# Patient Record
Sex: FEMALE | Race: OTHER | HISPANIC OR LATINO | Employment: OTHER | URBAN - METROPOLITAN AREA
[De-identification: names, ages, dates, MRNs, and addresses within clinical notes are randomized per-mention and may not be internally consistent; named-entity substitution may affect disease eponyms.]

---

## 2017-02-28 ENCOUNTER — GENERIC CONVERSION - ENCOUNTER (OUTPATIENT)
Dept: CARDIOLOGY CLINIC | Facility: CLINIC | Age: 52
End: 2017-02-28

## 2017-02-28 ENCOUNTER — GENERIC CONVERSION - ENCOUNTER (OUTPATIENT)
Dept: OTHER | Facility: OTHER | Age: 52
End: 2017-02-28

## 2017-03-13 ENCOUNTER — OUTPATIENT (OUTPATIENT)
Dept: OUTPATIENT SERVICES | Facility: HOSPITAL | Age: 52
LOS: 1 days | Discharge: HOME | End: 2017-03-13

## 2017-05-15 ENCOUNTER — GENERIC CONVERSION - ENCOUNTER (OUTPATIENT)
Dept: OTHER | Facility: OTHER | Age: 52
End: 2017-05-15

## 2017-06-27 DIAGNOSIS — Z79.01 LONG TERM (CURRENT) USE OF ANTICOAGULANTS: ICD-10-CM

## 2017-06-27 DIAGNOSIS — I51.3 INTRACARDIAC THROMBOSIS, NOT ELSEWHERE CLASSIFIED: ICD-10-CM

## 2017-10-10 ENCOUNTER — GENERIC CONVERSION - ENCOUNTER (OUTPATIENT)
Dept: OTHER | Facility: OTHER | Age: 52
End: 2017-10-10

## 2017-11-07 ENCOUNTER — GENERIC CONVERSION - ENCOUNTER (OUTPATIENT)
Dept: OTHER | Facility: OTHER | Age: 52
End: 2017-11-07

## 2017-11-09 ENCOUNTER — CONVERSION ENCOUNTER (OUTPATIENT)
Dept: MAMMOGRAPHY | Facility: CLINIC | Age: 52
End: 2017-11-09

## 2017-11-16 ENCOUNTER — ALLSCRIPTS OFFICE VISIT (OUTPATIENT)
Dept: OTHER | Facility: OTHER | Age: 52
End: 2017-11-16

## 2017-11-20 NOTE — CONSULTS
Assessment    1  Acute anterior wall MI (410 10) (I21 09)   2  Dyslipidemia (272 4) (E78 5)   3  3-vessel CAD (414 00) (I25 10)    Plan  3-vessel CAD    · CARDIAC ICD IMPLANT; Status:Need Information - Financial Authorization; Requestedfor:19Nov2017;    Perform:[facility]; YJT:[id number]; Ordered; For:3-vessel CAD; Ordered By:Maxim Fritz;   · EKG/ECG- POC; Status:Complete;   Done: [id number]   Perform: In Office; Due:16Nov2018; Last Updated Corina Felix; 11/16/2017 1:24:11 PM;Ordered;CAD; Steven Red By:Maxim Fritz;    Discussion/Summary    1  Ischemic cardiomyopathy ejection fraction 28% large anterior wall myocardial infarction certainly she is at risk for sudden cardiac death  I explained to her the nature of ICD therapy explained the not make her feel better but has the potential to make her live longer I did explain inherent risks of placing a defibrillator such as heart perforation need for reoperation infection  Given that she has an apical thrombus we will perform her ICD placement on warfarin therapy  This may increase her risk of bleeding slightly but certainly do not want to risk a cerebrovascular accident  History of HIV on treatment since 1985  History of hypertension well controlled  you for asking me to see this pleasant patient in consultation will arrange for single-chamber AICD placement we will check a warfarin level a few days before will hopefully operate with an INR between two and three  The patient was counseled regarding diagnostic results,-- instructions for management,-- risk factor reductions,-- prognosis,-- impressions,-- risks and benefits of treatment options  Chief Complaint  Pt is here for an EP consult for a possible ICD implant  Pt had an MI on 2/26/17 while living in [place] and she went to [facility] in [facility]  We have requested these records  Pt moved to [place] in March and started seeing Dr Navneet Julien   Pt does not have any current cardiac complaints or symptoms  History of Present Illness  Cardiology HPI Free Text Note Form [facility]: Consult for ICD placement prior anterior wall myocardial infarction without revascularization this occurred while the patient was in the [place]  She was recently seen by Dr Lucy Hyde in cardiology consultation  Echocardiogram showed severe LV systolic dysfunction with apical akinesis and paradoxical motion large nonmobile thrombus noted in the apex RV normal size perfusion scan showed ejection fraction 28% essentially no reversibility  is on appropriate medical therapy including entresto (has an ARB) , carvedilol and statin  is essentially New York heart Association class one to class two symptoms  point review of systems positive for lack of energy fatigue trouble sleeping weight changes constipation shortness of breath cough sputum and wheezing snoring present  All other ROS negative  history negative for hypertension coronary disease diabetes or sudden cardiac death  history smokes one pack a day has done so for 30 years single no alcohol use       Review of Systems    ROS reviewed  Active Problems  1  3-vessel CAD (414 00) (I25 10)   2  Acute anterior wall MI (410 10) (I21 09)   3  Back pain (724 5) (M54 9)   4  Dyslipidemia (272 4) (E78 5)   5  HIV (human immunodeficiency virus infection) (V08) (B20)   6  Hypertension (401 9) (I10)    Past Medical History   · History of cocaine abuse (305 63) (X17 595)   · History of S/P cardiac cath (V45 89) (Z98 890)    The active problems and past medical history were reviewed and updated today  Surgical History   · History of Hysterectomy    The surgical history was reviewed and updated today  Family History  Father    · Family history of    · Family history of gastrointestinal bleeding (V18 59) (Z83 79)  Brother    · Family history of    · Family history of Overdose  Family History Reviewed: The family history was reviewed and updated today  Social History     · Current every day smoker (305 1) (F17 200)  The social history was reviewed and updated today  The social history was reviewed and is unchanged  Current Meds   1  Aspirin 81 MG Oral Tablet Delayed Release; Take 1 tablet daily; Therapy: (Recorded:10Nov2017) to Recorded   2  Atorvastatin Calcium 40 MG Oral Tablet; Take 1 tablet daily; Therapy: (Recorded:10Nov2017) to Recorded   3  Carvedilol 6 25 MG Oral Tablet; Take 1 tablet twice a day with food; Therapy: (Recorded:10Nov2017) to Recorded   4  Coumadin 2 MG Oral Tablet; TAKE 1 TABLET DAILY AS DIRECTED; Therapy: (Recorded:10Nov2017) to Recorded   5  Descovy 200-25 MG Oral Tablet; TAKE 1 TABLET BY MOUTH DAILY; Therapy: (Recorded:10Nov2017) to Recorded   6  Entresto 24-26 MG Oral Tablet; TAKE 1 TABLET Bedtime; Therapy: (Recorded:10Nov2017) to Recorded   7  Tivicay 50 MG Oral Tablet; TAKE 1 TABLET BY MOUTH ONCE DAILY; Therapy: (Recorded:10Nov2017) to Recorded    The medication list was reviewed and updated today  Allergies  1  No Known Drug Allergies    Vitals  Signs     Heart Rate: 61  Systolic: 479, RUE, Sitting  Diastolic: 72, RUE, Sitting  Height: 5 ft 2 in  Weight: 178 lb 9 oz  BMI Calculated: 32 66  BSA Calculated: 1 82    Physical Exam   Constitutional - General appearance: No acute distress, well appearing and well nourished  Eyes - Conjunctiva and Sclera examination: Conjunctiva pink, sclera anicteric  Neck - Normal, no JVD   Pulmonary - Respiratory effort: No signs of respiratory distress  -- Auscultation of lungs: Clear to auscultation  Cardiovascular - Auscultation of heart: Normal rate and rhythm, normal S1 and S2, no murmurs  -- Pedal pulses: Normal, 2+ bilaterally  -- Examination of extremities for edema and/or varicosities: Normal    Abdomen - Soft  Musculoskeletal - Gait and station: Normal gait  Skin - Skin: Normal without rashes  Skin is warm and well perfused  Neurologic - Speech normal  No focal deficits  Psychiatric - Orientation to person, place, and time: Normal       Results/Data  Sinus rhythm with previous anterior wall myocardial infarction Q-waves V1 through V6 also possible old inferior infarction Q-waves to three AVF this may represent a wrap-around LAD      End of Encounter Meds    1  Carvedilol 6 25 MG Oral Tablet; Take 1 tablet twice a day with food; Therapy: (Recorded:10Nov2017) to Recorded    2  Atorvastatin Calcium 40 MG Oral Tablet; Take 1 tablet daily; Therapy: (Recorded:10Nov2017) to Recorded   3  Entresto 24-26 MG Oral Tablet; TAKE 1 TABLET Bedtime; Therapy: (Recorded:10Nov2017) to Recorded    4  Descovy 200-25 MG Oral Tablet; TAKE 1 TABLET BY MOUTH DAILY; Therapy: (Recorded:10Nov2017) to Recorded   5  Tivicay 50 MG Oral Tablet; TAKE 1 TABLET BY MOUTH ONCE DAILY; Therapy: (Recorded:10Nov2017) to Recorded    6  Aspirin 81 MG Oral Tablet Delayed Release; Take 1 tablet daily; Therapy: (Recorded:10Nov2017) to Recorded   7  Coumadin 2 MG Oral Tablet (Warfarin Sodium); TAKE 1 TABLET DAILY AS DIRECTED;  Therapy: (Recorded:10Nov2017) to Recorded    Signatures   Electronically signed by : Ludin Salter DO; Nov 19 2017  8:44PM EST                       (Author)

## 2017-12-13 ENCOUNTER — ANESTHESIA (OUTPATIENT)
Dept: INPATIENT UNIT | Facility: HOSPITAL | Age: 52
End: 2017-12-13
Payer: MEDICARE

## 2017-12-13 ENCOUNTER — ANESTHESIA EVENT (OUTPATIENT)
Dept: INPATIENT UNIT | Facility: HOSPITAL | Age: 52
End: 2017-12-13
Payer: MEDICARE

## 2017-12-13 ENCOUNTER — HOSPITAL ENCOUNTER (OUTPATIENT)
Dept: NON INVASIVE DIAGNOSTICS | Facility: HOSPITAL | Age: 52
Discharge: HOME | End: 2017-12-15
Attending: INTERNAL MEDICINE | Admitting: INTERNAL MEDICINE
Payer: MEDICARE

## 2017-12-13 ENCOUNTER — GENERIC CONVERSION - ENCOUNTER (OUTPATIENT)
Dept: OTHER | Facility: OTHER | Age: 52
End: 2017-12-13

## 2017-12-13 ENCOUNTER — APPOINTMENT (OUTPATIENT)
Dept: RADIOLOGY | Facility: HOSPITAL | Age: 52
End: 2017-12-13
Payer: MEDICARE

## 2017-12-13 DIAGNOSIS — I25.10 3-VESSEL CAD: ICD-10-CM

## 2017-12-13 DIAGNOSIS — Z95.810 ICD (IMPLANTABLE CARDIOVERTER-DEFIBRILLATOR) IN PLACE: Primary | ICD-10-CM

## 2017-12-13 LAB
ANION GAP SERPL CALCULATED.3IONS-SCNC: 5 MMOL/L (ref 4–13)
ATRIAL RATE: 60 BPM
BASOPHILS # BLD AUTO: 0.03 THOUSANDS/ΜL (ref 0–0.1)
BASOPHILS NFR BLD AUTO: 1 % (ref 0–1)
BUN SERPL-MCNC: 13 MG/DL (ref 5–25)
CALCIUM SERPL-MCNC: 9.3 MG/DL (ref 8.3–10.1)
CHLORIDE SERPL-SCNC: 109 MMOL/L (ref 100–108)
CO2 SERPL-SCNC: 28 MMOL/L (ref 21–32)
CREAT SERPL-MCNC: 1.47 MG/DL (ref 0.6–1.3)
EOSINOPHIL # BLD AUTO: 0.08 THOUSAND/ΜL (ref 0–0.61)
EOSINOPHIL NFR BLD AUTO: 2 % (ref 0–6)
ERYTHROCYTE [DISTWIDTH] IN BLOOD BY AUTOMATED COUNT: 15.5 % (ref 11.6–15.1)
GFR SERPL CREATININE-BSD FRML MDRD: 41 ML/MIN/1.73SQ M
GLUCOSE P FAST SERPL-MCNC: 101 MG/DL (ref 65–99)
GLUCOSE SERPL-MCNC: 101 MG/DL (ref 65–140)
HCG SERPL QL: NEGATIVE
HCT VFR BLD AUTO: 40.2 % (ref 34.8–46.1)
HGB BLD-MCNC: 13.5 G/DL (ref 11.5–15.4)
INR PPP: 1.27 (ref 0.86–1.16)
LYMPHOCYTES # BLD AUTO: 2.01 THOUSANDS/ΜL (ref 0.6–4.47)
LYMPHOCYTES NFR BLD AUTO: 42 % (ref 14–44)
MAGNESIUM SERPL-MCNC: 2.1 MG/DL (ref 1.6–2.6)
MCH RBC QN AUTO: 29.9 PG (ref 26.8–34.3)
MCHC RBC AUTO-ENTMCNC: 33.6 G/DL (ref 31.4–37.4)
MCV RBC AUTO: 89 FL (ref 82–98)
MONOCYTES # BLD AUTO: 0.34 THOUSAND/ΜL (ref 0.17–1.22)
MONOCYTES NFR BLD AUTO: 7 % (ref 4–12)
NEUTROPHILS # BLD AUTO: 2.37 THOUSANDS/ΜL (ref 1.85–7.62)
NEUTS SEG NFR BLD AUTO: 48 % (ref 43–75)
NRBC BLD AUTO-RTO: 0 /100 WBCS
P AXIS: 53 DEGREES
PLATELET # BLD AUTO: 173 THOUSANDS/UL (ref 149–390)
PMV BLD AUTO: 10.9 FL (ref 8.9–12.7)
POTASSIUM SERPL-SCNC: 4.4 MMOL/L (ref 3.5–5.3)
PR INTERVAL: 134 MS
PROTHROMBIN TIME: 16 SECONDS (ref 12.1–14.4)
QRS AXIS: -52 DEGREES
QRSD INTERVAL: 90 MS
QT INTERVAL: 478 MS
QTC INTERVAL: 478 MS
RBC # BLD AUTO: 4.51 MILLION/UL (ref 3.81–5.12)
SODIUM SERPL-SCNC: 142 MMOL/L (ref 136–145)
T WAVE AXIS: 75 DEGREES
VENTRICULAR RATE: 60 BPM
WBC # BLD AUTO: 4.84 THOUSAND/UL (ref 4.31–10.16)

## 2017-12-13 PROCEDURE — 83735 ASSAY OF MAGNESIUM: CPT | Performed by: PHYSICIAN ASSISTANT

## 2017-12-13 PROCEDURE — C1722 AICD, SINGLE CHAMBER: HCPCS

## 2017-12-13 PROCEDURE — 80048 BASIC METABOLIC PNL TOTAL CA: CPT | Performed by: PHYSICIAN ASSISTANT

## 2017-12-13 PROCEDURE — 85025 COMPLETE CBC W/AUTO DIFF WBC: CPT | Performed by: PHYSICIAN ASSISTANT

## 2017-12-13 PROCEDURE — 85610 PROTHROMBIN TIME: CPT | Performed by: PHYSICIAN ASSISTANT

## 2017-12-13 PROCEDURE — C1777 LEAD, AICD, ENDO SINGLE COIL: HCPCS

## 2017-12-13 PROCEDURE — C1892 INTRO/SHEATH,FIXED,PEEL-AWAY: HCPCS | Performed by: INTERNAL MEDICINE

## 2017-12-13 PROCEDURE — 84703 CHORIONIC GONADOTROPIN ASSAY: CPT | Performed by: PHYSICIAN ASSISTANT

## 2017-12-13 PROCEDURE — 33249 INSJ/RPLCMT DEFIB W/LEAD(S): CPT | Performed by: INTERNAL MEDICINE

## 2017-12-13 PROCEDURE — 71010 HB CHEST X-RAY 1 VIEW FRONTAL (PORTABLE): CPT

## 2017-12-13 PROCEDURE — 93005 ELECTROCARDIOGRAM TRACING: CPT | Performed by: PHYSICIAN ASSISTANT

## 2017-12-13 RX ORDER — CARVEDILOL 6.25 MG/1
6.25 TABLET ORAL 2 TIMES DAILY WITH MEALS
COMMUNITY
End: 2018-12-12 | Stop reason: SDUPTHER

## 2017-12-13 RX ORDER — LIDOCAINE HYDROCHLORIDE 10 MG/ML
INJECTION, SOLUTION INFILTRATION; PERINEURAL AS NEEDED
Status: DISCONTINUED | OUTPATIENT
Start: 2017-12-13 | End: 2017-12-13 | Stop reason: SURG

## 2017-12-13 RX ORDER — ASPIRIN 81 MG/1
81 TABLET ORAL DAILY
Status: DISCONTINUED | OUTPATIENT
Start: 2017-12-13 | End: 2017-12-15 | Stop reason: HOSPADM

## 2017-12-13 RX ORDER — CARVEDILOL 6.25 MG/1
6.25 TABLET ORAL 2 TIMES DAILY WITH MEALS
Status: DISCONTINUED | OUTPATIENT
Start: 2017-12-13 | End: 2017-12-15 | Stop reason: HOSPADM

## 2017-12-13 RX ORDER — ATORVASTATIN CALCIUM 40 MG/1
40 TABLET, FILM COATED ORAL DAILY
Status: DISCONTINUED | OUTPATIENT
Start: 2017-12-13 | End: 2017-12-15 | Stop reason: HOSPADM

## 2017-12-13 RX ORDER — SODIUM CHLORIDE 9 MG/ML
INJECTION, SOLUTION INTRAVENOUS CONTINUOUS PRN
Status: DISCONTINUED | OUTPATIENT
Start: 2017-12-13 | End: 2017-12-13 | Stop reason: SURG

## 2017-12-13 RX ORDER — FENTANYL CITRATE 50 UG/ML
25 INJECTION, SOLUTION INTRAMUSCULAR; INTRAVENOUS EVERY 2 HOUR PRN
Status: DISCONTINUED | OUTPATIENT
Start: 2017-12-13 | End: 2017-12-15 | Stop reason: HOSPADM

## 2017-12-13 RX ORDER — ASPIRIN 81 MG/1
81 TABLET ORAL DAILY
COMMUNITY
End: 2019-08-12 | Stop reason: SDUPTHER

## 2017-12-13 RX ORDER — PROPOFOL 10 MG/ML
INJECTION, EMULSION INTRAVENOUS CONTINUOUS PRN
Status: DISCONTINUED | OUTPATIENT
Start: 2017-12-13 | End: 2017-12-13 | Stop reason: SURG

## 2017-12-13 RX ORDER — OXYCODONE HYDROCHLORIDE AND ACETAMINOPHEN 5; 325 MG/1; MG/1
1 TABLET ORAL EVERY 4 HOURS PRN
Status: DISCONTINUED | OUTPATIENT
Start: 2017-12-13 | End: 2017-12-15 | Stop reason: HOSPADM

## 2017-12-13 RX ORDER — ACETAMINOPHEN 325 MG/1
650 TABLET ORAL EVERY 4 HOURS PRN
Status: DISCONTINUED | OUTPATIENT
Start: 2017-12-13 | End: 2017-12-15 | Stop reason: HOSPADM

## 2017-12-13 RX ORDER — WARFARIN SODIUM 2 MG/1
5 TABLET ORAL
COMMUNITY
End: 2018-09-12 | Stop reason: SDUPTHER

## 2017-12-13 RX ORDER — WARFARIN SODIUM 5 MG/1
5 TABLET ORAL
Status: DISCONTINUED | OUTPATIENT
Start: 2017-12-13 | End: 2017-12-15 | Stop reason: HOSPADM

## 2017-12-13 RX ORDER — ATORVASTATIN CALCIUM 40 MG/1
40 TABLET, FILM COATED ORAL DAILY
COMMUNITY
End: 2018-09-11 | Stop reason: SDUPTHER

## 2017-12-13 RX ORDER — LIDOCAINE HYDROCHLORIDE 10 MG/ML
INJECTION, SOLUTION INFILTRATION; PERINEURAL CODE/TRAUMA/SEDATION MEDICATION
Status: COMPLETED | OUTPATIENT
Start: 2017-12-13 | End: 2017-12-13

## 2017-12-13 RX ORDER — GENTAMICIN SULFATE 40 MG/ML
INJECTION, SOLUTION INTRAMUSCULAR; INTRAVENOUS CODE/TRAUMA/SEDATION MEDICATION
Status: COMPLETED | OUTPATIENT
Start: 2017-12-13 | End: 2017-12-13

## 2017-12-13 RX ADMIN — SODIUM CHLORIDE: 0.9 INJECTION, SOLUTION INTRAVENOUS at 10:05

## 2017-12-13 RX ADMIN — OXYCODONE HYDROCHLORIDE AND ACETAMINOPHEN 1 TABLET: 5; 325 TABLET ORAL at 17:11

## 2017-12-13 RX ADMIN — WARFARIN SODIUM 5 MG: 5 TABLET ORAL at 17:11

## 2017-12-13 RX ADMIN — IOHEXOL 10 ML: 350 INJECTION, SOLUTION INTRAVENOUS at 10:56

## 2017-12-13 RX ADMIN — LIDOCAINE HYDROCHLORIDE 50 MG: 10 INJECTION, SOLUTION INFILTRATION; PERINEURAL at 10:29

## 2017-12-13 RX ADMIN — GENTAMICIN SULFATE 80 MG: 40 INJECTION, SOLUTION INTRAMUSCULAR; INTRAVENOUS at 11:08

## 2017-12-13 RX ADMIN — SACUBITRIL AND VALSARTAN 1 TABLET: 24; 26 TABLET, FILM COATED ORAL at 21:13

## 2017-12-13 RX ADMIN — CARVEDILOL 6.25 MG: 6.25 TABLET, FILM COATED ORAL at 17:11

## 2017-12-13 RX ADMIN — PROPOFOL 80 MCG/KG/MIN: 10 INJECTION, EMULSION INTRAVENOUS at 10:29

## 2017-12-13 RX ADMIN — CEFAZOLIN SODIUM 1000 MG: 1 SOLUTION INTRAVENOUS at 10:42

## 2017-12-13 RX ADMIN — OXYCODONE HYDROCHLORIDE AND ACETAMINOPHEN 1 TABLET: 5; 325 TABLET ORAL at 13:20

## 2017-12-13 RX ADMIN — LIDOCAINE HYDROCHLORIDE 30 ML: 10 INJECTION, SOLUTION INFILTRATION; PERINEURAL at 10:43

## 2017-12-13 RX ADMIN — CEFAZOLIN SODIUM 1000 MG: 1 SOLUTION INTRAVENOUS at 10:25

## 2017-12-13 RX ADMIN — LIDOCAINE HYDROCHLORIDE 10 ML: 10 INJECTION, SOLUTION INFILTRATION; PERINEURAL at 10:44

## 2017-12-13 RX ADMIN — OXYCODONE HYDROCHLORIDE AND ACETAMINOPHEN 1 TABLET: 5; 325 TABLET ORAL at 21:20

## 2017-12-13 NOTE — CASE MANAGEMENT
OP/NCB    Cardiac ICD Implant    /64   Pulse 61   Temp 97 8 °F (36 6 °C) (Oral)   Resp 18   Ht 5' 2" (1 575 m)   Wt 80 7 kg (178 lb)   SpO2 96%   BMI 32 56 kg/m²     Scheduled Meds:   Continuous Infusions:  ceFAZolin      PRN Meds:    ceFAZolin    gentamicin    iohexol    lidocaine

## 2017-12-13 NOTE — PROGRESS NOTES
Patient returned from cath lab at this time, patient c/o incisional pain, which EP cardiology is aware of  Right chest wall aquacel dressing is clean dry and intact  Telemetry applied  Primary RN aware of patient's return  Bed in low position, ice bag applied to site, call bell and belongings within reach

## 2017-12-13 NOTE — ANESTHESIA PREPROCEDURE EVALUATION
Review of Systems/Medical History  Patient summary reviewed  Chart reviewed  No history of anesthetic complications     Cardiovascular  EKG reviewed, Hypertension , Past MI Acute Anterior, CAD, CAD status: 3VD, CHF ,   Comment: Normal sinus rhythm  Low voltage QRS  Left anterior fascicular block  Inferior infarct , age undetermined  Anterolateral infarct , age undetermined  Abnormal ECG  No previous ECGs available  Confirmed by Trip Darling ([id number]) on 12/13/2017 7:42:43 AM    Specimen Collected: 12/13/17 07:41  Last Resulted: 12/13/17 07:42      Ischemic cardiomyopathy ejection fraction 28% ,  Pulmonary  Smoker cigarette smoker , ,        GI/Hepatic            Endo/Other     GYN       Hematology    Immunocompromised state HIV/AIDS,    Musculoskeletal       Neurology   Psychology     Comment: History of cocaine abuse         Physical Exam    Airway    Mallampati score: II  TM Distance: >3 FB  Neck ROM: full     Dental       Cardiovascular  Cardiovascular exam normal    Pulmonary  Pulmonary exam normal Breath sounds clear to auscultation,     Other Findings        Anesthesia Plan  ASA Score- 3       Anesthesia Type- IV sedation with anesthesia with ASA Monitors  Additional Monitors: arterial line  Airway Plan:     Comment: Possible arterial line  Induction- intravenous  Informed Consent- Anesthetic plan and risks discussed with patient  I personally reviewed this patient with the CRNA  Discussed and agreed on the Anesthesia Plan with the CRNA       Lab Results   Component Value Date    WBC 4 84 12/13/2017    HGB 13 5 12/13/2017    HCT 40 2 12/13/2017    MCV 89 12/13/2017     12/13/2017     Lab Results   Component Value Date    GLUCOSE 101 12/13/2017    CALCIUM 9 3 12/13/2017     12/13/2017    K 4 4 12/13/2017    CO2 28 12/13/2017     (H) 12/13/2017    BUN 13 12/13/2017    CREATININE 1 47 (H) 12/13/2017     Lab Results   Component Value Date    INR 1 27 (H) 12/13/2017 PROTIME 16 0 (H) 12/13/2017     No results found for: PTT        I, Dr Blayne Bergman, the attending physician, have personally seen and evaluated the patient prior to anesthetic care  I have reviewed the pre-anesthetic record, and other medical records if appropriate to the anesthetic care  If a CRNA is involved in the case, I have reviewed the CRNA assessment, if present, and agree  The patient is in a suitable condition to proceed with my formulated anesthetic plan

## 2017-12-13 NOTE — ANESTHESIA POSTPROCEDURE EVALUATION
Post-Op Assessment Note      CV Status:  Stable    Mental Status:  Alert    Hydration Status:  Stable    PONV Controlled:  None    Airway Patency:  Patent    Post Op Vitals Reviewed: Yes          Staff: CRNA           BP   110/61   Temp      Pulse  70   Resp   24   SpO2   100

## 2017-12-14 ENCOUNTER — APPOINTMENT (OUTPATIENT)
Dept: NON INVASIVE DIAGNOSTICS | Facility: HOSPITAL | Age: 52
End: 2017-12-14
Payer: MEDICARE

## 2017-12-14 ENCOUNTER — APPOINTMENT (OUTPATIENT)
Dept: RADIOLOGY | Facility: HOSPITAL | Age: 52
End: 2017-12-14
Attending: INTERNAL MEDICINE
Payer: MEDICARE

## 2017-12-14 LAB
ANION GAP SERPL CALCULATED.3IONS-SCNC: 5 MMOL/L (ref 4–13)
BUN SERPL-MCNC: 16 MG/DL (ref 5–25)
CALCIUM SERPL-MCNC: 8.6 MG/DL (ref 8.3–10.1)
CHLORIDE SERPL-SCNC: 106 MMOL/L (ref 100–108)
CO2 SERPL-SCNC: 30 MMOL/L (ref 21–32)
CREAT SERPL-MCNC: 1.68 MG/DL (ref 0.6–1.3)
ERYTHROCYTE [DISTWIDTH] IN BLOOD BY AUTOMATED COUNT: 15.9 % (ref 11.6–15.1)
GFR SERPL CREATININE-BSD FRML MDRD: 35 ML/MIN/1.73SQ M
GLUCOSE SERPL-MCNC: 111 MG/DL (ref 65–140)
HCT VFR BLD AUTO: 37 % (ref 34.8–46.1)
HGB BLD-MCNC: 12.3 G/DL (ref 11.5–15.4)
INR PPP: 1.21 (ref 0.86–1.16)
MCH RBC QN AUTO: 30 PG (ref 26.8–34.3)
MCHC RBC AUTO-ENTMCNC: 33.2 G/DL (ref 31.4–37.4)
MCV RBC AUTO: 90 FL (ref 82–98)
PLATELET # BLD AUTO: 150 THOUSANDS/UL (ref 149–390)
PMV BLD AUTO: 10.4 FL (ref 8.9–12.7)
POTASSIUM SERPL-SCNC: 4.4 MMOL/L (ref 3.5–5.3)
PROTHROMBIN TIME: 15.4 SECONDS (ref 12.1–14.4)
RBC # BLD AUTO: 4.1 MILLION/UL (ref 3.81–5.12)
SODIUM SERPL-SCNC: 141 MMOL/L (ref 136–145)
WBC # BLD AUTO: 5.2 THOUSAND/UL (ref 4.31–10.16)

## 2017-12-14 PROCEDURE — 85610 PROTHROMBIN TIME: CPT | Performed by: PHYSICIAN ASSISTANT

## 2017-12-14 PROCEDURE — 80048 BASIC METABOLIC PNL TOTAL CA: CPT | Performed by: PHYSICIAN ASSISTANT

## 2017-12-14 PROCEDURE — 93308 TTE F-UP OR LMTD: CPT

## 2017-12-14 PROCEDURE — 85027 COMPLETE CBC AUTOMATED: CPT | Performed by: PHYSICIAN ASSISTANT

## 2017-12-14 PROCEDURE — 71020 HB CHEST X-RAY 2VW FRONTAL&LATL: CPT

## 2017-12-14 RX ADMIN — ACETAMINOPHEN 650 MG: 325 TABLET, FILM COATED ORAL at 20:58

## 2017-12-14 RX ADMIN — DOLUTEGRAVIR SODIUM 50 MG: 50 TABLET, FILM COATED ORAL at 13:14

## 2017-12-14 RX ADMIN — OXYCODONE HYDROCHLORIDE AND ACETAMINOPHEN 1 TABLET: 5; 325 TABLET ORAL at 15:51

## 2017-12-14 RX ADMIN — WARFARIN SODIUM 5 MG: 5 TABLET ORAL at 17:09

## 2017-12-14 RX ADMIN — ATORVASTATIN CALCIUM 40 MG: 40 TABLET, FILM COATED ORAL at 09:21

## 2017-12-14 RX ADMIN — ACETAMINOPHEN 650 MG: 325 TABLET, FILM COATED ORAL at 13:21

## 2017-12-14 RX ADMIN — OXYCODONE HYDROCHLORIDE AND ACETAMINOPHEN 1 TABLET: 5; 325 TABLET ORAL at 09:21

## 2017-12-14 RX ADMIN — EMTRICITABINE AND TENOFOVIR ALAFENAMIDE 1 TABLET: 200; 25 TABLET ORAL at 13:14

## 2017-12-14 RX ADMIN — ACETAMINOPHEN 650 MG: 325 TABLET, FILM COATED ORAL at 04:00

## 2017-12-14 RX ADMIN — ASPIRIN 81 MG: 81 TABLET ORAL at 09:21

## 2017-12-14 NOTE — PROGRESS NOTES
Patient ambulating without difficulty, BPs still low manually, pt asymptomatic  Will continue to monitor

## 2017-12-14 NOTE — PROGRESS NOTES
Progress Note - Electrophysiology-Cardiology (EP)   Yolanda Escalera 47 y o  female MRN: [id number]  Unit/Bed#: 2 210-01 Encounter: [id number]      Assessment:  1  Ischemic CMP, EF 28%  2  HTN  3  HIV, on treatment    Plan:  -pt with hypotension this am   Meds were held  -BP improved, echo done to r/o pericardial effusion shows small anterior effusion, not likely cause of hypotension  Will repeat in am and keep again overnight  Order Entresto qd in pm as at home   -site, xray and device check nml  -per dr Cecelia Scanlon  Usual coumadin dosing  Subjective/Objective     Subjective: Pt denies CP, SOB or dizziness  Site pain controlled    Objective:     Vitals: /58   Pulse (!) 51   Temp 98 °F (36 7 °C) (Oral)   Resp 16   Ht 5' 2" (1 575 m)   Wt 80 7 kg (178 lb)   SpO2 99%   BMI 32 56 kg/m²   Vitals:    12/13/17 0745   Weight: 80 7 kg (178 lb)     Orthostatic Blood Pressures    Flowsheet Row Most Recent Value   Blood Pressure  107/58 filed at 12/14/2017 1459   Patient Position - Orthostatic VS  Lying filed at 12/14/2017 1459          Physical Exam:   VS: Blood pressure 107/58, pulse (!) 51, temperature 98 °F (36 7 °C), temperature source Oral, resp  rate 16, height 5' 2" (1 575 m), weight 80 7 kg (178 lb), SpO2 99 %  Gen  Pt in NAD  Skin: warm and dry without rashes, clubbing or edema  CV: RRR, +S1, S2  Resp   CTA b/l, no w/r/r  Abdomen: soft, NT, ND, +BS  Extr : no LE edema b/l  Psych: normal affect and mood  Neuro: No focal deficits, AAOx3  Device site-aquacel in place, no hematoma or ecchymosis        Scheduled Meds:  aspirin 81 mg Oral Daily   atorvastatin 40 mg Oral Daily   carvedilol 6 25 mg Oral BID With Meals   dolutegravir 50 mg Oral Daily   emtricitabine-tenofovir AF 1 tablet Oral Daily   sacubitril-valsartan 1 tablet Oral HS   warfarin 5 mg Oral Daily (warfarin)     Continuous Infusions:   PRN Meds:   acetaminophen    fentanyl citrate (PF)    oxyCODONE-acetaminophen      Intake/Output Summary (Last 24 hours) at 12/14/17 1608  Last data filed at 12/14/17 1321   Gross per 24 hour   Intake              420 ml   Output              200 ml   Net              220 ml       Invasive Devices     Peripheral Intravenous Line            Peripheral IV 12/13/17 Left Antecubital 1 day    Peripheral IV 12/13/17 Right Hand 1 day                            Lab Results:   CBC with diff:   Results from last 7 days  Lab Units 12/14/17  0440   WBC Thousand/uL 5 20   RBC Million/uL 4 10   HEMOGLOBIN g/dL 12 3   HEMATOCRIT % 37 0   MCV fL 90   MCH pg 30 0   MCHC g/dL 33 2   RDW % 15 9*   MPV fL 10 4   PLATELETS Thousands/uL 150     CMP:   Results from last 7 days  Lab Units 12/14/17  0440   SODIUM mmol/L 141   POTASSIUM mmol/L 4 4   CHLORIDE mmol/L 106   CO2 mmol/L 30   ANION GAP mmol/L 5   BUN mg/dL 16   CREATININE mg/dL 1 68*   GLUCOSE RANDOM mg/dL 111   CALCIUM mg/dL 8 6   EGFR ml/min/1 73sq m 35     TSH:     Magnesium:   Results from last 7 days  Lab Units 12/13/17  0824   MAGNESIUM mg/dL 2 1       Counseling / Coordination of Care  Total time spent today 30 minutes minutes  Greater than 50% of total time was spent with the patient and / or family counseling and / or coordination of care

## 2017-12-14 NOTE — PLAN OF CARE
Problem: PAIN - ADULT  Goal: Verbalizes/displays adequate comfort level or baseline comfort level  Interventions:  - Encourage patient to monitor pain and request assistance  - Assess pain using appropriate pain scale  - Administer analgesics based on type and severity of pain and evaluate response  - Implement non-pharmacological measures as appropriate and evaluate response  - Consider cultural and social influences on pain and pain management  - Notify physician/advanced practitioner if interventions unsuccessful or patient reports new pain  Outcome: Progressing      Problem: INFECTION - ADULT  Goal: Absence or prevention of progression during hospitalization  INTERVENTIONS:  - Assess and monitor for signs and symptoms of infection  - Monitor lab/diagnostic results  - Monitor all insertion sites, i e  indwelling lines, tubes, and drains  - Monitor endotracheal (as able) and nasal secretions for changes in amount and color  - [place] appropriate cooling/warming therapies per order  - Administer medications as ordered  - Instruct and encourage patient and family to use good hand hygiene technique  - Identify and instruct in appropriate isolation precautions for identified infection/condition  Outcome: Progressing    Goal: Absence of fever/infection during neutropenic period  INTERVENTIONS:  - Monitor WBC  - Implement neutropenic guidelines  Outcome: Progressing      Problem: SAFETY ADULT  Goal: Patient will remain free of falls  INTERVENTIONS:  - Assess patient frequently for physical needs  -  Identify cognitive and physical deficits and behaviors that affect risk of falls    -  [place] fall precautions as indicated by assessment   - Educate patient/family on patient safety including physical limitations  - Instruct patient to call for assistance with activity based on assessment  - Modify environment to reduce risk of injury  - Consider OT/PT consult to assist with strengthening/mobility  Outcome: Progressing    Goal: Maintain or return to baseline ADL function  INTERVENTIONS:  -  Assess patient's ability to carry out ADLs; assess patient's baseline for ADL function and identify physical deficits which impact ability to perform ADLs (bathing, care of mouth/teeth, toileting, grooming, dressing, etc )  - Assess/evaluate cause of self-care deficits   - Assess range of motion  - Assess patient's mobility; develop plan if impaired  - Assess patient's need for assistive devices and provide as appropriate  - Encourage maximum independence but intervene and supervise when necessary  ¯ Involve family in performance of ADLs  ¯ Assess for home care needs following discharge   ¯ Request OT consult to assist with ADL evaluation and planning for discharge  ¯ Provide patient education as appropriate  Outcome: Progressing    Goal: Maintain or return mobility status to optimal level  INTERVENTIONS:  - Assess patient's baseline mobility status (ambulation, transfers, stairs, etc )    - Identify cognitive and physical deficits and behaviors that affect mobility  - Identify mobility aids required to assist with transfers and/or ambulation (gait belt, sit-to-stand, lift, walker, cane, etc )  - [place] fall precautions as indicated by assessment  - Record patient progress and toleration of activity level on Mobility SBAR; progress patient to next Phase/Stage  - Instruct patient to call for assistance with activity based on assessment  - Request Rehabilitation consult to assist with strengthening/weightbearing, etc   Outcome: Progressing      Problem: DISCHARGE PLANNING  Goal: Discharge to home or other facility with appropriate resources  INTERVENTIONS:  - Identify barriers to discharge w/patient and caregiver  - Arrange for needed discharge resources and transportation as appropriate  - Identify discharge learning needs (meds, wound care, etc )  - Arrange for interpretive services to assist at discharge as needed  - Refer to Case Management Department for coordinating discharge planning if the patient needs post-hospital services based on physician/advanced practitioner order or complex needs related to functional status, cognitive ability, or social support system  Outcome: Progressing      Problem: Knowledge Deficit  Goal: Patient/family/caregiver demonstrates understanding of disease process, treatment plan, medications, and discharge instructions  Complete learning assessment and assess knowledge base    Interventions:  - Provide teaching at level of understanding  - Provide teaching via preferred learning methods  Outcome: Progressing

## 2017-12-14 NOTE — PROGRESS NOTES
Paged dr Christensen Fear, on call fellow, regarding pt's hypotension and HR in high 40s-low 50s  Pt had a couple of BP meds in the evening and have been getting a percocet for pain  Pt is asking for another percocet and my concern of current vitals  Per doctor, see if pt would take tylenol for now d/t low HR/BP

## 2017-12-14 NOTE — CASE MANAGEMENT
Continued Stay Review    Date: 12/14/2017    Vital Signs: BP (!) 84/54   Pulse (!) 51   Temp 97 9 °F (36 6 °C) (Oral)   Resp 16   Ht 5' 2" (1 575 m)   Wt 80 7 kg (178 lb)   SpO2 97%   BMI 32 56 kg/m²     Medications:   Scheduled Meds:   aspirin 81 mg Oral Daily   atorvastatin 40 mg Oral Daily   carvedilol 6 25 mg Oral BID With Meals   dolutegravir 50 mg Oral Daily   emtricitabine-tenofovir AF 1 tablet Oral Daily   sacubitril-valsartan 1 tablet Oral BID   warfarin 5 mg Oral Daily (warfarin)     Continuous Infusions:    PRN Meds:   acetaminophen    fentanyl citrate (PF)    PO oxyCODONE-acetaminophen x3    Abnormal Labs/Diagnostic Results:     Age/Sex: 47 y o  female     Assessment/Plan:   1  Ischemic CMP, EF 28%  2  HTN  3  HIV, on treatment     Plan:  -pt with hypotension this am   Meds were held  -BP improved, echo done to r/o pericardial effusion shows small anterior effusion, not likely cause of hypotension  Will repeat in am and keep again overnight  Order Entresto qd in pm as at home   -site, xray and device check nml  -per dr Nuvia Jerez  Usual coumadin dosing  Discharge Plan: TBD    HR in high 40s-low 50s  Pt had a couple of BP meds in the evening and have been getting a percocet for pain  Pt is asking for another percocet and my concern of current vitals

## 2017-12-14 NOTE — PROGRESS NOTES
Rounding done with Raji Carr with cardiology  Aware of patient's low blood pressures  Patient has ambulated around the unit without difficulty, has no c/o dizziness or lightheadedness  Ambulation charted appropriately  Will continue to monitor

## 2017-12-15 ENCOUNTER — APPOINTMENT (OUTPATIENT)
Dept: NON INVASIVE DIAGNOSTICS | Facility: HOSPITAL | Age: 52
End: 2017-12-15
Payer: MEDICARE

## 2017-12-15 VITALS
RESPIRATION RATE: 18 BRPM | WEIGHT: 178 LBS | OXYGEN SATURATION: 98 % | BODY MASS INDEX: 32.76 KG/M2 | SYSTOLIC BLOOD PRESSURE: 107 MMHG | HEIGHT: 62 IN | TEMPERATURE: 97.7 F | HEART RATE: 54 BPM | DIASTOLIC BLOOD PRESSURE: 56 MMHG

## 2017-12-15 LAB
ANION GAP SERPL CALCULATED.3IONS-SCNC: 4 MMOL/L (ref 4–13)
BASOPHILS # BLD AUTO: 0.02 THOUSANDS/ΜL (ref 0–0.1)
BASOPHILS NFR BLD AUTO: 0 % (ref 0–1)
BUN SERPL-MCNC: 18 MG/DL (ref 5–25)
CALCIUM SERPL-MCNC: 8.6 MG/DL (ref 8.3–10.1)
CHLORIDE SERPL-SCNC: 108 MMOL/L (ref 100–108)
CO2 SERPL-SCNC: 29 MMOL/L (ref 21–32)
CREAT SERPL-MCNC: 1.5 MG/DL (ref 0.6–1.3)
EOSINOPHIL # BLD AUTO: 0.11 THOUSAND/ΜL (ref 0–0.61)
EOSINOPHIL NFR BLD AUTO: 3 % (ref 0–6)
ERYTHROCYTE [DISTWIDTH] IN BLOOD BY AUTOMATED COUNT: 15.9 % (ref 11.6–15.1)
GFR SERPL CREATININE-BSD FRML MDRD: 40 ML/MIN/1.73SQ M
GLUCOSE SERPL-MCNC: 80 MG/DL (ref 65–140)
HCT VFR BLD AUTO: 38.5 % (ref 34.8–46.1)
HGB BLD-MCNC: 12.7 G/DL (ref 11.5–15.4)
INR PPP: 1.22 (ref 0.86–1.16)
LYMPHOCYTES # BLD AUTO: 1.98 THOUSANDS/ΜL (ref 0.6–4.47)
LYMPHOCYTES NFR BLD AUTO: 44 % (ref 14–44)
MCH RBC QN AUTO: 29.9 PG (ref 26.8–34.3)
MCHC RBC AUTO-ENTMCNC: 33 G/DL (ref 31.4–37.4)
MCV RBC AUTO: 91 FL (ref 82–98)
MONOCYTES # BLD AUTO: 0.39 THOUSAND/ΜL (ref 0.17–1.22)
MONOCYTES NFR BLD AUTO: 9 % (ref 4–12)
NEUTROPHILS # BLD AUTO: 1.96 THOUSANDS/ΜL (ref 1.85–7.62)
NEUTS SEG NFR BLD AUTO: 44 % (ref 43–75)
NRBC BLD AUTO-RTO: 0 /100 WBCS
PLATELET # BLD AUTO: 144 THOUSANDS/UL (ref 149–390)
PMV BLD AUTO: 11.2 FL (ref 8.9–12.7)
POTASSIUM SERPL-SCNC: 4.4 MMOL/L (ref 3.5–5.3)
PROTHROMBIN TIME: 15.5 SECONDS (ref 12.1–14.4)
RBC # BLD AUTO: 4.25 MILLION/UL (ref 3.81–5.12)
SODIUM SERPL-SCNC: 141 MMOL/L (ref 136–145)
WBC # BLD AUTO: 4.46 THOUSAND/UL (ref 4.31–10.16)

## 2017-12-15 PROCEDURE — 85610 PROTHROMBIN TIME: CPT | Performed by: PHYSICIAN ASSISTANT

## 2017-12-15 PROCEDURE — 80048 BASIC METABOLIC PNL TOTAL CA: CPT | Performed by: PHYSICIAN ASSISTANT

## 2017-12-15 PROCEDURE — 93308 TTE F-UP OR LMTD: CPT

## 2017-12-15 PROCEDURE — 85025 COMPLETE CBC W/AUTO DIFF WBC: CPT | Performed by: PHYSICIAN ASSISTANT

## 2017-12-15 RX ORDER — OXYCODONE HYDROCHLORIDE AND ACETAMINOPHEN 5; 325 MG/1; MG/1
1 TABLET ORAL EVERY 4 HOURS PRN
Qty: 10 TABLET | Refills: 0 | Status: SHIPPED | OUTPATIENT
Start: 2017-12-15 | End: 2017-12-25

## 2017-12-15 RX ADMIN — OXYCODONE HYDROCHLORIDE AND ACETAMINOPHEN 1 TABLET: 5; 325 TABLET ORAL at 13:55

## 2017-12-15 RX ADMIN — ASPIRIN 81 MG: 81 TABLET ORAL at 09:05

## 2017-12-15 RX ADMIN — ACETAMINOPHEN 650 MG: 325 TABLET, FILM COATED ORAL at 03:30

## 2017-12-15 RX ADMIN — EMTRICITABINE AND TENOFOVIR ALAFENAMIDE 1 TABLET: 200; 25 TABLET ORAL at 09:13

## 2017-12-15 RX ADMIN — ATORVASTATIN CALCIUM 40 MG: 40 TABLET, FILM COATED ORAL at 09:05

## 2017-12-15 RX ADMIN — DOLUTEGRAVIR SODIUM 50 MG: 50 TABLET, FILM COATED ORAL at 09:12

## 2017-12-15 RX ADMIN — ACETAMINOPHEN 650 MG: 325 TABLET, FILM COATED ORAL at 09:10

## 2017-12-15 NOTE — DISCHARGE INSTRUCTIONS
Please continue your Coumadin and have an INR on Monday  We did not change any of year medications so the should be continued as prior to admission  Please refer to post pacemaker/ICD implantation discharge instructions and restrictions and your ICD booklet/temporary card  Keep incision dry for one week  Do not use lotions/powders/creams on incision  Remove outer bandage 7 days after implantation  This bandage is waterproof so you can shower with it in place  No overhead reaching/pushing/pulling/lifting greater than 5-10lbs with left arm for one month  Please call the office if you notice redness, swelling, bleeding, or drainage from incision or if you develop fevers  Implantable Cardioverter Defibrillator   WHAT YOU NEED TO KNOW:   An implantable cardioverter defibrillator (ICD) is a small device that monitors your heart rate and rhythm  It is placed inside your chest or abdomen  It may be used if you have an arrhythmia  An arrhythmia is an irregular heart rate or a heart rate that is too fast or too slow  Some arrhythmias may cause your heart to suddenly stop beating  An ICD can give a shock to your heart to make it start beating again  It can also make your heart beat faster or slower  DISCHARGE INSTRUCTIONS:   Call 911 for any of the following:   · You have any of the following signs of a heart attack:      ¨ Squeezing, pressure, or pain in your chest that lasts longer than 5 minutes or returns    ¨ Discomfort or pain in your back, neck, jaw, stomach, or arm     ¨ Trouble breathing    ¨ Nausea or vomiting    ¨ Lightheadedness or a sudden cold sweat, especially with chest pain or trouble breathing    · You become weak, dizzy, or faint  · You feel your heart skip beats or beat very fast or slow, but you do not feel a shock from your ICD  · You feel lightheaded, short of breath, and have chest pain  · You cough up blood  · You have trouble breathing    Seek care immediately if: · Your arm or leg feels warm, tender, and painful  It may look swollen and red  · Your stitches or staples come apart  · Blood soaks through your bandage  · You feel more than 3 shocks in a row from your ICD  Contact your healthcare provider if:   · You have a fever  · You feel 1 or more shocks from your ICD and feel fine afterwards  · Your feet or ankles swell  · The skin around your stitches or staples is red, swollen, or draining pus or fluid  · You have chills, a cough, and feel weak or achy  · You are sad or anxious and find it hard to do your usual activities  · You have questions or concerns about your condition or care  Medicines: You may need any of the following:  · Heart medicine  may be given to strengthen or control your heartbeat  · Blood thinners    help prevent blood clots  Examples of blood thinners include heparin and warfarin  Clots can cause strokes, heart attacks, and death  The following are general safety guidelines to follow while you are taking a blood thinner:    ¨ Watch for bleeding and bruising while you take blood thinners  Watch for bleeding from your gums or nose  Watch for blood in your urine and bowel movements  Use a soft washcloth on your skin, and a soft toothbrush to brush your teeth  This can keep your skin and gums from bleeding  If you shave, use an electric shaver  Do not play contact sports  ¨ Tell your dentist and other healthcare providers that you take anticoagulants  Wear a bracelet or necklace that says you take this medicine  ¨ Do not start or stop any medicines unless your healthcare provider tells you to  Many medicines cannot be used with blood thinners  ¨ Tell your healthcare provider right away if you forget to take the medicine, or if you take too much  ¨ Warfarin  is a blood thinner that you may need to take  The following are things you should be aware of if you take warfarin      § Foods and medicines can affect the amount of warfarin in your blood  Do not make major changes to your diet while you take warfarin  Warfarin works best when you eat about the same amount of vitamin K every day  Vitamin K is found in green leafy vegetables and certain other foods  Ask for more information about what to eat when you are taking warfarin  § You will need to see your healthcare provider for follow-up visits when you are on warfarin  You will need regular blood tests  These tests are used to decide how much medicine you need  · Prescription pain medicine  may be given  Ask your how to take this medicine safely  · Take your medicine as directed  Contact your healthcare provider if you think your medicine is not helping or if you have side effects  Tell him or her if you are allergic to any medicine  Keep a list of the medicines, vitamins, and herbs you take  Include the amounts, and when and why you take them  Bring the list or the pill bottles to follow-up visits  Carry your medicine list with you in case of an emergency  Care for your wound as directed:  Wear loose-fitting clothing over the ICD site  Do not get your wound wet until your healthcare provider says it is okay  Carefully wash the wound with soap and water  Dry the area and put on new, clean bandages as directed  Change your bandages when they get wet or dirty  Do not put powders or lotions over your incision  Check your wound everyday for signs of infections such as swelling, redness, or pus  Self-care:   · Apply ice  on your wound for 15 to 20 minutes every hour or as directed  Use an ice pack, or put crushed ice in a plastic bag  Cover it with a towel  Ice helps prevent tissue damage and decreases swelling and pain  · Do not lift anything heavier than 3 pounds  Lifting may put too much stress on your incision  Ask your healthcare provider when you can lift heavy objects  · Limit the use of your arm nearest to your ICD    Place your arm closest to the ICD in a sling  Wear as directed  This will help decrease swelling and pain  Prop your arm on pillows or blankets when you take off your sling to keep it elevated comfortably  Do not lift your arm closest to your ICD, over your heard for 5 days  Perform gentle range of motion exercises (ROM) exercises as directed to prevent arm and shoulder stiffness  Safety instructions when you have an ICD:  Talk to your healthcare provider about driving and playing sports after you have an ICD placed  The following are instructions to keep you safe with an ICD:  · Carry an ID card for your ICD at all times  This card has important information about your ICD  Healthcare providers need to know if you have an ICD so they can keep you safe during medical procedures and tests  · Wear medical alert jewelry  that says you have an ICD  Ask your healthcare provider where to get these items  · Stay away from magnets or machines with electric fields  Some MRI machines may be safe to use with your ICD  Ask your healthcare provider before you have an MRI  Avoid leaning into a car engine or doing welding  These things can interfere with how your ICD works  · Keep your cell phone and MP3 player away from your ICD  Do not place your cell phone or MP3 player in a breast pocket over your ICD site  Use your cell phone with the ear on the opposite side from your ICD  Wear arm bands with cell phones or MP3 players on the opposite arm from where your ICD is  · Tell airport security you have an ICD  You may need to be searched by hand when you go through a security gate  The security gate or handheld wand could harm your ICD  · Keep an ICD diary  Record when you get a shock and what you were doing before you got the shock  Keep track of how you felt before and after the shock, as well as how many shocks you received  Write down the day and time of each shock   Bring the diary with you when you see your healthcare provider or cardiologist   Skinny Whitfield how to take your pulse:  Check your pulse any time you feel light headed, dizzy, or short of breath  Use the second hand of a clock or a timer  Flip your hand with palms face up  Place your pointer finger and second finger onto the side of your wrist that is under your thumb  Apply gentle pressure  When you feel your pulse, count the number of beats in 15 seconds  Multiply this number by 4 to find the beats per minute  A normal pulse is 60 to 100 beats per minutes  Follow up with your healthcare provider as directed: Your healthcare provider will check your ICD frequently  He will place a special magnet over your ICD to check it  Information will be sent to a computer about your heart rhythm, and how well your ICD is working  Your ICD battery may need to be replaced every 5 to 7 years  Write down your questions so you remember to ask them during your visits  © 2017 [address] [employer] Information is for End User's use only and may not be sold, redistributed or otherwise used for commercial purposes  All illustrations and images included in CareNotes® are the copyrighted property of [employer] , [employer]  or Micah Mar  The above information is an  only  It is not intended as medical advice for individual conditions or treatments  Talk to your doctor, nurse or pharmacist before following any medical regimen to see if it is safe and effective for you

## 2017-12-15 NOTE — CASE MANAGEMENT
Continued Stay Review    Date: 12/15/2017    Vital Signs: /58   Pulse (!) 51   Temp 98 3 °F (36 8 °C)   Resp 17   Ht 5' 2" (1 575 m)   Wt 80 7 kg (178 lb)   SpO2 96%   BMI 32 56 kg/m²     Medications:   Scheduled Meds:   aspirin 81 mg Oral Daily   atorvastatin 40 mg Oral Daily   carvedilol 6 25 mg Oral BID With Meals   dolutegravir 50 mg Oral Daily   emtricitabine-tenofovir AF 1 tablet Oral Daily   sacubitril-valsartan 1 tablet Oral HS   warfarin 5 mg Oral Daily (warfarin)     Continuous Infusions:    PRN Meds:   acetaminophen    fentanyl citrate (PF)    oxyCODONE-acetaminophen    Abnormal Labs/Diagnostic Results:   ECHO: pending    Age/Sex: 47 y o  female     Assessment/Plan: 0932    Discharge Plan: VIET

## 2017-12-15 NOTE — NURSING NOTE
Pt  Discharge information provided to pt  Who verbalized understanding of the information provided  Written prescription provided to pt

## 2017-12-15 NOTE — DISCHARGE SUMMARY
Discharge Summary - Siria Sevilla 47 y o  female MRN: [id number]    Unit/Bed#: CW2 210-01 Encounter: [id number]    Admission Date: 12/13/2017     Discharge Diagnosis:   1  Ischemic CMP, EF 28%  2  HTN  3  HIV, on treatment  4  Status post single-chamber [employer] ICD    Procedures Performed:   Orders Placed This Encounter   Procedures    Cardiac icd implant       Hospital Course:   Patient is a 71-year-old female with history of ischemic cardiomyopathy with EF 28% and large anterior wall myocardial infarction  She was seen in the office by Dr Saira Villasenor November 16, 2017  She was recommended to undergo insertion of single-chamber ICD implant  She follows with Dr Feliz Frankel  Patient had the procedure completed on December 13, 4949 without complication  She was monitored overnight restarted on her home medications  Chest x-ray showed leads in proper position with no pneumothorax  Device check showed normal device function and device site was stable with no significant  hematoma or ecchymosis  Patient's blood pressure was seen to be low the next morning although patient was feeling fine  Echocardiogram was completed to rule out pericardial effusion which showed a small effusion  It was recommended by Dr Saira Villasenor that patient continue on her Coumadin but that we repeat an additional echocardiogram after keeping her another 24 hours  She was seen and examined this morning and blood pressure was back to baseline at 587 systolic  She was feeling well with no complaints of dizziness, chest pain or shortness of breath  Repeat echocardiogram showed stable trace to small pericardial effusion  It was recommended that she could be discharged home  Post device instructions were reviewed with patient and post hospital device check was placed in discharge paperwork  Patient was discharged home with a prescription for 10 Percocet    Her prescription history was reviewed in the [place] drug monitoring program web site and she does not have any recent narcotic prescriptions  Exam:   VS: Blood pressure 107/56, pulse (!) 54, temperature 97 7 °F (36 5 °C), temperature source Oral, resp  rate 18, height 5' 2" (1 575 m), weight 80 7 kg (178 lb), SpO2 98 %  Gen  Pt in NAD  Skin: warm and dry without rashes, clubbing or edema  CV: RRR, +S1, S2,  Resp  CTA b/l, no w/r/r  Abdomen: soft, NT, ND, +BS  Extr : no LE edema b/l  Psych: normal affect and mood  Neuro: No focal deficits, AAOx3        Complications: none    Condition at Discharge: good     Discharge instructions/Information to patient and family:   See after visit summary for information provided to patient and family  Provisions for Follow-Up Care:  See after visit summary for information related to follow-up care and any pertinent home health orders  Disposition: Home    Planned Readmission: No    Discharge Statement   >40 minutes was spent on discharging the patient including arranging appts, discharge instructions review and paperwork, medication reconciliation, etc       Discharge Medications:  See after visit summary for reconciled discharge medications provided to patient and family

## 2018-01-02 ENCOUNTER — GENERIC CONVERSION - ENCOUNTER (OUTPATIENT)
Dept: OTHER | Facility: OTHER | Age: 53
End: 2018-01-02

## 2018-01-02 ENCOUNTER — ALLSCRIPTS OFFICE VISIT (OUTPATIENT)
Dept: OTHER | Facility: OTHER | Age: 53
End: 2018-01-02

## 2018-01-05 ENCOUNTER — GENERIC CONVERSION - ENCOUNTER (OUTPATIENT)
Dept: OTHER | Facility: OTHER | Age: 53
End: 2018-01-05

## 2018-01-12 VITALS
SYSTOLIC BLOOD PRESSURE: 108 MMHG | WEIGHT: 178.56 LBS | DIASTOLIC BLOOD PRESSURE: 72 MMHG | HEIGHT: 62 IN | BODY MASS INDEX: 32.86 KG/M2 | HEART RATE: 61 BPM

## 2018-01-24 ENCOUNTER — CONVERSION ENCOUNTER (OUTPATIENT)
Dept: MAMMOGRAPHY | Facility: CLINIC | Age: 53
End: 2018-01-24

## 2018-03-07 NOTE — PROGRESS NOTES
"  Discussion/Summary  Normal device function      Results/Data  Cardiac Device In Clinic 02Jan2018 07:45PM Neita Halsted     Test Name Result Flag Reference   MISCELLANEOUS COMMENT (Report)     DEVICE INTERROGATED IN THE [facility] OFFICE  WOUND CHECK: INCISION CLEAN AND DRY WITH EDGES APPROXIMATED; WOUND CARE AND RESTRICTIONS REVIEWED WITH PATIENT  BATTERY VOLTAGE ADEQUATE (DAVIDSON - 11 4 YRS)   - 0%  ALL LEAD PARAMETERS TEST WITHIN NORMAL LIMITS & STABLE SINCE IMPLANT  ALL OTHER TESTING WITHIN NORMAL LIMITS  NO SIGNIFICANT HIGH RATE EPISODES  NO PROGRAMMING CHANGES MADE TO DEVICE PARAMETERS  APPROPRIATELY FUNCTIONING ICD   eb   Cardiac Electrophysiology Report      [id number]  pdf   DEVICE TYPE CRT-D       Cardiac Electrophysiology Report [id number] 07:45PM Neita Halsted     Test Name Result Flag Reference   Cardiac Electrophysiology Report      [id number]  pdf     Signatures   Electronically signed by : Luann Nunez, ; Jan 2 2018  3:11PM EST                       (Author)    Electronically signed by : Dorie Wong DO; Jan 2 2018  3:46PM EST                       (Author)    "

## 2018-04-11 LAB
CALLED AND READ BACK BY. (HISTORICAL): NORMAL
PT - I.N. RATIO (HISTORICAL): 5 RATIO(INR)
PT, PATIENT (HISTORICAL): 54.9 SEC (ref 9.2–11.1)

## 2018-04-13 LAB
% CD3 (MATURE T CELLS) (HISTORICAL): 72
% CD4 (HELPER CELLS) (HISTORICAL): 19
ABSOLUTE CD3+ CELLS (HISTORICAL): 1740
ABSOLUTE CD8+ CELLS (HISTORICAL): 1291
CD4:CD8 RATIO (HISTORICAL): 0.36
CD8 % SUPPRESSOR T CELL (HISTORICAL): 53
Lab: 451
Lab: ABNORMAL

## 2018-04-15 LAB
Lab: <1.3
Lab: <20
Lab: NORMAL

## 2018-04-16 LAB
PT - I.N. RATIO (HISTORICAL): 4.2 RATIO(INR)
PT, PATIENT (HISTORICAL): 43.5 SEC (ref 9.2–11.1)

## 2018-04-26 LAB
PT - I.N. RATIO (HISTORICAL): 2.2 RATIO(INR)
PT, PATIENT (HISTORICAL): 22.3 SEC (ref 9.2–11.1)

## 2018-05-15 LAB
PT - I.N. RATIO (HISTORICAL): 2.6 RATIO(INR)
PT, PATIENT (HISTORICAL): 26.7 SEC (ref 9.2–11.1)

## 2018-05-30 LAB
PT - I.N. RATIO (HISTORICAL): 1.9 RATIO(INR)
PT, PATIENT (HISTORICAL): 19.4 SEC (ref 9.2–11.1)

## 2018-06-07 LAB
PT - I.N. RATIO (HISTORICAL): 2.7 RATIO (INR) (ref 0.89–1.1)
PT, PATIENT (HISTORICAL): 26.9 SEC (ref 9.5–11.6)

## 2018-07-05 ENCOUNTER — APPOINTMENT (OUTPATIENT)
Dept: LAB | Facility: HOSPITAL | Age: 53
End: 2018-07-05
Payer: MEDICARE

## 2018-07-05 ENCOUNTER — TRANSCRIBE ORDERS (OUTPATIENT)
Dept: ADMINISTRATIVE | Facility: HOSPITAL | Age: 53
End: 2018-07-05

## 2018-07-05 DIAGNOSIS — Z79.01 LONG TERM (CURRENT) USE OF ANTICOAGULANTS: Primary | ICD-10-CM

## 2018-07-05 DIAGNOSIS — Z79.01 LONG TERM (CURRENT) USE OF ANTICOAGULANTS: ICD-10-CM

## 2018-07-05 LAB
INR PPP: 3.2 (ref 0.89–1.1)
PROTHROMBIN TIME: 31.8 SECONDS (ref 9.5–11.6)

## 2018-07-05 PROCEDURE — 85610 PROTHROMBIN TIME: CPT

## 2018-07-05 PROCEDURE — 36415 COLL VENOUS BLD VENIPUNCTURE: CPT

## 2018-07-09 ENCOUNTER — TELEPHONE (OUTPATIENT)
Dept: FAMILY MEDICINE CLINIC | Facility: CLINIC | Age: 53
End: 2018-07-09

## 2018-07-09 DIAGNOSIS — I25.5 ISCHEMIC CARDIOMYOPATHY: Primary | ICD-10-CM

## 2018-07-09 NOTE — PROGRESS NOTES
Spoke with patient  Currently taking coumadin 5 mg PO nightly  She also has 6 mg tablets, which she is not currently taking  INR is 3 2  Instructed her to take 5 mg nightly and recheck INR in one week  Patient reports she successfully underwent breast lumpectomy  She is now interested in going through with screening colonoscopy  She stated Dr Karina Shelton said she was cleared to do so  Spoke with Dr Hari Martinez, whois in clinic tomorrow  She will arrange

## 2018-07-09 NOTE — TELEPHONE ENCOUNTER
See my note of 7/9/18 regarding handling of INR  Will refer this lab to Dr Sai Schultz, who is in the office tomorrow

## 2018-07-10 NOTE — TELEPHONE ENCOUNTER
Advised patient to call our office with date of her colonoscopy appointment  Advised patient she will need to stop coumadin 5 days prior to colonoscopy and she would need INR checked the day of colonoscopy    Patient agreed to call office so she can receive written instructions

## 2018-07-11 ENCOUNTER — ANTICOAG VISIT (OUTPATIENT)
Dept: FAMILY MEDICINE CLINIC | Facility: CLINIC | Age: 53
End: 2018-07-11

## 2018-07-11 DIAGNOSIS — I25.5 ISCHEMIC CARDIOMYOPATHY: ICD-10-CM

## 2018-07-16 ENCOUNTER — ANTICOAG VISIT (OUTPATIENT)
Dept: FAMILY MEDICINE CLINIC | Facility: CLINIC | Age: 53
End: 2018-07-16

## 2018-07-16 ENCOUNTER — TELEPHONE (OUTPATIENT)
Dept: FAMILY MEDICINE CLINIC | Facility: CLINIC | Age: 53
End: 2018-07-16

## 2018-07-16 ENCOUNTER — APPOINTMENT (OUTPATIENT)
Dept: LAB | Facility: HOSPITAL | Age: 53
End: 2018-07-16
Payer: MEDICARE

## 2018-07-16 DIAGNOSIS — I25.5 ISCHEMIC CARDIOMYOPATHY: ICD-10-CM

## 2018-07-16 DIAGNOSIS — Z79.01 ENCOUNTER FOR CURRENT LONG-TERM USE OF ANTICOAGULANTS: Primary | ICD-10-CM

## 2018-07-16 DIAGNOSIS — Z79.01 ENCOUNTER FOR CURRENT LONG-TERM USE OF ANTICOAGULANTS: ICD-10-CM

## 2018-07-16 LAB
INR PPP: 2.61 (ref 0.89–1.1)
PROTHROMBIN TIME: 26.2 SECONDS (ref 9.5–11.6)

## 2018-07-16 PROCEDURE — 36415 COLL VENOUS BLD VENIPUNCTURE: CPT

## 2018-07-16 PROCEDURE — 85610 PROTHROMBIN TIME: CPT

## 2018-07-16 NOTE — PROGRESS NOTES
INR goal 2 - 3    7/16/2018 at goal 2 61  Spoke with patient  Currently taking 5 mg q day  Continue 5 mg daily and recheck INR in one week 7/23/2018  Patient verbalized understanding

## 2018-07-23 ENCOUNTER — LAB (OUTPATIENT)
Dept: LAB | Facility: HOSPITAL | Age: 53
End: 2018-07-23
Payer: MEDICARE

## 2018-07-23 ENCOUNTER — OFFICE VISIT (OUTPATIENT)
Dept: CARDIOLOGY CLINIC | Facility: CLINIC | Age: 53
End: 2018-07-23
Payer: MEDICARE

## 2018-07-23 ENCOUNTER — ANTICOAG VISIT (OUTPATIENT)
Dept: FAMILY MEDICINE CLINIC | Facility: CLINIC | Age: 53
End: 2018-07-23

## 2018-07-23 VITALS
RESPIRATION RATE: 18 BRPM | SYSTOLIC BLOOD PRESSURE: 118 MMHG | DIASTOLIC BLOOD PRESSURE: 60 MMHG | WEIGHT: 171 LBS | HEART RATE: 60 BPM | BODY MASS INDEX: 31.47 KG/M2 | HEIGHT: 62 IN | OXYGEN SATURATION: 96 %

## 2018-07-23 DIAGNOSIS — I23.6 LV (LEFT VENTRICULAR) MURAL THROMBUS FOLLOWING MI (HCC): ICD-10-CM

## 2018-07-23 DIAGNOSIS — I25.5 ISCHEMIC CARDIOMYOPATHY: ICD-10-CM

## 2018-07-23 DIAGNOSIS — I25.2 PAST HEART ATTACK: ICD-10-CM

## 2018-07-23 DIAGNOSIS — I25.10 CORONARY ARTERY DISEASE INVOLVING NATIVE HEART WITHOUT ANGINA PECTORIS, UNSPECIFIED VESSEL OR LESION TYPE: Primary | ICD-10-CM

## 2018-07-23 DIAGNOSIS — I25.2 OLD MI (MYOCARDIAL INFARCTION): ICD-10-CM

## 2018-07-23 DIAGNOSIS — I10 HYPERTENSION, UNSPECIFIED TYPE: ICD-10-CM

## 2018-07-23 DIAGNOSIS — E78.1 PURE HYPERGLYCERIDEMIA: ICD-10-CM

## 2018-07-23 DIAGNOSIS — Z79.01 LONG TERM CURRENT USE OF ANTICOAGULANT THERAPY: ICD-10-CM

## 2018-07-23 PROBLEM — E78.5 HYPERLIPEMIA: Status: ACTIVE | Noted: 2017-03-31

## 2018-07-23 PROBLEM — I51.89 IMPAIRED LEFT VENTRICULAR FUNCTION: Status: ACTIVE | Noted: 2017-11-07

## 2018-07-23 PROBLEM — Z21 HIV POSITIVE (HCC): Status: ACTIVE | Noted: 2017-04-10

## 2018-07-23 PROBLEM — F17.200 TOBACCO DEPENDENCE SYNDROME: Status: ACTIVE | Noted: 2017-03-31

## 2018-07-23 LAB
INR PPP: 2.91 (ref 0.89–1.1)
PROTHROMBIN TIME: 29 SECONDS (ref 9.5–11.6)

## 2018-07-23 PROCEDURE — 36415 COLL VENOUS BLD VENIPUNCTURE: CPT

## 2018-07-23 PROCEDURE — 99214 OFFICE O/P EST MOD 30 MIN: CPT | Performed by: INTERNAL MEDICINE

## 2018-07-23 PROCEDURE — 93000 ELECTROCARDIOGRAM COMPLETE: CPT | Performed by: INTERNAL MEDICINE

## 2018-07-23 PROCEDURE — 85610 PROTHROMBIN TIME: CPT

## 2018-07-23 RX ORDER — ALBUTEROL SULFATE 90 UG/1
AEROSOL, METERED RESPIRATORY (INHALATION)
COMMUNITY
Start: 2017-09-19 | End: 2019-11-14 | Stop reason: SDUPTHER

## 2018-07-23 NOTE — PROGRESS NOTES
Cardiology Follow Up    Jong Albright  1965  [id number]  [address] [facility]  [address], [facility] [address] [phone]    1  Coronary artery disease involving native heart without angina pectoris, unspecified vessel or lesion type  POCT ECG   2  Hypertension, unspecified type  POCT ECG   3  Old MI (myocardial infarction)  POCT ECG    sacubitril-valsartan (ENTRESTO) 24-26 MG TABS   4  old extensive anterior, apical, lateral and distal inferior apical MI  sacubitril-valsartan (ENTRESTO) 24-26 MG TABS   5  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 (HCC)  sacubitril-valsartan (ENTRESTO) 24-26 MG TABS   6  Long term current use of anticoagulant therapy     7  Pure hyperglyceridemia         Patient was referred to us from [facility]  She experienced a myocardial infarction on February 28, 2017 in [place]  She has subsequently moved to [facility] area  She had a cardiac catheterization taken at [facility]  Patient's cardiac catheterization CD revealed the left main coronary artery to be nonobstructed  The left anterior descending artery was 100% in its midportion after a large 1st diagonal branch which was nonobstructed  The circumflex artery obtuse marginal was 90%  The right coronary artery in its midportion with 50-60%  The large distal LAD filled from right to left collaterals  [facility] had recommended CABG but the patient had refused at that time  No ventriculogram was performed but an echocardiogram which I performed had demonstrated extensive LV damage  A nuclear stress test demonstrated minimal with any it ischemia  I did not feel that the patient would improve with CABG  Patient is free of angina pectoris or shortness of breath  She is doing quite well for her degree of LV dysfunction      03/27/2017 lipids: Cholesterol 162, triglycerides 170, LDL 82, HDL 46     04/11/2017 echocardiogram severe LV dysfunction, EF 30%, mild LV enlargement, apical aneurysm with akinesis to paradoxical motion  Large organized apical thrombus  Grade 1 diastolic dysfunction, normal pulmonary artery pressures    05/15/2017 stress test:  Severe LV dysfunction, EF 20%  Mild LV enlargement  Anterior apical aneurysm with paradoxical motion  Extensive myocardial infarctions involving the distal anterior apical and inferior apical walls  Negative for Persantine induced ischemia  Interval History:  Patient denies chest discomfort,  shortness of breath, symptoms of near-syncope/syncope  She isn't tolerating Entresto once daily at bedtime    Patient Active Problem List   Diagnosis    Ischemic cardiomyopathy    Triple vessel coronary artery disease    HIV positive ([facility] [address] )    Hypertension    Hyperlipemia    Impaired left ventricular function    Tobacco dependence syndrome    Long term current use of anticoagulant therapy    old extensive anterior, apical, lateral and distal inferior apical MI    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 [facility])     History reviewed  No pertinent past medical history  Social History     Social History    Marital status: Single     Spouse name: N/A    Number of children: N/A    Years of education: N/A     Occupational History    Not on file       Social History Main Topics    Smoking status: Current Some Day Smoker     Packs/day: 1 00     Types: Cigarettes    Smokeless tobacco: Current User    Alcohol use No    Drug use: No    Sexual activity: Not on file     Other Topics Concern    Not on file     Social History Narrative    No narrative on file      Family History   Problem Relation Age of Onset    Other Father         GI bleed    Suicidality Brother     Drug abuse Brother         overdose     Past Surgical History:   Procedure Laterality Date    CORONARY ANGIOPLASTY      TOTAL ABDOMINAL HYSTERECTOMY         Current Outpatient Prescriptions:     albuterol (VENTOLIN HFA) 90 mcg/act inhaler, Use only as rescue inhaler, Disp: , Rfl:     aspirin (ECOTRIN LOW STRENGTH) 81 mg EC tablet, Take 81 mg by mouth daily, Disp: , Rfl:     atorvastatin (LIPITOR) 40 mg tablet, Take 40 mg by mouth daily, Disp: , Rfl:     carvedilol (COREG) 6 25 mg tablet, Take 6 25 mg by mouth 2 (two) times a day with meals, Disp: , Rfl:     dolutegravir (TIVICAY) 50 MG TABS, Take 50 mg by mouth daily, Disp: , Rfl:     emtricitabine-tenofovir AF (DESCOVY) 200-25 MG tablet, Take by mouth daily, Disp: , Rfl:     nicotine polacrilex (NICORETTE) 4 mg gum, every 2 (two) hours, Disp: , Rfl:     sacubitril-valsartan (ENTRESTO) 24-26 MG TABS, Take 1 tablet by mouth 2 (two) times a day, Disp: 60 tablet, Rfl: 5    warfarin (COUMADIN) 2 mg tablet, Take by mouth daily, Disp: , Rfl:   Allergies   Allergen Reactions    No Active Allergies        Results for orders placed or performed in visit on 07/23/18   POCT ECG    Narrative    Normal sinus rhythm at a rate of 60 beats per minute  Extensive anterior inferior and inferior lateral wall myocardial infarctions           No results found for: CHOL  No results found for: HDL  No results found for: LDLCALC  No results found for: TRIG  No components found for: CHOLHDL  Lab Results   Component Value Date    GLUCOSE 80 12/15/2017    CALCIUM 8 6 12/15/2017     12/15/2017    K 4 4 12/15/2017    CO2 29 12/15/2017     12/15/2017    BUN 18 12/15/2017    CREATININE 1 50 (H) 12/15/2017     Lab Results   Component Value Date     12/15/2017    K 4 4 12/15/2017     12/15/2017    CO2 29 12/15/2017    ANIONGAP 4 12/15/2017    BUN 18 12/15/2017    CREATININE 1 50 (H) 12/15/2017    GLUCOSE 80 12/15/2017    GLUF 101 (H) 12/13/2017    CALCIUM 8 6 12/15/2017    EGFR 40 12/15/2017     Lab Results   Component Value Date    WBC 4 46 12/15/2017    HGB 12 7 12/15/2017    HCT 38 5 12/15/2017    MCV 91 12/15/2017     (L) 12/15/2017     No results found for: XKP3YDNRLXRE, TSH, SLAMBTSH  No results found for: SEDRATE    Labs:  Appointment on 07/16/2018   Component Date Value    Protime 07/16/2018 26 2*    INR 07/16/2018 2 61*   Appointment on 07/05/2018   Component Date Value    Protime 07/05/2018 31 8*    INR 07/05/2018 3 20*   Orders Only on 06/07/2018   Component Date Value    PT, PATIENT (HISTORICAL) 06/07/2018 26 9*    PT - I N  RATIO (HISTORI* 06/07/2018 2 7*   Orders Only on 05/30/2018   Component Date Value    PT, PATIENT (HISTORICAL) 05/30/2018 19 4*    PT - I N  RATIO (HISTORI* 05/30/2018 1 9    Orders Only on 05/15/2018   Component Date Value    PT, PATIENT (HISTORICAL) 05/15/2018 26 7*    PT - I N  RATIO (HISTORI* 05/15/2018 2 6    Orders Only on 04/26/2018   Component Date Value    PT, PATIENT (HISTORICAL) 04/26/2018 22 3*    PT - I N  RATIO (HISTORI* 04/26/2018 2 2    Orders Only on 04/16/2018   Component Date Value    PT, PATIENT (HISTORICAL) 04/16/2018 43 5*    PT - I N  RATIO (HISTORI* 04/16/2018 4 2    Orders Only on 04/11/2018   Component Date Value    PT, PATIENT (HISTORICAL) 04/11/2018 54 9*    PT - I N  RATIO (HISTORI* 04/11/2018 5 0     Called and Read back by  04/11/2018 CALLED TO     HIV 1 QUANT PCR, COPY (H* 04/11/2018 <20     HIV 1 QUANT PCR, LOG (HI* 04/11/2018 <1 3     HIV 1 QUANT PCR, INTERP * 04/11/2018 See Note     % CD3 (MATURE T CELLS) (* 04/11/2018 72     ABSOLUTE CD3+ CELLS (HIS* 04/11/2018 1740     % CD4 (HELPER CELLS) 04/11/2018 19*    ABSOLUTE CD4+ CELLS (HIS* 04/11/2018 451     CD8 % SUPPRESSOR T CELL 04/11/2018 53*    ABSOLUTE CD8+ CELLS (HIS* 04/11/2018 1291*    CD4:CD8 RATIO 04/11/2018 0 36*    LYMPHOCYTE SUBSET PANEL * 04/11/2018 See Note        Review of Systems:  Review of Systems   Constitutional: Negative  HENT: Negative  Respiratory: Negative for chest tightness and shortness of breath      Cardiovascular: Negative for chest pain and leg swelling  Gastrointestinal: Negative  Endocrine: Negative  Genitourinary: Negative  Musculoskeletal: Negative  Skin: Negative  Allergic/Immunologic: Negative  Neurological: Negative  Hematological: Negative  Psychiatric/Behavioral: Negative  Physical Exam:  /60 (BP Location: Left arm, Patient Position: Sitting, Cuff Size: Large)   Pulse 60   Resp 18   Ht 5' 2" (1 575 m)   Wt 77 6 kg (171 lb)   SpO2 96%   BMI 31 28 kg/m²   Physical Exam   Constitutional: She is oriented to person, place, and time  She appears well-developed and well-nourished  HENT:   Head: Normocephalic and atraumatic  Eyes: Conjunctivae and EOM are normal  Pupils are equal, round, and reactive to light  Neck: Normal range of motion  Neck supple  No JVD present  No tracheal deviation present  No thyromegaly present  Cardiovascular: Normal rate, regular rhythm, normal heart sounds and intact distal pulses  Exam reveals no gallop and no friction rub  No murmur heard  Pulmonary/Chest: Effort normal  No respiratory distress  She has no rales  Abdominal: Soft  Bowel sounds are normal    Musculoskeletal: Normal range of motion  She exhibits no edema  Neurological: She is alert and oriented to person, place, and time  Skin: Skin is warm and dry  Psychiatric: She has a normal mood and affect  Her behavior is normal        Discussion/Summary:  Will increase Entresto 24/26 to b i d   Patient to return in 6 weeks  Will consider increasing Entresto to 49/51 if she is tolerating the increased dose performed today  Patient also for a device check in 6 weeks

## 2018-07-23 NOTE — PROGRESS NOTES
INR goal 2-3  Indication for coumadin - Ischemic cardiomyopathy    INR 2 91  At goal     Currently taking 5mg Qd  Denies any epistaxis, gum bleed, change in color of stools, hematuria, easy bruising  Recheck on 7/30/3018  If she continues to remain at goal, recheck in 2 weeks  Discussed with patient

## 2018-07-30 ENCOUNTER — LAB (OUTPATIENT)
Dept: LAB | Facility: HOSPITAL | Age: 53
End: 2018-07-30
Payer: MEDICARE

## 2018-07-30 DIAGNOSIS — I25.5 ISCHEMIC CARDIOMYOPATHY: ICD-10-CM

## 2018-07-30 LAB
INR PPP: 2.6 (ref 2–3)
INR PPP: 2.64 (ref 0.89–1.1)
PROTHROMBIN TIME: 26.5 SECONDS (ref 9.5–11.6)

## 2018-07-30 PROCEDURE — 85610 PROTHROMBIN TIME: CPT

## 2018-07-30 PROCEDURE — 36415 COLL VENOUS BLD VENIPUNCTURE: CPT

## 2018-08-15 ENCOUNTER — APPOINTMENT (OUTPATIENT)
Dept: LAB | Facility: HOSPITAL | Age: 53
End: 2018-08-15
Attending: FAMILY MEDICINE
Payer: MEDICARE

## 2018-08-15 DIAGNOSIS — I23.6 LV (LEFT VENTRICULAR) MURAL THROMBUS FOLLOWING MI (HCC): ICD-10-CM

## 2018-08-15 DIAGNOSIS — I23.6 LV (LEFT VENTRICULAR) MURAL THROMBUS FOLLOWING MI (HCC): Primary | ICD-10-CM

## 2018-08-15 LAB
INR PPP: 2.5 (ref 0.89–1.1)
PROTHROMBIN TIME: 25.2 SECONDS (ref 9.5–11.6)

## 2018-08-15 PROCEDURE — 85610 PROTHROMBIN TIME: CPT

## 2018-08-15 PROCEDURE — 36415 COLL VENOUS BLD VENIPUNCTURE: CPT

## 2018-08-15 NOTE — PROGRESS NOTES
Caity told me patient is across the street for INR which has not been ordered  INR order placed  Caity will relay to patient

## 2018-08-28 ENCOUNTER — TRANSCRIBE ORDERS (OUTPATIENT)
Dept: ADMINISTRATIVE | Facility: HOSPITAL | Age: 53
End: 2018-08-28

## 2018-08-28 ENCOUNTER — TELEPHONE (OUTPATIENT)
Dept: FAMILY MEDICINE CLINIC | Facility: CLINIC | Age: 53
End: 2018-08-28

## 2018-08-28 DIAGNOSIS — I23.6 LV (LEFT VENTRICULAR) MURAL THROMBUS FOLLOWING MI (HCC): Primary | ICD-10-CM

## 2018-08-28 NOTE — TELEPHONE ENCOUNTER
Consider changing to a standing order if you know how frequently you want her to go, otherwise, please associate and sign for INR being done today   Thanks

## 2018-08-31 ENCOUNTER — TELEPHONE (OUTPATIENT)
Dept: FAMILY MEDICINE CLINIC | Facility: CLINIC | Age: 53
End: 2018-08-31

## 2018-08-31 NOTE — TELEPHONE ENCOUNTER
Please call patient and follow-up on INR and colonoscopy which appears like it was cancelled  Please clarify   Thanks, Tuyet Jeong

## 2018-09-05 ENCOUNTER — TELEPHONE (OUTPATIENT)
Dept: FAMILY MEDICINE CLINIC | Facility: CLINIC | Age: 53
End: 2018-09-05

## 2018-09-05 NOTE — TELEPHONE ENCOUNTER
Patient left a voicemail on front office telephone number  Pt states she wants to speak with Dr Zuri Cowart to please return her call

## 2018-09-11 ENCOUNTER — IN-CLINIC DEVICE VISIT (OUTPATIENT)
Dept: CARDIOLOGY CLINIC | Facility: CLINIC | Age: 53
End: 2018-09-11
Payer: MEDICARE

## 2018-09-11 ENCOUNTER — APPOINTMENT (OUTPATIENT)
Dept: LAB | Facility: HOSPITAL | Age: 53
End: 2018-09-11
Payer: MEDICARE

## 2018-09-11 ENCOUNTER — TELEPHONE (OUTPATIENT)
Dept: FAMILY MEDICINE CLINIC | Facility: CLINIC | Age: 53
End: 2018-09-11

## 2018-09-11 ENCOUNTER — OFFICE VISIT (OUTPATIENT)
Dept: CARDIOLOGY CLINIC | Facility: CLINIC | Age: 53
End: 2018-09-11
Payer: MEDICARE

## 2018-09-11 VITALS
HEART RATE: 62 BPM | SYSTOLIC BLOOD PRESSURE: 100 MMHG | DIASTOLIC BLOOD PRESSURE: 80 MMHG | HEIGHT: 62 IN | OXYGEN SATURATION: 99 % | WEIGHT: 147 LBS | BODY MASS INDEX: 27.05 KG/M2

## 2018-09-11 DIAGNOSIS — I10 HYPERTENSION, UNSPECIFIED TYPE: ICD-10-CM

## 2018-09-11 DIAGNOSIS — I23.6 LV (LEFT VENTRICULAR) MURAL THROMBUS FOLLOWING MI (HCC): ICD-10-CM

## 2018-09-11 DIAGNOSIS — I25.5 ISCHEMIC CARDIOMYOPATHY: Primary | ICD-10-CM

## 2018-09-11 DIAGNOSIS — I25.119 CORONARY ARTERY DISEASE INVOLVING NATIVE HEART WITH ANGINA PECTORIS, UNSPECIFIED VESSEL OR LESION TYPE (HCC): ICD-10-CM

## 2018-09-11 DIAGNOSIS — E78.00 PURE HYPERCHOLESTEROLEMIA: ICD-10-CM

## 2018-09-11 DIAGNOSIS — I25.10 CORONARY ARTERY DISEASE INVOLVING NATIVE HEART WITHOUT ANGINA PECTORIS, UNSPECIFIED VESSEL OR LESION TYPE: Primary | ICD-10-CM

## 2018-09-11 DIAGNOSIS — Z95.810 PRESENCE OF AUTOMATIC CARDIOVERTER/DEFIBRILLATOR (AICD): ICD-10-CM

## 2018-09-11 LAB
INR PPP: 2.54 (ref 0.89–1.1)
PROTHROMBIN TIME: 25.5 SECONDS (ref 9.5–11.6)

## 2018-09-11 PROCEDURE — 85610 PROTHROMBIN TIME: CPT

## 2018-09-11 PROCEDURE — 99214 OFFICE O/P EST MOD 30 MIN: CPT | Performed by: INTERNAL MEDICINE

## 2018-09-11 PROCEDURE — 93282 PRGRMG EVAL IMPLANTABLE DFB: CPT | Performed by: INTERNAL MEDICINE

## 2018-09-11 PROCEDURE — 36415 COLL VENOUS BLD VENIPUNCTURE: CPT

## 2018-09-11 PROCEDURE — 93000 ELECTROCARDIOGRAM COMPLETE: CPT | Performed by: INTERNAL MEDICINE

## 2018-09-11 RX ORDER — ATORVASTATIN CALCIUM 40 MG/1
40 TABLET, FILM COATED ORAL DAILY
Qty: 30 TABLET | Refills: 5 | Status: SHIPPED | OUTPATIENT
Start: 2018-09-11 | End: 2018-12-12 | Stop reason: SDUPTHER

## 2018-09-11 RX ORDER — ATORVASTATIN CALCIUM 40 MG/1
40 TABLET, FILM COATED ORAL DAILY
Qty: 30 TABLET | Refills: 5 | Status: SHIPPED | OUTPATIENT
Start: 2018-09-11 | End: 2018-09-11 | Stop reason: SDUPTHER

## 2018-09-11 NOTE — TELEPHONE ENCOUNTER
Pt left message I didn't quite understand  First she said the cardiologist needed blood work to see her then at the end of the message she said she needed a referral  [address] and confirmed she needs insurance referral  Graham Cox has already contacted [employer] to set up the referral but it takes 24 hours  Notified office she could still be seen today

## 2018-09-11 NOTE — PROGRESS NOTES
Results for orders placed or performed in visit on 09/11/18   Cardiac EP device report    Narrative    MDT SINGLE ICD  INTERROGATED IN THE OFFICE BY COMPANY REP  BATTERY VOLTAGE ADEQUATE (11 2 YRS)  : <0 1%  ALL LEAD PARAMETERS WITHIN NORMAL LIMITS  NO SIGNIFICANT HIGH RATE EPISODES  OPTI-VOL WITHIN NORMAL LIMITS  NO PROGRAMMING CHANGES MADE TO DEVICE PARAMETERS  PT SEEN BY DR Penny Mello TODAY  APPROPRIATELY FUNCTIONING ICD    [address] [place] [address]

## 2018-09-12 ENCOUNTER — OFFICE VISIT (OUTPATIENT)
Dept: FAMILY MEDICINE CLINIC | Facility: CLINIC | Age: 53
End: 2018-09-12
Payer: MEDICARE

## 2018-09-12 ENCOUNTER — ANTICOAG VISIT (OUTPATIENT)
Dept: FAMILY MEDICINE CLINIC | Facility: CLINIC | Age: 53
End: 2018-09-12

## 2018-09-12 VITALS
TEMPERATURE: 96.7 F | RESPIRATION RATE: 16 BRPM | DIASTOLIC BLOOD PRESSURE: 82 MMHG | HEART RATE: 72 BPM | WEIGHT: 178 LBS | SYSTOLIC BLOOD PRESSURE: 120 MMHG | OXYGEN SATURATION: 97 % | BODY MASS INDEX: 32.56 KG/M2

## 2018-09-12 DIAGNOSIS — I25.5 ISCHEMIC CARDIOMYOPATHY: ICD-10-CM

## 2018-09-12 DIAGNOSIS — I23.6 LV (LEFT VENTRICULAR) MURAL THROMBUS FOLLOWING MI (HCC): ICD-10-CM

## 2018-09-12 DIAGNOSIS — Z71.85 IMMUNIZATION COUNSELING: ICD-10-CM

## 2018-09-12 DIAGNOSIS — Z21 HIV POSITIVE (HCC): ICD-10-CM

## 2018-09-12 DIAGNOSIS — F17.200 TOBACCO DEPENDENCE SYNDROME: ICD-10-CM

## 2018-09-12 DIAGNOSIS — Z12.11 ENCOUNTER FOR SCREENING COLONOSCOPY: Primary | ICD-10-CM

## 2018-09-12 DIAGNOSIS — E78.2 MIXED HYPERLIPIDEMIA: ICD-10-CM

## 2018-09-12 DIAGNOSIS — Z79.01 LONG TERM CURRENT USE OF ANTICOAGULANT THERAPY: ICD-10-CM

## 2018-09-12 DIAGNOSIS — I10 ESSENTIAL HYPERTENSION: ICD-10-CM

## 2018-09-12 DIAGNOSIS — Z12.11 SCREENING FOR MALIGNANT NEOPLASM OF COLON: ICD-10-CM

## 2018-09-12 PROCEDURE — 90732 PPSV23 VACC 2 YRS+ SUBQ/IM: CPT | Performed by: FAMILY MEDICINE

## 2018-09-12 PROCEDURE — G0009 ADMIN PNEUMOCOCCAL VACCINE: HCPCS | Performed by: FAMILY MEDICINE

## 2018-09-12 PROCEDURE — 99214 OFFICE O/P EST MOD 30 MIN: CPT | Performed by: FAMILY MEDICINE

## 2018-09-12 RX ORDER — WARFARIN SODIUM 2 MG/1
5 TABLET ORAL
Qty: 90 TABLET | Refills: 3 | Status: SHIPPED | OUTPATIENT
Start: 2018-09-12 | End: 2018-12-12 | Stop reason: ALTCHOICE

## 2018-09-12 NOTE — PROGRESS NOTES
Assessment/Plan:    Screening for malignant neoplasm of colon  Discussed with patient that the USPSTF recommends screening for colorectal cancer starting at age 48 years and continuing until age 76 years  Patient has agreed to undergo testing at this time  All questions were answered  Tobacco dependence syndrome  - Discussed harmful affects of smoking  Patient has decreased to 1/2ppd from 1ppd  She has been using the gum however not interested in starting another medication to assist with smoking cessation  Will  again on next visit  HIV positive ([facility] )  Referral for ID given  Encouraged to continue to take medications daily  Hypertension  - BP well controlled on coreg and entresto  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 (HCC)  - INR 2 5 today  Continue coumadin  Advised to adhere to coumadin friendly diet  Immunization counseling  Discussed with patient that Shingrix is FDA approved for the prevention of shingles (herpes zoster) in adults 48 and older  Explained that this vaccine is a 2 dose series and 2nd dose will given in 6 months  Discussed with patient that 1 dose of PPSV23  is recommended due to history of tobacco abuse as well as mild intermittent asthma  She will need PPSV13 the following year  Diagnoses and all orders for this visit:    Encounter for screening colonoscopy  -     Ambulatory referral to Gastroenterology; Future    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 (HCC)  -     warfarin (COUMADIN) 2 mg tablet; Take 2 5 tablets (5 mg total) by mouth daily    HIV positive ([facility] )  -     Ambulatory referral to Infectious Disease;  Future  -     PNEUMOCOCCAL POLYSACCHARIDE VACCINE 23-VALENT =>3YO SQ IM    Tobacco dependence syndrome    Long term current use of anticoagulant therapy    Mixed hyperlipidemia    Screening for malignant neoplasm of colon    Essential hypertension    Immunization counseling          Subjective:      Patient ID: Eldaveronica Greene Victoria Kong is a 47 y o  female  Feliberto Hirsch is a 53F with PMH of HIV, CAD s/p MI with LV thrombus (s/p ICD placement) and HLD presents for follow-up  Reports she is doing well  No issues/complaints today  HIV: viral load checked in May, 2018 was undetectable  CD4 counts were in 400s  Used to follow with Dr Jevon Hilton however he does not work with [facility] Quigley and would like to be referred to new ID doctor  Reports compliance with meds  CAD s/p LV thrombus: Seen by Cardio, ICD interrogated, no new events  Reports no symptoms including CP, SOB  Continues to take Coumadin 5mg  Tobacco abuse: continues to smoke however has decreased to 1/2 ppd    HLD: stable on statin    Health maintenance:  Colonoscopy: needs a new referral as previous colonoscopy was cancelled  Referral had   Mammogram: s/p R breast biopsy by Dr Sina Rene in  which was normal  Follow-up mammogram in 2018  Pap smear: scheduled with me in 2 weeks  Immunization: agreeable to PCV23 today  Advised to get flu shot and Shingrix  The following portions of the patient's history were reviewed and updated as appropriate: allergies, current medications, past family history, past medical history, past social history, past surgical history and problem list     Review of Systems   Constitutional: Negative for chills and fever  Respiratory: Negative for cough and shortness of breath  Cardiovascular: Negative for chest pain and palpitations  Endocrine: Negative for polyphagia and polyuria  Musculoskeletal: Negative for back pain  Neurological: Negative for light-headedness, numbness and headaches  Objective:      /82 (BP Location: Right arm, Patient Position: Sitting, Cuff Size: Standard)   Pulse 72   Temp (!) 96 7 °F (35 9 °C) (Temporal)   Resp 16   Wt 80 7 kg (178 lb)   SpO2 97%   BMI 32 56 kg/m²          Physical Exam   Constitutional: She appears well-developed and well-nourished     HENT:   Head: Normocephalic and atraumatic  Eyes: EOM are normal    Neck: Normal range of motion  Neck supple  Cardiovascular: Normal rate and normal heart sounds  Pulmonary/Chest: Effort normal and breath sounds normal  No respiratory distress  Abdominal: Soft  Bowel sounds are normal  She exhibits no distension  Neurological: She is alert  Skin: Skin is warm and dry  Psychiatric: She has a normal mood and affect

## 2018-09-12 NOTE — ASSESSMENT & PLAN NOTE
Discussed with patient that Shingrix is FDA approved for the prevention of shingles (herpes zoster) in adults 48 and older  Explained that this vaccine is a 2 dose series and 2nd dose will given in 6 months  Discussed with patient that 1 dose of PPSV23  is recommended due to history of tobacco abuse as well as mild intermittent asthma  She will need PPSV13 the following year

## 2018-09-12 NOTE — PATIENT INSTRUCTIONS
Shingles Vaccine   AMBULATORY CARE:   The shingles vaccine  is an injection to protect you from the herpes-zoster virus  This is the same virus that causes chickenpox  Shingles is a painful rash that develops in people who had chickenpox  You can get the shingles vaccine even if you do not know if you had chickenpox  If you have had shingles, the vaccine can help prevent another outbreak  Who should get the shingles vaccine:   · Anyone 60 years or older    · Anyone who has had shingles, to prevent another outbreak  Who should not get the shingles vaccine or should wait to get it: If you have a temperature of 101 3°F or higher, wait to get the vaccine until symptoms get better  You should not get the vaccine if:  · You had a severe allergic reaction to gelatin, the antibiotic neomycin, or any part of the vaccine    · You have a weakened immune system from HIV or AIDS, are taking a drug such as steroids or cancer treatment, or have certain cancers    · You are pregnant or plan to become pregnant within 4 weeks of receiving the vaccine  Risks of the shingles vaccine: You may develop a rash that looks like chickenpox near the injection site  The site may also be red, sore, swollen, or itch  You may get shingles even after you receive the vaccine  You may have an allergic reaction  This can be life-threatening  Call 911 if:   · You have signs of a severe allergic reaction, such as trouble breathing, swelling in your throat, or hives  Seek care immediately if:   · You have a high fever or behavior changes that concern you  Contact your healthcare provider if:   · You have questions or concerns about the shingles vaccine  Apply a warm compress  to the area to relieve swelling and pain  If you develop a chickenpox-like rash near the injection site, cover the rash until it goes away  Follow up with your healthcare provider as directed:  Write down your questions so you remember to ask them during your visits  © 2017 [facility] Information is for End User's use only and may not be sold, redistributed or otherwise used for commercial purposes  All illustrations and images included in CareNotes® are the copyrighted property of A D A M [employer] Inc  or [employer]ah Mar  The above information is an  only  It is not intended as medical advice for individual conditions or treatments  Talk to your doctor, nurse or pharmacist before following any medical regimen to see if it is safe and effective for you

## 2018-09-12 NOTE — ASSESSMENT & PLAN NOTE
- Discussed harmful affects of smoking  Patient has decreased to 1/2ppd from 1ppd  She has been using the gum however not interested in starting another medication to assist with smoking cessation  Will  again on next visit

## 2018-09-12 NOTE — ASSESSMENT & PLAN NOTE
Discussed with patient that the USPSTF recommends screening for colorectal cancer starting at age 48 years and continuing until age 76 years  Patient has agreed to undergo testing at this time  All questions were answered

## 2018-09-13 NOTE — PROGRESS NOTES
Cardiology Follow Up    Tejal Suarez  1965  [id number]  [address] [facility]  [address], [facility] [address] [phone]    1  Coronary artery disease involving native heart without angina pectoris, unspecified vessel or lesion type  POCT ECG   2  Hypertension, unspecified type  POCT ECG   3  Pure hypercholesterolemia  atorvastatin (LIPITOR) 40 mg tablet    DISCONTINUED: atorvastatin (LIPITOR) 40 mg tablet       Patient was referred to us from [facility]  She experienced a myocardial infarction on February 28, 2017 in [place]  She has subsequently moved to [facility] area  She had a cardiac catheterization taken at [facility]  Patient's cardiac catheterization CD revealed the left main coronary artery to be nonobstructed  The left anterior descending artery was 100% in its midportion after a large 1st diagonal branch which was nonobstructed  The circumflex artery obtuse marginal was 90%  The right coronary artery in its midportion with 50-60%  The large distal LAD filled from right to left collaterals  [facility] had recommended CABG but the patient had refused at that time  No ventriculogram was performed but an echocardiogram which I performed had demonstrated extensive LV damage  A nuclear stress test demonstrated minimal with any it ischemia  I did not feel that the patient would improve with CABG  Patient is free of angina pectoris or shortness of breath  She is doing quite well for her degree of LV dysfunction  03/27/2017 lipids:  Cholesterol 162, triglycerides 170, LDL 82, HDL 46     04/11/2017 echocardiogram severe LV dysfunction, EF 30%, mild LV enlargement, apical aneurysm with akinesis to paradoxical motion  Large organized apical thrombus    Grade 1 diastolic dysfunction, normal pulmonary artery pressures    05/15/2017 stress test:  Severe LV dysfunction, EF 20%  Mild LV enlargement  Anterior apical aneurysm with paradoxical motion  Extensive myocardial infarctions involving the distal anterior apical and inferior apical walls  Negative for Persantine induced ischemia  Interval History:   Patient has no cardiac complaints  She denies chest discomfort or shortness of breath  She continues to be active  Interrogation of her ICD demonstrates no events  Her blood pressure was checked with the automatic machine and it was 110/68 with values of 110/50, 113/92 and 106/62  Patient Active Problem List   Diagnosis    Ischemic cardiomyopathy    Triple vessel coronary artery disease    HIV positive ([place] [address] )    Hypertension    Hyperlipemia    Impaired left ventricular function    Tobacco dependence syndrome    Long term current use of anticoagulant therapy    old extensive anterior, apical, lateral and distal inferior apical MI    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 ([place] [address] )    Screening for malignant neoplasm of colon    Immunization counseling     Past Medical History:   Diagnosis Date    Coronary artery disease     defibrillator    HIV positive ([place] [address] )      Social History     Social History    Marital status: Single     Spouse name: N/A    Number of children: N/A    Years of education: N/A     Occupational History    Not on file       Social History Main Topics    Smoking status: Current Some Day Smoker     Packs/day: 1 00     Types: Cigarettes    Smokeless tobacco: Current User      Comment: 15 cigaretes a day    Alcohol use No    Drug use: No    Sexual activity: Not on file     Other Topics Concern    Not on file     Social History Narrative    No narrative on file      Family History   Problem Relation Age of Onset    Other Father         GI bleed    Suicidality Brother     Drug abuse Brother         overdose     Past Surgical History:   Procedure Laterality Date    ANGIOPLASTY      CARDIAC DEFIBRILLATOR PLACEMENT      CORONARY ANGIOPLASTY      pci placement    TOTAL ABDOMINAL HYSTERECTOMY         Current Outpatient Prescriptions:     albuterol (VENTOLIN HFA) 90 mcg/act inhaler, Use only as rescue inhaler, Disp: , Rfl:     aspirin (ECOTRIN LOW STRENGTH) 81 mg EC tablet, Take 81 mg by mouth daily, Disp: , Rfl:     atorvastatin (LIPITOR) 40 mg tablet, Take 1 tablet (40 mg total) by mouth daily, Disp: 30 tablet, Rfl: 5    carvedilol (COREG) 6 25 mg tablet, Take 6 25 mg by mouth 2 (two) times a day with meals, Disp: , Rfl:     dolutegravir (TIVICAY) 50 MG TABS, Take 50 mg by mouth daily, Disp: , Rfl:     emtricitabine-tenofovir AF (DESCOVY) 200-25 MG tablet, Take by mouth daily, Disp: , Rfl:     nicotine polacrilex (NICORETTE) 4 mg gum, every 2 (two) hours, Disp: , Rfl:     sacubitril-valsartan (ENTRESTO) 24-26 MG TABS, Take 1 tablet by mouth 2 (two) times a day, Disp: 60 tablet, Rfl: 5    warfarin (COUMADIN) 2 mg tablet, Take 2 5 tablets (5 mg total) by mouth daily, Disp: 90 tablet, Rfl: 3  Allergies   Allergen Reactions    No Active Allergies        Results for orders placed or performed in visit on 09/11/18   POCT ECG    Narrative    Normal sinus rhythm at a rate of 62 beats per minute  Old anterior septal and apical myocardial infarction  Low QRS voltage limb leads  Nonspecific ST T wave changes             Lab Results   Component Value Date    CALCIUM 8 6 12/15/2017     12/15/2017    K 4 4 12/15/2017    CO2 29 12/15/2017     12/15/2017    BUN 18 12/15/2017    CREATININE 1 50 (H) 12/15/2017     Lab Results   Component Value Date     12/15/2017    K 4 4 12/15/2017     12/15/2017    CO2 29 12/15/2017    BUN 18 12/15/2017    CREATININE 1 50 (H) 12/15/2017    GLUF 101 (H) 12/13/2017    CALCIUM 8 6 12/15/2017    EGFR 40 12/15/2017     Lab Results   Component Value Date    WBC 4 46 12/15/2017    HGB 12 7 12/15/2017    HCT 38 5 12/15/2017    MCV 91 12/15/2017     (L) 12/15/2017         Review of Systems:  Review of Systems   Constitutional: Negative  HENT: Negative  Respiratory: Negative for chest tightness and shortness of breath  Cardiovascular: Negative for chest pain and leg swelling  Gastrointestinal: Negative  Endocrine: Negative  Genitourinary: Negative  Musculoskeletal: Negative  Skin: Negative  Allergic/Immunologic: Negative  Neurological: Negative  Hematological: Negative  Psychiatric/Behavioral: Negative  Physical Exam:  /80 (BP Location: Left arm, Patient Position: Sitting, Cuff Size: Large)   Pulse 62   Ht 5' 2" (1 575 m)   Wt 66 7 kg (147 lb)   SpO2 99%   BMI 26 89 kg/m²   Physical Exam   Constitutional: She is oriented to person, place, and time  She appears well-developed and well-nourished  HENT:   Head: Normocephalic and atraumatic  Eyes: Conjunctivae and EOM are normal  Pupils are equal, round, and reactive to light  Neck: Normal range of motion  Neck supple  No JVD present  No tracheal deviation present  No thyromegaly present  Cardiovascular: Normal rate, regular rhythm, normal heart sounds and intact distal pulses  Exam reveals no gallop and no friction rub  No murmur heard  Pulmonary/Chest: Effort normal  No respiratory distress  She has no rales  Abdominal: Soft  Bowel sounds are normal    Musculoskeletal: Normal range of motion  She exhibits no edema  Neurological: She is alert and oriented to person, place, and time  Skin: Skin is warm and dry  Psychiatric: She has a normal mood and affect  Her behavior is normal        Discussion/Summary:  1  Coronary artery disease, status post extensive myocardial infarction  2  Status post single chamber ICD  3  Hyperlipidemia  Plan:  1  Remote pacer check in 3 months  2  Return office visit in 3 months on a non pacer day  3   Return office visit and office pacer check in 6

## 2018-10-08 DIAGNOSIS — I51.3 MURAL THROMBUS OF LEFT VENTRICLE: Primary | ICD-10-CM

## 2018-10-08 RX ORDER — WARFARIN SODIUM 5 MG/1
TABLET ORAL
Qty: 30 TABLET | Refills: 0 | Status: SHIPPED | OUTPATIENT
Start: 2018-10-08 | End: 2018-11-07 | Stop reason: SDUPTHER

## 2018-10-12 ENCOUNTER — TELEPHONE (OUTPATIENT)
Dept: FAMILY MEDICINE CLINIC | Facility: CLINIC | Age: 53
End: 2018-10-12

## 2018-10-12 ENCOUNTER — APPOINTMENT (OUTPATIENT)
Dept: LAB | Facility: HOSPITAL | Age: 53
End: 2018-10-12
Payer: MEDICARE

## 2018-10-12 ENCOUNTER — ANTICOAG VISIT (OUTPATIENT)
Dept: FAMILY MEDICINE CLINIC | Facility: CLINIC | Age: 53
End: 2018-10-12

## 2018-10-12 ENCOUNTER — ANNUAL EXAM (OUTPATIENT)
Dept: FAMILY MEDICINE CLINIC | Facility: CLINIC | Age: 53
End: 2018-10-12
Payer: MEDICARE

## 2018-10-12 VITALS
TEMPERATURE: 97 F | SYSTOLIC BLOOD PRESSURE: 122 MMHG | RESPIRATION RATE: 16 BRPM | BODY MASS INDEX: 31.47 KG/M2 | HEART RATE: 79 BPM | WEIGHT: 171 LBS | DIASTOLIC BLOOD PRESSURE: 86 MMHG | HEIGHT: 62 IN | OXYGEN SATURATION: 97 %

## 2018-10-12 DIAGNOSIS — Z01.419 WELL WOMAN EXAM WITH ROUTINE GYNECOLOGICAL EXAM: Primary | ICD-10-CM

## 2018-10-12 DIAGNOSIS — I25.5 ISCHEMIC CARDIOMYOPATHY: ICD-10-CM

## 2018-10-12 DIAGNOSIS — Z12.4 PAP SMEAR FOR CERVICAL CANCER SCREENING: ICD-10-CM

## 2018-10-12 DIAGNOSIS — F17.200 TOBACCO DEPENDENCE SYNDROME: ICD-10-CM

## 2018-10-12 LAB
INR PPP: 2.19 (ref 0.89–1.1)
PROTHROMBIN TIME: 22.2 SECONDS (ref 9.5–11.6)

## 2018-10-12 PROCEDURE — 90471 IMMUNIZATION ADMIN: CPT | Performed by: FAMILY MEDICINE

## 2018-10-12 PROCEDURE — G0101 CA SCREEN;PELVIC/BREAST EXAM: HCPCS | Performed by: FAMILY MEDICINE

## 2018-10-12 PROCEDURE — 88175 CYTOPATH C/V AUTO FLUID REDO: CPT | Performed by: PATHOLOGY

## 2018-10-12 PROCEDURE — 90682 RIV4 VACC RECOMBINANT DNA IM: CPT | Performed by: FAMILY MEDICINE

## 2018-10-12 PROCEDURE — 88141 CYTOPATH C/V INTERPRET: CPT | Performed by: PATHOLOGY

## 2018-10-12 PROCEDURE — 36415 COLL VENOUS BLD VENIPUNCTURE: CPT | Performed by: FAMILY MEDICINE

## 2018-10-12 PROCEDURE — 85610 PROTHROMBIN TIME: CPT | Performed by: FAMILY MEDICINE

## 2018-10-12 PROCEDURE — 87624 HPV HI-RISK TYP POOLED RSLT: CPT | Performed by: FAMILY MEDICINE

## 2018-10-12 NOTE — ASSESSMENT & PLAN NOTE
Pap smear obtained with HPV  Patient tolerated the procedure well  Plan to call patient with results

## 2018-10-12 NOTE — PROGRESS NOTES
Assessment/Plan:    Well woman exam with routine gynecological exam  Pap smear obtained with HPV  Patient tolerated the procedure well  Plan to call patient with results    Pap smear for cervical cancer screening  Discussed with patient that for women aged 27 to 72 years, the USPSTF recommends cervical cancer screening every 5 years with hrHPV testing in combination with cytology (cotesting)  Patient has agreed to undergo testing at this time  All questions were answered  Tobacco dependence syndrome  Discussed against harmful affects of smoking with patient including various cancers  Offered nicotine replacement however she is not interested at this time  Will re-address on next visit         Diagnoses and all orders for this visit:    Well woman exam with routine gynecological exam  -     influenza vaccine, 8905-5959, quadrivalent, recombinant, PF, 0 5 mL, for patients 18-49 yr with comorbidities (FLUBLOK)  -     Liquid-based pap, diagnostic    Pap smear for cervical cancer screening  -     influenza vaccine, [id number], quadrivalent, recombinant, PF, 0 5 mL, for patients 18-49 yr with comorbidities (FLUBLOK)  -     Liquid-based pap, diagnostic    Tobacco dependence syndrome          Subjective:      Patient ID: Leigha Loaiza is a 47 y o  female  Leigha Loaiza is a 47 y o  female here for a routine exam   Current complaints: denies vaginal discharge, bleeding or vaginal dryness  Personal health questionnaire reviewed: yes  Gynecologic History  LMP: 20 years ago  S/p partial hysterectomy (per patient had uterine cancer)  Contraception: none as she is not currently sexually active  When she was sexually active she was with women  Last Pap: 3 years  Results were: normal  Last mammogram: due in Nov 2018   Results were: normal    Obstetric History  1 child via vaginal delivery      The following portions of the patient's history were reviewed and updated as appropriate: allergies, current medications, past family history, past medical history, past social history, past surgical history and problem list             The following portions of the patient's history were reviewed and updated as appropriate: allergies, current medications, past family history, past medical history, past social history, past surgical history and problem list     Review of Systems   Constitutional: Negative for fatigue and unexpected weight change  Genitourinary: Negative for decreased urine volume, menstrual problem, pelvic pain, urgency, vaginal bleeding, vaginal discharge and vaginal pain  Musculoskeletal: Negative for back pain  Skin: Negative for pallor  Objective:      /86 (BP Location: Right arm, Patient Position: Sitting, Cuff Size: Standard)   Pulse 79   Temp (!) 97 °F (36 1 °C) (Temporal)   Resp 16   Ht 5' 2" (1 575 m)   Wt 77 6 kg (171 lb)   SpO2 97%   BMI 31 28 kg/m²          Physical Exam   Constitutional: She appears well-developed and well-nourished  No distress  HENT:   Head: Normocephalic and atraumatic  Eyes: EOM are normal    Pulmonary/Chest: Effort normal        Genitourinary: Vagina normal and uterus normal  No breast swelling, tenderness, discharge or bleeding  No labial fusion  There is no rash, tenderness or lesion on the right labia  There is no rash, tenderness or lesion on the left labia  Cervix exhibits no motion tenderness, no discharge and no friability  Right adnexum displays no mass and no tenderness  Left adnexum displays no mass and no tenderness  No erythema, tenderness or bleeding in the vagina  No foreign body in the vagina  No vaginal discharge found  Musculoskeletal: Normal range of motion

## 2018-10-12 NOTE — PATIENT INSTRUCTIONS

## 2018-10-12 NOTE — TELEPHONE ENCOUNTER
Called patient to discuss INR results  INR in range, plan to continue current regimen  Patient taking med as advised

## 2018-10-12 NOTE — ASSESSMENT & PLAN NOTE
Discussed with patient that for women aged 27 to 72 years, the USPSTF recommends cervical cancer screening every 5 years with hrHPV testing in combination with cytology (cotesting)  Patient has agreed to undergo testing at this time  All questions were answered

## 2018-10-12 NOTE — ASSESSMENT & PLAN NOTE
Discussed against harmful affects of smoking with patient including various cancers  Offered nicotine replacement however she is not interested at this time   Will re-address on next visit

## 2018-10-16 LAB
HPV HR 12 DNA CVX QL NAA+PROBE: NEGATIVE
HPV16 DNA CVX QL NAA+PROBE: POSITIVE
HPV18 DNA CVX QL NAA+PROBE: NEGATIVE

## 2018-10-18 LAB
LAB AP GYN PRIMARY INTERPRETATION: NORMAL
Lab: NORMAL
PATH INTERP SPEC-IMP: NORMAL

## 2018-10-19 ENCOUNTER — TELEPHONE (OUTPATIENT)
Dept: FAMILY MEDICINE CLINIC | Facility: CLINIC | Age: 53
End: 2018-10-19

## 2018-10-23 ENCOUNTER — APPOINTMENT (OUTPATIENT)
Dept: LAB | Facility: HOSPITAL | Age: 53
End: 2018-10-23
Payer: MEDICARE

## 2018-10-23 ENCOUNTER — TRANSCRIBE ORDERS (OUTPATIENT)
Dept: ADMINISTRATIVE | Facility: HOSPITAL | Age: 53
End: 2018-10-23

## 2018-10-23 DIAGNOSIS — B20 HUMAN IMMUNODEFICIENCY VIRUS (HIV) DISEASE (HCC): Primary | ICD-10-CM

## 2018-10-23 DIAGNOSIS — B20 HUMAN IMMUNODEFICIENCY VIRUS (HIV) DISEASE (HCC): ICD-10-CM

## 2018-10-23 PROCEDURE — 86355 B CELLS TOTAL COUNT: CPT

## 2018-10-23 PROCEDURE — 86360 T CELL ABSOLUTE COUNT/RATIO: CPT

## 2018-10-23 PROCEDURE — 87536 HIV-1 QUANT&REVRSE TRNSCRPJ: CPT

## 2018-10-23 PROCEDURE — 36415 COLL VENOUS BLD VENIPUNCTURE: CPT

## 2018-10-23 PROCEDURE — 86359 T CELLS TOTAL COUNT: CPT

## 2018-10-24 LAB
BASOPHILS # BLD AUTO: 0 X10E3/UL (ref 0–0.2)
BASOPHILS # BLD AUTO: 0 X10E3/UL (ref 0–0.2)
BASOPHILS NFR BLD AUTO: 0 %
BASOPHILS NFR BLD AUTO: 0 %
CD19 CELLS # BLD: 302 /UL (ref 12–645)
CD19 CELLS NFR BLD: 15.9 % (ref 3.3–25.4)
CD3 CELLS # BLD: 1406 /UL (ref 622–2402)
CD3 CELLS NFR BLD: 74 % (ref 57.5–86.2)
CD3+CD4+ CELLS # BLD: 426 /UL (ref 359–1519)
CD3+CD4+ CELLS # BLD: 445 /UL (ref 359–1519)
CD3+CD4+ CELLS NFR BLD: 22.4 % (ref 30.8–58.5)
CD3+CD4+ CELLS NFR BLD: 23.4 % (ref 30.8–58.5)
CD3+CD4+ CELLS/CD3+CD8+ CLL BLD: 0.43 % (ref 0.92–3.72)
CD3+CD8+ CELLS # BLD: 986 /UL (ref 109–897)
CD3+CD8+ CELLS NFR BLD: 51.9 % (ref 12–35.5)
EOSINOPHIL # BLD AUTO: 0 X10E3/UL (ref 0–0.4)
EOSINOPHIL # BLD AUTO: 0.1 X10E3/UL (ref 0–0.4)
EOSINOPHIL NFR BLD AUTO: 1 %
EOSINOPHIL NFR BLD AUTO: 1 %
ERYTHROCYTE [DISTWIDTH] IN BLOOD BY AUTOMATED COUNT: 15.2 % (ref 12.3–15.4)
ERYTHROCYTE [DISTWIDTH] IN BLOOD BY AUTOMATED COUNT: 15.6 % (ref 12.3–15.4)
HCT VFR BLD AUTO: 40.4 % (ref 34–46.6)
HCT VFR BLD AUTO: 42.9 % (ref 34–46.6)
HGB BLD-MCNC: 14.1 G/DL (ref 11.1–15.9)
HGB BLD-MCNC: 14.3 G/DL (ref 11.1–15.9)
IMM GRANULOCYTES # BLD: 0 X10E3/UL (ref 0–0.1)
IMM GRANULOCYTES # BLD: 0 X10E3/UL (ref 0–0.1)
IMM GRANULOCYTES NFR BLD: 0 %
IMM GRANULOCYTES NFR BLD: 0 %
LYMPHOCYTES # BLD AUTO: 1.9 X10E3/UL (ref 0.7–3.1)
LYMPHOCYTES # BLD AUTO: 1.9 X10E3/UL (ref 0.7–3.1)
LYMPHOCYTES NFR BLD AUTO: 39 %
LYMPHOCYTES NFR BLD AUTO: 40 %
MCH RBC QN AUTO: 31.4 PG (ref 26.6–33)
MCH RBC QN AUTO: 32.4 PG (ref 26.6–33)
MCHC RBC AUTO-ENTMCNC: 32.9 G/DL (ref 31.5–35.7)
MCHC RBC AUTO-ENTMCNC: 35.4 G/DL (ref 31.5–35.7)
MCV RBC AUTO: 92 FL (ref 79–97)
MCV RBC AUTO: 96 FL (ref 79–97)
MONOCYTES # BLD AUTO: 0.3 X10E3/UL (ref 0.1–0.9)
MONOCYTES # BLD AUTO: 0.3 X10E3/UL (ref 0.1–0.9)
MONOCYTES NFR BLD AUTO: 6 %
MONOCYTES NFR BLD AUTO: 7 %
NEUTROPHILS # BLD AUTO: 2.5 X10E3/UL (ref 1.4–7)
NEUTROPHILS # BLD AUTO: 2.7 X10E3/UL (ref 1.4–7)
NEUTROPHILS NFR BLD AUTO: 52 %
NEUTROPHILS NFR BLD AUTO: 54 %
PLATELET # BLD AUTO: 197 X10E3/UL (ref 150–379)
PLATELET # BLD AUTO: 204 X10E3/UL (ref 150–379)
RBC # BLD AUTO: 4.41 X10E6/UL (ref 3.77–5.28)
RBC # BLD AUTO: 4.49 X10E6/UL (ref 3.77–5.28)
WBC # BLD AUTO: 4.7 X10E3/UL (ref 3.4–10.8)
WBC # BLD AUTO: 4.9 X10E3/UL (ref 3.4–10.8)

## 2018-10-25 LAB
HIV1 RNA # SERPL NAA+PROBE: <20 COPIES/ML
HIV1 RNA SERPL NAA+PROBE-LOG#: NORMAL LOG10COPY/ML

## 2018-10-29 ENCOUNTER — TELEPHONE (OUTPATIENT)
Dept: FAMILY MEDICINE CLINIC | Facility: CLINIC | Age: 53
End: 2018-10-29

## 2018-10-29 NOTE — TELEPHONE ENCOUNTER
Called patient to remind her to have her INR checked on Wed Oct 31 which is 2 days before her colposcopy procedure

## 2018-10-31 ENCOUNTER — APPOINTMENT (OUTPATIENT)
Dept: LAB | Facility: HOSPITAL | Age: 53
End: 2018-10-31
Payer: MEDICARE

## 2018-10-31 ENCOUNTER — PATIENT OUTREACH (OUTPATIENT)
Dept: SURGERY | Facility: CLINIC | Age: 53
End: 2018-10-31

## 2018-10-31 DIAGNOSIS — Z72.89 OTHER PROBLEMS RELATED TO LIFESTYLE: ICD-10-CM

## 2018-10-31 DIAGNOSIS — I23.6 LV (LEFT VENTRICULAR) MURAL THROMBUS FOLLOWING MI (HCC): ICD-10-CM

## 2018-10-31 DIAGNOSIS — Z13.1 SCREENING FOR DIABETES MELLITUS: ICD-10-CM

## 2018-10-31 DIAGNOSIS — Z11.3 ENCOUNTER FOR SCREENING FOR INFECTIONS WITH A PREDOMINANTLY SEXUAL MODE OF TRANSMISSION: ICD-10-CM

## 2018-10-31 DIAGNOSIS — B20 HIV (HUMAN IMMUNODEFICIENCY VIRUS INFECTION) (HCC): Primary | ICD-10-CM

## 2018-10-31 LAB
INR PPP: 2.57 (ref 0.89–1.1)
PROTHROMBIN TIME: 25.8 SECONDS (ref 9.5–11.6)

## 2018-10-31 PROCEDURE — 85610 PROTHROMBIN TIME: CPT

## 2018-10-31 PROCEDURE — 36415 COLL VENOUS BLD VENIPUNCTURE: CPT

## 2018-10-31 NOTE — LETTER
HIV Prevention Counseling Risk Reduction Plan    Client Name: Ambika Villarreal   Client #:    Client Signature:     LYNN #:    CM Signature:     CHIS Score: -   Date: 10/31/18 RAT Score:-     Current Risk Behaviors  Unprotected sex with: same sex partners  Anal/Oral/Vaginal?: Vaginal, Oral     Number of sex partners in the past three months: 0        Has sex for drugs or money: No  Has sex while using drugs and alcohol: No  Drug or Alcohol Abuse: No        Has not disclosed HIV/STD D to partner(s): No  Victim of sexual abuse/assault: No       Previous Successes  Previous Successes: Abstinence          Safer Goal Behavior(s)  Safer Goal Behavior(s): Abstinence (sex/alcohol/drugs)       Barrier(s) to Safer Goal Behavior          Benefit(s) to Safer Goal Behavior  Benefit(s) to safer goal behavior: Prevent the spread of HIV, STD to others       Action Steps  Action Steps: Continue adhering to medical care/medication adherence       Referral          Comments (relating to goals, benefits, barriers and action steps identified above)  Comments: (relating to goals, benefits, barriers and action steps identified above): Ct reports to currently be not sexually active  Ct maintains she understands the importane of using protection when sexualy active

## 2018-10-31 NOTE — LETTER
[facility]  HIV and Non-HIV Medication Assessment    Date: 10/31/18  Client Name: Mary Garcia  Client Address: [place] [address]  Client Phone: [phone] (home)   : Robyn Soni    Is the client currently receiving medical care for HIV?: Yes  When was the last time the client had a CD4 (T-cell) count? (Choose one): Within the last 3 months  What was the CD4 (or T-cell) count at the client's last measurement?: 445 (Truncated from multiple numeric result values to a single numeric value  Original value: 445; 426)  When was the last time the client had a viral load count? (Choose one): Within the last 3 months  What was the viral load count at the client's last measurement? (Choose one): Undetectable  Has client ever been prescribed HIV antiretroviral (ARV) drug therapy by a doctor?: Yes       Client is currently being prescribed the following medications:  Medications: HIV ARV Meds  ARV: How many pills is the client supposed to take a day?: 1  ARV: How many pills did the client miss taking yesterday?: 0  ARV: How many pills did the client miss taking last week?: 0  ARV: Were there any pills missed within the last month?: 0                                                    Other Medication Questions:  Are there other medications you are supposed to be taking and are not taking?: No           Client's understanding of mediation?: Thorough  Has the client ever stopped taking ARV without the doctor's permission?: No                   Barriers to Drug Adherence:                      Summary:  Summary: Drug Adherence Assessment (Choose one): Client has an adequate understading and support to maintain medication adherence  No interventions needed  Staff Summary/Recommendation(s): Ct is currently maintianing medical adherence and attending her appointments  Ct is in need of a medical appointment with Dr Maya Joseph as she was referred by her previous doctor

## 2018-10-31 NOTE — LETTER
Roque Sheridan 28 at [address]  Dental Assessment Referral    Date: 10/31/18    Client Name: Donna Jorge  Client Address: [address] [place] [address]  Client Phone: [phone] (home)   Primary Dentist: To be Charles Mathew    Do you have dental insurance coverage? If yes, note it comments: Yes    1  When was your last dental visit?: 2 1/2 years ago  2  Do you have trouble eating?: No     3  Do your gums bleed at any time?: No  4  Is your mouth sore?: No  5  Do you have any toothaches?: No  6  Do you have any loose teeth?: No  7   Have you noticed any white coating on your tongue or in your mouth?: No    Referral made to: Richy [address] : ***  Date: 10/31/18
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
## 2018-11-01 ENCOUNTER — ANTICOAG VISIT (OUTPATIENT)
Dept: FAMILY MEDICINE CLINIC | Facility: CLINIC | Age: 53
End: 2018-11-01

## 2018-11-01 ENCOUNTER — TELEPHONE (OUTPATIENT)
Dept: FAMILY MEDICINE CLINIC | Facility: CLINIC | Age: 53
End: 2018-11-01

## 2018-11-01 DIAGNOSIS — I25.5 ISCHEMIC CARDIOMYOPATHY: ICD-10-CM

## 2018-11-01 DIAGNOSIS — I51.3 MURAL THROMBUS OF LEFT VENTRICLE: Primary | ICD-10-CM

## 2018-11-01 NOTE — TELEPHONE ENCOUNTER
Called patient to discuss INR results  INR is in therapeutic range  She will be coming in for her colopo tomorrow

## 2018-11-02 ENCOUNTER — APPOINTMENT (OUTPATIENT)
Dept: LAB | Facility: HOSPITAL | Age: 53
End: 2018-11-02
Attending: INTERNAL MEDICINE
Payer: MEDICARE

## 2018-11-02 ENCOUNTER — ANNUAL EXAM (OUTPATIENT)
Dept: FAMILY MEDICINE CLINIC | Facility: CLINIC | Age: 53
End: 2018-11-02
Payer: MEDICARE

## 2018-11-02 VITALS
SYSTOLIC BLOOD PRESSURE: 110 MMHG | DIASTOLIC BLOOD PRESSURE: 72 MMHG | OXYGEN SATURATION: 97 % | TEMPERATURE: 97.1 F | HEART RATE: 78 BPM | RESPIRATION RATE: 16 BRPM | WEIGHT: 172 LBS | BODY MASS INDEX: 31.46 KG/M2

## 2018-11-02 DIAGNOSIS — Z72.89 OTHER PROBLEMS RELATED TO LIFESTYLE: ICD-10-CM

## 2018-11-02 DIAGNOSIS — Z11.3 ENCOUNTER FOR SCREENING FOR INFECTIONS WITH A PREDOMINANTLY SEXUAL MODE OF TRANSMISSION: ICD-10-CM

## 2018-11-02 DIAGNOSIS — R87.612 LOW GRADE SQUAMOUS INTRAEPITHELIAL LESION (LGSIL) ON CERVICAL PAP SMEAR: Primary | ICD-10-CM

## 2018-11-02 DIAGNOSIS — Z13.1 SCREENING FOR DIABETES MELLITUS: ICD-10-CM

## 2018-11-02 DIAGNOSIS — B20 HIV (HUMAN IMMUNODEFICIENCY VIRUS INFECTION) (HCC): ICD-10-CM

## 2018-11-02 LAB
ALBUMIN SERPL BCP-MCNC: 4.2 G/DL (ref 3–5.2)
ALP SERPL-CCNC: 71 U/L (ref 43–122)
ALT SERPL W P-5'-P-CCNC: 30 U/L (ref 9–52)
ANION GAP SERPL CALCULATED.3IONS-SCNC: 9 MMOL/L (ref 5–14)
AST SERPL W P-5'-P-CCNC: 26 U/L (ref 14–36)
BASOPHILS # BLD AUTO: 0 THOUSANDS/ΜL (ref 0–0.1)
BASOPHILS NFR BLD AUTO: 1 % (ref 0–1)
BILIRUB SERPL-MCNC: 0.6 MG/DL
BILIRUB UR QL STRIP: NEGATIVE
BUN SERPL-MCNC: 13 MG/DL (ref 5–25)
CALCIUM SERPL-MCNC: 9.3 MG/DL (ref 8.4–10.2)
CHLAMYDIA DNA CVX QL NAA+PROBE: NORMAL
CHLORIDE SERPL-SCNC: 106 MMOL/L (ref 97–108)
CHOLEST SERPL-MCNC: 176 MG/DL
CLARITY UR: CLEAR
CO2 SERPL-SCNC: 26 MMOL/L (ref 22–30)
COLOR UR: YELLOW
CREAT SERPL-MCNC: 1.37 MG/DL (ref 0.6–1.2)
EOSINOPHIL # BLD AUTO: 0.1 THOUSAND/ΜL (ref 0–0.4)
EOSINOPHIL NFR BLD AUTO: 2 % (ref 0–6)
ERYTHROCYTE [DISTWIDTH] IN BLOOD BY AUTOMATED COUNT: 14.5 %
EST. AVERAGE GLUCOSE BLD GHB EST-MCNC: 123 MG/DL
GFR SERPL CREATININE-BSD FRML MDRD: 44 ML/MIN/1.73SQ M
GLUCOSE P FAST SERPL-MCNC: 139 MG/DL (ref 70–99)
GLUCOSE UR STRIP-MCNC: NEGATIVE MG/DL
HBA1C MFR BLD: 5.9 % (ref 4.2–6.3)
HCT VFR BLD AUTO: 42.5 % (ref 36–46)
HDLC SERPL-MCNC: 40 MG/DL (ref 40–59)
HGB BLD-MCNC: 14.3 G/DL (ref 12–16)
HGB UR QL STRIP.AUTO: NEGATIVE
KETONES UR STRIP-MCNC: NEGATIVE MG/DL
LDLC SERPL CALC-MCNC: 102 MG/DL
LEUKOCYTE ESTERASE UR QL STRIP: NEGATIVE
LYMPHOCYTES # BLD AUTO: 2 THOUSANDS/ΜL (ref 0.5–4)
LYMPHOCYTES NFR BLD AUTO: 41 % (ref 20–50)
MCH RBC QN AUTO: 32.3 PG (ref 26–34)
MCHC RBC AUTO-ENTMCNC: 33.7 G/DL (ref 31–36)
MCV RBC AUTO: 96 FL (ref 80–100)
MONOCYTES # BLD AUTO: 0.3 THOUSAND/ΜL (ref 0.2–0.9)
MONOCYTES NFR BLD AUTO: 6 % (ref 1–10)
N GONORRHOEA DNA GENITAL QL NAA+PROBE: NORMAL
NEUTROPHILS # BLD AUTO: 2.5 THOUSANDS/ΜL (ref 1.8–7.8)
NEUTS SEG NFR BLD AUTO: 51 % (ref 45–65)
NITRITE UR QL STRIP: NEGATIVE
PH UR STRIP.AUTO: 6 [PH] (ref 4.5–8)
PLATELET # BLD AUTO: 185 THOUSANDS/UL (ref 150–450)
PMV BLD AUTO: 9.1 FL (ref 8.9–12.7)
POTASSIUM SERPL-SCNC: 3.8 MMOL/L (ref 3.6–5)
PROT SERPL-MCNC: 7.6 G/DL (ref 5.9–8.4)
PROT UR STRIP-MCNC: NEGATIVE MG/DL
RBC # BLD AUTO: 4.44 MILLION/UL (ref 4–5.2)
SODIUM SERPL-SCNC: 141 MMOL/L (ref 137–147)
SP GR UR STRIP.AUTO: 1.02 (ref 1–1.04)
TRIGL SERPL-MCNC: 171 MG/DL
UROBILINOGEN UA: NEGATIVE MG/DL
WBC # BLD AUTO: 4.9 THOUSAND/UL (ref 4.5–11)

## 2018-11-02 PROCEDURE — 86645 CMV ANTIBODY IGM: CPT

## 2018-11-02 PROCEDURE — 86480 TB TEST CELL IMMUN MEASURE: CPT

## 2018-11-02 PROCEDURE — 86803 HEPATITIS C AB TEST: CPT

## 2018-11-02 PROCEDURE — 87491 CHLMYD TRACH DNA AMP PROBE: CPT

## 2018-11-02 PROCEDURE — 87536 HIV-1 QUANT&REVRSE TRNSCRPJ: CPT

## 2018-11-02 PROCEDURE — 87340 HEPATITIS B SURFACE AG IA: CPT

## 2018-11-02 PROCEDURE — 88305 TISSUE EXAM BY PATHOLOGIST: CPT | Performed by: PATHOLOGY

## 2018-11-02 PROCEDURE — 86644 CMV ANTIBODY: CPT

## 2018-11-02 PROCEDURE — 86592 SYPHILIS TEST NON-TREP QUAL: CPT

## 2018-11-02 PROCEDURE — 87591 N.GONORRHOEAE DNA AMP PROB: CPT

## 2018-11-02 PROCEDURE — 86777 TOXOPLASMA ANTIBODY: CPT

## 2018-11-02 PROCEDURE — 86778 TOXOPLASMA ANTIBODY IGM: CPT

## 2018-11-02 PROCEDURE — 99214 OFFICE O/P EST MOD 30 MIN: CPT | Performed by: FAMILY MEDICINE

## 2018-11-02 PROCEDURE — 81381 HLA I TYPING 1 ALLELE HR: CPT

## 2018-11-02 PROCEDURE — 86706 HEP B SURFACE ANTIBODY: CPT

## 2018-11-02 PROCEDURE — 86361 T CELL ABSOLUTE COUNT: CPT

## 2018-11-02 PROCEDURE — 36415 COLL VENOUS BLD VENIPUNCTURE: CPT

## 2018-11-02 PROCEDURE — 57456 ENDOCERV CURETTAGE W/SCOPE: CPT | Performed by: FAMILY MEDICINE

## 2018-11-02 PROCEDURE — 80061 LIPID PANEL: CPT

## 2018-11-02 PROCEDURE — 86708 HEPATITIS A ANTIBODY: CPT

## 2018-11-02 PROCEDURE — 85025 COMPLETE CBC W/AUTO DIFF WBC: CPT

## 2018-11-02 PROCEDURE — 80053 COMPREHEN METABOLIC PANEL: CPT

## 2018-11-02 PROCEDURE — 83036 HEMOGLOBIN GLYCOSYLATED A1C: CPT

## 2018-11-02 NOTE — ASSESSMENT & PLAN NOTE
Colpo done, endocervical curettage sample sent for tissue exam    Patient tolerated the procedure well

## 2018-11-02 NOTE — PROGRESS NOTES
Assessment/Plan:    Low grade squamous intraepithelial lesion (LGSIL) on cervical Pap smear  Colpo done, endocervical curettage sample sent for tissue exam    Patient tolerated the procedure well  Diagnoses and all orders for this visit:    Low grade squamous intraepithelial lesion (LGSIL) on cervical Pap smear  -     Tissue Exam; Future  -     Tissue Exam  -     Colposcopy          Subjective:      Patient ID: Jono munson a 47 y o  female  53F presents for colonoscopy procedure  Her most recent pap smear revealed Low grade squamous intraepithelial lesion  She is on coumadin for mural thrombus of LV and her INR checked on 11/1 (yesterday) was 2 5 (therapeutic range)  She is also HIV + currently on triple therapy with high CD4 count and low viral load  She feels well  Reports she underwent colpo once many years ago which resulted WNL  The following portions of the patient's history were reviewed and updated as appropriate: allergies, current medications, past family history, past medical history, past social history, past surgical history and problem list     Review of Systems   Constitutional: Negative for fatigue and unexpected weight change  Genitourinary: Negative for decreased urine volume, menstrual problem, pelvic pain, urgency, vaginal bleeding, vaginal discharge and vaginal pain  Musculoskeletal: Negative for back pain  Skin: Negative for pallor  Objective:      /72 (BP Location: Right arm, Patient Position: Sitting, Cuff Size: Standard)   Pulse 78   Temp (!) 97 1 °F (36 2 °C) (Temporal)   Resp 16   Wt 78 kg (172 lb)   SpO2 97%   BMI 31 46 kg/m²            Physical Exam   Constitutional: She appears well-developed and well-nourished  No distress  HENT:   Head: Normocephalic and atraumatic  Eyes: EOM are normal    Pulmonary/Chest: Effort normal    Genitourinary: Vagina normal and uterus normal  No breast swelling, tenderness, discharge or bleeding   No labial fusion  There is no rash, tenderness or lesion on the right labia  There is no rash, tenderness or lesion on the left labia  Uterus is not tender  Cervix exhibits no motion tenderness, no discharge and no friability  Right adnexum displays no mass and no tenderness  Left adnexum displays no mass and no tenderness  No erythema, tenderness or bleeding in the vagina  No foreign body in the vagina  No vaginal discharge found  Musculoskeletal: Normal range of motion  Colposcopy  Date/Time: 11/2/2018 11:13 AM  Performed by: Calixto Holt  Authorized by: Calixto Holt     Consent:     Consent obtained:  Verbal    Consent given by:  Patient    Procedural risks discussed:  Bleeding, failure rate and repeat procedure    Patient questions answered: yes      Patient agrees, verbalizes understanding, and wants to proceed: yes    Pre-procedure:     Pre-procedure timeout performed: yes      Prepped with: acetic acid    Indication:     Indication:  LSIL  Procedure:     Procedure: Colposcopy w/ endocervical curettage      Under satisfactory analgesia the patient was prepped and draped in the dorsal lithotomy position: yes      Dalton speculum was placed in the vagina: yes      Under colposcopic examination the transition zone was seen in entirety: yes      Intracervical block was performed: no      Endocervix was curetted using a Kevorkian curette: yes      Tampon inserted: no      Monsel's solution was applied: no      Biopsy(s): no      Specimen to pathology: yes    Post-procedure:     Findings: Bleeding      Impression: Low grade cervical dysplasia      Patient tolerance of procedure:   Tolerated well, no immediate complications

## 2018-11-03 LAB
BASOPHILS # BLD AUTO: 0 X10E3/UL (ref 0–0.2)
BASOPHILS NFR BLD AUTO: 1 %
CD3+CD4+ CELLS # BLD: 458 /UL (ref 359–1519)
CD3+CD4+ CELLS NFR BLD: 21.8 % (ref 30.8–58.5)
CLINICAL COMMENT: NORMAL
CMV IGG SERPL IA-ACNC: >10 U/ML (ref 0–0.59)
CMV IGM SERPL IA-ACNC: <30 AU/ML (ref 0–29.9)
EOSINOPHIL # BLD AUTO: 0.1 X10E3/UL (ref 0–0.4)
EOSINOPHIL NFR BLD AUTO: 2 %
ERYTHROCYTE [DISTWIDTH] IN BLOOD BY AUTOMATED COUNT: 15.5 % (ref 12.3–15.4)
HAV AB SER QL IA: NORMAL
HBV SURFACE AB SER-ACNC: 590.84 MIU/ML
HBV SURFACE AG SER QL: NORMAL
HCT VFR BLD AUTO: 42.8 % (ref 34–46.6)
HCV AB SER QL: NORMAL
HGB BLD-MCNC: 14.8 G/DL (ref 11.1–15.9)
IMM GRANULOCYTES # BLD: 0 X10E3/UL (ref 0–0.1)
IMM GRANULOCYTES NFR BLD: 0 %
LYMPHOCYTES # BLD AUTO: 2.1 X10E3/UL (ref 0.7–3.1)
LYMPHOCYTES NFR BLD AUTO: 42 %
MCH RBC QN AUTO: 32.2 PG (ref 26.6–33)
MCHC RBC AUTO-ENTMCNC: 34.6 G/DL (ref 31.5–35.7)
MCV RBC AUTO: 93 FL (ref 79–97)
MONOCYTES # BLD AUTO: 0.3 X10E3/UL (ref 0.1–0.9)
MONOCYTES NFR BLD AUTO: 6 %
NEUTROPHILS # BLD AUTO: 2.5 X10E3/UL (ref 1.4–7)
NEUTROPHILS NFR BLD AUTO: 49 %
PLATELET # BLD AUTO: 195 X10E3/UL (ref 150–379)
RBC # BLD AUTO: 4.6 X10E6/UL (ref 3.77–5.28)
T GONDII IGG SERPL IA-ACNC: <3 IU/ML (ref 0–7.1)
T GONDII IGM SER IA-ACNC: <3 AU/ML (ref 0–7.9)
WBC # BLD AUTO: 5.1 X10E3/UL (ref 3.4–10.8)

## 2018-11-05 ENCOUNTER — TELEPHONE (OUTPATIENT)
Dept: FAMILY MEDICINE CLINIC | Facility: CLINIC | Age: 53
End: 2018-11-05

## 2018-11-05 LAB
GAMMA INTERFERON BACKGROUND BLD IA-ACNC: 0.02 IU/ML
M TB IFN-G BLD-IMP: NEGATIVE
M TB IFN-G CD4+ BCKGRND COR BLD-ACNC: 0 IU/ML
M TB IFN-G CD4+ BCKGRND COR BLD-ACNC: 0 IU/ML
MITOGEN IGNF BCKGRD COR BLD-ACNC: >10 IU/ML
RPR SER QL: NORMAL

## 2018-11-06 LAB
HIV1 RNA # SERPL NAA+PROBE: 20 COPIES/ML
HIV1 RNA SERPL NAA+PROBE-LOG#: 1.3 LOG10COPY/ML

## 2018-11-06 NOTE — TELEPHONE ENCOUNTER
Called lab at [phone] and they did not offer any questions or concerns that I could answer for them

## 2018-11-07 DIAGNOSIS — I51.3 MURAL THROMBUS OF LEFT VENTRICLE: ICD-10-CM

## 2018-11-07 LAB — HLA-B*57:01 SPEC QL: NEGATIVE

## 2018-11-07 RX ORDER — WARFARIN SODIUM 5 MG/1
TABLET ORAL
Qty: 30 TABLET | Refills: 2 | Status: SHIPPED | OUTPATIENT
Start: 2018-11-07 | End: 2019-02-12 | Stop reason: SDUPTHER

## 2018-11-09 ENCOUNTER — TELEPHONE (OUTPATIENT)
Dept: FAMILY MEDICINE CLINIC | Facility: CLINIC | Age: 53
End: 2018-11-09

## 2018-11-09 NOTE — TELEPHONE ENCOUNTER
Called patient with colpo results  Plan to repeat pap in 12 months  Advised patient to set a reminder for herself and I will do the same   All questions were answered

## 2018-11-14 ENCOUNTER — OFFICE VISIT (OUTPATIENT)
Dept: SURGERY | Facility: CLINIC | Age: 53
End: 2018-11-14
Payer: MEDICARE

## 2018-11-14 VITALS
WEIGHT: 173.8 LBS | BODY MASS INDEX: 31.98 KG/M2 | SYSTOLIC BLOOD PRESSURE: 98 MMHG | TEMPERATURE: 98 F | HEART RATE: 61 BPM | DIASTOLIC BLOOD PRESSURE: 65 MMHG | HEIGHT: 62 IN

## 2018-11-14 DIAGNOSIS — B20 HIV (HUMAN IMMUNODEFICIENCY VIRUS INFECTION) (HCC): Primary | ICD-10-CM

## 2018-11-14 DIAGNOSIS — N18.30 CKD (CHRONIC KIDNEY DISEASE) STAGE 3, GFR 30-59 ML/MIN (HCC): ICD-10-CM

## 2018-11-14 DIAGNOSIS — F17.200 TOBACCO DEPENDENCE SYNDROME: ICD-10-CM

## 2018-11-14 PROCEDURE — 99204 OFFICE O/P NEW MOD 45 MIN: CPT | Performed by: INTERNAL MEDICINE

## 2018-11-14 NOTE — PROGRESS NOTES
Consultation - Infectious Disease   Shara Reagan 47 y o  female MRN: [id number]  Unit/Bed#:  Encounter: [id number]      IMPRESSION & RECOMMENDATIONS:     1  HIV - doing well on TIVICAY and DESCOVY with viral load of 20 and CD4 count in 400s  Her Cr  Is 1 37  Stressed adherence  Repeat labs in 2 months and follow-up in 3 months  2  CKD stage 3 - suspect secondary to cardiac issues, HTN, and smoking  Cr  1 37 with GFR 44  Denies decrease in urine volume or frequency  Recommend follow-up with nephrology  3  Tobacco dependence - currently smoked 1ppd  In the process of trying to quit  Follows-up with PCP  Has been unsuccessful with nicotine patch  Advised cessation  HISTORY OF PRESENT ILLNESS:  Reason for Consult: HIV  HPI: Shara Reagan is a 47y o  year old female here for new patient evaluation for HIV  Patient is a lesbian was diagnosed with HIV in 1988 after heterosexual contact  She is currently taking TIVICAY and DESCOVY without missing any doses  She presents to the clinic to establish care because her previous provider relocated  She is currently not sexually active and reports that last sexual encounter was approximately 10 years ago  However she did suffer an MI and LV mural wall thrombus in February 2017 requiring defibrillatory placement and long-term anticoagulation with warfarin  Has adequate follow-up with cardiology  She currently smokes 1 pack per day and is in the process of trying to quit  She states that her peers smoke and is difficult for her to quit  She denies any recreational or illicit drug use  Has used heroin and crack in the past by snorting or smoking; she has been clean for the past 7 years  Overall she is feeling well and denies any side effects from any of her medications  REVIEW OF SYSTEMS:  A complete 12 point system-based review of systems is otherwise negative      PAST MEDICAL HISTORY:  Past Medical History:   Diagnosis Date    Coronary artery disease defibrillator    HIV positive ([facility] [address] )      Past Surgical History:   Procedure Laterality Date    ANGIOPLASTY      CARDIAC DEFIBRILLATOR PLACEMENT      CORONARY ANGIOPLASTY      pci placement    TOTAL ABDOMINAL HYSTERECTOMY         FAMILY HISTORY:  Non-contributory    SOCIAL HISTORY:  Social History   History   Alcohol Use No     History   Drug Use No     History   Smoking Status    Current Some Day Smoker    Packs/day: 1 00    Types: Cigarettes   Smokeless Tobacco    Current User     Comment: 15 cigaretes a day       ALLERGIES:  Allergies   Allergen Reactions    No Active Allergies     No Known Allergies        MEDICATIONS:  All current active medications have been reviewed  PHYSICAL EXAM:  Vitals:    11/14/18 1741   BP: 98/65   BP Location: Left arm   Patient Position: Sitting   Cuff Size: Standard   Pulse: 61   Temp: 98 °F (36 7 °C)   Weight: 78 8 kg (173 lb 12 8 oz)   Height: 5' 2" (1 575 m)         General Appearance:  Appearing well, nontoxic, and in no distress   Head:  Normocephalic, without obvious abnormality, atraumatic   Eyes:  Conjunctiva pink and sclera anicteric, both eyes   Nose: Nares normal, mucosa normal, no drainage   Throat: Oropharynx moist without lesions   Neck: Supple, symmetrical, no adenopathy, no tenderness/mass/nodules   Back:   Symmetric, no curvature, ROM normal, no CVA tenderness   Lungs:   Clear to auscultation bilaterally, respirations unlabored   Chest Wall:  No tenderness or deformity   Heart:  RRR; no murmur, rub or gallop   Abdomen:   Soft, non-tender, non-distended, positive bowel sounds,    Extremities: No cyanosis, clubbing or edema   Skin: No rashes or lesions  No draining wounds noted  Lymph nodes: Cervical, supraclavicular nodes normal   Neurologic: Alert and oriented times 3, extremity strength 5/5 and symmetric       LABS, IMAGING, & OTHER STUDIES:  Lab Results:  I have personally reviewed pertinent labs    Lab Results   Component Value Date    K 3 8 11/02/2018     11/02/2018    CO2 26 11/02/2018    BUN 13 11/02/2018    CREATININE 1 37 (H) 11/02/2018    GLUF 139 (H) 11/02/2018    CALCIUM 9 3 11/02/2018    AST 26 11/02/2018    ALT 30 11/02/2018    ALKPHOS 71 11/02/2018    EGFR 44 (L) 11/02/2018     Lab Results   Component Value Date    WBC 4 90 11/02/2018    WBC 5 1 11/02/2018    HGB 14 3 11/02/2018    HGB 14 8 11/02/2018    HCT 42 5 11/02/2018    HCT 42 8 11/02/2018    MCV 96 11/02/2018    MCV 93 11/02/2018     11/02/2018     11/02/2018     No results found for: CE2OMUJ  HIV-1 RNA Viral Load Log   Date/Time Value Ref Range Status   11/02/2018 09:32 AM 1 301 [id number]/mL Final       Imaging Studies:   I have personally reviewed pertinent imaging study reports and images in PACS  Other Studies:   I have personally reviewed pertinent reports

## 2018-11-16 NOTE — PROGRESS NOTES
Initial Nutritional Assessment     Socio-Economic Status:receives "SNAP"   Living situation:   Lives in house with her mother, and her nephews                              & Kitchen Facilities: fully functioning appliances   Psychosocial Factors:                                Functional Status:  Patient & her mother do the food shopping &meal prep                 Physical Activity Level:patient states she walks regularly and has an "exercise chair"/ resistance training   GI Intolerance:   None reported                                          Appetite : Good   Oral Health  History/Status:  Patient states she has dentures, they fit well                                                    Last Dental visit:  "a while ago" (but she states she doesn't need an appt  Now)                        ?:   [address] Allergies/Intolerances    None reported                                                                                Diet History: Breakfast:coffee, toast & eggs             Lunch: sandwich, water              Dinner:  Rice & beans, fish or chicken, cooked green veggies, water                                                                          Snacks:  Weight Status: todays weight:   78 8kg              IBW:   50kg           %IBW:  158%         UBW:    78 kg        Weight Stable for past 6 months?        yes                             current BMI:31 8  Estimated Nutritional Needs: Kcal/kkcal/57 kg Adjusted body weight                                                          =    1425 calories                                             Gm Protein/kg             1 0 - 1 2gm/kg                =     57 to 68                               Fluid:           25    ml/kg =   1425 ml                                             Patient meeting nutritional needs:    yes            , Supplements: no  Nutrition Diagnosis: Problem Related to:    Overweight/Obesity As Evidenced by:estimated intake inconsistent w/ measured nutritional needs   Signs &  Symptoms:BMI > 30  Nutrition Education Intervention:   Person Educated: patient                         , Education Provided: healthy eating guidelines reviewed  Readiness to Learn:   receptive                                Expected compliance: fair to good  Recommendations:  Continue to monitor patient's weight status, lipids and other pertinent labs                                                                                                                                                                    Neo Sanabria, DOROTHYN,LDN,CDE

## 2018-11-19 ENCOUNTER — TELEPHONE (OUTPATIENT)
Dept: OBGYN CLINIC | Facility: OTHER | Age: 53
End: 2018-11-19

## 2018-11-19 DIAGNOSIS — N18.30 STAGE 3 CHRONIC KIDNEY DISEASE (HCC): Primary | ICD-10-CM

## 2018-11-19 NOTE — TELEPHONE ENCOUNTER
Received a call from patient regarding her blood work showing chronic kidney disease   She would like to be referred to nephro which I have placed a referral for her

## 2018-11-23 ENCOUNTER — TELEPHONE (OUTPATIENT)
Dept: NEPHROLOGY | Facility: CLINIC | Age: 53
End: 2018-11-23

## 2018-11-23 NOTE — TELEPHONE ENCOUNTER
Called and spoke with Airam in regards to scheduling her new patient appointment that she was referred to by Dr Stanislav Miguel  Patient is scheduled for Friday, December 7 at 130pm  Patient is aware of time, date and location  I confirmed patient of her mailing address and I put NPP out in the mail

## 2018-12-11 ENCOUNTER — REMOTE DEVICE CLINIC VISIT (OUTPATIENT)
Dept: CARDIOLOGY CLINIC | Facility: CLINIC | Age: 53
End: 2018-12-11
Payer: COMMERCIAL

## 2018-12-11 DIAGNOSIS — I25.5 ISCHEMIC CARDIOMYOPATHY: Primary | ICD-10-CM

## 2018-12-11 DIAGNOSIS — Z95.810 PRESENCE OF CARDIOVERTER DEFIBRILLATOR: ICD-10-CM

## 2018-12-11 PROCEDURE — 93295 DEV INTERROG REMOTE 1/2/MLT: CPT | Performed by: INTERNAL MEDICINE

## 2018-12-11 PROCEDURE — 93296 REM INTERROG EVL PM/IDS: CPT | Performed by: INTERNAL MEDICINE

## 2018-12-11 NOTE — PROGRESS NOTES
Results for orders placed or performed in visit on 12/11/18   Cardiac EP device report    Narrative    MDT SINGLE ICD  MDT SINGLE CHAMBER ICD CARELINK TRANSMISSION: BATTERY VOLTAGE ADEQUATE (11 1 YRS)  PRESENTING - SBRADY @ 54 BPM   - <0 1%  ALL AVAILABLE LEAD PARAMETERS APPEAR WITHIN NORMAL LIMITS & STABLE  NO SIGNIFICANT HIGH RATE EPISODES  OPTI-VOL WITHIN NORMAL LIMITS   APPROPRIATELY FUNCTIONING ICD       eb

## 2018-12-12 ENCOUNTER — PATIENT OUTREACH (OUTPATIENT)
Dept: SURGERY | Facility: CLINIC | Age: 53
End: 2018-12-12

## 2018-12-12 ENCOUNTER — OFFICE VISIT (OUTPATIENT)
Dept: CARDIOLOGY CLINIC | Facility: CLINIC | Age: 53
End: 2018-12-12
Payer: COMMERCIAL

## 2018-12-12 ENCOUNTER — TELEPHONE (OUTPATIENT)
Dept: FAMILY MEDICINE CLINIC | Facility: CLINIC | Age: 53
End: 2018-12-12

## 2018-12-12 ENCOUNTER — APPOINTMENT (OUTPATIENT)
Dept: LAB | Facility: HOSPITAL | Age: 53
End: 2018-12-12
Payer: COMMERCIAL

## 2018-12-12 VITALS
BODY MASS INDEX: 32.42 KG/M2 | HEART RATE: 67 BPM | WEIGHT: 176.2 LBS | OXYGEN SATURATION: 98 % | HEIGHT: 62 IN | DIASTOLIC BLOOD PRESSURE: 60 MMHG | SYSTOLIC BLOOD PRESSURE: 124 MMHG

## 2018-12-12 DIAGNOSIS — I25.2 OLD MI (MYOCARDIAL INFARCTION): ICD-10-CM

## 2018-12-12 DIAGNOSIS — I25.2 PAST HEART ATTACK: ICD-10-CM

## 2018-12-12 DIAGNOSIS — E78.2 MIXED HYPERLIPIDEMIA: ICD-10-CM

## 2018-12-12 DIAGNOSIS — I10 ESSENTIAL HYPERTENSION: ICD-10-CM

## 2018-12-12 DIAGNOSIS — I25.5 ISCHEMIC CARDIOMYOPATHY: ICD-10-CM

## 2018-12-12 DIAGNOSIS — I23.6 LV (LEFT VENTRICULAR) MURAL THROMBUS FOLLOWING MI (HCC): ICD-10-CM

## 2018-12-12 DIAGNOSIS — I25.10 TRIPLE VESSEL CORONARY ARTERY DISEASE: Primary | ICD-10-CM

## 2018-12-12 DIAGNOSIS — E78.00 PURE HYPERCHOLESTEROLEMIA: ICD-10-CM

## 2018-12-12 DIAGNOSIS — Z95.810 IMPLANTABLE CARDIOVERTER-DEFIBRILLATOR (ICD) IN SITU: ICD-10-CM

## 2018-12-12 LAB
INR PPP: 2.22 (ref 0.89–1.1)
PROTHROMBIN TIME: 22.5 SECONDS (ref 9.5–11.6)

## 2018-12-12 PROCEDURE — 36415 COLL VENOUS BLD VENIPUNCTURE: CPT | Performed by: FAMILY MEDICINE

## 2018-12-12 PROCEDURE — 93000 ELECTROCARDIOGRAM COMPLETE: CPT | Performed by: INTERNAL MEDICINE

## 2018-12-12 PROCEDURE — 99214 OFFICE O/P EST MOD 30 MIN: CPT | Performed by: INTERNAL MEDICINE

## 2018-12-12 PROCEDURE — 85610 PROTHROMBIN TIME: CPT | Performed by: FAMILY MEDICINE

## 2018-12-12 RX ORDER — ATORVASTATIN CALCIUM 40 MG/1
40 TABLET, FILM COATED ORAL DAILY
Qty: 30 TABLET | Refills: 5 | Status: SHIPPED | OUTPATIENT
Start: 2018-12-12 | End: 2019-03-01 | Stop reason: DRUGHIGH

## 2018-12-12 RX ORDER — CARVEDILOL 6.25 MG/1
6.25 TABLET ORAL 2 TIMES DAILY WITH MEALS
Qty: 60 TABLET | Refills: 5 | Status: SHIPPED | OUTPATIENT
Start: 2018-12-12 | End: 2019-07-08 | Stop reason: SDUPTHER

## 2018-12-12 NOTE — PROGRESS NOTES
Cardiology Follow Up    Deion Valderrama  1965  [id number]  [address] [facility]  [address] [place] [id number] [phone]    1  Triple vessel coronary artery disease  POCT ECG    Echo complete with contrast if indicated   2  Ischemic cardiomyopathy  Echo complete with contrast if indicated   3  old extensive anterior, apical, lateral and distal inferior apical MI  Echo complete with contrast if indicated   4  Implantable cardioverter-defibrillator (ICD) in situ  Echo complete with contrast if indicated   5  Essential hypertension     6  Mixed hyperlipidemia         Patient was referred to us from [facility]  She experienced a myocardial infarction on February 28, 2017 in [place]  She has subsequently moved to [facility] area  She had a cardiac catheterization taken at [facility]  Patient's cardiac catheterization CD revealed the left main coronary artery to be nonobstructed  The left anterior descending artery was 100% in its midportion after a large 1st diagonal branch which was nonobstructed  The circumflex artery obtuse marginal was 90%  The right coronary artery in its midportion with 50-60%  The large distal LAD filled from right to left collaterals  [facility] had recommended CABG but the patient had refused at that time  No ventriculogram was performed but an echocardiogram which I performed had demonstrated extensive LV damage  A nuclear stress test demonstrated minimal with any it ischemia  I did not feel that the patient would improve with CABG  Patient is free of angina pectoris or shortness of breath  She is doing quite well for her degree of LV dysfunction  04/11/2017 echocardiogram severe LV dysfunction, EF 30%, mild LV enlargement, apical aneurysm with akinesis to paradoxical motion  Large organized apical thrombus  Grade 1 diastolic dysfunction, normal pulmonary artery pressures    05/15/2017 stress test: Severe LV dysfunction, EF 20%  Mild LV enlargement  Anterior apical aneurysm with paradoxical motion  Extensive myocardial infarctions involving the distal anterior apical and inferior apical walls  Negative for Persantine induced ischemia  Interval History:   Patient has done well over the last 3 months  She denies shortness of breath or chest pain  Her blood pressure is improved compared to her last visit  Patient Active Problem List   Diagnosis    Ischemic cardiomyopathy    Triple vessel coronary artery disease    HIV positive ([place] [address] )    Hypertension    Hyperlipemia    Impaired left ventricular function    Tobacco dependence syndrome    Long term current use of anticoagulant therapy    old extensive anterior, apical, lateral and distal inferior apical MI    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    Screening for malignant neoplasm of colon    Immunization counseling    Well woman exam with routine gynecological exam    Pap smear for cervical cancer screening    Low grade squamous intraepithelial lesion (LGSIL) on cervical Pap smear    CKD (chronic kidney disease) stage 3, GFR 30-59 ml/min ([facility])    Implantable cardioverter-defibrillator (ICD) in situ     Past Medical History:   Diagnosis Date    Coronary artery disease     defibrillator    HIV positive ([place] [address] )      Social History     Social History    Marital status: Single     Spouse name: N/A    Number of children: N/A    Years of education: N/A     Occupational History    Not on file       Social History Main Topics    Smoking status: Current Some Day Smoker     Packs/day: 1 00     Types: Cigarettes    Smokeless tobacco: Current User      Comment: 15 cigaretes a day    Alcohol use No    Drug use: No    Sexual activity: Not on file     Other Topics Concern    Not on file     Social History Narrative    No narrative on file Family History   Problem Relation Age of Onset    Other Father         GI bleed    Suicidality Brother     Drug abuse Brother         overdose     Past Surgical History:   Procedure Laterality Date    ANGIOPLASTY      CARDIAC DEFIBRILLATOR PLACEMENT      CORONARY ANGIOPLASTY      pci placement    TOTAL ABDOMINAL HYSTERECTOMY         Current Outpatient Prescriptions:     albuterol (VENTOLIN HFA) 90 mcg/act inhaler, Use only as rescue inhaler, Disp: , Rfl:     aspirin (ECOTRIN LOW STRENGTH) 81 mg EC tablet, Take 81 mg by mouth daily, Disp: , Rfl:     atorvastatin (LIPITOR) 40 mg tablet, Take 1 tablet (40 mg total) by mouth daily, Disp: 30 tablet, Rfl: 5    carvedilol (COREG) 6 25 mg tablet, Take 6 25 mg by mouth 2 (two) times a day with meals, Disp: , Rfl:     dolutegravir (TIVICAY) 50 MG TABS, Take 1 tablet (50 mg total) by mouth daily, Disp: 30 tablet, Rfl: 3    emtricitabine-tenofovir AF (DESCOVY) 200-25 MG tablet, Take 1 tablet by mouth daily, Disp: 30 tablet, Rfl: 3    nicotine polacrilex (NICORETTE) 4 mg gum, every 2 (two) hours, Disp: , Rfl:     sacubitril-valsartan (ENTRESTO) 24-26 MG TABS, Take 1 tablet by mouth 2 (two) times a day, Disp: 60 tablet, Rfl: 5    warfarin (COUMADIN) 5 mg tablet, TAKE 1 TABLET BY MOUTH ONCE DAILY  , Disp: 30 tablet, Rfl: 2  Allergies   Allergen Reactions    No Active Allergies     No Known Allergies        Results for orders placed or performed in visit on 12/12/18   POCT ECG    Narrative    Normal sinus rhythm at a rate of 67 beats per minute  Extensive anterior lateral inferior wall myocardial infarction  Secondary ST T-wave abnormalities           No results found for: CHOL  Lab Results   Component Value Date    HDL 40 11/02/2018     Lab Results   Component Value Date    LDLCALC 102 11/02/2018     Lab Results   Component Value Date    TRIG 171 (H) 11/02/2018     No results found for: [place], [place]  Lab Results   Component Value Date    CALCIUM 9 3 11/02/2018 K 3 8 11/02/2018    CO2 26 11/02/2018     11/02/2018    BUN 13 11/02/2018    CREATININE 1 37 (H) 11/02/2018     Lab Results   Component Value Date    K 3 8 11/02/2018     11/02/2018    CO2 26 11/02/2018    BUN 13 11/02/2018    CREATININE 1 37 (H) 11/02/2018    GLUF 139 (H) 11/02/2018    CALCIUM 9 3 11/02/2018    AST 26 11/02/2018    ALT 30 11/02/2018    ALKPHOS 71 11/02/2018    EGFR 44 (L) 11/02/2018     Lab Results   Component Value Date    WBC 4 90 11/02/2018    WBC 5 1 11/02/2018    HGB 14 3 11/02/2018    HGB 14 8 11/02/2018    HCT 42 5 11/02/2018    HCT 42 8 11/02/2018    MCV 96 11/02/2018    MCV 93 11/02/2018     11/02/2018     11/02/2018       Review of Systems:  Review of Systems   Respiratory: Negative for cough, choking, chest tightness, shortness of breath and wheezing  Cardiovascular: Negative for chest pain, palpitations and leg swelling  Musculoskeletal: Negative for gait problem  Skin: Negative for rash  Neurological: Negative for dizziness, tremors, syncope, weakness, light-headedness, numbness and headaches  Psychiatric/Behavioral: Negative for agitation and behavioral problems  The patient is not hyperactive  Physical Exam:  /60 (BP Location: Left arm, Patient Position: Sitting, Cuff Size: Adult)   Pulse 67   Ht 5' 2" (1 575 m)   Wt 79 9 kg (176 lb 3 2 oz)   SpO2 98%   BMI 32 23 kg/m²    Physical Exam   Constitutional: She is oriented to person, place, and time  She appears well-developed and well-nourished  No distress  HENT:   Head: Normocephalic and atraumatic  Neck: No JVD present  No thyromegaly present  Cardiovascular: Normal rate, regular rhythm, normal heart sounds and intact distal pulses  Exam reveals no gallop and no friction rub  No murmur heard  Pulmonary/Chest: No respiratory distress  She has no wheezes  She has no rales  Musculoskeletal: She exhibits no edema     Neurological: She is alert and oriented to person, place, and time  Skin: Skin is warm and dry  Psychiatric: She has a normal mood and affect  Her behavior is normal        Discussion/Summary:  1   Echocardiogram 1-2 weeks before return visit  2  Fasting direct LDL prior to return  3    In office device check on return

## 2018-12-20 ENCOUNTER — CONSULT (OUTPATIENT)
Dept: NEPHROLOGY | Facility: CLINIC | Age: 53
End: 2018-12-20
Payer: COMMERCIAL

## 2018-12-20 VITALS
HEIGHT: 62 IN | WEIGHT: 176 LBS | DIASTOLIC BLOOD PRESSURE: 40 MMHG | SYSTOLIC BLOOD PRESSURE: 80 MMHG | BODY MASS INDEX: 32.39 KG/M2

## 2018-12-20 DIAGNOSIS — N18.30 CKD (CHRONIC KIDNEY DISEASE) STAGE 3, GFR 30-59 ML/MIN (HCC): Primary | ICD-10-CM

## 2018-12-20 DIAGNOSIS — I25.5 ISCHEMIC CARDIOMYOPATHY: ICD-10-CM

## 2018-12-20 DIAGNOSIS — N18.30 STAGE 3 CHRONIC KIDNEY DISEASE (HCC): ICD-10-CM

## 2018-12-20 PROCEDURE — 99204 OFFICE O/P NEW MOD 45 MIN: CPT | Performed by: INTERNAL MEDICINE

## 2018-12-20 NOTE — PROGRESS NOTES
NEPHROLOGY OUTPATIENT CONSULTATION   José Miguel Jacobsen 47 y o  female MRN: [id number]    Reason for consultation:  Chronic kidney disease    ASSESSMENT and PLAN:  1  Chronic kidney disease, suspecting stage III, baseline creatinine seems to fluctuate between 1 41 6 over last year  Her underlying disease most likely secondary to cardiorenal, urinalysis is bland  Unfortunately we do not have any laboratory studies be on 2017 to compare with  2  HIV since 1985 currently on treatment  3  Cardiomyopathy, previous ejection fraction 35%  4  Labile blood pressures, patient asymptomatic, continue to follow    · Again suspecting CKD stage 3, baseline creatinine 1 4-1 6 likely from possible cardiorenal syndrome urinalysis is bland  · No changes in current regimen  · Will continue to follow closely, patient has schedule blood work in 3 months will follow-up in 6 months  · Would also check renal ultrasound for size and echogenicity    HISTORY OF PRESENT ILLNESS:  We had the pleasure of seeing Airam for nephrology consultation secondary to a elevated creatinine between 1 4-1 6  As we know she is a 15-year-old female with a known history of HIV since 6  She has been on treatment since then  She also has a known history of coronary disease as well as ischemic cardiomyopathy  Ejection fraction of approximately 30 to 35%  Denies any history of kidney disease  No reported family medical history of kidney disease  Feels well  Denies any excessive fatigue or tiredness  Denies any dizziness lightheadedness or falls  Denies any chest pains significant shortness of breath cough or sputum production  No abdominal pain or bloating  Denies any lower extremity swelling  Her appetite has been normal   Denies any problems with urination creating hematuria or dysuria  No reports of flank pain  Review of Systems   All other systems reviewed and are negative        PAST MEDICAL HISTORY:  Past Medical History:   Diagnosis Date  Coronary artery disease     defibrillator    HIV positive ([facility] [address] )        PAST SURGICAL HISTORY:  Past Surgical History:   Procedure Laterality Date    ANGIOPLASTY      CARDIAC DEFIBRILLATOR PLACEMENT      CORONARY ANGIOPLASTY      pci placement    TOTAL ABDOMINAL HYSTERECTOMY         ALLERGIES:  Allergies   Allergen Reactions    No Active Allergies     No Known Allergies        SOCIAL HISTORY:  History   Alcohol Use    Yes     Comment: ocassionally     History   Drug Use No     History   Smoking Status    Current Some Day Smoker    Packs/day: 1 00    Years: 30 00    Types: Cigarettes   Smokeless Tobacco    Current User     Comment: 15 cigaretes a day       FAMILY HISTORY:  Family History   Problem Relation Age of Onset    Other Father         GI bleed    Suicidality Brother     Drug abuse Brother         overdose       MEDICATIONS:    Current Outpatient Prescriptions:     albuterol (VENTOLIN HFA) 90 mcg/act inhaler, Use only as rescue inhaler, Disp: , Rfl:     aspirin (ECOTRIN LOW STRENGTH) 81 mg EC tablet, Take 81 mg by mouth daily, Disp: , Rfl:     atorvastatin (LIPITOR) 40 mg tablet, Take 1 tablet (40 mg total) by mouth daily, Disp: 30 tablet, Rfl: 5    carvedilol (COREG) 6 25 mg tablet, Take 1 tablet (6 25 mg total) by mouth 2 (two) times a day with meals, Disp: 60 tablet, Rfl: 5    dolutegravir (TIVICAY) 50 MG TABS, Take 1 tablet (50 mg total) by mouth daily, Disp: 30 tablet, Rfl: 3    emtricitabine-tenofovir AF (DESCOVY) 200-25 MG tablet, Take 1 tablet by mouth daily, Disp: 30 tablet, Rfl: 3    nicotine polacrilex (NICORETTE) 4 mg gum, every 2 (two) hours, Disp: , Rfl:     sacubitril-valsartan (ENTRESTO) 24-26 MG TABS, Take 1 tablet by mouth 2 (two) times a day, Disp: 60 tablet, Rfl: 5    warfarin (COUMADIN) 5 mg tablet, TAKE 1 TABLET BY MOUTH ONCE DAILY  , Disp: 30 tablet, Rfl: 2        PHYSICAL EXAM:  Vitals:    12/20/18 0912 12/20/18 0959   BP: 130/92 (!) 80/40   BP Location: Left arm Left arm   Patient Position: Sitting Sitting   Cuff Size: Large    Weight: 79 8 kg (176 lb)    Height: 5' 2" (1 575 m)        Physical Exam   Constitutional: She is oriented to person, place, and time  No distress  HENT:   Head: Normocephalic  Eyes: No scleral icterus  Cardiovascular: Normal rate and regular rhythm  Pulmonary/Chest: Effort normal  She has rales (Coarse rales right base)  Abdominal: Soft  She exhibits no distension  Musculoskeletal: She exhibits no edema  Neurological: She is alert and oriented to person, place, and time  Skin: Skin is warm and dry  Psychiatric: She has a normal mood and affect  Laboratory results:   Results for orders placed or performed in visit on 11/02/18   Tissue Exam   Result Value Ref Range    Case Report       Surgical Pathology Report                         Case: [id number]                                   Authorizing Provider:  Janette Montez MD            Collected:           11/02/2018 1058              Ordering Location:     [address] [place] [address]    Received:            11/02/2018 [address]                                                                            Heart                                                                        Pathologist:           iBllie Salazar MD                                                              Specimen:    Cervix, Endocervical, Endocervical                                                         Final Diagnosis       A  Cervix, Endocervical, Endocervical:  -Scant superficial squamous epithelium with mild cytological atypia , suggestive of HPV infection   -No evidence of endocervical epithelium seen      Note       Appropriate clinical follow up is recommended  Additional Information       All controls performed with the immunohistochemical stains reported above reacted appropriately    These tests were developed and their performance characteristics determined by [facility] or [facility]  They may not be cleared or approved by the [place]  Food and Drug Administration  The FDA has determined that such clearance or approval is not necessary  These tests are used for clinical purposes  They should not be regarded as investigational or for research  This laboratory has been approved by CLIA 88, designated as a high-complexity laboratory and is qualified to perform these tests  Gross Description        A  The specimen is received in formalin, submitted in 2 containers, labeled with the patient's name and hospital number, and is designated "cervix/endocervix  The specimen consists of multiple colorless to minute white tan-pink, mucinous and soft tissue fragments measuring in aggregate 0 2 x 0 1 by a by 1 cm  Due to the size and consistency of the specimen, the specimen may not survive histological processing  Entirely submitted  One cassette  Note: The estimated total formalin fixation time based upon information provided by the submitting clinician and the standard processing schedule is over 72 hours    Jackson Zaidi

## 2018-12-20 NOTE — LETTER
December 20, 2018     Pedro Real MD  [address] [place] [address]  [address] [facility]    Patient: Becki Torres   YOB: 1965   Date of Visit: 12/20/2018     Dear Dr Ct Osorio      Thank you for referring Becki Torres to me for evaluation  Below are the relevant portions of my assessment and plan of care  If you have questions, please do not hesitate to call me  I look forward to following [place] along with you  Sincerely,        Barry Crowell, DO        CC: No Recipients    Progress Notes:    ASSESSMENT and PLAN:  1  Chronic kidney disease, suspecting stage III, baseline creatinine seems to fluctuate between 1 41 6 over last year  Her underlying disease most likely secondary to cardiorenal, urinalysis is bland  Unfortunately we do not have any laboratory studies be on 2017 to compare with  2  HIV since 1985 currently on treatment  3  Cardiomyopathy, previous ejection fraction 35%  4   Labile blood pressures, patient asymptomatic, continue to follow    · Again suspecting CKD stage 3, baseline creatinine 1 4-1 6 likely from possible cardiorenal syndrome urinalysis is bland  · No changes in current regimen  · Will continue to follow closely, patient has schedule blood work in 3 months will follow-up in 6 months  · Would also check renal ultrasound for size and echogenicity

## 2018-12-20 NOTE — PATIENT INSTRUCTIONS
ASSESSMENT and PLAN:  1  Chronic kidney disease, suspecting stage III, baseline creatinine seems to fluctuate between 1 41 6 over last year  Her underlying disease most likely secondary to cardiorenal, urinalysis is bland  Unfortunately we do not have any laboratory studies be on 2017 to compare with  2  HIV since 1985 currently on treatment  3  Cardiomyopathy, previous ejection fraction 35%  4   Labile blood pressures, patient asymptomatic, continue to follow    · Again suspecting CKD stage 3, baseline creatinine 1 4-1 6 likely from possible cardiorenal syndrome urinalysis is bland  · No changes in current regimen  · Will continue to follow closely, patient has schedule blood work in 3 months will follow-up in 6 months  · Would also check renal ultrasound for size and echogenicity

## 2018-12-22 ENCOUNTER — HOSPITAL ENCOUNTER (OUTPATIENT)
Dept: ULTRASOUND IMAGING | Facility: HOSPITAL | Age: 53
Discharge: HOME | End: 2018-12-22
Attending: INTERNAL MEDICINE
Payer: COMMERCIAL

## 2018-12-22 DIAGNOSIS — N18.30 CKD (CHRONIC KIDNEY DISEASE) STAGE 3, GFR 30-59 ML/MIN (HCC): ICD-10-CM

## 2018-12-22 DIAGNOSIS — I25.5 ISCHEMIC CARDIOMYOPATHY: ICD-10-CM

## 2018-12-22 DIAGNOSIS — N18.30 STAGE 3 CHRONIC KIDNEY DISEASE (HCC): ICD-10-CM

## 2018-12-22 PROCEDURE — 76770 US EXAM ABDO BACK WALL COMP: CPT

## 2019-01-03 ENCOUNTER — OFFICE VISIT (OUTPATIENT)
Dept: FAMILY MEDICINE CLINIC | Facility: CLINIC | Age: 54
End: 2019-01-03

## 2019-01-03 VITALS
OXYGEN SATURATION: 95 % | SYSTOLIC BLOOD PRESSURE: 104 MMHG | BODY MASS INDEX: 32.02 KG/M2 | HEIGHT: 62 IN | DIASTOLIC BLOOD PRESSURE: 70 MMHG | TEMPERATURE: 97.4 F | HEART RATE: 59 BPM | RESPIRATION RATE: 16 BRPM | WEIGHT: 174 LBS

## 2019-01-03 DIAGNOSIS — I23.6 LV (LEFT VENTRICULAR) MURAL THROMBUS FOLLOWING MI (HCC): ICD-10-CM

## 2019-01-03 DIAGNOSIS — E78.2 MIXED HYPERLIPIDEMIA: ICD-10-CM

## 2019-01-03 DIAGNOSIS — Z21 HIV POSITIVE (HCC): ICD-10-CM

## 2019-01-03 DIAGNOSIS — I10 ESSENTIAL HYPERTENSION: Primary | ICD-10-CM

## 2019-01-03 PROCEDURE — 99213 OFFICE O/P EST LOW 20 MIN: CPT | Performed by: FAMILY MEDICINE

## 2019-01-03 NOTE — ASSESSMENT & PLAN NOTE
- Discussed with patient that goal BP <150/90 in the absence of DM or CKD  - Controlled  - Restrict daily salt intake up to 2 4 g  - Discussed DASH diet, hand out given  - Advised to abstain for alcohol consumption  - Advised to walk 30 minutes a day 5 times a week and practice stress relieving measures such as meditation, yoga, bebo chi

## 2019-01-03 NOTE — ASSESSMENT & PLAN NOTE
She can follow-up with ID  CD4 count per patient is high with low viral load  She is complaint with her meds

## 2019-01-10 ENCOUNTER — PATIENT OUTREACH (OUTPATIENT)
Dept: SURGERY | Facility: CLINIC | Age: 54
End: 2019-01-10

## 2019-01-15 ENCOUNTER — TRANSCRIBE ORDERS (OUTPATIENT)
Dept: ADMINISTRATIVE | Facility: HOSPITAL | Age: 54
End: 2019-01-15

## 2019-01-15 ENCOUNTER — TELEPHONE (OUTPATIENT)
Dept: FAMILY MEDICINE CLINIC | Facility: CLINIC | Age: 54
End: 2019-01-15

## 2019-01-15 ENCOUNTER — APPOINTMENT (OUTPATIENT)
Dept: LAB | Facility: HOSPITAL | Age: 54
End: 2019-01-15
Payer: COMMERCIAL

## 2019-01-15 ENCOUNTER — ANTICOAG VISIT (OUTPATIENT)
Dept: FAMILY MEDICINE CLINIC | Facility: CLINIC | Age: 54
End: 2019-01-15

## 2019-01-15 DIAGNOSIS — I25.5 ISCHEMIC CARDIOMYOPATHY: ICD-10-CM

## 2019-01-15 NOTE — TELEPHONE ENCOUNTER
Called patient to report that INR is now in therapeutic range  She is scheduled for L breast biopsy with Dr Rad Romano  Surgery is scheduled for 01/16/2019  She will resume both ASA and Warfarin on 01/17  Plan to re-check INR 1 week after resuming the medications

## 2019-01-18 PROCEDURE — 88305 TISSUE EXAM BY PATHOLOGIST: CPT | Performed by: PATHOLOGY

## 2019-01-19 ENCOUNTER — LAB REQUISITION (OUTPATIENT)
Dept: LAB | Facility: HOSPITAL | Age: 54
End: 2019-01-19
Payer: COMMERCIAL

## 2019-01-19 DIAGNOSIS — N63.20 MASS OF LEFT BREAST: ICD-10-CM

## 2019-01-24 ENCOUNTER — APPOINTMENT (OUTPATIENT)
Dept: LAB | Facility: HOSPITAL | Age: 54
End: 2019-01-24
Payer: COMMERCIAL

## 2019-01-24 DIAGNOSIS — B20 HIV (HUMAN IMMUNODEFICIENCY VIRUS INFECTION) (HCC): ICD-10-CM

## 2019-01-24 DIAGNOSIS — I25.5 ISCHEMIC CARDIOMYOPATHY: ICD-10-CM

## 2019-01-24 DIAGNOSIS — N18.30 CKD (CHRONIC KIDNEY DISEASE) STAGE 3, GFR 30-59 ML/MIN (HCC): ICD-10-CM

## 2019-01-24 DIAGNOSIS — N18.30 STAGE 3 CHRONIC KIDNEY DISEASE (HCC): ICD-10-CM

## 2019-01-24 LAB
ALBUMIN SERPL BCP-MCNC: 3.9 G/DL (ref 3–5.2)
ALP SERPL-CCNC: 65 U/L (ref 43–122)
ALT SERPL W P-5'-P-CCNC: 15 U/L (ref 9–52)
ANION GAP SERPL CALCULATED.3IONS-SCNC: 5 MMOL/L (ref 5–14)
AST SERPL W P-5'-P-CCNC: 26 U/L (ref 14–36)
BACTERIA UR QL AUTO: ABNORMAL /HPF
BASOPHILS # BLD AUTO: 0 THOUSANDS/ΜL (ref 0–0.1)
BASOPHILS NFR BLD AUTO: 1 % (ref 0–1)
BILIRUB SERPL-MCNC: 0.6 MG/DL
BILIRUB UR QL STRIP: NEGATIVE
BUN SERPL-MCNC: 14 MG/DL (ref 5–25)
CALCIUM SERPL-MCNC: 9.3 MG/DL (ref 8.4–10.2)
CHLORIDE SERPL-SCNC: 111 MMOL/L (ref 97–108)
CLARITY UR: CLEAR
CO2 SERPL-SCNC: 24 MMOL/L (ref 22–30)
COLOR UR: ABNORMAL
CREAT SERPL-MCNC: 1.31 MG/DL (ref 0.6–1.2)
CREAT UR-MCNC: 223 MG/DL
EOSINOPHIL # BLD AUTO: 0.1 THOUSAND/ΜL (ref 0–0.4)
EOSINOPHIL NFR BLD AUTO: 1 % (ref 0–6)
ERYTHROCYTE [DISTWIDTH] IN BLOOD BY AUTOMATED COUNT: 13.7 %
GFR SERPL CREATININE-BSD FRML MDRD: 47 ML/MIN/1.73SQ M
GLUCOSE SERPL-MCNC: 90 MG/DL (ref 70–99)
GLUCOSE UR STRIP-MCNC: NEGATIVE MG/DL
HCT VFR BLD AUTO: 43.2 % (ref 36–46)
HGB BLD-MCNC: 14.1 G/DL (ref 12–16)
HGB UR QL STRIP.AUTO: NEGATIVE
KETONES UR STRIP-MCNC: NEGATIVE MG/DL
LEUKOCYTE ESTERASE UR QL STRIP: 25
LYMPHOCYTES # BLD AUTO: 2.1 THOUSANDS/ΜL (ref 0.5–4)
LYMPHOCYTES NFR BLD AUTO: 40 % (ref 25–45)
MCH RBC QN AUTO: 31.4 PG (ref 26–34)
MCHC RBC AUTO-ENTMCNC: 32.6 G/DL (ref 31–36)
MCV RBC AUTO: 96 FL (ref 80–100)
MICROALBUMIN UR-MCNC: 10.5 MG/L (ref 0–20)
MICROALBUMIN/CREAT 24H UR: 5 MG/G CREATININE (ref 0–30)
MONOCYTES # BLD AUTO: 0.3 THOUSAND/ΜL (ref 0.2–0.9)
MONOCYTES NFR BLD AUTO: 5 % (ref 1–10)
MUCOUS THREADS UR QL AUTO: ABNORMAL
NEUTROPHILS # BLD AUTO: 2.8 THOUSANDS/ΜL (ref 1.8–7.8)
NEUTS SEG NFR BLD AUTO: 53 % (ref 45–65)
NITRITE UR QL STRIP: NEGATIVE
NON-SQ EPI CELLS URNS QL MICRO: ABNORMAL /HPF
PH UR STRIP.AUTO: 5 [PH] (ref 4.5–8)
PHOSPHATE SERPL-MCNC: 3 MG/DL (ref 2.5–4.8)
PLATELET # BLD AUTO: 200 THOUSANDS/UL (ref 150–450)
PMV BLD AUTO: 9.3 FL (ref 8.9–12.7)
POTASSIUM SERPL-SCNC: 4.3 MMOL/L (ref 3.6–5)
PROT SERPL-MCNC: 7.4 G/DL (ref 5.9–8.4)
PROT UR STRIP-MCNC: NEGATIVE MG/DL
PTH-INTACT SERPL-MCNC: 110.9 PG/ML (ref 16.7–78.9)
RBC # BLD AUTO: 4.48 MILLION/UL (ref 4–5.2)
RBC #/AREA URNS AUTO: ABNORMAL /HPF
SODIUM SERPL-SCNC: 140 MMOL/L (ref 137–147)
SP GR UR STRIP.AUTO: 1.01 (ref 1–1.04)
URATE SERPL-MCNC: 6.2 MG/DL (ref 2.7–7.5)
UROBILINOGEN UA: 1 MG/DL
WBC # BLD AUTO: 5.3 THOUSAND/UL (ref 4.5–11)
WBC #/AREA URNS AUTO: ABNORMAL /HPF

## 2019-01-24 PROCEDURE — 84550 ASSAY OF BLOOD/URIC ACID: CPT

## 2019-01-24 PROCEDURE — 83970 ASSAY OF PARATHORMONE: CPT

## 2019-01-24 PROCEDURE — 82570 ASSAY OF URINE CREATININE: CPT

## 2019-01-24 PROCEDURE — 82043 UR ALBUMIN QUANTITATIVE: CPT

## 2019-01-24 PROCEDURE — 80053 COMPREHEN METABOLIC PANEL: CPT

## 2019-01-24 PROCEDURE — 84100 ASSAY OF PHOSPHORUS: CPT

## 2019-01-24 PROCEDURE — 87536 HIV-1 QUANT&REVRSE TRNSCRPJ: CPT

## 2019-01-24 PROCEDURE — 86361 T CELL ABSOLUTE COUNT: CPT

## 2019-01-24 PROCEDURE — 85025 COMPLETE CBC W/AUTO DIFF WBC: CPT

## 2019-01-24 PROCEDURE — 81001 URINALYSIS AUTO W/SCOPE: CPT

## 2019-01-25 ENCOUNTER — ANTICOAG VISIT (OUTPATIENT)
Dept: FAMILY MEDICINE CLINIC | Facility: CLINIC | Age: 54
End: 2019-01-25

## 2019-01-25 ENCOUNTER — TELEPHONE (OUTPATIENT)
Dept: FAMILY MEDICINE CLINIC | Facility: CLINIC | Age: 54
End: 2019-01-25

## 2019-01-25 DIAGNOSIS — I23.6 LV (LEFT VENTRICULAR) MURAL THROMBUS FOLLOWING MI (HCC): Primary | ICD-10-CM

## 2019-01-25 DIAGNOSIS — I25.5 ISCHEMIC CARDIOMYOPATHY: ICD-10-CM

## 2019-01-25 LAB
BASOPHILS # BLD AUTO: 0 X10E3/UL (ref 0–0.2)
BASOPHILS NFR BLD AUTO: 1 %
CD3+CD4+ CELLS # BLD: 497 /UL (ref 359–1519)
CD3+CD4+ CELLS NFR BLD: 22.6 % (ref 30.8–58.5)
EOSINOPHIL # BLD AUTO: 0 X10E3/UL (ref 0–0.4)
EOSINOPHIL NFR BLD AUTO: 1 %
ERYTHROCYTE [DISTWIDTH] IN BLOOD BY AUTOMATED COUNT: 14.5 % (ref 12.3–15.4)
HCT VFR BLD AUTO: 41.2 % (ref 34–46.6)
HGB BLD-MCNC: 14.2 G/DL (ref 11.1–15.9)
IMM GRANULOCYTES # BLD: 0 X10E3/UL (ref 0–0.1)
IMM GRANULOCYTES NFR BLD: 0 %
LYMPHOCYTES # BLD AUTO: 2.2 X10E3/UL (ref 0.7–3.1)
LYMPHOCYTES NFR BLD AUTO: 42 %
MCH RBC QN AUTO: 32 PG (ref 26.6–33)
MCHC RBC AUTO-ENTMCNC: 34.5 G/DL (ref 31.5–35.7)
MCV RBC AUTO: 93 FL (ref 79–97)
MONOCYTES # BLD AUTO: 0.3 X10E3/UL (ref 0.1–0.9)
MONOCYTES NFR BLD AUTO: 6 %
NEUTROPHILS # BLD AUTO: 2.7 X10E3/UL (ref 1.4–7)
NEUTROPHILS NFR BLD AUTO: 50 %
PLATELET # BLD AUTO: 211 X10E3/UL (ref 150–379)
RBC # BLD AUTO: 4.44 X10E6/UL (ref 3.77–5.28)
WBC # BLD AUTO: 5.2 X10E3/UL (ref 3.4–10.8)

## 2019-01-25 RX ORDER — WARFARIN SODIUM 1 MG/1
TABLET ORAL
Qty: 20 TABLET | Refills: 0 | Status: SHIPPED | OUTPATIENT
Start: 2019-01-25 | End: 2019-03-01 | Stop reason: SDUPTHER

## 2019-01-25 NOTE — TELEPHONE ENCOUNTER
Called patient to discuss subtherapeutic INR  Plan to start her on 1mg pills along with 5 mg pills of coumadin she already takes  She is to take 1mg today (1/25/2019 - 1/27/2019) along with coumadin 5mg daily  Plan to re-check INR in 1 week  Patient verbalized understanding

## 2019-01-28 LAB
HIV1 RNA # SERPL NAA+PROBE: <20 COPIES/ML
HIV1 RNA SERPL NAA+PROBE-LOG#: NORMAL LOG10COPY/ML

## 2019-02-06 ENCOUNTER — TELEPHONE (OUTPATIENT)
Dept: FAMILY MEDICINE CLINIC | Facility: CLINIC | Age: 54
End: 2019-02-06

## 2019-02-06 NOTE — TELEPHONE ENCOUNTER
Received a call from patient stating that she did not go for INR check as she is in [place] for super bowel  Advised patient to continue with coumadin as planned and get blood work done ASAP  Patient also requested for help with smoking cessation, wants to be prescribed nictorol  Advised to schedule an appointment

## 2019-02-12 DIAGNOSIS — I51.3 MURAL THROMBUS OF LEFT VENTRICLE: ICD-10-CM

## 2019-02-12 RX ORDER — WARFARIN SODIUM 5 MG/1
TABLET ORAL
Qty: 30 TABLET | Refills: 0 | Status: SHIPPED | OUTPATIENT
Start: 2019-02-12 | End: 2019-03-13 | Stop reason: SDUPTHER

## 2019-02-14 ENCOUNTER — APPOINTMENT (OUTPATIENT)
Dept: LAB | Facility: HOSPITAL | Age: 54
End: 2019-02-14
Payer: COMMERCIAL

## 2019-02-14 DIAGNOSIS — I51.3 MURAL THROMBUS OF LEFT VENTRICLE: ICD-10-CM

## 2019-02-14 DIAGNOSIS — E78.2 MIXED HYPERLIPIDEMIA: ICD-10-CM

## 2019-02-14 LAB — LDLC SERPL DIRECT ASSAY-MCNC: 76.46 MG/DL (ref 0–100)

## 2019-02-14 PROCEDURE — 83721 ASSAY OF BLOOD LIPOPROTEIN: CPT

## 2019-02-15 ENCOUNTER — ANTICOAG VISIT (OUTPATIENT)
Dept: FAMILY MEDICINE CLINIC | Facility: CLINIC | Age: 54
End: 2019-02-15

## 2019-02-15 ENCOUNTER — TELEPHONE (OUTPATIENT)
Dept: FAMILY MEDICINE CLINIC | Facility: CLINIC | Age: 54
End: 2019-02-15

## 2019-02-15 DIAGNOSIS — Z72.0 TOBACCO ABUSE: Primary | ICD-10-CM

## 2019-02-15 DIAGNOSIS — I25.5 ISCHEMIC CARDIOMYOPATHY: ICD-10-CM

## 2019-02-15 NOTE — TELEPHONE ENCOUNTER
Called patient, INR 1 7 which is subtherapeutic   She is advised to now start taking 6mg QD (instead of 5mg QD) for next week and go for blood work on 2/21

## 2019-02-21 ENCOUNTER — APPOINTMENT (OUTPATIENT)
Dept: LAB | Facility: HOSPITAL | Age: 54
End: 2019-02-21
Payer: COMMERCIAL

## 2019-02-21 DIAGNOSIS — I51.3 MURAL THROMBUS OF LEFT VENTRICLE: ICD-10-CM

## 2019-02-21 LAB
INR PPP: 2.37 (ref 0.89–1.1)
PROTHROMBIN TIME: 23.9 SECONDS (ref 9.5–11.6)

## 2019-02-21 PROCEDURE — 36415 COLL VENOUS BLD VENIPUNCTURE: CPT

## 2019-02-21 PROCEDURE — 85610 PROTHROMBIN TIME: CPT

## 2019-02-22 ENCOUNTER — ANTICOAG VISIT (OUTPATIENT)
Dept: FAMILY MEDICINE CLINIC | Facility: CLINIC | Age: 54
End: 2019-02-22

## 2019-02-22 ENCOUNTER — TELEPHONE (OUTPATIENT)
Dept: FAMILY MEDICINE CLINIC | Facility: CLINIC | Age: 54
End: 2019-02-22

## 2019-02-22 DIAGNOSIS — I25.5 ISCHEMIC CARDIOMYOPATHY: ICD-10-CM

## 2019-02-22 NOTE — TELEPHONE ENCOUNTER
Called patient and discussed INR  INR is therapeutic  Continue with Coumadin 6mg QD  Re-check INR on Feb 28th

## 2019-03-01 ENCOUNTER — TELEPHONE (OUTPATIENT)
Dept: FAMILY MEDICINE CLINIC | Facility: CLINIC | Age: 54
End: 2019-03-01

## 2019-03-01 ENCOUNTER — ANTICOAG VISIT (OUTPATIENT)
Dept: FAMILY MEDICINE CLINIC | Facility: CLINIC | Age: 54
End: 2019-03-01

## 2019-03-01 ENCOUNTER — APPOINTMENT (OUTPATIENT)
Dept: LAB | Facility: HOSPITAL | Age: 54
End: 2019-03-01
Payer: COMMERCIAL

## 2019-03-01 ENCOUNTER — OFFICE VISIT (OUTPATIENT)
Dept: SURGERY | Facility: CLINIC | Age: 54
End: 2019-03-01
Payer: COMMERCIAL

## 2019-03-01 VITALS
HEART RATE: 68 BPM | WEIGHT: 173.3 LBS | TEMPERATURE: 97.7 F | HEIGHT: 62 IN | SYSTOLIC BLOOD PRESSURE: 107 MMHG | OXYGEN SATURATION: 97 % | DIASTOLIC BLOOD PRESSURE: 68 MMHG | BODY MASS INDEX: 31.89 KG/M2

## 2019-03-01 DIAGNOSIS — E78.5 HYPERLIPIDEMIA, UNSPECIFIED HYPERLIPIDEMIA TYPE: Primary | ICD-10-CM

## 2019-03-01 DIAGNOSIS — Z23 NEED FOR MENACTRA VACCINATION: ICD-10-CM

## 2019-03-01 DIAGNOSIS — B20 HIV (HUMAN IMMUNODEFICIENCY VIRUS INFECTION) (HCC): Primary | ICD-10-CM

## 2019-03-01 DIAGNOSIS — I51.3 MURAL THROMBUS OF LEFT VENTRICLE: ICD-10-CM

## 2019-03-01 DIAGNOSIS — N18.30 CKD (CHRONIC KIDNEY DISEASE) STAGE 3, GFR 30-59 ML/MIN (HCC): ICD-10-CM

## 2019-03-01 DIAGNOSIS — I25.5 ISCHEMIC CARDIOMYOPATHY: ICD-10-CM

## 2019-03-01 DIAGNOSIS — I23.6 LV (LEFT VENTRICULAR) MURAL THROMBUS FOLLOWING MI (HCC): ICD-10-CM

## 2019-03-01 DIAGNOSIS — F17.200 TOBACCO DEPENDENCE SYNDROME: ICD-10-CM

## 2019-03-01 LAB
INR PPP: 2.17 (ref 0.89–1.1)
PROTHROMBIN TIME: 22 SECONDS (ref 9.5–11.6)

## 2019-03-01 PROCEDURE — 90734 MENACWYD/MENACWYCRM VACC IM: CPT

## 2019-03-01 PROCEDURE — 36415 COLL VENOUS BLD VENIPUNCTURE: CPT

## 2019-03-01 PROCEDURE — 85610 PROTHROMBIN TIME: CPT

## 2019-03-01 PROCEDURE — 99214 OFFICE O/P EST MOD 30 MIN: CPT | Performed by: INTERNAL MEDICINE

## 2019-03-01 PROCEDURE — 90471 IMMUNIZATION ADMIN: CPT

## 2019-03-01 RX ORDER — WARFARIN SODIUM 1 MG/1
TABLET ORAL
Qty: 90 TABLET | Refills: 1 | Status: SHIPPED | OUTPATIENT
Start: 2019-03-01 | End: 2019-03-13 | Stop reason: SDUPTHER

## 2019-03-01 RX ORDER — ATORVASTATIN CALCIUM 80 MG/1
80 TABLET, FILM COATED ORAL DAILY
Qty: 30 TABLET | Refills: 5 | Status: SHIPPED | OUTPATIENT
Start: 2019-03-01 | End: 2019-07-16 | Stop reason: SDUPTHER

## 2019-03-01 NOTE — TELEPHONE ENCOUNTER
Called patient regarding INR 2 1  Went up on her Coumadin from 5mg to 6mg  She reports compliance with meds  She denies taking anything new that might be interacting with her Coumadin   Plan to re-check INR on 3/14

## 2019-03-01 NOTE — TELEPHONE ENCOUNTER
Pt called to increase atorvastatin from 40 to 80 mg daily per Dr Chandler Console  Pt understands to take two pills daily for now and new Rx to be sent in for her

## 2019-03-01 NOTE — PROGRESS NOTES
Progress Note - Infectious Disease   Siria Sevilla 49 y o  female MRN: [id number]  Unit/Bed#:  Encounter: [id number]      Impression/Plan:  1  HIV - doing well on Tivicay and Descovy with an undetectable viral load and a CD4 count in the high 400s  The patient wishes to change to a 1 pill a day and therefore will attempt to get approval with Biktarvy  If we changed to [facility], recheck labs in 4 weeks and follow up in 6 weeks  Stressed adherence      2  CKD stage 3 - the renal function remains relatively stable  The patient will continue to follow with Nephrology  Continue to monitor the renal function closely      3  Tobacco dependence - patient continues to smoke but is interested in stopping  Will engage the patient with are tobacco cessation program   Stressed the importance of complete tobacco cessation  Patient was provided medication, adherence and prevention education    Subjective:  Routine follow-up for HIV  Patient claims 100% adherence with his Tivicay and Descovy  Patient denies any notable side effects  Overall the feeling well  The patient denies any fever chills or sweats, denies any nausea vomiting or diarrhea, denies any cough or shortness of breath  ROS: A complete 12 point ROS is negative other than that noted in the HPI    Followup portions patient history reviewed and updated as: Allergies, current medications, past medical history, past social history, past surgical history, and the problem list    Objective:  Vitals:  Vitals:    03/01/19 0754   BP: 107/68   BP Location: Left arm   Patient Position: Sitting   Cuff Size: Standard   Pulse: 68   Temp: 97 7 °F (36 5 °C)   SpO2: 97%   Weight: 78 6 kg (173 lb 4 8 oz)   Height: 5' 2" (1 575 m)       Physical Exam:   General Appearance:  Alert, interactive, appearing well,  nontoxic, no acute distress  Neck:   Supple without lymphadenopathy, no thyromegaly or masses   Throat: Oropharynx moist without lesions      Lungs:   Clear to auscultation bilaterally; no wheezes, rhonchi or rales; respirations unlabored   Heart:  RRR; no murmur, rub or gallop   Abdomen:   Soft, non-tender, non-distended, positive bowel sounds  Extremities: No clubbing, cyanosis or edema   Skin: No new rashes or lesions  No draining wounds noted  Labs, Imaging, & Other studies:   All pertinent labs and imaging studies were personally reviewed    Lab Results   Component Value Date    K 4 3 01/24/2019     (H) 01/24/2019    CO2 24 01/24/2019    BUN 14 01/24/2019    CREATININE 1 31 (H) 01/24/2019    GLUF 139 (H) 11/02/2018    CALCIUM 9 3 01/24/2019    AST 26 01/24/2019    ALT 15 01/24/2019    ALKPHOS 65 01/24/2019    EGFR 47 (L) 01/24/2019     Lab Results   Component Value Date    WBC 5 30 01/24/2019    WBC 5 2 01/24/2019    HGB 14 1 01/24/2019    HGB 14 2 01/24/2019    HCT 43 2 01/24/2019    HCT 41 2 01/24/2019    MCV 96 01/24/2019    MCV 93 01/24/2019     01/24/2019     01/24/2019     Lab Results   Component Value Date    HEPCAB Non-reactive 11/02/2018     Lab Results   Component Value Date    HAV Non-reactive 11/02/2018    HEPCAB Non-reactive 11/02/2018     Lab Results   Component Value Date    RPR Non-Reactive 11/02/2018     No results found for: [id number]  CD4 ABS   Date/Time Value Ref Range Status   01/24/2019 01:32  359 - 1519 /uL Final     HIV-1 RNA by PCR, Qn   Date/Time Value Ref Range Status   01/24/2019 01:32 PM <20 copies/mL Final     Comment:     HIV-1 RNA detected  The reportable range for this assay is 20 to 10,000,000  copies HIV-1 RNA/mL       HIV-1 RNA Viral Load Log   Date/Time Value Ref Range Status   01/24/2019 01:32 PM COMMENT coh72zefv/mL Final     Comment:     Unable to calculate result since non-numeric result obtained for  component test            Current Outpatient Medications:     aspirin (ECOTRIN LOW STRENGTH) 81 mg EC tablet, Take 81 mg by mouth daily, Disp: , Rfl:     atorvastatin (LIPITOR) 40 mg tablet, Take 1 tablet (40 mg total) by mouth daily, Disp: 30 tablet, Rfl: 5    carvedilol (COREG) 6 25 mg tablet, Take 1 tablet (6 25 mg total) by mouth 2 (two) times a day with meals, Disp: 60 tablet, Rfl: 5    dolutegravir (TIVICAY) 50 MG TABS, Take 1 tablet (50 mg total) by mouth daily, Disp: 30 tablet, Rfl: 3    emtricitabine-tenofovir AF (DESCOVY) 200-25 MG tablet, Take 1 tablet by mouth daily, Disp: 30 tablet, Rfl: 3    nicotine polacrilex (NICORETTE) 4 mg gum, every 2 (two) hours, Disp: , Rfl:     warfarin (COUMADIN) 5 mg tablet, TAKE 1 TABLET BY MOUTH ONCE DAILY  , Disp: 30 tablet, Rfl: 0    albuterol (VENTOLIN HFA) 90 mcg/act inhaler, Use only as rescue inhaler, Disp: , Rfl:     nicotine (NICOTROL) 10 MG inhaler, Inhale 1 puff as needed for smoking cessation (Patient not taking: Reported on 3/1/2019), Disp: 42 each, Rfl: 0    sacubitril-valsartan (ENTRESTO) 24-26 MG TABS, Take 1 tablet by mouth 2 (two) times a day, Disp: 60 tablet, Rfl: 5    warfarin (COUMADIN) 1 mg tablet, Take 1 tab daily along with Coumadin 5mg for the next three days (Patient not taking: Reported on 3/1/2019), Disp: 20 tablet, Rfl: 0

## 2019-03-01 NOTE — PROGRESS NOTES
The  introduced self and Spiritual Care services  Ms Nieto Adjutant thanked  for information and she was well and did not need services at this time  Further support as needed

## 2019-03-08 ENCOUNTER — ANTICOAG VISIT (OUTPATIENT)
Dept: FAMILY MEDICINE CLINIC | Facility: CLINIC | Age: 54
End: 2019-03-08

## 2019-03-08 ENCOUNTER — TELEPHONE (OUTPATIENT)
Dept: FAMILY MEDICINE CLINIC | Facility: CLINIC | Age: 54
End: 2019-03-08

## 2019-03-08 ENCOUNTER — APPOINTMENT (OUTPATIENT)
Dept: LAB | Facility: HOSPITAL | Age: 54
End: 2019-03-08
Payer: COMMERCIAL

## 2019-03-08 DIAGNOSIS — I25.5 ISCHEMIC CARDIOMYOPATHY: ICD-10-CM

## 2019-03-08 DIAGNOSIS — I51.3 MURAL THROMBUS OF LEFT VENTRICLE: ICD-10-CM

## 2019-03-08 DIAGNOSIS — I25.5 ISCHEMIC CARDIOMYOPATHY: Primary | ICD-10-CM

## 2019-03-08 LAB
INR PPP: 3.22 (ref 0.89–1.1)
PROTHROMBIN TIME: 31.9 SECONDS (ref 9.5–11.6)

## 2019-03-08 PROCEDURE — 36415 COLL VENOUS BLD VENIPUNCTURE: CPT

## 2019-03-08 PROCEDURE — 85610 PROTHROMBIN TIME: CPT

## 2019-03-11 ENCOUNTER — REMOTE DEVICE CLINIC VISIT (OUTPATIENT)
Dept: CARDIOLOGY CLINIC | Facility: CLINIC | Age: 54
End: 2019-03-11
Payer: COMMERCIAL

## 2019-03-11 DIAGNOSIS — I25.10 CORONARY ARTERY DISEASE INVOLVING NATIVE HEART WITHOUT ANGINA PECTORIS, UNSPECIFIED VESSEL OR LESION TYPE: ICD-10-CM

## 2019-03-11 DIAGNOSIS — I25.5 ISCHEMIC CARDIOMYOPATHY: Primary | ICD-10-CM

## 2019-03-11 DIAGNOSIS — Z95.810 ICD (IMPLANTABLE CARDIOVERTER-DEFIBRILLATOR) IN PLACE: ICD-10-CM

## 2019-03-11 PROCEDURE — 93296 REM INTERROG EVL PM/IDS: CPT | Performed by: INTERNAL MEDICINE

## 2019-03-11 PROCEDURE — 93295 DEV INTERROG REMOTE 1/2/MLT: CPT | Performed by: INTERNAL MEDICINE

## 2019-03-11 NOTE — PROGRESS NOTES
Results for orders placed or performed in visit on 03/11/19   Cardiac EP device report    Narrative    MDT SINGLE ICD  CARELINK TRANSMISSION: BATTERY VOLTAGE ADEQUATE (11 YRS)   - 0%  ALL AVAILABLE LEAD PARAMETERS APPEAR WITHIN NORMAL LIMITS & STABLE  PRESENTING EGRM SHOWS SBRADY @ ~ 58 BPM  NO SIGNIFICANT HIGH RATE EPISODES  OPTI-VOL WITHIN NORMAL LIMITS  APPROPRIATELY FUNCTIONING ICD     EB

## 2019-03-12 ENCOUNTER — OFFICE VISIT (OUTPATIENT)
Dept: CARDIOLOGY CLINIC | Facility: CLINIC | Age: 54
End: 2019-03-12
Payer: COMMERCIAL

## 2019-03-12 ENCOUNTER — HOSPITAL ENCOUNTER (OUTPATIENT)
Dept: NON INVASIVE DIAGNOSTICS | Facility: CLINIC | Age: 54
Discharge: HOME | End: 2019-03-12
Payer: COMMERCIAL

## 2019-03-12 VITALS
WEIGHT: 176 LBS | HEIGHT: 62 IN | OXYGEN SATURATION: 99 % | BODY MASS INDEX: 32.39 KG/M2 | DIASTOLIC BLOOD PRESSURE: 70 MMHG | HEART RATE: 61 BPM | SYSTOLIC BLOOD PRESSURE: 112 MMHG

## 2019-03-12 DIAGNOSIS — I25.2 PAST HEART ATTACK: ICD-10-CM

## 2019-03-12 DIAGNOSIS — I10 ESSENTIAL HYPERTENSION: ICD-10-CM

## 2019-03-12 DIAGNOSIS — I25.10 TRIPLE VESSEL CORONARY ARTERY DISEASE: ICD-10-CM

## 2019-03-12 DIAGNOSIS — E78.2 MIXED HYPERLIPIDEMIA: ICD-10-CM

## 2019-03-12 DIAGNOSIS — I25.10 TRIPLE VESSEL CORONARY ARTERY DISEASE: Primary | ICD-10-CM

## 2019-03-12 DIAGNOSIS — I51.89 IMPAIRED LEFT VENTRICULAR FUNCTION: ICD-10-CM

## 2019-03-12 DIAGNOSIS — I25.5 ISCHEMIC CARDIOMYOPATHY: ICD-10-CM

## 2019-03-12 DIAGNOSIS — Z95.810 IMPLANTABLE CARDIOVERTER-DEFIBRILLATOR (ICD) IN SITU: ICD-10-CM

## 2019-03-12 DIAGNOSIS — I23.6 LV (LEFT VENTRICULAR) MURAL THROMBUS FOLLOWING MI (HCC): ICD-10-CM

## 2019-03-12 PROCEDURE — 99215 OFFICE O/P EST HI 40 MIN: CPT | Performed by: INTERNAL MEDICINE

## 2019-03-12 PROCEDURE — 93306 TTE W/DOPPLER COMPLETE: CPT

## 2019-03-13 ENCOUNTER — TELEPHONE (OUTPATIENT)
Dept: CARDIOLOGY CLINIC | Facility: CLINIC | Age: 54
End: 2019-03-13

## 2019-03-13 ENCOUNTER — ANTICOAG VISIT (OUTPATIENT)
Dept: FAMILY MEDICINE CLINIC | Facility: CLINIC | Age: 54
End: 2019-03-13

## 2019-03-13 ENCOUNTER — TELEPHONE (OUTPATIENT)
Dept: FAMILY MEDICINE CLINIC | Facility: CLINIC | Age: 54
End: 2019-03-13

## 2019-03-13 ENCOUNTER — TRANSCRIBE ORDERS (OUTPATIENT)
Dept: ADMINISTRATIVE | Facility: HOSPITAL | Age: 54
End: 2019-03-13

## 2019-03-13 ENCOUNTER — APPOINTMENT (OUTPATIENT)
Dept: LAB | Facility: HOSPITAL | Age: 54
End: 2019-03-13
Payer: COMMERCIAL

## 2019-03-13 DIAGNOSIS — I51.3 MURAL THROMBUS OF LEFT VENTRICLE: ICD-10-CM

## 2019-03-13 DIAGNOSIS — I25.5 ISCHEMIC CARDIOMYOPATHY: ICD-10-CM

## 2019-03-13 DIAGNOSIS — I23.6 LV (LEFT VENTRICULAR) MURAL THROMBUS FOLLOWING MI (HCC): ICD-10-CM

## 2019-03-13 LAB
INR PPP: 1.83 (ref 0.89–1.1)
PROTHROMBIN TIME: 18.7 SECONDS (ref 9.5–11.6)

## 2019-03-13 PROCEDURE — 36415 COLL VENOUS BLD VENIPUNCTURE: CPT

## 2019-03-13 PROCEDURE — 85610 PROTHROMBIN TIME: CPT

## 2019-03-13 PROCEDURE — 93306 TTE W/DOPPLER COMPLETE: CPT | Performed by: INTERNAL MEDICINE

## 2019-03-13 RX ORDER — WARFARIN SODIUM 1 MG/1
TABLET ORAL
Qty: 90 TABLET | Refills: 1 | Status: SHIPPED | OUTPATIENT
Start: 2019-03-13 | End: 2020-01-23

## 2019-03-13 RX ORDER — WARFARIN SODIUM 5 MG/1
5 TABLET ORAL DAILY
Qty: 90 TABLET | Refills: 5 | Status: SHIPPED | OUTPATIENT
Start: 2019-03-13 | End: 2020-04-01

## 2019-03-13 NOTE — TELEPHONE ENCOUNTER
Please see note for correspondence  Lynne Alfred from Dr Ann Doyle office called me earlier, states Dr Claribel Melendez would like to see her INR 2 5-3 5 due to an abnormal echo finding  If appropriate, you can request Cardiology management

## 2019-03-13 NOTE — TELEPHONE ENCOUNTER
Called Dr Tasneem Joy who recommended that patient's INR should be between 2 5-3 5 given her new echo findings  He recommended to start patient on Coumadin 10mg today and then Coumadin 6mg daily until 3/18 and recheck INR  Called patient and had a lengthy discussion  Advised to take Coumadin 10mg today and then Coumadin 6mg daily until 3/18 and recheck INR   Patient verbalized understanding

## 2019-03-13 NOTE — TELEPHONE ENCOUNTER
Called pt 3/12 to go to the lab on 3/13 early to get a pt and left msg  Called pt 3/13 at 8:00 to go to the lab  Pt answered and I told her to go to the lab for a P T  Pt said she was going to the dentist first and I told her to go before the dentist so we can get a result early  She said she would

## 2019-03-13 NOTE — TELEPHONE ENCOUNTER
Called patient, INR 1 8, subtherapeutic  She reports not taking the meds as prescribed  She was supposed to take 5mg for 3 days and 6mg for the next 4 days however she did not have 6mg tabs  Plan is to continue with 5mg x 1 week  Then recheck INR in 1 week  Denies any meds/dietary changes at this point

## 2019-03-13 NOTE — TELEPHONE ENCOUNTER
----- Message from Adela Wyatt MD sent at 3/13/2019  1:27 PM EDT -----      ----- Message -----  From: Lab, Background User  Sent: 3/13/2019   1:00 PM  To: Adela Wyatt MD

## 2019-03-13 NOTE — TELEPHONE ENCOUNTER
Called patient and had a lengthy discussion with her regarding INR of 3 22  She did receive Dr Antonio Herrera message who was managing her INR during my vacation on March 4-March 8th  Patient reports that she went for INR check today, reports compliance with meds  She was taking 5mg Warfarin QD

## 2019-03-13 NOTE — TELEPHONE ENCOUNTER
[address] [facility]  (Dr Ct Osorio) office per Dr Chapo Barrientos them that per Dr Landen Evans he would like to have patients INR to run  Between 2 5-3 5 due to abnormal echo finding   the patient had low Inr yesteday of 1 83 and he would recommend it being increased due to visualized cloudiness in the atrium  The family practice controls her coumadin  Spoke to [employer] in office who will give information to Dr Ct Osorio

## 2019-03-14 DIAGNOSIS — I51.3 MURAL THROMBUS OF LEFT VENTRICLE: Primary | ICD-10-CM

## 2019-03-19 ENCOUNTER — TELEPHONE (OUTPATIENT)
Dept: FAMILY MEDICINE CLINIC | Facility: CLINIC | Age: 54
End: 2019-03-19

## 2019-03-19 ENCOUNTER — ANTICOAG VISIT (OUTPATIENT)
Dept: FAMILY MEDICINE CLINIC | Facility: CLINIC | Age: 54
End: 2019-03-19

## 2019-03-19 DIAGNOSIS — I25.5 ISCHEMIC CARDIOMYOPATHY: ICD-10-CM

## 2019-03-19 NOTE — TELEPHONE ENCOUNTER
Patient called my personal number  She requested that I call [facility] in [place] to obtain her INR results  Prior to calling [facility] I discussed the case with front office staff Carey Cates as patient reports the results were already faxed to our office  Carey Cates states that our fax system is very slow and it may take days for us to receive faxes  I discussed this situation with Malu Fontenot who states that we do not have an alternative fax machine to receive results  I called [facility], spoke to  who reported the INR: 3 4  I then called the patient and advised her to take Coumadin 6mg QD and have her INR checked on Monday 3/25  Patient verbalized understanding

## 2019-03-19 NOTE — TELEPHONE ENCOUNTER
Called patient in regards to her INR  She was advised to have it drawn on 3/18  She is currently in [place] and reports she went to a lab yesterday in [place] to have her INR drawn  She is currently in the process of calling that lab and have them fax the results to our clinic  I advised patient that she can call my personal number/office number if she is unable to get those results

## 2019-03-21 ENCOUNTER — PATIENT OUTREACH (OUTPATIENT)
Dept: SURGERY | Facility: CLINIC | Age: 54
End: 2019-03-21

## 2019-03-26 ENCOUNTER — APPOINTMENT (OUTPATIENT)
Dept: LAB | Facility: HOSPITAL | Age: 54
End: 2019-03-26
Payer: COMMERCIAL

## 2019-03-26 DIAGNOSIS — I51.3 MURAL THROMBUS OF LEFT VENTRICLE: ICD-10-CM

## 2019-03-27 ENCOUNTER — ANTICOAG VISIT (OUTPATIENT)
Dept: FAMILY MEDICINE CLINIC | Facility: CLINIC | Age: 54
End: 2019-03-27

## 2019-03-27 ENCOUNTER — TELEPHONE (OUTPATIENT)
Dept: FAMILY MEDICINE CLINIC | Facility: CLINIC | Age: 54
End: 2019-03-27

## 2019-03-27 DIAGNOSIS — I25.5 ISCHEMIC CARDIOMYOPATHY: ICD-10-CM

## 2019-03-27 NOTE — TELEPHONE ENCOUNTER
Late entry:    Called patient on 3/36/2019 at 3:19PM to report INR results  INR is 2/1 subtherapeutic  Plan to take Coumadin 10mg on 3/26 and then continue with 6mg QD  She reports compliance with meds

## 2019-04-01 ENCOUNTER — APPOINTMENT (OUTPATIENT)
Dept: LAB | Facility: HOSPITAL | Age: 54
End: 2019-04-01
Payer: COMMERCIAL

## 2019-04-01 DIAGNOSIS — I51.3 MURAL THROMBUS OF LEFT VENTRICLE: ICD-10-CM

## 2019-04-01 DIAGNOSIS — B20 HIV (HUMAN IMMUNODEFICIENCY VIRUS INFECTION) (HCC): ICD-10-CM

## 2019-04-01 LAB
ALBUMIN SERPL BCP-MCNC: 4.1 G/DL (ref 3–5.2)
ALP SERPL-CCNC: 60 U/L (ref 43–122)
ALT SERPL W P-5'-P-CCNC: 21 U/L (ref 9–52)
ANION GAP SERPL CALCULATED.3IONS-SCNC: 7 MMOL/L (ref 5–14)
AST SERPL W P-5'-P-CCNC: 26 U/L (ref 14–36)
BASOPHILS # BLD AUTO: 0 THOUSANDS/ΜL (ref 0–0.1)
BASOPHILS NFR BLD AUTO: 0 % (ref 0–1)
BILIRUB SERPL-MCNC: 0.5 MG/DL
BUN SERPL-MCNC: 18 MG/DL (ref 5–25)
CALCIUM SERPL-MCNC: 9.3 MG/DL (ref 8.4–10.2)
CHLORIDE SERPL-SCNC: 106 MMOL/L (ref 97–108)
CO2 SERPL-SCNC: 27 MMOL/L (ref 22–30)
CREAT SERPL-MCNC: 1.44 MG/DL (ref 0.6–1.2)
EOSINOPHIL # BLD AUTO: 0 THOUSAND/ΜL (ref 0–0.4)
EOSINOPHIL NFR BLD AUTO: 1 % (ref 0–6)
ERYTHROCYTE [DISTWIDTH] IN BLOOD BY AUTOMATED COUNT: 14 %
GFR SERPL CREATININE-BSD FRML MDRD: 41 ML/MIN/1.73SQ M
GLUCOSE P FAST SERPL-MCNC: 109 MG/DL (ref 70–99)
HCT VFR BLD AUTO: 41.8 % (ref 36–46)
HGB BLD-MCNC: 13.7 G/DL (ref 12–16)
INR PPP: 4.71 (ref 0.89–1.1)
LYMPHOCYTES # BLD AUTO: 2.1 THOUSANDS/ΜL (ref 0.5–4)
LYMPHOCYTES NFR BLD AUTO: 33 % (ref 25–45)
MCH RBC QN AUTO: 31.6 PG (ref 26–34)
MCHC RBC AUTO-ENTMCNC: 32.8 G/DL (ref 31–36)
MCV RBC AUTO: 96 FL (ref 80–100)
MONOCYTES # BLD AUTO: 0.3 THOUSAND/ΜL (ref 0.2–0.9)
MONOCYTES NFR BLD AUTO: 5 % (ref 1–10)
NEUTROPHILS # BLD AUTO: 3.9 THOUSANDS/ΜL (ref 1.8–7.8)
NEUTS SEG NFR BLD AUTO: 61 % (ref 45–65)
PLATELET # BLD AUTO: 173 THOUSANDS/UL (ref 150–450)
PMV BLD AUTO: 9.1 FL (ref 8.9–12.7)
POTASSIUM SERPL-SCNC: 4.7 MMOL/L (ref 3.6–5)
PROT SERPL-MCNC: 7.1 G/DL (ref 5.9–8.4)
PROTHROMBIN TIME: 45.7 SECONDS (ref 9.5–11.6)
RBC # BLD AUTO: 4.33 MILLION/UL (ref 4–5.2)
SODIUM SERPL-SCNC: 140 MMOL/L (ref 137–147)
WBC # BLD AUTO: 6.3 THOUSAND/UL (ref 4.5–11)

## 2019-04-01 PROCEDURE — 80053 COMPREHEN METABOLIC PANEL: CPT

## 2019-04-01 PROCEDURE — 85025 COMPLETE CBC W/AUTO DIFF WBC: CPT

## 2019-04-01 PROCEDURE — 86361 T CELL ABSOLUTE COUNT: CPT

## 2019-04-01 PROCEDURE — 85610 PROTHROMBIN TIME: CPT

## 2019-04-01 PROCEDURE — 87536 HIV-1 QUANT&REVRSE TRNSCRPJ: CPT

## 2019-04-01 PROCEDURE — 36415 COLL VENOUS BLD VENIPUNCTURE: CPT

## 2019-04-02 ENCOUNTER — ANTICOAG VISIT (OUTPATIENT)
Dept: FAMILY MEDICINE CLINIC | Facility: CLINIC | Age: 54
End: 2019-04-02

## 2019-04-02 ENCOUNTER — TELEPHONE (OUTPATIENT)
Dept: FAMILY MEDICINE CLINIC | Facility: CLINIC | Age: 54
End: 2019-04-02

## 2019-04-02 DIAGNOSIS — I25.5 ISCHEMIC CARDIOMYOPATHY: ICD-10-CM

## 2019-04-02 LAB
BASOPHILS # BLD AUTO: 0 X10E3/UL (ref 0–0.2)
BASOPHILS NFR BLD AUTO: 1 %
CD3+CD4+ CELLS # BLD: 414 /UL (ref 359–1519)
CD3+CD4+ CELLS NFR BLD: 19.7 % (ref 30.8–58.5)
EOSINOPHIL # BLD AUTO: 0.1 X10E3/UL (ref 0–0.4)
EOSINOPHIL NFR BLD AUTO: 1 %
ERYTHROCYTE [DISTWIDTH] IN BLOOD BY AUTOMATED COUNT: 14.9 % (ref 12.3–15.4)
HCT VFR BLD AUTO: 35.3 % (ref 34–46.6)
HGB BLD-MCNC: 12 G/DL (ref 11.1–15.9)
IMM GRANULOCYTES # BLD: 0 X10E3/UL (ref 0–0.1)
IMM GRANULOCYTES NFR BLD: 0 %
LYMPHOCYTES # BLD AUTO: 2.1 X10E3/UL (ref 0.7–3.1)
LYMPHOCYTES NFR BLD AUTO: 33 %
MCH RBC QN AUTO: 31.4 PG (ref 26.6–33)
MCHC RBC AUTO-ENTMCNC: 34 G/DL (ref 31.5–35.7)
MCV RBC AUTO: 92 FL (ref 79–97)
MONOCYTES # BLD AUTO: 0.3 X10E3/UL (ref 0.1–0.9)
MONOCYTES NFR BLD AUTO: 4 %
NEUTROPHILS # BLD AUTO: 3.9 X10E3/UL (ref 1.4–7)
NEUTROPHILS NFR BLD AUTO: 61 %
PLATELET # BLD AUTO: 201 X10E3/UL (ref 150–379)
RBC # BLD AUTO: 3.82 X10E6/UL (ref 3.77–5.28)
WBC # BLD AUTO: 6.3 X10E3/UL (ref 3.4–10.8)

## 2019-04-03 ENCOUNTER — PATIENT OUTREACH (OUTPATIENT)
Dept: SURGERY | Facility: CLINIC | Age: 54
End: 2019-04-03

## 2019-04-03 LAB
HIV1 RNA # SERPL NAA+PROBE: <20 COPIES/ML
HIV1 RNA SERPL NAA+PROBE-LOG#: NORMAL LOG10COPY/ML

## 2019-04-16 ENCOUNTER — APPOINTMENT (OUTPATIENT)
Dept: LAB | Facility: HOSPITAL | Age: 54
End: 2019-04-16
Payer: COMMERCIAL

## 2019-04-16 ENCOUNTER — ANTICOAG VISIT (OUTPATIENT)
Dept: FAMILY MEDICINE CLINIC | Facility: CLINIC | Age: 54
End: 2019-04-16

## 2019-04-16 ENCOUNTER — TELEPHONE (OUTPATIENT)
Dept: FAMILY MEDICINE CLINIC | Facility: CLINIC | Age: 54
End: 2019-04-16

## 2019-04-16 ENCOUNTER — HOSPITAL ENCOUNTER (EMERGENCY)
Facility: HOSPITAL | Age: 54
Discharge: HOME | End: 2019-04-16
Attending: EMERGENCY MEDICINE | Admitting: EMERGENCY MEDICINE
Payer: COMMERCIAL

## 2019-04-16 VITALS
SYSTOLIC BLOOD PRESSURE: 100 MMHG | DIASTOLIC BLOOD PRESSURE: 60 MMHG | OXYGEN SATURATION: 97 % | WEIGHT: 167.31 LBS | TEMPERATURE: 96.7 F | RESPIRATION RATE: 14 BRPM | HEART RATE: 58 BPM | BODY MASS INDEX: 30.6 KG/M2

## 2019-04-16 DIAGNOSIS — D68.59 HYPERCOAGULABLE STATE, PRIMARY (HCC): Primary | ICD-10-CM

## 2019-04-16 DIAGNOSIS — I23.6 LV (LEFT VENTRICULAR) MURAL THROMBUS FOLLOWING MI (HCC): ICD-10-CM

## 2019-04-16 DIAGNOSIS — Z72.0 TOBACCO ABUSE: Primary | ICD-10-CM

## 2019-04-16 DIAGNOSIS — R79.1 SUPRATHERAPEUTIC INR: Primary | ICD-10-CM

## 2019-04-16 DIAGNOSIS — I25.5 ISCHEMIC CARDIOMYOPATHY: ICD-10-CM

## 2019-04-16 LAB
INR PPP: 10.54 (ref 0.89–1.1)
PROTHROMBIN TIME: 97.8 SECONDS (ref 9.5–11.6)

## 2019-04-16 PROCEDURE — 99283 EMERGENCY DEPT VISIT LOW MDM: CPT | Performed by: EMERGENCY MEDICINE

## 2019-04-16 PROCEDURE — 99283 EMERGENCY DEPT VISIT LOW MDM: CPT

## 2019-04-16 PROCEDURE — 85610 PROTHROMBIN TIME: CPT

## 2019-04-16 PROCEDURE — 36415 COLL VENOUS BLD VENIPUNCTURE: CPT

## 2019-04-16 RX ORDER — PHYTONADIONE 5 MG/1
5 TABLET ORAL ONCE
Status: COMPLETED | OUTPATIENT
Start: 2019-04-16 | End: 2019-04-16

## 2019-04-16 RX ORDER — POLYETHYLENE GLYCOL 3350 17 G
4 POWDER IN PACKET (EA) ORAL AS NEEDED
Qty: 100 EACH | Refills: 2 | Status: SHIPPED | OUTPATIENT
Start: 2019-04-16 | End: 2019-05-10

## 2019-04-16 RX ADMIN — PHYTONADIONE 5 MG: 5 TABLET ORAL at 17:10

## 2019-04-18 ENCOUNTER — APPOINTMENT (OUTPATIENT)
Dept: LAB | Facility: HOSPITAL | Age: 54
End: 2019-04-18
Payer: COMMERCIAL

## 2019-04-18 ENCOUNTER — ANTICOAG VISIT (OUTPATIENT)
Dept: FAMILY MEDICINE CLINIC | Facility: CLINIC | Age: 54
End: 2019-04-18

## 2019-04-18 DIAGNOSIS — I25.5 ISCHEMIC CARDIOMYOPATHY: ICD-10-CM

## 2019-04-18 DIAGNOSIS — Z79.01 LONG TERM CURRENT USE OF ANTICOAGULANT THERAPY: Primary | ICD-10-CM

## 2019-04-18 DIAGNOSIS — Z79.01 LONG TERM CURRENT USE OF ANTICOAGULANT THERAPY: ICD-10-CM

## 2019-04-18 LAB
INR PPP: 1.59 (ref 0.89–1.1)
PROTHROMBIN TIME: 16.4 SECONDS (ref 9.5–11.6)

## 2019-04-18 PROCEDURE — 36415 COLL VENOUS BLD VENIPUNCTURE: CPT

## 2019-04-18 PROCEDURE — 85610 PROTHROMBIN TIME: CPT

## 2019-04-22 ENCOUNTER — TELEPHONE (OUTPATIENT)
Dept: OTHER | Facility: HOSPITAL | Age: 54
End: 2019-04-22

## 2019-04-22 DIAGNOSIS — F17.200 TOBACCO DEPENDENCE SYNDROME: Primary | ICD-10-CM

## 2019-04-25 ENCOUNTER — TELEPHONE (OUTPATIENT)
Dept: FAMILY MEDICINE CLINIC | Facility: CLINIC | Age: 54
End: 2019-04-25

## 2019-04-25 ENCOUNTER — APPOINTMENT (OUTPATIENT)
Dept: LAB | Facility: HOSPITAL | Age: 54
End: 2019-04-25
Payer: COMMERCIAL

## 2019-04-25 ENCOUNTER — ANTICOAG VISIT (OUTPATIENT)
Dept: OTHER | Facility: HOSPITAL | Age: 54
End: 2019-04-25

## 2019-04-25 ENCOUNTER — TELEPHONE (OUTPATIENT)
Dept: OTHER | Facility: HOSPITAL | Age: 54
End: 2019-04-25

## 2019-04-25 DIAGNOSIS — K59.01 SLOW TRANSIT CONSTIPATION: Primary | ICD-10-CM

## 2019-04-25 DIAGNOSIS — I25.5 ISCHEMIC CARDIOMYOPATHY: ICD-10-CM

## 2019-04-25 DIAGNOSIS — I51.3 MURAL THROMBUS OF LEFT VENTRICLE: ICD-10-CM

## 2019-04-25 LAB
INR PPP: 6.29 (ref 0.89–1.1)
PROTHROMBIN TIME: 60.1 SECONDS (ref 9.5–11.6)

## 2019-04-25 PROCEDURE — 36415 COLL VENOUS BLD VENIPUNCTURE: CPT

## 2019-04-25 PROCEDURE — 85610 PROTHROMBIN TIME: CPT

## 2019-04-25 RX ORDER — POLYETHYLENE GLYCOL 3350 17 G/17G
17 POWDER, FOR SOLUTION ORAL DAILY
Qty: 14 EACH | Refills: 0 | Status: SHIPPED | OUTPATIENT
Start: 2019-04-25 | End: 2019-05-10

## 2019-04-29 ENCOUNTER — TELEPHONE (OUTPATIENT)
Dept: NEPHROLOGY | Facility: CLINIC | Age: 54
End: 2019-04-29

## 2019-04-29 ENCOUNTER — APPOINTMENT (OUTPATIENT)
Dept: LAB | Facility: HOSPITAL | Age: 54
End: 2019-04-29
Payer: COMMERCIAL

## 2019-04-29 ENCOUNTER — ANTICOAG VISIT (OUTPATIENT)
Dept: OTHER | Facility: HOSPITAL | Age: 54
End: 2019-04-29

## 2019-04-29 ENCOUNTER — TRANSCRIBE ORDERS (OUTPATIENT)
Dept: ADMINISTRATIVE | Facility: HOSPITAL | Age: 54
End: 2019-04-29

## 2019-04-29 ENCOUNTER — TELEPHONE (OUTPATIENT)
Dept: OTHER | Facility: HOSPITAL | Age: 54
End: 2019-04-29

## 2019-04-29 DIAGNOSIS — I25.5 ISCHEMIC CARDIOMYOPATHY: ICD-10-CM

## 2019-04-29 DIAGNOSIS — I51.3 MURAL THROMBUS OF LEFT VENTRICLE: ICD-10-CM

## 2019-04-29 LAB
INR PPP: 2.33 (ref 0.89–1.1)
PROTHROMBIN TIME: 23.5 SECONDS (ref 9.5–11.6)

## 2019-04-29 PROCEDURE — 36415 COLL VENOUS BLD VENIPUNCTURE: CPT

## 2019-04-29 PROCEDURE — 85610 PROTHROMBIN TIME: CPT

## 2019-05-03 ENCOUNTER — OFFICE VISIT (OUTPATIENT)
Dept: SURGERY | Facility: CLINIC | Age: 54
End: 2019-05-03
Payer: COMMERCIAL

## 2019-05-03 VITALS
SYSTOLIC BLOOD PRESSURE: 94 MMHG | HEIGHT: 62 IN | WEIGHT: 167.2 LBS | DIASTOLIC BLOOD PRESSURE: 64 MMHG | BODY MASS INDEX: 30.77 KG/M2 | HEART RATE: 69 BPM | TEMPERATURE: 98.1 F | OXYGEN SATURATION: 99 %

## 2019-05-03 DIAGNOSIS — B20 HIV INFECTION, UNSPECIFIED SYMPTOM STATUS (HCC): Primary | ICD-10-CM

## 2019-05-03 DIAGNOSIS — Z23 NEED FOR MENACTRA VACCINATION: ICD-10-CM

## 2019-05-03 DIAGNOSIS — Z11.3 ENCOUNTER FOR SCREENING FOR INFECTIONS WITH A PREDOMINANTLY SEXUAL MODE OF TRANSMISSION: ICD-10-CM

## 2019-05-03 DIAGNOSIS — F17.200 TOBACCO DEPENDENCE SYNDROME: ICD-10-CM

## 2019-05-03 DIAGNOSIS — N18.30 CKD (CHRONIC KIDNEY DISEASE) STAGE 3, GFR 30-59 ML/MIN (HCC): ICD-10-CM

## 2019-05-03 DIAGNOSIS — Z78.9 HISTORY OF RECENT CHANGE IN LIFESTYLE: ICD-10-CM

## 2019-05-03 DIAGNOSIS — Z11.3 ROUTINE SCREENING FOR STI (SEXUALLY TRANSMITTED INFECTION): ICD-10-CM

## 2019-05-03 DIAGNOSIS — Z21 HIV POSITIVE (HCC): Primary | ICD-10-CM

## 2019-05-03 PROCEDURE — 90734 MENACWYD/MENACWYCRM VACC IM: CPT | Performed by: INTERNAL MEDICINE

## 2019-05-03 PROCEDURE — 99214 OFFICE O/P EST MOD 30 MIN: CPT | Performed by: INTERNAL MEDICINE

## 2019-05-03 PROCEDURE — 90471 IMMUNIZATION ADMIN: CPT | Performed by: INTERNAL MEDICINE

## 2019-05-06 ENCOUNTER — TELEPHONE (OUTPATIENT)
Dept: OBGYN CLINIC | Facility: CLINIC | Age: 54
End: 2019-05-06

## 2019-05-06 ENCOUNTER — ANTICOAG VISIT (OUTPATIENT)
Dept: OBGYN CLINIC | Facility: CLINIC | Age: 54
End: 2019-05-06

## 2019-05-06 ENCOUNTER — APPOINTMENT (OUTPATIENT)
Dept: LAB | Facility: HOSPITAL | Age: 54
End: 2019-05-06
Payer: COMMERCIAL

## 2019-05-06 DIAGNOSIS — I25.5 ISCHEMIC CARDIOMYOPATHY: ICD-10-CM

## 2019-05-06 DIAGNOSIS — I51.3 MURAL THROMBUS OF LEFT VENTRICLE: ICD-10-CM

## 2019-05-06 LAB
INR PPP: 3.1 (ref 0.89–1.1)
PROTHROMBIN TIME: 30.8 SECONDS (ref 9.5–11.6)

## 2019-05-06 PROCEDURE — 36415 COLL VENOUS BLD VENIPUNCTURE: CPT

## 2019-05-06 PROCEDURE — 85610 PROTHROMBIN TIME: CPT

## 2019-05-10 ENCOUNTER — OFFICE VISIT (OUTPATIENT)
Dept: FAMILY MEDICINE CLINIC | Facility: CLINIC | Age: 54
End: 2019-05-10

## 2019-05-10 VITALS
WEIGHT: 164 LBS | DIASTOLIC BLOOD PRESSURE: 60 MMHG | RESPIRATION RATE: 16 BRPM | OXYGEN SATURATION: 98 % | SYSTOLIC BLOOD PRESSURE: 80 MMHG | HEART RATE: 68 BPM | TEMPERATURE: 97.1 F | BODY MASS INDEX: 30 KG/M2

## 2019-05-10 DIAGNOSIS — I23.6 LV (LEFT VENTRICULAR) MURAL THROMBUS FOLLOWING MI (HCC): ICD-10-CM

## 2019-05-10 DIAGNOSIS — I25.2 OLD MI (MYOCARDIAL INFARCTION): ICD-10-CM

## 2019-05-10 DIAGNOSIS — I10 ESSENTIAL HYPERTENSION: ICD-10-CM

## 2019-05-10 DIAGNOSIS — F17.200 TOBACCO DEPENDENCE SYNDROME: ICD-10-CM

## 2019-05-10 DIAGNOSIS — E78.2 MIXED HYPERLIPIDEMIA: Primary | ICD-10-CM

## 2019-05-10 DIAGNOSIS — I25.2 PAST HEART ATTACK: ICD-10-CM

## 2019-05-10 PROCEDURE — 99213 OFFICE O/P EST LOW 20 MIN: CPT | Performed by: FAMILY MEDICINE

## 2019-05-10 RX ORDER — ADHESIVE BANDAGE 3/4"
BANDAGE TOPICAL DAILY
Qty: 1 EACH | Refills: 0 | Status: SHIPPED | OUTPATIENT
Start: 2019-05-10 | End: 2020-09-16

## 2019-05-15 ENCOUNTER — TELEPHONE (OUTPATIENT)
Dept: FAMILY MEDICINE CLINIC | Facility: CLINIC | Age: 54
End: 2019-05-15

## 2019-05-15 ENCOUNTER — ANTICOAG VISIT (OUTPATIENT)
Dept: FAMILY MEDICINE CLINIC | Facility: CLINIC | Age: 54
End: 2019-05-15

## 2019-05-15 ENCOUNTER — APPOINTMENT (OUTPATIENT)
Dept: LAB | Facility: HOSPITAL | Age: 54
End: 2019-05-15
Payer: COMMERCIAL

## 2019-05-15 DIAGNOSIS — I25.5 ISCHEMIC CARDIOMYOPATHY: ICD-10-CM

## 2019-05-15 DIAGNOSIS — E78.2 MIXED HYPERLIPIDEMIA: ICD-10-CM

## 2019-05-15 DIAGNOSIS — I23.6 LV (LEFT VENTRICULAR) MURAL THROMBUS FOLLOWING MI (HCC): ICD-10-CM

## 2019-05-15 LAB
EST. AVERAGE GLUCOSE BLD GHB EST-MCNC: 123 MG/DL
HBA1C MFR BLD: 5.9 % (ref 4.2–6.3)
INR PPP: 3.35 (ref 0.89–1.1)
PROTHROMBIN TIME: 33.2 SECONDS (ref 9.5–11.6)

## 2019-05-15 PROCEDURE — 85610 PROTHROMBIN TIME: CPT

## 2019-05-15 PROCEDURE — 36415 COLL VENOUS BLD VENIPUNCTURE: CPT

## 2019-05-15 PROCEDURE — 83036 HEMOGLOBIN GLYCOSYLATED A1C: CPT

## 2019-05-16 ENCOUNTER — TELEPHONE (OUTPATIENT)
Dept: OBGYN CLINIC | Facility: CLINIC | Age: 54
End: 2019-05-16

## 2019-05-22 ENCOUNTER — ANTICOAG VISIT (OUTPATIENT)
Dept: FAMILY MEDICINE CLINIC | Facility: CLINIC | Age: 54
End: 2019-05-22

## 2019-05-22 ENCOUNTER — APPOINTMENT (OUTPATIENT)
Dept: LAB | Facility: HOSPITAL | Age: 54
End: 2019-05-22
Payer: COMMERCIAL

## 2019-05-22 ENCOUNTER — TELEPHONE (OUTPATIENT)
Dept: FAMILY MEDICINE CLINIC | Facility: CLINIC | Age: 54
End: 2019-05-22

## 2019-05-22 DIAGNOSIS — I23.6 LV (LEFT VENTRICULAR) MURAL THROMBUS FOLLOWING MI (HCC): ICD-10-CM

## 2019-05-22 DIAGNOSIS — I25.5 ISCHEMIC CARDIOMYOPATHY: ICD-10-CM

## 2019-05-22 LAB
INR PPP: 2.48 (ref 0.89–1.1)
PROTHROMBIN TIME: 25 SECONDS (ref 9.5–11.6)

## 2019-05-22 PROCEDURE — 85610 PROTHROMBIN TIME: CPT

## 2019-05-22 PROCEDURE — 36415 COLL VENOUS BLD VENIPUNCTURE: CPT

## 2019-05-24 ENCOUNTER — TELEPHONE (OUTPATIENT)
Dept: FAMILY MEDICINE CLINIC | Facility: CLINIC | Age: 54
End: 2019-05-24

## 2019-05-24 ENCOUNTER — DOCUMENTATION (OUTPATIENT)
Dept: FAMILY MEDICINE CLINIC | Facility: CLINIC | Age: 54
End: 2019-05-24

## 2019-05-24 ENCOUNTER — APPOINTMENT (EMERGENCY)
Dept: RADIOLOGY | Facility: HOSPITAL | Age: 54
End: 2019-05-24
Payer: COMMERCIAL

## 2019-05-24 ENCOUNTER — HOSPITAL ENCOUNTER (EMERGENCY)
Facility: HOSPITAL | Age: 54
Discharge: HOME | End: 2019-05-24
Attending: EMERGENCY MEDICINE
Payer: COMMERCIAL

## 2019-05-24 VITALS
OXYGEN SATURATION: 96 % | RESPIRATION RATE: 18 BRPM | HEART RATE: 68 BPM | TEMPERATURE: 98.7 F | BODY MASS INDEX: 29.59 KG/M2 | WEIGHT: 161.8 LBS | SYSTOLIC BLOOD PRESSURE: 100 MMHG | DIASTOLIC BLOOD PRESSURE: 69 MMHG

## 2019-05-24 DIAGNOSIS — B34.9 VIRAL ILLNESS: ICD-10-CM

## 2019-05-24 DIAGNOSIS — J06.9 URI (UPPER RESPIRATORY INFECTION): Primary | ICD-10-CM

## 2019-05-24 PROCEDURE — 71046 X-RAY EXAM CHEST 2 VIEWS: CPT

## 2019-05-24 PROCEDURE — 99284 EMERGENCY DEPT VISIT MOD MDM: CPT | Performed by: EMERGENCY MEDICINE

## 2019-05-24 PROCEDURE — 99283 EMERGENCY DEPT VISIT LOW MDM: CPT

## 2019-05-24 RX ORDER — DEXTROMETHORPHAN HYDROBROMIDE AND PROMETHAZINE HYDROCHLORIDE 15; 6.25 MG/5ML; MG/5ML
5 SYRUP ORAL 4 TIMES DAILY PRN
Qty: 118 ML | Refills: 0 | Status: SHIPPED | OUTPATIENT
Start: 2019-05-24 | End: 2020-02-17

## 2019-05-31 ENCOUNTER — ANTICOAG VISIT (OUTPATIENT)
Dept: FAMILY MEDICINE CLINIC | Facility: CLINIC | Age: 54
End: 2019-05-31

## 2019-05-31 ENCOUNTER — TELEPHONE (OUTPATIENT)
Dept: FAMILY MEDICINE CLINIC | Facility: CLINIC | Age: 54
End: 2019-05-31

## 2019-05-31 ENCOUNTER — APPOINTMENT (OUTPATIENT)
Dept: LAB | Facility: HOSPITAL | Age: 54
End: 2019-05-31
Payer: COMMERCIAL

## 2019-05-31 DIAGNOSIS — I25.5 ISCHEMIC CARDIOMYOPATHY: ICD-10-CM

## 2019-05-31 DIAGNOSIS — I23.6 LV (LEFT VENTRICULAR) MURAL THROMBUS FOLLOWING MI (HCC): ICD-10-CM

## 2019-05-31 LAB
INR PPP: 2.31 (ref 0.89–1.1)
PROTHROMBIN TIME: 23.4 SECONDS (ref 9.5–11.6)

## 2019-05-31 PROCEDURE — 85610 PROTHROMBIN TIME: CPT

## 2019-05-31 PROCEDURE — 36415 COLL VENOUS BLD VENIPUNCTURE: CPT

## 2019-06-06 ENCOUNTER — OFFICE VISIT (OUTPATIENT)
Dept: NEPHROLOGY | Facility: CLINIC | Age: 54
End: 2019-06-06
Payer: COMMERCIAL

## 2019-06-06 VITALS
RESPIRATION RATE: 16 BRPM | SYSTOLIC BLOOD PRESSURE: 105 MMHG | WEIGHT: 162.2 LBS | BODY MASS INDEX: 28.74 KG/M2 | DIASTOLIC BLOOD PRESSURE: 64 MMHG | HEIGHT: 63 IN | HEART RATE: 71 BPM

## 2019-06-06 DIAGNOSIS — I25.5 ISCHEMIC CARDIOMYOPATHY: ICD-10-CM

## 2019-06-06 DIAGNOSIS — N18.30 CKD (CHRONIC KIDNEY DISEASE) STAGE 3, GFR 30-59 ML/MIN (HCC): Primary | ICD-10-CM

## 2019-06-06 PROCEDURE — 99213 OFFICE O/P EST LOW 20 MIN: CPT | Performed by: INTERNAL MEDICINE

## 2019-06-07 ENCOUNTER — APPOINTMENT (OUTPATIENT)
Dept: LAB | Facility: HOSPITAL | Age: 54
End: 2019-06-07
Attending: INTERNAL MEDICINE
Payer: COMMERCIAL

## 2019-06-07 ENCOUNTER — TRANSCRIBE ORDERS (OUTPATIENT)
Dept: ADMINISTRATIVE | Facility: HOSPITAL | Age: 54
End: 2019-06-07

## 2019-06-07 ENCOUNTER — ANTICOAG VISIT (OUTPATIENT)
Dept: OTHER | Facility: HOSPITAL | Age: 54
End: 2019-06-07

## 2019-06-07 ENCOUNTER — TELEPHONE (OUTPATIENT)
Dept: FAMILY MEDICINE CLINIC | Facility: CLINIC | Age: 54
End: 2019-06-07

## 2019-06-07 DIAGNOSIS — Z95.810 IMPLANTABLE CARDIOVERTER-DEFIBRILLATOR (ICD) IN SITU: Primary | ICD-10-CM

## 2019-06-07 DIAGNOSIS — I25.5 ISCHEMIC CARDIOMYOPATHY: Primary | ICD-10-CM

## 2019-06-07 DIAGNOSIS — I25.5 ISCHEMIC CARDIOMYOPATHY: ICD-10-CM

## 2019-06-07 DIAGNOSIS — N18.30 CKD (CHRONIC KIDNEY DISEASE) STAGE 3, GFR 30-59 ML/MIN (HCC): ICD-10-CM

## 2019-06-07 LAB
INR PPP: 2.34 (ref 0.89–1.1)
PROTHROMBIN TIME: 23.6 SECONDS (ref 9.5–11.6)

## 2019-06-07 PROCEDURE — 85610 PROTHROMBIN TIME: CPT

## 2019-06-07 PROCEDURE — 36415 COLL VENOUS BLD VENIPUNCTURE: CPT

## 2019-06-24 ENCOUNTER — TELEPHONE (OUTPATIENT)
Dept: FAMILY MEDICINE CLINIC | Facility: CLINIC | Age: 54
End: 2019-06-24

## 2019-06-24 ENCOUNTER — ANTICOAG VISIT (OUTPATIENT)
Dept: OTHER | Facility: HOSPITAL | Age: 54
End: 2019-06-24

## 2019-06-24 ENCOUNTER — APPOINTMENT (OUTPATIENT)
Dept: LAB | Facility: HOSPITAL | Age: 54
End: 2019-06-24
Attending: FAMILY MEDICINE
Payer: COMMERCIAL

## 2019-06-24 ENCOUNTER — REMOTE DEVICE CLINIC VISIT (OUTPATIENT)
Dept: CARDIOLOGY CLINIC | Facility: CLINIC | Age: 54
End: 2019-06-24
Payer: COMMERCIAL

## 2019-06-24 DIAGNOSIS — Z95.810 PRESENCE OF AUTOMATIC CARDIOVERTER/DEFIBRILLATOR (AICD): Primary | ICD-10-CM

## 2019-06-24 DIAGNOSIS — Z95.810 IMPLANTABLE CARDIOVERTER-DEFIBRILLATOR (ICD) IN SITU: ICD-10-CM

## 2019-06-24 DIAGNOSIS — I25.5 ISCHEMIC CARDIOMYOPATHY: ICD-10-CM

## 2019-06-24 LAB
INR PPP: 2.25 (ref 0.89–1.1)
PROTHROMBIN TIME: 22.8 SECONDS (ref 9.5–11.6)

## 2019-06-24 PROCEDURE — 93295 DEV INTERROG REMOTE 1/2/MLT: CPT | Performed by: INTERNAL MEDICINE

## 2019-06-24 PROCEDURE — 85610 PROTHROMBIN TIME: CPT

## 2019-06-24 PROCEDURE — 36415 COLL VENOUS BLD VENIPUNCTURE: CPT

## 2019-06-24 PROCEDURE — 93297 REM INTERROG DEV EVAL ICPMS: CPT | Performed by: INTERNAL MEDICINE

## 2019-06-24 PROCEDURE — 93296 REM INTERROG EVL PM/IDS: CPT | Performed by: INTERNAL MEDICINE

## 2019-06-25 ENCOUNTER — TELEPHONE (OUTPATIENT)
Dept: CARDIOLOGY CLINIC | Facility: CLINIC | Age: 54
End: 2019-06-25

## 2019-06-26 ENCOUNTER — TRANSCRIBE ORDERS (OUTPATIENT)
Dept: LAB | Facility: HOSPITAL | Age: 54
End: 2019-06-26

## 2019-06-26 DIAGNOSIS — I25.5 ISCHEMIC CARDIOMYOPATHY: ICD-10-CM

## 2019-06-26 DIAGNOSIS — N18.30 CKD (CHRONIC KIDNEY DISEASE) STAGE 3, GFR 30-59 ML/MIN (HCC): Primary | ICD-10-CM

## 2019-07-01 ENCOUNTER — TELEPHONE (OUTPATIENT)
Dept: FAMILY MEDICINE CLINIC | Facility: CLINIC | Age: 54
End: 2019-07-01

## 2019-07-01 ENCOUNTER — APPOINTMENT (OUTPATIENT)
Dept: LAB | Facility: HOSPITAL | Age: 54
End: 2019-07-01
Attending: INTERNAL MEDICINE
Payer: COMMERCIAL

## 2019-07-01 DIAGNOSIS — I25.5 ISCHEMIC CARDIOMYOPATHY: ICD-10-CM

## 2019-07-01 DIAGNOSIS — I23.6 LV (LEFT VENTRICULAR) MURAL THROMBUS FOLLOWING MI (HCC): ICD-10-CM

## 2019-07-01 DIAGNOSIS — B20 HIV INFECTION, UNSPECIFIED SYMPTOM STATUS (HCC): ICD-10-CM

## 2019-07-01 DIAGNOSIS — N18.30 CKD (CHRONIC KIDNEY DISEASE) STAGE 3, GFR 30-59 ML/MIN (HCC): ICD-10-CM

## 2019-07-01 DIAGNOSIS — Z79.01 LONG TERM CURRENT USE OF ANTICOAGULANT THERAPY: Primary | ICD-10-CM

## 2019-07-01 LAB
ALBUMIN SERPL BCP-MCNC: 4.4 G/DL (ref 3–5.2)
ALP SERPL-CCNC: 71 U/L (ref 43–122)
ALT SERPL W P-5'-P-CCNC: 24 U/L (ref 9–52)
ANION GAP SERPL CALCULATED.3IONS-SCNC: 9 MMOL/L (ref 5–14)
AST SERPL W P-5'-P-CCNC: 27 U/L (ref 14–36)
BACTERIA UR QL AUTO: ABNORMAL /HPF
BASOPHILS # BLD AUTO: 0.1 THOUSANDS/ΜL (ref 0–0.1)
BASOPHILS NFR BLD AUTO: 1 % (ref 0–1)
BILIRUB SERPL-MCNC: 0.7 MG/DL
BILIRUB UR QL STRIP: NEGATIVE
BUN SERPL-MCNC: 11 MG/DL (ref 5–25)
CALCIUM SERPL-MCNC: 9.8 MG/DL (ref 8.4–10.2)
CHLORIDE SERPL-SCNC: 108 MMOL/L (ref 97–108)
CHOLEST SERPL-MCNC: 152 MG/DL
CLARITY UR: ABNORMAL
CO2 SERPL-SCNC: 25 MMOL/L (ref 22–30)
COLOR UR: ABNORMAL
CREAT SERPL-MCNC: 1.21 MG/DL (ref 0.6–1.2)
CREAT UR-MCNC: 273 MG/DL
EOSINOPHIL # BLD AUTO: 0.1 THOUSAND/ΜL (ref 0–0.4)
EOSINOPHIL NFR BLD AUTO: 1 % (ref 0–6)
ERYTHROCYTE [DISTWIDTH] IN BLOOD BY AUTOMATED COUNT: 14.4 %
GFR SERPL CREATININE-BSD FRML MDRD: 51 ML/MIN/1.73SQ M
GLUCOSE P FAST SERPL-MCNC: 110 MG/DL (ref 70–99)
GLUCOSE UR STRIP-MCNC: NEGATIVE MG/DL
HCT VFR BLD AUTO: 43.9 % (ref 36–46)
HDLC SERPL-MCNC: 45 MG/DL (ref 40–59)
HGB BLD-MCNC: 14.8 G/DL (ref 12–16)
HGB UR QL STRIP.AUTO: 25
KETONES UR STRIP-MCNC: NEGATIVE MG/DL
LDLC SERPL CALC-MCNC: 72 MG/DL
LEUKOCYTE ESTERASE UR QL STRIP: 500
LYMPHOCYTES # BLD AUTO: 1.8 THOUSANDS/ΜL (ref 0.5–4)
LYMPHOCYTES NFR BLD AUTO: 30 % (ref 25–45)
MCH RBC QN AUTO: 32.3 PG (ref 26–34)
MCHC RBC AUTO-ENTMCNC: 33.7 G/DL (ref 31–36)
MCV RBC AUTO: 96 FL (ref 80–100)
MICROALBUMIN UR-MCNC: 23.7 MG/L (ref 0–20)
MICROALBUMIN/CREAT 24H UR: 9 MG/G CREATININE (ref 0–30)
MONOCYTES # BLD AUTO: 0.3 THOUSAND/ΜL (ref 0.2–0.9)
MONOCYTES NFR BLD AUTO: 6 % (ref 1–10)
NEUTROPHILS # BLD AUTO: 3.7 THOUSANDS/ΜL (ref 1.8–7.8)
NEUTS SEG NFR BLD AUTO: 62 % (ref 45–65)
NITRITE UR QL STRIP: POSITIVE
NON-SQ EPI CELLS URNS QL MICRO: ABNORMAL /HPF
NONHDLC SERPL-MCNC: 107 MG/DL
PH UR STRIP.AUTO: 5 [PH]
PHOSPHATE SERPL-MCNC: 2.9 MG/DL (ref 2.5–4.8)
PLATELET # BLD AUTO: 202 THOUSANDS/UL (ref 150–450)
PMV BLD AUTO: 9.4 FL (ref 8.9–12.7)
POTASSIUM SERPL-SCNC: 4.4 MMOL/L (ref 3.6–5)
PROT SERPL-MCNC: 7.9 G/DL (ref 5.9–8.4)
PROT UR STRIP-MCNC: ABNORMAL MG/DL
PTH-INTACT SERPL-MCNC: 121.5 PG/ML (ref 16.7–78.9)
RBC # BLD AUTO: 4.58 MILLION/UL (ref 4–5.2)
RBC #/AREA URNS AUTO: ABNORMAL /HPF
SODIUM SERPL-SCNC: 142 MMOL/L (ref 137–147)
SP GR UR STRIP.AUTO: 1.02 (ref 1–1.04)
TRIGL SERPL-MCNC: 176 MG/DL
UROBILINOGEN UA: 1 MG/DL
WBC # BLD AUTO: 5.9 THOUSAND/UL (ref 4.5–11)
WBC #/AREA URNS AUTO: ABNORMAL /HPF

## 2019-07-01 PROCEDURE — 86480 TB TEST CELL IMMUN MEASURE: CPT

## 2019-07-01 PROCEDURE — 36415 COLL VENOUS BLD VENIPUNCTURE: CPT

## 2019-07-01 PROCEDURE — 82043 UR ALBUMIN QUANTITATIVE: CPT

## 2019-07-01 PROCEDURE — 86803 HEPATITIS C AB TEST: CPT

## 2019-07-01 PROCEDURE — 81001 URINALYSIS AUTO W/SCOPE: CPT

## 2019-07-01 PROCEDURE — 87536 HIV-1 QUANT&REVRSE TRNSCRPJ: CPT

## 2019-07-01 PROCEDURE — 86361 T CELL ABSOLUTE COUNT: CPT

## 2019-07-01 PROCEDURE — 82570 ASSAY OF URINE CREATININE: CPT

## 2019-07-01 PROCEDURE — 80053 COMPREHEN METABOLIC PANEL: CPT

## 2019-07-01 PROCEDURE — 83970 ASSAY OF PARATHORMONE: CPT

## 2019-07-01 PROCEDURE — 86592 SYPHILIS TEST NON-TREP QUAL: CPT

## 2019-07-01 PROCEDURE — 85025 COMPLETE CBC W/AUTO DIFF WBC: CPT

## 2019-07-01 PROCEDURE — 80061 LIPID PANEL: CPT

## 2019-07-01 PROCEDURE — 84100 ASSAY OF PHOSPHORUS: CPT

## 2019-07-01 NOTE — TELEPHONE ENCOUNTER
[facility] outpatient calling for a standing order for INR to be placed  PT was there today, she was seen but they dnt hve an order

## 2019-07-02 LAB
BASOPHILS # BLD AUTO: 0.1 X10E3/UL (ref 0–0.2)
BASOPHILS NFR BLD AUTO: 1 %
CD3+CD4+ CELLS # BLD: 427 /UL (ref 359–1519)
CD3+CD4+ CELLS NFR BLD: 19.4 % (ref 30.8–58.5)
EOSINOPHIL # BLD AUTO: 0.1 X10E3/UL (ref 0–0.4)
EOSINOPHIL NFR BLD AUTO: 1 %
ERYTHROCYTE [DISTWIDTH] IN BLOOD BY AUTOMATED COUNT: 14.9 % (ref 12.3–15.4)
HCT VFR BLD AUTO: 41 % (ref 34–46.6)
HCV AB SER QL: NORMAL
HGB BLD-MCNC: 14.4 G/DL (ref 11.1–15.9)
IMM GRANULOCYTES # BLD: 0 X10E3/UL (ref 0–0.1)
IMM GRANULOCYTES NFR BLD: 0 %
LYMPHOCYTES # BLD AUTO: 2.2 X10E3/UL (ref 0.7–3.1)
LYMPHOCYTES NFR BLD AUTO: 33 %
MCH RBC QN AUTO: 31.9 PG (ref 26.6–33)
MCHC RBC AUTO-ENTMCNC: 35.1 G/DL (ref 31.5–35.7)
MCV RBC AUTO: 91 FL (ref 79–97)
MONOCYTES # BLD AUTO: 0.4 X10E3/UL (ref 0.1–0.9)
MONOCYTES NFR BLD AUTO: 6 %
NEUTROPHILS # BLD AUTO: 3.8 X10E3/UL (ref 1.4–7)
NEUTROPHILS NFR BLD AUTO: 59 %
PLATELET # BLD AUTO: 224 X10E3/UL (ref 150–450)
RBC # BLD AUTO: 4.51 X10E6/UL (ref 3.77–5.28)
RPR SER QL: NORMAL
WBC # BLD AUTO: 6.5 X10E3/UL (ref 3.4–10.8)

## 2019-07-03 ENCOUNTER — TELEPHONE (OUTPATIENT)
Dept: OTHER | Facility: HOSPITAL | Age: 54
End: 2019-07-03

## 2019-07-03 ENCOUNTER — APPOINTMENT (OUTPATIENT)
Dept: LAB | Facility: HOSPITAL | Age: 54
End: 2019-07-03
Payer: COMMERCIAL

## 2019-07-03 ENCOUNTER — ANTICOAG VISIT (OUTPATIENT)
Dept: OTHER | Facility: HOSPITAL | Age: 54
End: 2019-07-03

## 2019-07-03 DIAGNOSIS — Z79.01 LONG TERM CURRENT USE OF ANTICOAGULANT THERAPY: ICD-10-CM

## 2019-07-03 DIAGNOSIS — I23.6 LV (LEFT VENTRICULAR) MURAL THROMBUS FOLLOWING MI (HCC): ICD-10-CM

## 2019-07-03 DIAGNOSIS — I25.5 ISCHEMIC CARDIOMYOPATHY: ICD-10-CM

## 2019-07-03 LAB
GAMMA INTERFERON BACKGROUND BLD IA-ACNC: 0.04 IU/ML
HIV1 RNA # SERPL NAA+PROBE: <20 COPIES/ML
HIV1 RNA SERPL NAA+PROBE-LOG#: NORMAL LOG10COPY/ML
INR PPP: 3 (ref 0.89–1.1)
M TB IFN-G BLD-IMP: NEGATIVE
M TB IFN-G CD4+ BCKGRND COR BLD-ACNC: 0 IU/ML
M TB IFN-G CD4+ BCKGRND COR BLD-ACNC: 0.01 IU/ML
MITOGEN IGNF BCKGRD COR BLD-ACNC: >10 IU/ML
PROTHROMBIN TIME: 29.9 SECONDS (ref 9.5–11.6)

## 2019-07-03 PROCEDURE — 85610 PROTHROMBIN TIME: CPT

## 2019-07-03 PROCEDURE — 36415 COLL VENOUS BLD VENIPUNCTURE: CPT

## 2019-07-03 NOTE — TELEPHONE ENCOUNTER
Late Entry    Approx at 1600 received phone call from patient asking about INR  INR is 3, range is 2 5-3 5, Dx is mural thrombus of LV  She reports compliance  No bleeding episodes  Plan to continue with Coumadin schedule noted below  Mon, Tue, Wed: Take Coumadin 6mg QD  Thur, Fri, Sat, Sun: Take Coumadin 5mf QD  Plan to check INR in 2-3 weeks (patient travelling again to [facility])   She will text me on my personal cell number when she return from [place], [place]

## 2019-07-05 DIAGNOSIS — N30.00 ACUTE CYSTITIS WITHOUT HEMATURIA: Primary | ICD-10-CM

## 2019-07-05 RX ORDER — CEPHALEXIN 250 MG/1
250 CAPSULE ORAL EVERY 12 HOURS SCHEDULED
Qty: 20 CAPSULE | Refills: 0 | Status: SHIPPED | OUTPATIENT
Start: 2019-07-05 | End: 2019-07-15

## 2019-07-05 NOTE — PROGRESS NOTES
Patient apparently concerned about recent urinalysis results  Again patient denied any fever, abdominal pain or dysuria  However patient is complaining of some flank pain  Will empirically treat, Keflex 250 mg b i d  For 10 days

## 2019-07-08 DIAGNOSIS — I25.2 PAST HEART ATTACK: ICD-10-CM

## 2019-07-08 DIAGNOSIS — I25.2 OLD MI (MYOCARDIAL INFARCTION): ICD-10-CM

## 2019-07-08 DIAGNOSIS — I23.6 LV (LEFT VENTRICULAR) MURAL THROMBUS FOLLOWING MI (HCC): ICD-10-CM

## 2019-07-09 RX ORDER — SACUBITRIL AND VALSARTAN 24; 26 MG/1; MG/1
TABLET, FILM COATED ORAL
Qty: 60 TABLET | Refills: 4 | Status: SHIPPED | OUTPATIENT
Start: 2019-07-09 | End: 2019-09-11 | Stop reason: DRUGHIGH

## 2019-07-09 RX ORDER — CARVEDILOL 6.25 MG/1
TABLET ORAL
Qty: 60 TABLET | Refills: 4 | Status: SHIPPED | OUTPATIENT
Start: 2019-07-09 | End: 2019-08-12

## 2019-07-16 ENCOUNTER — APPOINTMENT (OUTPATIENT)
Dept: LAB | Facility: HOSPITAL | Age: 54
End: 2019-07-16
Payer: COMMERCIAL

## 2019-07-16 DIAGNOSIS — I25.5 ISCHEMIC CARDIOMYOPATHY: ICD-10-CM

## 2019-07-16 DIAGNOSIS — I25.5 ISCHEMIC CARDIOMYOPATHY: Primary | ICD-10-CM

## 2019-07-16 DIAGNOSIS — E78.5 HYPERLIPIDEMIA, UNSPECIFIED HYPERLIPIDEMIA TYPE: ICD-10-CM

## 2019-07-16 LAB
INR PPP: 4.18 (ref 0.89–1.1)
PROTHROMBIN TIME: 40.9 SECONDS (ref 9.5–11.6)

## 2019-07-16 PROCEDURE — 85610 PROTHROMBIN TIME: CPT

## 2019-07-16 PROCEDURE — 36415 COLL VENOUS BLD VENIPUNCTURE: CPT

## 2019-07-16 RX ORDER — ATORVASTATIN CALCIUM 80 MG/1
TABLET, FILM COATED ORAL
Qty: 30 TABLET | Refills: 10 | Status: SHIPPED | OUTPATIENT
Start: 2019-07-16 | End: 2019-09-11 | Stop reason: SDUPTHER

## 2019-07-17 ENCOUNTER — ANTICOAG VISIT (OUTPATIENT)
Dept: OBGYN CLINIC | Facility: CLINIC | Age: 54
End: 2019-07-17

## 2019-07-17 ENCOUNTER — TELEPHONE (OUTPATIENT)
Dept: OBGYN CLINIC | Facility: CLINIC | Age: 54
End: 2019-07-17

## 2019-07-17 DIAGNOSIS — Z79.01 LONG TERM CURRENT USE OF ANTICOAGULANT THERAPY: ICD-10-CM

## 2019-07-17 DIAGNOSIS — I23.6 LV (LEFT VENTRICULAR) MURAL THROMBUS FOLLOWING MI (HCC): ICD-10-CM

## 2019-07-17 DIAGNOSIS — I25.5 ISCHEMIC CARDIOMYOPATHY: Primary | ICD-10-CM

## 2019-07-17 DIAGNOSIS — I25.5 ISCHEMIC CARDIOMYOPATHY: ICD-10-CM

## 2019-07-17 NOTE — TELEPHONE ENCOUNTER
Late Entry     Called patient (7/16/2019) approx 1700 to report supratherapeutic INR of 4 18  She denies making any changes in her diet  Denies bleeding episodes  Plan to continue with Coumadin 5mg QD and recheck blood work  Patient will again be going out of town and she will return 8/2 for repeat INR check

## 2019-08-12 ENCOUNTER — APPOINTMENT (OUTPATIENT)
Dept: LAB | Facility: HOSPITAL | Age: 54
End: 2019-08-12
Payer: COMMERCIAL

## 2019-08-12 ENCOUNTER — ANTICOAG VISIT (OUTPATIENT)
Dept: FAMILY MEDICINE CLINIC | Facility: CLINIC | Age: 54
End: 2019-08-12

## 2019-08-12 ENCOUNTER — OFFICE VISIT (OUTPATIENT)
Dept: FAMILY MEDICINE CLINIC | Facility: CLINIC | Age: 54
End: 2019-08-12

## 2019-08-12 VITALS
HEART RATE: 71 BPM | OXYGEN SATURATION: 98 % | TEMPERATURE: 97.3 F | DIASTOLIC BLOOD PRESSURE: 72 MMHG | WEIGHT: 155 LBS | BODY MASS INDEX: 27.9 KG/M2 | SYSTOLIC BLOOD PRESSURE: 116 MMHG | RESPIRATION RATE: 18 BRPM

## 2019-08-12 DIAGNOSIS — Z78.9 HISTORY OF RECENT CHANGE IN LIFESTYLE: ICD-10-CM

## 2019-08-12 DIAGNOSIS — I23.6 LV (LEFT VENTRICULAR) MURAL THROMBUS FOLLOWING MI (HCC): Primary | ICD-10-CM

## 2019-08-12 DIAGNOSIS — I25.2 PAST HEART ATTACK: ICD-10-CM

## 2019-08-12 DIAGNOSIS — Z79.01 LONG TERM CURRENT USE OF ANTICOAGULANT THERAPY: ICD-10-CM

## 2019-08-12 DIAGNOSIS — I25.2 OLD MI (MYOCARDIAL INFARCTION): ICD-10-CM

## 2019-08-12 DIAGNOSIS — I23.6 LV (LEFT VENTRICULAR) MURAL THROMBUS FOLLOWING MI (HCC): ICD-10-CM

## 2019-08-12 DIAGNOSIS — Z11.3 ROUTINE SCREENING FOR STI (SEXUALLY TRANSMITTED INFECTION): ICD-10-CM

## 2019-08-12 DIAGNOSIS — F17.200 TOBACCO DEPENDENCE SYNDROME: ICD-10-CM

## 2019-08-12 DIAGNOSIS — B20 HIV INFECTION, UNSPECIFIED SYMPTOM STATUS (HCC): ICD-10-CM

## 2019-08-12 DIAGNOSIS — I25.5 ISCHEMIC CARDIOMYOPATHY: ICD-10-CM

## 2019-08-12 DIAGNOSIS — Z11.3 ENCOUNTER FOR SCREENING FOR INFECTIONS WITH A PREDOMINANTLY SEXUAL MODE OF TRANSMISSION: ICD-10-CM

## 2019-08-12 PROBLEM — Z01.419 WELL WOMAN EXAM WITH ROUTINE GYNECOLOGICAL EXAM: Status: RESOLVED | Noted: 2018-10-12 | Resolved: 2019-08-12

## 2019-08-12 PROBLEM — Z12.4 PAP SMEAR FOR CERVICAL CANCER SCREENING: Status: RESOLVED | Noted: 2018-10-12 | Resolved: 2019-08-12

## 2019-08-12 PROBLEM — R87.612 LOW GRADE SQUAMOUS INTRAEPITHELIAL LESION (LGSIL) ON CERVICAL PAP SMEAR: Status: RESOLVED | Noted: 2018-11-02 | Resolved: 2019-08-12

## 2019-08-12 LAB
INR PPP: 1.64 (ref 0.89–1.1)
PROTHROMBIN TIME: 16.9 SECONDS (ref 9.5–11.6)

## 2019-08-12 PROCEDURE — 99213 OFFICE O/P EST LOW 20 MIN: CPT | Performed by: FAMILY MEDICINE

## 2019-08-12 PROCEDURE — 87491 CHLMYD TRACH DNA AMP PROBE: CPT

## 2019-08-12 PROCEDURE — 36415 COLL VENOUS BLD VENIPUNCTURE: CPT | Performed by: FAMILY MEDICINE

## 2019-08-12 PROCEDURE — 87591 N.GONORRHOEAE DNA AMP PROB: CPT

## 2019-08-12 PROCEDURE — 85610 PROTHROMBIN TIME: CPT | Performed by: FAMILY MEDICINE

## 2019-08-12 RX ORDER — CARVEDILOL 3.12 MG/1
3.12 TABLET ORAL 2 TIMES DAILY WITH MEALS
COMMUNITY
End: 2019-09-11 | Stop reason: SDUPTHER

## 2019-08-12 RX ORDER — ASPIRIN 81 MG/1
81 TABLET ORAL DAILY
Qty: 81 TABLET | Refills: 10 | Status: SHIPPED | OUTPATIENT
Start: 2019-08-12 | End: 2022-04-25 | Stop reason: SDUPTHER

## 2019-08-12 RX ORDER — NICOTINE 21 MG/24HR
1 PATCH, TRANSDERMAL 24 HOURS TRANSDERMAL EVERY 24 HOURS
Qty: 28 PATCH | Refills: 7 | Status: SHIPPED | OUTPATIENT
Start: 2019-08-12 | End: 2020-09-16

## 2019-08-12 NOTE — ASSESSMENT & PLAN NOTE
Called patient and discussed with her that INR today is 1 64  Her diagnosis is left ventricular mural thrombus  INR range should be 2 5-3 5  She reports compliance with medications  Denies any episodes of bleeding  Her Coumadin regimen was altered and is noted below:  Plan to take Coumadin 7 mg 1 tab by mouth today (Monday, 08/12/2019)  Plan to take Coumadin 7 mg 1 tablet by mouth on Tuesday (tomorrow, 08/13/2019)  Continue with Coumadin 5 mg 1 tab by mouth daily the rest of the week  Recheck INR in 1 week on Monday 08/19/2019  This plan was discussed with patient in depth  Teach back method was used

## 2019-08-12 NOTE — PROGRESS NOTES
Assessment/Plan:    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 [facility])  Called patient and discussed with her that INR today is 1 64  Her diagnosis is left ventricular mural thrombus  INR range should be 2 5-3 5  She reports compliance with medications  Denies any episodes of bleeding  Her Coumadin regimen was altered and is noted below:  Plan to take Coumadin 7 mg 1 tab by mouth today (Monday, 08/12/2019)  Plan to take Coumadin 7 mg 1 tablet by mouth on Tuesday (tomorrow, 08/13/2019)  Continue with Coumadin 5 mg 1 tab by mouth daily the rest of the week  Recheck INR in 1 week on Monday 08/19/2019  This plan was discussed with patient in depth  Teach back method was used  Tobacco dependence syndrome  Discussed against harmful affects of smoking with patient including various cancers  Offered nicotine replacement  Discussed the specifics of using nicotine patches, on a tapering schedule, after stopping smoking  Tapering can last 6 months  While on the nicotine patches, I instructed on the use of PRN nicotine gum, instead of reaching for a cigarette, in a moment of weakness  Correct way of using nicotine patch was discussed: set a quit date, throw away cigg/lighters  Do not use nicotine patch and smoke at the same time  Diagnoses and all orders for this visit:    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 (HCC)    Tobacco dependence syndrome  -     nicotine polacrilex (NICORETTE) 4 mg gum; Chew 1 each (4 mg total) as needed for smoking cessation  -     nicotine (NICODERM CQ) 21 mg/24 hr TD 24 hr patch; Place 1 patch on the skin every 24 hours    Old MI (myocardial infarction)  -     aspirin (ECOTRIN LOW STRENGTH) 81 mg EC tablet; Take 1 tablet (81 mg total) by mouth daily    old extensive anterior, apical, lateral and distal inferior apical MI    Other orders  -     Cancel: Ambulatory referral to Gastroenterology; Future  -     carvedilol (COREG) 3 125 mg tablet;  Take 3 125 mg by mouth 2 (two) times a day with meals          Subjective:      Patient ID: Chika Lewis is a 49 y o  female  54F with PMH significant for HIV, CAD s/p MI with LV thrombus (s/p ICD placement) and HLD presents for follow-up  She reports feeling well and denies any complaints at this time          HIV: Currently on Biktarvy with an undetectable viral load and a CD4 count in the 400s, checked in April 2019  She does follow with Infectious Disease regularly        CAD s/p LV thrombus: Follows with Cardiology  Reports no symptoms including CP, SOB  Continues to take Coumadin   Her new range is 2 5-3 5  Health Maintenance:  -Cervical cancer screening:   Pap completed in November, 2018-low-grade squamous intraepithelial lesion  She is S/P colposcopy normal results  Plan to repeat Pap in November, 2019  -Breast cancer screening: Mammogram completed in January, 2019 that revealed left-sided mass  She is s/p left-sided breast biopsy completed in January, 2019 that revealed no malignancy  She is following with Dr Justus Grimes    -Colon cancer screening:   Completed virtual colonoscopy in 2018  Will need repeat in 10 years   -Smoking cessation: continues to smoke however has decreased to 1/2 ppd  -Alcohol use:   Denies  -Drug use:   Denies           The following portions of the patient's history were reviewed and updated as appropriate: allergies, current medications, past family history, past medical history, past social history, past surgical history and problem list     Review of Systems   Constitutional: Negative for chills and fever  Respiratory: Negative for cough and shortness of breath  Cardiovascular: Negative for chest pain and palpitations  Endocrine: Negative for polyphagia and polyuria  Musculoskeletal: Negative for back pain  Neurological: Negative for light-headedness, numbness and headaches           Objective:      /72 (BP Location: Right arm, Patient Position: Sitting, Cuff Size: Standard)   Pulse 71   Temp (!) 97 3 °F (36 3 °C) (Temporal)   Resp 18   Wt 70 3 kg (155 lb)   SpO2 98%   BMI 27 90 kg/m²          Physical Exam   Constitutional: She appears well-developed and well-nourished  HENT:   Head: Normocephalic and atraumatic  Eyes: EOM are normal    Neck: Normal range of motion  Neck supple  Cardiovascular: Normal rate and normal heart sounds  Pulmonary/Chest: Effort normal and breath sounds normal  No respiratory distress  Abdominal: Soft  Bowel sounds are normal  She exhibits no distension  Neurological: She is alert  Skin: Skin is warm and dry  Psychiatric: She has a normal mood and affect

## 2019-08-12 NOTE — ASSESSMENT & PLAN NOTE
Discussed against harmful affects of smoking with patient including various cancers  Offered nicotine replacement  Discussed the specifics of using nicotine patches, on a tapering schedule, after stopping smoking  Tapering can last 6 months  While on the nicotine patches, I instructed on the use of PRN nicotine gum, instead of reaching for a cigarette, in a moment of weakness  Correct way of using nicotine patch was discussed: set a quit date, throw away cigg/lighters  Do not use nicotine patch and smoke at the same time

## 2019-08-13 LAB
C TRACH DNA SPEC QL NAA+PROBE: NEGATIVE
N GONORRHOEA DNA SPEC QL NAA+PROBE: NEGATIVE

## 2019-08-14 NOTE — Clinical Note
Good morning,Please schedule this patient for a Pap smear with me in November, 2019    Thank you kindly

## 2019-08-26 ENCOUNTER — TELEPHONE (OUTPATIENT)
Dept: FAMILY MEDICINE CLINIC | Facility: CLINIC | Age: 54
End: 2019-08-26

## 2019-09-06 ENCOUNTER — TELEPHONE (OUTPATIENT)
Dept: FAMILY MEDICINE CLINIC | Facility: CLINIC | Age: 54
End: 2019-09-06

## 2019-09-09 ENCOUNTER — TELEPHONE (OUTPATIENT)
Dept: FAMILY MEDICINE CLINIC | Facility: CLINIC | Age: 54
End: 2019-09-09

## 2019-09-09 NOTE — TELEPHONE ENCOUNTER
I received a telephone call from patient on my personal cell phone stating that she was unable to take her Coumadin for the last 4 days secondary to being stuck in [place] and having car problems  Patient is due to return to [facility] and will resume her Coumadin schedule by starting Coumadin 6 mg tonight  She will then go for INR check tomorrow (Tuesday September 10th)  Advised patient to go for INR as soon as possible  She is okay to take Coumadin tonight 6 mg  Advised to not take any Coumadin until blood work results  Advised to wait for my phone call prior to taking any Coumadin at all  Patient voiced her understanding

## 2019-09-10 ENCOUNTER — TELEPHONE (OUTPATIENT)
Dept: CARDIOLOGY CLINIC | Facility: CLINIC | Age: 54
End: 2019-09-10

## 2019-09-10 ENCOUNTER — APPOINTMENT (OUTPATIENT)
Dept: LAB | Facility: HOSPITAL | Age: 54
End: 2019-09-10
Payer: COMMERCIAL

## 2019-09-10 DIAGNOSIS — I25.5 ISCHEMIC CARDIOMYOPATHY: ICD-10-CM

## 2019-09-10 LAB
INR PPP: 1.01 (ref 0.91–1.09)
PROTHROMBIN TIME: 11.1 SECONDS (ref 9.8–12)

## 2019-09-10 PROCEDURE — 36415 COLL VENOUS BLD VENIPUNCTURE: CPT

## 2019-09-10 PROCEDURE — 85610 PROTHROMBIN TIME: CPT

## 2019-09-10 NOTE — TELEPHONE ENCOUNTER
Phone call from dr Keyonna Joyce, patients, requesting dr Izzy Duenas, assume patient's pt / inr and warfarin dosing  Doctor reports patient travels to [place] and [place] recently , she missed 4-5 days of warfarin and inr today 1 0  She reports patient's ionr are erratic  and is requesting dr Izzy Duenas take over adjusting  Warfarin  Dr Keyonna Joyce will call patient to continue with her current dose, patient to be seen at  with dr Izzy Duenas tomorrow  Exl;ained we can provide a new prescription tyo patient tomorrow and review warfarin management with patient art office visit tomorrow with dr Izzy Duenas  Will notify dr Izzy Duenas of call

## 2019-09-10 NOTE — PROGRESS NOTES
Assessment/Plan:    Tobacco dependence syndrome  Congratulated patient on her success  Advised to remain in remission  Nicotine gum prescribed  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 [facility])  She remains on warfarin 6 mg Monday and Tuesday  Warfarin 5 mg the rest of the week  Cardiology will take over INR management from now on  Patient understands and is aware  Patient does have a list of foods to avoid while on warfarin  Ischemic cardiomyopathy  Remains stable on atorvastatin, carvedilol, entresto was increased by Cardiology yesterday  Diagnoses and all orders for this visit:    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 (HCC)    Tobacco dependence syndrome  -     nicotine polacrilex (COMMIT) 2 MG lozenge; Apply 1 lozenge (2 mg total) to the mouth or throat as needed for smoking cessation    Ischemic cardiomyopathy          Subjective:      Patient ID: Pelon Thurston is a 49 y o  female  54F with PMH significant for HIV, CAD s/p MI with LV thrombus (s/p ICD placement) and HLD presents for follow-up  She reports feeling well and denies any complaints at this time          HIV: Currently on Biktarvy with an undetectable viral load and a CD4 count in the 400s, checked in April 2019  She does follow with Infectious Disease regularly      CAD s/p LV thrombus: Follows with Cardiology  Most recently, she was seen by Cardiology yesterday  Entresto was increased to 49-51 mg  Cardiology is planning to increase her carvedilol during her next visit  Cardiology to take over INR management      Tobacco abuse:  Patient reports that she has quit smoking for the past 3 weeks  She has been using nicotine gum  Requesting refills  Review of Systems   Constitutional: Negative for chills and fever  Respiratory: Negative for cough and shortness of breath  Cardiovascular: Negative for chest pain and palpitations  Endocrine: Negative for polyphagia and polyuria     Musculoskeletal: Negative for back pain  Neurological: Negative for light-headedness, numbness and headaches  Objective:      /80 (BP Location: Left arm, Patient Position: Sitting, Cuff Size: Standard)   Pulse 62   Temp 98 °F (36 7 °C) (Temporal)   Resp 16   Wt 71 7 kg (158 lb)   SpO2 97%   BMI 28 44 kg/m²          Physical Exam   Constitutional: She appears well-developed and well-nourished  HENT:   Head: Normocephalic and atraumatic  Eyes: EOM are normal    Neck: Normal range of motion  Neck supple  Cardiovascular: Normal rate and normal heart sounds  Pulmonary/Chest: Effort normal and breath sounds normal  No respiratory distress  Abdominal: Soft  Bowel sounds are normal  She exhibits no distension  Neurological: She is alert  Skin: Skin is warm and dry  Psychiatric: She has a normal mood and affect

## 2019-09-11 ENCOUNTER — ANTICOAG VISIT (OUTPATIENT)
Dept: CARDIOLOGY CLINIC | Facility: CLINIC | Age: 54
End: 2019-09-11

## 2019-09-11 ENCOUNTER — TELEPHONE (OUTPATIENT)
Dept: CARDIOLOGY CLINIC | Facility: CLINIC | Age: 54
End: 2019-09-11

## 2019-09-11 ENCOUNTER — OFFICE VISIT (OUTPATIENT)
Dept: CARDIOLOGY CLINIC | Facility: CLINIC | Age: 54
End: 2019-09-11
Payer: COMMERCIAL

## 2019-09-11 ENCOUNTER — ANTICOAG VISIT (OUTPATIENT)
Dept: FAMILY MEDICINE CLINIC | Facility: CLINIC | Age: 54
End: 2019-09-11

## 2019-09-11 VITALS
WEIGHT: 157 LBS | DIASTOLIC BLOOD PRESSURE: 70 MMHG | SYSTOLIC BLOOD PRESSURE: 110 MMHG | HEART RATE: 80 BPM | HEIGHT: 63 IN | BODY MASS INDEX: 27.82 KG/M2

## 2019-09-11 DIAGNOSIS — E78.5 HYPERLIPIDEMIA, UNSPECIFIED HYPERLIPIDEMIA TYPE: ICD-10-CM

## 2019-09-11 DIAGNOSIS — I23.6 LV (LEFT VENTRICULAR) MURAL THROMBUS FOLLOWING MI (HCC): ICD-10-CM

## 2019-09-11 DIAGNOSIS — I25.2 PAST HEART ATTACK: ICD-10-CM

## 2019-09-11 DIAGNOSIS — I25.5 ISCHEMIC CARDIOMYOPATHY: ICD-10-CM

## 2019-09-11 DIAGNOSIS — I10 HYPERTENSION, UNSPECIFIED TYPE: Primary | ICD-10-CM

## 2019-09-11 DIAGNOSIS — Z79.01 LONG TERM CURRENT USE OF ANTICOAGULANT THERAPY: ICD-10-CM

## 2019-09-11 DIAGNOSIS — Z95.810 IMPLANTABLE CARDIOVERTER-DEFIBRILLATOR (ICD) IN SITU: ICD-10-CM

## 2019-09-11 DIAGNOSIS — E78.2 MIXED HYPERLIPIDEMIA: ICD-10-CM

## 2019-09-11 DIAGNOSIS — I25.10 TRIPLE VESSEL CORONARY ARTERY DISEASE: ICD-10-CM

## 2019-09-11 DIAGNOSIS — I23.6 LV (LEFT VENTRICULAR) MURAL THROMBUS FOLLOWING MI (HCC): Primary | ICD-10-CM

## 2019-09-11 DIAGNOSIS — I10 ESSENTIAL HYPERTENSION: ICD-10-CM

## 2019-09-11 PROCEDURE — 99215 OFFICE O/P EST HI 40 MIN: CPT | Performed by: INTERNAL MEDICINE

## 2019-09-11 PROCEDURE — 3078F DIAST BP <80 MM HG: CPT | Performed by: INTERNAL MEDICINE

## 2019-09-11 PROCEDURE — 93000 ELECTROCARDIOGRAM COMPLETE: CPT | Performed by: INTERNAL MEDICINE

## 2019-09-11 PROCEDURE — 3074F SYST BP LT 130 MM HG: CPT | Performed by: INTERNAL MEDICINE

## 2019-09-11 RX ORDER — ATORVASTATIN CALCIUM 80 MG/1
80 TABLET, FILM COATED ORAL DAILY
Qty: 30 TABLET | Refills: 5 | Status: SHIPPED | OUTPATIENT
Start: 2019-09-11 | End: 2020-04-07 | Stop reason: SDUPTHER

## 2019-09-11 RX ORDER — CARVEDILOL 3.12 MG/1
3.12 TABLET ORAL 2 TIMES DAILY WITH MEALS
Qty: 60 TABLET | Refills: 5 | Status: SHIPPED | OUTPATIENT
Start: 2019-09-11 | End: 2019-12-19 | Stop reason: DRUGHIGH

## 2019-09-11 NOTE — PROGRESS NOTES
Cardiology Follow Up    Sean Guo  1965  [id number]  [address] [facility] [address] [phone]    1  Hypertension, unspecified type  POCT ECG   2  Triple vessel coronary artery disease     3  Ischemic cardiomyopathy  sacubitril-valsartan (ENTRESTO) 49-51 MG TABS    carvedilol (COREG) 3 125 mg tablet   4  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 ([address] )     5  old extensive anterior, apical, lateral and distal inferior apical MI  sacubitril-valsartan (ENTRESTO) 49-51 MG TABS    carvedilol (COREG) 3 125 mg tablet   6  Implantable cardioverter-defibrillator (ICD) in situ     7  Essential hypertension     8  Mixed hyperlipidemia     9  Hyperlipidemia, unspecified hyperlipidemia type  atorvastatin (LIPITOR) 80 mg tablet       Patient [facility]  She experienced a myocardial infarction on February 28, 2017 in [place]  She has subsequently moved to [facility] area  She had a cardiac catheterization taken at [facility]  Patient's cardiac catheterization CD revealed the left main coronary artery to be nonobstructed  The left anterior descending artery was 100% in its midportion after a large 1st diagonal branch which was nonobstructed  The circumflex artery obtuse marginal was 90%  The right coronary artery in its midportion with 50-60%  The large distal LAD filled from right to left collaterals  [facility] had recommended CABG but the patient had refused at that time  No ventriculogram was performed but an echocardiogram which I performed had demonstrated extensive LV damage  A nuclear stress test demonstrated minimal with any it ischemia  I did not feel that the patient would improve with CABG  Patient is free of angina pectoris or shortness of breath   She is doing quite well for her degree of LV dysfunction  04/11/2017 echocardiogram severe LV dysfunction, EF 30%, mild LV enlargement, apical aneurysm with akinesis to paradoxical motion  Large organized apical thrombus  Grade 1 diastolic dysfunction, normal pulmonary artery pressures    05/15/2017 stress test: Severe LV dysfunction, EF 20%  Mild LV enlargement  Anterior apical aneurysm with paradoxical motion  Extensive myocardial infarctions involving the distal anterior apical and inferior apical walls  Negative for Persantine induced ischemia     07/01/2019 lipid profile: Total cholesterol 152, triglycerides 176, HDL 45, LDL calculated 72, non HDL cholesterol 107  Interval History:  Patient returns feeling very well  She stop smoking a month ago  Her cough is gone  Her breathing is much better  She is also doing well on her medical management for her ischemic cardiomyopathy  Her family doctor reduced her carvedilol I presume due to bradycardia  Carvedilol studies demonstrate that the medication prolong survival in patients with severe cardiomyopathies despite the bradycardia it causes      Patient Active Problem List   Diagnosis    Ischemic cardiomyopathy    Triple vessel coronary artery disease    HIV positive ([facility] )    Essential hypertension    Hyperlipemia    Impaired left ventricular function    Tobacco dependence syndrome    Long term current use of anticoagulant therapy    old extensive anterior, apical, lateral and distal inferior apical MI    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    Screening for malignant neoplasm of colon    Immunization counseling    CKD (chronic kidney disease) stage 3, GFR 30-59 ml/min ([facility]    Implantable cardioverter-defibrillator (ICD) in situ     Past Medical History:   Diagnosis Date    Asthma     Coronary artery disease     defibrillator    HIV positive ([facility] )      Social History     Socioeconomic History    Marital status: Single     Spouse name: Not on file    Number of children: Not on file    Years of education: Not on file    Highest education level: Not on file   Occupational History    Not on file   Social Needs    Financial resource strain: Not on file    Food insecurity:     Worry: Not on file     Inability: Not on file    Transportation needs:     Medical: Not on file     Non-medical: Not on file   Tobacco Use    Smoking status: Current Some Day Smoker     Packs/day: 1 00     Years: 30 00     Pack years: 30 00     Types: Cigarettes    Smokeless tobacco: Current User    Tobacco comment: 1/2 pk   Substance and Sexual Activity    Alcohol use: Yes     Comment: ocassionally    Drug use: No    Sexual activity: Not on file   Lifestyle    Physical activity:     Days per week: Not on file     Minutes per session: Not on file    Stress: Not on file   Relationships    Social connections:     Talks on phone: Not on file     Gets together: Not on file     Attends Baptism service: Not on file     Active member of club or organization: Not on file     Attends meetings of clubs or organizations: Not on file     Relationship status: Not on file    Intimate partner violence:     Fear of current or ex partner: Not on file     Emotionally abused: Not on file     Physically abused: Not on file     Forced sexual activity: Not on file   Other Topics Concern    Not on file   Social History Narrative    Not on file      Family History   Problem Relation Age of Onset    Other Father         GI bleed    Suicidality Brother     Drug abuse Brother         overdose     Past Surgical History:   Procedure Laterality Date    ANGIOPLASTY      CARDIAC DEFIBRILLATOR PLACEMENT      CORONARY ANGIOPLASTY      pci placement    TOTAL ABDOMINAL HYSTERECTOMY         Current Outpatient Medications:     albuterol (VENTOLIN HFA) 90 mcg/act inhaler, Use only as rescue inhaler, Disp: , Rfl:     aspirin (ECOTRIN LOW STRENGTH) 81 mg EC tablet, Take 1 tablet (81 mg total) by mouth daily, Disp: 81 tablet, Rfl: 10    atorvastatin (LIPITOR) 80 mg tablet, Take 1 tablet (80 mg total) by mouth daily, Disp: 30 tablet, Rfl: 5    bictegravir-emtricitab-tenofovir alafenamide (BIKTARVY) -25 MG tablet, Take 1 tablet by mouth daily with breakfast, Disp: 30 tablet, Rfl: 3    carvedilol (COREG) 3 125 mg tablet, Take 1 tablet (3 125 mg total) by mouth 2 (two) times a day with meals, Disp: 60 tablet, Rfl: 5    nicotine polacrilex (NICORETTE) 4 mg gum, Chew 1 each (4 mg total) as needed for smoking cessation, Disp: 100 each, Rfl: 7    warfarin (COUMADIN) 1 mg tablet, Take 1 tab daily along with Coumadin 5mg for the next three days, Disp: 90 tablet, Rfl: 1    warfarin (COUMADIN) 5 mg tablet, Take 1 tablet (5 mg total) by mouth daily, Disp: 90 tablet, Rfl: 5    Blood Pressure Monitoring (BLOOD PRESSURE CUFF) MISC, by Does not apply route daily (Patient not taking: Reported on 6/6/2019), Disp: 1 each, Rfl: 0    nicotine (NICODERM CQ) 21 mg/24 hr TD 24 hr patch, Place 1 patch on the skin every 24 hours (Patient not taking: Reported on 9/11/2019), Disp: 28 patch, Rfl: 7    promethazine-dextromethorphan (PHENERGAN-DM) 6 25-15 mg/5 mL oral syrup, Take 5 mL by mouth 4 (four) times a day as needed for cough (Patient not taking: Reported on 6/6/2019), Disp: 118 mL, Rfl: 0    sacubitril-valsartan (ENTRESTO) 49-51 MG TABS, Take 1 tablet by mouth 2 (two) times a day, Disp: 60 tablet, Rfl: 5  Allergies   Allergen Reactions    No Active Allergies     No Known Allergies        Results for orders placed or performed in visit on 09/11/19   POCT ECG    Narrative    Sinus bradycardia rate of 59 beats per minute  Left axis deviation  Extensive anterior, apical and inferior myocardial infarction, old  Low voltage throughout  Abnormal EKG           No results found for: CHOL  Lab Results   Component Value Date    HDL 45 07/01/2019    HDL 40 11/02/2018     Lab Results   Component Value Date    LDLCALC 72 07/01/2019    [address] 102 11/02/2018     Lab Results   Component Value Date    TRIG 176 (H) 07/01/2019    TRIG 171 (H) 11/02/2018     No results found for: [place]  Lab Results   Component Value Date    SODIUM 142 07/01/2019    K 4 4 07/01/2019     07/01/2019    CO2 25 07/01/2019    BUN 11 07/01/2019    CREATININE 1 21 (H) 07/01/2019    GLUC 90 01/24/2019    CALCIUM 9 8 07/01/2019     Lab Results   Component Value Date    SODIUM 142 07/01/2019    K 4 4 07/01/2019     07/01/2019    CO2 25 07/01/2019    AGAP 9 07/01/2019    BUN 11 07/01/2019    CREATININE 1 21 (H) 07/01/2019    GLUC 90 01/24/2019    GLUF 110 (H) 07/01/2019    CALCIUM 9 8 07/01/2019    AST 27 07/01/2019    ALT 24 07/01/2019    ALKPHOS 71 07/01/2019    TP 7 9 07/01/2019    TBILI 0 70 07/01/2019    EGFR 51 (L) 07/01/2019     Lab Results   Component Value Date    WBC 5 90 07/01/2019    WBC 6 5 07/01/2019    HGB 14 8 07/01/2019    HGB 14 4 07/01/2019    HCT 43 9 07/01/2019    HCT 41 0 07/01/2019    MCV 96 07/01/2019    MCV 91 07/01/2019     07/01/2019     07/01/2019         Review of Systems:  Review of Systems   Respiratory: Negative for cough, choking, chest tightness, shortness of breath and wheezing  Cardiovascular: Negative for chest pain, palpitations and leg swelling  Musculoskeletal: Negative for gait problem  Skin: Negative for rash  Neurological: Negative for dizziness, tremors, syncope, weakness, light-headedness, numbness and headaches  Psychiatric/Behavioral: Negative for agitation and behavioral problems  The patient is not hyperactive  Physical Exam:  /70 (BP Location: Left arm, Patient Position: Sitting, Cuff Size: Adult)   Pulse 80   Ht 5' 2 5" (1 588 m)   Wt 71 2 kg (157 lb)   BMI 28 26 kg/m²   Physical Exam   Constitutional: She is oriented to person, place, and time  She appears well-developed and well-nourished  No distress  HENT:   Head: Normocephalic and atraumatic  Neck: No JVD present   No thyromegaly present  Cardiovascular: Normal rate, regular rhythm, normal heart sounds and intact distal pulses  Exam reveals no gallop and no friction rub  No murmur heard  Markedly displaced PMI consistent with cardiomegaly a   Pulmonary/Chest: No respiratory distress  She has no wheezes  She has no rales  Musculoskeletal: She exhibits no edema  Neurological: She is alert and oriented to person, place, and time  Skin: Skin is warm and dry  Psychiatric: She has a normal mood and affect  Her behavior is normal        Discussion/Summary:  1  Increase Entresto to 49/51 mg b i d   2  Return in 3 months  3  If blood pressure and symptoms allow, will increase carvedilol to 6 25 mg b i d  On return visit  4  Patient has requested that we take over management of her anticoagulation with Coumadin

## 2019-09-11 NOTE — PROGRESS NOTES
9/11/19, patient seen at  with dr Efrain Oconnor, switching warfarin management from PCP to cardiology  Pt reports she is taking 6 mg t/w, 5 mg other days , inr due 1 week per last instrcutions from pcp  Pt was given new inr script and handout of vit k in diet   ramses

## 2019-09-12 ENCOUNTER — OFFICE VISIT (OUTPATIENT)
Dept: FAMILY MEDICINE CLINIC | Facility: CLINIC | Age: 54
End: 2019-09-12

## 2019-09-12 VITALS
TEMPERATURE: 98 F | RESPIRATION RATE: 16 BRPM | BODY MASS INDEX: 28.44 KG/M2 | HEART RATE: 62 BPM | WEIGHT: 158 LBS | OXYGEN SATURATION: 97 % | DIASTOLIC BLOOD PRESSURE: 80 MMHG | SYSTOLIC BLOOD PRESSURE: 120 MMHG

## 2019-09-12 DIAGNOSIS — F17.200 TOBACCO DEPENDENCE SYNDROME: ICD-10-CM

## 2019-09-12 DIAGNOSIS — I25.5 ISCHEMIC CARDIOMYOPATHY: ICD-10-CM

## 2019-09-12 DIAGNOSIS — I23.6 LV (LEFT VENTRICULAR) MURAL THROMBUS FOLLOWING MI (HCC): Primary | ICD-10-CM

## 2019-09-12 PROBLEM — Z71.85 IMMUNIZATION COUNSELING: Status: RESOLVED | Noted: 2018-09-12 | Resolved: 2019-09-12

## 2019-09-12 PROBLEM — Z12.11 SCREENING FOR MALIGNANT NEOPLASM OF COLON: Status: RESOLVED | Noted: 2018-09-12 | Resolved: 2019-09-12

## 2019-09-12 PROCEDURE — 3725F SCREEN DEPRESSION PERFORMED: CPT | Performed by: FAMILY MEDICINE

## 2019-09-12 PROCEDURE — 99213 OFFICE O/P EST LOW 20 MIN: CPT | Performed by: FAMILY MEDICINE

## 2019-09-12 RX ORDER — POLYETHYLENE GLYCOL 3350 17 G
2 POWDER IN PACKET (EA) ORAL AS NEEDED
Qty: 100 EACH | Refills: 3 | Status: SHIPPED | OUTPATIENT
Start: 2019-09-12 | End: 2020-09-16

## 2019-09-12 NOTE — ASSESSMENT & PLAN NOTE
She remains on warfarin 6 mg Monday and Tuesday  Warfarin 5 mg the rest of the week  Cardiology will take over INR management from now on  Patient understands and is aware  Patient does have a list of foods to avoid while on warfarin

## 2019-09-18 ENCOUNTER — TELEPHONE (OUTPATIENT)
Dept: CARDIOLOGY CLINIC | Facility: CLINIC | Age: 54
End: 2019-09-18

## 2019-09-18 ENCOUNTER — TELEPHONE (OUTPATIENT)
Dept: FAMILY MEDICINE CLINIC | Facility: CLINIC | Age: 54
End: 2019-09-18

## 2019-09-18 NOTE — TELEPHONE ENCOUNTER
Prior authorization request came in the fax today from Josh [employer]ellez on [address]  Form placed in your mailbox  Prior Authorization is for the Nicoderm patches

## 2019-09-18 NOTE — TELEPHONE ENCOUNTER
Phone call to patient, questioned if she had pt/inr done today as requested  She stated no, she had an emergency and is currently in [place], will not have inr done until next week when she returns home, explained last inr was low,it is important to have inr done asap  She understood same, will test when she returns home

## 2019-09-19 ENCOUNTER — TELEPHONE (OUTPATIENT)
Dept: FAMILY MEDICINE CLINIC | Facility: CLINIC | Age: 54
End: 2019-09-19

## 2019-09-19 NOTE — TELEPHONE ENCOUNTER
Received prior Auth for nicotine gum  I called patient reports having enough supply of 2 mg nicotine gum  She does not need any more refills at this point  Will not do prior Auth as patient has enough supply at home

## 2019-09-24 ENCOUNTER — REMOTE DEVICE CLINIC VISIT (OUTPATIENT)
Dept: CARDIOLOGY CLINIC | Facility: CLINIC | Age: 54
End: 2019-09-24
Payer: COMMERCIAL

## 2019-09-24 ENCOUNTER — APPOINTMENT (OUTPATIENT)
Dept: LAB | Facility: HOSPITAL | Age: 54
End: 2019-09-24
Payer: COMMERCIAL

## 2019-09-24 ENCOUNTER — ANTICOAG VISIT (OUTPATIENT)
Dept: CARDIOLOGY CLINIC | Facility: CLINIC | Age: 54
End: 2019-09-24

## 2019-09-24 DIAGNOSIS — I23.6 LV (LEFT VENTRICULAR) MURAL THROMBUS FOLLOWING MI (HCC): ICD-10-CM

## 2019-09-24 DIAGNOSIS — Z95.810 PRESENCE OF AUTOMATIC CARDIOVERTER/DEFIBRILLATOR (AICD): Primary | ICD-10-CM

## 2019-09-24 DIAGNOSIS — I25.5 ISCHEMIC CARDIOMYOPATHY: ICD-10-CM

## 2019-09-24 DIAGNOSIS — Z79.01 LONG TERM CURRENT USE OF ANTICOAGULANT THERAPY: ICD-10-CM

## 2019-09-24 LAB
INR PPP: 1.27 (ref 0.91–1.09)
PROTHROMBIN TIME: 13.9 SECONDS (ref 9.8–12)

## 2019-09-24 PROCEDURE — 36415 COLL VENOUS BLD VENIPUNCTURE: CPT

## 2019-09-24 PROCEDURE — 93295 DEV INTERROG REMOTE 1/2/MLT: CPT | Performed by: INTERNAL MEDICINE

## 2019-09-24 PROCEDURE — 85610 PROTHROMBIN TIME: CPT

## 2019-09-24 PROCEDURE — 93297 REM INTERROG DEV EVAL ICPMS: CPT | Performed by: INTERNAL MEDICINE

## 2019-09-24 PROCEDURE — 93296 REM INTERROG EVL PM/IDS: CPT | Performed by: INTERNAL MEDICINE

## 2019-09-24 NOTE — PROGRESS NOTES
Results for orders placed or performed in visit on 09/24/19   Cardiac EP device report    Narrative    MDT-SINGLE CHAMBER ICD  CARELINK TRANSMISSION: BATTERY VOLTAGE ADEQUATE  (10 6 YRS)   <1%  ALL AVAILABLE LEAD PARAMETERS WITHIN NORMAL LIMITS  NO SIGNIFICANT HIGH RATE EPISODES  OPTI-VOL FLUID THRESHOLD CROSSED  RECHECK IN ONE MONTH  NORMAL DEVICE FUNCTION  ---SHAH

## 2019-09-24 NOTE — PROGRESS NOTES
9/24/19, tc to pt, denies any missed doses, reports she has been eating more salads  reviewed with dr Taylor See, take 10 mg x 3 days, inr due fri, called to patient reviewed same  She understood same   ramses

## 2019-09-27 ENCOUNTER — APPOINTMENT (OUTPATIENT)
Dept: LAB | Facility: HOSPITAL | Age: 54
End: 2019-09-27
Payer: COMMERCIAL

## 2019-09-27 ENCOUNTER — ANTICOAG VISIT (OUTPATIENT)
Dept: CARDIOLOGY CLINIC | Facility: CLINIC | Age: 54
End: 2019-09-27

## 2019-09-27 DIAGNOSIS — I23.6 LV (LEFT VENTRICULAR) MURAL THROMBUS FOLLOWING MI (HCC): ICD-10-CM

## 2019-09-27 DIAGNOSIS — I25.5 ISCHEMIC CARDIOMYOPATHY: ICD-10-CM

## 2019-09-27 DIAGNOSIS — Z79.01 LONG TERM CURRENT USE OF ANTICOAGULANT THERAPY: ICD-10-CM

## 2019-09-27 LAB
INR PPP: 2.57 (ref 0.91–1.09)
PROTHROMBIN TIME: 27.8 SECONDS (ref 9.8–12)

## 2019-09-27 PROCEDURE — 85610 PROTHROMBIN TIME: CPT

## 2019-09-27 PROCEDURE — 36415 COLL VENOUS BLD VENIPUNCTURE: CPT

## 2019-09-27 NOTE — PROGRESS NOTES
9/27/19, tc to pt, left message on answering machine, take 6 mg alt 5 mg daily, inr due gianna pearce

## 2019-10-01 ENCOUNTER — APPOINTMENT (OUTPATIENT)
Dept: LAB | Facility: HOSPITAL | Age: 54
End: 2019-10-01
Payer: COMMERCIAL

## 2019-10-01 ENCOUNTER — ANTICOAG VISIT (OUTPATIENT)
Dept: CARDIOLOGY CLINIC | Facility: CLINIC | Age: 54
End: 2019-10-01

## 2019-10-01 DIAGNOSIS — Z79.01 LONG TERM CURRENT USE OF ANTICOAGULANT THERAPY: ICD-10-CM

## 2019-10-01 DIAGNOSIS — I25.5 ISCHEMIC CARDIOMYOPATHY: ICD-10-CM

## 2019-10-01 DIAGNOSIS — I51.3 LV (LEFT VENTRICULAR) MURAL THROMBUS: Primary | ICD-10-CM

## 2019-10-01 DIAGNOSIS — I23.6 LV (LEFT VENTRICULAR) MURAL THROMBUS FOLLOWING MI (HCC): ICD-10-CM

## 2019-10-01 LAB
INR PPP: 2.07 (ref 0.91–1.09)
PROTHROMBIN TIME: 22.4 SECONDS (ref 9.8–12)

## 2019-10-01 PROCEDURE — 36415 COLL VENOUS BLD VENIPUNCTURE: CPT

## 2019-10-01 PROCEDURE — 85610 PROTHROMBIN TIME: CPT

## 2019-10-03 DIAGNOSIS — F17.200 TOBACCO DEPENDENCE SYNDROME: Primary | ICD-10-CM

## 2019-10-03 RX ORDER — POLYETHYLENE GLYCOL 3350 17 G
4 POWDER IN PACKET (EA) ORAL AS NEEDED
Qty: 100 EACH | Refills: 0 | Status: SHIPPED | OUTPATIENT
Start: 2019-10-03 | End: 2019-12-19 | Stop reason: SDUPTHER

## 2019-10-07 ENCOUNTER — APPOINTMENT (OUTPATIENT)
Dept: LAB | Facility: HOSPITAL | Age: 54
End: 2019-10-07
Payer: COMMERCIAL

## 2019-10-07 ENCOUNTER — ANTICOAG VISIT (OUTPATIENT)
Dept: CARDIOLOGY CLINIC | Facility: CLINIC | Age: 54
End: 2019-10-07

## 2019-10-07 ENCOUNTER — TRANSCRIBE ORDERS (OUTPATIENT)
Dept: ADMINISTRATIVE | Facility: HOSPITAL | Age: 54
End: 2019-10-07

## 2019-10-07 DIAGNOSIS — I25.5 ISCHEMIC CARDIOMYOPATHY: ICD-10-CM

## 2019-10-07 DIAGNOSIS — I23.6 LV (LEFT VENTRICULAR) MURAL THROMBUS FOLLOWING MI (HCC): ICD-10-CM

## 2019-10-07 DIAGNOSIS — B20 HIV DISEASE (HCC): Primary | ICD-10-CM

## 2019-10-07 DIAGNOSIS — Z79.01 LONG TERM CURRENT USE OF ANTICOAGULANT THERAPY: ICD-10-CM

## 2019-10-07 LAB
INR PPP: 3.02 (ref 0.91–1.09)
PROTHROMBIN TIME: 32.8 SECONDS (ref 9.8–12)

## 2019-10-07 PROCEDURE — 36415 COLL VENOUS BLD VENIPUNCTURE: CPT

## 2019-10-07 PROCEDURE — 85610 PROTHROMBIN TIME: CPT

## 2019-10-07 NOTE — PROGRESS NOTES
PC to patient with good INR  Advised same dose a 6 mgs and have another INR in two weeks    Patient going away so she requested two week retest   Marcy Worthington

## 2019-10-08 ENCOUNTER — TELEPHONE (OUTPATIENT)
Dept: SURGERY | Facility: CLINIC | Age: 54
End: 2019-10-08

## 2019-11-05 ENCOUNTER — APPOINTMENT (OUTPATIENT)
Dept: LAB | Facility: HOSPITAL | Age: 54
End: 2019-11-05
Payer: COMMERCIAL

## 2019-11-05 ENCOUNTER — ANTICOAG VISIT (OUTPATIENT)
Dept: CARDIOLOGY CLINIC | Facility: CLINIC | Age: 54
End: 2019-11-05

## 2019-11-05 DIAGNOSIS — B20 HIV DISEASE (HCC): ICD-10-CM

## 2019-11-05 DIAGNOSIS — I23.6 LV (LEFT VENTRICULAR) MURAL THROMBUS FOLLOWING MI (HCC): ICD-10-CM

## 2019-11-05 DIAGNOSIS — Z79.01 LONG TERM CURRENT USE OF ANTICOAGULANT THERAPY: ICD-10-CM

## 2019-11-05 DIAGNOSIS — I25.5 ISCHEMIC CARDIOMYOPATHY: ICD-10-CM

## 2019-11-05 LAB
ALBUMIN SERPL BCP-MCNC: 4.2 G/DL (ref 3–5.2)
ALP SERPL-CCNC: 83 U/L (ref 43–122)
ALT SERPL W P-5'-P-CCNC: 23 U/L (ref 9–52)
ANION GAP SERPL CALCULATED.3IONS-SCNC: 6 MMOL/L (ref 5–14)
AST SERPL W P-5'-P-CCNC: 25 U/L (ref 14–36)
BASOPHILS # BLD AUTO: 0 THOUSANDS/ΜL (ref 0–0.1)
BASOPHILS NFR BLD AUTO: 1 % (ref 0–1)
BILIRUB SERPL-MCNC: 0.8 MG/DL
BUN SERPL-MCNC: 14 MG/DL (ref 5–25)
CALCIUM SERPL-MCNC: 9.9 MG/DL (ref 8.4–10.2)
CHLORIDE SERPL-SCNC: 106 MMOL/L (ref 97–108)
CO2 SERPL-SCNC: 28 MMOL/L (ref 22–30)
CREAT SERPL-MCNC: 1.38 MG/DL (ref 0.6–1.2)
EOSINOPHIL # BLD AUTO: 0.1 THOUSAND/ΜL (ref 0–0.4)
EOSINOPHIL NFR BLD AUTO: 1 % (ref 0–6)
ERYTHROCYTE [DISTWIDTH] IN BLOOD BY AUTOMATED COUNT: 14.5 %
GFR SERPL CREATININE-BSD FRML MDRD: 44 ML/MIN/1.73SQ M
GLUCOSE P FAST SERPL-MCNC: 113 MG/DL (ref 70–99)
HCT VFR BLD AUTO: 41.3 % (ref 36–46)
HGB BLD-MCNC: 14.1 G/DL (ref 12–16)
LYMPHOCYTES # BLD AUTO: 2.2 THOUSANDS/ΜL (ref 0.5–4)
LYMPHOCYTES NFR BLD AUTO: 42 % (ref 25–45)
MCH RBC QN AUTO: 32.3 PG (ref 26–34)
MCHC RBC AUTO-ENTMCNC: 34.1 G/DL (ref 31–36)
MCV RBC AUTO: 95 FL (ref 80–100)
MONOCYTES # BLD AUTO: 0.3 THOUSAND/ΜL (ref 0.2–0.9)
MONOCYTES NFR BLD AUTO: 6 % (ref 1–10)
NEUTROPHILS # BLD AUTO: 2.6 THOUSANDS/ΜL (ref 1.8–7.8)
NEUTS SEG NFR BLD AUTO: 50 % (ref 45–65)
PLATELET # BLD AUTO: 178 THOUSANDS/UL (ref 150–450)
PMV BLD AUTO: 9.5 FL (ref 8.9–12.7)
POTASSIUM SERPL-SCNC: 4.9 MMOL/L (ref 3.6–5)
PROT SERPL-MCNC: 7.8 G/DL (ref 5.9–8.4)
RBC # BLD AUTO: 4.36 MILLION/UL (ref 4–5.2)
SODIUM SERPL-SCNC: 140 MMOL/L (ref 137–147)
WBC # BLD AUTO: 5.3 THOUSAND/UL (ref 4.5–11)

## 2019-11-05 PROCEDURE — 87536 HIV-1 QUANT&REVRSE TRNSCRPJ: CPT

## 2019-11-05 PROCEDURE — 86361 T CELL ABSOLUTE COUNT: CPT

## 2019-11-05 PROCEDURE — 85025 COMPLETE CBC W/AUTO DIFF WBC: CPT

## 2019-11-05 PROCEDURE — 80053 COMPREHEN METABOLIC PANEL: CPT

## 2019-11-05 NOTE — PROGRESS NOTES
11/5/19, tc to pt, left message on answering machine, hold x 2 days, inr due thrus  Pt to monitor for bleeding, if bleeding , call our office  Pt to call if any changes in health, medication or diet   ramses

## 2019-11-06 LAB
BASOPHILS # BLD AUTO: 0 X10E3/UL (ref 0–0.2)
BASOPHILS NFR BLD AUTO: 0 %
CD3+CD4+ CELLS # BLD: 468 /UL (ref 359–1519)
CD3+CD4+ CELLS NFR BLD: 19.5 % (ref 30.8–58.5)
EOSINOPHIL # BLD AUTO: 0 X10E3/UL (ref 0–0.4)
EOSINOPHIL NFR BLD AUTO: 1 %
ERYTHROCYTE [DISTWIDTH] IN BLOOD BY AUTOMATED COUNT: 15 % (ref 12.3–15.4)
HCT VFR BLD AUTO: 41 % (ref 34–46.6)
HGB BLD-MCNC: 13.9 G/DL (ref 11.1–15.9)
IMM GRANULOCYTES # BLD: 0 X10E3/UL (ref 0–0.1)
IMM GRANULOCYTES NFR BLD: 0 %
LYMPHOCYTES # BLD AUTO: 2.4 X10E3/UL (ref 0.7–3.1)
LYMPHOCYTES NFR BLD AUTO: 43 %
MCH RBC QN AUTO: 31.7 PG (ref 26.6–33)
MCHC RBC AUTO-ENTMCNC: 33.9 G/DL (ref 31.5–35.7)
MCV RBC AUTO: 93 FL (ref 79–97)
MONOCYTES # BLD AUTO: 0.3 X10E3/UL (ref 0.1–0.9)
MONOCYTES NFR BLD AUTO: 6 %
NEUTROPHILS # BLD AUTO: 2.8 X10E3/UL (ref 1.4–7)
NEUTROPHILS NFR BLD AUTO: 50 %
PLATELET # BLD AUTO: 203 X10E3/UL (ref 150–450)
RBC # BLD AUTO: 4.39 X10E6/UL (ref 3.77–5.28)
WBC # BLD AUTO: 5.7 X10E3/UL (ref 3.4–10.8)

## 2019-11-07 ENCOUNTER — IN-CLINIC DEVICE VISIT (OUTPATIENT)
Dept: CARDIOLOGY CLINIC | Facility: CLINIC | Age: 54
End: 2019-11-07
Payer: COMMERCIAL

## 2019-11-07 ENCOUNTER — APPOINTMENT (OUTPATIENT)
Dept: LAB | Facility: HOSPITAL | Age: 54
End: 2019-11-07
Payer: COMMERCIAL

## 2019-11-07 ENCOUNTER — ANTICOAG VISIT (OUTPATIENT)
Dept: CARDIOLOGY CLINIC | Facility: CLINIC | Age: 54
End: 2019-11-07

## 2019-11-07 ENCOUNTER — TELEPHONE (OUTPATIENT)
Dept: FAMILY MEDICINE CLINIC | Facility: CLINIC | Age: 54
End: 2019-11-07

## 2019-11-07 DIAGNOSIS — I23.6 LV (LEFT VENTRICULAR) MURAL THROMBUS FOLLOWING MI (HCC): ICD-10-CM

## 2019-11-07 DIAGNOSIS — I25.5 ISCHEMIC CARDIOMYOPATHY: Primary | ICD-10-CM

## 2019-11-07 DIAGNOSIS — Z79.01 LONG TERM CURRENT USE OF ANTICOAGULANT THERAPY: ICD-10-CM

## 2019-11-07 DIAGNOSIS — I25.5 ISCHEMIC CARDIOMYOPATHY: ICD-10-CM

## 2019-11-07 DIAGNOSIS — I10 ESSENTIAL HYPERTENSION: ICD-10-CM

## 2019-11-07 DIAGNOSIS — I51.3 LV (LEFT VENTRICULAR) MURAL THROMBUS: ICD-10-CM

## 2019-11-07 DIAGNOSIS — Z95.810 PRESENCE OF AUTOMATIC CARDIOVERTER/DEFIBRILLATOR (AICD): ICD-10-CM

## 2019-11-07 LAB
HIV1 RNA # SERPL NAA+PROBE: <20 COPIES/ML
HIV1 RNA SERPL NAA+PROBE-LOG#: NORMAL LOG10COPY/ML
INR PPP: 1.97 (ref 0.91–1.09)
PROTHROMBIN TIME: 21.4 SECONDS (ref 9.8–12)

## 2019-11-07 PROCEDURE — 93282 PRGRMG EVAL IMPLANTABLE DFB: CPT | Performed by: INTERNAL MEDICINE

## 2019-11-07 PROCEDURE — 36415 COLL VENOUS BLD VENIPUNCTURE: CPT

## 2019-11-07 PROCEDURE — 85610 PROTHROMBIN TIME: CPT

## 2019-11-07 RX ORDER — BENZOCAINE/MENTHOL 6 MG-10 MG
LOZENGE MUCOUS MEMBRANE
Refills: 10 | OUTPATIENT
Start: 2019-11-07

## 2019-11-07 NOTE — PROGRESS NOTES
Results for orders placed or performed in visit on 11/07/19   Cardiac EP device report    Narrative    MDT-SINGLE CHAMBER ICD  DEVICE INTERROGATED IN THE [place] OFFICE  BATTERY VOLTAGE ADEQUATE (10 6 YRS)  : <0 1%  ALL LEAD PARAMETERS WITHIN NORMAL LIMITS  NO SIGNIFICANT HIGH RATE EPISODES (MONITORED VT/AF EPISODES PREVIOUSLY ADDRESSED)  OPTI-VOL WITHIN NORMAL LIMITS  NO PROGRAMMING CHANGES MADE TO DEVICE PARAMETERS  APPROPRIATELY FUNCTIONING ICD    [address] [place] [address]

## 2019-11-07 NOTE — PROGRESS NOTES
11/7/19, tc to pt, reports she had been taking 5 mg tablets and braking a 5 mg tablet to try to get 1 mg , now has 1 mg tablets available  Will take 6 mg daily, inr due 1 week   ramses

## 2019-11-08 ENCOUNTER — OFFICE VISIT (OUTPATIENT)
Dept: SURGERY | Facility: CLINIC | Age: 54
End: 2019-11-08
Payer: COMMERCIAL

## 2019-11-08 VITALS
OXYGEN SATURATION: 97 % | WEIGHT: 154.6 LBS | HEIGHT: 62 IN | SYSTOLIC BLOOD PRESSURE: 92 MMHG | TEMPERATURE: 98.6 F | BODY MASS INDEX: 28.45 KG/M2 | HEART RATE: 64 BPM | DIASTOLIC BLOOD PRESSURE: 62 MMHG

## 2019-11-08 DIAGNOSIS — F17.200 TOBACCO DEPENDENCE SYNDROME: ICD-10-CM

## 2019-11-08 DIAGNOSIS — Z11.3 ROUTINE SCREENING FOR STI (SEXUALLY TRANSMITTED INFECTION): ICD-10-CM

## 2019-11-08 DIAGNOSIS — Z13.6 SCREENING FOR CARDIOVASCULAR CONDITION: ICD-10-CM

## 2019-11-08 DIAGNOSIS — Z23 NEED FOR TDAP VACCINATION: ICD-10-CM

## 2019-11-08 DIAGNOSIS — N18.30 CKD (CHRONIC KIDNEY DISEASE) STAGE 3, GFR 30-59 ML/MIN (HCC): ICD-10-CM

## 2019-11-08 DIAGNOSIS — Z78.9 HISTORY OF RECENT CHANGE IN LIFESTYLE: ICD-10-CM

## 2019-11-08 DIAGNOSIS — Z11.3 ENCOUNTER FOR SCREENING FOR INFECTIONS WITH A PREDOMINANTLY SEXUAL MODE OF TRANSMISSION: ICD-10-CM

## 2019-11-08 DIAGNOSIS — B20 HIV DISEASE (HCC): Primary | ICD-10-CM

## 2019-11-08 PROCEDURE — 90715 TDAP VACCINE 7 YRS/> IM: CPT

## 2019-11-08 PROCEDURE — 90471 IMMUNIZATION ADMIN: CPT

## 2019-11-08 PROCEDURE — 99214 OFFICE O/P EST MOD 30 MIN: CPT | Performed by: INTERNAL MEDICINE

## 2019-11-08 PROCEDURE — 3725F SCREEN DEPRESSION PERFORMED: CPT | Performed by: INTERNAL MEDICINE

## 2019-11-08 NOTE — PROGRESS NOTES
Progress Note - Infectious Disease   Bobo Wood 49 y o  female MRN: [id number]  Unit/Bed#:  Encounter: [id number]      Impression/Plan:  1  HIV - doing well on Biktarvy with an undetectable viral load and a CD4 count in the 400s  Recheck labs in 2 months and follow up in 3 months     Stressed adherence      2  CKD stage 3 - the renal function has modestly improved since the last visit  The patient will continue to follow with Nephrology  Continue to monitor the renal function closely      3  Tobacco dependence -  patient has decreased the amount of cigarettes with using the nicotine gum  Encouraged the patient to set a quit date  Continue to follow up with our tobacco cessation program       Patient was provided medication, adherence and prevention education    Subjective:  Routine follow-up for HIV  Patient claims 100% adherence with Biktarvy    Patient denies any notable side effects  Overall the feeling well  The patient denies any fever chills or sweats, denies any nausea vomiting or diarrhea, denies any cough or shortness of breath  ROS: A complete 12 point ROS is negative other than that noted in the HPI    Followup portions patient history reviewed and updated as: Allergies, current medications, past medical history, past social history, past surgical history, and the problem list    Objective:  Vitals:  Vitals:    11/08/19 0756   BP: 92/62   BP Location: Right arm   Patient Position: Sitting   Cuff Size: Standard   Pulse: 64   Temp: 98 6 °F (37 °C)   SpO2: 97%   Weight: 70 1 kg (154 lb 9 6 oz)   Height: 5' 2" (1 575 m)       Physical Exam:   General Appearance:  Alert, interactive, appearing well,  nontoxic, no acute distress  Neck:   Supple without lymphadenopathy, no thyromegaly or masses   Throat: Oropharynx moist without lesions      Lungs:   Clear to auscultation bilaterally; no wheezes, rhonchi or rales; respirations unlabored   Heart:  RRR; no murmur, rub or gallop   Abdomen:   Soft, non-tender, non-distended, positive bowel sounds  Extremities: No clubbing, cyanosis or edema   Skin: No new rashes or lesions  No draining wounds noted  Labs, Imaging, & Other studies:   All pertinent labs and imaging studies were personally reviewed    Lab Results   Component Value Date    K 4 9 11/05/2019     11/05/2019    CO2 28 11/05/2019    BUN 14 11/05/2019    CREATININE 1 38 (H) 11/05/2019    GLUF 113 (H) 11/05/2019    CALCIUM 9 9 11/05/2019    AST 25 11/05/2019    ALT 23 11/05/2019    ALKPHOS 83 11/05/2019    EGFR 44 (L) 11/05/2019     Lab Results   Component Value Date    WBC 5 30 11/05/2019    WBC 5 7 11/05/2019    HGB 14 1 11/05/2019    HGB 13 9 11/05/2019    HCT 41 3 11/05/2019    HCT 41 0 11/05/2019    MCV 95 11/05/2019    MCV 93 11/05/2019     11/05/2019     11/05/2019     Lab Results   Component Value Date    HEPCAB Non-reactive 07/01/2019     Lab Results   Component Value Date    HAV Non-reactive 11/02/2018    HEPCAB Non-reactive 07/01/2019     Lab Results   Component Value Date    RPR Non-Reactive 07/01/2019     CD4 ABS   Date/Time Value Ref Range Status   11/05/2019 11:50  359 - 1519 /uL Final     HIV-1 RNA by PCR, Qn   Date/Time Value Ref Range Status   11/05/2019 11:50 AM <20 copies/mL Final     Comment:     HIV-1 RNA not detected  The reportable range for this assay is 20 to 10,000,000  copies HIV-1 RNA/mL             Current Outpatient Medications:     albuterol (VENTOLIN HFA) 90 mcg/act inhaler, Use only as rescue inhaler, Disp: , Rfl:     aspirin (ECOTRIN LOW STRENGTH) 81 mg EC tablet, Take 1 tablet (81 mg total) by mouth daily, Disp: 81 tablet, Rfl: 10    atorvastatin (LIPITOR) 80 mg tablet, Take 1 tablet (80 mg total) by mouth daily, Disp: 30 tablet, Rfl: 5    bictegravir-emtricitab-tenofovir alafenamide (BIKTARVY) -25 MG tablet, Take 1 tablet by mouth daily with breakfast, Disp: 30 tablet, Rfl: 3    Blood Pressure Monitoring (BLOOD PRESSURE CUFF) MISC, by Does not apply route daily (Patient not taking: Reported on 6/6/2019), Disp: 1 each, Rfl: 0    carvedilol (COREG) 3 125 mg tablet, Take 1 tablet (3 125 mg total) by mouth 2 (two) times a day with meals, Disp: 60 tablet, Rfl: 5    nicotine (NICODERM CQ) 21 mg/24 hr TD 24 hr patch, Place 1 patch on the skin every 24 hours (Patient not taking: Reported on 9/11/2019), Disp: 28 patch, Rfl: 7    nicotine polacrilex (COMMIT) 2 MG lozenge, Apply 1 lozenge (2 mg total) to the mouth or throat as needed for smoking cessation, Disp: 100 each, Rfl: 3    nicotine polacrilex (COMMIT) 4 MG lozenge, Apply 1 lozenge (4 mg total) to the mouth or throat as needed for smoking cessation, Disp: 100 each, Rfl: 0    promethazine-dextromethorphan (PHENERGAN-DM) 6 25-15 mg/5 mL oral syrup, Take 5 mL by mouth 4 (four) times a day as needed for cough (Patient not taking: Reported on 6/6/2019), Disp: 118 mL, Rfl: 0    sacubitril-valsartan (ENTRESTO) 49-51 MG TABS, Take 1 tablet by mouth 2 (two) times a day, Disp: 60 tablet, Rfl: 5    warfarin (COUMADIN) 1 mg tablet, Take 1 tab daily along with Coumadin 5mg for the next three days, Disp: 90 tablet, Rfl: 1    warfarin (COUMADIN) 5 mg tablet, Take 1 tablet (5 mg total) by mouth daily, Disp: 90 tablet, Rfl: 5

## 2019-11-08 NOTE — PROGRESS NOTES
Assessment    Annual  Nutritional Assessment    Clinical Data/Client History    HIV: yes  AIDS: no    : Jazmin Rios    Socio- Economic Status: SNAP, Cooks and other family members cook as well  Living Situation: House    Food Prep/Access: Refrigerator, Stove and Microwave    Psycho Social Factors: not discussed    Functional Status: Ambulatory, Able to [employer] and prepare meals along with other family members  Activity Level: patient states she does some resistance training/ stretching    Ambulation Difficulty with n/a    Oral Problems: Chewing Difficulty no, Pain no, Inability to Chew no , patient wears dentures that fit well    Last Dental Exam: over a year ago    Procedures Performed: not discussed    Typical Food/Beverage Intake:    · Breakfast eggs, coffee  · Lunch grilled cheese s/w, or other sandwich  · Dinner full hot meal: rice & beans, cooked meat, cooked veggies  · Snacks usually not a lot of snacks  · Fluids tries for mostly water    Appetite: Good    Supplements: No no    Food Intolerance: none reported    Weight Change Percent: 10 % (desirable)    Time Period of Weight Change: 12 months    Significant % Weight Loss: 10    Severe % Weight Loss: >7 5    Usual Body Weight: 78 8 kg    Current Body Weight: 70 1 kg,IBW for Height: 50 kg, Current weight is 140% of IBW, Adjusted body weight: 75 kg    Nutrition Diagnosis    Problem: Altered nutrition related laboratory values    Related to: Lack of nutrition related education    As Evidenced By: No prior knowledge of need for food and nutrition recommendations    Intervention Diet Prescription    Nutritional needs based on: Adj BW: 75 kg    · 1875  kcal  · 75 gm protein  · 1875 ml fluid  · 2 gm  Na    Current intake: adequate    Snack/Supplement Recommendations: not discussed    Nutrition Recommendations: reviewed    Goals: Drink mostly water, avoid excess fruit juice   Try to start walking ~ 15 minutes/ day, 4 to 5 days/ week     Nutrition Education Intervention: Provided     Person Educated: patient    Topics Discussed: Healthy eating to lower A1c(patient now w/ Pre-Diabetes)  Teaching Method: Verbal    Readiness to Change: Pre-Contemplation    Visit Summary    Annual nutritional assessment complete  Patient has lost ~ 10 % of her usual body weight over the past year, this is a desirable weight loss  Patient in general eating healthy, reviewed with patient that her A1C lab indicates Pre-Diabetes status& we talked about ways to improve BGs  Patient verbalized understanding and seemed willing to make some lifestyle changes  Also invited patient to our weekly Cooking Class  Will continue to monitor patient's weight status and pertinent labs    Neo Sanabria, DOROTHYN, LDN, CDE

## 2019-11-12 NOTE — PROGRESS NOTES
[id number] [facility] [place] SPECIALIST    Assessment: Pt does not seem to experience concern with her mental health at this time  Diagnoses and all orders for this visit:    HIV disease ([address] )  -     CBC and differential; Future  -     Chlamydia/GC amplified DNA by PCR; Future  -     Comprehensive metabolic panel; Future  -     Hepatitis C antibody; Future  -     HIV-1 RNA, quantitative, PCR; Future  -     Urinalysis with reflex to microscopic; Future  -     T-helper cells (CD4) count; Future  -     RPR; Future  -     Quantiferon TB Gold Plus; Future  -     Lipid panel; Future    Routine screening for STI (sexually transmitted infection)  -     Chlamydia/GC amplified DNA by PCR; Future    Encounter for screening for infections with a predominantly sexual mode of transmission  -     Chlamydia/GC amplified DNA by PCR; Future    History of recent change in lifestyle  -     Chlamydia/GC amplified DNA by PCR; Future    Screening for cardiovascular condition  -     Lipid panel; Future    Need for Tdap vaccination  -     TDAP VACCINE GREATER THAN OR EQUAL TO 8YO IM    CKD (chronic kidney disease) stage 3, GFR 30-59 ml/min ([facility])    Tobacco dependence syndrome          Plan/Behavorial Health Recommendations:  1  F/U with [facility] at future visits  2  Maintain self-care and use of positive coping skills daily      Discussion: [facility] met with pt briefly  Today pt presents with no mental health concerns  [facility] completed PHQ-9 Depression screener and pt scored 6 with no SI or HI reported or observed  Pt reports that she does not have any concerns or stress at this time  [facility] provided pt support  Pt will likely benefit from f/u with [facility] at future visits  Pt would also benefit from self-care and use of positive coping skills  Subjective:     Patient ID: Julee Sever is a 49 y o  female      HPI: The patient is being seen at the [facility] today for a routine behavioral health follow up     Objective:    Orientation    Person: Yes   Place: Yes   Time: Yes     Appearance     Well Developed: Yes   Uncomfortable: No  Normal Body Odor: Yes   Smells of Feces: No  Disheveled: No  Grooming Unkempt: No  Poor Eye Contact: No  Hirsute: No  Looks Tired:  Yes   Appearance Reflects Stated Age: Yes     Mood and Affect    Appropriate: Yes   Euthymic: No  Irritable: No  Angry: No  Anxious: No  Depresses: No  Blunted: No  Labile: No  Restricted: No    Harm to Self or Others: None reported or observed    Substance Abuse: None reported or observed      JAVIER Martinez  [facility] Specialist

## 2019-11-14 ENCOUNTER — ANNUAL EXAM (OUTPATIENT)
Dept: FAMILY MEDICINE CLINIC | Facility: CLINIC | Age: 54
End: 2019-11-14

## 2019-11-14 ENCOUNTER — ANTICOAG VISIT (OUTPATIENT)
Dept: CARDIOLOGY CLINIC | Facility: CLINIC | Age: 54
End: 2019-11-14

## 2019-11-14 ENCOUNTER — APPOINTMENT (OUTPATIENT)
Dept: LAB | Facility: HOSPITAL | Age: 54
End: 2019-11-14
Payer: COMMERCIAL

## 2019-11-14 VITALS
HEART RATE: 96 BPM | WEIGHT: 151 LBS | TEMPERATURE: 97.5 F | HEIGHT: 62 IN | DIASTOLIC BLOOD PRESSURE: 66 MMHG | OXYGEN SATURATION: 97 % | SYSTOLIC BLOOD PRESSURE: 104 MMHG | BODY MASS INDEX: 27.79 KG/M2 | RESPIRATION RATE: 18 BRPM

## 2019-11-14 DIAGNOSIS — I23.6 LV (LEFT VENTRICULAR) MURAL THROMBUS FOLLOWING MI (HCC): ICD-10-CM

## 2019-11-14 DIAGNOSIS — I51.3 LV (LEFT VENTRICULAR) MURAL THROMBUS: ICD-10-CM

## 2019-11-14 DIAGNOSIS — I25.5 ISCHEMIC CARDIOMYOPATHY: ICD-10-CM

## 2019-11-14 DIAGNOSIS — J45.21 MILD INTERMITTENT ASTHMA WITH ACUTE EXACERBATION: Primary | ICD-10-CM

## 2019-11-14 DIAGNOSIS — Z79.01 LONG TERM CURRENT USE OF ANTICOAGULANT THERAPY: ICD-10-CM

## 2019-11-14 DIAGNOSIS — Z01.419 WELL WOMAN EXAM WITH ROUTINE GYNECOLOGICAL EXAM: ICD-10-CM

## 2019-11-14 LAB
INR PPP: 3.65 (ref 0.91–1.09)
PROTHROMBIN TIME: 39.5 SECONDS (ref 9.8–12)

## 2019-11-14 PROCEDURE — 36415 COLL VENOUS BLD VENIPUNCTURE: CPT

## 2019-11-14 PROCEDURE — 85610 PROTHROMBIN TIME: CPT

## 2019-11-14 PROCEDURE — G0145 SCR C/V CYTO,THINLAYER,RESCR: HCPCS | Performed by: PATHOLOGY

## 2019-11-14 PROCEDURE — G0101 CA SCREEN;PELVIC/BREAST EXAM: HCPCS | Performed by: INTERNAL MEDICINE

## 2019-11-14 PROCEDURE — G0124 SCREEN C/V THIN LAYER BY MD: HCPCS | Performed by: PATHOLOGY

## 2019-11-14 PROCEDURE — 94640 AIRWAY INHALATION TREATMENT: CPT | Performed by: INTERNAL MEDICINE

## 2019-11-14 PROCEDURE — 87624 HPV HI-RISK TYP POOLED RSLT: CPT | Performed by: FAMILY MEDICINE

## 2019-11-14 RX ORDER — IPRATROPIUM BROMIDE AND ALBUTEROL SULFATE 2.5; .5 MG/3ML; MG/3ML
3 SOLUTION RESPIRATORY (INHALATION)
Status: DISCONTINUED | OUTPATIENT
Start: 2019-11-14 | End: 2019-11-14

## 2019-11-14 RX ORDER — PREDNISONE 20 MG/1
40 TABLET ORAL DAILY
Qty: 10 TABLET | Refills: 0 | Status: SHIPPED | OUTPATIENT
Start: 2019-11-14 | End: 2019-11-19

## 2019-11-14 RX ORDER — IPRATROPIUM BROMIDE AND ALBUTEROL SULFATE 2.5; .5 MG/3ML; MG/3ML
3 SOLUTION RESPIRATORY (INHALATION) ONCE
Status: DISCONTINUED | OUTPATIENT
Start: 2019-11-14 | End: 2019-11-14

## 2019-11-14 RX ORDER — ALBUTEROL SULFATE 90 UG/1
1 AEROSOL, METERED RESPIRATORY (INHALATION) 4 TIMES DAILY
Qty: 2 INHALER | Refills: 0 | Status: SHIPPED | OUTPATIENT
Start: 2019-11-14 | End: 2020-09-16

## 2019-11-14 RX ADMIN — IPRATROPIUM BROMIDE AND ALBUTEROL SULFATE 3 ML: 2.5; .5 SOLUTION RESPIRATORY (INHALATION) at 10:21

## 2019-11-14 NOTE — PROGRESS NOTES
Mini neb  Performed by: Kimberlee Haynes MA  Authorized by: Jacky Maynard MD     Treatment 1:   Pre-Procedure     Symptoms:  Wheezing    Medication Administered:  Duoneb - Albuterol 2 5 mg/Atrovent 0 5 mg

## 2019-11-14 NOTE — ASSESSMENT & PLAN NOTE
Will treat with DuoNeb in office in reassess  On reassessment patient with improved wheezing  Will treat with steroids 40 mg for next 5 days  Educated to rest, stay hydrated (okay to drink soups, water, warm beverages)  Continue with Tylenol and Mucinex       Non-pharmacologic interventions disused: gargle with salt water, netti pot (use with distilled water only)    Advised to RTC for worsening symptoms: fevers, new rash, NV or does not improve by 7-10 days    Course of viral illness explained

## 2019-11-14 NOTE — PROGRESS NOTES
11/14/19, tc to pt, will decrease dose, 5 mg sun/th, 6 mg other days of the week, inr due 1 week   ramses

## 2019-11-14 NOTE — PROGRESS NOTES
Assessment/Plan:    Well woman exam with routine gynecological exam  Discussed with patient that for women aged 27 to 72 years, the USPSTF recommends cervical cancer screening every 5 years with hrHPV testing in combination with cytology (cotesting)  Patient has agreed to undergo testing at this time  All questions were answered  Pap performed, patient tolerated well  Advised that she may experience some spotting afterwards which is normal and expected  Will call with results  Mild intermittent asthma with acute exacerbation  Will treat with DuoNeb in office in reassess  On reassessment patient with improved wheezing  Will treat with steroids 40 mg for next 5 days  Educated to rest, stay hydrated (okay to drink soups, water, warm beverages)  Continue with Tylenol and Mucinex  Non-pharmacologic interventions disused: gargle with salt water, netti pot (use with distilled water only)    Advised to RTC for worsening symptoms: fevers, new rash, NV or does not improve by 7-10 days    Course of viral illness explained       Diagnoses and all orders for this visit:    Mild intermittent asthma with acute exacerbation  -     ipratropium-albuterol (DUO-NEB) 0 5-2 5 mg/3 mL inhalation solution 3 mL  -     albuterol (VENTOLIN HFA) 90 mcg/act inhaler; Inhale 1 puff 4 (four) times a day  -     predniSONE 20 mg tablet; Take 2 tablets (40 mg total) by mouth daily for 5 days    Well woman exam with routine gynecological exam  -     Liquid-based pap, screening          Subjective:      Patient ID: Blayne Andrade is a 49 y o  female  Patient is a 52-year-old female who presents for annual gyn exam   Most recent Pap completed in November, 2018 revealed low-grade squamous intraepithelial lesion  Subsequently, she underwent colposcopy which showed superficial squamous epithelium with mild cytological atypia    Per AST CP guidelines, patient is here for repeat gyn exam     Nasal congestion/sore throat  Patient has been experiencing 2 day history of sore throat with difficulty swallowing  She is also experiencing running nose and congestion  Denies CP  Does report of cough that is nonproductive  Also reports of excessive wheezing  Does have history of mild intermittent asthma and uses albuterol inhaler very sparingly  Last use of albuterol inhaler was about 6 months ago  Review of Systems   Constitutional: Negative for fatigue and unexpected weight change  HENT: Positive for rhinorrhea, sinus pressure, sinus pain and sore throat  Respiratory: Positive for cough and shortness of breath  Genitourinary: Negative for decreased urine volume, menstrual problem, pelvic pain, urgency, vaginal bleeding, vaginal discharge and vaginal pain  Musculoskeletal: Negative for back pain  Skin: Negative for pallor  Objective:      /66 (BP Location: Right arm, Patient Position: Sitting, Cuff Size: Standard)   Pulse 96   Temp 97 5 °F (36 4 °C) (Temporal)   Resp 18   Ht 5' 2" (1 575 m)   Wt 68 5 kg (151 lb)   SpO2 97%   BMI 27 62 kg/m²          Physical Exam   Pulmonary/Chest: Effort normal  No respiratory distress  She has wheezes  Genitourinary: Vagina normal  Pelvic exam was performed with patient supine  No labial fusion  There is no rash, tenderness, lesion or injury on the right labia  There is no rash, tenderness, lesion or injury on the left labia  Cervix exhibits no motion tenderness and no discharge  Right adnexum displays no mass, no tenderness and no fullness  Left adnexum displays no mass, no tenderness and no fullness  No erythema, tenderness or bleeding in the vagina  No foreign body in the vagina  No signs of injury around the vagina  No vaginal discharge found  Genitourinary Comments:  exam performed with chaperone Tanya    Neurological: She is alert  Skin: Skin is warm

## 2019-11-14 NOTE — ASSESSMENT & PLAN NOTE
Discussed with patient that for women aged 27 to 72 years, the USPSTF recommends cervical cancer screening every 5 years with hrHPV testing in combination with cytology (cotesting)  Patient has agreed to undergo testing at this time  All questions were answered  Pap performed, patient tolerated well  Advised that she may experience some spotting afterwards which is normal and expected  Will call with results

## 2019-11-22 ENCOUNTER — ANTICOAG VISIT (OUTPATIENT)
Dept: CARDIOLOGY CLINIC | Facility: CLINIC | Age: 54
End: 2019-11-22

## 2019-11-22 ENCOUNTER — TELEPHONE (OUTPATIENT)
Dept: FAMILY MEDICINE CLINIC | Facility: CLINIC | Age: 54
End: 2019-11-22

## 2019-11-22 ENCOUNTER — APPOINTMENT (OUTPATIENT)
Dept: LAB | Facility: HOSPITAL | Age: 54
End: 2019-11-22
Payer: COMMERCIAL

## 2019-11-22 DIAGNOSIS — I25.5 ISCHEMIC CARDIOMYOPATHY: ICD-10-CM

## 2019-11-22 DIAGNOSIS — Z79.01 LONG TERM CURRENT USE OF ANTICOAGULANT THERAPY: ICD-10-CM

## 2019-11-22 DIAGNOSIS — I23.6 LV (LEFT VENTRICULAR) MURAL THROMBUS FOLLOWING MI (HCC): ICD-10-CM

## 2019-11-22 DIAGNOSIS — I51.3 LV (LEFT VENTRICULAR) MURAL THROMBUS: ICD-10-CM

## 2019-11-22 DIAGNOSIS — I23.6 LV (LEFT VENTRICULAR) MURAL THROMBUS FOLLOWING MI (HCC): Primary | ICD-10-CM

## 2019-11-22 DIAGNOSIS — J18.9 COMMUNITY ACQUIRED PNEUMONIA, UNSPECIFIED LATERALITY: ICD-10-CM

## 2019-11-22 DIAGNOSIS — J18.9 COMMUNITY ACQUIRED PNEUMONIA, UNSPECIFIED LATERALITY: Primary | ICD-10-CM

## 2019-11-22 LAB
INR PPP: 3.99 (ref 0.91–1.09)
LAB AP GYN PRIMARY INTERPRETATION: ABNORMAL
Lab: ABNORMAL
PATH INTERP SPEC-IMP: ABNORMAL
PROTHROMBIN TIME: 43.1 SECONDS (ref 9.8–12)

## 2019-11-22 PROCEDURE — 85610 PROTHROMBIN TIME: CPT

## 2019-11-22 PROCEDURE — 36415 COLL VENOUS BLD VENIPUNCTURE: CPT

## 2019-11-22 RX ORDER — AZITHROMYCIN 250 MG/1
TABLET, FILM COATED ORAL
Qty: 6 TABLET | Refills: 0 | Status: SHIPPED | OUTPATIENT
Start: 2019-11-22 | End: 2019-11-22 | Stop reason: SDUPTHER

## 2019-11-22 RX ORDER — AZITHROMYCIN 250 MG/1
TABLET, FILM COATED ORAL
Qty: 6 TABLET | Refills: 0 | Status: SHIPPED | OUTPATIENT
Start: 2019-11-22 | End: 2019-11-27

## 2019-11-22 NOTE — PROGRESS NOTES
11/22/19, tc to pt, will hold x 1 day, 5 mg sat/sun/mon, inr due tues  Pt starting z pack today   ramses

## 2019-11-22 NOTE — TELEPHONE ENCOUNTER
Called patient to discuss recent pap results  She is HPV + with ASCUS  Per ASCCP guidelines, she will need colposcopy  Patient is in agreement with plan  She offered no questions in regards to the procedure  Sent message to staff to assist with scheduling  I saw her in office on 11/4 for GYN visit  She was coughing and SOB  She received Duoneb in office with resolution of symptoms  Today she reports subjective fevers, chills with green sputum  Patient has CAD with LV thrombus on coumadin  Will send in Z-pack as it is least interacting macrolide with coumadin  Discussed with Dr Ian Solano via tiger text, patient's cardiologist and recommends to check INR Tue, 11/26 and results to his office  Relayed this plan to patient who will go for INR check

## 2019-11-22 NOTE — PROGRESS NOTES
11/22/19, message from pt's pcp, pt started on z pack today, will test inr tues 11/26 per dr Bert Márquez  Tc to pt, reports she was given instructions by per pcp and was aware  Pt had inr done today at 130 pm  Results are still pending  Pt reports she took 6 mg last pm , not 5 mg as instructed  instructed patient, if inr results are available by our ov closing will call her with results, other wise will take 5 mg sun/fri, 6 mg others and will call her when results are available  She agreed    ramses

## 2019-11-26 ENCOUNTER — ANTICOAG VISIT (OUTPATIENT)
Dept: CARDIOLOGY CLINIC | Facility: CLINIC | Age: 54
End: 2019-11-26

## 2019-11-26 ENCOUNTER — APPOINTMENT (OUTPATIENT)
Dept: LAB | Facility: HOSPITAL | Age: 54
End: 2019-11-26
Payer: COMMERCIAL

## 2019-11-26 DIAGNOSIS — I23.6 LV (LEFT VENTRICULAR) MURAL THROMBUS FOLLOWING MI (HCC): ICD-10-CM

## 2019-11-26 DIAGNOSIS — Z79.01 LONG TERM CURRENT USE OF ANTICOAGULANT THERAPY: ICD-10-CM

## 2019-11-26 DIAGNOSIS — I25.5 ISCHEMIC CARDIOMYOPATHY: ICD-10-CM

## 2019-11-26 LAB
INR PPP: 3.17 (ref 0.91–1.09)
PROTHROMBIN TIME: 34.4 SECONDS (ref 9.8–12)

## 2019-11-26 PROCEDURE — 85610 PROTHROMBIN TIME: CPT

## 2019-11-26 PROCEDURE — 36415 COLL VENOUS BLD VENIPUNCTURE: CPT

## 2019-11-27 DIAGNOSIS — Z21 HIV POSITIVE (HCC): ICD-10-CM

## 2019-11-27 RX ORDER — BICTEGRAVIR SODIUM, EMTRICITABINE, AND TENOFOVIR ALAFENAMIDE FUMARATE 50; 200; 25 MG/1; MG/1; MG/1
TABLET ORAL
Qty: 30 TABLET | Refills: 10 | Status: SHIPPED | OUTPATIENT
Start: 2019-11-27 | End: 2020-09-11 | Stop reason: SDUPTHER

## 2019-11-29 ENCOUNTER — APPOINTMENT (OUTPATIENT)
Dept: LAB | Facility: HOSPITAL | Age: 54
End: 2019-11-29
Payer: COMMERCIAL

## 2019-11-29 ENCOUNTER — ANTICOAG VISIT (OUTPATIENT)
Dept: CARDIOLOGY CLINIC | Facility: CLINIC | Age: 54
End: 2019-11-29

## 2019-11-29 DIAGNOSIS — I25.5 ISCHEMIC CARDIOMYOPATHY: ICD-10-CM

## 2019-11-29 DIAGNOSIS — Z79.01 LONG TERM CURRENT USE OF ANTICOAGULANT THERAPY: ICD-10-CM

## 2019-11-29 DIAGNOSIS — I51.3 LV (LEFT VENTRICULAR) MURAL THROMBUS: ICD-10-CM

## 2019-11-29 DIAGNOSIS — I23.6 LV (LEFT VENTRICULAR) MURAL THROMBUS FOLLOWING MI (HCC): ICD-10-CM

## 2019-11-29 DIAGNOSIS — I10 ESSENTIAL HYPERTENSION: ICD-10-CM

## 2019-11-29 LAB
INR PPP: 3.47 (ref 0.91–1.09)
PROTHROMBIN TIME: 37.6 SECONDS (ref 9.8–12)

## 2019-11-29 PROCEDURE — 85610 PROTHROMBIN TIME: CPT

## 2019-11-29 PROCEDURE — 36415 COLL VENOUS BLD VENIPUNCTURE: CPT

## 2019-12-02 RX ORDER — BENZOCAINE/MENTHOL 6 MG-10 MG
LOZENGE MUCOUS MEMBRANE
Refills: 10 | OUTPATIENT
Start: 2019-12-02

## 2019-12-03 ENCOUNTER — TELEPHONE (OUTPATIENT)
Dept: FAMILY MEDICINE CLINIC | Facility: CLINIC | Age: 54
End: 2019-12-03

## 2019-12-03 NOTE — TELEPHONE ENCOUNTER
----- Message from Susannah Abad MD sent at 11/27/2019  7:31 PM EST -----  I am messaging in regards to this patient's colpo appointment with me and Dr Belia De Leon is not available 12/2, 12/23, 12/24  If I do not have any 40 mins slots in Dec, then kindly double book this patient   Thank you!!

## 2019-12-03 NOTE — TELEPHONE ENCOUNTER
Dr Carbone [place] next available 40 minute appt is in January  Am I authorized to double book these? Last we spoke, you had stated we definitely need 40 minutes for these so I just want to make sure before I go an book the patient

## 2019-12-06 ENCOUNTER — APPOINTMENT (OUTPATIENT)
Dept: LAB | Facility: HOSPITAL | Age: 54
End: 2019-12-06
Payer: COMMERCIAL

## 2019-12-06 ENCOUNTER — ANTICOAG VISIT (OUTPATIENT)
Dept: CARDIOLOGY CLINIC | Facility: CLINIC | Age: 54
End: 2019-12-06

## 2019-12-06 DIAGNOSIS — Z79.01 LONG TERM CURRENT USE OF ANTICOAGULANT THERAPY: ICD-10-CM

## 2019-12-06 DIAGNOSIS — I25.5 ISCHEMIC CARDIOMYOPATHY: ICD-10-CM

## 2019-12-06 DIAGNOSIS — I23.6 LV (LEFT VENTRICULAR) MURAL THROMBUS FOLLOWING MI (HCC): ICD-10-CM

## 2019-12-06 LAB
INR PPP: 6.07 (ref 0.91–1.09)
PROTHROMBIN TIME: 64.5 SECONDS (ref 9.8–12)

## 2019-12-06 PROCEDURE — 36415 COLL VENOUS BLD VENIPUNCTURE: CPT

## 2019-12-06 PROCEDURE — 85610 PROTHROMBIN TIME: CPT

## 2019-12-06 NOTE — PROGRESS NOTES
Pt called denies any changes in meds or diet insists just took 5 daily   will hold x2 days take 3 mg Sun recheck on 12/9/19

## 2019-12-09 ENCOUNTER — ANTICOAG VISIT (OUTPATIENT)
Dept: CARDIOLOGY CLINIC | Facility: CLINIC | Age: 54
End: 2019-12-09

## 2019-12-09 ENCOUNTER — APPOINTMENT (OUTPATIENT)
Dept: LAB | Facility: HOSPITAL | Age: 54
End: 2019-12-09
Payer: COMMERCIAL

## 2019-12-09 DIAGNOSIS — Z79.01 LONG TERM CURRENT USE OF ANTICOAGULANT THERAPY: ICD-10-CM

## 2019-12-09 DIAGNOSIS — I23.6 LV (LEFT VENTRICULAR) MURAL THROMBUS FOLLOWING MI (HCC): ICD-10-CM

## 2019-12-09 DIAGNOSIS — I25.5 ISCHEMIC CARDIOMYOPATHY: ICD-10-CM

## 2019-12-09 DIAGNOSIS — I51.3 LV (LEFT VENTRICULAR) MURAL THROMBUS: ICD-10-CM

## 2019-12-09 LAB
INR PPP: 1.42 (ref 0.91–1.09)
PROTHROMBIN TIME: 15.5 SECONDS (ref 9.8–12)

## 2019-12-09 PROCEDURE — 36415 COLL VENOUS BLD VENIPUNCTURE: CPT

## 2019-12-09 PROCEDURE — 85610 PROTHROMBIN TIME: CPT

## 2019-12-12 ENCOUNTER — PROCEDURE VISIT (OUTPATIENT)
Dept: FAMILY MEDICINE CLINIC | Facility: CLINIC | Age: 54
End: 2019-12-12

## 2019-12-12 VITALS
TEMPERATURE: 98.6 F | BODY MASS INDEX: 27.42 KG/M2 | OXYGEN SATURATION: 98 % | RESPIRATION RATE: 18 BRPM | WEIGHT: 149 LBS | HEART RATE: 84 BPM | SYSTOLIC BLOOD PRESSURE: 112 MMHG | HEIGHT: 62 IN | DIASTOLIC BLOOD PRESSURE: 72 MMHG

## 2019-12-12 DIAGNOSIS — R87.810 ASCUS WITH POSITIVE HIGH RISK HPV CERVICAL: Primary | ICD-10-CM

## 2019-12-12 DIAGNOSIS — R87.610 ASCUS WITH POSITIVE HIGH RISK HPV CERVICAL: Primary | ICD-10-CM

## 2019-12-12 PROBLEM — F17.200 TOBACCO DEPENDENCE SYNDROME: Status: RESOLVED | Noted: 2017-03-31 | Resolved: 2019-12-12

## 2019-12-12 PROBLEM — Z01.419 WELL WOMAN EXAM WITH ROUTINE GYNECOLOGICAL EXAM: Status: RESOLVED | Noted: 2018-10-12 | Resolved: 2019-12-12

## 2019-12-12 PROCEDURE — 88305 TISSUE EXAM BY PATHOLOGIST: CPT | Performed by: PATHOLOGY

## 2019-12-12 PROCEDURE — 57456 ENDOCERV CURETTAGE W/SCOPE: CPT | Performed by: FAMILY MEDICINE

## 2019-12-12 NOTE — PROGRESS NOTES
Assessment/Plan:    ASCUS with positive high risk HPV cervical  Patient underwent endocervical curettage and tolerated well  Sample sent to lab for processing  Diagnoses and all orders for this visit:    ASCUS with positive high risk HPV cervical  -     Colposcopy  -     Tissue Exam; Future          Subjective:      Patient ID: Ky Cornejo is a 49 y o  female  Patient is a 54F who presents to office for coloposcopy  She underwent pap smear on 11/14 which showed ASCUS with HPV+  She denies vaginal discharge, vaginal bleeding, pain or burning on urination  She denies being sexually active  Review of Systems   Constitutional: Negative for fatigue and unexpected weight change  Genitourinary: Negative for decreased urine volume, menstrual problem, pelvic pain, urgency, vaginal bleeding, vaginal discharge and vaginal pain  Musculoskeletal: Negative for back pain  Skin: Negative for pallor  Objective:      /72 (BP Location: Left arm, Patient Position: Sitting, Cuff Size: Standard)   Pulse 84   Temp 98 6 °F (37 °C) (Temporal)   Resp 18   Ht 5' 2" (1 575 m)   Wt 67 6 kg (149 lb)   SpO2 98%   BMI 27 25 kg/m²            Physical Exam   Constitutional: She is oriented to person, place, and time  She appears well-developed and well-nourished  No distress  HENT:   Head: Normocephalic and atraumatic  Eyes: EOM are normal    Pulmonary/Chest: Effort normal  No respiratory distress  Genitourinary: Vagina normal and uterus normal  Pelvic exam was performed with patient prone  No labial fusion  There is no rash, tenderness, lesion or injury on the right labia  There is no rash, tenderness, lesion or injury on the left labia  Cervix exhibits no motion tenderness, no discharge and no friability  No erythema, tenderness or bleeding in the vagina  No foreign body in the vagina  No signs of injury around the vagina  No vaginal discharge found     Genitourinary Comments:  exam performed with attending physician Dr Lynn Arnett    Musculoskeletal: Normal range of motion  Neurological: She is alert and oriented to person, place, and time  Skin: Skin is warm and dry  Capillary refill takes less than 2 seconds  Psychiatric: She has a normal mood and affect  Colposcopy  Date/Time: 12/12/2019 2:44 PM  Performed by: Yani Rivera MD  Authorized by: Yani Rivera MD     Consent:     Consent obtained:  Verbal    Consent given by:  Patient    Procedural risks discussed:  Bleeding, damage to other organs, failure rate and infection    Patient questions answered: yes      Patient agrees, verbalizes understanding, and wants to proceed: yes    Pre-procedure:     Pre-procedure timeout performed: yes      Prepped with: acetic acid    Indication:     Indication:  ASC-US  Procedure:     Procedure: Colposcopy w/ endocervical curettage      Under satisfactory analgesia the patient was prepped and draped in the dorsal lithotomy position: yes      [place] speculum was placed in the vagina: yes      Under colposcopic examination the transition zone was seen in entirety: yes      Intracervical block was performed: no      Endocervix was curetted using a Kevorkian curette: yes      Tampon inserted: no      Monsel's solution was applied: no      Biopsy(s): no      Specimen to pathology: yes    Post-procedure:     Findings: Bleeding      Impression: High grade cervical dysplasia      Patient tolerance of procedure:   Tolerated well, no immediate complications

## 2019-12-13 ENCOUNTER — APPOINTMENT (OUTPATIENT)
Dept: LAB | Facility: HOSPITAL | Age: 54
End: 2019-12-13
Payer: COMMERCIAL

## 2019-12-13 ENCOUNTER — ANTICOAG VISIT (OUTPATIENT)
Dept: CARDIOLOGY CLINIC | Facility: CLINIC | Age: 54
End: 2019-12-13

## 2019-12-13 DIAGNOSIS — I51.3 LV (LEFT VENTRICULAR) MURAL THROMBUS: ICD-10-CM

## 2019-12-13 DIAGNOSIS — I23.6 LV (LEFT VENTRICULAR) MURAL THROMBUS FOLLOWING MI (HCC): ICD-10-CM

## 2019-12-13 DIAGNOSIS — I25.5 ISCHEMIC CARDIOMYOPATHY: ICD-10-CM

## 2019-12-13 DIAGNOSIS — Z79.01 LONG TERM CURRENT USE OF ANTICOAGULANT THERAPY: ICD-10-CM

## 2019-12-16 ENCOUNTER — HOSPITAL ENCOUNTER (EMERGENCY)
Facility: HOSPITAL | Age: 54
Discharge: HOME | End: 2019-12-16
Attending: EMERGENCY MEDICINE | Admitting: EMERGENCY MEDICINE
Payer: COMMERCIAL

## 2019-12-16 VITALS
BODY MASS INDEX: 28.06 KG/M2 | OXYGEN SATURATION: 97 % | SYSTOLIC BLOOD PRESSURE: 111 MMHG | RESPIRATION RATE: 16 BRPM | HEART RATE: 82 BPM | DIASTOLIC BLOOD PRESSURE: 72 MMHG | WEIGHT: 153.44 LBS | TEMPERATURE: 97.6 F

## 2019-12-16 DIAGNOSIS — J32.9 SINUSITIS: Primary | ICD-10-CM

## 2019-12-16 DIAGNOSIS — R09.81 NASAL CONGESTION: ICD-10-CM

## 2019-12-16 PROCEDURE — 99282 EMERGENCY DEPT VISIT SF MDM: CPT

## 2019-12-16 PROCEDURE — 99284 EMERGENCY DEPT VISIT MOD MDM: CPT | Performed by: EMERGENCY MEDICINE

## 2019-12-16 RX ORDER — ACETAMINOPHEN 325 MG/1
650 TABLET ORAL ONCE
Status: COMPLETED | OUTPATIENT
Start: 2019-12-16 | End: 2019-12-16

## 2019-12-16 RX ORDER — AZITHROMYCIN 250 MG/1
TABLET, FILM COATED ORAL
Qty: 6 TABLET | Refills: 0 | Status: SHIPPED | OUTPATIENT
Start: 2019-12-16 | End: 2019-12-20

## 2019-12-16 RX ORDER — GUAIFENESIN 600 MG
1200 TABLET, EXTENDED RELEASE 12 HR ORAL EVERY 12 HOURS SCHEDULED
Qty: 30 TABLET | Refills: 0 | Status: SHIPPED | OUTPATIENT
Start: 2019-12-16 | End: 2020-02-17

## 2019-12-16 RX ORDER — BENZONATATE 100 MG/1
100 CAPSULE ORAL EVERY 8 HOURS
Qty: 21 CAPSULE | Refills: 0 | Status: SHIPPED | OUTPATIENT
Start: 2019-12-16 | End: 2020-09-16

## 2019-12-16 RX ORDER — FLUTICASONE PROPIONATE 50 MCG
1 SPRAY, SUSPENSION (ML) NASAL DAILY
Qty: 16 G | Refills: 0 | Status: SHIPPED | OUTPATIENT
Start: 2019-12-16 | End: 2020-09-16

## 2019-12-16 RX ADMIN — ACETAMINOPHEN 650 MG: 325 TABLET ORAL at 13:55

## 2019-12-16 NOTE — ED PROVIDER NOTES
History  Chief Complaint   Patient presents with    Nasal Congestion     c/o sinus/nasal congestion and headache with coughing x2 days     55-year-old female presents with complaint of sinus congestion, pressure, discomfort  She reports that she was treated successfully with a Z-Hoang for similar symptoms several weeks ago  She continues to have sinus pressure and drainage along with mild runny nose  She denies chest pain, shortness of breath, fever/chills, or GI symptoms  She has taken no medications for her symptoms  URI   Presenting symptoms: congestion and rhinorrhea    Presenting symptoms: no cough, no fatigue and no fever    Severity:  Moderate  Onset quality:  Gradual  Timing:  Constant  Progression:  Worsening  Chronicity:  Recurrent  Relieved by:  Nothing  Worsened by:  Nothing  Ineffective treatments:  None tried  Associated symptoms: sinus pain    Associated symptoms: no arthralgias, no headaches, no myalgias, no neck pain, no sneezing, no swollen glands and no wheezing        Prior to Admission Medications   Prescriptions Last Dose Informant Patient Reported? Taking?    BIKTARVY -25 MG tablet   No No   Sig: TAKE 1 TABLET BY MOUTH DAILY WITH BREAKFAST   Blood Pressure Monitoring (BLOOD PRESSURE CUFF) MISC  Self No No   Sig: by Does not apply route daily   Patient not taking: Reported on 6/6/2019   albuterol (VENTOLIN HFA) 90 mcg/act inhaler   No No   Sig: Inhale 1 puff 4 (four) times a day   aspirin (ECOTRIN LOW STRENGTH) 81 mg EC tablet   No No   Sig: Take 1 tablet (81 mg total) by mouth daily   atorvastatin (LIPITOR) 80 mg tablet   No No   Sig: Take 1 tablet (80 mg total) by mouth daily   carvedilol (COREG) 3 125 mg tablet   No No   Sig: Take 1 tablet (3 125 mg total) by mouth 2 (two) times a day with meals   nicotine (NICODERM CQ) 21 mg/24 hr TD 24 hr patch   No No   Sig: Place 1 patch on the skin every 24 hours   Patient not taking: Reported on 9/11/2019   nicotine polacrilex (COMMIT) 2 MG lozenge   No No   Sig: Apply 1 lozenge (2 mg total) to the mouth or throat as needed for smoking cessation   nicotine polacrilex (COMMIT) 4 MG lozenge   No No   Sig: Apply 1 lozenge (4 mg total) to the mouth or throat as needed for smoking cessation   promethazine-dextromethorphan (PHENERGAN-DM) 6 25-15 mg/5 mL oral syrup  Self No No   Sig: Take 5 mL by mouth 4 (four) times a day as needed for cough   Patient not taking: Reported on 2019   sacubitril-valsartan (ENTRESTO) 49-51 MG TABS   No No   Sig: Take 1 tablet by mouth 2 (two) times a day   warfarin (COUMADIN) 1 mg tablet  Self No No   Sig: Take 1 tab daily along with Coumadin 5mg for the next three days   warfarin (COUMADIN) 5 mg tablet  Self No No   Sig: Take 1 tablet (5 mg total) by mouth daily      Facility-Administered Medications: None       Past Medical History:   Diagnosis Date    Asthma     Coronary artery disease     defibrillator    HIV positive ([facility] [address] )        Past Surgical History:   Procedure Laterality Date    ANGIOPLASTY      CARDIAC DEFIBRILLATOR PLACEMENT      CORONARY ANGIOPLASTY      pci placement    TOTAL ABDOMINAL HYSTERECTOMY         Family History   Problem Relation Age of Onset    Other Father         GI bleed    Suicidality Brother     Drug abuse Brother         overdose     I have reviewed and agree with the history as documented  Social History     Tobacco Use    Smoking status: Former Smoker     Packs/day: 1 00     Years: 30 00     Pack years: 30 00     Types: Cigarettes     Last attempt to quit: 2019     Years since quittin 3    Smokeless tobacco: Former User     Quit date: 2019    Tobacco comment:  day   Substance Use Topics    Alcohol use: Yes     Comment: ocassionally    Drug use: No        Review of Systems   Constitutional: Negative for appetite change, chills, fatigue and fever  HENT: Positive for congestion, postnasal drip, rhinorrhea and sinus pain   Negative for sneezing and trouble swallowing  Eyes: Negative for redness and itching  Respiratory: Negative for cough, chest tightness, shortness of breath and wheezing  Cardiovascular: Negative for chest pain and leg swelling  Gastrointestinal: Negative for abdominal pain, constipation, diarrhea, nausea and vomiting  Endocrine: Negative  Genitourinary: Negative for difficulty urinating and dysuria  Musculoskeletal: Negative for arthralgias, back pain, myalgias and neck pain  Skin: Negative for rash  Allergic/Immunologic: Negative  Neurological: Negative for dizziness, numbness and headaches  Hematological: Negative  Psychiatric/Behavioral: Negative  Physical Exam  Physical Exam   Constitutional: She is oriented to person, place, and time  She appears well-developed and well-nourished  HENT:   Head: Normocephalic and atraumatic  Right Ear: Tympanic membrane and ear canal normal    Left Ear: Tympanic membrane and ear canal normal    Nose: Mucosal edema and rhinorrhea present  Right sinus exhibits maxillary sinus tenderness and frontal sinus tenderness  Left sinus exhibits maxillary sinus tenderness and frontal sinus tenderness  Mouth/Throat: Oropharynx is clear and moist    Eyes: Pupils are equal, round, and reactive to light  Conjunctivae and EOM are normal    Neck: Normal range of motion  Neck supple  Cardiovascular: Normal rate, regular rhythm and normal heart sounds  Pulmonary/Chest: Effort normal and breath sounds normal  No respiratory distress  She has no wheezes  Abdominal: Soft  Bowel sounds are normal  There is no tenderness  There is no guarding  Musculoskeletal: She exhibits no edema, tenderness or deformity  Neurological: She is alert and oriented to person, place, and time  Skin: Skin is warm and dry  Capillary refill takes less than 2 seconds  No rash noted  Psychiatric: She has a normal mood and affect  Her behavior is normal    Nursing note and vitals reviewed        Vital Signs  ED Triage Vitals [12/16/19 1304]   Temperature Pulse Respirations Blood Pressure SpO2   97 6 °F (36 4 °C) 82 16 111/72 97 %      Temp Source Heart Rate Source Patient Position - Orthostatic VS BP Location FiO2 (%)   Tympanic Monitor Sitting Left arm --      Pain Score       --           Vitals:    12/16/19 1304   BP: 111/72   Pulse: 82   Patient Position - Orthostatic VS: Sitting         Visual Acuity      ED Medications  Medications   acetaminophen (TYLENOL) tablet 650 mg (650 mg Oral Given 12/16/19 1355)       Diagnostic Studies  Results Reviewed     None                 No orders to display              Procedures  Procedures         ED Course                               MDM  Number of Diagnoses or Management Options  Nasal congestion:   Sinusitis:   Diagnosis management comments: 71-year-old female presents with URI/sinusitis symptoms  Discussed supportive care measures, treatment plan, expected clinical course, need for follow-up  She was provided a week see prescription for repeat Z-Hoang  Discussed waiting for the next several days to see if other supportive care measures resolved her symptoms  If not she will then have his prescription filled  Disposition  Final diagnoses:   Sinusitis   Nasal congestion     Time reflects when diagnosis was documented in both MDM as applicable and the Disposition within this note     Time User Action Codes Description Comment    12/16/2019  1:13 PM David Monsivais [J32 9] Sinusitis     12/16/2019  1:50 PM David Monsivais [R09 81] Nasal congestion       ED Disposition     ED Disposition Condition Date/Time Comment    Discharge Stable Mon Dec 16, 2019  1:13 PM Venecia Christian discharge to home/self care              Follow-up Information    None         Patient's Medications   Discharge Prescriptions    AZITHROMYCIN (ZITHROMAX) 250 MG TABLET    Take 2 tablets today then 1 tablet daily x 4 days       Start Date: 12/16/2019End Date: 12/20/2019       Order Dose: --       Quantity: 6 tablet    Refills: 0    BENZONATATE (TESSALON PERLES) 100 MG CAPSULE    Take 1 capsule (100 mg total) by mouth every 8 (eight) hours       Start Date: 12/16/2019End Date: --       Order Dose: 100 mg       Quantity: 21 capsule    Refills: 0    FLUTICASONE (FLONASE) 50 MCG/ACT NASAL SPRAY    1 spray into each nostril daily       Start Date: 12/16/2019End Date: --       Order Dose: 1 spray       Quantity: 16 g    Refills: 0    GUAIFENESIN (MUCINEX) 600 MG 12 HR TABLET    Take 2 tablets (1,200 mg total) by mouth every 12 (twelve) hours       Start Date: 12/16/2019End Date: --       Order Dose: 1,200 mg       Quantity: 30 tablet    Refills: 0     No discharge procedures on file      ED Provider  Electronically Signed by           Augusto Singh DO  12/16/19 7527

## 2019-12-16 NOTE — ED NOTES
Pt c/o headache, requesting medications for same   Dr King aware, tylenol ordered and given     Carmen Mace RN  12/16/19 7054

## 2019-12-18 ENCOUNTER — ANTICOAG VISIT (OUTPATIENT)
Dept: CARDIOLOGY CLINIC | Facility: CLINIC | Age: 54
End: 2019-12-18

## 2019-12-18 ENCOUNTER — APPOINTMENT (OUTPATIENT)
Dept: LAB | Facility: HOSPITAL | Age: 54
End: 2019-12-18
Payer: COMMERCIAL

## 2019-12-18 DIAGNOSIS — I25.5 ISCHEMIC CARDIOMYOPATHY: ICD-10-CM

## 2019-12-18 DIAGNOSIS — I23.6 LV (LEFT VENTRICULAR) MURAL THROMBUS FOLLOWING MI (HCC): ICD-10-CM

## 2019-12-18 DIAGNOSIS — Z79.01 LONG TERM CURRENT USE OF ANTICOAGULANT THERAPY: ICD-10-CM

## 2019-12-18 DIAGNOSIS — I51.3 LV (LEFT VENTRICULAR) MURAL THROMBUS: ICD-10-CM

## 2019-12-18 LAB
INR PPP: 3.34 (ref 0.91–1.09)
PROTHROMBIN TIME: 35.9 SECONDS (ref 9.8–12)

## 2019-12-18 PROCEDURE — 85610 PROTHROMBIN TIME: CPT

## 2019-12-18 PROCEDURE — 36415 COLL VENOUS BLD VENIPUNCTURE: CPT

## 2019-12-18 NOTE — PROGRESS NOTES
12/18/19, tc to pt, reports she started z pack 2 days ago for sinusitis, will hold x 1 day, then decrease dose, 2 5 mg alt 5 mg , requested inr be done 12/23, but states she will be in [place] and will not test until 12/26   ramses

## 2019-12-19 ENCOUNTER — OFFICE VISIT (OUTPATIENT)
Dept: CARDIOLOGY CLINIC | Facility: CLINIC | Age: 54
End: 2019-12-19
Payer: COMMERCIAL

## 2019-12-19 VITALS
OXYGEN SATURATION: 99 % | WEIGHT: 148 LBS | BODY MASS INDEX: 27.23 KG/M2 | SYSTOLIC BLOOD PRESSURE: 110 MMHG | DIASTOLIC BLOOD PRESSURE: 70 MMHG | HEIGHT: 62 IN | HEART RATE: 89 BPM

## 2019-12-19 DIAGNOSIS — I25.10 TRIPLE VESSEL CORONARY ARTERY DISEASE: ICD-10-CM

## 2019-12-19 DIAGNOSIS — I25.5 ISCHEMIC CARDIOMYOPATHY: Primary | ICD-10-CM

## 2019-12-19 DIAGNOSIS — Z79.01 LONG TERM CURRENT USE OF ANTICOAGULANT THERAPY: ICD-10-CM

## 2019-12-19 DIAGNOSIS — E78.2 MIXED HYPERLIPIDEMIA: ICD-10-CM

## 2019-12-19 DIAGNOSIS — Z95.810 IMPLANTABLE CARDIOVERTER-DEFIBRILLATOR (ICD) IN SITU: ICD-10-CM

## 2019-12-19 DIAGNOSIS — I25.2 PAST HEART ATTACK: ICD-10-CM

## 2019-12-19 DIAGNOSIS — I23.6 LV (LEFT VENTRICULAR) MURAL THROMBUS FOLLOWING MI (HCC): ICD-10-CM

## 2019-12-19 DIAGNOSIS — N18.30 CKD (CHRONIC KIDNEY DISEASE) STAGE 3, GFR 30-59 ML/MIN (HCC): ICD-10-CM

## 2019-12-19 DIAGNOSIS — I10 ESSENTIAL HYPERTENSION: ICD-10-CM

## 2019-12-19 PROCEDURE — 99214 OFFICE O/P EST MOD 30 MIN: CPT | Performed by: INTERNAL MEDICINE

## 2019-12-19 RX ORDER — CARVEDILOL 6.25 MG/1
6.25 TABLET ORAL 2 TIMES DAILY WITH MEALS
Qty: 180 TABLET | Refills: 3 | Status: SHIPPED | OUTPATIENT
Start: 2019-12-19 | End: 2020-09-16 | Stop reason: SDUPTHER

## 2019-12-19 NOTE — PROGRESS NOTES
Cardiology Follow Up    Venecia Dec  1965  [id number]  [address] [facility] [id number] [phone]    1  Ischemic cardiomyopathy  carvedilol (COREG) 6 25 mg tablet   2  Triple vessel coronary artery disease  carvedilol (COREG) 6 25 mg tablet   3  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 ([address] )     4  Mixed hyperlipidemia     5  Essential hypertension     6  Long term current use of anticoagulant therapy     7  old extensive anterior, apical, lateral and distal inferior apical MI     8  CKD (chronic kidney disease) stage 3, GFR 30-59 ml/min ([facility])     9  Implantable cardioverter-defibrillator (ICD) in situ         Patient was referred to us from [facility]  She experienced a myocardial infarction on February 28, 2017 in [place]  She has subsequently moved to [facility] area  She had a cardiac catheterization taken at [facility]  Patient's cardiac catheterization CD revealed the left main coronary artery to be nonobstructed  The left anterior descending artery was 100% in its midportion after a large 1st diagonal branch which was nonobstructed  The circumflex artery obtuse marginal was 90%  The right coronary artery in its midportion with 50-60%  The large distal LAD filled from right to left collaterals  [facility] had recommended CABG but the patient had refused at that time  No ventriculogram was performed but an echocardiogram which I performed had demonstrated extensive LV damage  A nuclear stress test demonstrated minimal with any it ischemia  I did not feel that the patient would improve with CABG  Patient is free of angina pectoris or shortness of breath  She is doing quite well for her degree of LV dysfunction      04/11/2017 echocardiogram severe LV dysfunction, EF 30%, mild LV enlargement, apical aneurysm with akinesis to paradoxical motion  Large organized apical thrombus  Grade 1 diastolic dysfunction, normal pulmonary artery pressures    05/15/2017 stress test: Severe LV dysfunction, EF 20%  Mild LV enlargement  Anterior apical aneurysm with paradoxical motion  Extensive myocardial infarctions involving the distal anterior apical and inferior apical walls  Negative for Persantine induced ischemia     07/01/2019 lipid profile: Total cholesterol 152, triglycerides 176, HDL 45, LDL calculated 72, non HDL cholesterol 107  Interval History:  Patient is feeling very good on her present medical regimen  She is tolerating the Entresto 49/51 b i d  She had previously been on carvedilol 6 25 mg b i d  But this was reduced by her primary care physician because her blood pressure was low  However the patient was asymptomatic  Explained to patient that in her situation where she has had significant myocardial damage, the important thing is symptoms not what the actual blood pressure number is and the lower the blood pressure that she will tolerate, the better since it unload her heart and prevents further remodeling  In addition, her resting heart rate today is 89 beats per minute      Patient Active Problem List   Diagnosis    Ischemic cardiomyopathy    Triple vessel coronary artery disease    HIV positive ([facility] )    Essential hypertension    Hyperlipemia    Impaired left ventricular function    Long term current use of anticoagulant therapy    old extensive anterior, apical, lateral and distal inferior apical MI    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 ([facility] [address] )    CKD (chronic kidney disease) stage 3, GFR 30-59 ml/min ([facility])    Implantable cardioverter-defibrillator (ICD) in situ    Mild intermittent asthma with acute exacerbation    ASCUS with positive high risk HPV cervical     Past Medical History:   Diagnosis Date    Asthma     Coronary artery disease     defibrillator    [facility]      Social History     Socioeconomic History    Marital status: Single     Spouse name: Not on file    Number of children: Not on file    Years of education: Not on file    Highest education level: Not on file   Occupational History    Not on file   Social Needs    Financial resource strain: Not on file    Food insecurity:     Worry: Not on file     Inability: Not on file    Transportation needs:     Medical: Not on file     Non-medical: Not on file   Tobacco Use    Smoking status: Former Smoker     Packs/day: 1 00     Years: 30 00     Pack years: 30 00     Types: Cigarettes     Last attempt to quit: 2019     Years since quittin 3    Smokeless tobacco: Former User     Quit date: 2019    Tobacco comment: 1/2 pk day   Substance and Sexual Activity    Alcohol use: Yes     Comment: ocassionally    Drug use: No    Sexual activity: Not Currently   Lifestyle    Physical activity:     Days per week: Not on file     Minutes per session: Not on file    Stress: Not on file   Relationships    Social connections:     Talks on phone: Not on file     Gets together: Not on file     Attends Anabaptist service: Not on file     Active member of club or organization: Not on file     Attends meetings of clubs or organizations: Not on file     Relationship status: Not on file    Intimate partner violence:     Fear of current or ex partner: Not on file     Emotionally abused: Not on file     Physically abused: Not on file     Forced sexual activity: Not on file   Other Topics Concern    Not on file   Social History Narrative    Not on file      Family History   Problem Relation Age of Onset    Other Father         GI bleed    Suicidality Brother     Drug abuse Brother         overdose     Past Surgical History:   Procedure Laterality Date    ANGIOPLASTY      CARDIAC DEFIBRILLATOR PLACEMENT      CORONARY ANGIOPLASTY      pci placement    TOTAL ABDOMINAL HYSTERECTOMY         Current Outpatient Medications:    albuterol (VENTOLIN HFA) 90 mcg/act inhaler, Inhale 1 puff 4 (four) times a day, Disp: 2 Inhaler, Rfl: 0    aspirin (ECOTRIN LOW STRENGTH) 81 mg EC tablet, Take 1 tablet (81 mg total) by mouth daily, Disp: 81 tablet, Rfl: 10    atorvastatin (LIPITOR) 80 mg tablet, Take 1 tablet (80 mg total) by mouth daily, Disp: 30 tablet, Rfl: 5    azithromycin (ZITHROMAX) 250 mg tablet, Take 2 tablets today then 1 tablet daily x 4 days, Disp: 6 tablet, Rfl: 0    benzonatate (TESSALON PERLES) 100 mg capsule, Take 1 capsule (100 mg total) by mouth every 8 (eight) hours, Disp: 21 capsule, Rfl: 0    BIKTARVY -25 MG tablet, TAKE 1 TABLET BY MOUTH DAILY WITH BREAKFAST, Disp: 30 tablet, Rfl: 10    fluticasone (FLONASE) 50 mcg/act nasal spray, 1 spray into each nostril daily, Disp: 16 g, Rfl: 0    sacubitril-valsartan (ENTRESTO) 49-51 MG TABS, Take 1 tablet by mouth 2 (two) times a day, Disp: 60 tablet, Rfl: 5    warfarin (COUMADIN) 1 mg tablet, Take 1 tab daily along with Coumadin 5mg for the next three days, Disp: 90 tablet, Rfl: 1    warfarin (COUMADIN) 5 mg tablet, Take 1 tablet (5 mg total) by mouth daily, Disp: 90 tablet, Rfl: 5    Blood Pressure Monitoring (BLOOD PRESSURE CUFF) MISC, by Does not apply route daily (Patient not taking: Reported on 6/6/2019), Disp: 1 each, Rfl: 0    carvedilol (COREG) 6 25 mg tablet, Take 1 tablet (6 25 mg total) by mouth 2 (two) times a day with meals, Disp: 180 tablet, Rfl: 3    guaiFENesin (MUCINEX) 600 mg 12 hr tablet, Take 2 tablets (1,200 mg total) by mouth every 12 (twelve) hours (Patient not taking: Reported on 12/19/2019), Disp: 30 tablet, Rfl: 0    nicotine (NICODERM CQ) 21 mg/24 hr TD 24 hr patch, Place 1 patch on the skin every 24 hours (Patient not taking: Reported on 9/11/2019), Disp: 28 patch, Rfl: 7    nicotine polacrilex (COMMIT) 2 MG lozenge, Apply 1 lozenge (2 mg total) to the mouth or throat as needed for smoking cessation (Patient not taking: Reported on 12/19/2019), Disp: 100 each, Rfl: 3    promethazine-dextromethorphan (PHENERGAN-DM) 6 25-15 mg/5 mL oral syrup, Take 5 mL by mouth 4 (four) times a day as needed for cough (Patient not taking: Reported on 6/6/2019), Disp: 118 mL, Rfl: 0  Allergies   Allergen Reactions    No Active Allergies     No Known Allergies        No results found for this visit on 12/19/19  Lab Results   Component Value Date    CHOLESTEROL 152 07/01/2019    CHOLESTEROL 176 11/02/2018     Lab Results   Component Value Date    HDL 45 07/01/2019    HDL 40 11/02/2018     Lab Results   Component Value Date    TRIG 176 (H) 07/01/2019    TRIG 171 (H) 11/02/2018     Lab Results   Component Value Date    NONHDLC 107 07/01/2019     Lab Results   Component Value Date    SODIUM 140 11/05/2019    K 4 9 11/05/2019     11/05/2019    CO2 28 11/05/2019    BUN 14 11/05/2019    CREATININE 1 38 (H) 11/05/2019    GLUC 90 01/24/2019    CALCIUM 9 9 11/05/2019     Lab Results   Component Value Date    SODIUM 140 11/05/2019    K 4 9 11/05/2019     11/05/2019    CO2 28 11/05/2019    AGAP 6 11/05/2019    BUN 14 11/05/2019    CREATININE 1 38 (H) 11/05/2019    GLUC 90 01/24/2019    GLUF 113 (H) 11/05/2019    CALCIUM 9 9 11/05/2019    AST 25 11/05/2019    ALT 23 11/05/2019    ALKPHOS 83 11/05/2019    TP 7 8 11/05/2019    TBILI 0 80 11/05/2019    EGFR 44 (L) 11/05/2019     Lab Results   Component Value Date    WBC 5 30 11/05/2019    WBC 5 7 11/05/2019    HGB 14 1 11/05/2019    HGB 13 9 11/05/2019    HCT 41 3 11/05/2019    HCT 41 0 11/05/2019    MCV 95 11/05/2019    MCV 93 11/05/2019     11/05/2019     11/05/2019       Review of Systems:  Review of Systems   Respiratory: Negative for cough, choking, chest tightness, shortness of breath and wheezing  Cardiovascular: Negative for chest pain, palpitations and leg swelling  Musculoskeletal: Negative for gait problem  Skin: Negative for rash     Neurological: Negative for dizziness, tremors, syncope, weakness, light-headedness, numbness and headaches  Psychiatric/Behavioral: Negative for agitation and behavioral problems  The patient is not hyperactive  Physical Exam:  /70 (BP Location: Right arm, Patient Position: Sitting, Cuff Size: Large)   Pulse 89   Ht 5' 2" (1 575 m)   Wt 67 1 kg (148 lb)   SpO2 99%   BMI 27 07 kg/m²   Physical Exam   Constitutional: She is oriented to person, place, and time  She appears well-developed and well-nourished  No distress  HENT:   Head: Normocephalic and atraumatic  Neck: No JVD present  No thyromegaly present  Cardiovascular: Normal rate, regular rhythm, normal heart sounds and intact distal pulses  Exam reveals no gallop and no friction rub  No murmur heard  Pulmonary/Chest: No respiratory distress  She has no wheezes  She has no rales  Musculoskeletal: She exhibits no edema  Neurological: She is alert and oriented to person, place, and time  Skin: Skin is warm and dry  Psychiatric: She has a normal mood and affect  Her behavior is normal        Discussion/Summary:  1  Increase carvedilol to 6 25 mg b i d    2  Return in 3 months

## 2019-12-26 ENCOUNTER — TELEPHONE (OUTPATIENT)
Dept: FAMILY MEDICINE CLINIC | Facility: CLINIC | Age: 54
End: 2019-12-26

## 2019-12-26 NOTE — TELEPHONE ENCOUNTER
Called patient in regards to recent colposcopy results  Discussed with patient that there is no evidence of cancer  Plan to repeat Pap smear in November, 2020  Patient understands had no further questions

## 2020-01-02 ENCOUNTER — APPOINTMENT (OUTPATIENT)
Dept: LAB | Facility: HOSPITAL | Age: 55
End: 2020-01-02
Payer: COMMERCIAL

## 2020-01-02 ENCOUNTER — ANTICOAG VISIT (OUTPATIENT)
Dept: CARDIOLOGY CLINIC | Facility: CLINIC | Age: 55
End: 2020-01-02

## 2020-01-02 DIAGNOSIS — Z79.01 LONG TERM CURRENT USE OF ANTICOAGULANT THERAPY: ICD-10-CM

## 2020-01-02 DIAGNOSIS — I51.3 LV (LEFT VENTRICULAR) MURAL THROMBUS: ICD-10-CM

## 2020-01-02 DIAGNOSIS — I25.5 ISCHEMIC CARDIOMYOPATHY: ICD-10-CM

## 2020-01-02 DIAGNOSIS — I23.6 LV (LEFT VENTRICULAR) MURAL THROMBUS FOLLOWING MI (HCC): ICD-10-CM

## 2020-01-02 LAB
INR PPP: 1.49 (ref 0.91–1.09)
PROTHROMBIN TIME: 16.4 SECONDS (ref 9.8–12)

## 2020-01-02 PROCEDURE — 36415 COLL VENOUS BLD VENIPUNCTURE: CPT

## 2020-01-02 PROCEDURE — 85610 PROTHROMBIN TIME: CPT

## 2020-01-02 NOTE — PROGRESS NOTES
1/2/20, tc to pt, completed antibiotics  She continued on 2 5 mg alt 5 mg daily, will take 5 mg x 2 days, then 2 5 mg sat, 5 mg sun/mon, 2 5 mg tues, inr due wed   ramses

## 2020-01-08 ENCOUNTER — APPOINTMENT (OUTPATIENT)
Dept: LAB | Facility: HOSPITAL | Age: 55
End: 2020-01-08
Payer: COMMERCIAL

## 2020-01-08 ENCOUNTER — ANTICOAG VISIT (OUTPATIENT)
Dept: CARDIOLOGY CLINIC | Facility: CLINIC | Age: 55
End: 2020-01-08

## 2020-01-08 DIAGNOSIS — I25.5 ISCHEMIC CARDIOMYOPATHY: ICD-10-CM

## 2020-01-08 DIAGNOSIS — I23.6 LV (LEFT VENTRICULAR) MURAL THROMBUS FOLLOWING MI (HCC): ICD-10-CM

## 2020-01-08 DIAGNOSIS — I51.3 LV (LEFT VENTRICULAR) MURAL THROMBUS: ICD-10-CM

## 2020-01-08 DIAGNOSIS — Z79.01 LONG TERM CURRENT USE OF ANTICOAGULANT THERAPY: ICD-10-CM

## 2020-01-08 LAB
INR PPP: 1.89 (ref 0.91–1.09)
PROTHROMBIN TIME: 20.5 SECONDS (ref 9.8–12)

## 2020-01-08 PROCEDURE — 85610 PROTHROMBIN TIME: CPT

## 2020-01-08 PROCEDURE — 36415 COLL VENOUS BLD VENIPUNCTURE: CPT

## 2020-01-23 DIAGNOSIS — I25.2 PAST HEART ATTACK: ICD-10-CM

## 2020-01-23 DIAGNOSIS — I23.6 LV (LEFT VENTRICULAR) MURAL THROMBUS FOLLOWING MI (HCC): ICD-10-CM

## 2020-01-23 DIAGNOSIS — I25.5 ISCHEMIC CARDIOMYOPATHY: ICD-10-CM

## 2020-01-23 RX ORDER — WARFARIN SODIUM 1 MG/1
TABLET ORAL
Qty: 90 TABLET | Refills: 0 | Status: SHIPPED | OUTPATIENT
Start: 2020-01-23 | End: 2020-04-28

## 2020-01-24 ENCOUNTER — APPOINTMENT (OUTPATIENT)
Dept: LAB | Facility: HOSPITAL | Age: 55
End: 2020-01-24
Payer: COMMERCIAL

## 2020-01-24 ENCOUNTER — ANTICOAG VISIT (OUTPATIENT)
Dept: CARDIOLOGY CLINIC | Facility: CLINIC | Age: 55
End: 2020-01-24

## 2020-01-24 DIAGNOSIS — I23.6 LV (LEFT VENTRICULAR) MURAL THROMBUS FOLLOWING MI (HCC): ICD-10-CM

## 2020-01-24 DIAGNOSIS — Z79.01 LONG TERM CURRENT USE OF ANTICOAGULANT THERAPY: ICD-10-CM

## 2020-01-24 DIAGNOSIS — I51.3 LV (LEFT VENTRICULAR) MURAL THROMBUS: ICD-10-CM

## 2020-01-24 DIAGNOSIS — I25.5 ISCHEMIC CARDIOMYOPATHY: ICD-10-CM

## 2020-01-24 LAB
INR PPP: 2.08 (ref 0.91–1.09)
PROTHROMBIN TIME: 22.8 SECONDS (ref 9.8–12)

## 2020-01-24 PROCEDURE — 36415 COLL VENOUS BLD VENIPUNCTURE: CPT

## 2020-01-24 PROCEDURE — 85610 PROTHROMBIN TIME: CPT

## 2020-01-24 NOTE — PROGRESS NOTES
1/24/20, tc to pt, denies any missed doses, will increase dose, 2 5 mg wed, 5 mg other days of the week, inr due 1 week   ramses

## 2020-01-27 RX ORDER — SACUBITRIL AND VALSARTAN 49; 51 MG/1; MG/1
TABLET, FILM COATED ORAL
Qty: 60 TABLET | Refills: 0 | Status: SHIPPED | OUTPATIENT
Start: 2020-01-27 | End: 2020-02-28

## 2020-01-30 ENCOUNTER — ANTICOAG VISIT (OUTPATIENT)
Dept: CARDIOLOGY CLINIC | Facility: CLINIC | Age: 55
End: 2020-01-30

## 2020-01-30 ENCOUNTER — APPOINTMENT (OUTPATIENT)
Dept: LAB | Facility: HOSPITAL | Age: 55
End: 2020-01-30
Payer: COMMERCIAL

## 2020-01-30 ENCOUNTER — TRANSCRIBE ORDERS (OUTPATIENT)
Dept: ADMINISTRATIVE | Facility: HOSPITAL | Age: 55
End: 2020-01-30

## 2020-01-30 DIAGNOSIS — I23.6 LV (LEFT VENTRICULAR) MURAL THROMBUS FOLLOWING MI (HCC): ICD-10-CM

## 2020-01-30 DIAGNOSIS — I51.3 LV (LEFT VENTRICULAR) MURAL THROMBUS: ICD-10-CM

## 2020-01-30 DIAGNOSIS — I25.5 ISCHEMIC CARDIOMYOPATHY: ICD-10-CM

## 2020-01-30 DIAGNOSIS — Z79.01 LONG TERM CURRENT USE OF ANTICOAGULANT THERAPY: ICD-10-CM

## 2020-01-30 LAB
INR PPP: 2.1 (ref 0.91–1.09)
PROTHROMBIN TIME: 22.8 SECONDS (ref 9.8–12)

## 2020-01-30 PROCEDURE — 36415 COLL VENOUS BLD VENIPUNCTURE: CPT

## 2020-01-30 PROCEDURE — 85610 PROTHROMBIN TIME: CPT

## 2020-02-13 ENCOUNTER — ANTICOAG VISIT (OUTPATIENT)
Dept: CARDIOLOGY CLINIC | Facility: CLINIC | Age: 55
End: 2020-02-13

## 2020-02-13 ENCOUNTER — APPOINTMENT (OUTPATIENT)
Dept: LAB | Facility: HOSPITAL | Age: 55
End: 2020-02-13
Payer: COMMERCIAL

## 2020-02-13 DIAGNOSIS — I25.5 ISCHEMIC CARDIOMYOPATHY: ICD-10-CM

## 2020-02-13 DIAGNOSIS — I51.3 LV (LEFT VENTRICULAR) MURAL THROMBUS: ICD-10-CM

## 2020-02-13 DIAGNOSIS — I23.6 LV (LEFT VENTRICULAR) MURAL THROMBUS FOLLOWING MI (HCC): ICD-10-CM

## 2020-02-13 DIAGNOSIS — Z79.01 LONG TERM CURRENT USE OF ANTICOAGULANT THERAPY: ICD-10-CM

## 2020-02-13 LAB
INR PPP: 1.64 (ref 0.91–1.09)
PROTHROMBIN TIME: 17.9 SECONDS (ref 9.8–12)

## 2020-02-13 PROCEDURE — 85610 PROTHROMBIN TIME: CPT

## 2020-02-13 PROCEDURE — 36415 COLL VENOUS BLD VENIPUNCTURE: CPT

## 2020-02-13 NOTE — PROGRESS NOTES
Pt called denies any missed doses or changes in diet or medications  Pt will take 10 mg today then 5 mg daily recheck in 1 week

## 2020-02-17 ENCOUNTER — OFFICE VISIT (OUTPATIENT)
Dept: FAMILY MEDICINE CLINIC | Facility: CLINIC | Age: 55
End: 2020-02-17

## 2020-02-17 VITALS
HEIGHT: 62 IN | BODY MASS INDEX: 28.34 KG/M2 | TEMPERATURE: 96.2 F | RESPIRATION RATE: 18 BRPM | OXYGEN SATURATION: 99 % | SYSTOLIC BLOOD PRESSURE: 110 MMHG | DIASTOLIC BLOOD PRESSURE: 70 MMHG | HEART RATE: 66 BPM | WEIGHT: 154 LBS

## 2020-02-17 DIAGNOSIS — Z21 HIV POSITIVE (HCC): ICD-10-CM

## 2020-02-17 DIAGNOSIS — I10 ESSENTIAL HYPERTENSION: Primary | ICD-10-CM

## 2020-02-17 DIAGNOSIS — I23.6 LV (LEFT VENTRICULAR) MURAL THROMBUS FOLLOWING MI (HCC): ICD-10-CM

## 2020-02-17 PROCEDURE — 3074F SYST BP LT 130 MM HG: CPT | Performed by: INTERNAL MEDICINE

## 2020-02-17 PROCEDURE — 3078F DIAST BP <80 MM HG: CPT | Performed by: INTERNAL MEDICINE

## 2020-02-17 PROCEDURE — 1036F TOBACCO NON-USER: CPT | Performed by: INTERNAL MEDICINE

## 2020-02-17 PROCEDURE — 99213 OFFICE O/P EST LOW 20 MIN: CPT | Performed by: INTERNAL MEDICINE

## 2020-02-17 PROCEDURE — 3008F BODY MASS INDEX DOCD: CPT | Performed by: INTERNAL MEDICINE

## 2020-02-17 NOTE — PROGRESS NOTES
Assessment/Plan:    Essential hypertension  Controlled  Continue with current regimen of entresto 49-51 BID and coreg 6 25 mg BID    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 (HCC)  Remains on warfarin; currently managed by Dr Isaac Busby office  HIV positive ([place] [id number] )  Follows with ID  On Biktarvy -25mg  Diagnoses and all orders for this visit:    Essential hypertension  -     HEMOGLOBIN A1C W/ EAG ESTIMATION; Future  -     Lipid Panel with Direct LDL reflex; Future    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 (HCC)    HIV positive ([facility] )          Subjective:      Patient ID: Vicente Gamboa is a 49 y o  female  54F with PMH significant for HIV, CAD s/p MI with LV thrombus (s/p ICD placement) and HLD presents for follow-up  She reports doing well  No complaints  Denies SOB, CP  States she is moving to [facility] soon, might be needing a new PCP  She plans on informing our practice of this change  Review of Systems   Constitutional: Negative for chills and fever  Respiratory: Negative for cough and shortness of breath  Cardiovascular: Negative for chest pain and palpitations  Endocrine: Negative for polyphagia and polyuria  Musculoskeletal: Negative for back pain  Neurological: Negative for light-headedness, numbness and headaches  Objective:      /70 (BP Location: Right arm, Patient Position: Sitting, Cuff Size: Adult)   Pulse 66   Temp (!) 96 2 °F (35 7 °C)   Resp 18   Ht 5' 2" (1 575 m)   Wt 69 9 kg (154 lb)   SpO2 99%   BMI 28 17 kg/m²          Physical Exam   Constitutional: She appears well-developed and well-nourished  HENT:   Head: Normocephalic and atraumatic  Eyes: EOM are normal    Neck: Normal range of motion  Neck supple  Cardiovascular: Normal rate and normal heart sounds  Pulmonary/Chest: Effort normal and breath sounds normal  No respiratory distress  Abdominal: Soft   Bowel sounds are normal  She exhibits no distension  Neurological: She is alert  Skin: Skin is warm and dry  Psychiatric: She has a normal mood and affect

## 2020-02-19 ENCOUNTER — REMOTE DEVICE CLINIC VISIT (OUTPATIENT)
Dept: CARDIOLOGY CLINIC | Facility: CLINIC | Age: 55
End: 2020-02-19
Payer: COMMERCIAL

## 2020-02-19 DIAGNOSIS — Z95.810 PRESENCE OF AUTOMATIC CARDIOVERTER/DEFIBRILLATOR (AICD): Primary | ICD-10-CM

## 2020-02-19 PROCEDURE — 93296 REM INTERROG EVL PM/IDS: CPT | Performed by: INTERNAL MEDICINE

## 2020-02-19 PROCEDURE — 93295 DEV INTERROG REMOTE 1/2/MLT: CPT | Performed by: INTERNAL MEDICINE

## 2020-02-19 NOTE — PROGRESS NOTES
Results for orders placed or performed in visit on 02/19/20   Cardiac EP device report    Narrative    MDT-SINGLE CHAMBER ICD  CAREL[employer] TRANSMISSION: BATTERY VOLTAGE ADEQUATE  (10 3 YRS)   1%  ALL AVAILABLE LEAD PARAMETERS WITHIN NORMAL LIMITS  NO SIGNIFICANT HIGH RATE EPISODES  OPTI-VOL WITHIN NORMAL LIMITS  NORMAL DEVICE FUNCTION  ---SHAH

## 2020-02-20 ENCOUNTER — APPOINTMENT (OUTPATIENT)
Dept: LAB | Facility: HOSPITAL | Age: 55
End: 2020-02-20
Payer: COMMERCIAL

## 2020-02-20 ENCOUNTER — ANTICOAG VISIT (OUTPATIENT)
Dept: CARDIOLOGY CLINIC | Facility: CLINIC | Age: 55
End: 2020-02-20

## 2020-02-20 DIAGNOSIS — I10 ESSENTIAL HYPERTENSION: ICD-10-CM

## 2020-02-20 DIAGNOSIS — I51.3 LV (LEFT VENTRICULAR) MURAL THROMBUS: ICD-10-CM

## 2020-02-20 DIAGNOSIS — I25.5 ISCHEMIC CARDIOMYOPATHY: ICD-10-CM

## 2020-02-20 DIAGNOSIS — I23.6 LV (LEFT VENTRICULAR) MURAL THROMBUS FOLLOWING MI (HCC): ICD-10-CM

## 2020-02-20 DIAGNOSIS — Z79.01 LONG TERM CURRENT USE OF ANTICOAGULANT THERAPY: ICD-10-CM

## 2020-02-20 LAB
CHOLEST SERPL-MCNC: 147 MG/DL
EST. AVERAGE GLUCOSE BLD GHB EST-MCNC: 111 MG/DL
HBA1C MFR BLD: 5.5 %
HDLC SERPL-MCNC: 47 MG/DL
INR PPP: 2.78 (ref 0.91–1.09)
LDLC SERPL CALC-MCNC: 63 MG/DL
PROTHROMBIN TIME: 30.2 SECONDS (ref 9.8–12)
TRIGL SERPL-MCNC: 185 MG/DL

## 2020-02-20 PROCEDURE — 80061 LIPID PANEL: CPT

## 2020-02-20 PROCEDURE — 36415 COLL VENOUS BLD VENIPUNCTURE: CPT

## 2020-02-20 PROCEDURE — 85610 PROTHROMBIN TIME: CPT

## 2020-02-20 PROCEDURE — 83036 HEMOGLOBIN GLYCOSYLATED A1C: CPT

## 2020-02-24 ENCOUNTER — TELEPHONE (OUTPATIENT)
Dept: FAMILY MEDICINE CLINIC | Facility: CLINIC | Age: 55
End: 2020-02-24

## 2020-02-24 NOTE — TELEPHONE ENCOUNTER
Received a text message from patient asking for blood work results on a personal phone  I called patient and discussed blood work results  She does not have diabetes  Cholesterol numbers are stable and advise her to continue taking atorvastatin as prescribed previously  She had no further questions

## 2020-02-28 DIAGNOSIS — I25.2 PAST HEART ATTACK: ICD-10-CM

## 2020-02-28 DIAGNOSIS — I25.5 ISCHEMIC CARDIOMYOPATHY: ICD-10-CM

## 2020-02-28 RX ORDER — SACUBITRIL AND VALSARTAN 49; 51 MG/1; MG/1
TABLET, FILM COATED ORAL
Qty: 60 TABLET | Refills: 0 | Status: SHIPPED | OUTPATIENT
Start: 2020-02-28 | End: 2020-03-09 | Stop reason: SDUPTHER

## 2020-03-03 ENCOUNTER — ANTICOAG VISIT (OUTPATIENT)
Dept: CARDIOLOGY CLINIC | Facility: CLINIC | Age: 55
End: 2020-03-03

## 2020-03-03 ENCOUNTER — APPOINTMENT (OUTPATIENT)
Dept: LAB | Facility: HOSPITAL | Age: 55
End: 2020-03-03
Payer: COMMERCIAL

## 2020-03-03 DIAGNOSIS — I51.3 LV (LEFT VENTRICULAR) MURAL THROMBUS: ICD-10-CM

## 2020-03-03 DIAGNOSIS — I23.6 LV (LEFT VENTRICULAR) MURAL THROMBUS FOLLOWING MI (HCC): ICD-10-CM

## 2020-03-03 DIAGNOSIS — I25.5 ISCHEMIC CARDIOMYOPATHY: ICD-10-CM

## 2020-03-03 DIAGNOSIS — Z79.01 LONG TERM CURRENT USE OF ANTICOAGULANT THERAPY: ICD-10-CM

## 2020-03-03 LAB
INR PPP: 3.73 (ref 0.91–1.09)
PROTHROMBIN TIME: 40 SECONDS (ref 9.8–12)

## 2020-03-03 PROCEDURE — 36415 COLL VENOUS BLD VENIPUNCTURE: CPT

## 2020-03-03 PROCEDURE — 85610 PROTHROMBIN TIME: CPT

## 2020-03-03 NOTE — PROGRESS NOTES
3/3/20, tc to pt, denies any bleeding or medication changes  Will decrease dose, 2 5 mg tues, 5 mg other days of the week, inr due 1 week   ramses

## 2020-03-04 ENCOUNTER — PATIENT OUTREACH (OUTPATIENT)
Dept: SURGERY | Facility: CLINIC | Age: 55
End: 2020-03-04

## 2020-03-09 ENCOUNTER — OFFICE VISIT (OUTPATIENT)
Dept: CARDIOLOGY CLINIC | Facility: CLINIC | Age: 55
End: 2020-03-09
Payer: COMMERCIAL

## 2020-03-09 VITALS
HEART RATE: 60 BPM | DIASTOLIC BLOOD PRESSURE: 60 MMHG | SYSTOLIC BLOOD PRESSURE: 90 MMHG | BODY MASS INDEX: 28.52 KG/M2 | WEIGHT: 155 LBS | HEIGHT: 62 IN

## 2020-03-09 DIAGNOSIS — I23.6 LV (LEFT VENTRICULAR) MURAL THROMBUS FOLLOWING MI (HCC): ICD-10-CM

## 2020-03-09 DIAGNOSIS — I25.10 TRIPLE VESSEL CORONARY ARTERY DISEASE: ICD-10-CM

## 2020-03-09 DIAGNOSIS — I10 ESSENTIAL HYPERTENSION: ICD-10-CM

## 2020-03-09 DIAGNOSIS — I25.2 PAST HEART ATTACK: ICD-10-CM

## 2020-03-09 DIAGNOSIS — Z79.01 LONG TERM CURRENT USE OF ANTICOAGULANT THERAPY: ICD-10-CM

## 2020-03-09 DIAGNOSIS — Z95.810 IMPLANTABLE CARDIOVERTER-DEFIBRILLATOR (ICD) IN SITU: ICD-10-CM

## 2020-03-09 DIAGNOSIS — N18.30 CKD (CHRONIC KIDNEY DISEASE) STAGE 3, GFR 30-59 ML/MIN (HCC): ICD-10-CM

## 2020-03-09 DIAGNOSIS — I51.89 IMPAIRED LEFT VENTRICULAR FUNCTION: ICD-10-CM

## 2020-03-09 DIAGNOSIS — I25.5 ISCHEMIC CARDIOMYOPATHY: Primary | ICD-10-CM

## 2020-03-09 PROCEDURE — 1036F TOBACCO NON-USER: CPT | Performed by: INTERNAL MEDICINE

## 2020-03-09 PROCEDURE — 99214 OFFICE O/P EST MOD 30 MIN: CPT | Performed by: INTERNAL MEDICINE

## 2020-03-09 PROCEDURE — 3008F BODY MASS INDEX DOCD: CPT | Performed by: INTERNAL MEDICINE

## 2020-03-09 PROCEDURE — 3078F DIAST BP <80 MM HG: CPT | Performed by: INTERNAL MEDICINE

## 2020-03-09 PROCEDURE — 3074F SYST BP LT 130 MM HG: CPT | Performed by: INTERNAL MEDICINE

## 2020-03-09 NOTE — PROGRESS NOTES
Cardiology Follow Up    [facility]  1965  [id number]  [address] [id number] [phone]    1  Ischemic cardiomyopathy  sacubitril-valsartan (Entresto) 49-51 MG TABS   2  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 ([address] )     3  Triple vessel coronary artery disease     4  old extensive anterior, apical, lateral and distal inferior apical MI  sacubitril-valsartan (Entresto) 49-51 MG TABS   5  Long term current use of anticoagulant therapy     6  Implantable cardioverter-defibrillator (ICD) in situ     7  Essential hypertension     8  Impaired left ventricular function     9  CKD (chronic kidney disease) stage 3, GFR 30-59 ml/min ([facility])         Patient was referred to us from [facility]  She experienced a myocardial infarction on February 28, 2017 in [place]  She has subsequently moved to [facility] area  She had a cardiac catheterization taken at [facility]  Patient's cardiac catheterization CD revealed the left main coronary artery to be nonobstructed  The left anterior descending artery was 100% in its midportion after a large 1st diagonal branch which was nonobstructed  The circumflex artery obtuse marginal was 90%  The right coronary artery in its midportion with 50-60%  The large distal LAD filled from right to left collaterals  [facility] had recommended CABG but the patient had refused at that time  No ventriculogram was performed but an echocardiogram which I performed had demonstrated extensive LV damage  A nuclear stress test demonstrated minimal with any it ischemia  I did not feel that the patient would improve with CABG  Patient is free of angina pectoris or shortness of breath  She is doing quite well for her degree of LV dysfunction      04/11/2017 echocardiogram severe LV dysfunction, EF 30%, mild LV enlargement, apical aneurysm with akinesis to paradoxical motion  Large organized apical thrombus  Grade 1 diastolic dysfunction, normal pulmonary artery pressures    05/15/2017 stress test: Severe LV dysfunction, EF 20%  Mild LV enlargement  Anterior apical aneurysm with paradoxical motion  Extensive myocardial infarctions involving the distal anterior apical and inferior apical walls  Negative for Persantine induced ischemia     07/01/2019 lipid profile: Total cholesterol 152, triglycerides 176, HDL 45, LDL calculated 72, non HDL cholesterol 107  Interval History:  Patient with a severe ischemic cardiomyopathy, triple-vessel disease managed medically and in LV mural thrombus from prior MI returns  Her blood pressure is low today but she denies dizziness or lightheadedness  She states that she has no lightheadedness when she stands up out of a chair gets out of bed  She is feeling very good and is active without difficulty  She denies chest pain or shortness of breath  She denies palpitations  She is tolerating Entresto        Patient Active Problem List   Diagnosis    Ischemic cardiomyopathy    Triple vessel coronary artery disease    HIV positive ([facility] )    Essential hypertension    Hyperlipemia    Impaired left ventricular function    Long term current use of anticoagulant therapy    old extensive anterior, apical, lateral and distal inferior apical MI    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 ([facility]    CKD (chronic kidney disease) stage 3, GFR 30-59 ml/min ([facility])    Implantable cardioverter-defibrillator (ICD) in situ    Mild intermittent asthma with acute exacerbation    ASCUS with positive high risk HPV cervical     Past Medical History:   Diagnosis Date    Asthma     Coronary artery disease     defibrillator    HIV positive ([facility] )      Social History     Socioeconomic History    Marital status: Single     Spouse name: Not on file    Number of children: Not on file    Years of education: Not on file    Highest education level: Not on file   Occupational History    Not on file   Social Needs    Financial resource strain: Not on file    Food insecurity:     Worry: Not on file     Inability: Not on file    Transportation needs:     Medical: Not on file     Non-medical: Not on file   Tobacco Use    Smoking status: Former Smoker     Packs/day: 1 00     Years: 30 00     Pack years: 30 00     Types: Cigarettes     Last attempt to quit: 2019     Years since quittin 5    Smokeless tobacco: Former User     Quit date: 2019    Tobacco comment: 1/2 pk day   Substance and Sexual Activity    Alcohol use: Yes     Comment: ocassionally    Drug use: No    Sexual activity: Not Currently   Lifestyle    Physical activity:     Days per week: Not on file     Minutes per session: Not on file    Stress: Not on file   Relationships    Social connections:     Talks on phone: Not on file     Gets together: Not on file     Attends Muslim service: Not on file     Active member of club or organization: Not on file     Attends meetings of clubs or organizations: Not on file     Relationship status: Not on file    Intimate partner violence:     Fear of current or ex partner: Not on file     Emotionally abused: Not on file     Physically abused: Not on file     Forced sexual activity: Not on file   Other Topics Concern    Not on file   Social History Narrative    Not on file      Family History   Problem Relation Age of Onset    Other Father         GI bleed    Suicidality Brother     Drug abuse Brother         overdose     Past Surgical History:   Procedure Laterality Date    ANGIOPLASTY      CARDIAC DEFIBRILLATOR PLACEMENT      CORONARY ANGIOPLASTY      pci placement    TOTAL ABDOMINAL HYSTERECTOMY         Current Outpatient Medications:     albuterol (VENTOLIN HFA) 90 mcg/act inhaler, Inhale 1 puff 4 (four) times a day, Disp: 2 Inhaler, Rfl: 0    aspirin (ECOTRIN LOW STRENGTH) 81 mg EC tablet, Take 1 tablet (81 mg total) by mouth daily, Disp: 81 tablet, Rfl: 10    atorvastatin (LIPITOR) 80 mg tablet, Take 1 tablet (80 mg total) by mouth daily, Disp: 30 tablet, Rfl: 5    BIKTARVY -25 MG tablet, TAKE 1 TABLET BY MOUTH DAILY WITH BREAKFAST, Disp: 30 tablet, Rfl: 10    carvedilol (COREG) 6 25 mg tablet, Take 1 tablet (6 25 mg total) by mouth 2 (two) times a day with meals, Disp: 180 tablet, Rfl: 3    sacubitril-valsartan (Entresto) 49-51 MG TABS, Take 1 tablet by mouth 2 (two) times a day, Disp: 180 tablet, Rfl: 3    warfarin (COUMADIN) 1 mg tablet, TAKE 1 TABLET BY MOUTH DAILY WITH COUMADIN 5MG FOR THE NEXT THREE DAYS, Disp: 90 tablet, Rfl: 0    warfarin (COUMADIN) 5 mg tablet, Take 1 tablet (5 mg total) by mouth daily, Disp: 90 tablet, Rfl: 5    benzonatate (TESSALON PERLES) 100 mg capsule, Take 1 capsule (100 mg total) by mouth every 8 (eight) hours (Patient not taking: Reported on 3/9/2020), Disp: 21 capsule, Rfl: 0    Blood Pressure Monitoring (BLOOD PRESSURE CUFF) MISC, by Does not apply route daily (Patient not taking: Reported on 6/6/2019), Disp: 1 each, Rfl: 0    fluticasone (FLONASE) 50 mcg/act nasal spray, 1 spray into each nostril daily (Patient not taking: Reported on 2/17/2020), Disp: 16 g, Rfl: 0    nicotine (NICODERM CQ) 21 mg/24 hr TD 24 hr patch, Place 1 patch on the skin every 24 hours (Patient not taking: Reported on 3/9/2020), Disp: 28 patch, Rfl: 7    nicotine polacrilex (COMMIT) 2 MG lozenge, Apply 1 lozenge (2 mg total) to the mouth or throat as needed for smoking cessation (Patient not taking: Reported on 12/19/2019), Disp: 100 each, Rfl: 3  Allergies   Allergen Reactions    No Active Allergies     No Known Allergies        No results found for this visit on 03/09/20        Lab Results   Component Value Date    CHOLESTEROL 147 02/20/2020    CHOLESTEROL 152 07/01/2019    CHOLESTEROL 176 11/02/2018     Lab Results   Component Value Date    HDL 47 02/20/2020 HDL 45 07/01/2019    HDL 40 11/02/2018     Lab Results   Component Value Date    TRIG 185 (H) 02/20/2020    TRIG 176 (H) 07/01/2019    TRIG 171 (H) 11/02/2018     Lab Results   Component Value Date    NONHDLC 107 07/01/2019     Lab Results   Component Value Date    SODIUM 140 11/05/2019    K 4 9 11/05/2019     11/05/2019    CO2 28 11/05/2019    BUN 14 11/05/2019    CREATININE 1 38 (H) 11/05/2019    GLUC 90 01/24/2019    CALCIUM 9 9 11/05/2019     Lab Results   Component Value Date    SODIUM 140 11/05/2019    K 4 9 11/05/2019     11/05/2019    CO2 28 11/05/2019    AGAP 6 11/05/2019    BUN 14 11/05/2019    CREATININE 1 38 (H) 11/05/2019    GLUC 90 01/24/2019    GLUF 113 (H) 11/05/2019    CALCIUM 9 9 11/05/2019    AST 25 11/05/2019    ALT 23 11/05/2019    ALKPHOS 83 11/05/2019    TP 7 8 11/05/2019    TBILI 0 80 11/05/2019    EGFR 44 (L) 11/05/2019     Lab Results   Component Value Date    WBC 5 30 11/05/2019    WBC 5 7 11/05/2019    HGB 14 1 11/05/2019    HGB 13 9 11/05/2019    HCT 41 3 11/05/2019    HCT 41 0 11/05/2019    MCV 95 11/05/2019    MCV 93 11/05/2019     11/05/2019     11/05/2019         Review of Systems:  Review of Systems   Respiratory: Negative for cough, choking, chest tightness, shortness of breath and wheezing  Cardiovascular: Negative for chest pain, palpitations and leg swelling  Musculoskeletal: Negative for gait problem  Skin: Negative for rash  Neurological: Negative for dizziness, tremors, syncope, weakness, light-headedness, numbness and headaches  Psychiatric/Behavioral: Negative for agitation and behavioral problems  The patient is not hyperactive  Physical Exam:  BP 90/60   Pulse 60   Ht 5' 2" (1 575 m)   Wt 70 3 kg (155 lb)   BMI 28 35 kg/m²   Physical Exam   Constitutional: She is oriented to person, place, and time  She appears well-developed and well-nourished  No distress  HENT:   Head: Normocephalic and atraumatic     Neck: No JVD present  No thyromegaly present  Cardiovascular: Normal rate, regular rhythm, normal heart sounds and intact distal pulses  Exam reveals no gallop and no friction rub  No murmur heard  Pulmonary/Chest: No respiratory distress  She has no wheezes  She has no rales  Musculoskeletal: She exhibits no edema  Neurological: She is alert and oriented to person, place, and time  Skin: Skin is warm and dry  Psychiatric: She has a normal mood and affect  Her behavior is normal        Discussion/Summary  Return in 6 months

## 2020-03-09 NOTE — LETTER
March 9, 2020     [facility]  [address] [employer]    Patient: Venecia Christian   YOB: 1965   Date of Visit: 3/9/2020       Dear Dr Abdi Leyva: Thank you for referring Venecia Christian to me for evaluation  Below are my notes for this consultation  If you have questions, please do not hesitate to call me  I look forward to following your patient along with you  Sincerely,        Virgie Lorenz MD        CC: No Recipients  Virgie Lorenz MD  3/9/2020 11:58 AM  Sign at close encounter                                             Cardiology Follow Up    Venecia Christian  1965  [id number]  [address]  [address] [facility] [address] [phone]    1  Ischemic cardiomyopathy  sacubitril-valsartan (Entresto) 49-51 MG TABS   2  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 ([address] )     3  Triple vessel coronary artery disease     4  old extensive anterior, apical, lateral and distal inferior apical MI  sacubitril-valsartan (Entresto) 49-51 MG TABS   5  Long term current use of anticoagulant therapy     6  Implantable cardioverter-defibrillator (ICD) in situ     7  Essential hypertension     8  Impaired left ventricular function     9  CKD (chronic kidney disease) stage 3, GFR 30-59 ml/min ([facility])         Patient was referred to us from [facility]  She experienced a myocardial infarction on February 28, 2017 in [place]  She has subsequently moved to [facility] area  She had a cardiac catheterization taken at [facility]  Patient's cardiac catheterization CD revealed the left main coronary artery to be nonobstructed  The left anterior descending artery was 100% in its midportion after a large 1st diagonal branch which was nonobstructed  The circumflex artery obtuse marginal was 90%  The right coronary artery in its midportion with 50-60%  The large distal LAD filled from right to left collaterals   [facility] had recommended CABG but the patient had refused at that time  No ventriculogram was performed but an echocardiogram which I performed had demonstrated extensive LV damage  A nuclear stress test demonstrated minimal with any it ischemia  I did not feel that the patient would improve with CABG  Patient is free of angina pectoris or shortness of breath  She is doing quite well for her degree of LV dysfunction  04/11/2017 echocardiogram severe LV dysfunction, EF 30%, mild LV enlargement, apical aneurysm with akinesis to paradoxical motion  Large organized apical thrombus  Grade 1 diastolic dysfunction, normal pulmonary artery pressures    05/15/2017 stress test: Severe LV dysfunction, EF 20%  Mild LV enlargement  Anterior apical aneurysm with paradoxical motion  Extensive myocardial infarctions involving the distal anterior apical and inferior apical walls  Negative for Persantine induced ischemia     07/01/2019 lipid profile: Total cholesterol 152, triglycerides 176, HDL 45, LDL calculated 72, non HDL cholesterol 107  Interval History:  Patient with a severe ischemic cardiomyopathy, triple-vessel disease managed medically and in LV mural thrombus from prior MI returns  Her blood pressure is low today but she denies dizziness or lightheadedness  She states that she has no lightheadedness when she stands up out of a chair gets out of bed  She is feeling very good and is active without difficulty  She denies chest pain or shortness of breath  She denies palpitations  She is tolerating Entresto        Patient Active Problem List   Diagnosis    Ischemic cardiomyopathy    Triple vessel coronary artery disease    HIV positive ([address] )    Essential hypertension    Hyperlipemia    Impaired left ventricular function    Long term current use of anticoagulant therapy    old extensive anterior, apical, lateral and distal inferior apical MI    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 ([facility] )    CKD (chronic kidney disease) stage 3, GFR 30-59 ml/min ([facility])    Implantable cardioverter-defibrillator (ICD) in situ    Mild intermittent asthma with acute exacerbation    ASCUS with positive high risk HPV cervical     Past Medical History:   Diagnosis Date    Asthma     Coronary artery disease     defibrillator    HIV positive ([place] [id number] )      Social History     Socioeconomic History    Marital status: Single     Spouse name: Not on file    Number of children: Not on file    Years of education: Not on file    Highest education level: Not on file   Occupational History    Not on file   Social Needs    Financial resource strain: Not on file    Food insecurity:     Worry: Not on file     Inability: Not on file    Transportation needs:     Medical: Not on file     Non-medical: Not on file   Tobacco Use    Smoking status: Former Smoker     Packs/day: 1      Years: 30      Pack years:      Types: Cigarettes     Last attempt to quit: 2019     Years since quittin 5    Smokeless tobacco: Former User     Quit date: 2019    Tobacco comment: 1/2 pk day   Substance and Sexual Activity    Alcohol use: Yes     Comment: ocassionally    Drug use: No    Sexual activity: Not Currently   Lifestyle    Physical activity:     Days per week: Not on file     Minutes per session: Not on file    Stress: Not on file   Relationships    Social connections:     Talks on phone: Not on file     Gets together: Not on file     Attends [employer] service: Not on file     Active member of club or organization: Not on file     Attends meetings of clubs or organizations: Not on file     Relationship status: Not on file    Intimate partner violence:     Fear of current or ex partner: Not on file     Emotionally abused: Not on file     Physically abused: Not on file     Forced sexual activity: Not on file   Other Topics Concern    Not on file   Social History Narrative    Not on file Family History   Problem Relation Age of Onset    Other Father         GI bleed    Suicidality Brother     Drug abuse Brother         overdose     Past Surgical History:   Procedure Laterality Date    ANGIOPLASTY      CARDIAC DEFIBRILLATOR PLACEMENT      CORONARY ANGIOPLASTY      pci placement    TOTAL ABDOMINAL HYSTERECTOMY         Current Outpatient Medications:     albuterol (VENTOLIN HFA) 90 mcg/act inhaler, Inhale 1 puff 4 (four) times a day, Disp: 2 Inhaler, Rfl: 0    aspirin (ECOTRIN LOW STRENGTH) 81 mg EC tablet, Take 1 tablet (81 mg total) by mouth daily, Disp: 81 tablet, Rfl: 10    atorvastatin (LIPITOR) 80 mg tablet, Take 1 tablet (80 mg total) by mouth daily, Disp: 30 tablet, Rfl: 5    BIKTARVY -25 MG tablet, TAKE 1 TABLET BY MOUTH DAILY WITH BREAKFAST, Disp: 30 tablet, Rfl: 10    carvedilol (COREG) 6 25 mg tablet, Take 1 tablet (6 25 mg total) by mouth 2 (two) times a day with meals, Disp: 180 tablet, Rfl: 3    sacubitril-valsartan (Entresto) 49-51 MG TABS, Take 1 tablet by mouth 2 (two) times a day, Disp: 180 tablet, Rfl: 3    warfarin (COUMADIN) 1 mg tablet, TAKE 1 TABLET BY MOUTH DAILY WITH COUMADIN 5MG FOR THE NEXT THREE DAYS, Disp: 90 tablet, Rfl: 0    warfarin (COUMADIN) 5 mg tablet, Take 1 tablet (5 mg total) by mouth daily, Disp: 90 tablet, Rfl: 5    benzonatate (TESSALON PERLES) 100 mg capsule, Take 1 capsule (100 mg total) by mouth every 8 (eight) hours (Patient not taking: Reported on 3/9/2020), Disp: 21 capsule, Rfl: 0    Blood Pressure Monitoring (BLOOD PRESSURE CUFF) MISC, by Does not apply route daily (Patient not taking: Reported on 6/6/2019), Disp: 1 each, Rfl: 0    fluticasone (FLONASE) 50 mcg/act nasal spray, 1 spray into each nostril daily (Patient not taking: Reported on 2/17/2020), Disp: 16 g, Rfl: 0    nicotine (NICODERM CQ) 21 mg/24 hr TD 24 hr patch, Place 1 patch on the skin every 24 hours (Patient not taking: Reported on 3/9/2020), Disp: 28 patch, Rfl: 7    nicotine polacrilex (COMMIT) 2 MG lozenge, Apply 1 lozenge (2 mg total) to the mouth or throat as needed for smoking cessation (Patient not taking: Reported on 12/19/2019), Disp: 100 each, Rfl: 3  Allergies   Allergen Reactions    No Active Allergies     No Known Allergies        No results found for this visit on 03/09/20  Lab Results   Component Value Date    CHOLESTEROL 147 02/20/2020    CHOLESTEROL 152 07/01/2019    CHOLESTEROL 176 11/02/2018     Lab Results   Component Value Date    HDL 47 02/20/2020    HDL 45 07/01/2019    HDL 40 11/02/2018     Lab Results   Component Value Date    TRIG 185 (H) 02/20/2020    TRIG 176 (H) 07/01/2019    TRIG 171 (H) 11/02/2018     Lab Results   Component Value Date    NONHDLC 107 07/01/2019     Lab Results   Component Value Date    SODIUM 140 11/05/2019    K 4 9 11/05/2019     11/05/2019    CO2 28 11/05/2019    BUN 14 11/05/2019    CREATININE 1 38 (H) 11/05/2019    GLUC 90 01/24/2019    CALCIUM 9 9 11/05/2019     Lab Results   Component Value Date    SODIUM 140 11/05/2019    K 4 9 11/05/2019     11/05/2019    CO2 28 11/05/2019    AGAP 6 11/05/2019    BUN 14 11/05/2019    CREATININE 1 38 (H) 11/05/2019    GLUC 90 01/24/2019    GLUF 113 (H) 11/05/2019    CALCIUM 9 9 11/05/2019    AST 25 11/05/2019    ALT 23 11/05/2019    ALKPHOS 83 11/05/2019    TP 7 8 11/05/2019    TBILI 0 80 11/05/2019    EGFR 44 (L) 11/05/2019     Lab Results   Component Value Date    WBC 5 30 11/05/2019    WBC 5 7 11/05/2019    HGB 14 1 11/05/2019    HGB 13 9 11/05/2019    HCT 41 3 11/05/2019    HCT 41 0 11/05/2019    MCV 95 11/05/2019    MCV 93 11/05/2019     11/05/2019     11/05/2019         Review of Systems:  Review of Systems   Respiratory: Negative for cough, choking, chest tightness, shortness of breath and wheezing  Cardiovascular: Negative for chest pain, palpitations and leg swelling  Musculoskeletal: Negative for gait problem     Skin: Negative for rash    Neurological: Negative for dizziness, tremors, syncope, weakness, light-headedness, numbness and headaches  Psychiatric/Behavioral: Negative for agitation and behavioral problems  The patient is not hyperactive  Physical Exam:  BP 90/60   Pulse 60   Ht 5' 2" (1 575 m)   Wt 70 3 kg (155 lb)   BMI 28 35 kg/m²    Physical Exam   Constitutional: She is oriented to person, place, and time  She appears well-developed and well-nourished  No distress  HENT:   Head: Normocephalic and atraumatic  Neck: No JVD present  No thyromegaly present  Cardiovascular: Normal rate, regular rhythm, normal heart sounds and intact distal pulses  Exam reveals no gallop and no friction rub  No murmur heard  Pulmonary/Chest: No respiratory distress  She has no wheezes  She has no rales  Musculoskeletal: She exhibits no edema  Neurological: She is alert and oriented to person, place, and time  Skin: Skin is warm and dry  Psychiatric: She has a normal mood and affect  Her behavior is normal        Discussion/Summary  Return in 6 months

## 2020-03-11 ENCOUNTER — ANTICOAG VISIT (OUTPATIENT)
Dept: CARDIOLOGY CLINIC | Facility: CLINIC | Age: 55
End: 2020-03-11

## 2020-03-11 ENCOUNTER — APPOINTMENT (OUTPATIENT)
Dept: LAB | Facility: HOSPITAL | Age: 55
End: 2020-03-11
Payer: COMMERCIAL

## 2020-03-11 DIAGNOSIS — I23.6 LV (LEFT VENTRICULAR) MURAL THROMBUS FOLLOWING MI (HCC): ICD-10-CM

## 2020-03-11 DIAGNOSIS — I25.5 ISCHEMIC CARDIOMYOPATHY: ICD-10-CM

## 2020-03-11 DIAGNOSIS — Z79.01 LONG TERM CURRENT USE OF ANTICOAGULANT THERAPY: ICD-10-CM

## 2020-03-11 LAB
INR PPP: 1.74 (ref 0.91–1.09)
PROTHROMBIN TIME: 19.1 SECONDS (ref 9.8–12)

## 2020-03-11 PROCEDURE — 85610 PROTHROMBIN TIME: CPT

## 2020-03-11 PROCEDURE — 36415 COLL VENOUS BLD VENIPUNCTURE: CPT

## 2020-03-12 ENCOUNTER — PATIENT OUTREACH (OUTPATIENT)
Dept: SURGERY | Facility: CLINIC | Age: 55
End: 2020-03-12
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
## 2020-03-14 ENCOUNTER — HOSPITAL ENCOUNTER (EMERGENCY)
Facility: HOSPITAL | Age: 55
Discharge: HOME | End: 2020-03-14
Attending: EMERGENCY MEDICINE | Admitting: EMERGENCY MEDICINE
Payer: COMMERCIAL

## 2020-03-14 ENCOUNTER — APPOINTMENT (EMERGENCY)
Dept: RADIOLOGY | Facility: HOSPITAL | Age: 55
End: 2020-03-14
Payer: COMMERCIAL

## 2020-03-14 VITALS
DIASTOLIC BLOOD PRESSURE: 62 MMHG | OXYGEN SATURATION: 100 % | RESPIRATION RATE: 18 BRPM | BODY MASS INDEX: 28.27 KG/M2 | WEIGHT: 154.54 LBS | HEART RATE: 64 BPM | SYSTOLIC BLOOD PRESSURE: 107 MMHG | TEMPERATURE: 97.5 F

## 2020-03-14 DIAGNOSIS — J40 BRONCHITIS: Primary | ICD-10-CM

## 2020-03-14 LAB
ALBUMIN SERPL BCP-MCNC: 4 G/DL (ref 3–5.2)
ALP SERPL-CCNC: 67 U/L (ref 43–122)
ALT SERPL W P-5'-P-CCNC: 21 U/L (ref 9–52)
ANION GAP SERPL CALCULATED.3IONS-SCNC: 6 MMOL/L (ref 5–14)
APTT PPP: 37 SECONDS (ref 25–32)
AST SERPL W P-5'-P-CCNC: 24 U/L (ref 14–36)
ATRIAL RATE: 63 BPM
BASOPHILS # BLD AUTO: 0 THOUSANDS/ΜL (ref 0–0.1)
BASOPHILS NFR BLD AUTO: 1 % (ref 0–1)
BILIRUB SERPL-MCNC: 0.4 MG/DL
BUN SERPL-MCNC: 12 MG/DL (ref 5–25)
CALCIUM SERPL-MCNC: 9.1 MG/DL (ref 8.4–10.2)
CHLORIDE SERPL-SCNC: 108 MMOL/L (ref 97–108)
CO2 SERPL-SCNC: 27 MMOL/L (ref 22–30)
CREAT SERPL-MCNC: 1.3 MG/DL (ref 0.6–1.2)
EOSINOPHIL # BLD AUTO: 0.1 THOUSAND/ΜL (ref 0–0.4)
EOSINOPHIL NFR BLD AUTO: 2 % (ref 0–6)
ERYTHROCYTE [DISTWIDTH] IN BLOOD BY AUTOMATED COUNT: 14.7 %
FLUAV RNA NPH QL NAA+PROBE: NORMAL
FLUBV RNA NPH QL NAA+PROBE: NORMAL
GFR SERPL CREATININE-BSD FRML MDRD: 47 ML/MIN/1.73SQ M
GLUCOSE SERPL-MCNC: 103 MG/DL (ref 70–99)
HCT VFR BLD AUTO: 40.6 % (ref 36–46)
HGB BLD-MCNC: 13.7 G/DL (ref 12–16)
INR PPP: 2.2 (ref 0.91–1.09)
LYMPHOCYTES # BLD AUTO: 1.1 THOUSANDS/ΜL (ref 0.5–4)
LYMPHOCYTES NFR BLD AUTO: 19 % (ref 25–45)
MCH RBC QN AUTO: 32.3 PG (ref 26–34)
MCHC RBC AUTO-ENTMCNC: 33.7 G/DL (ref 31–36)
MCV RBC AUTO: 96 FL (ref 80–100)
MONOCYTES # BLD AUTO: 0.5 THOUSAND/ΜL (ref 0.2–0.9)
MONOCYTES NFR BLD AUTO: 9 % (ref 1–10)
NEUTROPHILS # BLD AUTO: 4.1 THOUSANDS/ΜL (ref 1.8–7.8)
NEUTS SEG NFR BLD AUTO: 69 % (ref 45–65)
NT-PROBNP SERPL-MCNC: 556 PG/ML (ref 0–299)
P AXIS: 60 DEGREES
PLATELET # BLD AUTO: 152 THOUSANDS/UL (ref 150–450)
PMV BLD AUTO: 8.9 FL (ref 8.9–12.7)
POTASSIUM SERPL-SCNC: 4.3 MMOL/L (ref 3.6–5)
PR INTERVAL: 124 MS
PROT SERPL-MCNC: 7.5 G/DL (ref 5.9–8.4)
PROTHROMBIN TIME: 24 SECONDS (ref 9.8–12)
QRS AXIS: -51 DEGREES
QRSD INTERVAL: 88 MS
QT INTERVAL: 428 MS
QTC INTERVAL: 437 MS
RBC # BLD AUTO: 4.24 MILLION/UL (ref 4–5.2)
RSV RNA NPH QL NAA+PROBE: NORMAL
SODIUM SERPL-SCNC: 141 MMOL/L (ref 137–147)
T WAVE AXIS: 80 DEGREES
TROPONIN I SERPL-MCNC: <0.01 NG/ML (ref 0–0.03)
VENTRICULAR RATE: 63 BPM
WBC # BLD AUTO: 5.9 THOUSAND/UL (ref 4.5–11)

## 2020-03-14 PROCEDURE — 85610 PROTHROMBIN TIME: CPT | Performed by: EMERGENCY MEDICINE

## 2020-03-14 PROCEDURE — 96361 HYDRATE IV INFUSION ADD-ON: CPT

## 2020-03-14 PROCEDURE — 85025 COMPLETE CBC W/AUTO DIFF WBC: CPT | Performed by: EMERGENCY MEDICINE

## 2020-03-14 PROCEDURE — 83880 ASSAY OF NATRIURETIC PEPTIDE: CPT | Performed by: EMERGENCY MEDICINE

## 2020-03-14 PROCEDURE — 36415 COLL VENOUS BLD VENIPUNCTURE: CPT | Performed by: EMERGENCY MEDICINE

## 2020-03-14 PROCEDURE — 85730 THROMBOPLASTIN TIME PARTIAL: CPT | Performed by: EMERGENCY MEDICINE

## 2020-03-14 PROCEDURE — 71045 X-RAY EXAM CHEST 1 VIEW: CPT

## 2020-03-14 PROCEDURE — 96360 HYDRATION IV INFUSION INIT: CPT

## 2020-03-14 PROCEDURE — 93005 ELECTROCARDIOGRAM TRACING: CPT

## 2020-03-14 PROCEDURE — 87631 RESP VIRUS 3-5 TARGETS: CPT | Performed by: EMERGENCY MEDICINE

## 2020-03-14 PROCEDURE — 99284 EMERGENCY DEPT VISIT MOD MDM: CPT | Performed by: EMERGENCY MEDICINE

## 2020-03-14 PROCEDURE — 84484 ASSAY OF TROPONIN QUANT: CPT | Performed by: EMERGENCY MEDICINE

## 2020-03-14 PROCEDURE — 99285 EMERGENCY DEPT VISIT HI MDM: CPT

## 2020-03-14 PROCEDURE — 93010 ELECTROCARDIOGRAM REPORT: CPT | Performed by: INTERNAL MEDICINE

## 2020-03-14 PROCEDURE — 80053 COMPREHEN METABOLIC PANEL: CPT | Performed by: EMERGENCY MEDICINE

## 2020-03-14 RX ORDER — ALBUTEROL SULFATE 90 UG/1
1-2 AEROSOL, METERED RESPIRATORY (INHALATION) EVERY 6 HOURS PRN
Qty: 1 INHALER | Refills: 0 | Status: SHIPPED | OUTPATIENT
Start: 2020-03-14 | End: 2020-09-16

## 2020-03-14 RX ORDER — DOXYCYCLINE HYCLATE 100 MG
100 TABLET ORAL 2 TIMES DAILY
Qty: 20 TABLET | Refills: 0 | Status: SHIPPED | OUTPATIENT
Start: 2020-03-14 | End: 2020-03-24

## 2020-03-14 RX ADMIN — SODIUM CHLORIDE 500 ML: 0.9 INJECTION, SOLUTION INTRAVENOUS at 12:08

## 2020-03-15 NOTE — ED PROVIDER NOTES
History  Chief Complaint   Patient presents with    Chest Pain     chest pain since this morning  also having cough, runny nose, body aches for a few days       History provided by:  Patient  Chest Pain   Pain location:  Substernal area  Pain quality: aching    Pain radiates to:  Does not radiate  Pain severity:  Mild  Onset quality:  Gradual  Timing:  Constant  Progression:  Worsening  Chronicity:  New  Relieved by:  Nothing  Worsened by:  Nothing tried  Ineffective treatments:  None tried  Associated symptoms: cough and shortness of breath    Associated symptoms: no abdominal pain, no dizziness, no dysphagia, no fever, no headache, no nausea, no numbness and no weakness        Prior to Admission Medications   Prescriptions Last Dose Informant Patient Reported? Taking?    BIKTARVY -25 MG tablet  Self No No   Sig: TAKE 1 TABLET BY MOUTH DAILY WITH BREAKFAST   Blood Pressure Monitoring (BLOOD PRESSURE CUFF) MISC  Self No No   Sig: by Does not apply route daily   Patient not taking: Reported on 2019   albuterol (VENTOLIN HFA) 90 mcg/act inhaler  Self No No   Sig: Inhale 1 puff 4 (four) times a day   aspirin (ECOTRIN LOW STRENGTH) 81 mg EC tablet  Self No No   Sig: Take 1 tablet (81 mg total) by mouth daily   atorvastatin (LIPITOR) 80 mg tablet  Self No No   Sig: Take 1 tablet (80 mg total) by mouth daily   benzonatate (TESSALON PERLES) 100 mg capsule  Self No No   Sig: Take 1 capsule (100 mg total) by mouth every 8 (eight) hours   Patient not taking: Reported on 3/9/2020   carvedilol (COREG) 6 25 mg tablet  Self No No   Sig: Take 1 tablet (6 25 mg total) by mouth 2 (two) times a day with meals   fluticasone (FLONASE) 50 mcg/act nasal spray  Self No No   Si spray into each nostril daily   Patient not taking: Reported on 2020   nicotine (NICODERM CQ) 21 mg/24 hr TD 24 hr patch  Self No No   Sig: Place 1 patch on the skin every 24 hours   Patient not taking: Reported on 3/9/2020   nicotine polacrilex (COMMIT) 2 MG lozenge  Self No No   Sig: Apply 1 lozenge (2 mg total) to the mouth or throat as needed for smoking cessation   Patient not taking: Reported on 12/19/2019   sacubitril-valsartan (Entresto) 49-51 MG TABS   No No   Sig: Take 1 tablet by mouth 2 (two) times a day   warfarin (COUMADIN) 1 mg tablet  Self No No   Sig: TAKE 1 TABLET BY MOUTH DAILY WITH COUMADIN 5MG FOR THE NEXT THREE DAYS   warfarin (COUMADIN) 5 mg tablet  Self No No   Sig: Take 1 tablet (5 mg total) by mouth daily      Facility-Administered Medications: None       Past Medical History:   Diagnosis Date    Asthma     Coronary artery disease     defibrillator    HIV positive ([facility] [address] )        Past Surgical History:   Procedure Laterality Date    ANGIOPLASTY      CARDIAC DEFIBRILLATOR PLACEMENT      CORONARY ANGIOPLASTY      pci placement    TOTAL ABDOMINAL HYSTERECTOMY         Family History   Problem Relation Age of Onset    Other Father         GI bleed    Suicidality Brother     Drug abuse Brother         overdose     I have reviewed and agree with the history as documented  E-Cigarette/Vaping    E-Cigarette Use Never User      E-Cigarette/Vaping Substances     Social History     Tobacco Use    Smoking status: Current Every Day Smoker     Packs/day: 0 50     Years: 30 00     Pack years: 15 00     Types: Cigarettes    Smokeless tobacco: Former User     Quit date: 8/12/2019    Tobacco comment: 1/2 pk day   Substance Use Topics    Alcohol use: Yes     Comment: ocassionally    Drug use: No       Review of Systems   Constitutional: Negative for chills and fever  HENT: Negative for rhinorrhea, sore throat and trouble swallowing  Eyes: Negative for pain  Respiratory: Positive for cough and shortness of breath  Negative for wheezing and stridor  Cardiovascular: Positive for chest pain  Negative for leg swelling  Gastrointestinal: Negative for abdominal pain, diarrhea and nausea  Endocrine: Negative for polyuria  Genitourinary: Negative for dysuria, flank pain and urgency  Musculoskeletal: Negative for joint swelling, myalgias and neck stiffness  Skin: Negative for rash  Allergic/Immunologic: Negative for immunocompromised state  Neurological: Negative for dizziness, syncope, weakness, numbness and headaches  Psychiatric/Behavioral: Negative for confusion and suicidal ideas  All other systems reviewed and are negative  Physical Exam  Physical Exam   Constitutional: She is oriented to person, place, and time  She appears well-developed and well-nourished  HENT:   Head: Normocephalic and atraumatic  Eyes: Pupils are equal, round, and reactive to light  EOM are normal    Neck: Normal range of motion  Neck supple  Cardiovascular: Normal rate and regular rhythm  Exam reveals no friction rub  No murmur heard  AICD In place      Pulmonary/Chest: Breath sounds normal  No respiratory distress  She has no wheezes  She has no rales  Abdominal: Soft  Bowel sounds are normal  She exhibits no distension  There is no tenderness  Musculoskeletal: Normal range of motion  She exhibits no edema or tenderness  Neurological: She is alert and oriented to person, place, and time  Skin: Skin is warm  No rash noted  Psychiatric: She has a normal mood and affect  Nursing note and vitals reviewed        Vital Signs  ED Triage Vitals [03/14/20 1120]   Temperature Pulse Respirations Blood Pressure SpO2   97 5 °F (36 4 °C) 64 18 107/62 100 %      Temp Source Heart Rate Source Patient Position - Orthostatic VS BP Location FiO2 (%)   Tympanic Monitor Sitting Left arm --      Pain Score       7           Vitals:    03/14/20 1120   BP: 107/62   Pulse: 64   Patient Position - Orthostatic VS: Sitting         Visual Acuity      ED Medications  Medications   sodium chloride 0 9 % bolus 500 mL (0 mL Intravenous Stopped 3/14/20 1402)       Diagnostic Studies  Results Reviewed     Procedure Component Value Units Date/Time Influenza A/B and RSV PCR [[id number]]  (Normal) Collected:  03/14/20 1205    Lab Status:  Final result Specimen:  Nasopharyngeal from Nose Updated:  03/14/20 1251     INFLUENZA A PCR None Detected     INFLUENZA B PCR None Detected     RSV PCR None Detected    NT-BNP PRO [[id number]]  (Abnormal) Collected:  03/14/20 1205    Lab Status:  Final result Specimen:  Blood from Arm, Right Updated:  03/14/20 1243     NT-proBNP 556 pg/mL     Troponin I [[id number]]  (Normal) Collected:  03/14/20 1205    Lab Status:  Final result Specimen:  Blood from Arm, Right Updated:  03/14/20 1243     Troponin I <0 01 ng/mL     Comprehensive metabolic panel [[id number]]  (Abnormal) Collected:  03/14/20 1205    Lab Status:  Final result Specimen:  Blood from Arm, Right Updated:  03/14/20 1235     Sodium 141 mmol/L      Potassium 4 3 mmol/L      Chloride 108 mmol/L      CO2 27 mmol/L      ANION GAP 6 mmol/L      BUN 12 mg/dL      Creatinine 1 30 mg/dL      Glucose 103 mg/dL      Calcium 9 1 mg/dL      AST 24 U/L      ALT 21 U/L      Alkaline Phosphatase 67 U/L      Total Protein 7 5 g/dL      Albumin 4 0 g/dL      Total Bilirubin 0 40 mg/dL      eGFR 47 ml/min/1 73sq m     Narrative:       [place] guidelines for Chronic Kidney Disease (CKD):     Stage 1 with normal or high GFR (GFR > 90 mL/min/1 73 square meters)    Stage 2 Mild CKD (GFR = 60-89 mL/min/1 73 square meters)    Stage 3A Moderate CKD (GFR = 45-59 mL/min/1 73 square meters)    Stage 3B Moderate CKD (GFR = 30-44 mL/min/1 73 square meters)    Stage 4 Severe CKD (GFR = 15-29 mL/min/1 73 square meters)    Stage 5 End Stage CKD (GFR <15 mL/min/1 73 square meters)  Note: GFR calculation is accurate only with a steady state creatinine    Protime-INR [[id number]]  (Abnormal) Collected:  03/14/20 1205    Lab Status:  Final result Specimen:  Blood from Arm, Right Updated:  03/14/20 1233     Protime 24 0 seconds      INR 2 20    APTT [[id number]]  (Abnormal) Collected:  03/14/20 1205    Lab Status:  Final result Specimen:  Blood from Arm, Right Updated:  03/14/20 1233     PTT 37 seconds     CBC and differential [[id number]]  (Abnormal) Collected:  03/14/20 1205    Lab Status:  Final result Specimen:  Blood from Arm, Right Updated:  03/14/20 1227     WBC 5 90 Thousand/uL      RBC 4 24 Million/uL      Hemoglobin 13 7 g/dL      Hematocrit 40 6 %      MCV 96 fL      MCH 32 3 pg      MCHC 33 7 g/dL      RDW 14 7 %      MPV 8 9 fL      Platelets 273 Thousands/uL      Neutrophils Relative 69 %      Lymphocytes Relative 19 %      Monocytes Relative 9 %      Eosinophils Relative 2 %      Basophils Relative 1 %      Neutrophils Absolute 4 10 Thousands/µL      Lymphocytes Absolute 1 10 Thousands/µL      Monocytes Absolute 0 50 Thousand/µL      Eosinophils Absolute 0 10 Thousand/µL      Basophils Absolute 0 00 Thousands/µL                  XR chest 1 view portable   Final Result by Leonie Holguin DO (03/14 1526)      No acute cardiopulmonary disease  Workstation performed: [id number]                    Procedures  Procedures         ED Course  ED Course as of Mar 15 0736   Sat Mar 14, 2020   1337 Background: 48 y o  female with chest pain    Differential DX includes but is not limited to: acs/mi, pe, pleurisy, dissection, pneumonia, musculoskeletal chest pain    Plan: cardiac workup          1337 Pt re-examined and evaluated after testing and treatment  Spoke with the patient and feeling improved and sxs have resolved  Will discharge home with close f/u with pcp and instructed to return to the ED if sxs worsen or continue  Pt agrees with the plan for discharge and feels comfortable to go home with proper f/u  Advised to return for worsening or additional problems  Diagnostic tests were reviewed and questions answered  Diagnosis, care plan and treatment options were discussed  The patient understand instructions and will follow up as directed      Counseling: I had a detailed discussion with the patient and/or guardian regarding: the historical points, exam findings, and any diagnostic results supporting the discharge diagnosis, lab results, radiology results, discharge instructions reviewed with patient and/or family/caregiver and understanding was verbalized  Instructions given to return to the emergency department if symptoms worsen or persist, or if there are any questions or concerns that arise at home  All labs reviewed and utilized in the medical decision making process    All radiology studies independently viewed by me and interpreted by the radiologist                   HEART Risk Score      Most Recent Value   Heart Score Risk Calculator   History  0 Filed at: 03/14/2020 1338   ECG  1 Filed at: 03/14/2020 1338   Age  1 Filed at: 03/14/2020 1338   Risk Factors  1 Filed at: 03/14/2020 1338   Troponin  0 Filed at: 03/14/2020 1338   HEART Score  3 Filed at: 03/14/2020 1338                              MDM  Number of Diagnoses or Management Options  Bronchitis: new and requires workup  Diagnosis management comments: Background: 48 y o  female with chest pain    Differential DX includes but is not limited to: acs/mi, pe, pleurisy, dissection, pneumonia, musculoskeletal chest pain    Plan: cardiac workup      CXR with no infiltrate, workup unremarkable  Tx with meds for bronchitis     Pt re-examined and evaluated after testing and treatment  Spoke with the patient and feeling improved and sxs have resolved  Will discharge home with close f/u with pcp and instructed to return to the ED if sxs worsen or continue  Pt agrees with the plan for discharge and feels comfortable to go home with proper f/u  Advised to return for worsening or additional problems  Diagnostic tests were reviewed and questions answered  Diagnosis, care plan and treatment options were discussed  The patient understand instructions and will follow up as directed      Counseling: I had a detailed discussion with the patient and/or guardian regarding: the historical points, exam findings, and any diagnostic results supporting the discharge diagnosis, lab results, radiology results, discharge instructions reviewed with patient and/or family/caregiver and understanding was verbalized  Instructions given to return to the emergency department if symptoms worsen or persist, or if there are any questions or concerns that arise at home  All labs reviewed and utilized in the medical decision making process    All radiology studies independently viewed by me and interpreted by the radiologist            Amount and/or Complexity of Data Reviewed  Clinical lab tests: ordered and reviewed  Tests in the radiology section of CPT®: ordered and reviewed  Tests in the medicine section of CPT®: ordered and reviewed  Decide to obtain previous medical records or to obtain history from someone other than the patient: yes  Review and summarize past medical records: yes  Independent visualization of images, tracings, or specimens: yes          Disposition  Final diagnoses:   Bronchitis     Time reflects when diagnosis was documented in both MDM as applicable and the Disposition within this note     Time User Action Codes Description Comment    3/14/2020  1:38 PM Nay Monsivais [[id number]] Bronchitis       ED Disposition     ED Disposition Condition Date/Time Comment    Discharge Stable Sat Mar 14, 2020  1:38 PM Verónica Elder discharge to home/self care              Follow-up Information     Follow up With Specialties Details Why Contact Info Additional [address] [facility] Schedule an appointment as soon as possible for a visit  If symptoms worsen [address], [address] [address]  [address], [address], [address] [place], [place], [address]          Discharge Medication List as of 3/14/2020 1:40 PM      START taking these medications    Details   !! albuterol (PROVENTIL HFA,VENTOLIN HFA) 90 mcg/act inhaler Inhale 1-2 puffs every 6 (six) hours as needed for wheezing, Starting Sat 3/14/2020, Normal      doxycycline hyclate (VIBRA-TABS) 100 mg tablet Take 1 tablet (100 mg total) by mouth 2 (two) times a day for 10 days, Starting Sat 3/14/2020, Until Tue 3/24/2020, Normal       !! - Potential duplicate medications found  Please discuss with provider        CONTINUE these medications which have NOT CHANGED    Details   !! albuterol (VENTOLIN HFA) 90 mcg/act inhaler Inhale 1 puff 4 (four) times a day, Starting Thu 11/14/2019, Normal      aspirin (ECOTRIN LOW STRENGTH) 81 mg EC tablet Take 1 tablet (81 mg total) by mouth daily, Starting Mon 8/12/2019, Normal      atorvastatin (LIPITOR) 80 mg tablet Take 1 tablet (80 mg total) by mouth daily, Starting Wed 9/11/2019, Normal      benzonatate (TESSALON PERLES) 100 mg capsule Take 1 capsule (100 mg total) by mouth every 8 (eight) hours, Starting Mon 12/16/2019, Print      BIKTARVY -25 MG tablet TAKE 1 TABLET BY MOUTH DAILY WITH BREAKFAST, Normal      Blood Pressure Monitoring (BLOOD PRESSURE CUFF) MISC by Does not apply route daily, Starting Fri 5/10/2019, Normal      carvedilol (COREG) 6 25 mg tablet Take 1 tablet (6 25 mg total) by mouth 2 (two) times a day with meals, Starting Thu 12/19/2019, Normal      fluticasone (FLONASE) 50 mcg/act nasal spray 1 spray into each nostril daily, Starting Mon 12/16/2019, Print      nicotine (NICODERM CQ) 21 mg/24 hr TD 24 hr patch Place 1 patch on the skin every 24 hours, Starting Mon 8/12/2019, Normal      nicotine polacrilex (COMMIT) 2 MG lozenge Apply 1 lozenge (2 mg total) to the mouth or throat as needed for smoking cessation, Starting Thu 9/12/2019, Normal      sacubitril-valsartan (Entresto) 49-51 MG TABS Take 1 tablet by mouth 2 (two) times a day, Starting Mon 3/9/2020, Normal      !! warfarin (COUMADIN) 1 mg tablet TAKE 1 TABLET BY MOUTH DAILY WITH COUMADIN 5MG FOR THE NEXT THREE DAYS, Normal      !! warfarin (COUMADIN) 5 mg tablet Take 1 tablet (5 mg total) by mouth daily, Starting Wed 3/13/2019, Normal       !! - Potential duplicate medications found  Please discuss with provider  No discharge procedures on file      PDMP Review     None          ED Provider  Electronically Signed by           Kiya Tovar DO  03/15/20 4943

## 2020-03-17 ENCOUNTER — PATIENT OUTREACH (OUTPATIENT)
Dept: SURGERY | Facility: CLINIC | Age: 55
End: 2020-03-17

## 2020-03-18 ENCOUNTER — ANTICOAG VISIT (OUTPATIENT)
Dept: CARDIOLOGY CLINIC | Facility: CLINIC | Age: 55
End: 2020-03-18

## 2020-03-18 ENCOUNTER — APPOINTMENT (OUTPATIENT)
Dept: LAB | Facility: HOSPITAL | Age: 55
End: 2020-03-18
Payer: COMMERCIAL

## 2020-03-18 DIAGNOSIS — I25.5 ISCHEMIC CARDIOMYOPATHY: ICD-10-CM

## 2020-03-18 DIAGNOSIS — I23.6 LV (LEFT VENTRICULAR) MURAL THROMBUS FOLLOWING MI (HCC): ICD-10-CM

## 2020-03-18 DIAGNOSIS — Z79.01 LONG TERM CURRENT USE OF ANTICOAGULANT THERAPY: ICD-10-CM

## 2020-03-18 NOTE — PROGRESS NOTES
3/18/20, tc to pt, started on antibiotic on sat, 3/14  Will take 2 5 mg x 2 days, inr due fri   ramses

## 2020-03-20 ENCOUNTER — ANTICOAG VISIT (OUTPATIENT)
Dept: CARDIOLOGY CLINIC | Facility: CLINIC | Age: 55
End: 2020-03-20

## 2020-03-20 ENCOUNTER — APPOINTMENT (OUTPATIENT)
Dept: LAB | Facility: HOSPITAL | Age: 55
End: 2020-03-20
Payer: COMMERCIAL

## 2020-03-20 DIAGNOSIS — I25.5 ISCHEMIC CARDIOMYOPATHY: ICD-10-CM

## 2020-03-20 DIAGNOSIS — I23.6 LV (LEFT VENTRICULAR) MURAL THROMBUS FOLLOWING MI (HCC): ICD-10-CM

## 2020-03-20 DIAGNOSIS — I51.3 LV (LEFT VENTRICULAR) MURAL THROMBUS: ICD-10-CM

## 2020-03-20 DIAGNOSIS — Z79.01 LONG TERM CURRENT USE OF ANTICOAGULANT THERAPY: ICD-10-CM

## 2020-03-20 LAB
INR PPP: 1.78 (ref 0.91–1.09)
PROTHROMBIN TIME: 19.4 SECONDS (ref 9.8–12)

## 2020-03-20 PROCEDURE — 36415 COLL VENOUS BLD VENIPUNCTURE: CPT

## 2020-03-20 PROCEDURE — 85610 PROTHROMBIN TIME: CPT

## 2020-03-20 NOTE — PROGRESS NOTES
3/20/20, tc to pt, left message on answering machine, will resume 5 mg daily, inr due 1 week   ramses

## 2020-03-26 ENCOUNTER — TELEPHONE (OUTPATIENT)
Dept: FAMILY MEDICINE CLINIC | Facility: CLINIC | Age: 55
End: 2020-03-26

## 2020-03-27 ENCOUNTER — ANTICOAG VISIT (OUTPATIENT)
Dept: CARDIOLOGY CLINIC | Facility: CLINIC | Age: 55
End: 2020-03-27

## 2020-03-27 ENCOUNTER — APPOINTMENT (OUTPATIENT)
Dept: LAB | Facility: HOSPITAL | Age: 55
End: 2020-03-27
Payer: COMMERCIAL

## 2020-03-27 ENCOUNTER — TRANSCRIBE ORDERS (OUTPATIENT)
Dept: ADMINISTRATIVE | Facility: HOSPITAL | Age: 55
End: 2020-03-27

## 2020-03-27 DIAGNOSIS — Z79.01 LONG TERM CURRENT USE OF ANTICOAGULANT THERAPY: ICD-10-CM

## 2020-03-27 DIAGNOSIS — I23.6 LV (LEFT VENTRICULAR) MURAL THROMBUS FOLLOWING MI (HCC): ICD-10-CM

## 2020-03-27 DIAGNOSIS — I25.5 ISCHEMIC CARDIOMYOPATHY: ICD-10-CM

## 2020-03-27 LAB
INR PPP: 2.6 (ref 0.84–1.19)
PROTHROMBIN TIME: 26.9 SECONDS (ref 11.6–14.5)

## 2020-03-27 PROCEDURE — 36415 COLL VENOUS BLD VENIPUNCTURE: CPT

## 2020-03-27 PROCEDURE — 85610 PROTHROMBIN TIME: CPT

## 2020-03-31 DIAGNOSIS — I51.3 MURAL THROMBUS OF LEFT VENTRICLE: ICD-10-CM

## 2020-04-01 RX ORDER — WARFARIN SODIUM 5 MG/1
TABLET ORAL
Qty: 90 TABLET | Refills: 11 | Status: SHIPPED | OUTPATIENT
Start: 2020-04-01 | End: 2021-03-19

## 2020-04-07 ENCOUNTER — APPOINTMENT (OUTPATIENT)
Dept: LAB | Facility: HOSPITAL | Age: 55
End: 2020-04-07
Payer: COMMERCIAL

## 2020-04-07 ENCOUNTER — ANTICOAG VISIT (OUTPATIENT)
Dept: CARDIOLOGY CLINIC | Facility: CLINIC | Age: 55
End: 2020-04-07

## 2020-04-07 DIAGNOSIS — B20 HIV DISEASE (HCC): ICD-10-CM

## 2020-04-07 DIAGNOSIS — E78.5 HYPERLIPIDEMIA, UNSPECIFIED HYPERLIPIDEMIA TYPE: ICD-10-CM

## 2020-04-07 DIAGNOSIS — I25.5 ISCHEMIC CARDIOMYOPATHY: ICD-10-CM

## 2020-04-07 DIAGNOSIS — Z11.3 ROUTINE SCREENING FOR STI (SEXUALLY TRANSMITTED INFECTION): ICD-10-CM

## 2020-04-07 DIAGNOSIS — Z13.6 SCREENING FOR CARDIOVASCULAR CONDITION: ICD-10-CM

## 2020-04-07 DIAGNOSIS — Z11.3 ENCOUNTER FOR SCREENING FOR INFECTIONS WITH A PREDOMINANTLY SEXUAL MODE OF TRANSMISSION: ICD-10-CM

## 2020-04-07 DIAGNOSIS — I23.6 LV (LEFT VENTRICULAR) MURAL THROMBUS FOLLOWING MI (HCC): ICD-10-CM

## 2020-04-07 DIAGNOSIS — Z78.9 HISTORY OF RECENT CHANGE IN LIFESTYLE: ICD-10-CM

## 2020-04-07 DIAGNOSIS — Z79.01 LONG TERM CURRENT USE OF ANTICOAGULANT THERAPY: ICD-10-CM

## 2020-04-07 LAB
ALBUMIN SERPL BCP-MCNC: 4 G/DL (ref 3–5.2)
ALP SERPL-CCNC: 65 U/L (ref 43–122)
ALT SERPL W P-5'-P-CCNC: 21 U/L (ref 9–52)
ANION GAP SERPL CALCULATED.3IONS-SCNC: 5 MMOL/L (ref 5–14)
AST SERPL W P-5'-P-CCNC: 23 U/L (ref 14–36)
BASOPHILS # BLD AUTO: 0 THOUSANDS/ΜL (ref 0–0.1)
BASOPHILS NFR BLD AUTO: 1 % (ref 0–1)
BILIRUB SERPL-MCNC: 0.5 MG/DL
BUN SERPL-MCNC: 10 MG/DL (ref 5–25)
CALCIUM SERPL-MCNC: 9.3 MG/DL (ref 8.4–10.2)
CHLORIDE SERPL-SCNC: 108 MMOL/L (ref 97–108)
CHOLEST SERPL-MCNC: 147 MG/DL
CO2 SERPL-SCNC: 28 MMOL/L (ref 22–30)
CREAT SERPL-MCNC: 1.2 MG/DL (ref 0.6–1.2)
EOSINOPHIL # BLD AUTO: 0.1 THOUSAND/ΜL (ref 0–0.4)
EOSINOPHIL NFR BLD AUTO: 2 % (ref 0–6)
ERYTHROCYTE [DISTWIDTH] IN BLOOD BY AUTOMATED COUNT: 14.3 %
GFR SERPL CREATININE-BSD FRML MDRD: 51 ML/MIN/1.73SQ M
GLUCOSE P FAST SERPL-MCNC: 135 MG/DL (ref 70–99)
HCT VFR BLD AUTO: 42.1 % (ref 36–46)
HCV AB SER QL: NORMAL
HDLC SERPL-MCNC: 41 MG/DL
HGB BLD-MCNC: 14.3 G/DL (ref 12–16)
LDLC SERPL CALC-MCNC: 74 MG/DL
LYMPHOCYTES # BLD AUTO: 2.4 THOUSANDS/ΜL (ref 0.5–4)
LYMPHOCYTES NFR BLD AUTO: 47 % (ref 25–45)
MCH RBC QN AUTO: 32.6 PG (ref 26–34)
MCHC RBC AUTO-ENTMCNC: 33.9 G/DL (ref 31–36)
MCV RBC AUTO: 96 FL (ref 80–100)
MONOCYTES # BLD AUTO: 0.3 THOUSAND/ΜL (ref 0.2–0.9)
MONOCYTES NFR BLD AUTO: 6 % (ref 1–10)
NEUTROPHILS # BLD AUTO: 2.3 THOUSANDS/ΜL (ref 1.8–7.8)
NEUTS SEG NFR BLD AUTO: 45 % (ref 45–65)
NONHDLC SERPL-MCNC: 106 MG/DL
PLATELET # BLD AUTO: 154 THOUSANDS/UL (ref 150–450)
PMV BLD AUTO: 9.5 FL (ref 8.9–12.7)
POTASSIUM SERPL-SCNC: 4 MMOL/L (ref 3.6–5)
PROT SERPL-MCNC: 7.5 G/DL (ref 5.9–8.4)
RBC # BLD AUTO: 4.38 MILLION/UL (ref 4–5.2)
SODIUM SERPL-SCNC: 141 MMOL/L (ref 137–147)
TRIGL SERPL-MCNC: 162 MG/DL
WBC # BLD AUTO: 5.1 THOUSAND/UL (ref 4.5–11)

## 2020-04-07 PROCEDURE — 86361 T CELL ABSOLUTE COUNT: CPT

## 2020-04-07 PROCEDURE — 86592 SYPHILIS TEST NON-TREP QUAL: CPT

## 2020-04-07 PROCEDURE — 85025 COMPLETE CBC W/AUTO DIFF WBC: CPT

## 2020-04-07 PROCEDURE — 87536 HIV-1 QUANT&REVRSE TRNSCRPJ: CPT

## 2020-04-07 PROCEDURE — 86480 TB TEST CELL IMMUN MEASURE: CPT

## 2020-04-07 PROCEDURE — 86803 HEPATITIS C AB TEST: CPT

## 2020-04-07 PROCEDURE — 80053 COMPREHEN METABOLIC PANEL: CPT

## 2020-04-07 PROCEDURE — 80061 LIPID PANEL: CPT

## 2020-04-08 ENCOUNTER — APPOINTMENT (OUTPATIENT)
Dept: LAB | Facility: HOSPITAL | Age: 55
End: 2020-04-08
Attending: INTERNAL MEDICINE
Payer: COMMERCIAL

## 2020-04-08 LAB
BASOPHILS # BLD AUTO: 0 X10E3/UL (ref 0–0.2)
BASOPHILS NFR BLD AUTO: 1 %
BILIRUB UR QL STRIP: NEGATIVE
CD3+CD4+ CELLS # BLD: 487 /UL (ref 359–1519)
CD3+CD4+ CELLS NFR BLD: 23.2 % (ref 30.8–58.5)
CLARITY UR: CLEAR
COLOR UR: NORMAL
EOSINOPHIL # BLD AUTO: 0.1 X10E3/UL (ref 0–0.4)
EOSINOPHIL NFR BLD AUTO: 2 %
ERYTHROCYTE [DISTWIDTH] IN BLOOD BY AUTOMATED COUNT: 13.2 % (ref 11.7–15.4)
GLUCOSE UR STRIP-MCNC: NEGATIVE MG/DL
HCT VFR BLD AUTO: 30.6 % (ref 34–46.6)
HGB BLD-MCNC: 10.2 G/DL (ref 11.1–15.9)
HGB UR QL STRIP.AUTO: NEGATIVE
IMM GRANULOCYTES # BLD: 0 X10E3/UL (ref 0–0.1)
IMM GRANULOCYTES NFR BLD: 0 %
KETONES UR STRIP-MCNC: NEGATIVE MG/DL
LEUKOCYTE ESTERASE UR QL STRIP: NEGATIVE
LYMPHOCYTES # BLD AUTO: 2.1 X10E3/UL (ref 0.7–3.1)
LYMPHOCYTES NFR BLD AUTO: 44 %
MCH RBC QN AUTO: 30.9 PG (ref 26.6–33)
MCHC RBC AUTO-ENTMCNC: 33.3 G/DL (ref 31.5–35.7)
MCV RBC AUTO: 93 FL (ref 79–97)
MONOCYTES # BLD AUTO: 0.3 X10E3/UL (ref 0.1–0.9)
MONOCYTES NFR BLD AUTO: 7 %
NEUTROPHILS # BLD AUTO: 2.3 X10E3/UL (ref 1.4–7)
NEUTROPHILS NFR BLD AUTO: 46 %
NITRITE UR QL STRIP: NEGATIVE
PH UR STRIP.AUTO: 6 [PH]
PLATELET # BLD AUTO: 224 X10E3/UL (ref 150–450)
PROT UR STRIP-MCNC: NEGATIVE MG/DL
RBC # BLD AUTO: 3.3 X10E6/UL (ref 3.77–5.28)
RPR SER QL: NORMAL
SP GR UR STRIP.AUTO: 1.01 (ref 1–1.04)
UROBILINOGEN UA: NEGATIVE MG/DL
WBC # BLD AUTO: 4.9 X10E3/UL (ref 3.4–10.8)

## 2020-04-08 PROCEDURE — 87491 CHLMYD TRACH DNA AMP PROBE: CPT

## 2020-04-08 PROCEDURE — 87591 N.GONORRHOEAE DNA AMP PROB: CPT

## 2020-04-08 RX ORDER — ATORVASTATIN CALCIUM 80 MG/1
80 TABLET, FILM COATED ORAL DAILY
Qty: 30 TABLET | Refills: 5 | Status: SHIPPED | OUTPATIENT
Start: 2020-04-08 | End: 2020-06-04 | Stop reason: SDUPTHER

## 2020-04-09 ENCOUNTER — TELEPHONE (OUTPATIENT)
Dept: FAMILY MEDICINE CLINIC | Facility: CLINIC | Age: 55
End: 2020-04-09

## 2020-04-09 DIAGNOSIS — D64.9 NORMOCYTIC ANEMIA: Primary | ICD-10-CM

## 2020-04-09 LAB
GAMMA INTERFERON BACKGROUND BLD IA-ACNC: 0.03 IU/ML
HIV1 RNA # SERPL NAA+PROBE: <20 COPIES/ML
HIV1 RNA SERPL NAA+PROBE-LOG#: NORMAL LOG10COPY/ML
M TB IFN-G BLD-IMP: NEGATIVE
M TB IFN-G CD4+ BCKGRND COR BLD-ACNC: 0 IU/ML
M TB IFN-G CD4+ BCKGRND COR BLD-ACNC: 0 IU/ML
MITOGEN IGNF BCKGRD COR BLD-ACNC: >10 IU/ML

## 2020-04-12 LAB
C TRACH DNA SPEC QL NAA+PROBE: NEGATIVE
N GONORRHOEA DNA SPEC QL NAA+PROBE: NEGATIVE

## 2020-04-15 ENCOUNTER — APPOINTMENT (OUTPATIENT)
Dept: LAB | Facility: HOSPITAL | Age: 55
End: 2020-04-15
Payer: COMMERCIAL

## 2020-04-15 ENCOUNTER — ANTICOAG VISIT (OUTPATIENT)
Dept: CARDIOLOGY CLINIC | Facility: CLINIC | Age: 55
End: 2020-04-15

## 2020-04-15 DIAGNOSIS — D64.9 NORMOCYTIC ANEMIA: ICD-10-CM

## 2020-04-15 DIAGNOSIS — I25.5 ISCHEMIC CARDIOMYOPATHY: ICD-10-CM

## 2020-04-15 DIAGNOSIS — Z79.01 LONG TERM CURRENT USE OF ANTICOAGULANT THERAPY: ICD-10-CM

## 2020-04-15 DIAGNOSIS — I23.6 LV (LEFT VENTRICULAR) MURAL THROMBUS FOLLOWING MI (HCC): ICD-10-CM

## 2020-04-15 LAB
BASOPHILS # BLD AUTO: 0 THOUSANDS/ΜL (ref 0–0.1)
BASOPHILS NFR BLD AUTO: 1 % (ref 0–1)
EOSINOPHIL # BLD AUTO: 0.1 THOUSAND/ΜL (ref 0–0.4)
EOSINOPHIL NFR BLD AUTO: 2 % (ref 0–6)
ERYTHROCYTE [DISTWIDTH] IN BLOOD BY AUTOMATED COUNT: 14.2 %
HCT VFR BLD AUTO: 41.6 % (ref 36–46)
HGB BLD-MCNC: 14.2 G/DL (ref 12–16)
LYMPHOCYTES # BLD AUTO: 2.3 THOUSANDS/ΜL (ref 0.5–4)
LYMPHOCYTES NFR BLD AUTO: 39 % (ref 25–45)
MCH RBC QN AUTO: 32.4 PG (ref 26–34)
MCHC RBC AUTO-ENTMCNC: 34.1 G/DL (ref 31–36)
MCV RBC AUTO: 95 FL (ref 80–100)
MONOCYTES # BLD AUTO: 0.3 THOUSAND/ΜL (ref 0.2–0.9)
MONOCYTES NFR BLD AUTO: 6 % (ref 1–10)
NEUTROPHILS # BLD AUTO: 3.1 THOUSANDS/ΜL (ref 1.8–7.8)
NEUTS SEG NFR BLD AUTO: 53 % (ref 45–65)
PLATELET # BLD AUTO: 152 THOUSANDS/UL (ref 150–450)
PMV BLD AUTO: 9.5 FL (ref 8.9–12.7)
RBC # BLD AUTO: 4.39 MILLION/UL (ref 4–5.2)
WBC # BLD AUTO: 5.8 THOUSAND/UL (ref 4.5–11)

## 2020-04-15 PROCEDURE — 85025 COMPLETE CBC W/AUTO DIFF WBC: CPT

## 2020-04-28 DIAGNOSIS — I23.6 LV (LEFT VENTRICULAR) MURAL THROMBUS FOLLOWING MI (HCC): ICD-10-CM

## 2020-04-28 RX ORDER — WARFARIN SODIUM 1 MG/1
TABLET ORAL
Qty: 90 TABLET | Refills: 10 | Status: SHIPPED | OUTPATIENT
Start: 2020-04-28 | End: 2021-04-28

## 2020-04-29 ENCOUNTER — PATIENT OUTREACH (OUTPATIENT)
Dept: SURGERY | Facility: CLINIC | Age: 55
End: 2020-04-29

## 2020-04-29 ENCOUNTER — APPOINTMENT (OUTPATIENT)
Dept: LAB | Facility: HOSPITAL | Age: 55
End: 2020-04-29
Payer: COMMERCIAL

## 2020-04-30 ENCOUNTER — ANTICOAG VISIT (OUTPATIENT)
Dept: CARDIOLOGY CLINIC | Facility: CLINIC | Age: 55
End: 2020-04-30

## 2020-04-30 DIAGNOSIS — Z79.01 LONG TERM CURRENT USE OF ANTICOAGULANT THERAPY: ICD-10-CM

## 2020-04-30 DIAGNOSIS — I25.5 ISCHEMIC CARDIOMYOPATHY: ICD-10-CM

## 2020-04-30 DIAGNOSIS — I23.6 LV (LEFT VENTRICULAR) MURAL THROMBUS FOLLOWING MI (HCC): ICD-10-CM

## 2020-05-08 ENCOUNTER — TELEMEDICINE (OUTPATIENT)
Dept: SURGERY | Facility: CLINIC | Age: 55
End: 2020-05-08
Payer: COMMERCIAL

## 2020-05-08 DIAGNOSIS — I25.5 ISCHEMIC CARDIOMYOPATHY: ICD-10-CM

## 2020-05-08 DIAGNOSIS — I25.10 TRIPLE VESSEL CORONARY ARTERY DISEASE: ICD-10-CM

## 2020-05-08 DIAGNOSIS — Z21 HIV POSITIVE (HCC): ICD-10-CM

## 2020-05-08 DIAGNOSIS — N18.30 CKD (CHRONIC KIDNEY DISEASE) STAGE 3, GFR 30-59 ML/MIN (HCC): ICD-10-CM

## 2020-05-08 DIAGNOSIS — B20 HIV DISEASE (HCC): Primary | ICD-10-CM

## 2020-05-08 PROCEDURE — 99214 OFFICE O/P EST MOD 30 MIN: CPT | Performed by: INTERNAL MEDICINE

## 2020-05-12 ENCOUNTER — TELEPHONE (OUTPATIENT)
Dept: SURGERY | Facility: CLINIC | Age: 55
End: 2020-05-12

## 2020-05-13 ENCOUNTER — APPOINTMENT (OUTPATIENT)
Dept: LAB | Facility: HOSPITAL | Age: 55
End: 2020-05-13
Payer: COMMERCIAL

## 2020-05-14 ENCOUNTER — ANTICOAG VISIT (OUTPATIENT)
Dept: CARDIOLOGY CLINIC | Facility: CLINIC | Age: 55
End: 2020-05-14

## 2020-05-14 DIAGNOSIS — I25.5 ISCHEMIC CARDIOMYOPATHY: ICD-10-CM

## 2020-05-14 DIAGNOSIS — Z79.01 LONG TERM CURRENT USE OF ANTICOAGULANT THERAPY: ICD-10-CM

## 2020-05-14 DIAGNOSIS — I23.6 LV (LEFT VENTRICULAR) MURAL THROMBUS FOLLOWING MI (HCC): ICD-10-CM

## 2020-05-20 ENCOUNTER — REMOTE DEVICE CLINIC VISIT (OUTPATIENT)
Dept: CARDIOLOGY CLINIC | Facility: CLINIC | Age: 55
End: 2020-05-20
Payer: COMMERCIAL

## 2020-05-20 DIAGNOSIS — Z95.810 IMPLANTABLE CARDIOVERTER-DEFIBRILLATOR (ICD) IN SITU: Primary | ICD-10-CM

## 2020-05-20 PROCEDURE — 93296 REM INTERROG EVL PM/IDS: CPT | Performed by: INTERNAL MEDICINE

## 2020-05-20 PROCEDURE — 93295 DEV INTERROG REMOTE 1/2/MLT: CPT | Performed by: INTERNAL MEDICINE

## 2020-06-03 ENCOUNTER — PATIENT OUTREACH (OUTPATIENT)
Dept: SURGERY | Facility: CLINIC | Age: 55
End: 2020-06-03

## 2020-06-04 ENCOUNTER — APPOINTMENT (OUTPATIENT)
Dept: LAB | Facility: HOSPITAL | Age: 55
End: 2020-06-04
Payer: COMMERCIAL

## 2020-06-04 ENCOUNTER — ANTICOAG VISIT (OUTPATIENT)
Dept: CARDIOLOGY CLINIC | Facility: CLINIC | Age: 55
End: 2020-06-04

## 2020-06-04 DIAGNOSIS — I23.6 LV (LEFT VENTRICULAR) MURAL THROMBUS FOLLOWING MI (HCC): ICD-10-CM

## 2020-06-04 DIAGNOSIS — Z79.01 LONG TERM CURRENT USE OF ANTICOAGULANT THERAPY: ICD-10-CM

## 2020-06-04 DIAGNOSIS — E78.5 HYPERLIPIDEMIA, UNSPECIFIED HYPERLIPIDEMIA TYPE: ICD-10-CM

## 2020-06-04 DIAGNOSIS — I25.5 ISCHEMIC CARDIOMYOPATHY: ICD-10-CM

## 2020-06-04 LAB
INR PPP: 1.89 (ref 0.84–1.19)
PROTHROMBIN TIME: 20.9 SECONDS (ref 11.6–14.5)

## 2020-06-04 PROCEDURE — 36415 COLL VENOUS BLD VENIPUNCTURE: CPT

## 2020-06-04 PROCEDURE — 85610 PROTHROMBIN TIME: CPT

## 2020-06-04 RX ORDER — ATORVASTATIN CALCIUM 80 MG/1
80 TABLET, FILM COATED ORAL DAILY
Qty: 90 TABLET | Refills: 5 | Status: SHIPPED | OUTPATIENT
Start: 2020-06-04 | End: 2020-09-16 | Stop reason: SDUPTHER

## 2020-06-08 ENCOUNTER — OFFICE VISIT (OUTPATIENT)
Dept: FAMILY MEDICINE CLINIC | Facility: CLINIC | Age: 55
End: 2020-06-08

## 2020-06-08 VITALS
WEIGHT: 159 LBS | RESPIRATION RATE: 18 BRPM | HEIGHT: 62 IN | BODY MASS INDEX: 29.26 KG/M2 | TEMPERATURE: 97.6 F | OXYGEN SATURATION: 98 % | HEART RATE: 83 BPM | SYSTOLIC BLOOD PRESSURE: 104 MMHG | DIASTOLIC BLOOD PRESSURE: 60 MMHG

## 2020-06-08 DIAGNOSIS — Z21 HIV POSITIVE (HCC): ICD-10-CM

## 2020-06-08 DIAGNOSIS — I10 ESSENTIAL HYPERTENSION: Primary | ICD-10-CM

## 2020-06-08 PROBLEM — J45.21 MILD INTERMITTENT ASTHMA WITH ACUTE EXACERBATION: Status: RESOLVED | Noted: 2019-11-14 | Resolved: 2020-06-08

## 2020-06-08 PROCEDURE — 3008F BODY MASS INDEX DOCD: CPT | Performed by: INTERNAL MEDICINE

## 2020-06-08 PROCEDURE — 4004F PT TOBACCO SCREEN RCVD TLK: CPT | Performed by: INTERNAL MEDICINE

## 2020-06-08 PROCEDURE — 3078F DIAST BP <80 MM HG: CPT | Performed by: INTERNAL MEDICINE

## 2020-06-08 PROCEDURE — 3074F SYST BP LT 130 MM HG: CPT | Performed by: INTERNAL MEDICINE

## 2020-06-08 PROCEDURE — 99213 OFFICE O/P EST LOW 20 MIN: CPT | Performed by: INTERNAL MEDICINE

## 2020-06-15 ENCOUNTER — TELEPHONE (OUTPATIENT)
Dept: CARDIOLOGY CLINIC | Facility: CLINIC | Age: 55
End: 2020-06-15

## 2020-06-15 ENCOUNTER — APPOINTMENT (OUTPATIENT)
Dept: LAB | Facility: HOSPITAL | Age: 55
End: 2020-06-15
Attending: INTERNAL MEDICINE
Payer: COMMERCIAL

## 2020-06-15 ENCOUNTER — OFFICE VISIT (OUTPATIENT)
Dept: NEPHROLOGY | Facility: CLINIC | Age: 55
End: 2020-06-15
Payer: COMMERCIAL

## 2020-06-15 VITALS
DIASTOLIC BLOOD PRESSURE: 62 MMHG | SYSTOLIC BLOOD PRESSURE: 118 MMHG | HEART RATE: 62 BPM | WEIGHT: 163.2 LBS | BODY MASS INDEX: 30.03 KG/M2 | HEIGHT: 62 IN

## 2020-06-15 DIAGNOSIS — I25.5 ISCHEMIC CARDIOMYOPATHY: ICD-10-CM

## 2020-06-15 DIAGNOSIS — N18.30 CKD (CHRONIC KIDNEY DISEASE) STAGE 3, GFR 30-59 ML/MIN (HCC): Primary | ICD-10-CM

## 2020-06-15 DIAGNOSIS — E78.2 MIXED HYPERLIPIDEMIA: ICD-10-CM

## 2020-06-15 DIAGNOSIS — N18.30 CKD (CHRONIC KIDNEY DISEASE) STAGE 3, GFR 30-59 ML/MIN (HCC): ICD-10-CM

## 2020-06-15 PROCEDURE — 36415 COLL VENOUS BLD VENIPUNCTURE: CPT

## 2020-06-15 PROCEDURE — 4004F PT TOBACCO SCREEN RCVD TLK: CPT | Performed by: INTERNAL MEDICINE

## 2020-06-15 PROCEDURE — 3008F BODY MASS INDEX DOCD: CPT | Performed by: INTERNAL MEDICINE

## 2020-06-15 PROCEDURE — 3074F SYST BP LT 130 MM HG: CPT | Performed by: INTERNAL MEDICINE

## 2020-06-15 PROCEDURE — 99214 OFFICE O/P EST MOD 30 MIN: CPT | Performed by: INTERNAL MEDICINE

## 2020-06-15 PROCEDURE — 3078F DIAST BP <80 MM HG: CPT | Performed by: INTERNAL MEDICINE

## 2020-06-16 ENCOUNTER — ANTICOAG VISIT (OUTPATIENT)
Dept: CARDIOLOGY CLINIC | Facility: CLINIC | Age: 55
End: 2020-06-16

## 2020-06-16 DIAGNOSIS — I23.6 LV (LEFT VENTRICULAR) MURAL THROMBUS FOLLOWING MI (HCC): ICD-10-CM

## 2020-06-16 DIAGNOSIS — Z79.01 LONG TERM CURRENT USE OF ANTICOAGULANT THERAPY: ICD-10-CM

## 2020-06-16 DIAGNOSIS — I25.5 ISCHEMIC CARDIOMYOPATHY: ICD-10-CM

## 2020-07-01 ENCOUNTER — ANTICOAG VISIT (OUTPATIENT)
Dept: CARDIOLOGY CLINIC | Facility: CLINIC | Age: 55
End: 2020-07-01

## 2020-07-01 ENCOUNTER — APPOINTMENT (OUTPATIENT)
Dept: LAB | Facility: HOSPITAL | Age: 55
End: 2020-07-01
Payer: COMMERCIAL

## 2020-07-01 DIAGNOSIS — I25.5 ISCHEMIC CARDIOMYOPATHY: ICD-10-CM

## 2020-07-01 DIAGNOSIS — I23.6 LV (LEFT VENTRICULAR) MURAL THROMBUS FOLLOWING MI (HCC): ICD-10-CM

## 2020-07-01 DIAGNOSIS — Z79.01 LONG TERM CURRENT USE OF ANTICOAGULANT THERAPY: ICD-10-CM

## 2020-07-01 NOTE — PROGRESS NOTES
7/1/20, tc to pt, left message on answering machine, increase dose, 7 5 mg wed, 5 mg other days of the week, inr due 2 week   ramses

## 2020-07-15 ENCOUNTER — ANTICOAG VISIT (OUTPATIENT)
Dept: CARDIOLOGY CLINIC | Facility: CLINIC | Age: 55
End: 2020-07-15

## 2020-07-15 ENCOUNTER — APPOINTMENT (OUTPATIENT)
Dept: LAB | Facility: HOSPITAL | Age: 55
End: 2020-07-15
Payer: COMMERCIAL

## 2020-07-15 DIAGNOSIS — Z79.01 LONG TERM CURRENT USE OF ANTICOAGULANT THERAPY: ICD-10-CM

## 2020-07-15 DIAGNOSIS — I23.6 LV (LEFT VENTRICULAR) MURAL THROMBUS FOLLOWING MI (HCC): ICD-10-CM

## 2020-07-15 DIAGNOSIS — I25.5 ISCHEMIC CARDIOMYOPATHY: ICD-10-CM

## 2020-07-15 NOTE — PROGRESS NOTES
Pt called denied any changes or missed doses will take 10 mg today then 7 5 Wed and Friday 5 others recheck in 2 wks

## 2020-07-30 ENCOUNTER — APPOINTMENT (OUTPATIENT)
Dept: LAB | Facility: HOSPITAL | Age: 55
End: 2020-07-30
Payer: COMMERCIAL

## 2020-07-31 ENCOUNTER — ANTICOAG VISIT (OUTPATIENT)
Dept: CARDIOLOGY CLINIC | Facility: CLINIC | Age: 55
End: 2020-07-31

## 2020-07-31 DIAGNOSIS — I25.5 ISCHEMIC CARDIOMYOPATHY: ICD-10-CM

## 2020-07-31 DIAGNOSIS — I23.6 LV (LEFT VENTRICULAR) MURAL THROMBUS FOLLOWING MI (HCC): ICD-10-CM

## 2020-07-31 DIAGNOSIS — Z79.01 LONG TERM CURRENT USE OF ANTICOAGULANT THERAPY: ICD-10-CM

## 2020-08-07 ENCOUNTER — PATIENT OUTREACH (OUTPATIENT)
Dept: SURGERY | Facility: CLINIC | Age: 55
End: 2020-08-07

## 2020-08-19 ENCOUNTER — REMOTE DEVICE CLINIC VISIT (OUTPATIENT)
Dept: CARDIOLOGY CLINIC | Facility: CLINIC | Age: 55
End: 2020-08-19
Payer: COMMERCIAL

## 2020-08-19 DIAGNOSIS — Z95.810 PRESENCE OF CARDIOVERTER DEFIBRILLATOR: Primary | ICD-10-CM

## 2020-08-19 PROCEDURE — 93295 DEV INTERROG REMOTE 1/2/MLT: CPT | Performed by: INTERNAL MEDICINE

## 2020-08-19 PROCEDURE — 93296 REM INTERROG EVL PM/IDS: CPT | Performed by: INTERNAL MEDICINE

## 2020-08-19 NOTE — PROGRESS NOTES
Results for orders placed or performed in visit on 08/19/20   Cardiac EP device report    Narrative    MDT-SINGLE CHAMBER ICD  CARELINK TRANSMISSION: BATTERY VOLTAGE ADEQUATE (10 YRS)   <0 1%  ALL AVAILABLE LEAD PARAMETERS WITHIN NORMAL LIMITS  1 VT-NS EPISODE, DURATION 7 BEATS @  BPM  NO THERAPY DELIVERED  EF 25-27% (3/2019 ECHO)  PT ON ASA 81, WARFARIN, CARVEDILOL  OPTI-VOL WITHIN NORMAL LIMITS  APPROPRIATELY FUNCTIONING ICD     EB

## 2020-08-20 ENCOUNTER — ANTICOAG VISIT (OUTPATIENT)
Dept: CARDIOLOGY CLINIC | Facility: CLINIC | Age: 55
End: 2020-08-20

## 2020-08-20 ENCOUNTER — APPOINTMENT (OUTPATIENT)
Dept: LAB | Facility: HOSPITAL | Age: 55
End: 2020-08-20
Payer: COMMERCIAL

## 2020-08-20 DIAGNOSIS — B20 HIV DISEASE (HCC): ICD-10-CM

## 2020-08-20 DIAGNOSIS — I23.6 LV (LEFT VENTRICULAR) MURAL THROMBUS FOLLOWING MI (HCC): ICD-10-CM

## 2020-08-20 DIAGNOSIS — Z79.01 LONG TERM CURRENT USE OF ANTICOAGULANT THERAPY: ICD-10-CM

## 2020-08-20 DIAGNOSIS — I25.5 ISCHEMIC CARDIOMYOPATHY: ICD-10-CM

## 2020-08-20 LAB
ALBUMIN SERPL BCP-MCNC: 4.1 G/DL (ref 3–5.2)
ALP SERPL-CCNC: 65 U/L (ref 43–122)
ALT SERPL W P-5'-P-CCNC: 15 U/L (ref 9–52)
ANION GAP SERPL CALCULATED.3IONS-SCNC: 4 MMOL/L (ref 5–14)
AST SERPL W P-5'-P-CCNC: 26 U/L (ref 14–36)
BASOPHILS # BLD AUTO: 0 THOUSANDS/ΜL (ref 0–0.1)
BASOPHILS NFR BLD AUTO: 1 % (ref 0–1)
BILIRUB SERPL-MCNC: 0.6 MG/DL
BUN SERPL-MCNC: 14 MG/DL (ref 5–25)
CALCIUM SERPL-MCNC: 9.4 MG/DL (ref 8.4–10.2)
CHLORIDE SERPL-SCNC: 109 MMOL/L (ref 97–108)
CO2 SERPL-SCNC: 28 MMOL/L (ref 22–30)
CREAT SERPL-MCNC: 1.34 MG/DL (ref 0.6–1.2)
EOSINOPHIL # BLD AUTO: 0.1 THOUSAND/ΜL (ref 0–0.4)
EOSINOPHIL NFR BLD AUTO: 2 % (ref 0–6)
ERYTHROCYTE [DISTWIDTH] IN BLOOD BY AUTOMATED COUNT: 14.4 %
GFR SERPL CREATININE-BSD FRML MDRD: 45 ML/MIN/1.73SQ M
GLUCOSE P FAST SERPL-MCNC: 95 MG/DL (ref 70–99)
HCT VFR BLD AUTO: 41.2 % (ref 36–46)
HGB BLD-MCNC: 14.1 G/DL (ref 12–16)
LYMPHOCYTES # BLD AUTO: 1.7 THOUSANDS/ΜL (ref 0.5–4)
LYMPHOCYTES NFR BLD AUTO: 40 % (ref 25–45)
MCH RBC QN AUTO: 32.7 PG (ref 26–34)
MCHC RBC AUTO-ENTMCNC: 34.3 G/DL (ref 31–36)
MCV RBC AUTO: 96 FL (ref 80–100)
MONOCYTES # BLD AUTO: 0.3 THOUSAND/ΜL (ref 0.2–0.9)
MONOCYTES NFR BLD AUTO: 6 % (ref 1–10)
NEUTROPHILS # BLD AUTO: 2.2 THOUSANDS/ΜL (ref 1.8–7.8)
NEUTS SEG NFR BLD AUTO: 52 % (ref 45–65)
PHOSPHATE SERPL-MCNC: 2.3 MG/DL (ref 2.5–4.8)
PLATELET # BLD AUTO: 163 THOUSANDS/UL (ref 150–450)
PMV BLD AUTO: 9.5 FL (ref 8.9–12.7)
POTASSIUM SERPL-SCNC: 5 MMOL/L (ref 3.6–5)
PROT SERPL-MCNC: 7.4 G/DL (ref 5.9–8.4)
PTH-INTACT SERPL-MCNC: 124.5 PG/ML (ref 16.7–78.9)
RBC # BLD AUTO: 4.31 MILLION/UL (ref 4–5.2)
SODIUM SERPL-SCNC: 141 MMOL/L (ref 137–147)
URATE SERPL-MCNC: 5.8 MG/DL (ref 2.7–7.5)
WBC # BLD AUTO: 4.3 THOUSAND/UL (ref 4.5–11)

## 2020-08-20 PROCEDURE — 36415 COLL VENOUS BLD VENIPUNCTURE: CPT

## 2020-08-20 PROCEDURE — 84550 ASSAY OF BLOOD/URIC ACID: CPT

## 2020-08-20 PROCEDURE — 84100 ASSAY OF PHOSPHORUS: CPT

## 2020-08-20 PROCEDURE — 83970 ASSAY OF PARATHORMONE: CPT

## 2020-08-20 PROCEDURE — 85025 COMPLETE CBC W/AUTO DIFF WBC: CPT

## 2020-08-20 PROCEDURE — 86361 T CELL ABSOLUTE COUNT: CPT

## 2020-08-20 PROCEDURE — 80053 COMPREHEN METABOLIC PANEL: CPT

## 2020-08-20 PROCEDURE — 87536 HIV-1 QUANT&REVRSE TRNSCRPJ: CPT

## 2020-08-21 LAB
BASOPHILS # BLD AUTO: 0 X10E3/UL (ref 0–0.2)
BASOPHILS NFR BLD AUTO: 1 %
CD3+CD4+ CELLS # BLD: 464 /UL (ref 359–1519)
CD3+CD4+ CELLS NFR BLD: 24.4 % (ref 30.8–58.5)
EOSINOPHIL # BLD AUTO: 0.1 X10E3/UL (ref 0–0.4)
EOSINOPHIL NFR BLD AUTO: 2 %
ERYTHROCYTE [DISTWIDTH] IN BLOOD BY AUTOMATED COUNT: 13.9 % (ref 11.7–15.4)
HCT VFR BLD AUTO: 39.5 % (ref 34–46.6)
HGB BLD-MCNC: 13.4 G/DL (ref 11.1–15.9)
IMM GRANULOCYTES # BLD: 0 X10E3/UL (ref 0–0.1)
IMM GRANULOCYTES NFR BLD: 0 %
LYMPHOCYTES # BLD AUTO: 1.9 X10E3/UL (ref 0.7–3.1)
LYMPHOCYTES NFR BLD AUTO: 42 %
MCH RBC QN AUTO: 32.4 PG (ref 26.6–33)
MCHC RBC AUTO-ENTMCNC: 33.9 G/DL (ref 31.5–35.7)
MCV RBC AUTO: 95 FL (ref 79–97)
MONOCYTES # BLD AUTO: 0.3 X10E3/UL (ref 0.1–0.9)
MONOCYTES NFR BLD AUTO: 7 %
NEUTROPHILS # BLD AUTO: 2.2 X10E3/UL (ref 1.4–7)
NEUTROPHILS NFR BLD AUTO: 48 %
PLATELET # BLD AUTO: 174 X10E3/UL (ref 150–450)
RBC # BLD AUTO: 4.14 X10E6/UL (ref 3.77–5.28)
WBC # BLD AUTO: 4.5 X10E3/UL (ref 3.4–10.8)

## 2020-08-24 LAB
HIV1 RNA # SERPL NAA+PROBE: <20 COPIES/ML
HIV1 RNA SERPL NAA+PROBE-LOG#: NORMAL LOG10COPY/ML

## 2020-09-03 ENCOUNTER — PATIENT OUTREACH (OUTPATIENT)
Dept: SURGERY | Facility: CLINIC | Age: 55
End: 2020-09-03

## 2020-09-04 ENCOUNTER — PATIENT OUTREACH (OUTPATIENT)
Dept: SURGERY | Facility: CLINIC | Age: 55
End: 2020-09-04

## 2020-09-04 NOTE — PROGRESS NOTES
Ct and Cm completed recert over the phone due to [address] reports she is not sexually active  Cm and Ct discussed the importance of using protection and being adherent  Ct reports housing is stable  Ct is currently in good health and reports no other health concerns  Ct currently take public transportation and drives her own vehicle from time to time  CM and Ct  discussed at risk behaviors and importance of using protection and maintaining medication adherence  Ct is seeing Dr Benjie Moreno for specialty care and currently sees Dionisio Metz primary care  Ct still not sure when she will pick a new PCP but was informed she can come to [facility]  Ct has medicaid part A and B and health partners as a supplemental  Ct does smoke cigarettes and has requested no referral to smoking cessation   Ct reports she has no mental issues  Ct no drug or alcohol issues  Ct states there are no domestic violence or legal issues  Ct believes that her last dental appointment was 2 1/2 years ago and had a referral back in march but due to pandemic was canceled  Ct and Cm called [facility]

## 2020-09-10 ENCOUNTER — TELEPHONE (OUTPATIENT)
Dept: SURGERY | Facility: CLINIC | Age: 55
End: 2020-09-10

## 2020-09-11 ENCOUNTER — APPOINTMENT (OUTPATIENT)
Dept: LAB | Facility: HOSPITAL | Age: 55
End: 2020-09-11
Payer: COMMERCIAL

## 2020-09-11 ENCOUNTER — ANTICOAG VISIT (OUTPATIENT)
Dept: CARDIOLOGY CLINIC | Facility: CLINIC | Age: 55
End: 2020-09-11

## 2020-09-11 ENCOUNTER — OFFICE VISIT (OUTPATIENT)
Dept: SURGERY | Facility: CLINIC | Age: 55
End: 2020-09-11
Payer: COMMERCIAL

## 2020-09-11 VITALS
DIASTOLIC BLOOD PRESSURE: 60 MMHG | BODY MASS INDEX: 30.18 KG/M2 | WEIGHT: 165 LBS | OXYGEN SATURATION: 95 % | TEMPERATURE: 98 F | HEART RATE: 82 BPM | SYSTOLIC BLOOD PRESSURE: 120 MMHG

## 2020-09-11 DIAGNOSIS — B20 HIV DISEASE (HCC): Primary | ICD-10-CM

## 2020-09-11 DIAGNOSIS — Z79.01 LONG TERM CURRENT USE OF ANTICOAGULANT THERAPY: ICD-10-CM

## 2020-09-11 DIAGNOSIS — I25.5 ISCHEMIC CARDIOMYOPATHY: ICD-10-CM

## 2020-09-11 DIAGNOSIS — Z21 HIV POSITIVE (HCC): ICD-10-CM

## 2020-09-11 DIAGNOSIS — Z95.810 IMPLANTABLE CARDIOVERTER-DEFIBRILLATOR (ICD) IN SITU: ICD-10-CM

## 2020-09-11 DIAGNOSIS — N18.30 CKD (CHRONIC KIDNEY DISEASE) STAGE 3, GFR 30-59 ML/MIN (HCC): ICD-10-CM

## 2020-09-11 DIAGNOSIS — I23.6 LV (LEFT VENTRICULAR) MURAL THROMBUS FOLLOWING MI (HCC): ICD-10-CM

## 2020-09-11 PROCEDURE — 99214 OFFICE O/P EST MOD 30 MIN: CPT | Performed by: INTERNAL MEDICINE

## 2020-09-11 RX ORDER — BICTEGRAVIR SODIUM, EMTRICITABINE, AND TENOFOVIR ALAFENAMIDE FUMARATE 50; 200; 25 MG/1; MG/1; MG/1
1 TABLET ORAL
Qty: 30 TABLET | Refills: 5 | Status: SHIPPED | OUTPATIENT
Start: 2020-09-11 | End: 2021-04-19

## 2020-09-11 NOTE — PROGRESS NOTES
RD met and introduced self to [facility] in conjunction with ID appointment  Pt was in good spirits  RD explained [employer] $15 incentive and provided Airam with flyer  Pt does use [employer], RD explained [employer] and provided pt with flyer of participating locations  Airam admitted that she does like plenty of fruits and vegetables  Pt follows up closely with cardiology, and stated that she was aware of vitamin K in foods  Pt on coumadin  Airam had no questions or nutritional concerns to discuss with RD at present  Encouraged pt to call clinic should she wish to discuss  Offer nutritional education as rapport is established and pt is receptive  165#  BMI 30 2    A1C 5 5    Monitor weight trend, lipid panel, A1C, diet/exercise recall        Kalen Bishop, MS, RD, LDN

## 2020-09-11 NOTE — PROGRESS NOTES
Progress Note - Infectious Disease   [facility] Press 48 y o  female MRN: [id number]  Unit/Bed#:  Encounter: [id number]      Impression/Plan:    1  HIV - doing well on Biktarvy with an undetectable viral load and a CD4 count in the 400s    Recheck labs in 5 months and follow up in 6 months     Stressed adherence      2  CKD stage 3 - patient needs referral to nephrology  Continue to monitor the renal function closely      3  Tobacco dependence -  patient has decreased the amount of cigarettes with using the nicotine gum; she is smoking about 10 cigarettes per day   Encouraged the patient to set a quit date   Continue to follow up with our tobacco cessation program      4  Ischemic Cardiomyopathy: with mural thrombus  She has AICD in place  Patient follows up with cardiology  She is on coumadin and was also started on entresto         Patient was provided medication, adherence and prevention education    Subjective:  Routine follow-up for HIV  Patient claims 100% adherence with     Patient denies any notable side effects  Overall the feeling well  The patient denies any fever chills or sweats, denies any nausea vomiting or diarrhea, denies any cough or shortness of breath  ROS: A complete 12 point ROS is negative other than that noted in the HPI    Followup portions patient history reviewed and updated as: Allergies, current medications, past medical history, past social history, past surgical history, and the problem list    Objective:  Vitals:  Vitals:    09/11/20 0745   BP: 120/60   BP Location: Left arm   Patient Position: Sitting   Pulse: 82   Temp: 98 °F (36 7 °C)   TempSrc: Tympanic   SpO2: 95%   Weight: 74 8 kg (165 lb)       Physical Exam:   General Appearance:  Alert, interactive, appearing well,  nontoxic, no acute distress  Neck:   Supple without lymphadenopathy, no thyromegaly or masses   Throat: Oropharynx moist without lesions      Lungs:   Clear to auscultation bilaterally; no wheezes, rhonchi or rales; respirations unlabored   Heart:  RRR; no murmur, rub or gallop   Abdomen:   Soft, non-tender, non-distended, positive bowel sounds  Extremities: No clubbing, cyanosis or edema   Skin: No new rashes or lesions  No draining wounds noted  Labs, Imaging, & Other studies:   All pertinent labs and imaging studies were personally reviewed    Lab Results   Component Value Date    K 5 0 08/20/2020     (H) 08/20/2020    CO2 28 08/20/2020    BUN 14 08/20/2020    CREATININE 1 34 (H) 08/20/2020    GLUF 95 08/20/2020    CALCIUM 9 4 08/20/2020    AST 26 08/20/2020    ALT 15 08/20/2020    ALKPHOS 65 08/20/2020    EGFR 45 (L) 08/20/2020     Lab Results   Component Value Date    WBC 4 30 (L) 08/20/2020    WBC 4 5 08/20/2020    HGB 14 1 08/20/2020    HGB 13 4 08/20/2020    HCT 41 2 08/20/2020    HCT 39 5 08/20/2020    MCV 96 08/20/2020    MCV 95 08/20/2020     08/20/2020     08/20/2020     Lab Results   Component Value Date    HEPCAB Non-reactive 04/07/2020     Lab Results   Component Value Date    HAV Non-reactive 11/02/2018    HEPCAB Non-reactive 04/07/2020     Lab Results   Component Value Date    RPR Non-Reactive 04/07/2020     CD4 ABS   Date/Time Value Ref Range Status   08/20/2020 01:02  359 - 1519 /uL Final     HIV-1 RNA by PCR, Qn   Date/Time Value Ref Range Status   08/20/2020 01:02 PM <20 copies/mL Final     Comment:     HIV-1 RNA not detected  The reportable range for this assay is 20 to 10,000,000  copies HIV-1 RNA/mL             Current Outpatient Medications:     albuterol (PROVENTIL HFA,VENTOLIN HFA) 90 mcg/act inhaler, Inhale 1-2 puffs every 6 (six) hours as needed for wheezing, Disp: 1 Inhaler, Rfl: 0    albuterol (VENTOLIN HFA) 90 mcg/act inhaler, Inhale 1 puff 4 (four) times a day, Disp: 2 Inhaler, Rfl: 0    aspirin (ECOTRIN LOW STRENGTH) 81 mg EC tablet, Take 1 tablet (81 mg total) by mouth daily, Disp: 81 tablet, Rfl: 10    atorvastatin (LIPITOR) 80 mg tablet, Take 1 tablet (80 mg total) by mouth daily, Disp: 90 tablet, Rfl: 5    benzonatate (TESSALON PERLES) 100 mg capsule, Take 1 capsule (100 mg total) by mouth every 8 (eight) hours, Disp: 21 capsule, Rfl: 0    BIKTARVY -25 MG tablet, TAKE 1 TABLET BY MOUTH DAILY WITH BREAKFAST, Disp: 30 tablet, Rfl: 10    Blood Pressure Monitoring (BLOOD PRESSURE CUFF) MISC, by Does not apply route daily (Patient not taking: Reported on 6/6/2019), Disp: 1 each, Rfl: 0    carvedilol (COREG) 6 25 mg tablet, Take 1 tablet (6 25 mg total) by mouth 2 (two) times a day with meals, Disp: 180 tablet, Rfl: 3    fluticasone (FLONASE) 50 mcg/act nasal spray, 1 spray into each nostril daily (Patient not taking: Reported on 2/17/2020), Disp: 16 g, Rfl: 0    nicotine (NICODERM CQ) 21 mg/24 hr TD 24 hr patch, Place 1 patch on the skin every 24 hours (Patient not taking: Reported on 3/9/2020), Disp: 28 patch, Rfl: 7    nicotine polacrilex (COMMIT) 2 MG lozenge, Apply 1 lozenge (2 mg total) to the mouth or throat as needed for smoking cessation (Patient not taking: Reported on 12/19/2019), Disp: 100 each, Rfl: 3    sacubitril-valsartan (Entresto) 49-51 MG TABS, Take 1 tablet by mouth 2 (two) times a day, Disp: 180 tablet, Rfl: 3    warfarin (COUMADIN) 1 mg tablet, TAKE 1 TABLET BY MOUTH DAILY WITH COUMADIN 5MG FOR THE NEXT THREE DAYS, Disp: 90 tablet, Rfl: 10    warfarin (COUMADIN) 5 mg tablet, TAKE 1 TABLET BY MOUTH DAILY, Disp: 90 tablet, Rfl: 11

## 2020-09-16 ENCOUNTER — OFFICE VISIT (OUTPATIENT)
Dept: CARDIOLOGY CLINIC | Facility: CLINIC | Age: 55
End: 2020-09-16
Payer: COMMERCIAL

## 2020-09-16 VITALS
DIASTOLIC BLOOD PRESSURE: 68 MMHG | HEART RATE: 64 BPM | TEMPERATURE: 97.6 F | SYSTOLIC BLOOD PRESSURE: 104 MMHG | RESPIRATION RATE: 16 BRPM | HEIGHT: 62 IN | BODY MASS INDEX: 30.69 KG/M2 | WEIGHT: 166.8 LBS

## 2020-09-16 DIAGNOSIS — E78.5 HYPERLIPIDEMIA, UNSPECIFIED HYPERLIPIDEMIA TYPE: ICD-10-CM

## 2020-09-16 DIAGNOSIS — I23.6 LV (LEFT VENTRICULAR) MURAL THROMBUS FOLLOWING MI (HCC): ICD-10-CM

## 2020-09-16 DIAGNOSIS — N18.30 CKD (CHRONIC KIDNEY DISEASE) STAGE 3, GFR 30-59 ML/MIN (HCC): ICD-10-CM

## 2020-09-16 DIAGNOSIS — I51.89 IMPAIRED LEFT VENTRICULAR FUNCTION: ICD-10-CM

## 2020-09-16 DIAGNOSIS — Z79.01 LONG TERM CURRENT USE OF ANTICOAGULANT THERAPY: ICD-10-CM

## 2020-09-16 DIAGNOSIS — E78.2 MIXED HYPERLIPIDEMIA: ICD-10-CM

## 2020-09-16 DIAGNOSIS — I10 ESSENTIAL HYPERTENSION: ICD-10-CM

## 2020-09-16 DIAGNOSIS — I25.2 PAST HEART ATTACK: ICD-10-CM

## 2020-09-16 DIAGNOSIS — I25.10 TRIPLE VESSEL CORONARY ARTERY DISEASE: Primary | ICD-10-CM

## 2020-09-16 DIAGNOSIS — Z95.810 IMPLANTABLE CARDIOVERTER-DEFIBRILLATOR (ICD) IN SITU: ICD-10-CM

## 2020-09-16 DIAGNOSIS — I25.5 ISCHEMIC CARDIOMYOPATHY: ICD-10-CM

## 2020-09-16 PROCEDURE — 99214 OFFICE O/P EST MOD 30 MIN: CPT | Performed by: INTERNAL MEDICINE

## 2020-09-16 RX ORDER — CARVEDILOL 6.25 MG/1
6.25 TABLET ORAL 2 TIMES DAILY WITH MEALS
Qty: 180 TABLET | Refills: 3 | Status: SHIPPED | OUTPATIENT
Start: 2020-09-16 | End: 2021-03-16 | Stop reason: SDUPTHER

## 2020-09-16 RX ORDER — ATORVASTATIN CALCIUM 80 MG/1
80 TABLET, FILM COATED ORAL DAILY
Qty: 90 TABLET | Refills: 3 | Status: SHIPPED | OUTPATIENT
Start: 2020-09-16 | End: 2021-09-22

## 2020-09-16 RX ORDER — SACUBITRIL AND VALSARTAN 49; 51 MG/1; MG/1
1 TABLET, FILM COATED ORAL 2 TIMES DAILY
Qty: 180 TABLET | Refills: 3 | Status: SHIPPED | OUTPATIENT
Start: 2020-09-16 | End: 2021-03-16 | Stop reason: SDUPTHER

## 2020-09-16 NOTE — LETTER
September 16, 2020     Rachel Fuentes, [address]    Patient: Leigha Loaiza   YOB: 1965   Date of Visit: 9/16/2020       Dear Dr Matt Chandler: Thank you for referring Leigha Loaiza to me for evaluation  Below are my notes for this consultation  If you have questions, please do not hesitate to call me  I look forward to following your patient along with you  Sincerely,        Handy Cosme MD        CC: No Recipients  Handy Cosme MD  9/16/2020  4:25 PM  Sign when Signing Visit                                             Cardiology Follow Up    Leigha Loaiza  1965  [id number]  [address]  [address] [facility] [address] [phone]    1  Triple vessel coronary artery disease     2  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 ([address] )     3  Ischemic cardiomyopathy     4  Mixed hyperlipidemia     5  old extensive anterior, apical, lateral and distal inferior apical MI     6  Long term current use of anticoagulant therapy     7  Implantable cardioverter-defibrillator (ICD) in situ     8  Impaired left ventricular function     9  Essential hypertension     10  CKD (chronic kidney disease) stage 3, GFR 30-59 ml/min ([facility])         Patient was referred to us from [facility]  She experienced a myocardial infarction on February 28, 2017 in [place]  She has subsequently moved to [facility] area  She had a cardiac catheterization taken at [facility]  Patient's cardiac catheterization CD revealed the left main coronary artery to be nonobstructed  The left anterior descending artery was 100% in its midportion after a large 1st diagonal branch which was nonobstructed  The circumflex artery obtuse marginal was 90%  The right coronary artery in its midportion with 50-60%  The large distal LAD filled from right to left collaterals   [address] [facility] had recommended CABG but the patient had refused at that time  No ventriculogram was performed but an echocardiogram which I performed had demonstrated extensive LV damage  A nuclear stress test demonstrated minimal with any it ischemia  I did not feel that the patient would improve with CABG  Patient is free of angina pectoris or shortness of breath  She is doing quite well for her degree of LV dysfunction  04/11/2017 echocardiogram severe LV dysfunction, EF 30%, mild LV enlargement, apical aneurysm with akinesis to paradoxical motion  Large organized apical thrombus  Grade 1 diastolic dysfunction, normal pulmonary artery pressures    05/15/2017 stress test: Severe LV dysfunction, EF 20%  Mild LV enlargement  Anterior apical aneurysm with paradoxical motion  Extensive myocardial infarctions involving the distal anterior apical and inferior apical walls  Negative for Persantine induced ischemia     07/01/2019 lipid profile: Total cholesterol 152, triglycerides 176, HDL 45, LDL calculated 72, non HDL cholesterol 107    04/07/2020 lipid profile: Total cholesterol 147, triglycerides 162, HDL 41, LDL calculated 74, non HDL cholesterol 106    Interval History:  Patient denies chest discomfort or shortness of breath  She denies palpitations  She has had no dizziness or lightheadedness  She denies leg edema  She has no orthopnea or PND  She continues on Coumadin anticoagulation for left ventricular thrombus        Discussion/Summary:  Return in 6 months    Patient Active Problem List   Diagnosis    Ischemic cardiomyopathy    Triple vessel coronary artery disease    [facility]    Essential hypertension    Hyperlipemia    Impaired left ventricular function    Long term current use of anticoagulant therapy    old extensive anterior, apical, lateral and distal inferior apical MI    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 ([address] )    CKD (chronic kidney disease) stage 3, GFR 30-59 ml/min ([facility])    Implantable cardioverter-defibrillator (ICD) in situ    ASCUS with positive high risk HPV cervical     Past Medical History:   Diagnosis Date    Asthma     Coronary artery disease     defibrillator    HIV positive [facility]      Social History     Socioeconomic History    Marital status: Single     Spouse name: Not on file    Number of children: Not on file    Years of education: Not on file    Highest education level: Not on file   Occupational History    Not on file   Social Needs    Financial resource strain: Not on file    Food insecurity     Worry: Not on file     Inability: Not on file    Transportation needs     Medical: Not on file     Non-medical: Not on file   Tobacco Use    Smoking status: Current Every Day Smoker     Packs/day: 0 50     Years: 30 00     Pack years: 15 00     Types: Cigarettes    Smokeless tobacco: Former User     Quit date: 8/12/2019    Tobacco comment: 1/2 pk day   Substance and Sexual Activity    Alcohol use: Yes     Comment: ocassionally    Drug use: No    Sexual activity: Not Currently   Lifestyle    Physical activity     Days per week: Not on file     Minutes per session: Not on file    Stress: Not on file   Relationships    Social connections     Talks on phone: Not on file     Gets together: Not on file     Attends Yarsanism service: Not on file     Active member of club or organization: Not on file     Attends meetings of clubs or organizations: Not on file     Relationship status: Not on file    Intimate partner violence     Fear of current or ex partner: Not on file     Emotionally abused: Not on file     Physically abused: Not on file     Forced sexual activity: Not on file   Other Topics Concern    Not on file   Social History Narrative    Not on file      Family History   Problem Relation Age of Onset    Other Father         GI bleed    Suicidality Brother     Drug abuse Brother         overdose     Past Surgical History:   Procedure Laterality Date    ANGIOPLASTY      CARDIAC DEFIBRILLATOR PLACEMENT      CORONARY ANGIOPLASTY      pci placement    TOTAL ABDOMINAL HYSTERECTOMY         Current Outpatient Medications:     aspirin (ECOTRIN LOW STRENGTH) 81 mg EC tablet, Take 1 tablet (81 mg total) by mouth daily, Disp: 81 tablet, Rfl: 10    atorvastatin (LIPITOR) 80 mg tablet, Take 1 tablet (80 mg total) by mouth daily, Disp: 90 tablet, Rfl: 5    bictegravir-emtricitab-tenofovir alafenamide (Biktarvy) -25 MG tablet, Take 1 tablet by mouth daily with breakfast, Disp: 30 tablet, Rfl: 5    sacubitril-valsartan (Entresto) 49-51 MG TABS, Take 1 tablet by mouth 2 (two) times a day, Disp: 180 tablet, Rfl: 3    warfarin (COUMADIN) 1 mg tablet, TAKE 1 TABLET BY MOUTH DAILY WITH COUMADIN 5MG FOR THE NEXT THREE DAYS, Disp: 90 tablet, Rfl: 10    warfarin (COUMADIN) 5 mg tablet, TAKE 1 TABLET BY MOUTH DAILY, Disp: 90 tablet, Rfl: 11    carvedilol (COREG) 6 25 mg tablet, Take 1 tablet (6 25 mg total) by mouth 2 (two) times a day with meals, Disp: 180 tablet, Rfl: 3  Allergies   Allergen Reactions    No Active Allergies     No Known Allergies        No results found for this visit on 09/16/20  Review of Systems:  Review of Systems   Respiratory: Negative for cough, choking, chest tightness, shortness of breath and wheezing  Cardiovascular: Negative for chest pain, palpitations and leg swelling  Musculoskeletal: Negative for gait problem  Skin: Negative for rash  Neurological: Negative for dizziness, tremors, syncope, weakness, light-headedness, numbness and headaches  Psychiatric/Behavioral: Negative for agitation and behavioral problems  The patient is not hyperactive  Physical Exam:  /68   Pulse 64   Temp 97 6 °F (36 4 °C)   Resp 16   Ht 5' 2" (1 575 m)   Wt 75 7 kg (166 lb 12 8 oz)   BMI 30 51 kg/m²   Physical Exam  Constitutional:       General: She is not in acute distress  Appearance: She is well-developed     HENT: Head: Normocephalic and atraumatic  Neck:      Thyroid: No thyromegaly  Vascular: No JVD  Cardiovascular:      Rate and Rhythm: Normal rate and regular rhythm  Heart sounds: Normal heart sounds  No murmur  No friction rub  No gallop  Pulmonary:      Effort: No respiratory distress  Breath sounds: No wheezing or rales  Skin:     General: Skin is warm and dry  Neurological:      Mental Status: She is alert and oriented to person, place, and time  Psychiatric:         Behavior: Behavior normal            --------------------------------------------------------------------------------  ECHO:   Results for orders placed during the hospital encounter of 19   Echo complete with contrast if indicated    Narrative [facility]  [address]  [place], [address]  [phone]    Transthoracic Echocardiogram  2D, M-mode, Doppler, and Color Doppler    Study date:  12-Mar-2019    Patient: Estrada Blair  MR number: [id number]  Account number: [de-identified]  : 1965  Age: 49 years  Gender: Female  Status: Outpatient  Location: [facility]  Height: 62 in  Weight: 172 9 lb  BP: 107/ 68 mmHg    Indications: Coronary artery disease  Triple vessel disease  Diagnoses: I25 5 - Ischemic cardiomyopathy    Sonographer:  LIAN Freeman  Primary Physician:  Adilene Copeland MD  Referring Physician:  Ten Reed MD  Group:  [facility]  Interpreting Physician:  Ten Reed MD    SUMMARY    SUMMARY:  1  Sinus rhythm  2  Good technical quality    LEFT VENTRICLE:  Severe left ventricular systolic dysfunction, EF 72-96%  Left ventricular cavity normal size at the base but then balloons at the midportion and apex to a maximum 60 mm  normal left ventricular wall thickness  The mid to distal anterior  wall is severely hypokinetic   The distal septum and apex are paradoxical  Grade 1 left ventricular diastolic dysfunction with normal left ventricular filling pressures  Patient has evidence of smoke or strands of thrombus in the septal apex portion which extended into the mid ventricle  There is no evidence of a significant space filling mass  However, when definity was used, this particular area of the  ventricle did not opacify  This most likely is secondary to severe stasis of blood in that area  MITRAL VALVE:  No significant mitral regurgitation  AORTIC VALVE:  No aortic stenosis or regurgitation  TRICUSPID VALVE:  Trace tricuspid regurgitation  PULMONIC VALVE:  No pulmonic valvular regurgitation  AORTA:  Minimal enlargement of the aortic root at 38 mm  PULMONARY ARTERIES:  There was not significant tricuspid regurgitation to measure pulmonary artery pressures  HISTORY: PRIOR HISTORY: Previous (remote) myocardial infarction  Risk factors: hypertension, renal failure, and hypercholesterolemia  PRIOR PROCEDURES: ICD implantation  PROCEDURE: The study was performed in the [address] [place] [address]  This was a routine study  The transthoracic approach was used  The study included complete 2D imaging, M-mode, complete spectral Doppler, and color Doppler  The  heart rate was 68 bpm, at the start of the study  Intravenous contrast ( 1 ml of Definity) was administered  This was a technically difficult study  LEFT VENTRICLE: Severe left ventricular systolic dysfunction, EF 38-61%  Left ventricular cavity normal size at the base but then balloons at the midportion and apex to a maximum 60 mm  normal left ventricular wall thickness  The mid to  distal anterior wall is severely hypokinetic  The distal septum and apex are paradoxical  Grade 1 left ventricular diastolic dysfunction with normal left ventricular filling pressures  Patient has evidence of smoke or strands of thrombus in the septal apex portion which extended into the mid ventricle  There is no evidence of a significant space filling mass   However, when definity was used, this particular area of the  ventricle did not opacify  This most likely is secondary to severe stasis of blood in that area  RIGHT VENTRICLE: The size was normal  Systolic function was normal  Wall thickness was normal     LEFT ATRIUM: Size was normal     RIGHT ATRIUM: Size was normal     MITRAL VALVE: No significant mitral regurgitation  AORTIC VALVE: No aortic stenosis or regurgitation  TRICUSPID VALVE: Trace tricuspid regurgitation  PULMONIC VALVE: No pulmonic valvular regurgitation  PERICARDIUM: There was no pericardial effusion  The pericardium was normal in appearance  AORTA: Minimal enlargement of the aortic root at 38 mm  PULMONARY ARTERY: There was not significant tricuspid regurgitation to measure pulmonary artery pressures      SYSTEM MEASUREMENT TABLES    2D  %FS: 17 5 %  Ao Diam: 3 8 cm  EF(Teich): 36 7 %  ESV(Teich): 56 4 ml  IVSd: 1 cm  LVIDd: 4 4 cm  LVIDs: 3 7 cm  LVPWd: 1 cm  SV(Teich): 32 7 ml    [facility] Accredited Echocardiography Laboratory    Prepared and electronically signed by    Chloé Abdul MD  Signed 13-Mar-2019 09:31:50         ======================================================    Lab Results   Component Value Date    WBC 4 30 (L) 08/20/2020    WBC 4 5 08/20/2020    HGB 14 1 08/20/2020    HGB 13 4 08/20/2020    HCT 41 2 08/20/2020    HCT 39 5 08/20/2020    MCV 96 08/20/2020    MCV 95 08/20/2020     08/20/2020     08/20/2020      Lab Results   Component Value Date    SODIUM 141 08/20/2020    K 5 0 08/20/2020     (H) 08/20/2020    CO2 28 08/20/2020    BUN 14 08/20/2020    CREATININE 1 34 (H) 08/20/2020    GLUC 103 (H) 03/14/2020    CALCIUM 9 4 08/20/2020      Lab Results   Component Value Date    HGBA1C 5 5 02/20/2020      No results found for: CHOL  Lab Results   Component Value Date    HDL 41 04/07/2020    HDL 47 02/20/2020    HDL 45 07/01/2019     Lab Results   Component Value Date    LDLCALC 74 04/07/2020    LDLCALC 63 02/20/2020    LDLCALC 72 07/01/2019     Lab Results   Component Value Date    TRIG 162 (H) 04/07/2020    TRIG 185 (H) 02/20/2020    TRIG 176 (H) 07/01/2019     No results found for: [place], [place]   Lab Results   Component Value Date    INR 3 52 (H) 09/11/2020    INR 2 94 (H) 08/20/2020    INR 2 64 (H) 07/30/2020    PROTIME 35 3 (H) 09/11/2020    PROTIME 30 7 (H) 08/20/2020    PROTIME 28 2 (H) 07/30/2020        Imaging:   I have personally reviewed pertinent reports  Portions of the record may have been created with voice recognition software  Occasional wrong word or "sound a like" substitutions may have occurred due to the inherent limitations of voice recognition software  Read the chart carefully and recognize, using context, where substitutions have occurred

## 2020-09-16 NOTE — PROGRESS NOTES
Cardiology Follow Up    Gracie Valles  1965  [id number]  [address] [facility] [id number] [phone]    1  Triple vessel coronary artery disease     2  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 ([address] )     3  Ischemic cardiomyopathy     4  Mixed hyperlipidemia     5  old extensive anterior, apical, lateral and distal inferior apical MI     6  Long term current use of anticoagulant therapy     7  Implantable cardioverter-defibrillator (ICD) in situ     8  Impaired left ventricular function     9  Essential hypertension     10  CKD (chronic kidney disease) stage 3, GFR 30-59 ml/min ([facility])         Patient was referred to us from [facility]  She experienced a myocardial infarction on February 28, 2017 in [place]  She has subsequently moved to [facility] area  She had a cardiac catheterization taken at [facility]  Patient's cardiac catheterization CD revealed the left main coronary artery to be nonobstructed  The left anterior descending artery was 100% in its midportion after a large 1st diagonal branch which was nonobstructed  The circumflex artery obtuse marginal was 90%  The right coronary artery in its midportion with 50-60%  The large distal LAD filled from right to left collaterals  [facility] had recommended CABG but the patient had refused at that time  No ventriculogram was performed but an echocardiogram which I performed had demonstrated extensive LV damage  A nuclear stress test demonstrated minimal with any it ischemia  I did not feel that the patient would improve with CABG  Patient is free of angina pectoris or shortness of breath  She is doing quite well for her degree of LV dysfunction  04/11/2017 echocardiogram severe LV dysfunction, EF 30%, mild LV enlargement, apical aneurysm with akinesis to paradoxical motion   Large organized apical thrombus  Grade 1 diastolic dysfunction, normal pulmonary artery pressures    05/15/2017 stress test: Severe LV dysfunction, EF 20%  Mild LV enlargement  Anterior apical aneurysm with paradoxical motion  Extensive myocardial infarctions involving the distal anterior apical and inferior apical walls  Negative for Persantine induced ischemia     07/01/2019 lipid profile: Total cholesterol 152, triglycerides 176, HDL 45, LDL calculated 72, non HDL cholesterol 107    04/07/2020 lipid profile: Total cholesterol 147, triglycerides 162, HDL 41, LDL calculated 74, non HDL cholesterol 106    Interval History:  Patient denies chest discomfort or shortness of breath  She denies palpitations  She has had no dizziness or lightheadedness  She denies leg edema  She has no orthopnea or PND  She continues on Coumadin anticoagulation for left ventricular thrombus        Discussion/Summary:  Return in 6 months    Patient Active Problem List   Diagnosis    Ischemic cardiomyopathy    Triple vessel coronary artery disease    HIV positive ([facility] )    Essential hypertension    Hyperlipemia    Impaired left ventricular function    Long term current use of anticoagulant therapy    old extensive anterior, apical, lateral and distal inferior apical MI    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    CKD (chronic kidney disease) stage 3, GFR 30-59 ml/min ([facility])    Implantable cardioverter-defibrillator (ICD) in situ    ASCUS with positive high risk HPV cervical     Past Medical History:   Diagnosis Date    Asthma     Coronary artery disease     defibrillator    [facility] ([facility] )      Social History     Socioeconomic History    Marital status: Single     Spouse name: Not on file    Number of children: Not on file    Years of education: Not on file    Highest education level: Not on file   Occupational History    Not on file   Social Needs    Financial resource strain: Not on file   [place] insecurity     Worry: Not on file     Inability: Not on file    Transportation needs     Medical: Not on file     Non-medical: Not on file   Tobacco Use    Smoking status: Current Every Day Smoker     Packs/day: 0 50     Years: 30 00     Pack years: 15 00     Types: Cigarettes    Smokeless tobacco: Former User     Quit date: 8/12/2019    Tobacco comment: 1/2 pk day   Substance and Sexual Activity    Alcohol use: Yes     Comment: ocassionally    Drug use: No    Sexual activity: Not Currently   Lifestyle    Physical activity     Days per week: Not on file     Minutes per session: Not on file    Stress: Not on file   Relationships    Social connections     Talks on phone: Not on file     Gets together: Not on file     Attends [employer] service: Not on file     Active member of club or organization: Not on file     Attends meetings of clubs or organizations: Not on file     Relationship status: Not on file    Intimate partner violence     Fear of current or ex partner: Not on file     Emotionally abused: Not on file     Physically abused: Not on file     Forced sexual activity: Not on file   Other Topics Concern    Not on file   Social History Narrative    Not on file      Family History   Problem Relation Age of Onset    Other Father         GI bleed    Suicidality Brother     Drug abuse Brother         overdose     Past Surgical History:   Procedure Laterality Date    ANGIOPLASTY      CARDIAC DEFIBRILLATOR PLACEMENT      CORONARY ANGIOPLASTY      pci placement    TOTAL ABDOMINAL HYSTERECTOMY         Current Outpatient Medications:     aspirin (ECOTRIN LOW STRENGTH) 81 mg EC tablet, Take 1 tablet (81 mg total) by mouth daily, Disp: 81 tablet, Rfl: 10    atorvastatin (LIPITOR) 80 mg tablet, Take 1 tablet (80 mg total) by mouth daily, Disp: 90 tablet, Rfl: 5    bictegravir-emtricitab-tenofovir alafenamide (Biktarvy) -25 MG tablet, Take 1 tablet by mouth daily with breakfast, Disp: 30 tablet, Rfl: 5    sacubitril-valsartan (Entresto) 49-51 MG TABS, Take 1 tablet by mouth 2 (two) times a day, Disp: 180 tablet, Rfl: 3    warfarin (COUMADIN) 1 mg tablet, TAKE 1 TABLET BY MOUTH DAILY WITH COUMADIN 5MG FOR THE NEXT THREE DAYS, Disp: 90 tablet, Rfl: 10    warfarin (COUMADIN) 5 mg tablet, TAKE 1 TABLET BY MOUTH DAILY, Disp: 90 tablet, Rfl: 11    carvedilol (COREG) 6 25 mg tablet, Take 1 tablet (6 25 mg total) by mouth 2 (two) times a day with meals, Disp: 180 tablet, Rfl: 3  Allergies   Allergen Reactions    No Active Allergies     No Known Allergies        No results found for this visit on 09/16/20  Review of Systems:  Review of Systems   Respiratory: Negative for cough, choking, chest tightness, shortness of breath and wheezing  Cardiovascular: Negative for chest pain, palpitations and leg swelling  Musculoskeletal: Negative for gait problem  Skin: Negative for rash  Neurological: Negative for dizziness, tremors, syncope, weakness, light-headedness, numbness and headaches  Psychiatric/Behavioral: Negative for agitation and behavioral problems  The patient is not hyperactive  Physical Exam:  /68   Pulse 64   Temp 97 6 °F (36 4 °C)   Resp 16   Ht 5' 2" (1 575 m)   Wt 75 7 kg (166 lb 12 8 oz)   BMI 30 51 kg/m²   Physical Exam  Constitutional:       General: She is not in acute distress  Appearance: She is well-developed  HENT:      Head: Normocephalic and atraumatic  Neck:      Thyroid: No thyromegaly  Vascular: No JVD  Cardiovascular:      Rate and Rhythm: Normal rate and regular rhythm  Heart sounds: Normal heart sounds  No murmur  No friction rub  No gallop  Pulmonary:      Effort: No respiratory distress  Breath sounds: No wheezing or rales  Skin:     General: Skin is warm and dry  Neurological:      Mental Status: She is alert and oriented to person, place, and time     Psychiatric:         Behavior: Behavior normal  --------------------------------------------------------------------------------  ECHO:   Results for orders placed during the hospital encounter of 19   Echo complete with contrast if indicated    [address]  [address]  \[facility]\"", [address]  [phone]    Transthoracic Echocardiogram  2D, M-mode, Doppler, and Color Doppler    Study date:  12-Mar-2019    Patient: Shira Hewitt  MR number: [id number]  Account number: [de-identified]  : 1965  Age: 49 years  Gender: Female  Status: Outpatient  Location: [facility]  Height: 62 in  Weight: 172 9 lb  BP: 107/ 68 mmHg    Indications: Coronary artery disease  Triple vessel disease  Diagnoses: I25 5 - Ischemic cardiomyopathy    Sonographer:  LIAN De La Garza  Primary Physician:  Arta Schlatter, MD  Referring Physician:  Mecca Hill MD  Group:  [facility]  Interpreting Physician:  Mecca Hill MD    SUMMARY    SUMMARY:  1  Sinus rhythm  2  Good technical quality    LEFT VENTRICLE:  Severe left ventricular systolic dysfunction, EF 26-17%  Left ventricular cavity normal size at the base but then balloons at the midportion and apex to a maximum 60 mm  normal left ventricular wall thickness  The mid to distal anterior  wall is severely hypokinetic  The distal septum and apex are paradoxical  Grade 1 left ventricular diastolic dysfunction with normal left ventricular filling pressures  Patient has evidence of smoke or strands of thrombus in the septal apex portion which extended into the mid ventricle  There is no evidence of a significant space filling mass  However, when definity was used, this particular area of the  ventricle did not opacify  This most likely is secondary to severe stasis of blood in that area  MITRAL VALVE:  No significant mitral regurgitation  AORTIC VALVE:  No aortic stenosis or regurgitation      TRICUSPID VALVE:  Trace tricuspid regurgitation  PULMONIC VALVE:  No pulmonic valvular regurgitation  AORTA:  Minimal enlargement of the aortic root at 38 mm  PULMONARY ARTERIES:  There was not significant tricuspid regurgitation to measure pulmonary artery pressures  HISTORY: PRIOR HISTORY: Previous (remote) myocardial infarction  Risk factors: hypertension, renal failure, and hypercholesterolemia  PRIOR PROCEDURES: ICD implantation  PROCEDURE: The study was performed in the [address] [place] [address]  This was a routine study  The transthoracic approach was used  The study included complete 2D imaging, M-mode, complete spectral Doppler, and color Doppler  The  heart rate was 68 bpm, at the start of the study  Intravenous contrast ( 1 ml of Definity) was administered  This was a technically difficult study  LEFT VENTRICLE: Severe left ventricular systolic dysfunction, EF 36-73%  Left ventricular cavity normal size at the base but then balloons at the midportion and apex to a maximum 60 mm  normal left ventricular wall thickness  The mid to  distal anterior wall is severely hypokinetic  The distal septum and apex are paradoxical  Grade 1 left ventricular diastolic dysfunction with normal left ventricular filling pressures  Patient has evidence of smoke or strands of thrombus in the septal apex portion which extended into the mid ventricle  There is no evidence of a significant space filling mass  However, when definity was used, this particular area of the  ventricle did not opacify  This most likely is secondary to severe stasis of blood in that area  RIGHT VENTRICLE: The size was normal  Systolic function was normal  Wall thickness was normal     LEFT ATRIUM: Size was normal     RIGHT ATRIUM: Size was normal     MITRAL VALVE: No significant mitral regurgitation  AORTIC VALVE: No aortic stenosis or regurgitation  TRICUSPID VALVE: Trace tricuspid regurgitation      PULMONIC VALVE: No pulmonic valvular regurgitation  PERICARDIUM: There was no pericardial effusion  The pericardium was normal in appearance  AORTA: Minimal enlargement of the aortic root at 38 mm  PULMONARY ARTERY: There was not significant tricuspid regurgitation to measure pulmonary artery pressures  SYSTEM MEASUREMENT TABLES    2D  %FS: 17 5 %  Ao Diam: 3 8 cm  EF(Teich): 36 7 %  ESV(Teich): 56 4 ml  IVSd: 1 cm  LVIDd: 4 4 cm  LVIDs: 3 7 cm  LVPWd: 1 cm  SV(Teich): 32 7 ml    [facility]    Prepared and electronically signed by    Poornima Linda MD  Signed 13-Mar-2019 09:31:50         ======================================================    Lab Results   Component Value Date    WBC 4 30 (L) 08/20/2020    WBC 4 5 08/20/2020    HGB 14 1 08/20/2020    HGB 13 4 08/20/2020    HCT 41 2 08/20/2020    HCT 39 5 08/20/2020    MCV 96 08/20/2020    MCV 95 08/20/2020     08/20/2020     08/20/2020      Lab Results   Component Value Date    SODIUM 141 08/20/2020    K 5 0 08/20/2020     (H) 08/20/2020    CO2 28 08/20/2020    BUN 14 08/20/2020    CREATININE 1 34 (H) 08/20/2020    GLUC 103 (H) 03/14/2020    CALCIUM 9 4 08/20/2020      Lab Results   Component Value Date    HGBA1C 5 5 02/20/2020      No results found for: CHOL  Lab Results   Component Value Date    HDL 41 04/07/2020    HDL 47 02/20/2020    HDL 45 07/01/2019     Lab Results   Component Value Date    LDLCALC 74 04/07/2020    LDLCALC 63 02/20/2020    LDLCALC 72 07/01/2019     Lab Results   Component Value Date    TRIG 162 (H) 04/07/2020    TRIG 185 (H) 02/20/2020    TRIG 176 (H) 07/01/2019     No results found for: [place], [place]   Lab Results   Component Value Date    INR 3 52 (H) 09/11/2020    INR 2 94 (H) 08/20/2020    INR 2 64 (H) 07/30/2020    PROTIME 35 3 (H) 09/11/2020    PROTIME 30 7 (H) 08/20/2020    PROTIME 28 2 (H) 07/30/2020        Imaging:   I have personally reviewed pertinent reports          Portions of the record may have been created with voice recognition software  Occasional wrong word or "sound a like" substitutions may have occurred due to the inherent limitations of voice recognition software  Read the chart carefully and recognize, using context, where substitutions have occurred

## 2020-09-22 ENCOUNTER — APPOINTMENT (OUTPATIENT)
Dept: LAB | Facility: HOSPITAL | Age: 55
End: 2020-09-22
Payer: COMMERCIAL

## 2020-09-22 ENCOUNTER — TELEPHONE (OUTPATIENT)
Dept: CARDIOLOGY CLINIC | Facility: CLINIC | Age: 55
End: 2020-09-22

## 2020-09-22 ENCOUNTER — ANTICOAG VISIT (OUTPATIENT)
Dept: CARDIOLOGY CLINIC | Facility: CLINIC | Age: 55
End: 2020-09-22

## 2020-09-22 DIAGNOSIS — I23.6 LV (LEFT VENTRICULAR) MURAL THROMBUS FOLLOWING MI (HCC): ICD-10-CM

## 2020-09-22 DIAGNOSIS — I25.5 ISCHEMIC CARDIOMYOPATHY: ICD-10-CM

## 2020-09-22 DIAGNOSIS — Z79.01 LONG TERM CURRENT USE OF ANTICOAGULANT THERAPY: ICD-10-CM

## 2020-09-22 NOTE — PROGRESS NOTES
Tc to pt, reports she started taking vit e tablets 3 days ago, on her own  instructed to stop taking vit e, she needs to call regarding any supplement drugs she wants to start on her own  Will increase dose, of warfarin, 7 5 mg tues/sat, 5 mg other days of the week, inr due 1 week

## 2020-09-22 NOTE — TELEPHONE ENCOUNTER
Tc to patient regarding pt/inr results  See anticoag visit from today  Patient reported she started taking vit K otc on her own  Advised of the interaction with vit k and warfarin  She took 3 doses  Patient understood to stop taking vit k  She questioned taking a multivitamin  Advised to inquire at pharmacy, which multivitamins are available that do not contain vit K  due to patient taking warfarin  She understood

## 2020-10-01 ENCOUNTER — TELEPHONE (OUTPATIENT)
Dept: CARDIOLOGY CLINIC | Facility: CLINIC | Age: 55
End: 2020-10-01

## 2020-10-01 ENCOUNTER — ANTICOAG VISIT (OUTPATIENT)
Dept: CARDIOLOGY CLINIC | Facility: CLINIC | Age: 55
End: 2020-10-01

## 2020-10-01 ENCOUNTER — APPOINTMENT (OUTPATIENT)
Dept: LAB | Facility: HOSPITAL | Age: 55
End: 2020-10-01
Payer: COMMERCIAL

## 2020-10-01 DIAGNOSIS — I25.5 ISCHEMIC CARDIOMYOPATHY: Primary | ICD-10-CM

## 2020-10-01 DIAGNOSIS — I25.5 ISCHEMIC CARDIOMYOPATHY: ICD-10-CM

## 2020-10-01 DIAGNOSIS — Z79.01 LONG TERM CURRENT USE OF ANTICOAGULANT THERAPY: ICD-10-CM

## 2020-10-01 DIAGNOSIS — I23.6 VENTRICULAR THROMBUS FOLLOWING MI (MYOCARDIAL INFARCTION) (HCC): ICD-10-CM

## 2020-10-01 DIAGNOSIS — Z79.01 ANTICOAGULATION MONITORING BY PHARMACIST: ICD-10-CM

## 2020-10-01 DIAGNOSIS — I23.6 LV (LEFT VENTRICULAR) MURAL THROMBUS FOLLOWING MI (HCC): ICD-10-CM

## 2020-10-01 LAB
INR PPP: 2.76 (ref 0.84–1.19)
PROTHROMBIN TIME: 29.2 SECONDS (ref 11.6–14.5)

## 2020-10-01 PROCEDURE — 85610 PROTHROMBIN TIME: CPT

## 2020-10-01 PROCEDURE — 36415 COLL VENOUS BLD VENIPUNCTURE: CPT

## 2020-10-15 ENCOUNTER — ANTICOAG VISIT (OUTPATIENT)
Dept: CARDIOLOGY CLINIC | Facility: CLINIC | Age: 55
End: 2020-10-15

## 2020-10-15 ENCOUNTER — OFFICE VISIT (OUTPATIENT)
Dept: FAMILY MEDICINE CLINIC | Facility: CLINIC | Age: 55
End: 2020-10-15

## 2020-10-15 ENCOUNTER — PATIENT OUTREACH (OUTPATIENT)
Dept: SURGERY | Facility: CLINIC | Age: 55
End: 2020-10-15

## 2020-10-15 ENCOUNTER — APPOINTMENT (OUTPATIENT)
Dept: LAB | Facility: HOSPITAL | Age: 55
End: 2020-10-15
Payer: COMMERCIAL

## 2020-10-15 VITALS
HEART RATE: 71 BPM | BODY MASS INDEX: 32.81 KG/M2 | RESPIRATION RATE: 16 BRPM | OXYGEN SATURATION: 95 % | TEMPERATURE: 96.4 F | WEIGHT: 167.1 LBS | SYSTOLIC BLOOD PRESSURE: 128 MMHG | HEIGHT: 60 IN | DIASTOLIC BLOOD PRESSURE: 62 MMHG

## 2020-10-15 DIAGNOSIS — I25.5 ISCHEMIC CARDIOMYOPATHY: ICD-10-CM

## 2020-10-15 DIAGNOSIS — Z79.01 LONG TERM CURRENT USE OF ANTICOAGULANT THERAPY: ICD-10-CM

## 2020-10-15 DIAGNOSIS — U07.1 COVID-19: Primary | ICD-10-CM

## 2020-10-15 DIAGNOSIS — I10 ESSENTIAL HYPERTENSION: Primary | ICD-10-CM

## 2020-10-15 DIAGNOSIS — J45.20 MILD INTERMITTENT ASTHMA WITHOUT COMPLICATION: ICD-10-CM

## 2020-10-15 DIAGNOSIS — N18.31 STAGE 3A CHRONIC KIDNEY DISEASE (HCC): ICD-10-CM

## 2020-10-15 DIAGNOSIS — I23.6 LV (LEFT VENTRICULAR) MURAL THROMBUS FOLLOWING MI (HCC): ICD-10-CM

## 2020-10-15 PROCEDURE — 99213 OFFICE O/P EST LOW 20 MIN: CPT | Performed by: INTERNAL MEDICINE

## 2020-10-15 RX ORDER — ALBUTEROL SULFATE 90 UG/1
2 AEROSOL, METERED RESPIRATORY (INHALATION) EVERY 6 HOURS PRN
Qty: 18 G | Refills: 1 | Status: SHIPPED | OUTPATIENT
Start: 2020-10-15 | End: 2020-10-15 | Stop reason: SDUPTHER

## 2020-10-15 RX ORDER — BUDESONIDE AND FORMOTEROL FUMARATE DIHYDRATE 160; 4.5 UG/1; UG/1
1 AEROSOL RESPIRATORY (INHALATION) EVERY 6 HOURS PRN
Qty: 1 INHALER | Refills: 2 | Status: SHIPPED | OUTPATIENT
Start: 2020-10-15 | End: 2022-04-25

## 2020-10-15 RX ORDER — ALBUTEROL SULFATE 90 UG/1
2 AEROSOL, METERED RESPIRATORY (INHALATION) EVERY 6 HOURS PRN
Qty: 18 G | Refills: 1 | Status: SHIPPED | OUTPATIENT
Start: 2020-10-15 | End: 2020-10-15 | Stop reason: ALTCHOICE

## 2020-10-19 ENCOUNTER — PATIENT OUTREACH (OUTPATIENT)
Dept: SURGERY | Facility: CLINIC | Age: 55
End: 2020-10-19

## 2020-10-19 ENCOUNTER — APPOINTMENT (OUTPATIENT)
Dept: LAB | Facility: HOSPITAL | Age: 55
End: 2020-10-19
Payer: COMMERCIAL

## 2020-10-19 DIAGNOSIS — U07.1 COVID-19: ICD-10-CM

## 2020-10-19 LAB — SARS-COV-2 IGG+IGM SERPL QL IA: NORMAL

## 2020-10-19 PROCEDURE — 36415 COLL VENOUS BLD VENIPUNCTURE: CPT

## 2020-10-19 PROCEDURE — 86769 SARS-COV-2 COVID-19 ANTIBODY: CPT

## 2020-10-26 ENCOUNTER — REMOTE DEVICE CLINIC VISIT (OUTPATIENT)
Dept: CARDIOLOGY CLINIC | Facility: CLINIC | Age: 55
End: 2020-10-26
Payer: COMMERCIAL

## 2020-10-26 DIAGNOSIS — Z95.810 IMPLANTABLE CARDIOVERTER-DEFIBRILLATOR (ICD) IN SITU: Primary | ICD-10-CM

## 2020-10-26 PROCEDURE — G2066 INTER DEVC REMOTE 30D: HCPCS | Performed by: INTERNAL MEDICINE

## 2020-10-26 PROCEDURE — 93297 REM INTERROG DEV EVAL ICPMS: CPT | Performed by: INTERNAL MEDICINE

## 2020-11-05 ENCOUNTER — LAB (OUTPATIENT)
Dept: LAB | Facility: HOSPITAL | Age: 55
End: 2020-11-05
Payer: COMMERCIAL

## 2020-11-05 ENCOUNTER — ANTICOAG VISIT (OUTPATIENT)
Dept: CARDIOLOGY CLINIC | Facility: CLINIC | Age: 55
End: 2020-11-05

## 2020-11-05 ENCOUNTER — HOSPITAL ENCOUNTER (EMERGENCY)
Facility: HOSPITAL | Age: 55
Discharge: HOME | End: 2020-11-05
Attending: EMERGENCY MEDICINE | Admitting: EMERGENCY MEDICINE
Payer: COMMERCIAL

## 2020-11-05 VITALS
OXYGEN SATURATION: 99 % | TEMPERATURE: 97.2 F | HEART RATE: 68 BPM | RESPIRATION RATE: 1 BRPM | WEIGHT: 167.25 LBS | SYSTOLIC BLOOD PRESSURE: 95 MMHG | DIASTOLIC BLOOD PRESSURE: 62 MMHG | BODY MASS INDEX: 32.66 KG/M2

## 2020-11-05 DIAGNOSIS — Z79.01 LONG TERM CURRENT USE OF ANTICOAGULANT THERAPY: ICD-10-CM

## 2020-11-05 DIAGNOSIS — I25.5 ISCHEMIC CARDIOMYOPATHY: ICD-10-CM

## 2020-11-05 DIAGNOSIS — J45.901 ACUTE ASTHMA EXACERBATION: Primary | ICD-10-CM

## 2020-11-05 DIAGNOSIS — I23.6 LV (LEFT VENTRICULAR) MURAL THROMBUS FOLLOWING MI (HCC): ICD-10-CM

## 2020-11-05 DIAGNOSIS — N18.31 STAGE 3A CHRONIC KIDNEY DISEASE (HCC): ICD-10-CM

## 2020-11-05 LAB
ANION GAP SERPL CALCULATED.3IONS-SCNC: 5 MMOL/L (ref 5–14)
BUN SERPL-MCNC: 13 MG/DL (ref 5–25)
CALCIUM SERPL-MCNC: 9.5 MG/DL (ref 8.4–10.2)
CHLORIDE SERPL-SCNC: 108 MMOL/L (ref 97–108)
CO2 SERPL-SCNC: 28 MMOL/L (ref 22–30)
CREAT SERPL-MCNC: 1.32 MG/DL (ref 0.6–1.2)
GFR SERPL CREATININE-BSD FRML MDRD: 46 ML/MIN/1.73SQ M
GLUCOSE P FAST SERPL-MCNC: 121 MG/DL (ref 70–99)
POTASSIUM SERPL-SCNC: 4.2 MMOL/L (ref 3.6–5)
SODIUM SERPL-SCNC: 141 MMOL/L (ref 137–147)

## 2020-11-05 PROCEDURE — 94640 AIRWAY INHALATION TREATMENT: CPT

## 2020-11-05 PROCEDURE — 80048 BASIC METABOLIC PNL TOTAL CA: CPT

## 2020-11-05 PROCEDURE — 99284 EMERGENCY DEPT VISIT MOD MDM: CPT | Performed by: PHYSICIAN ASSISTANT

## 2020-11-05 PROCEDURE — 99283 EMERGENCY DEPT VISIT LOW MDM: CPT

## 2020-11-05 RX ORDER — SODIUM CHLORIDE FOR INHALATION 0.9 %
6 VIAL, NEBULIZER (ML) INHALATION ONCE
Status: COMPLETED | OUTPATIENT
Start: 2020-11-05 | End: 2020-11-05

## 2020-11-05 RX ORDER — PREDNISONE 20 MG/1
60 TABLET ORAL ONCE
Status: COMPLETED | OUTPATIENT
Start: 2020-11-05 | End: 2020-11-05

## 2020-11-05 RX ORDER — LORATADINE 10 MG/1
10 TABLET ORAL DAILY
Qty: 30 TABLET | Refills: 0 | Status: SHIPPED | OUTPATIENT
Start: 2020-11-05 | End: 2021-03-16 | Stop reason: ALTCHOICE

## 2020-11-05 RX ORDER — ALBUTEROL SULFATE 90 UG/1
2 AEROSOL, METERED RESPIRATORY (INHALATION) EVERY 4 HOURS PRN
Qty: 1 INHALER | Refills: 0 | Status: SHIPPED | OUTPATIENT
Start: 2020-11-05 | End: 2022-06-07 | Stop reason: SDUPTHER

## 2020-11-05 RX ORDER — PREDNISONE 20 MG/1
60 TABLET ORAL DAILY
Qty: 15 TABLET | Refills: 0 | Status: SHIPPED | OUTPATIENT
Start: 2020-11-05 | End: 2020-11-10

## 2020-11-05 RX ADMIN — IPRATROPIUM BROMIDE 1 MG: 0.5 SOLUTION RESPIRATORY (INHALATION) at 11:58

## 2020-11-05 RX ADMIN — ALBUTEROL SULFATE 10 MG: 2.5 SOLUTION RESPIRATORY (INHALATION) at 11:58

## 2020-11-05 RX ADMIN — ISODIUM CHLORIDE 3 ML: 0.03 SOLUTION RESPIRATORY (INHALATION) at 11:57

## 2020-11-05 RX ADMIN — PREDNISONE 60 MG: 20 TABLET ORAL at 11:56

## 2020-11-07 ENCOUNTER — TELEPHONE (OUTPATIENT)
Dept: FAMILY MEDICINE CLINIC | Facility: CLINIC | Age: 55
End: 2020-11-07

## 2020-11-07 DIAGNOSIS — J45.20 MILD INTERMITTENT ASTHMA WITHOUT COMPLICATION: Primary | ICD-10-CM

## 2020-11-07 RX ORDER — INHALER,ASSIST DEVICE,LG MASK
1 SPACER (EA) MISCELLANEOUS DAILY
Qty: 1 DEVICE | Refills: 0 | Status: SHIPPED | OUTPATIENT
Start: 2020-11-07 | End: 2022-07-25 | Stop reason: ALTCHOICE

## 2020-11-10 ENCOUNTER — ANTICOAG VISIT (OUTPATIENT)
Dept: CARDIOLOGY CLINIC | Facility: CLINIC | Age: 55
End: 2020-11-10

## 2020-11-10 ENCOUNTER — LAB (OUTPATIENT)
Dept: LAB | Facility: HOSPITAL | Age: 55
End: 2020-11-10
Payer: COMMERCIAL

## 2020-11-10 DIAGNOSIS — I23.6 LV (LEFT VENTRICULAR) MURAL THROMBUS FOLLOWING MI (HCC): ICD-10-CM

## 2020-11-10 DIAGNOSIS — Z79.01 LONG TERM CURRENT USE OF ANTICOAGULANT THERAPY: ICD-10-CM

## 2020-11-10 DIAGNOSIS — I25.5 ISCHEMIC CARDIOMYOPATHY: ICD-10-CM

## 2020-11-11 ENCOUNTER — TELEPHONE (OUTPATIENT)
Dept: FAMILY MEDICINE CLINIC | Facility: CLINIC | Age: 55
End: 2020-11-11

## 2020-11-17 ENCOUNTER — ANTICOAG VISIT (OUTPATIENT)
Dept: CARDIOLOGY CLINIC | Facility: CLINIC | Age: 55
End: 2020-11-17

## 2020-11-17 ENCOUNTER — LAB (OUTPATIENT)
Dept: LAB | Facility: HOSPITAL | Age: 55
End: 2020-11-17
Payer: COMMERCIAL

## 2020-11-17 DIAGNOSIS — I23.6 LV (LEFT VENTRICULAR) MURAL THROMBUS FOLLOWING MI (HCC): ICD-10-CM

## 2020-11-17 DIAGNOSIS — Z79.01 LONG TERM CURRENT USE OF ANTICOAGULANT THERAPY: ICD-10-CM

## 2020-11-17 DIAGNOSIS — I25.5 ISCHEMIC CARDIOMYOPATHY: ICD-10-CM

## 2020-11-24 ENCOUNTER — IN-CLINIC DEVICE VISIT (OUTPATIENT)
Dept: CARDIOLOGY CLINIC | Facility: CLINIC | Age: 55
End: 2020-11-24
Payer: COMMERCIAL

## 2020-11-24 DIAGNOSIS — Z95.810 IMPLANTABLE CARDIOVERTER-DEFIBRILLATOR (ICD) IN SITU: Primary | ICD-10-CM

## 2020-11-24 PROCEDURE — 93282 PRGRMG EVAL IMPLANTABLE DFB: CPT | Performed by: INTERNAL MEDICINE

## 2020-12-11 ENCOUNTER — ANTICOAG VISIT (OUTPATIENT)
Dept: CARDIOLOGY CLINIC | Facility: CLINIC | Age: 55
End: 2020-12-11

## 2020-12-11 ENCOUNTER — LAB (OUTPATIENT)
Dept: LAB | Facility: HOSPITAL | Age: 55
End: 2020-12-11
Payer: COMMERCIAL

## 2020-12-11 DIAGNOSIS — I23.6 LV (LEFT VENTRICULAR) MURAL THROMBUS FOLLOWING MI (HCC): ICD-10-CM

## 2020-12-11 DIAGNOSIS — I25.5 ISCHEMIC CARDIOMYOPATHY: ICD-10-CM

## 2020-12-11 DIAGNOSIS — Z79.01 LONG TERM CURRENT USE OF ANTICOAGULANT THERAPY: ICD-10-CM

## 2020-12-22 ENCOUNTER — LAB (OUTPATIENT)
Dept: LAB | Facility: HOSPITAL | Age: 55
End: 2020-12-22
Payer: COMMERCIAL

## 2020-12-22 ENCOUNTER — ANTICOAG VISIT (OUTPATIENT)
Dept: CARDIOLOGY CLINIC | Facility: CLINIC | Age: 55
End: 2020-12-22

## 2020-12-22 DIAGNOSIS — I23.6 LV (LEFT VENTRICULAR) MURAL THROMBUS FOLLOWING MI (HCC): ICD-10-CM

## 2020-12-22 DIAGNOSIS — Z79.01 LONG TERM CURRENT USE OF ANTICOAGULANT THERAPY: ICD-10-CM

## 2020-12-22 DIAGNOSIS — I25.5 ISCHEMIC CARDIOMYOPATHY: ICD-10-CM

## 2021-01-08 ENCOUNTER — LAB (OUTPATIENT)
Dept: LAB | Facility: HOSPITAL | Age: 56
End: 2021-01-08
Payer: COMMERCIAL

## 2021-01-11 ENCOUNTER — ANTICOAG VISIT (OUTPATIENT)
Dept: CARDIOLOGY CLINIC | Facility: CLINIC | Age: 56
End: 2021-01-11

## 2021-01-11 DIAGNOSIS — Z79.01 LONG TERM CURRENT USE OF ANTICOAGULANT THERAPY: ICD-10-CM

## 2021-01-11 DIAGNOSIS — I25.5 ISCHEMIC CARDIOMYOPATHY: ICD-10-CM

## 2021-01-11 DIAGNOSIS — I23.6 LV (LEFT VENTRICULAR) MURAL THROMBUS FOLLOWING MI (HCC): ICD-10-CM

## 2021-01-11 NOTE — PROGRESS NOTES
Tc to pt, reports she has not missed any doses or any medication changes  Will increase dose, 7 5 mg tues/sat, 5 mg other days of the week, inr due 2 weeks

## 2021-01-27 ENCOUNTER — ANTICOAG VISIT (OUTPATIENT)
Dept: CARDIOLOGY CLINIC | Facility: CLINIC | Age: 56
End: 2021-01-27

## 2021-01-27 ENCOUNTER — LAB (OUTPATIENT)
Dept: LAB | Facility: HOSPITAL | Age: 56
End: 2021-01-27
Payer: COMMERCIAL

## 2021-01-27 DIAGNOSIS — I23.6 LV (LEFT VENTRICULAR) MURAL THROMBUS FOLLOWING MI (HCC): ICD-10-CM

## 2021-01-27 DIAGNOSIS — Z79.01 LONG TERM CURRENT USE OF ANTICOAGULANT THERAPY: ICD-10-CM

## 2021-01-27 DIAGNOSIS — I25.5 ISCHEMIC CARDIOMYOPATHY: ICD-10-CM

## 2021-01-27 DIAGNOSIS — B20 HIV DISEASE (HCC): ICD-10-CM

## 2021-01-27 LAB
ALBUMIN SERPL BCP-MCNC: 3.9 G/DL (ref 3–5.2)
ALP SERPL-CCNC: 60 U/L (ref 43–122)
ALT SERPL W P-5'-P-CCNC: 15 U/L (ref 9–52)
ANION GAP SERPL CALCULATED.3IONS-SCNC: 5 MMOL/L (ref 5–14)
AST SERPL W P-5'-P-CCNC: 28 U/L (ref 14–36)
BASOPHILS # BLD AUTO: 0 THOUSANDS/ΜL (ref 0–0.1)
BASOPHILS NFR BLD AUTO: 1 % (ref 0–1)
BILIRUB SERPL-MCNC: 0.8 MG/DL
BUN SERPL-MCNC: 11 MG/DL (ref 5–25)
CALCIUM SERPL-MCNC: 9.6 MG/DL (ref 8.4–10.2)
CHLORIDE SERPL-SCNC: 106 MMOL/L (ref 97–108)
CO2 SERPL-SCNC: 31 MMOL/L (ref 22–30)
CREAT SERPL-MCNC: 1.18 MG/DL (ref 0.6–1.2)
EOSINOPHIL # BLD AUTO: 0.1 THOUSAND/ΜL (ref 0–0.4)
EOSINOPHIL NFR BLD AUTO: 2 % (ref 0–6)
ERYTHROCYTE [DISTWIDTH] IN BLOOD BY AUTOMATED COUNT: 14.9 %
GFR SERPL CREATININE-BSD FRML MDRD: 52 ML/MIN/1.73SQ M
GLUCOSE P FAST SERPL-MCNC: 83 MG/DL (ref 70–99)
HCT VFR BLD AUTO: 41.7 % (ref 36–46)
HGB BLD-MCNC: 13.5 G/DL (ref 12–16)
LYMPHOCYTES # BLD AUTO: 1.9 THOUSANDS/ΜL (ref 0.5–4)
LYMPHOCYTES NFR BLD AUTO: 38 % (ref 25–45)
MCH RBC QN AUTO: 31.2 PG (ref 26–34)
MCHC RBC AUTO-ENTMCNC: 32.4 G/DL (ref 31–36)
MCV RBC AUTO: 96 FL (ref 80–100)
MONOCYTES # BLD AUTO: 0.4 THOUSAND/ΜL (ref 0.2–0.9)
MONOCYTES NFR BLD AUTO: 8 % (ref 1–10)
NEUTROPHILS # BLD AUTO: 2.7 THOUSANDS/ΜL (ref 1.8–7.8)
NEUTS SEG NFR BLD AUTO: 53 % (ref 45–65)
PLATELET # BLD AUTO: 198 THOUSANDS/UL (ref 150–450)
PMV BLD AUTO: 9.1 FL (ref 8.9–12.7)
POTASSIUM SERPL-SCNC: 3.7 MMOL/L (ref 3.6–5)
PROT SERPL-MCNC: 7.2 G/DL (ref 5.9–8.4)
RBC # BLD AUTO: 4.33 MILLION/UL (ref 4–5.2)
SODIUM SERPL-SCNC: 142 MMOL/L (ref 137–147)
WBC # BLD AUTO: 5.2 THOUSAND/UL (ref 4.5–11)

## 2021-01-27 PROCEDURE — 87536 HIV-1 QUANT&REVRSE TRNSCRPJ: CPT

## 2021-01-27 PROCEDURE — 86361 T CELL ABSOLUTE COUNT: CPT

## 2021-01-27 PROCEDURE — 80053 COMPREHEN METABOLIC PANEL: CPT

## 2021-01-27 PROCEDURE — 85025 COMPLETE CBC W/AUTO DIFF WBC: CPT

## 2021-01-27 NOTE — PROGRESS NOTES
Pt was actually only takin 7 5 on Sat   will follw the 7/5 Tues and Sat with a 10 mg today for 1 dose recheck in 2 weeks

## 2021-01-28 ENCOUNTER — PATIENT OUTREACH (OUTPATIENT)
Dept: SURGERY | Facility: CLINIC | Age: 56
End: 2021-01-28

## 2021-01-28 LAB
BASOPHILS # BLD AUTO: 0 X10E3/UL (ref 0–0.2)
BASOPHILS NFR BLD AUTO: 1 %
CD3+CD4+ CELLS # BLD: 418 /UL (ref 359–1519)
CD3+CD4+ CELLS NFR BLD: 23.2 % (ref 30.8–58.5)
EOSINOPHIL # BLD AUTO: 0.1 X10E3/UL (ref 0–0.4)
EOSINOPHIL NFR BLD AUTO: 2 %
ERYTHROCYTE [DISTWIDTH] IN BLOOD BY AUTOMATED COUNT: 13.9 % (ref 11.7–15.4)
HCT VFR BLD AUTO: 37.7 % (ref 34–46.6)
HGB BLD-MCNC: 12.4 G/DL (ref 11.1–15.9)
IMM GRANULOCYTES # BLD: 0 X10E3/UL (ref 0–0.1)
IMM GRANULOCYTES NFR BLD: 0 %
LYMPHOCYTES # BLD AUTO: 1.8 X10E3/UL (ref 0.7–3.1)
LYMPHOCYTES NFR BLD AUTO: 39 %
MCH RBC QN AUTO: 31.2 PG (ref 26.6–33)
MCHC RBC AUTO-ENTMCNC: 32.9 G/DL (ref 31.5–35.7)
MCV RBC AUTO: 95 FL (ref 79–97)
MONOCYTES # BLD AUTO: 0.4 X10E3/UL (ref 0.1–0.9)
MONOCYTES NFR BLD AUTO: 8 %
NEUTROPHILS # BLD AUTO: 2.3 X10E3/UL (ref 1.4–7)
NEUTROPHILS NFR BLD AUTO: 50 %
PLATELET # BLD AUTO: 223 X10E3/UL (ref 150–450)
RBC # BLD AUTO: 3.98 X10E6/UL (ref 3.77–5.28)
WBC # BLD AUTO: 4.6 X10E3/UL (ref 3.4–10.8)

## 2021-01-29 LAB
HIV1 RNA # SERPL NAA+PROBE: <20 COPIES/ML
HIV1 RNA SERPL NAA+PROBE-LOG#: NORMAL LOG10COPY/ML

## 2021-02-02 ENCOUNTER — REMOTE DEVICE CLINIC VISIT (OUTPATIENT)
Dept: CARDIOLOGY CLINIC | Facility: CLINIC | Age: 56
End: 2021-02-02
Payer: COMMERCIAL

## 2021-02-02 DIAGNOSIS — Z95.810 PRESENCE OF IMPLANTABLE CARDIOVERTER-DEFIBRILLATOR (ICD): Primary | ICD-10-CM

## 2021-02-02 PROCEDURE — 93297 REM INTERROG DEV EVAL ICPMS: CPT | Performed by: INTERNAL MEDICINE

## 2021-02-02 PROCEDURE — G2066 INTER DEVC REMOTE 30D: HCPCS | Performed by: INTERNAL MEDICINE

## 2021-02-02 NOTE — PROGRESS NOTES
MDT-SINGLE CHAMBER ICD - ACTIVE SYSTEM IS MRI CONDITIONAL   CARELINK TRANSMISSION - OPTI-VOL ONLY:  OPTI-VOL TRENDING TO WITHIN NORMAL LIMITS   BATTERY VOLTAGE ADEQUATE (9 7 YR)    0%   ALL LEAD PARAMETERS WITHIN NORMAL LIMITS   NO HIGH RATE EPISODES   NORMAL DEVICE FUNCTION   RG

## 2021-02-04 ENCOUNTER — TELEPHONE (OUTPATIENT)
Dept: NEPHROLOGY | Facility: CLINIC | Age: 56
End: 2021-02-04

## 2021-02-04 NOTE — TELEPHONE ENCOUNTER
Left a detail message asking patient to call the office to schedule the follow up appointment with Dr Vega that she requested  Please schedule when patient returns the call back

## 2021-02-05 NOTE — TELEPHONE ENCOUNTER
Patient returned the call back and stated she did not send a message to schedule for March through her My-Chart  She knows she is due to be seen for June  I told her the staff will reach out to her when the June schedule is available

## 2021-02-17 ENCOUNTER — LAB (OUTPATIENT)
Dept: LAB | Facility: HOSPITAL | Age: 56
End: 2021-02-17
Payer: COMMERCIAL

## 2021-02-17 ENCOUNTER — ANTICOAG VISIT (OUTPATIENT)
Dept: CARDIOLOGY CLINIC | Facility: CLINIC | Age: 56
End: 2021-02-17

## 2021-02-17 DIAGNOSIS — Z79.01 LONG TERM CURRENT USE OF ANTICOAGULANT THERAPY: ICD-10-CM

## 2021-02-17 DIAGNOSIS — I25.5 ISCHEMIC CARDIOMYOPATHY: ICD-10-CM

## 2021-02-17 DIAGNOSIS — I23.6 LV (LEFT VENTRICULAR) MURAL THROMBUS FOLLOWING MI (HCC): ICD-10-CM

## 2021-02-19 ENCOUNTER — PATIENT OUTREACH (OUTPATIENT)
Dept: SURGERY | Facility: CLINIC | Age: 56
End: 2021-02-19

## 2021-02-19 NOTE — PROGRESS NOTES
Ct called and stated that her appointment was moved to 4-9-2021  Ct requested to see Cm after her appointment

## 2021-02-24 ENCOUNTER — TELEPHONE (OUTPATIENT)
Dept: SURGERY | Facility: CLINIC | Age: 56
End: 2021-02-24

## 2021-03-02 ENCOUNTER — OFFICE VISIT (OUTPATIENT)
Dept: FAMILY MEDICINE CLINIC | Facility: CLINIC | Age: 56
End: 2021-03-02

## 2021-03-02 ENCOUNTER — ANTICOAG VISIT (OUTPATIENT)
Dept: CARDIOLOGY CLINIC | Facility: CLINIC | Age: 56
End: 2021-03-02

## 2021-03-02 ENCOUNTER — APPOINTMENT (OUTPATIENT)
Dept: LAB | Facility: HOSPITAL | Age: 56
End: 2021-03-02
Payer: COMMERCIAL

## 2021-03-02 VITALS
WEIGHT: 174 LBS | BODY MASS INDEX: 34.16 KG/M2 | RESPIRATION RATE: 18 BRPM | SYSTOLIC BLOOD PRESSURE: 102 MMHG | HEIGHT: 60 IN | DIASTOLIC BLOOD PRESSURE: 60 MMHG | OXYGEN SATURATION: 97 % | HEART RATE: 75 BPM | TEMPERATURE: 97.8 F

## 2021-03-02 DIAGNOSIS — Z21 HIV POSITIVE (HCC): ICD-10-CM

## 2021-03-02 DIAGNOSIS — Z72.0 TOBACCO ABUSE: ICD-10-CM

## 2021-03-02 DIAGNOSIS — J45.20 MILD INTERMITTENT ASTHMA WITHOUT COMPLICATION: Primary | ICD-10-CM

## 2021-03-02 DIAGNOSIS — I25.5 ISCHEMIC CARDIOMYOPATHY: ICD-10-CM

## 2021-03-02 DIAGNOSIS — I23.6 LV (LEFT VENTRICULAR) MURAL THROMBUS FOLLOWING MI (HCC): ICD-10-CM

## 2021-03-02 DIAGNOSIS — E78.2 MIXED HYPERLIPIDEMIA: ICD-10-CM

## 2021-03-02 DIAGNOSIS — I10 ESSENTIAL HYPERTENSION: ICD-10-CM

## 2021-03-02 DIAGNOSIS — Z79.01 LONG TERM CURRENT USE OF ANTICOAGULANT THERAPY: ICD-10-CM

## 2021-03-02 PROCEDURE — 99213 OFFICE O/P EST LOW 20 MIN: CPT | Performed by: FAMILY MEDICINE

## 2021-03-02 NOTE — ASSESSMENT & PLAN NOTE
-not recent exacerbations reported  -currently on Symbicort 1 puff q 6 hrs PRN, not needed to use recently  -provided counseling that if symptoms worsen, can use Symbicort BID as a maintenance therapy to aid in preventing exacerbations  -avoidance of triggers and allergens encouraged as well

## 2021-03-02 NOTE — ASSESSMENT & PLAN NOTE
-used to smoke about 2 packs a day  -recently quit about 2 weeks ago, maintained with nicotine lozenges and nicotine gums  -congratulated patient on achievement and encouraged adherence to tobacco cessation

## 2021-03-02 NOTE — ASSESSMENT & PLAN NOTE
-On Biktarvy -25 mg therapy   -follows with ID  -most recent viral load HIV < 20 (non detected viral load)

## 2021-03-02 NOTE — ASSESSMENT & PLAN NOTE
Blood Pressure: 102/60     -controlled  -follows with cardiology  -encouraged importance of adherence to exercise and low salt diet  -continue Coreg, entresto, ASA

## 2021-03-02 NOTE — PROGRESS NOTES
Assessment/Plan:    Mild intermittent asthma without complication  -not recent exacerbations reported  -currently on Symbicort 1 puff q 6 hrs PRN, not needed to use recently  -provided counseling that if symptoms worsen, can use Symbicort BID as a maintenance therapy to aid in preventing exacerbations  -avoidance of triggers and allergens encouraged as well    Essential hypertension  Blood Pressure: 102/60     -controlled  -follows with cardiology  -encouraged importance of adherence to exercise and low salt diet  -continue Coreg, entresto, ASA    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 ([facility] )  -continues on warfarin, adjusted by Cardiologist's team  -no episodes of bleeding reported  -continue follow up with cardiology    HIV positive ([place] [address] )  -On Biktarvy -25 mg therapy   -follows with ID  -most recent viral load HIV < 20 (non detected viral load)    Hyperlipemia  -stable, continue with high dose statin therapy    Tobacco abuse  -used to smoke about 2 packs a day  -recently quit about 2 weeks ago, maintained with nicotine lozenges and nicotine gums  -congratulated patient on achievement and encouraged adherence to tobacco cessation          Diagnoses and all orders for this visit:    Mild intermittent asthma without complication    BMI 73 8-44 8,[id number]    Essential hypertension    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 ([facility])    HIV positive ([facility] )    Mixed hyperlipidemia    Tobacco abuse          Subjective:      Patient ID: Brooke Dominguez is a 55 y o  female  Case of 56 y/o female who came to the clinic for follow up  She indicates to be compliant to all her medications  Has recently stopped smoking for the past 2 weeks, has been maintained with nicotine lozenges and nicotine gum and states to be feeling much better and has desire to continue without smoking  Her asthma has been well controlled so far with recent medications   She does have noticed some weight gain during quarantine period and is motivated to modify her health style habits to improve in weight loss  Colonoscopy- Done 1yr ago in [place], [address] [place] [address]--- repeat in 10 yrs    Next routine Mammogram due on july, 2021                  The following portions of the patient's history were reviewed and updated as appropriate: allergies, current medications, past family history, past medical history, past social history, past surgical history and problem list     Review of Systems   Constitutional: Negative for fever  Respiratory: Negative for cough, shortness of breath and wheezing  Cardiovascular: Negative for chest pain, palpitations and leg swelling  Gastrointestinal: Negative for abdominal pain, nausea and vomiting  Skin: Negative for color change, pallor, rash and wound  Neurological: Negative for headaches  Psychiatric/Behavioral: The patient is not nervous/anxious  Objective:      /60 (BP Location: Left arm, Patient Position: Sitting, Cuff Size: Standard)   Pulse 75   Temp 97 8 °F (36 6 °C) (Temporal)   Resp 18   Ht 5' (1 524 m)   Wt 78 9 kg (174 lb)   SpO2 97%   BMI 33 98 kg/m²          Physical Exam  Vitals signs reviewed  Constitutional:       General: She is not in acute distress  Appearance: Normal appearance  She is not ill-appearing, toxic-appearing or diaphoretic  Eyes:      Extraocular Movements: Extraocular movements intact  Neck:      Musculoskeletal: Normal range of motion  Cardiovascular:      Rate and Rhythm: Normal rate and regular rhythm  Pulses: Normal pulses  Heart sounds: Normal heart sounds  No murmur  Pulmonary:      Effort: Pulmonary effort is normal  No respiratory distress  Breath sounds: Normal breath sounds  No wheezing  Abdominal:      General: Abdomen is flat  Bowel sounds are normal  There is no distension  Palpations: Abdomen is soft  Tenderness: There is no abdominal tenderness  Musculoskeletal: Normal range of motion  General: No swelling, tenderness, deformity or signs of injury  Right lower leg: No edema  Left lower leg: No edema  Skin:     General: Skin is warm  Coloration: Skin is not jaundiced or pale  Findings: No bruising, erythema, lesion or rash  Neurological:      General: No focal deficit present  Mental Status: She is alert and oriented to person, place, and time  Psychiatric:         Mood and Affect: Mood normal            BMI Counseling: Body mass index is 33 98 kg/m²  The BMI is above normal  Nutrition recommendations include reducing portion sizes, decreasing overall calorie intake, 3-5 servings of fruits/vegetables daily, reducing fast food intake, consuming healthier snacks, decreasing soda and/or juice intake, moderation in carbohydrate intake, increasing intake of lean protein, reducing intake of saturated fat and trans fat and reducing intake of cholesterol  Exercise recommendations include exercising 3-5 times per week

## 2021-03-02 NOTE — ASSESSMENT & PLAN NOTE
-continues on warfarin, adjusted by Cardiologist's team  -no episodes of bleeding reported  -continue follow up with cardiology

## 2021-03-03 ENCOUNTER — PATIENT OUTREACH (OUTPATIENT)
Dept: SURGERY | Facility: CLINIC | Age: 56
End: 2021-03-03

## 2021-03-03 ENCOUNTER — REMOTE DEVICE CLINIC VISIT (OUTPATIENT)
Dept: CARDIOLOGY CLINIC | Facility: CLINIC | Age: 56
End: 2021-03-03
Payer: COMMERCIAL

## 2021-03-03 DIAGNOSIS — Z95.810 AICD (AUTOMATIC CARDIOVERTER/DEFIBRILLATOR) PRESENT: Primary | ICD-10-CM

## 2021-03-03 PROCEDURE — G2066 INTER DEVC REMOTE 30D: HCPCS | Performed by: INTERNAL MEDICINE

## 2021-03-03 PROCEDURE — 93297 REM INTERROG DEV EVAL ICPMS: CPT | Performed by: INTERNAL MEDICINE

## 2021-03-03 NOTE — PROGRESS NOTES
Results for orders placed or performed in visit on 03/03/21   Cardiac EP device report    Narrative    MDT-SINGLE CHAMBER ICD - ACTIVE SYSTEM IS MRI CONDITIONAL  CARELINK TRANSMISSION: BATTERY STATUS "OK"   0%  ALL AVAILABLE LEAD PARAMETERS WITHIN NORMAL LIMITS  NO SIGNIFICANT HIGH RATE EPISODES  OPTI-VOL WITHIN NORMAL LIMITS  NORMAL DEVICE FUNCTION   NC       Current Outpatient Medications:     albuterol (PROVENTIL HFA,VENTOLIN HFA) 90 mcg/act inhaler, Inhale 2 puffs every 4 (four) hours as needed for wheezing, Disp: 1 Inhaler, Rfl: 0    aspirin (ECOTRIN LOW STRENGTH) 81 mg EC tablet, Take 1 tablet (81 mg total) by mouth daily, Disp: 81 tablet, Rfl: 10    atorvastatin (LIPITOR) 80 mg tablet, Take 1 tablet (80 mg total) by mouth daily, Disp: 90 tablet, Rfl: 3    bictegravir-emtricitab-tenofovir alafenamide (Biktarvy) -25 MG tablet, Take 1 tablet by mouth daily with breakfast, Disp: 30 tablet, Rfl: 5    budesonide-formoterol (SYMBICORT) 160-4 5 mcg/act inhaler, Inhale 1 puff every 6 (six) hours as needed (shortness of breath) Rinse mouth after use , Disp: 1 Inhaler, Rfl: 2    carvedilol (COREG) 6 25 mg tablet, Take 1 tablet (6 25 mg total) by mouth 2 (two) times a day with meals, Disp: 180 tablet, Rfl: 3    loratadine (CLARITIN) 10 mg tablet, Take 1 tablet (10 mg total) by mouth daily (Patient not taking: Reported on 3/2/2021), Disp: 30 tablet, Rfl: 0    sacubitril-valsartan (Entresto) 49-51 MG TABS, Take 1 tablet by mouth 2 (two) times a day (Patient not taking: Reported on 11/5/2020), Disp: 180 tablet, Rfl: 3    Spacer/Aero-Holding Chambers (Procare Spacer/Adult Mask) MARK, 1 Device by Does not apply route daily, Disp: 1 Device, Rfl: 0    warfarin (COUMADIN) 1 mg tablet, TAKE 1 TABLET BY MOUTH DAILY WITH COUMADIN 5MG FOR THE NEXT THREE DAYS, Disp: 90 tablet, Rfl: 10    warfarin (COUMADIN) 5 mg tablet, TAKE 1 TABLET BY MOUTH DAILY, Disp: 90 tablet, Rfl: 11

## 2021-03-10 DIAGNOSIS — Z23 ENCOUNTER FOR IMMUNIZATION: ICD-10-CM

## 2021-03-16 ENCOUNTER — APPOINTMENT (OUTPATIENT)
Dept: LAB | Facility: HOSPITAL | Age: 56
End: 2021-03-16
Payer: COMMERCIAL

## 2021-03-16 ENCOUNTER — PATIENT OUTREACH (OUTPATIENT)
Dept: SURGERY | Facility: CLINIC | Age: 56
End: 2021-03-16

## 2021-03-16 ENCOUNTER — OFFICE VISIT (OUTPATIENT)
Dept: CARDIOLOGY CLINIC | Facility: CLINIC | Age: 56
End: 2021-03-16
Payer: COMMERCIAL

## 2021-03-16 ENCOUNTER — DOCUMENTATION (OUTPATIENT)
Dept: SURGERY | Facility: CLINIC | Age: 56
End: 2021-03-16

## 2021-03-16 VITALS
HEIGHT: 60 IN | SYSTOLIC BLOOD PRESSURE: 128 MMHG | BODY MASS INDEX: 33.57 KG/M2 | DIASTOLIC BLOOD PRESSURE: 68 MMHG | RESPIRATION RATE: 16 BRPM | TEMPERATURE: 96.6 F | WEIGHT: 171 LBS | HEART RATE: 60 BPM

## 2021-03-16 DIAGNOSIS — Z72.0 TOBACCO ABUSE: ICD-10-CM

## 2021-03-16 DIAGNOSIS — I25.2 PAST HEART ATTACK: ICD-10-CM

## 2021-03-16 DIAGNOSIS — I25.10 TRIPLE VESSEL CORONARY ARTERY DISEASE: Primary | ICD-10-CM

## 2021-03-16 DIAGNOSIS — I25.5 ISCHEMIC CARDIOMYOPATHY: ICD-10-CM

## 2021-03-16 DIAGNOSIS — Z95.810 IMPLANTABLE CARDIOVERTER-DEFIBRILLATOR (ICD) IN SITU: ICD-10-CM

## 2021-03-16 DIAGNOSIS — I10 ESSENTIAL HYPERTENSION: ICD-10-CM

## 2021-03-16 DIAGNOSIS — I23.6 LV (LEFT VENTRICULAR) MURAL THROMBUS FOLLOWING MI (HCC): ICD-10-CM

## 2021-03-16 DIAGNOSIS — Z79.01 LONG TERM CURRENT USE OF ANTICOAGULANT THERAPY: ICD-10-CM

## 2021-03-16 DIAGNOSIS — N18.31 STAGE 3A CHRONIC KIDNEY DISEASE (HCC): ICD-10-CM

## 2021-03-16 DIAGNOSIS — E78.2 MIXED HYPERLIPIDEMIA: ICD-10-CM

## 2021-03-16 DIAGNOSIS — E78.5 HYPERLIPIDEMIA, UNSPECIFIED HYPERLIPIDEMIA TYPE: ICD-10-CM

## 2021-03-16 PROCEDURE — 99214 OFFICE O/P EST MOD 30 MIN: CPT | Performed by: INTERNAL MEDICINE

## 2021-03-16 RX ORDER — CARVEDILOL 6.25 MG/1
6.25 TABLET ORAL 2 TIMES DAILY WITH MEALS
Qty: 180 TABLET | Refills: 3 | Status: SHIPPED | OUTPATIENT
Start: 2021-03-16 | End: 2021-09-22

## 2021-03-16 RX ORDER — SACUBITRIL AND VALSARTAN 49; 51 MG/1; MG/1
1 TABLET, FILM COATED ORAL 2 TIMES DAILY
Qty: 180 TABLET | Refills: 3 | Status: SHIPPED | OUTPATIENT
Start: 2021-03-16 | End: 2022-03-07

## 2021-03-16 NOTE — PROGRESS NOTES
Cardiology Follow Up    Magdaleno Longo  1965  [id number]  [address] [facility] [id number] [phone]    1  Triple vessel coronary artery disease  carvedilol (COREG) 6 25 mg tablet   2  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 ([address] )     3  Ischemic cardiomyopathy  carvedilol (COREG) 6 25 mg tablet    sacubitril-valsartan (Entresto) 49-51 MG TABS   4  Mixed hyperlipidemia     5  Essential hypertension     6  Implantable cardioverter-defibrillator (ICD) in situ     7  old extensive anterior, apical, lateral and distal inferior apical MI  sacubitril-valsartan (Entresto) 49-51 MG TABS   8  Tobacco abuse     9  Long term current use of anticoagulant therapy     10  Stage 3a chronic kidney disease     11  BMI 33 0-33 9,adult     12  Hyperlipidemia, unspecified hyperlipidemia type         Patient was referred to us from [facility]  She experienced a myocardial infarction on February 28, 2017 in [place]  She has subsequently moved to [facility] area  She had a cardiac catheterization taken at [facility]  Patient's cardiac catheterization CD revealed the left main coronary artery to be nonobstructed  The left anterior descending artery was 100% in its midportion after a large 1st diagonal branch which was nonobstructed  The circumflex artery obtuse marginal was 90%  The right coronary artery in its midportion with 50-60%  The large distal LAD filled from right to left collaterals  [facility] had recommended CABG but the patient had refused at that time  No ventriculogram was performed but an echocardiogram which I performed had demonstrated extensive LV damage  A nuclear stress test demonstrated minimal with any it ischemia  I did not feel that the patient would improve with CABG  Patient is free of angina pectoris or shortness of breath   She is doing quite well for her degree of LV dysfunction  04/11/2017 echocardiogram severe LV dysfunction, EF 30%, mild LV enlargement, apical aneurysm with akinesis to paradoxical motion  Large organized apical thrombus  Grade 1 diastolic dysfunction, normal pulmonary artery pressures    05/15/2017 stress test: Severe LV dysfunction, EF 20%  Mild LV enlargement  Anterior apical aneurysm with paradoxical motion  Extensive myocardial infarctions involving the distal anterior apical and inferior apical walls  Negative for Persantine induced ischemia  ICD: single-chamber  07/01/2019 lipid profile: Total cholesterol 152, triglycerides 176, HDL 45, LDL calculated 72, non HDL cholesterol 107    04/07/2020 lipid profile: Total cholesterol 147, triglycerides 162, HDL 41, LDL calculated 74, non HDL cholesterol 106    Interval History:   Patient has no cardiac symptoms  Patient denies chest discomfort or shortness of breath  Patient has no palpitations  Patient denies symptoms of dizziness, lightheadedness or near-syncope/syncope  Patient denies leg edema  Patient denies symptoms of orthopnea or paroxysmal nocturnal dyspnea        Discussion/Summary:    Return in 6 months    Patient Active Problem List   Diagnosis    Ischemic cardiomyopathy    Triple vessel coronary artery disease    HIV positive ([address] )    Essential hypertension    Hyperlipemia    Impaired left ventricular function    Long term current use of anticoagulant therapy    old extensive anterior, apical, lateral and distal inferior apical MI    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 ([address] )    CKD (chronic kidney disease) stage 3, GFR 30-59 ml/min    Implantable cardioverter-defibrillator (ICD) in situ    Mild intermittent asthma without complication    ASCUS with positive high risk HPV cervical    BMI 33 0-33 9,adult    Tobacco abuse     Past Medical History:   Diagnosis Date    Asthma     Coronary artery disease     defibrillator  HIV positive ([facility] )      Social History     Socioeconomic History    Marital status: Single     Spouse name: Not on file    Number of children: Not on file    Years of education: Not on file    Highest education level: Not on file   Occupational History    Not on file   Social Needs    Financial resource strain: Not on file    Food insecurity     Worry: Not on file     Inability: Not on file    Transportation needs     Medical: Not on file     Non-medical: Not on file   Tobacco Use    Smoking status: Current Every Day Smoker     Packs/day: 0 50     Years: 30 00     Pack years: 15 00     Types: Cigarettes    Smokeless tobacco: Former User     Quit date: 8/12/2019    Tobacco comment: 1/2 pk day   Substance and Sexual Activity    Alcohol use: Not Currently     Comment: ocassionally    Drug use: No    Sexual activity: Not Currently   Lifestyle    Physical activity     Days per week: Not on file     Minutes per session: Not on file    Stress: Not on file   Relationships    Social connections     Talks on phone: Not on file     Gets together: Not on file     Attends Buddhist service: Not on file     Active member of club or organization: Not on file     Attends meetings of clubs or organizations: Not on file     Relationship status: Not on file    Intimate partner violence     Fear of current or ex partner: Not on file     Emotionally abused: Not on file     Physically abused: Not on file     Forced sexual activity: Not on file   Other Topics Concern    Not on file   Social History Narrative    Not on file      Family History   Problem Relation Age of Onset    Other Father         GI bleed    Suicidality Brother     Drug abuse Brother         overdose     Past Surgical History:   Procedure Laterality Date    ANGIOPLASTY      CARDIAC DEFIBRILLATOR PLACEMENT      CORONARY ANGIOPLASTY      pci placement    TOTAL ABDOMINAL HYSTERECTOMY         Current Outpatient Medications:     albuterol (PROVENTIL HFA,VENTOLIN HFA) 90 mcg/act inhaler, Inhale 2 puffs every 4 (four) hours as needed for wheezing, Disp: 1 Inhaler, Rfl: 0    aspirin (ECOTRIN LOW STRENGTH) 81 mg EC tablet, Take 1 tablet (81 mg total) by mouth daily, Disp: 81 tablet, Rfl: 10    atorvastatin (LIPITOR) 80 mg tablet, Take 1 tablet (80 mg total) by mouth daily, Disp: 90 tablet, Rfl: 3    bictegravir-emtricitab-tenofovir alafenamide (Biktarvy) -25 MG tablet, Take 1 tablet by mouth daily with breakfast, Disp: 30 tablet, Rfl: 5    budesonide-formoterol (SYMBICORT) 160-4 5 mcg/act inhaler, Inhale 1 puff every 6 (six) hours as needed (shortness of breath) Rinse mouth after use , Disp: 1 Inhaler, Rfl: 2    carvedilol (COREG) 6 25 mg tablet, Take 1 tablet (6 25 mg total) by mouth 2 (two) times a day with meals, Disp: 180 tablet, Rfl: 3    sacubitril-valsartan (Entresto) 49-51 MG TABS, Take 1 tablet by mouth 2 (two) times a day, Disp: 180 tablet, Rfl: 3    Spacer/Aero-Holding Chambers (Procare Spacer/Adult Mask) MARK, 1 Device by Does not apply route daily, Disp: 1 Device, Rfl: 0    warfarin (COUMADIN) 1 mg tablet, TAKE 1 TABLET BY MOUTH DAILY WITH COUMADIN 5MG FOR THE NEXT THREE DAYS, Disp: 90 tablet, Rfl: 10    warfarin (COUMADIN) 5 mg tablet, TAKE 1 TABLET BY MOUTH DAILY, Disp: 90 tablet, Rfl: 11  Allergies   Allergen Reactions    No Active Allergies     No Known Allergies        No results found for this visit on 03/16/21  Review of Systems:  Review of Systems   Respiratory: Negative for cough, choking, chest tightness, shortness of breath and wheezing  Cardiovascular: Negative for chest pain, palpitations and leg swelling  Musculoskeletal: Negative for gait problem  Skin: Negative for rash  Neurological: Negative for dizziness, tremors, syncope, weakness, light-headedness, numbness and headaches  Psychiatric/Behavioral: Negative for agitation and behavioral problems  The patient is not hyperactive  Physical Exam:  /68   Pulse 60   Temp (!) 96 6 °F (35 9 °C)   Resp 16   Ht 5' (1 524 m)   Wt 77 6 kg (171 lb)   BMI 33 40 kg/m²   Physical Exam  Constitutional:       General: She is not in acute distress  Appearance: She is well-developed  HENT:      Head: Normocephalic and atraumatic  Neck:      Thyroid: No thyromegaly  Vascular: No carotid bruit or JVD  Cardiovascular:      Rate and Rhythm: Normal rate and regular rhythm  Heart sounds: Normal heart sounds  No murmur  No friction rub  No gallop  Pulmonary:      Effort: No respiratory distress  Breath sounds: No wheezing, rhonchi or rales  Musculoskeletal:      Right lower leg: No edema  Left lower leg: No edema  Skin:     General: Skin is warm and dry  Neurological:      General: No focal deficit present  Mental Status: She is alert and oriented to person, place, and time  Psychiatric:         Mood and Affect: Mood normal          Behavior: Behavior normal          Thought Content: Thought content normal          Judgment: Judgment normal          --------------------------------------------------------------------------------  ECHO:   Results for orders placed during the hospital encounter of 19   Echo complete with contrast if indicated    Narrative [address]  [address] [facility] [address]  [place], [address] [facility]  [phone]    Transthoracic Echocardiogram  2D, M-mode, Doppler, and Color Doppler    Study date:  12-Mar-2019    Patient: Josefina Reeves  MR number: [id number]  Account number: [de-identified]  : 1965  Age: 49 years  Gender: Female  Status: Outpatient  Location: [facility]  Height: 62 in  Weight: 172 9 lb  BP: 107/ 68 mmHg    Indications: Coronary artery disease  Triple vessel disease  Diagnoses: I25 5 - Ischemic cardiomyopathy    Sonographer:  LIAN Rosales  Primary Physician:  Liam Barnes MD  Referring Physician:  Rogers Velázquez  Alexandria Calzada MD  Group:  [facility]  Interpreting Physician:  Simin Mtz MD    SUMMARY    SUMMARY:  1  Sinus rhythm  2  Good technical quality    LEFT VENTRICLE:  Severe left ventricular systolic dysfunction, EF 73-59%  Left ventricular cavity normal size at the base but then balloons at the midportion and apex to a maximum 60 mm  normal left ventricular wall thickness  The mid to distal anterior  wall is severely hypokinetic  The distal septum and apex are paradoxical  Grade 1 left ventricular diastolic dysfunction with normal left ventricular filling pressures  Patient has evidence of smoke or strands of thrombus in the septal apex portion which extended into the mid ventricle  There is no evidence of a significant space filling mass  However, when definity was used, this particular area of the  ventricle did not opacify  This most likely is secondary to severe stasis of blood in that area  MITRAL VALVE:  No significant mitral regurgitation  AORTIC VALVE:  No aortic stenosis or regurgitation  TRICUSPID VALVE:  Trace tricuspid regurgitation  PULMONIC VALVE:  No pulmonic valvular regurgitation  AORTA:  Minimal enlargement of the aortic root at 38 mm  PULMONARY ARTERIES:  There was not significant tricuspid regurgitation to measure pulmonary artery pressures  HISTORY: PRIOR HISTORY: Previous (remote) myocardial infarction  Risk factors: hypertension, renal failure, and hypercholesterolemia  PRIOR PROCEDURES: ICD implantation  PROCEDURE: The study was performed in the [address] [place] [address]  This was a routine study  The transthoracic approach was used  The study included complete 2D imaging, M-mode, complete spectral Doppler, and color Doppler  The  heart rate was 68 bpm, at the start of the study  Intravenous contrast ( 1 ml of Definity) was administered  This was a technically difficult study      LEFT VENTRICLE: Severe left ventricular systolic dysfunction, EF 25-27%  Left ventricular cavity normal size at the base but then balloons at the midportion and apex to a maximum 60 mm  normal left ventricular wall thickness  The mid to  distal anterior wall is severely hypokinetic  The distal septum and apex are paradoxical  Grade 1 left ventricular diastolic dysfunction with normal left ventricular filling pressures  Patient has evidence of smoke or strands of thrombus in the septal apex portion which extended into the mid ventricle  There is no evidence of a significant space filling mass  However, when definity was used, this particular area of the  ventricle did not opacify  This most likely is secondary to severe stasis of blood in that area  RIGHT VENTRICLE: The size was normal  Systolic function was normal  Wall thickness was normal     LEFT ATRIUM: Size was normal     RIGHT ATRIUM: Size was normal     MITRAL VALVE: No significant mitral regurgitation  AORTIC VALVE: No aortic stenosis or regurgitation  TRICUSPID VALVE: Trace tricuspid regurgitation  PULMONIC VALVE: No pulmonic valvular regurgitation  PERICARDIUM: There was no pericardial effusion  The pericardium was normal in appearance  AORTA: Minimal enlargement of the aortic root at 38 mm  PULMONARY ARTERY: There was not significant tricuspid regurgitation to measure pulmonary artery pressures      SYSTEM MEASUREMENT TABLES    2D  %FS: 17 5 %  Ao Diam: 3 8 cm  EF(Teich): 36 7 %  ESV(Teich): 56 4 ml  IVSd: 1 cm  LVIDd: 4 4 cm  LVIDs: 3 7 cm  LVPWd: 1 cm  SV(Teich): 32 7 ml    Inters\[facility]\"" Commission Accredited Echocardiography Laboratory    Prepared and electronically signed by    Shin ePacock MD  Signed 13-Mar-2019 09:31:50        ======================================================    Lab Results   Component Value Date    WBC 5 20 01/27/2021    WBC 4 6 01/27/2021    HGB 13 5 01/27/2021    HGB 12 4 01/27/2021    HCT 41 7 01/27/2021    HCT 37 7 01/27/2021    MCV 96 01/27/2021    MCV 95 01/27/2021     01/27/2021     01/27/2021      Lab Results   Component Value Date    SODIUM 142 01/27/2021    K 3 7 01/27/2021     01/27/2021    CO2 31 (H) 01/27/2021    BUN 11 01/27/2021    CREATININE 1 18 01/27/2021    GLUC 103 (H) 03/14/2020    CALCIUM 9 6 01/27/2021      Lab Results   Component Value Date    HGBA1C 5 5 02/20/2020      No results found for: CHOL  Lab Results   Component Value Date    HDL 41 04/07/2020    HDL 47 02/20/2020    HDL 45 07/01/2019     Lab Results   Component Value Date    LDLCALC 74 04/07/2020    LDLCALC 63 02/20/2020    LDLCALC 72 07/01/2019     Lab Results   Component Value Date    TRIG 162 (H) 04/07/2020    TRIG 185 (H) 02/20/2020    TRIG 176 (H) 07/01/2019     No results found for: [place], [place]   Lab Results   Component Value Date    INR 2 87 (H) 03/02/2021    INR 2 38 (H) 02/17/2021    INR 2 11 (H) 01/27/2021    PROTIME 30 1 (H) 03/02/2021    PROTIME 26 1 (H) 02/17/2021    PROTIME 23 7 (H) 01/27/2021        Imaging:   I have personally reviewed pertinent reports  Portions of the record may have been created with voice recognition software  Occasional wrong word or "sound a like" substitutions may have occurred due to the inherent limitations of voice recognition software  Read the chart carefully and recognize, using context, where substitutions have occurred

## 2021-03-16 NOTE — LETTER
March 16, 2021     Nicole Rodriguez MD  [address]    Patient: Brooke Dominguez   YOB: 1965   Date of Visit: 3/16/2021       Dear Dr Bradley Saleh: Thank you for referring Brooke Dominguez to me for evaluation  Below are my notes for this consultation  If you have questions, please do not hesitate to call me  I look forward to following your patient along with you  Sincerely,        Damian Salinas MD        CC: No Recipients  Damian Salinas MD  3/16/2021  2:09 PM  Sign when Signing Visit                                             Cardiology Follow Up    Brooke Dominguez  1965  [id number]  [address] [facility]  [address] [facility] [id number] [phone]    1  Triple vessel coronary artery disease  carvedilol (COREG) 6 25 mg tablet   2  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 ([address] )     3  Ischemic cardiomyopathy  carvedilol (COREG) 6 25 mg tablet    sacubitril-valsartan (Entresto) 49-51 MG TABS   4  Mixed hyperlipidemia     5  Essential hypertension     6  Implantable cardioverter-defibrillator (ICD) in situ     7  old extensive anterior, apical, lateral and distal inferior apical MI  sacubitril-valsartan (Entresto) 49-51 MG TABS   8  Tobacco abuse     9  Long term current use of anticoagulant therapy     10  Stage 3a chronic kidney disease     11  BMI 33 0-33 9,adult     12  Hyperlipidemia, unspecified hyperlipidemia type         Patient was referred to us from [facility]  She experienced a myocardial infarction on February 28, 2017 in [place]  She has subsequently moved to [facility] area  She had a cardiac catheterization taken at [facility]  Patient's cardiac catheterization CD revealed the left main coronary artery to be nonobstructed  The left anterior descending artery was 100% in its midportion after a large 1st diagonal branch which was nonobstructed   The circumflex artery obtuse marginal was 90%  The right coronary artery in its midportion with 50-60%  The large distal LAD filled from right to left collaterals  Peter [address] had recommended CABG but the patient had refused at that time  No ventriculogram was performed but an echocardiogram which I performed had demonstrated extensive LV damage  A nuclear stress test demonstrated minimal with any it ischemia  I did not feel that the patient would improve with CABG  Patient is free of angina pectoris or shortness of breath  She is doing quite well for her degree of LV dysfunction  04/11/2017 echocardiogram severe LV dysfunction, EF 30%, mild LV enlargement, apical aneurysm with akinesis to paradoxical motion  Large organized apical thrombus  Grade 1 diastolic dysfunction, normal pulmonary artery pressures    05/15/2017 stress test: Severe LV dysfunction, EF 20%  Mild LV enlargement  Anterior apical aneurysm with paradoxical motion  Extensive myocardial infarctions involving the distal anterior apical and inferior apical walls  Negative for Persantine induced ischemia  ICD: single-chamber  07/01/2019 lipid profile: Total cholesterol 152, triglycerides 176, HDL 45, LDL calculated 72, non HDL cholesterol 107    04/07/2020 lipid profile: Total cholesterol 147, triglycerides 162, HDL 41, LDL calculated 74, non HDL cholesterol 106    Interval History:   Patient has no cardiac symptoms  Patient denies chest discomfort or shortness of breath  Patient has no palpitations  Patient denies symptoms of dizziness, lightheadedness or near-syncope/syncope  Patient denies leg edema  Patient denies symptoms of orthopnea or paroxysmal nocturnal dyspnea        Discussion/Summary:    Return in 6 months    Patient Active Problem List   Diagnosis    Ischemic cardiomyopathy    Triple vessel coronary artery disease    HIV positive ([address] )    Essential hypertension    Hyperlipemia    Impaired left ventricular function    Long term current use of anticoagulant therapy    old extensive anterior, apical, lateral and distal inferior apical MI    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 (HCC)    CKD (chronic kidney disease) stage 3, GFR 30-59 ml/min    Implantable cardioverter-defibrillator (ICD) in situ    Mild intermittent asthma without complication    ASCUS with positive high risk HPV cervical    BMI 33 0-33 9,adult    Tobacco abuse     Past Medical History:   Diagnosis Date    Asthma     Coronary artery disease     defibrillator    [facility] )      Social History     Socioeconomic History    Marital status: Single     Spouse name: Not on file    Number of children: Not on file    Years of education: Not on file    Highest education level: Not on file   Occupational History    Not on file   Social Needs    Financial resource strain: Not on file    Food insecurity     Worry: Not on file     Inability: Not on file    Transportation needs     Medical: Not on file     Non-medical: Not on file   Tobacco Use    Smoking status: Current Every Day Smoker     Packs/day: 0 50     Years: 30 00     Pack years: 15 00     Types: Cigarettes    Smokeless tobacco: Former User     Quit date: 8/12/2019    Tobacco comment: 1/2 pk day   Substance and Sexual Activity    Alcohol use: Not Currently     Comment: ocassionally    Drug use: No    Sexual activity: Not Currently   Lifestyle    Physical activity     Days per week: Not on file     Minutes per session: Not on file    Stress: Not on file   Relationships    Social connections     Talks on phone: Not on file     Gets together: Not on file     Attends Gnosticist service: Not on file     Active member of club or organization: Not on file     Attends meetings of clubs or organizations: Not on file     Relationship status: Not on file    Intimate partner violence     Fear of current or ex partner: Not on file     Emotionally abused: Not on file Physically abused: Not on file     Forced sexual activity: Not on file   Other Topics Concern    Not on file   Social History Narrative    Not on file      Family History   Problem Relation Age of Onset    Other Father         GI bleed    Suicidality Brother     Drug abuse Brother         overdose     Past Surgical History:   Procedure Laterality Date    ANGIOPLASTY      CARDIAC DEFIBRILLATOR PLACEMENT      CORONARY ANGIOPLASTY      pci placement    TOTAL ABDOMINAL HYSTERECTOMY         Current Outpatient Medications:     albuterol (PROVENTIL HFA,VENTOLIN HFA) 90 mcg/act inhaler, Inhale 2 puffs every 4 (four) hours as needed for wheezing, Disp: 1 Inhaler, Rfl: 0    aspirin (ECOTRIN LOW STRENGTH) 81 mg EC tablet, Take 1 tablet (81 mg total) by mouth daily, Disp: 81 tablet, Rfl: 10    atorvastatin (LIPITOR) 80 mg tablet, Take 1 tablet (80 mg total) by mouth daily, Disp: 90 tablet, Rfl: 3    bictegravir-emtricitab-tenofovir alafenamide (Biktarvy) -25 MG tablet, Take 1 tablet by mouth daily with breakfast, Disp: 30 tablet, Rfl: 5    budesonide-formoterol (SYMBICORT) 160-4 5 mcg/act inhaler, Inhale 1 puff every 6 (six) hours as needed (shortness of breath) Rinse mouth after use , Disp: 1 Inhaler, Rfl: 2    carvedilol (COREG) 6 25 mg tablet, Take 1 tablet (6 25 mg total) by mouth 2 (two) times a day with meals, Disp: 180 tablet, Rfl: 3    sacubitril-valsartan (Entresto) 49-51 MG TABS, Take 1 tablet by mouth 2 (two) times a day, Disp: 180 tablet, Rfl: 3    Spacer/Aero-Holding Chambers (Procare Spacer/Adult Mask) MARK, 1 Device by Does not apply route daily, Disp: 1 Device, Rfl: 0    warfarin (COUMADIN) 1 mg tablet, TAKE 1 TABLET BY MOUTH DAILY WITH COUMADIN 5MG FOR THE NEXT THREE DAYS, Disp: 90 tablet, Rfl: 10    warfarin (COUMADIN) 5 mg tablet, TAKE 1 TABLET BY MOUTH DAILY, Disp: 90 tablet, Rfl: 11  Allergies   Allergen Reactions    No Active Allergies     No Known Allergies        No results found for this visit on 03/16/21  Review of Systems:  Review of Systems   Respiratory: Negative for cough, choking, chest tightness, shortness of breath and wheezing  Cardiovascular: Negative for chest pain, palpitations and leg swelling  Musculoskeletal: Negative for gait problem  Skin: Negative for rash  Neurological: Negative for dizziness, tremors, syncope, weakness, light-headedness, numbness and headaches  Psychiatric/Behavioral: Negative for agitation and behavioral problems  The patient is not hyperactive  Physical Exam:  /68   Pulse 60   Temp (!) 96 6 °F (35 9 °C)   Resp 16   Ht 5' (1 524 m)   Wt 77 6 kg (171 lb)   BMI 33 40 kg/m²   Physical Exam  Constitutional:       General: She is not in acute distress  Appearance: She is well-developed  HENT:      Head: Normocephalic and atraumatic  Neck:      Thyroid: No thyromegaly  Vascular: No carotid bruit or JVD  Cardiovascular:      Rate and Rhythm: Normal rate and regular rhythm  Heart sounds: Normal heart sounds  No murmur  No friction rub  No gallop  Pulmonary:      Effort: No respiratory distress  Breath sounds: No wheezing, rhonchi or rales  Musculoskeletal:      Right lower leg: No edema  Left lower leg: No edema  Skin:     General: Skin is warm and dry  Neurological:      General: No focal deficit present  Mental Status: She is alert and oriented to person, place, and time  Psychiatric:         Mood and Affect: Mood normal          Behavior: Behavior normal          Thought Content: Thought content normal          Judgment: Judgment normal          --------------------------------------------------------------------------------  ECHO:   Results for orders placed during the hospital encounter of 03/12/19   Echo complete with contrast if indicated    Domenica Hull [address]    [place], [address] [facility]  [phone]    Transthoracic Echocardiogram  2D, M-mode, Doppler, and Color Doppler    Study date:  12-Mar-2019    Patient: Ros Reese  MR number: [id number]  Account number: [de-identified]  : 1965  Age: 49 years  Gender: Female  Status: Outpatient  Location: [facility] [place]  Height: 62 in  Weight: 172 9 lb  BP: 107/ 68 mmHg    Indications: Coronary artery disease  Triple vessel disease  Diagnoses: I25 5 - Ischemic cardiomyopathy    Sonographer:  LIAN Feliciano  Primary Physician:  Teresa Sawant MD  Referring Physician:  Bakari Gordon MD  Group:  [facility]  Interpreting Physician:  Bakari Gordon MD    SUMMARY    SUMMARY:  1  Sinus rhythm  2  Good technical quality    LEFT VENTRICLE:  Severe left ventricular systolic dysfunction, EF 03-96%  Left ventricular cavity normal size at the base but then balloons at the midportion and apex to a maximum 60 mm  normal left ventricular wall thickness  The mid to distal anterior  wall is severely hypokinetic  The distal septum and apex are paradoxical  Grade 1 left ventricular diastolic dysfunction with normal left ventricular filling pressures  Patient has evidence of smoke or strands of thrombus in the septal apex portion which extended into the mid ventricle  There is no evidence of a significant space filling mass  However, when definity was used, this particular area of the  ventricle did not opacify  This most likely is secondary to severe stasis of blood in that area  MITRAL VALVE:  No significant mitral regurgitation  AORTIC VALVE:  No aortic stenosis or regurgitation  TRICUSPID VALVE:  Trace tricuspid regurgitation  PULMONIC VALVE:  No pulmonic valvular regurgitation  AORTA:  Minimal enlargement of the aortic root at 38 mm  PULMONARY ARTERIES:  There was not significant tricuspid regurgitation to measure pulmonary artery pressures  HISTORY: PRIOR HISTORY: Previous (remote) myocardial infarction   Risk factors: hypertension, renal failure, and hypercholesterolemia  PRIOR PROCEDURES: ICD implantation  PROCEDURE: The study was performed in the [address] [place] [address]  This was a routine study  The transthoracic approach was used  The study included complete 2D imaging, M-mode, complete spectral Doppler, and color Doppler  The  heart rate was 68 bpm, at the start of the study  Intravenous contrast ( 1 ml of Definity) was administered  This was a technically difficult study  LEFT VENTRICLE: Severe left ventricular systolic dysfunction, EF 25-52%  Left ventricular cavity normal size at the base but then balloons at the midportion and apex to a maximum 60 mm  normal left ventricular wall thickness  The mid to  distal anterior wall is severely hypokinetic  The distal septum and apex are paradoxical  Grade 1 left ventricular diastolic dysfunction with normal left ventricular filling pressures  Patient has evidence of smoke or strands of thrombus in the septal apex portion which extended into the mid ventricle  There is no evidence of a significant space filling mass  However, when definity was used, this particular area of the  ventricle did not opacify  This most likely is secondary to severe stasis of blood in that area  RIGHT VENTRICLE: The size was normal  Systolic function was normal  Wall thickness was normal     LEFT ATRIUM: Size was normal     RIGHT ATRIUM: Size was normal     MITRAL VALVE: No significant mitral regurgitation  AORTIC VALVE: No aortic stenosis or regurgitation  TRICUSPID VALVE: Trace tricuspid regurgitation  PULMONIC VALVE: No pulmonic valvular regurgitation  PERICARDIUM: There was no pericardial effusion  The pericardium was normal in appearance  AORTA: Minimal enlargement of the aortic root at 38 mm  PULMONARY ARTERY: There was not significant tricuspid regurgitation to measure pulmonary artery pressures      SYSTEM MEASUREMENT TABLES    2D  %FS: 17 5 %  Ao Diam: 3 8 cm  EF(Teich): 36 7 %  ESV(Teich): 56 4 ml  IVSd: 1 cm  LVIDd: 4 4 cm  LVIDs: 3 7 cm  LVPWd: 1 cm  SV(Teich): 32 7 ml    [employer] Accredited Echocardiography Laboratory    Prepared and electronically signed by    Sriram Friedman MD  Signed 13-Mar-2019 09:31:50        ======================================================    Lab Results   Component Value Date    WBC 5 20 01/27/2021    WBC 4 6 01/27/2021    HGB 13 5 01/27/2021    HGB 12 4 01/27/2021    HCT 41 7 01/27/2021    HCT 37 7 01/27/2021    MCV 96 01/27/2021    MCV 95 01/27/2021     01/27/2021     01/27/2021      Lab Results   Component Value Date    SODIUM 142 01/27/2021    K 3 7 01/27/2021     01/27/2021    CO2 31 (H) 01/27/2021    BUN 11 01/27/2021    CREATININE 1 18 01/27/2021    GLUC 103 (H) 03/14/2020    CALCIUM 9 6 01/27/2021      Lab Results   Component Value Date    HGBA1C 5 5 02/20/2020      No results found for: CHOL  Lab Results   Component Value Date    HDL 41 04/07/2020    HDL 47 02/20/2020    HDL 45 07/01/2019     Lab Results   Component Value Date    LDLCALC 74 04/07/2020    LDLCALC 63 02/20/2020    LDLCALC 72 07/01/2019     Lab Results   Component Value Date    TRIG 162 (H) 04/07/2020    TRIG 185 (H) 02/20/2020    TRIG 176 (H) 07/01/2019     No results found for: [place], [place]   Lab Results   Component Value Date    INR 2 87 (H) 03/02/2021    INR 2 38 (H) 02/17/2021    INR 2 11 (H) 01/27/2021    PROTIME 30 1 (H) 03/02/2021    PROTIME 26 1 (H) 02/17/2021    PROTIME 23 7 (H) 01/27/2021        Imaging:   I have personally reviewed pertinent reports  Portions of the record may have been created with voice recognition software  Occasional wrong word or "sound a like" substitutions may have occurred due to the inherent limitations of voice recognition software  Read the chart carefully and recognize, using context, where substitutions have occurred

## 2021-03-16 NOTE — PROGRESS NOTES
CM attempted to contact pt in regards to pt satisfaction survey  Pt did not answer  Pt returned CM phone call  Pt was agreeable to completing pt satisfaction survey with CM  Survey was completed with CM

## 2021-03-17 ENCOUNTER — ANTICOAG VISIT (OUTPATIENT)
Dept: CARDIOLOGY CLINIC | Facility: CLINIC | Age: 56
End: 2021-03-17

## 2021-03-17 DIAGNOSIS — I23.6 LV (LEFT VENTRICULAR) MURAL THROMBUS FOLLOWING MI (HCC): ICD-10-CM

## 2021-03-17 DIAGNOSIS — Z79.01 LONG TERM CURRENT USE OF ANTICOAGULANT THERAPY: ICD-10-CM

## 2021-03-17 DIAGNOSIS — I25.5 ISCHEMIC CARDIOMYOPATHY: ICD-10-CM

## 2021-03-18 ENCOUNTER — PATIENT OUTREACH (OUTPATIENT)
Dept: SURGERY | Facility: CLINIC | Age: 56
End: 2021-03-18

## 2021-03-18 DIAGNOSIS — I51.3 MURAL THROMBUS OF LEFT VENTRICLE: ICD-10-CM

## 2021-03-19 ENCOUNTER — PATIENT OUTREACH (OUTPATIENT)
Dept: SURGERY | Facility: CLINIC | Age: 56
End: 2021-03-19

## 2021-03-19 RX ORDER — WARFARIN SODIUM 5 MG/1
TABLET ORAL
Qty: 30 TABLET | Refills: 2 | Status: SHIPPED | OUTPATIENT
Start: 2021-03-19 | End: 2021-06-20

## 2021-03-22 ENCOUNTER — TELEPHONE (OUTPATIENT)
Dept: SURGERY | Facility: CLINIC | Age: 56
End: 2021-03-22

## 2021-03-22 NOTE — TELEPHONE ENCOUNTER
CHELSI w/ patient, explaining that I am calling to ask her information for her annual nutritional assessment  Left call back # for patient to return my call    DDS

## 2021-03-25 ENCOUNTER — PATIENT OUTREACH (OUTPATIENT)
Dept: SURGERY | Facility: CLINIC | Age: 56
End: 2021-03-25

## 2021-03-29 ENCOUNTER — IMMUNIZATIONS (OUTPATIENT)
Dept: FAMILY MEDICINE CLINIC | Facility: HOSPITAL | Age: 56
End: 2021-03-29

## 2021-03-29 DIAGNOSIS — Z23 ENCOUNTER FOR IMMUNIZATION: Primary | ICD-10-CM

## 2021-03-29 PROCEDURE — 0001A SARS-COV-2 / COVID-19 MRNA VACCINE (PFIZER-BIONTECH) 30 MCG: CPT

## 2021-03-29 PROCEDURE — 91300 SARS-COV-2 / COVID-19 MRNA VACCINE (PFIZER-BIONTECH) 30 MCG: CPT

## 2021-03-30 ENCOUNTER — PATIENT OUTREACH (OUTPATIENT)
Dept: SURGERY | Facility: CLINIC | Age: 56
End: 2021-03-30

## 2021-03-30 ENCOUNTER — APPOINTMENT (OUTPATIENT)
Dept: LAB | Facility: HOSPITAL | Age: 56
End: 2021-03-30
Payer: COMMERCIAL

## 2021-03-30 ENCOUNTER — ANTICOAG VISIT (OUTPATIENT)
Dept: CARDIOLOGY CLINIC | Facility: CLINIC | Age: 56
End: 2021-03-30

## 2021-03-30 DIAGNOSIS — I23.6 LV (LEFT VENTRICULAR) MURAL THROMBUS FOLLOWING MI (HCC): ICD-10-CM

## 2021-03-30 DIAGNOSIS — I25.5 ISCHEMIC CARDIOMYOPATHY: ICD-10-CM

## 2021-03-30 DIAGNOSIS — Z79.01 LONG TERM CURRENT USE OF ANTICOAGULANT THERAPY: ICD-10-CM

## 2021-04-08 ENCOUNTER — PATIENT OUTREACH (OUTPATIENT)
Dept: SURGERY | Facility: CLINIC | Age: 56
End: 2021-04-08

## 2021-04-08 NOTE — PROGRESS NOTES
This cm attempted to contact ct in regards to upcoming appointment on 4/9/2021  Ct picked up the phone, answered, and then hung up  This cm tried calling again  Ct picked up, answered, and hung up  Ct called this cm back  Cm manager answered and ct hung up  Ct called this cm back  Ct was made aware of upcoming appointment tomorrow  Ct was also made aware that she would be meeting with this Cm and demario Levin tomorrow after her appointment to complete her recertification  Ct was made aware to bring an ID, insurance cards, proof of address, and updated income

## 2021-04-09 ENCOUNTER — PATIENT OUTREACH (OUTPATIENT)
Dept: SURGERY | Facility: CLINIC | Age: 56
End: 2021-04-09

## 2021-04-09 ENCOUNTER — OFFICE VISIT (OUTPATIENT)
Dept: SURGERY | Facility: CLINIC | Age: 56
End: 2021-04-09
Payer: COMMERCIAL

## 2021-04-09 VITALS
SYSTOLIC BLOOD PRESSURE: 113 MMHG | HEART RATE: 65 BPM | OXYGEN SATURATION: 98 % | HEIGHT: 60 IN | TEMPERATURE: 96.3 F | DIASTOLIC BLOOD PRESSURE: 70 MMHG | BODY MASS INDEX: 33.96 KG/M2 | WEIGHT: 173 LBS

## 2021-04-09 DIAGNOSIS — N18.31 STAGE 3A CHRONIC KIDNEY DISEASE (HCC): ICD-10-CM

## 2021-04-09 DIAGNOSIS — I25.5 ISCHEMIC CARDIOMYOPATHY: ICD-10-CM

## 2021-04-09 DIAGNOSIS — Z21 HIV POSITIVE (HCC): Primary | ICD-10-CM

## 2021-04-09 PROCEDURE — 99214 OFFICE O/P EST MOD 30 MIN: CPT | Performed by: INTERNAL MEDICINE

## 2021-04-09 NOTE — PROGRESS NOTES
Ct came in for scheduled   Ct reports she is not sexually active  Cm and Ct discussed the importance of being adherent  Ct reports housing is stable  Ct is currently in good health and reports no other health concerns  Ct currently takes public transportation to get where she needs to go  Ct is seeing Dr Shruti Christianson for specialty care and is in the process of switching to [facility] as her PCP  Ct has medicaid part A and B and health partners as a supplemental  Ct stated she does not smoke cigarettes and hasn't in 3 months  Ct reports she has no mental issues  Ct no drug or alcohol issues  Ct states there are no domestic violence or legal issues  Ct believes that her last dental appointment was 2-5 years ago and had a referral back in march but due to pandemic was canceled  Ct stated that she reached out to [facility] in regards to partial dentures, but was told from the office they were unable to address it due to previous dental work from prior dental office in [place]  [phone] upon return from travel in the beginning of June  Ct does not have dental issues at this time

## 2021-04-09 NOTE — PROGRESS NOTES
Progress Note - Infectious Disease   Delayne Cabot 55 y o  female MRN: [id number]  Unit/Bed#:  Encounter: [id number]      Impression/Plan:    1  HIV - doing well on Biktarvy with an undetectable viral load and a CD4 count in the 400s    Recheck labs in 5 months and follow up in 6 months     Stressed adherence      2  CKD stage 3 - creatinine stable, now at 1 18 mg/dl; saw nephrology last year  will  Continue to monitor the renal function closely, and patient to continue seeing nephrology on yearly basis     3  Tobacco dependence -  stopped few  months ago      4  Ischemic Cardiomyopathy: with mural thrombus  She has AICD in place   Patient follows up with    Patient was provided medication, adherence and prevention education    Subjective:  Routine follow-up for HIV  Patient claims 100% adherence with     Patient denies any notable side effects  Overall the feeling well  The patient denies any fever chills or sweats, denies any nausea vomiting or diarrhea, denies any cough or shortness of breath  ROS: A complete 12 point ROS is negative other than that noted in the HPI    Followup portions patient history reviewed and updated as: Allergies, current medications, past medical history, past social history, past surgical history, and the problem list    Objective:  Vitals:  Vitals:    04/09/21 0736   BP: 113/70   BP Location: Right arm   Patient Position: Sitting   Cuff Size: Standard   Pulse: 65   Temp: (!) 96 3 °F (35 7 °C)   SpO2: 98%   Weight: 78 5 kg (173 lb)   Height: 5' (1 524 m)       Physical Exam:   General Appearance:  Alert, interactive, appearing well,  nontoxic, no acute distress  Neck:   Supple without lymphadenopathy, no thyromegaly or masses   Throat: Oropharynx moist without lesions  Lungs:   Clear to auscultation bilaterally; no wheezes, rhonchi or rales; respirations unlabored   Heart:  RRR; no murmur, rub or gallop   Abdomen:   Soft, non-tender, non-distended, positive bowel sounds  Extremities: No clubbing, cyanosis or edema   Skin: No new rashes or lesions  No draining wounds noted  Labs, Imaging, & Other studies:   All pertinent labs and imaging studies were personally reviewed    Lab Results   Component Value Date    K 3 7 01/27/2021     01/27/2021    CO2 31 (H) 01/27/2021    BUN 11 01/27/2021    CREATININE 1 18 01/27/2021    GLUF 83 01/27/2021    CALCIUM 9 6 01/27/2021    AST 28 01/27/2021    ALT 15 01/27/2021    ALKPHOS 60 01/27/2021    EGFR 52 (L) 01/27/2021     Lab Results   Component Value Date    WBC 5 20 01/27/2021    WBC 4 6 01/27/2021    HGB 13 5 01/27/2021    HGB 12 4 01/27/2021    HCT 41 7 01/27/2021    HCT 37 7 01/27/2021    MCV 96 01/27/2021    MCV 95 01/27/2021     01/27/2021     01/27/2021     Lab Results   Component Value Date    HEPCAB Non-reactive 04/07/2020     Lab Results   Component Value Date    HAV Non-reactive 11/02/2018    HEPCAB Non-reactive 04/07/2020     Lab Results   Component Value Date    RPR Non-Reactive 04/07/2020     CD4 ABS   Date/Time Value Ref Range Status   01/27/2021 12:20  359 - 1519 /uL Final     HIV-1 RNA by PCR, Qn   Date/Time Value Ref Range Status   01/27/2021 12:20 PM <20 copies/mL Final     Comment:     HIV-1 RNA not detected  The reportable range for this assay is 20 to 10,000,000  copies HIV-1 RNA/mL             Current Outpatient Medications:     albuterol (PROVENTIL HFA,VENTOLIN HFA) 90 mcg/act inhaler, Inhale 2 puffs every 4 (four) hours as needed for wheezing, Disp: 1 Inhaler, Rfl: 0    aspirin (ECOTRIN LOW STRENGTH) 81 mg EC tablet, Take 1 tablet (81 mg total) by mouth daily, Disp: 81 tablet, Rfl: 10    atorvastatin (LIPITOR) 80 mg tablet, Take 1 tablet (80 mg total) by mouth daily, Disp: 90 tablet, Rfl: 3    bictegravir-emtricitab-tenofovir alafenamide (Biktarvy) -25 MG tablet, Take 1 tablet by mouth daily with breakfast, Disp: 30 tablet, Rfl: 5    budesonide-formoterol (SYMBICORT) 160-4 5 mcg/act inhaler, Inhale 1 puff every 6 (six) hours as needed (shortness of breath) Rinse mouth after use , Disp: 1 Inhaler, Rfl: 2    carvedilol (COREG) 6 25 mg tablet, Take 1 tablet (6 25 mg total) by mouth 2 (two) times a day with meals, Disp: 180 tablet, Rfl: 3    sacubitril-valsartan (Entresto) 49-51 MG TABS, Take 1 tablet by mouth 2 (two) times a day, Disp: 180 tablet, Rfl: 3    Spacer/Aero-Holding Chambers (Procare Spacer/Adult Mask) MARK, 1 Device by Does not apply route daily, Disp: 1 Device, Rfl: 0    warfarin (COUMADIN) 1 mg tablet, TAKE 1 TABLET BY MOUTH DAILY WITH COUMADIN 5MG FOR THE NEXT THREE DAYS, Disp: 90 tablet, Rfl: 10    warfarin (COUMADIN) 5 mg tablet, TAKE 1 TABLET BY MOUTH DAILY, Disp: 30 tablet, Rfl: 2

## 2021-04-12 ENCOUNTER — TELEPHONE (OUTPATIENT)
Dept: SURGERY | Facility: CLINIC | Age: 56
End: 2021-04-12

## 2021-04-12 NOTE — TELEPHONE ENCOUNTER
Assessment    Annual nutrition assessment   Last annual: 11/8/2019    Clinical Data/Client History    HIV: yes  AIDS: no    : Jacquelin Lugo     Socio- Economic Status: SNAP and Mom cooks; pt denied running out of food or money for food     Living Situation: House    Food Prep/Access: Refrigerator, Stove and Microwave    Psycho Social Factors: Not discussed    Functional Status: Ambulatory, Able to [employer] and Mom prepares meals    Activity Level: Chair exercises in home 2x weekly    Ambulation Difficulty with N/a    Oral Problems: Dentures  Pt reported screw coming loose, in need of dental work; per pt she will be making appt with help of CM in June  Otherwise, pt denied difficulty chewing/swallowing      Last Dental Exam: 4-5 years ago     Procedures Performed: N/A    Typical Food/Beverage Intake:    · Breakfast boiled eggs, or bagel, or Syrian toast  · Lunch meat and cheese sandwich (on white or whole wheat bread)  · Dinner fish or chicken, lettuce/tomato salad, or rice, or corn/peas   · Snacks not usually  · Fluids water; sometimes iced tea     Appetite: Unchanged from normal    Supplements: No     Food Intolerance: Pt denied n/v/d; denied constipation; denied known food allergies     Weight Change Percent: 18# gain from Mar-20    Time Period of Weight Change: 1 year    Usual Body Weight: 155# (Mar-20); 167# (Oct-20); 174# (Mar-21)    Current Body Weight: 173# (78 6 kg); #; 173% IBW; Adj # (53 8 kg);  BMI 33 8; 5' (1 524 m)    Nutrition Diagnosis    Problem: Overweight/Obesity    Related to: Decrease in physical activity    As Evidenced By: BMI >30    Intervention Diet Prescription    Nutritional needs based on: Current BW, -500 kcal, Adj BW, CKD-III, CD4 >200, HTN,     · 3974-6362 kcal  · 47-63 g protein  · 7366-6905 ml fluid  · 1 5 g Na    Current intake: adequate    Snack/Supplement Recommendations: No supplements recommended at this time; RD advised pt to communicate with healthcare providers regarding taking any OTC supplements     Nutrition Recommendations: Positively reinforced intake of lean protein and limiting beef/pork, hydrating primarily with water, increase physical activity as able     Goals: No SMART goals established at present     Nutrition Education Intervention: Deferred and Provided     Person Educated: patient    Topics Discussed: General assessment items     Teaching Method: Verbal    Readiness to Change: Contemplation:  (Acknowledging that there is a problem but not yet ready or sure of wanting to make a change)    Visit Summary    RD called, spoke with Airam, following ID appt on 4/9, to complete annual nutrition assessment  Airam admitted that she has gained weight since the start of the covid pandemic  She reported making some healthy dietary changes, including reducing intake of beef and pork and switching to salmon or chicken instead  RD positively reinforced health habits  Pt stated that her mom cooks, pt does not know how to cook  Pt reported plans to exercise more as the weather warms outside, RD positively reinforced  Comfort Jovel reported that she has [employer] flyer for fresh fruits and vegetables  RD reminded pt to not change her dietary intake of vitamin K rich foods unless discussed with cardiology, pt agreed, pt is in close f/u with cardiology  Pt reported eating phil [place] denied questions or concerns for RD at this time, denied needing support from RD at this time  RD encouraged pt to reach out should she wish to make any nutritional lifestyle changes, pt understood  Creatinine WNL     HgbA1C 5 5    Monitor renal function, lipid panel, hgbA1C, diet/exercise recall, weight trend        Estefani Becerra, MS, RD, LDN

## 2021-04-16 DIAGNOSIS — I23.6 LV (LEFT VENTRICULAR) MURAL THROMBUS FOLLOWING MI (HCC): ICD-10-CM

## 2021-04-16 DIAGNOSIS — Z21 HIV POSITIVE (HCC): ICD-10-CM

## 2021-04-19 RX ORDER — BICTEGRAVIR SODIUM, EMTRICITABINE, AND TENOFOVIR ALAFENAMIDE FUMARATE 50; 200; 25 MG/1; MG/1; MG/1
1 TABLET ORAL
Qty: 30 TABLET | Refills: 5 | Status: SHIPPED | OUTPATIENT
Start: 2021-04-19 | End: 2021-06-09 | Stop reason: SDUPTHER

## 2021-04-21 NOTE — PROGRESS NOTES
Assessment/Plan: [facility] approached pt to get acquainted, along with exploring behavioral, cognitive, and emotional aspects in need to be addressed  During today's contact, pt shared being stable at the time, not being talkative while waiting to be medically addressed  It was encouraged to ct to consider ongoing [facility] services which she's willing to request as needed in the future  [facility]     Today patient present with   Chief Complaint   Patient presents with    HIV Positive    Follow-up     Patient would likely benefit from: Ongoing [facility] f/u  Consider/focus/continue: Addressing core emotional, cognitive and behavioral displays  Stage of change: Pre-contemplation  Plan/ Behavioral Recommendations:  interventions per pt's request        Diagnoses and all orders for this visit:    HIV positive ([facility] [address] )  -     CBC and differential; Future  -     Comprehensive metabolic panel; Future  -     HIV-1 RNA, quantitative, PCR; Future  -     T-helper cells (CD4) count; Future    Ischemic cardiomyopathy    Stage 3a chronic kidney disease          Discussion:     Patient mood is stable  Subjective:     Patient ID: Justen Ojeda is a 55 y o  female  HPI    History of Present Illness:      The patient has no acute behavioral health concerns    Review of Systems      Objective:     Physical Exam      [facility]    Orientation     Person: yes    Place: yes    Time: yes    Appearance    Well Developed: yes healthy    Uncomfortable: yes    Normal Body Odor: yes    Smells of Feces: no    Smells of Urine: no    Disheveled: no    Well Nourished: yes weight WNL of ideal    Grooming Unkempt: no    Poor Eye Contact: no    Hirsute: no    Looks Tired: no    Acutely Exhausted: noappearance reflects stated age    Mood and Affect:     Appropriate: yes    Euthymicno    Irritable: no    Angry: no    Anxious: no    Depressed:no    Blunted:yes    Labile: no    Restricted: no    Harm to Self or Others: No SI or HI were disclosed, nor presented throughout contact  Substance Abuse: None reported by pt

## 2021-04-22 ENCOUNTER — APPOINTMENT (OUTPATIENT)
Dept: LAB | Facility: HOSPITAL | Age: 56
End: 2021-04-22
Payer: COMMERCIAL

## 2021-04-22 ENCOUNTER — ANTICOAG VISIT (OUTPATIENT)
Dept: CARDIOLOGY CLINIC | Facility: CLINIC | Age: 56
End: 2021-04-22

## 2021-04-22 DIAGNOSIS — I23.6 LV (LEFT VENTRICULAR) MURAL THROMBUS FOLLOWING MI (HCC): ICD-10-CM

## 2021-04-22 DIAGNOSIS — Z79.01 LONG TERM CURRENT USE OF ANTICOAGULANT THERAPY: ICD-10-CM

## 2021-04-22 DIAGNOSIS — I25.5 ISCHEMIC CARDIOMYOPATHY: ICD-10-CM

## 2021-04-22 NOTE — PROGRESS NOTES
Tc to pt, will continue current dose, inr due 2 weeks  Pt will test before leaving for [place] 5/5, returning 6/5

## 2021-04-23 ENCOUNTER — IMMUNIZATIONS (OUTPATIENT)
Dept: FAMILY MEDICINE CLINIC | Facility: HOSPITAL | Age: 56
End: 2021-04-23

## 2021-04-23 ENCOUNTER — PATIENT OUTREACH (OUTPATIENT)
Dept: SURGERY | Facility: CLINIC | Age: 56
End: 2021-04-23

## 2021-04-23 DIAGNOSIS — Z23 ENCOUNTER FOR IMMUNIZATION: Primary | ICD-10-CM

## 2021-04-23 PROCEDURE — 0002A SARS-COV-2 / COVID-19 MRNA VACCINE (PFIZER-BIONTECH) 30 MCG: CPT

## 2021-04-23 PROCEDURE — 91300 SARS-COV-2 / COVID-19 MRNA VACCINE (PFIZER-BIONTECH) 30 MCG: CPT

## 2021-04-23 NOTE — PROGRESS NOTES
Ct let this cm know she already received her first dose of the vaccine and was working on scheduling her appointment for the second dose

## 2021-04-28 RX ORDER — WARFARIN SODIUM 1 MG/1
TABLET ORAL
Qty: 30 TABLET | Refills: 0 | Status: SHIPPED | OUTPATIENT
Start: 2021-04-28 | End: 2021-05-05

## 2021-05-05 DIAGNOSIS — I23.6 LV (LEFT VENTRICULAR) MURAL THROMBUS FOLLOWING MI (HCC): ICD-10-CM

## 2021-05-05 RX ORDER — WARFARIN SODIUM 1 MG/1
TABLET ORAL
Qty: 30 TABLET | Refills: 0 | Status: SHIPPED | OUTPATIENT
Start: 2021-05-05 | End: 2021-06-20

## 2021-05-06 ENCOUNTER — APPOINTMENT (OUTPATIENT)
Dept: LAB | Facility: HOSPITAL | Age: 56
End: 2021-05-06
Payer: COMMERCIAL

## 2021-05-06 ENCOUNTER — ANTICOAG VISIT (OUTPATIENT)
Dept: CARDIOLOGY CLINIC | Facility: CLINIC | Age: 56
End: 2021-05-06

## 2021-05-06 DIAGNOSIS — Z79.01 LONG TERM CURRENT USE OF ANTICOAGULANT THERAPY: ICD-10-CM

## 2021-05-06 DIAGNOSIS — I23.6 LV (LEFT VENTRICULAR) MURAL THROMBUS FOLLOWING MI (HCC): ICD-10-CM

## 2021-05-06 DIAGNOSIS — I25.5 ISCHEMIC CARDIOMYOPATHY: ICD-10-CM

## 2021-05-18 ENCOUNTER — PATIENT OUTREACH (OUTPATIENT)
Dept: SURGERY | Facility: CLINIC | Age: 56
End: 2021-05-18

## 2021-06-01 ENCOUNTER — PATIENT OUTREACH (OUTPATIENT)
Dept: SURGERY | Facility: CLINIC | Age: 56
End: 2021-06-01

## 2021-06-07 ENCOUNTER — APPOINTMENT (OUTPATIENT)
Dept: LAB | Facility: HOSPITAL | Age: 56
End: 2021-06-07
Attending: INTERNAL MEDICINE
Payer: COMMERCIAL

## 2021-06-07 ENCOUNTER — ANTICOAG VISIT (OUTPATIENT)
Dept: CARDIOLOGY CLINIC | Facility: CLINIC | Age: 56
End: 2021-06-07

## 2021-06-07 DIAGNOSIS — I25.5 ISCHEMIC CARDIOMYOPATHY: ICD-10-CM

## 2021-06-07 DIAGNOSIS — Z79.01 LONG TERM CURRENT USE OF ANTICOAGULANT THERAPY: ICD-10-CM

## 2021-06-07 DIAGNOSIS — E78.2 MIXED HYPERLIPIDEMIA: ICD-10-CM

## 2021-06-07 DIAGNOSIS — I23.6 LV (LEFT VENTRICULAR) MURAL THROMBUS FOLLOWING MI (HCC): ICD-10-CM

## 2021-06-07 DIAGNOSIS — N18.30 CKD (CHRONIC KIDNEY DISEASE) STAGE 3, GFR 30-59 ML/MIN (HCC): ICD-10-CM

## 2021-06-07 LAB
CREAT UR-MCNC: 226 MG/DL
MICROALBUMIN UR-MCNC: 9.9 MG/L (ref 0–20)
MICROALBUMIN/CREAT 24H UR: 4 MG/G CREATININE (ref 0–30)

## 2021-06-07 PROCEDURE — 82043 UR ALBUMIN QUANTITATIVE: CPT

## 2021-06-07 PROCEDURE — 82570 ASSAY OF URINE CREATININE: CPT

## 2021-06-08 ENCOUNTER — REMOTE DEVICE CLINIC VISIT (OUTPATIENT)
Dept: CARDIOLOGY CLINIC | Facility: CLINIC | Age: 56
End: 2021-06-08
Payer: COMMERCIAL

## 2021-06-08 ENCOUNTER — PATIENT OUTREACH (OUTPATIENT)
Dept: SURGERY | Facility: CLINIC | Age: 56
End: 2021-06-08

## 2021-06-08 DIAGNOSIS — Z95.810 PRESENCE OF AUTOMATIC CARDIOVERTER/DEFIBRILLATOR (AICD): Primary | ICD-10-CM

## 2021-06-08 PROCEDURE — 93295 DEV INTERROG REMOTE 1/2/MLT: CPT | Performed by: INTERNAL MEDICINE

## 2021-06-08 PROCEDURE — 93296 REM INTERROG EVL PM/IDS: CPT | Performed by: INTERNAL MEDICINE

## 2021-06-08 NOTE — PROGRESS NOTES
Results for orders placed or performed in visit on 06/08/21   Cardiac EP device report    Narrative    MDT-SINGLE CHAMBER ICD - ACTIVE SYSTEM IS MRI CONDITIONAL  CARELINK TRANSMISSION: BATTERY VOLTAGE ADEQUATE (9 1 YRS)  : <0 1%  ALL AVAILABLE LEAD PARAMETERS WITHIN NORMAL LIMITS  NO SIGNIFICANT HIGH RATE EPISODES  OPTI-VOL WITHIN NORMAL LIMITS  APPROPRIATELY FUNCTIONING ICD    [facility]

## 2021-06-09 ENCOUNTER — PATIENT OUTREACH (OUTPATIENT)
Dept: SURGERY | Facility: CLINIC | Age: 56
End: 2021-06-09

## 2021-06-09 ENCOUNTER — OFFICE VISIT (OUTPATIENT)
Dept: SURGERY | Facility: CLINIC | Age: 56
End: 2021-06-09
Payer: COMMERCIAL

## 2021-06-09 VITALS
HEART RATE: 65 BPM | HEIGHT: 60 IN | OXYGEN SATURATION: 97 % | TEMPERATURE: 98.6 F | BODY MASS INDEX: 33.61 KG/M2 | DIASTOLIC BLOOD PRESSURE: 64 MMHG | SYSTOLIC BLOOD PRESSURE: 91 MMHG | WEIGHT: 171.2 LBS | RESPIRATION RATE: 16 BRPM

## 2021-06-09 DIAGNOSIS — Z12.31 ENCOUNTER FOR SCREENING MAMMOGRAM FOR HIGH-RISK PATIENT: ICD-10-CM

## 2021-06-09 DIAGNOSIS — I25.5 ISCHEMIC CARDIOMYOPATHY: ICD-10-CM

## 2021-06-09 DIAGNOSIS — Z12.31 ENCOUNTER FOR SCREENING MAMMOGRAM FOR MALIGNANT NEOPLASM OF BREAST: ICD-10-CM

## 2021-06-09 DIAGNOSIS — Z13.1 SCREENING FOR DIABETES MELLITUS: ICD-10-CM

## 2021-06-09 DIAGNOSIS — E78.2 MIXED HYPERLIPIDEMIA: ICD-10-CM

## 2021-06-09 DIAGNOSIS — Z11.59 ENCOUNTER FOR HEPATITIS C VIRUS SCREENING TEST FOR HIGH RISK PATIENT: ICD-10-CM

## 2021-06-09 DIAGNOSIS — Z20.2 CONTACT WITH AND (SUSPECTED) EXPOSURE TO INFECTIONS WITH A PREDOMINANTLY SEXUAL MODE OF TRANSMISSION: ICD-10-CM

## 2021-06-09 DIAGNOSIS — Z21 HIV POSITIVE (HCC): Primary | ICD-10-CM

## 2021-06-09 DIAGNOSIS — Z72.0 TOBACCO ABUSE: ICD-10-CM

## 2021-06-09 DIAGNOSIS — Z91.89 ENCOUNTER FOR HEPATITIS C VIRUS SCREENING TEST FOR HIGH RISK PATIENT: ICD-10-CM

## 2021-06-09 DIAGNOSIS — Z00.00 ENCOUNTER FOR MEDICAL EXAMINATION TO ESTABLISH CARE: ICD-10-CM

## 2021-06-09 DIAGNOSIS — Z11.1 SCREENING FOR TUBERCULOSIS: ICD-10-CM

## 2021-06-09 PROCEDURE — 99215 OFFICE O/P EST HI 40 MIN: CPT | Performed by: NURSE PRACTITIONER

## 2021-06-09 RX ORDER — BICTEGRAVIR SODIUM, EMTRICITABINE, AND TENOFOVIR ALAFENAMIDE FUMARATE 50; 200; 25 MG/1; MG/1; MG/1
1 TABLET ORAL
Qty: 90 TABLET | Refills: 1 | Status: SHIPPED | OUTPATIENT
Start: 2021-06-09 | End: 2021-10-18 | Stop reason: SDUPTHER

## 2021-06-09 NOTE — ASSESSMENT & PLAN NOTE
Stable    · Continue high dose statin  · Recheck lipid panel  Stressed importance of exercising for 30 minutes 5 days a week per recommended guidelines  Stressed importance of eating a diet low in fat, carbohydrates, cholesterol, sugar intake, monitor portion control and eating more fruits and vegetables    · Follow with [place]

## 2021-06-09 NOTE — ASSESSMENT & PLAN NOTE
Doing good  Pt reports missing 3 doses of Biktarvy due to being away in [place] and ran out of medication  · Stressed the importance of 100% medication adherence  · Reviewed U=U  · Reviewed labs  · Discussed the importance of not taking and vitamin or herbal supplements within 6 hours of HAART  · Discussed having pt call her insurance company to see if they can give her enough medication to cover while she is away    HIV Counseling:    Viral Load: < 20    CD4 Count: 418    ART: [address]    Denies side effects  Stressed the importance of adherence  Continue follow up in the ID clinic with Dr Sandi Potter  Reviewed the most recent labs, including the Cd4 and viral load  Discussed the risks and benefits of treatment options, instructions for management, importance of treatment adherence, and reduction of risk factors  Educated on possible medication side effects  Counseled on routes of HIV transmission, including the risk of  infection  Emphasized that viral suppression is the best method to prevent HIV transmission  At this time the pt denies the need for HIV testing of anyone in their life  Total encounter time was greater than 35 minutes  Greater than 20 minutes were spent on counseling and patient education  Pt voices understanding and agreement with treatment plan

## 2021-06-09 NOTE — ASSESSMENT & PLAN NOTE
Started smoking again while on vacation with family  Pt recently quit smoking for 4 months  Pt is smoking 5 cigarettes per day and has a plan to quit again  · Offered nicotine patches and gum  Pt declined  · Stressed the importance of continued smoking cessation      Nicotine Dependency - Primary    Counseled for greater than 15 minutes on the importance of smoking cessation  Education was given regarding the cardiovascular effects of how nicotine affects the heart, lungs, kidneys,and peripheral vascular system    Referred to [address] for enrollment in smoking cessation program

## 2021-06-09 NOTE — PROGRESS NOTES
Assessment/Plan:    HIV positive ([address] )  Doing good  Pt reports missing 3 doses of Biktarvy due to being away in [place] and ran out of medication  · Stressed the importance of 100% medication adherence  · Reviewed U=U  · Reviewed labs  · Discussed the importance of not taking and vitamin or herbal supplements within 6 hours of HAART  · Discussed having pt call her insurance company to see if they can give her enough medication to cover while she is away    HIV Counseling:    Viral Load: < 20    CD4 Count: 418    ART: Jeni Herr    Denies side effects  Stressed the importance of adherence  Continue follow up in the ID clinic with Dr Francisca Scales  Reviewed the most recent labs, including the Cd4 and viral load  Discussed the risks and benefits of treatment options, instructions for management, importance of treatment adherence, and reduction of risk factors  Educated on possible medication side effects  Counseled on routes of HIV transmission, including the risk of  infection  Emphasized that viral suppression is the best method to prevent HIV transmission  At this time the pt denies the need for HIV testing of anyone in their life  Total encounter time was greater than 35 minutes  Greater than 20 minutes were spent on counseling and patient education  Pt voices understanding and agreement with treatment plan  Hyperlipemia  Stable    · Continue high dose statin  · Recheck lipid panel  Stressed importance of exercising for 30 minutes 5 days a week per recommended guidelines  Stressed importance of eating a diet low in fat, carbohydrates, cholesterol, sugar intake, monitor portion control and eating more fruits and vegetables  · Follow with [place] dietician    Tobacco abuse  Started smoking again while on vacation with family  Pt recently quit smoking for 4 months  Pt is smoking 5 cigarettes per day and has a plan to quit again  · Offered nicotine patches and gum   Pt declined  · Stressed the importance of continued smoking cessation      Nicotine Dependency - Primary    Counseled for greater than 15 minutes on the importance of smoking cessation  Education was given regarding the cardiovascular effects of how nicotine affects the heart, lungs, kidneys,and peripheral vascular system  Referred to [facility] for enrollment in smoking cessation program          Diagnoses and all orders for this visit:    HIV positive ([facility] [address] )  -     TSH, 3rd generation with Free T4 reflex; Future  -     bictegravir-emtricitab-tenofovir alafenamide (Biktarvy) -25 MG tablet; Take 1 tablet by mouth daily with breakfast    Encounter for medical examination to establish care    Mixed hyperlipidemia  -     Lipid panel; Future    Tobacco abuse    Encounter for screening mammogram for malignant neoplasm of breast    Screening for diabetes mellitus  -     HEMOGLOBIN A1C W/ EAG ESTIMATION; Future    Contact with and (suspected) exposure to infections with a predominantly sexual mode of transmission  -     RPR; Future  -     Chlamydia/GC amplified DNA by PCR; Future    Screening for tuberculosis  -     Quantiferon TB Gold Plus; Future    Encounter for hepatitis C virus screening test for high risk patient  -     Hepatitis C antibody; Future    Ischemic cardiomyopathy  -     TSH, 3rd generation with Free T4 reflex;  Future    Encounter for screening mammogram for high-risk patient  -     Mammo screening bilateral w 3d & cad; Future        Patient Active Problem List   Diagnosis    Ischemic cardiomyopathy    Triple vessel coronary artery disease    [facility]    Essential hypertension    Hyperlipemia    Impaired left ventricular function    Long term current use of anticoagulant therapy    old extensive anterior, apical, lateral and distal inferior apical MI    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    CKD (chronic kidney disease) stage 3, GFR 30-59 ml/min ([facility] )    Implantable cardioverter-defibrillator (ICD) in situ    Mild intermittent asthma without complication    ASCUS with positive high risk HPV cervical    BMI 33 0-33 9,adult    Tobacco abuse     Lab Results   Component Value Date    K 3 7 01/27/2021     01/27/2021    CO2 31 (H) 01/27/2021    BUN 11 01/27/2021    CREATININE 1 18 01/27/2021    GLUF 83 01/27/2021    CALCIUM 9 6 01/27/2021    AST 28 01/27/2021    ALT 15 01/27/2021    ALKPHOS 60 01/27/2021    EGFR 52 (L) 01/27/2021     Lab Results   Component Value Date    WBC 5 20 01/27/2021    WBC 4 6 01/27/2021    HGB 13 5 01/27/2021    HGB 12 4 01/27/2021    HCT 41 7 01/27/2021    HCT 37 7 01/27/2021    MCV 96 01/27/2021    MCV 95 01/27/2021     01/27/2021     01/27/2021       Subjective:      Patient ID: Dorie Cunha is a 55 y o  female  HPI  Dorie Cunha is a 47y o  year old female here for new patient evaluation to establish care for PCP  Pt is already a patient of the HIV clinic and does see Dr Ciarra Magaña and would like to keep her care located in one area  Pt was seen a different resident every time she went  Patient is a lesbian was diagnosed with HIV in 81 after heterosexual contact  She is currently taking TIVICAY and DESCOVY without missing any doses  PMH: HIV, ischemic cardiomyopathy, smoker, CKD, AICD, left mural thrombus, asthma, TVD, HTN, long term anticoagulation, hyperlipidemia, and obesity  Pt denies being sexually active and reports that last sexual encounter was approximately 10 years ago  Pt suffered an MI and LV mural wall thrombus in February 2017 requiring defibrillatory placement and long-term anticoagulation with warfarin  Has adequate follow-up with cardiology  Pt has recently started smoking again  Pt denies any recreational or illicit drug use  Pt is currently not working    Pt reports she is doing good       The following portions of the patient's history were reviewed and updated as appropriate: allergies, current medications, past family history, past medical history, past social history, past surgical history and problem list     Review of Systems   Constitutional: Negative  HENT: Negative  Respiratory: Negative  Cardiovascular: Negative  Gastrointestinal: Negative  Genitourinary: Negative  Skin: Negative  Neurological: Negative  Psychiatric/Behavioral: Negative  Objective:      BP 91/64 (BP Location: Right arm, Patient Position: Sitting, Cuff Size: Standard)   Pulse 65   Temp 98 6 °F (37 °C)   Resp 16   Ht 5' (1 524 m)   Wt 77 7 kg (171 lb 3 2 oz)   SpO2 97%   BMI 33 44 kg/m²          Physical Exam  Vitals signs and nursing note reviewed  Constitutional:       General: She is not in acute distress  Appearance: Normal appearance  She is normal weight  She is not ill-appearing  HENT:      Right Ear: Tympanic membrane normal       Left Ear: Tympanic membrane normal       Nose: Nose normal       Mouth/Throat:      Mouth: Mucous membranes are moist       Pharynx: Oropharynx is clear  Eyes:      Extraocular Movements: Extraocular movements intact  Neck:      Vascular: No carotid bruit  Cardiovascular:      Rate and Rhythm: Normal rate and regular rhythm  Pulses: Normal pulses  Heart sounds: Normal heart sounds  Pulmonary:      Effort: Pulmonary effort is normal       Breath sounds: Normal breath sounds  Abdominal:      General: Bowel sounds are normal       Palpations: Abdomen is soft  Musculoskeletal: Normal range of motion  Lymphadenopathy:      Cervical: No cervical adenopathy  Skin:     General: Skin is warm and dry  Capillary Refill: Capillary refill takes less than 2 seconds  Neurological:      Mental Status: She is oriented to person, place, and time  Psychiatric:         Mood and Affect: Mood normal          Behavior: Behavior normal          Thought Content:  Thought content normal          Judgment: Judgment normal

## 2021-06-21 ENCOUNTER — PATIENT OUTREACH (OUTPATIENT)
Dept: SURGERY | Facility: CLINIC | Age: 56
End: 2021-06-21

## 2021-07-02 ENCOUNTER — ANTICOAG VISIT (OUTPATIENT)
Dept: CARDIOLOGY CLINIC | Facility: CLINIC | Age: 56
End: 2021-07-02

## 2021-07-02 ENCOUNTER — APPOINTMENT (OUTPATIENT)
Dept: LAB | Facility: HOSPITAL | Age: 56
End: 2021-07-02
Payer: COMMERCIAL

## 2021-07-02 DIAGNOSIS — Z20.2 CONTACT WITH AND (SUSPECTED) EXPOSURE TO INFECTIONS WITH A PREDOMINANTLY SEXUAL MODE OF TRANSMISSION: ICD-10-CM

## 2021-07-02 DIAGNOSIS — I23.6 LV (LEFT VENTRICULAR) MURAL THROMBUS FOLLOWING MI (HCC): ICD-10-CM

## 2021-07-02 DIAGNOSIS — E78.2 MIXED HYPERLIPIDEMIA: ICD-10-CM

## 2021-07-02 DIAGNOSIS — I25.5 ISCHEMIC CARDIOMYOPATHY: Primary | ICD-10-CM

## 2021-07-02 DIAGNOSIS — Z21 HIV POSITIVE (HCC): ICD-10-CM

## 2021-07-02 DIAGNOSIS — Z91.89 ENCOUNTER FOR HEPATITIS C VIRUS SCREENING TEST FOR HIGH RISK PATIENT: ICD-10-CM

## 2021-07-02 DIAGNOSIS — Z11.59 ENCOUNTER FOR HEPATITIS C VIRUS SCREENING TEST FOR HIGH RISK PATIENT: ICD-10-CM

## 2021-07-02 DIAGNOSIS — Z13.1 SCREENING FOR DIABETES MELLITUS: ICD-10-CM

## 2021-07-02 DIAGNOSIS — Z11.1 SCREENING FOR TUBERCULOSIS: ICD-10-CM

## 2021-07-02 DIAGNOSIS — Z79.01 LONG TERM CURRENT USE OF ANTICOAGULANT THERAPY: ICD-10-CM

## 2021-07-02 DIAGNOSIS — I25.5 ISCHEMIC CARDIOMYOPATHY: ICD-10-CM

## 2021-07-02 LAB
CHOLEST SERPL-MCNC: 152 MG/DL
EST. AVERAGE GLUCOSE BLD GHB EST-MCNC: 117 MG/DL
HBA1C MFR BLD: 5.7 %
HDLC SERPL-MCNC: 49 MG/DL
LDLC SERPL CALC-MCNC: 78 MG/DL
NONHDLC SERPL-MCNC: 103 MG/DL
TRIGL SERPL-MCNC: 124 MG/DL
TSH SERPL DL<=0.05 MIU/L-ACNC: 0.7 UIU/ML (ref 0.47–4.68)

## 2021-07-02 PROCEDURE — 86803 HEPATITIS C AB TEST: CPT

## 2021-07-02 PROCEDURE — 84443 ASSAY THYROID STIM HORMONE: CPT

## 2021-07-02 PROCEDURE — 87491 CHLMYD TRACH DNA AMP PROBE: CPT

## 2021-07-02 PROCEDURE — 83036 HEMOGLOBIN GLYCOSYLATED A1C: CPT

## 2021-07-02 PROCEDURE — 87591 N.GONORRHOEAE DNA AMP PROB: CPT

## 2021-07-02 PROCEDURE — 86480 TB TEST CELL IMMUN MEASURE: CPT

## 2021-07-02 PROCEDURE — 86592 SYPHILIS TEST NON-TREP QUAL: CPT

## 2021-07-02 PROCEDURE — 80061 LIPID PANEL: CPT

## 2021-07-02 NOTE — PROGRESS NOTES
Tc to pt, will hold x 1 day, then decrease dose, 7 5 mg tues, 5 mg other days of the week, inr due 7-10 days  denies any medication changes

## 2021-07-03 LAB — HCV AB SER QL: NORMAL

## 2021-07-05 LAB
C TRACH DNA SPEC QL NAA+PROBE: NEGATIVE
N GONORRHOEA DNA SPEC QL NAA+PROBE: NEGATIVE

## 2021-07-06 LAB
GAMMA INTERFERON BACKGROUND BLD IA-ACNC: 0.04 IU/ML
M TB IFN-G BLD-IMP: NEGATIVE
M TB IFN-G CD4+ BCKGRND COR BLD-ACNC: 0 IU/ML
M TB IFN-G CD4+ BCKGRND COR BLD-ACNC: 0 IU/ML
MITOGEN IGNF BCKGRD COR BLD-ACNC: >10 IU/ML
RPR SER QL: NORMAL

## 2021-07-07 ENCOUNTER — PATIENT OUTREACH (OUTPATIENT)
Dept: SURGERY | Facility: CLINIC | Age: 56
End: 2021-07-07

## 2021-07-08 ENCOUNTER — PATIENT OUTREACH (OUTPATIENT)
Dept: SURGERY | Facility: CLINIC | Age: 56
End: 2021-07-08

## 2021-07-12 ENCOUNTER — ANTICOAG VISIT (OUTPATIENT)
Dept: CARDIOLOGY CLINIC | Facility: CLINIC | Age: 56
End: 2021-07-12

## 2021-07-12 ENCOUNTER — APPOINTMENT (OUTPATIENT)
Dept: LAB | Facility: HOSPITAL | Age: 56
End: 2021-07-12
Attending: INTERNAL MEDICINE
Payer: COMMERCIAL

## 2021-07-12 DIAGNOSIS — Z21 HIV POSITIVE (HCC): ICD-10-CM

## 2021-07-12 DIAGNOSIS — Z79.01 LONG TERM CURRENT USE OF ANTICOAGULANT THERAPY: ICD-10-CM

## 2021-07-12 DIAGNOSIS — I23.6 LV (LEFT VENTRICULAR) MURAL THROMBUS FOLLOWING MI (HCC): ICD-10-CM

## 2021-07-12 DIAGNOSIS — I25.5 ISCHEMIC CARDIOMYOPATHY: Primary | ICD-10-CM

## 2021-07-26 ENCOUNTER — ANTICOAG VISIT (OUTPATIENT)
Dept: CARDIOLOGY CLINIC | Facility: CLINIC | Age: 56
End: 2021-07-26

## 2021-07-26 ENCOUNTER — APPOINTMENT (OUTPATIENT)
Dept: LAB | Facility: HOSPITAL | Age: 56
End: 2021-07-26
Payer: COMMERCIAL

## 2021-07-26 DIAGNOSIS — I23.6 LV (LEFT VENTRICULAR) MURAL THROMBUS FOLLOWING MI (HCC): ICD-10-CM

## 2021-07-26 DIAGNOSIS — I25.5 ISCHEMIC CARDIOMYOPATHY: Primary | ICD-10-CM

## 2021-07-26 DIAGNOSIS — I23.6 VENTRICULAR THROMBUS FOLLOWING MI (MYOCARDIAL INFARCTION) (HCC): ICD-10-CM

## 2021-07-26 DIAGNOSIS — I25.5 ISCHEMIC CARDIOMYOPATHY: ICD-10-CM

## 2021-07-26 DIAGNOSIS — Z79.01 LONG TERM CURRENT USE OF ANTICOAGULANT THERAPY: ICD-10-CM

## 2021-07-26 DIAGNOSIS — Z79.01 ANTICOAGULATION MONITORING BY PHARMACIST: ICD-10-CM

## 2021-07-26 LAB
INR PPP: 2.34 (ref 0.84–1.19)
PROTHROMBIN TIME: 25.7 SECONDS (ref 11.6–14.5)

## 2021-07-26 PROCEDURE — 85610 PROTHROMBIN TIME: CPT

## 2021-07-26 PROCEDURE — 36415 COLL VENOUS BLD VENIPUNCTURE: CPT

## 2021-07-26 NOTE — PROGRESS NOTES
Tc to pt, reports she has been taking 7 5 mg tues/sat, 5 mg other days of the week  Will take 7 5 mg today in place of 5 mg, then resume current dose, inr due 2 weeks

## 2021-07-30 ENCOUNTER — PATIENT OUTREACH (OUTPATIENT)
Dept: SURGERY | Facility: CLINIC | Age: 56
End: 2021-07-30

## 2021-08-11 ENCOUNTER — PATIENT OUTREACH (OUTPATIENT)
Dept: SURGERY | Facility: CLINIC | Age: 56
End: 2021-08-11

## 2021-08-19 ENCOUNTER — APPOINTMENT (OUTPATIENT)
Dept: LAB | Facility: HOSPITAL | Age: 56
End: 2021-08-19
Payer: COMMERCIAL

## 2021-08-19 ENCOUNTER — ANTICOAG VISIT (OUTPATIENT)
Dept: CARDIOLOGY CLINIC | Facility: CLINIC | Age: 56
End: 2021-08-19

## 2021-08-19 DIAGNOSIS — Z79.01 LONG TERM CURRENT USE OF ANTICOAGULANT THERAPY: ICD-10-CM

## 2021-08-19 DIAGNOSIS — I25.5 ISCHEMIC CARDIOMYOPATHY: Primary | ICD-10-CM

## 2021-08-19 DIAGNOSIS — N18.31 STAGE 3A CHRONIC KIDNEY DISEASE (HCC): Primary | ICD-10-CM

## 2021-08-19 DIAGNOSIS — I23.6 LV (LEFT VENTRICULAR) MURAL THROMBUS FOLLOWING MI (HCC): ICD-10-CM

## 2021-08-19 LAB
ALBUMIN SERPL BCP-MCNC: 4.2 G/DL (ref 3–5.2)
ALP SERPL-CCNC: 62 U/L (ref 43–122)
ALT SERPL W P-5'-P-CCNC: 17 U/L
ANION GAP SERPL CALCULATED.3IONS-SCNC: 10 MMOL/L (ref 5–14)
AST SERPL W P-5'-P-CCNC: 26 U/L (ref 14–36)
BASOPHILS # BLD AUTO: 0 THOUSANDS/ΜL (ref 0–0.1)
BASOPHILS NFR BLD AUTO: 1 % (ref 0–1)
BILIRUB SERPL-MCNC: 0.71 MG/DL
BUN SERPL-MCNC: 12 MG/DL (ref 5–25)
CALCIUM SERPL-MCNC: 9.5 MG/DL (ref 8.4–10.2)
CHLORIDE SERPL-SCNC: 109 MMOL/L (ref 97–108)
CO2 SERPL-SCNC: 25 MMOL/L (ref 22–30)
CREAT SERPL-MCNC: 1.38 MG/DL (ref 0.6–1.2)
EOSINOPHIL # BLD AUTO: 0.1 THOUSAND/ΜL (ref 0–0.4)
EOSINOPHIL NFR BLD AUTO: 2 % (ref 0–6)
ERYTHROCYTE [DISTWIDTH] IN BLOOD BY AUTOMATED COUNT: 14.5 %
GFR SERPL CREATININE-BSD FRML MDRD: 43 ML/MIN/1.73SQ M
GLUCOSE SERPL-MCNC: 116 MG/DL (ref 70–99)
HCT VFR BLD AUTO: 42 % (ref 36–46)
HGB BLD-MCNC: 14.3 G/DL (ref 12–16)
LYMPHOCYTES # BLD AUTO: 1.9 THOUSANDS/ΜL (ref 0.5–4)
LYMPHOCYTES NFR BLD AUTO: 43 % (ref 25–45)
MCH RBC QN AUTO: 32 PG (ref 26–34)
MCHC RBC AUTO-ENTMCNC: 34.1 G/DL (ref 31–36)
MCV RBC AUTO: 94 FL (ref 80–100)
MONOCYTES # BLD AUTO: 0.2 THOUSAND/ΜL (ref 0.2–0.9)
MONOCYTES NFR BLD AUTO: 5 % (ref 1–10)
NEUTROPHILS # BLD AUTO: 2.3 THOUSANDS/ΜL (ref 1.8–7.8)
NEUTS SEG NFR BLD AUTO: 50 % (ref 45–65)
PLATELET # BLD AUTO: 152 THOUSANDS/UL (ref 150–450)
PMV BLD AUTO: 10 FL (ref 8.9–12.7)
POTASSIUM SERPL-SCNC: 4 MMOL/L (ref 3.6–5)
PROT SERPL-MCNC: 7.4 G/DL (ref 5.9–8.4)
RBC # BLD AUTO: 4.47 MILLION/UL (ref 4–5.2)
SODIUM SERPL-SCNC: 144 MMOL/L (ref 137–147)
WBC # BLD AUTO: 4.5 THOUSAND/UL (ref 4.5–11)

## 2021-08-19 PROCEDURE — 87536 HIV-1 QUANT&REVRSE TRNSCRPJ: CPT

## 2021-08-19 PROCEDURE — 85025 COMPLETE CBC W/AUTO DIFF WBC: CPT

## 2021-08-19 PROCEDURE — 80053 COMPREHEN METABOLIC PANEL: CPT

## 2021-08-19 PROCEDURE — 36415 COLL VENOUS BLD VENIPUNCTURE: CPT

## 2021-08-19 NOTE — PROGRESS NOTES
Tc to pt, denies any bleeding, denies medicine changes  Will hold x 1 day, then decrease dose, 7 5 mg tues, 5 mg other days of the week, inr due 1 week

## 2021-08-21 LAB
HIV1 RNA # SERPL NAA+PROBE: <20 COPIES/ML
HIV1 RNA SERPL NAA+PROBE-LOG#: NORMAL LOG10COPY/ML

## 2021-08-24 ENCOUNTER — TELEPHONE (OUTPATIENT)
Dept: SURGERY | Facility: CLINIC | Age: 56
End: 2021-08-24

## 2021-08-24 NOTE — TELEPHONE ENCOUNTER
Mailed appointment reminder:        Nephrology - 10/20/21 @ [address] [address]          *Lab work will be ordered and needs to be completed one week prior to appointment*

## 2021-08-27 ENCOUNTER — ANTICOAG VISIT (OUTPATIENT)
Dept: CARDIOLOGY CLINIC | Facility: CLINIC | Age: 56
End: 2021-08-27

## 2021-08-27 ENCOUNTER — APPOINTMENT (OUTPATIENT)
Dept: LAB | Facility: HOSPITAL | Age: 56
End: 2021-08-27
Payer: COMMERCIAL

## 2021-08-27 DIAGNOSIS — N18.31 STAGE 3A CHRONIC KIDNEY DISEASE (HCC): ICD-10-CM

## 2021-08-27 DIAGNOSIS — Z79.01 LONG TERM CURRENT USE OF ANTICOAGULANT THERAPY: ICD-10-CM

## 2021-08-27 DIAGNOSIS — I25.5 ISCHEMIC CARDIOMYOPATHY: Primary | ICD-10-CM

## 2021-08-27 DIAGNOSIS — I23.6 LV (LEFT VENTRICULAR) MURAL THROMBUS FOLLOWING MI (HCC): ICD-10-CM

## 2021-08-27 LAB
ANION GAP SERPL CALCULATED.3IONS-SCNC: 9 MMOL/L (ref 5–14)
BUN SERPL-MCNC: 16 MG/DL (ref 5–25)
CALCIUM SERPL-MCNC: 9.7 MG/DL (ref 8.4–10.2)
CHLORIDE SERPL-SCNC: 110 MMOL/L (ref 97–108)
CO2 SERPL-SCNC: 26 MMOL/L (ref 22–30)
CREAT SERPL-MCNC: 1.39 MG/DL (ref 0.6–1.2)
GFR SERPL CREATININE-BSD FRML MDRD: 43 ML/MIN/1.73SQ M
GLUCOSE P FAST SERPL-MCNC: 97 MG/DL (ref 70–99)
POTASSIUM SERPL-SCNC: 4.4 MMOL/L (ref 3.6–5)
SODIUM SERPL-SCNC: 145 MMOL/L (ref 137–147)

## 2021-08-27 PROCEDURE — 80048 BASIC METABOLIC PNL TOTAL CA: CPT

## 2021-08-31 ENCOUNTER — VBI (OUTPATIENT)
Dept: ADMINISTRATIVE | Facility: OTHER | Age: 56
End: 2021-08-31

## 2021-09-01 ENCOUNTER — REMOTE DEVICE CLINIC VISIT (OUTPATIENT)
Dept: CARDIOLOGY CLINIC | Facility: CLINIC | Age: 56
End: 2021-09-01
Payer: COMMERCIAL

## 2021-09-01 DIAGNOSIS — Z95.810 PRESENCE OF IMPLANTABLE CARDIOVERTER-DEFIBRILLATOR (ICD): Primary | ICD-10-CM

## 2021-09-01 PROCEDURE — 93295 DEV INTERROG REMOTE 1/2/MLT: CPT | Performed by: INTERNAL MEDICINE

## 2021-09-01 PROCEDURE — 93296 REM INTERROG EVL PM/IDS: CPT | Performed by: INTERNAL MEDICINE

## 2021-09-01 NOTE — PROGRESS NOTES
MDT-SINGLE CHAMBER ICD - ACTIVE SYSTEM IS MRI CONDITIONAL   CARELINK TRANSMISSION:  BATTERY VOLTAGE ADEQUATE (8 8 YR)    0%   ALL LEAD PARAMETERS WITHIN NORMAL LIMITS   NO HIGH RATE EPISODES   OPTI-VOL WITHIN NORMAL LIMITS   NORMAL DEVICE FUNCTION   RG

## 2021-09-10 ENCOUNTER — ANTICOAG VISIT (OUTPATIENT)
Dept: CARDIOLOGY CLINIC | Facility: CLINIC | Age: 56
End: 2021-09-10

## 2021-09-10 ENCOUNTER — APPOINTMENT (OUTPATIENT)
Dept: LAB | Facility: HOSPITAL | Age: 56
End: 2021-09-10
Payer: COMMERCIAL

## 2021-09-10 DIAGNOSIS — I23.6 LV (LEFT VENTRICULAR) MURAL THROMBUS FOLLOWING MI (HCC): ICD-10-CM

## 2021-09-10 DIAGNOSIS — I25.5 ISCHEMIC CARDIOMYOPATHY: Primary | ICD-10-CM

## 2021-09-10 DIAGNOSIS — Z79.01 LONG TERM CURRENT USE OF ANTICOAGULANT THERAPY: ICD-10-CM

## 2021-09-14 ENCOUNTER — HOSPITAL ENCOUNTER (EMERGENCY)
Facility: HOSPITAL | Age: 56
Discharge: HOME | End: 2021-09-14
Attending: EMERGENCY MEDICINE | Admitting: EMERGENCY MEDICINE
Payer: COMMERCIAL

## 2021-09-14 ENCOUNTER — APPOINTMENT (EMERGENCY)
Dept: RADIOLOGY | Facility: HOSPITAL | Age: 56
End: 2021-09-14
Payer: COMMERCIAL

## 2021-09-14 ENCOUNTER — PATIENT OUTREACH (OUTPATIENT)
Dept: SURGERY | Facility: CLINIC | Age: 56
End: 2021-09-14

## 2021-09-14 VITALS
SYSTOLIC BLOOD PRESSURE: 105 MMHG | BODY MASS INDEX: 33.66 KG/M2 | HEART RATE: 64 BPM | OXYGEN SATURATION: 96 % | TEMPERATURE: 98.7 F | WEIGHT: 172.38 LBS | RESPIRATION RATE: 16 BRPM | DIASTOLIC BLOOD PRESSURE: 67 MMHG

## 2021-09-14 DIAGNOSIS — Z20.822 ENCOUNTER FOR LABORATORY TESTING FOR COVID-19 VIRUS: ICD-10-CM

## 2021-09-14 DIAGNOSIS — J40 BRONCHITIS: ICD-10-CM

## 2021-09-14 DIAGNOSIS — J32.9 SINUSITIS: Primary | ICD-10-CM

## 2021-09-14 LAB
S PYO DNA THROAT QL NAA+PROBE: NORMAL
SARS-COV-2 RNA RESP QL NAA+PROBE: NEGATIVE

## 2021-09-14 PROCEDURE — 87651 STREP A DNA AMP PROBE: CPT | Performed by: PHYSICIAN ASSISTANT

## 2021-09-14 PROCEDURE — 99283 EMERGENCY DEPT VISIT LOW MDM: CPT

## 2021-09-14 PROCEDURE — 99284 EMERGENCY DEPT VISIT MOD MDM: CPT | Performed by: PHYSICIAN ASSISTANT

## 2021-09-14 PROCEDURE — U0003 INFECTIOUS AGENT DETECTION BY NUCLEIC ACID (DNA OR RNA); SEVERE ACUTE RESPIRATORY SYNDROME CORONAVIRUS 2 (SARS-COV-2) (CORONAVIRUS DISEASE [COVID-19]), AMPLIFIED PROBE TECHNIQUE, MAKING USE OF HIGH THROUGHPUT TECHNOLOGIES AS DESCRIBED BY CMS-2020-01-R: HCPCS | Performed by: PHYSICIAN ASSISTANT

## 2021-09-14 PROCEDURE — 71045 X-RAY EXAM CHEST 1 VIEW: CPT

## 2021-09-14 PROCEDURE — U0005 INFEC AGEN DETEC AMPLI PROBE: HCPCS | Performed by: PHYSICIAN ASSISTANT

## 2021-09-14 RX ORDER — BENZONATATE 100 MG/1
100 CAPSULE ORAL 3 TIMES DAILY PRN
Qty: 20 CAPSULE | Refills: 0 | Status: SHIPPED | OUTPATIENT
Start: 2021-09-14 | End: 2022-04-25

## 2021-09-14 RX ORDER — AMOXICILLIN AND CLAVULANATE POTASSIUM 875; 125 MG/1; MG/1
1 TABLET, FILM COATED ORAL EVERY 12 HOURS SCHEDULED
Qty: 20 TABLET | Refills: 0 | Status: SHIPPED | OUTPATIENT
Start: 2021-09-14 | End: 2021-09-24

## 2021-09-14 RX ORDER — ALBUTEROL SULFATE 90 UG/1
2 AEROSOL, METERED RESPIRATORY (INHALATION) EVERY 6 HOURS PRN
Qty: 8.5 G | Refills: 0 | Status: SHIPPED | OUTPATIENT
Start: 2021-09-14 | End: 2022-06-07

## 2021-09-14 NOTE — PROGRESS NOTES
Cm spoke to ct in regards to scheduling recert appointment  Ct said she would be in the clinic on the 8th of October  Ct was made aware that cm would be out from the 5th of October to the 12th  Ct let cm know she could come in some time after the 12th  Ct let cm know she would call cm back

## 2021-09-14 NOTE — DISCHARGE INSTRUCTIONS

## 2021-09-14 NOTE — ED PROVIDER NOTES
History  Chief Complaint   Patient presents with    Cold Like Symptoms     states about 1 week of cough with sore throat and chills at night; hasn't stopped with OTC medications     Pt with nasal congestion sore throat and productive cough for several days, pt is covid vaccinated ,  No chest pain no sob       Sore Throat  Location:  Generalized  Quality:  Aching  Severity:  Mild  Onset quality:  Gradual  Duration:  2 days  Timing:  Constant  Progression:  Unchanged  Chronicity:  New  Relieved by:  Nothing  Ineffective treatments:  None tried  Associated symptoms: cough    Associated symptoms: no abdominal pain    Risk factors: no exposure to strep        Prior to Admission Medications   Prescriptions Last Dose Informant Patient Reported? Taking? Spacer/Aero-Holding Chambers (Procare Spacer/Adult Mask) MARK  Self No No   Si Device by Does not apply route daily   albuterol (PROVENTIL HFA,VENTOLIN HFA) 90 mcg/act inhaler  Self No No   Sig: Inhale 2 puffs every 4 (four) hours as needed for wheezing   aspirin (ECOTRIN LOW STRENGTH) 81 mg EC tablet  Self No No   Sig: Take 1 tablet (81 mg total) by mouth daily   atorvastatin (LIPITOR) 80 mg tablet  Self No No   Sig: Take 1 tablet (80 mg total) by mouth daily   bictegravir-emtricitab-tenofovir alafenamide (Biktarvy) -25 MG tablet   No No   Sig: Take 1 tablet by mouth daily with breakfast   budesonide-formoterol (SYMBICORT) 160-4 5 mcg/act inhaler  Self No No   Sig: Inhale 1 puff every 6 (six) hours as needed (shortness of breath) Rinse mouth after use     carvedilol (COREG) 6 25 mg tablet   No No   Sig: Take 1 tablet (6 25 mg total) by mouth 2 (two) times a day with meals   sacubitril-valsartan (Entresto) 49-51 MG TABS   No No   Sig: Take 1 tablet by mouth 2 (two) times a day   warfarin (COUMADIN) 1 mg tablet   No No   Sig: TAKE 1 TABLET BY MOUTH ONCE DAILY WITH COUMADIN FOR THE NEXT THREE DAYS   warfarin (COUMADIN) 5 mg tablet   No No   Sig: TAKE 1 TABLET BY MOUTH ONCE DAILY      Facility-Administered Medications: None       Past Medical History:   Diagnosis Date    Asthma     Coronary artery disease     defibrillator    HIV positive (HCC)        Past Surgical History:   Procedure Laterality Date    ANGIOPLASTY      CARDIAC DEFIBRILLATOR PLACEMENT      CORONARY ANGIOPLASTY      pci placement    TOTAL ABDOMINAL HYSTERECTOMY         Family History   Problem Relation Age of Onset    Other Father         GI bleed    Suicidality Brother     Drug abuse Brother         overdose     I have reviewed and agree with the history as documented  E-Cigarette/Vaping    E-Cigarette Use Never User      E-Cigarette/Vaping Substances     Social History     Tobacco Use    Smoking status: Former Smoker     Packs/day: 0 50     Years: 30 00     Pack years: 15 00     Types: Cigarettes     Quit date: 2020     Years since quittin 5    Smokeless tobacco: Former User     Quit date: 2019    Tobacco comment: 5 cig  daily   Vaping Use    Vaping Use: Never used   Substance Use Topics    Alcohol use: Not Currently     Comment: ocassionally    Drug use: No       Review of Systems   Constitutional: Negative  HENT: Positive for congestion, sinus pressure and sore throat  Eyes: Negative  Respiratory: Positive for cough  Cardiovascular: Negative  Gastrointestinal: Negative  Negative for abdominal pain  Endocrine: Negative  Genitourinary: Negative  Musculoskeletal: Negative  Skin: Negative  Allergic/Immunologic: Negative  Neurological: Negative  Hematological: Negative  Psychiatric/Behavioral: Negative  All other systems reviewed and are negative  Physical Exam  Physical Exam  Vitals and nursing note reviewed  Constitutional:       Appearance: Normal appearance  She is normal weight  HENT:      Head: Normocephalic and atraumatic        Right Ear: Tympanic membrane, ear canal and external ear normal       Left Ear: Tympanic membrane, ear canal and external ear normal       Nose: Congestion and rhinorrhea present  Comments: Boggy nasal mucosa  Max sinus tender to palp  Yellow nasal d/c      Mouth/Throat:      Mouth: Mucous membranes are moist       Pharynx: Oropharynx is clear  Eyes:      Extraocular Movements: Extraocular movements intact  Conjunctiva/sclera: Conjunctivae normal       Pupils: Pupils are equal, round, and reactive to light  Cardiovascular:      Rate and Rhythm: Normal rate and regular rhythm  Pulses: Normal pulses  Heart sounds: Normal heart sounds  Pulmonary:      Effort: Pulmonary effort is normal       Breath sounds: Normal breath sounds  Comments: Minor coarse mann nds   Abdominal:      General: Abdomen is flat  Bowel sounds are normal       Palpations: Abdomen is soft  Musculoskeletal:         General: Normal range of motion  Cervical back: Normal range of motion and neck supple  Skin:     General: Skin is warm  Capillary Refill: Capillary refill takes less than 2 seconds  Neurological:      General: No focal deficit present  Mental Status: She is alert and oriented to person, place, and time     Psychiatric:         Mood and Affect: Mood normal          Behavior: Behavior normal          Vital Signs  ED Triage Vitals [09/14/21 1047]   Temperature Pulse Respirations Blood Pressure SpO2   98 7 °F (37 1 °C) 64 16 105/67 96 %      Temp Source Heart Rate Source Patient Position - Orthostatic VS BP Location FiO2 (%)   Oral Monitor Sitting Left arm --      Pain Score       8           Vitals:    09/14/21 1047   BP: 105/67   Pulse: 64   Patient Position - Orthostatic VS: Sitting         Visual Acuity      ED Medications  Medications - No data to display    Diagnostic Studies  Results Reviewed     Procedure Component Value Units Date/Time    Novel Coronavirus [facility] [[id number]]  (Normal) Collected: 09/14/21 1125    Lab Status: Final result Specimen: Nares from Nose Updated: 09/14/21 1347     SARS-CoV-2 Negative    Narrative: The specimen collection materials, transport medium, and/or testing methodology utilized in the production of these test results have been proven to be reliable in a limited validation with an abbreviated program under the Emergency Utilization Authorization provided by the FDA  Testing reported as "Presumptive positive" will be confirmed with secondary testing to ensure result accuracy  Clinical caution and judgement should be used with the interpretation of these results with consideration of the clinical impression and other laboratory testing  Testing reported as "Positive" or "Negative" has been proven to be accurate according to standard laboratory validation requirements  All testing is performed with control materials showing appropriate reactivity at standard intervals  Strep A PCR [[id number]]  (Normal) Collected: 09/14/21 1125    Lab Status: Final result Specimen: Throat Updated: 09/14/21 1341     STREP A PCR None Detected                 XR chest 1 view portable   Final Result by Jason Florentino MD (09/14 1431)      No acute cardiopulmonary disease  Workstation performed: [id number]                    Procedures  Procedures         ED Course                             SBIRT 22yo+      Most Recent Value   SBIRT (23 yo +)   In order to provide better care to our patients, we are screening all of our patients for alcohol and drug use  Would it be okay to ask you these screening questions? Yes Filed at: 09/14/2021 1051   Initial Alcohol Screen: US AUDIT-C    1  How often do you have a drink containing alcohol?  0 Filed at: 09/14/2021 1051   2  How many drinks containing alcohol do you have on a typical day you are drinking? 0 Filed at: 09/14/2021 1051   3a  Male UNDER 65: How often do you have five or more drinks on one occasion? 0 Filed at: 09/14/2021 1051   3b  FEMALE Any Age, or MALE 65+:  How often do you have 4 or more drinks on one occassion? 0 Filed at: 09/14/2021 1051   Audit-C Score  0 Filed at: 09/14/2021 1051   ALICIA: How many times in the past year have you    Used an illegal drug or used a prescription medication for non-medical reasons? Never Filed at: 09/14/2021 1051                    MDM    Disposition  Final diagnoses:   Sinusitis   Bronchitis   Encounter for laboratory testing for COVID-19 virus     Time reflects when diagnosis was documented in both MDM as applicable and the Disposition within this note     Time User Action Codes Description Comment    9/14/2021 12:01 PM Linnell Truong  Add [J32 9] Sinusitis     9/14/2021 12:01 PM Linnell Truong  Add [J40] Bronchitis     9/14/2021 12:01 PM Linnell Truong  Add [Z20 822] Encounter for laboratory testing for COVID-19 virus       ED Disposition     ED Disposition Condition Date/Time Comment    Discharge Stable Tue Sep 14, 2021 12:01 PM Bear Fleming discharge to home/self care  Follow-up Information     Follow up With Specialties Details Why [address] Marybeth Ibarra MD Family Medicine   [address] [place] [address]  [phone]            Discharge Medication List as of 9/14/2021 12:03 PM      START taking these medications    Details   !! albuterol (ProAir HFA) 90 mcg/act inhaler Inhale 2 puffs every 6 (six) hours as needed for wheezing, Starting Tue 9/14/2021, Print      amoxicillin-clavulanate (AUGMENTIN) 875-125 mg per tablet Take 1 tablet by mouth every 12 (twelve) hours for 10 days, Starting Tue 9/14/2021, Until Fri 9/24/2021, Print      benzonatate (TESSALON PERLES) 100 mg capsule Take 1 capsule (100 mg total) by mouth 3 (three) times a day as needed for cough, Starting Tue 9/14/2021, Print       !! - Potential duplicate medications found  Please discuss with provider        CONTINUE these medications which have NOT CHANGED    Details   !! albuterol (PROVENTIL HFA,VENTOLIN HFA) 90 mcg/act inhaler Inhale 2 puffs every 4 (four) hours as needed for wheezing, Starting Thu 11/5/2020, Normal      aspirin (ECOTRIN LOW STRENGTH) 81 mg EC tablet Take 1 tablet (81 mg total) by mouth daily, Starting Mon 8/12/2019, Normal      atorvastatin (LIPITOR) 80 mg tablet Take 1 tablet (80 mg total) by mouth daily, Starting Wed 9/16/2020, Normal      bictegravir-emtricitab-tenofovir alafenamide (Biktarvy) -25 MG tablet Take 1 tablet by mouth daily with breakfast, Starting Wed 6/9/2021, Normal      budesonide-formoterol (SYMBICORT) 160-4 5 mcg/act inhaler Inhale 1 puff every 6 (six) hours as needed (shortness of breath) Rinse mouth after use , Starting Thu 10/15/2020, Normal      carvedilol (COREG) 6 25 mg tablet Take 1 tablet (6 25 mg total) by mouth 2 (two) times a day with meals, Starting Tue 3/16/2021, Normal      sacubitril-valsartan (Entresto) 49-51 MG TABS Take 1 tablet by mouth 2 (two) times a day, Starting Tue 3/16/2021, Normal      Spacer/Aero-Holding Chambers (Procare Spacer/Adult Mask) MARK 1 Device by Does not apply route daily, Starting Sat 11/7/2020, Normal      !! warfarin (COUMADIN) 1 mg tablet TAKE 1 TABLET BY MOUTH ONCE DAILY WITH COUMADIN FOR THE NEXT THREE DAYS, Normal      !! warfarin (COUMADIN) 5 mg tablet TAKE 1 TABLET BY MOUTH ONCE DAILY, Normal       !! - Potential duplicate medications found  Please discuss with provider  No discharge procedures on file      PDMP Review     None          ED Provider  Electronically Signed by           Angela Callahan PA-C  09/14/21 5229

## 2021-09-27 ENCOUNTER — PATIENT OUTREACH (OUTPATIENT)
Dept: SURGERY | Facility: CLINIC | Age: 56
End: 2021-09-27

## 2021-09-30 ENCOUNTER — ANTICOAG VISIT (OUTPATIENT)
Dept: CARDIOLOGY CLINIC | Facility: CLINIC | Age: 56
End: 2021-09-30

## 2021-09-30 ENCOUNTER — APPOINTMENT (OUTPATIENT)
Dept: LAB | Facility: HOSPITAL | Age: 56
End: 2021-09-30
Payer: COMMERCIAL

## 2021-09-30 DIAGNOSIS — Z79.01 LONG TERM CURRENT USE OF ANTICOAGULANT THERAPY: ICD-10-CM

## 2021-09-30 DIAGNOSIS — I25.5 ISCHEMIC CARDIOMYOPATHY: Primary | ICD-10-CM

## 2021-09-30 DIAGNOSIS — I23.6 LV (LEFT VENTRICULAR) MURAL THROMBUS FOLLOWING MI (HCC): ICD-10-CM

## 2021-10-08 ENCOUNTER — OFFICE VISIT (OUTPATIENT)
Dept: SURGERY | Facility: CLINIC | Age: 56
End: 2021-10-08
Payer: COMMERCIAL

## 2021-10-08 VITALS
HEART RATE: 58 BPM | SYSTOLIC BLOOD PRESSURE: 83 MMHG | HEIGHT: 60 IN | OXYGEN SATURATION: 97 % | DIASTOLIC BLOOD PRESSURE: 58 MMHG | BODY MASS INDEX: 34.4 KG/M2 | WEIGHT: 175.2 LBS

## 2021-10-08 DIAGNOSIS — N18.31 STAGE 3A CHRONIC KIDNEY DISEASE (HCC): ICD-10-CM

## 2021-10-08 DIAGNOSIS — B20 HIV DISEASE (HCC): Primary | ICD-10-CM

## 2021-10-08 DIAGNOSIS — Z23 NEED FOR IMMUNIZATION AGAINST INFLUENZA: ICD-10-CM

## 2021-10-08 PROCEDURE — G0008 ADMIN INFLUENZA VIRUS VAC: HCPCS

## 2021-10-08 PROCEDURE — 90682 RIV4 VACC RECOMBINANT DNA IM: CPT

## 2021-10-08 PROCEDURE — 99214 OFFICE O/P EST MOD 30 MIN: CPT | Performed by: INTERNAL MEDICINE

## 2021-10-14 ENCOUNTER — PATIENT OUTREACH (OUTPATIENT)
Dept: SURGERY | Facility: CLINIC | Age: 56
End: 2021-10-14

## 2021-10-14 DIAGNOSIS — N18.30 STAGE 3 CHRONIC KIDNEY DISEASE, UNSPECIFIED WHETHER STAGE 3A OR 3B CKD (HCC): Primary | ICD-10-CM

## 2021-10-15 ENCOUNTER — PATIENT OUTREACH (OUTPATIENT)
Dept: SURGERY | Facility: CLINIC | Age: 56
End: 2021-10-15

## 2021-10-17 PROBLEM — E78.2 MIXED HYPERLIPIDEMIA: Status: ACTIVE | Noted: 2017-03-31

## 2021-10-17 PROBLEM — E66.9 OBESITY (BMI 30-39.9): Status: ACTIVE | Noted: 2021-10-17

## 2021-10-18 ENCOUNTER — OFFICE VISIT (OUTPATIENT)
Dept: CARDIOLOGY CLINIC | Facility: CLINIC | Age: 56
End: 2021-10-18
Payer: COMMERCIAL

## 2021-10-18 VITALS
RESPIRATION RATE: 16 BRPM | HEART RATE: 60 BPM | SYSTOLIC BLOOD PRESSURE: 124 MMHG | HEIGHT: 60 IN | DIASTOLIC BLOOD PRESSURE: 84 MMHG | WEIGHT: 173 LBS | BODY MASS INDEX: 33.96 KG/M2

## 2021-10-18 DIAGNOSIS — I25.2 PAST HEART ATTACK: ICD-10-CM

## 2021-10-18 DIAGNOSIS — E66.9 OBESITY (BMI 30-39.9): ICD-10-CM

## 2021-10-18 DIAGNOSIS — I25.5 ISCHEMIC CARDIOMYOPATHY: ICD-10-CM

## 2021-10-18 DIAGNOSIS — Z72.0 TOBACCO ABUSE: ICD-10-CM

## 2021-10-18 DIAGNOSIS — E78.2 MIXED HYPERLIPIDEMIA: ICD-10-CM

## 2021-10-18 DIAGNOSIS — Z95.810 IMPLANTABLE CARDIOVERTER-DEFIBRILLATOR (ICD) IN SITU: ICD-10-CM

## 2021-10-18 DIAGNOSIS — Z21 HIV POSITIVE (HCC): ICD-10-CM

## 2021-10-18 DIAGNOSIS — N18.31 STAGE 3A CHRONIC KIDNEY DISEASE (HCC): ICD-10-CM

## 2021-10-18 DIAGNOSIS — I23.6 LV (LEFT VENTRICULAR) MURAL THROMBUS FOLLOWING MI (HCC): ICD-10-CM

## 2021-10-18 DIAGNOSIS — I25.10 TRIPLE VESSEL CORONARY ARTERY DISEASE: Primary | ICD-10-CM

## 2021-10-18 DIAGNOSIS — I10 ESSENTIAL HYPERTENSION: ICD-10-CM

## 2021-10-18 PROCEDURE — 3079F DIAST BP 80-89 MM HG: CPT | Performed by: INTERNAL MEDICINE

## 2021-10-18 PROCEDURE — 3074F SYST BP LT 130 MM HG: CPT | Performed by: INTERNAL MEDICINE

## 2021-10-18 PROCEDURE — 99214 OFFICE O/P EST MOD 30 MIN: CPT | Performed by: INTERNAL MEDICINE

## 2021-10-18 RX ORDER — BICTEGRAVIR SODIUM, EMTRICITABINE, AND TENOFOVIR ALAFENAMIDE FUMARATE 50; 200; 25 MG/1; MG/1; MG/1
1 TABLET ORAL
Qty: 90 TABLET | Refills: 1 | Status: SHIPPED | OUTPATIENT
Start: 2021-10-18 | End: 2021-10-18

## 2021-10-18 RX ORDER — BICTEGRAVIR SODIUM, EMTRICITABINE, AND TENOFOVIR ALAFENAMIDE FUMARATE 50; 200; 25 MG/1; MG/1; MG/1
1 TABLET ORAL
Qty: 90 TABLET | Refills: 1 | Status: SHIPPED | OUTPATIENT
Start: 2021-10-18 | End: 2022-03-24 | Stop reason: SDUPTHER

## 2021-10-20 ENCOUNTER — OFFICE VISIT (OUTPATIENT)
Dept: NEPHROLOGY | Facility: CLINIC | Age: 56
End: 2021-10-20
Payer: COMMERCIAL

## 2021-10-20 VITALS
DIASTOLIC BLOOD PRESSURE: 54 MMHG | WEIGHT: 174 LBS | BODY MASS INDEX: 34.16 KG/M2 | HEIGHT: 60 IN | SYSTOLIC BLOOD PRESSURE: 82 MMHG

## 2021-10-20 DIAGNOSIS — N18.31 STAGE 3A CHRONIC KIDNEY DISEASE (HCC): Primary | ICD-10-CM

## 2021-10-20 DIAGNOSIS — I25.5 ISCHEMIC CARDIOMYOPATHY: ICD-10-CM

## 2021-10-20 PROCEDURE — 99213 OFFICE O/P EST LOW 20 MIN: CPT | Performed by: INTERNAL MEDICINE

## 2021-10-20 PROCEDURE — 3008F BODY MASS INDEX DOCD: CPT | Performed by: INTERNAL MEDICINE

## 2021-10-25 ENCOUNTER — PATIENT OUTREACH (OUTPATIENT)
Dept: SURGERY | Facility: CLINIC | Age: 56
End: 2021-10-25

## 2021-11-01 ENCOUNTER — ANTICOAG VISIT (OUTPATIENT)
Dept: CARDIOLOGY CLINIC | Facility: CLINIC | Age: 56
End: 2021-11-01

## 2021-11-01 ENCOUNTER — APPOINTMENT (OUTPATIENT)
Dept: LAB | Facility: HOSPITAL | Age: 56
End: 2021-11-01
Attending: INTERNAL MEDICINE
Payer: COMMERCIAL

## 2021-11-01 DIAGNOSIS — I23.6 LV (LEFT VENTRICULAR) MURAL THROMBUS FOLLOWING MI (HCC): ICD-10-CM

## 2021-11-01 DIAGNOSIS — Z79.01 LONG TERM CURRENT USE OF ANTICOAGULANT THERAPY: ICD-10-CM

## 2021-11-01 DIAGNOSIS — I25.5 ISCHEMIC CARDIOMYOPATHY: Primary | ICD-10-CM

## 2021-11-01 DIAGNOSIS — N18.30 STAGE 3 CHRONIC KIDNEY DISEASE, UNSPECIFIED WHETHER STAGE 3A OR 3B CKD (HCC): ICD-10-CM

## 2021-11-01 DIAGNOSIS — I25.5 ISCHEMIC CARDIOMYOPATHY: ICD-10-CM

## 2021-11-01 DIAGNOSIS — N18.31 STAGE 3A CHRONIC KIDNEY DISEASE (HCC): ICD-10-CM

## 2021-11-01 LAB
ALBUMIN SERPL BCP-MCNC: 4.1 G/DL (ref 3–5.2)
ANION GAP SERPL CALCULATED.3IONS-SCNC: 5 MMOL/L (ref 5–14)
BUN SERPL-MCNC: 13 MG/DL (ref 5–25)
CALCIUM SERPL-MCNC: 10 MG/DL (ref 8.4–10.2)
CHLORIDE SERPL-SCNC: 105 MMOL/L (ref 97–108)
CO2 SERPL-SCNC: 31 MMOL/L (ref 22–30)
CREAT SERPL-MCNC: 1.44 MG/DL (ref 0.6–1.2)
ERYTHROCYTE [DISTWIDTH] IN BLOOD BY AUTOMATED COUNT: 14.6 %
GFR SERPL CREATININE-BSD FRML MDRD: 41 ML/MIN/1.73SQ M
GLUCOSE P FAST SERPL-MCNC: 116 MG/DL (ref 70–99)
HCT VFR BLD AUTO: 40.7 % (ref 36–46)
HGB BLD-MCNC: 13.5 G/DL (ref 12–16)
MCH RBC QN AUTO: 31.6 PG (ref 26–34)
MCHC RBC AUTO-ENTMCNC: 33.2 G/DL (ref 31–36)
MCV RBC AUTO: 95 FL (ref 80–100)
PHOSPHATE SERPL-MCNC: 3.1 MG/DL (ref 2.5–4.8)
PLATELET # BLD AUTO: 160 THOUSANDS/UL (ref 150–450)
PMV BLD AUTO: 9.5 FL (ref 8.9–12.7)
POTASSIUM SERPL-SCNC: 3.8 MMOL/L (ref 3.6–5)
PTH-INTACT SERPL-MCNC: 72.2 PG/ML (ref 16.7–78.9)
RBC # BLD AUTO: 4.27 MILLION/UL (ref 4–5.2)
SODIUM SERPL-SCNC: 141 MMOL/L (ref 137–147)
URATE SERPL-MCNC: 5.4 MG/DL (ref 2.7–7.5)
WBC # BLD AUTO: 4 THOUSAND/UL (ref 4.5–11)

## 2021-11-01 PROCEDURE — 83970 ASSAY OF PARATHORMONE: CPT

## 2021-11-01 PROCEDURE — 80069 RENAL FUNCTION PANEL: CPT

## 2021-11-01 PROCEDURE — 84550 ASSAY OF BLOOD/URIC ACID: CPT

## 2021-11-01 PROCEDURE — 85027 COMPLETE CBC AUTOMATED: CPT

## 2021-11-09 ENCOUNTER — TELEPHONE (OUTPATIENT)
Dept: SURGERY | Facility: CLINIC | Age: 56
End: 2021-11-09

## 2021-11-09 ENCOUNTER — PATIENT OUTREACH (OUTPATIENT)
Dept: SURGERY | Facility: CLINIC | Age: 56
End: 2021-11-09

## 2021-11-10 ENCOUNTER — VBI (OUTPATIENT)
Dept: ADMINISTRATIVE | Facility: OTHER | Age: 56
End: 2021-11-10

## 2021-11-15 ENCOUNTER — ANTICOAG VISIT (OUTPATIENT)
Dept: CARDIOLOGY CLINIC | Facility: CLINIC | Age: 56
End: 2021-11-15

## 2021-11-15 ENCOUNTER — TELEPHONE (OUTPATIENT)
Dept: CARDIOLOGY CLINIC | Facility: CLINIC | Age: 56
End: 2021-11-15

## 2021-11-15 ENCOUNTER — APPOINTMENT (OUTPATIENT)
Dept: LAB | Facility: HOSPITAL | Age: 56
End: 2021-11-15
Payer: COMMERCIAL

## 2021-11-15 DIAGNOSIS — I42.5 OTHER RESTRICTIVE CARDIOMYOPATHY (HCC): Primary | ICD-10-CM

## 2021-11-15 DIAGNOSIS — I25.5 ISCHEMIC CARDIOMYOPATHY: Primary | ICD-10-CM

## 2021-11-15 DIAGNOSIS — I23.6 LV (LEFT VENTRICULAR) MURAL THROMBUS FOLLOWING MI (HCC): ICD-10-CM

## 2021-11-15 DIAGNOSIS — Z79.01 LONG TERM CURRENT USE OF ANTICOAGULANT THERAPY: ICD-10-CM

## 2021-11-15 LAB
INR PPP: 2.06 (ref 0.84–1.19)
PROTHROMBIN TIME: 22.2 SECONDS (ref 11.6–14.5)

## 2021-11-15 PROCEDURE — 36415 COLL VENOUS BLD VENIPUNCTURE: CPT

## 2021-11-15 PROCEDURE — 85610 PROTHROMBIN TIME: CPT

## 2021-11-17 ENCOUNTER — PATIENT OUTREACH (OUTPATIENT)
Dept: SURGERY | Facility: CLINIC | Age: 56
End: 2021-11-17

## 2021-11-18 ENCOUNTER — PATIENT OUTREACH (OUTPATIENT)
Dept: SURGERY | Facility: CLINIC | Age: 56
End: 2021-11-18

## 2021-11-23 ENCOUNTER — IN-CLINIC DEVICE VISIT (OUTPATIENT)
Dept: CARDIOLOGY CLINIC | Facility: CLINIC | Age: 56
End: 2021-11-23
Payer: COMMERCIAL

## 2021-11-23 DIAGNOSIS — Z95.810 PRESENCE OF AUTOMATIC CARDIOVERTER/DEFIBRILLATOR (AICD): Primary | ICD-10-CM

## 2021-11-23 PROCEDURE — 93282 PRGRMG EVAL IMPLANTABLE DFB: CPT | Performed by: INTERNAL MEDICINE

## 2021-11-29 ENCOUNTER — ANTICOAG VISIT (OUTPATIENT)
Dept: CARDIOLOGY CLINIC | Facility: CLINIC | Age: 56
End: 2021-11-29

## 2021-11-29 ENCOUNTER — APPOINTMENT (OUTPATIENT)
Dept: LAB | Facility: HOSPITAL | Age: 56
End: 2021-11-29
Payer: COMMERCIAL

## 2021-11-29 DIAGNOSIS — I23.6 LV (LEFT VENTRICULAR) MURAL THROMBUS FOLLOWING MI (HCC): ICD-10-CM

## 2021-11-29 DIAGNOSIS — I25.5 ISCHEMIC CARDIOMYOPATHY: Primary | ICD-10-CM

## 2021-11-29 DIAGNOSIS — Z79.01 LONG TERM CURRENT USE OF ANTICOAGULANT THERAPY: ICD-10-CM

## 2021-11-30 ENCOUNTER — PATIENT OUTREACH (OUTPATIENT)
Dept: SURGERY | Facility: CLINIC | Age: 56
End: 2021-11-30

## 2021-12-07 ENCOUNTER — TELEPHONE (OUTPATIENT)
Dept: SURGERY | Facility: CLINIC | Age: 56
End: 2021-12-07

## 2021-12-08 ENCOUNTER — OFFICE VISIT (OUTPATIENT)
Dept: SURGERY | Facility: CLINIC | Age: 56
End: 2021-12-08
Payer: COMMERCIAL

## 2021-12-08 VITALS
HEIGHT: 60 IN | DIASTOLIC BLOOD PRESSURE: 63 MMHG | SYSTOLIC BLOOD PRESSURE: 103 MMHG | RESPIRATION RATE: 16 BRPM | BODY MASS INDEX: 34.16 KG/M2 | HEART RATE: 62 BPM | TEMPERATURE: 97.1 F | OXYGEN SATURATION: 100 % | WEIGHT: 174 LBS

## 2021-12-08 DIAGNOSIS — Z12.2 SCREENING FOR MALIGNANT NEOPLASM OF RESPIRATORY ORGAN: ICD-10-CM

## 2021-12-08 DIAGNOSIS — Z87.891 ENCOUNTER FOR SCREENING FOR MALIGNANT NEOPLASM OF LUNG IN FORMER SMOKER WHO QUIT IN PAST 15 YEARS WITH 30 PACK YEAR HISTORY OR GREATER: ICD-10-CM

## 2021-12-08 DIAGNOSIS — Z12.2 ENCOUNTER FOR SCREENING FOR MALIGNANT NEOPLASM OF LUNG IN FORMER SMOKER WHO QUIT IN PAST 15 YEARS WITH 30 PACK YEAR HISTORY OR GREATER: ICD-10-CM

## 2021-12-08 DIAGNOSIS — Z12.11 ENCOUNTER FOR SCREENING FOR COLORECTAL MALIGNANT NEOPLASM: ICD-10-CM

## 2021-12-08 DIAGNOSIS — Z00.00 MEDICARE ANNUAL WELLNESS VISIT, INITIAL: ICD-10-CM

## 2021-12-08 DIAGNOSIS — Z87.891 PERSONAL HISTORY OF TOBACCO USE, PRESENTING HAZARDS TO HEALTH: ICD-10-CM

## 2021-12-08 DIAGNOSIS — E78.2 MIXED HYPERLIPIDEMIA: ICD-10-CM

## 2021-12-08 DIAGNOSIS — Z23 NEED FOR HEPATITIS A VACCINATION: ICD-10-CM

## 2021-12-08 DIAGNOSIS — Z72.0 TOBACCO ABUSE: ICD-10-CM

## 2021-12-08 DIAGNOSIS — Z12.12 ENCOUNTER FOR SCREENING FOR COLORECTAL MALIGNANT NEOPLASM: ICD-10-CM

## 2021-12-08 DIAGNOSIS — Z21 ASYMPTOMATIC HUMAN IMMUNODEFICIENCY VIRUS (HIV) INFECTION STATUS (HCC): ICD-10-CM

## 2021-12-08 DIAGNOSIS — B20 HIV DISEASE (HCC): Primary | ICD-10-CM

## 2021-12-08 PROCEDURE — 3008F BODY MASS INDEX DOCD: CPT | Performed by: NURSE PRACTITIONER

## 2021-12-08 PROCEDURE — 3078F DIAST BP <80 MM HG: CPT | Performed by: NURSE PRACTITIONER

## 2021-12-08 PROCEDURE — G0438 PPPS, INITIAL VISIT: HCPCS | Performed by: NURSE PRACTITIONER

## 2021-12-08 PROCEDURE — 99213 OFFICE O/P EST LOW 20 MIN: CPT | Performed by: NURSE PRACTITIONER

## 2021-12-08 PROCEDURE — 90471 IMMUNIZATION ADMIN: CPT | Performed by: NURSE PRACTITIONER

## 2021-12-08 PROCEDURE — 3074F SYST BP LT 130 MM HG: CPT | Performed by: NURSE PRACTITIONER

## 2021-12-08 PROCEDURE — 90632 HEPA VACCINE ADULT IM: CPT | Performed by: NURSE PRACTITIONER

## 2021-12-09 ENCOUNTER — TELEPHONE (OUTPATIENT)
Dept: ADMINISTRATIVE | Facility: OTHER | Age: 56
End: 2021-12-09

## 2021-12-17 ENCOUNTER — PATIENT OUTREACH (OUTPATIENT)
Dept: SURGERY | Facility: CLINIC | Age: 56
End: 2021-12-17

## 2021-12-24 DIAGNOSIS — I51.3 MURAL THROMBUS OF LEFT VENTRICLE: ICD-10-CM

## 2021-12-27 ENCOUNTER — ANTICOAG VISIT (OUTPATIENT)
Dept: CARDIOLOGY CLINIC | Facility: CLINIC | Age: 56
End: 2021-12-27

## 2021-12-27 ENCOUNTER — APPOINTMENT (OUTPATIENT)
Dept: LAB | Facility: HOSPITAL | Age: 56
End: 2021-12-27
Payer: COMMERCIAL

## 2021-12-27 ENCOUNTER — APPOINTMENT (OUTPATIENT)
Dept: LAB | Facility: HOSPITAL | Age: 56
End: 2021-12-27
Attending: INTERNAL MEDICINE
Payer: COMMERCIAL

## 2021-12-27 DIAGNOSIS — Z79.01 LONG TERM CURRENT USE OF ANTICOAGULANT THERAPY: ICD-10-CM

## 2021-12-27 DIAGNOSIS — I25.5 ISCHEMIC CARDIOMYOPATHY: Primary | ICD-10-CM

## 2021-12-27 DIAGNOSIS — E78.2 MIXED HYPERLIPIDEMIA: ICD-10-CM

## 2021-12-27 DIAGNOSIS — I23.6 LV (LEFT VENTRICULAR) MURAL THROMBUS FOLLOWING MI (HCC): ICD-10-CM

## 2021-12-27 LAB
CHOLEST SERPL-MCNC: 125 MG/DL
HDLC SERPL-MCNC: 36 MG/DL
LDLC SERPL CALC-MCNC: 65 MG/DL
NONHDLC SERPL-MCNC: 89 MG/DL
TRIGL SERPL-MCNC: 119 MG/DL

## 2021-12-27 PROCEDURE — 80061 LIPID PANEL: CPT

## 2021-12-28 RX ORDER — WARFARIN SODIUM 5 MG/1
5 TABLET ORAL DAILY
Qty: 90 TABLET | Refills: 3 | Status: SHIPPED | OUTPATIENT
Start: 2021-12-28 | End: 2022-02-14 | Stop reason: SDUPTHER

## 2021-12-30 ENCOUNTER — HOSPITAL ENCOUNTER (OUTPATIENT)
Dept: CT IMAGING | Facility: HOSPITAL | Age: 56
Discharge: HOME | End: 2021-12-30
Payer: COMMERCIAL

## 2021-12-30 DIAGNOSIS — Z12.2 ENCOUNTER FOR SCREENING FOR MALIGNANT NEOPLASM OF LUNG IN FORMER SMOKER WHO QUIT IN PAST 15 YEARS WITH 30 PACK YEAR HISTORY OR GREATER: ICD-10-CM

## 2021-12-30 DIAGNOSIS — Z21 ASYMPTOMATIC HUMAN IMMUNODEFICIENCY VIRUS (HIV) INFECTION STATUS (HCC): ICD-10-CM

## 2021-12-30 DIAGNOSIS — Z72.0 TOBACCO ABUSE: ICD-10-CM

## 2021-12-30 DIAGNOSIS — Z87.891 ENCOUNTER FOR SCREENING FOR MALIGNANT NEOPLASM OF LUNG IN FORMER SMOKER WHO QUIT IN PAST 15 YEARS WITH 30 PACK YEAR HISTORY OR GREATER: ICD-10-CM

## 2021-12-30 DIAGNOSIS — Z87.891 PERSONAL HISTORY OF TOBACCO USE, PRESENTING HAZARDS TO HEALTH: ICD-10-CM

## 2021-12-30 PROCEDURE — 71271 CT THORAX LUNG CANCER SCR C-: CPT

## 2022-01-11 ENCOUNTER — APPOINTMENT (OUTPATIENT)
Dept: LAB | Facility: HOSPITAL | Age: 57
End: 2022-01-11
Payer: MEDICARE

## 2022-01-11 ENCOUNTER — ANTICOAG VISIT (OUTPATIENT)
Dept: CARDIOLOGY CLINIC | Facility: CLINIC | Age: 57
End: 2022-01-11

## 2022-01-11 DIAGNOSIS — I25.5 ISCHEMIC CARDIOMYOPATHY: Primary | ICD-10-CM

## 2022-01-11 DIAGNOSIS — I23.6 LV (LEFT VENTRICULAR) MURAL THROMBUS FOLLOWING MI (HCC): ICD-10-CM

## 2022-01-11 DIAGNOSIS — Z79.01 LONG TERM CURRENT USE OF ANTICOAGULANT THERAPY: ICD-10-CM

## 2022-01-11 NOTE — PROGRESS NOTES
Spoke with patient, INR good,will continue same dose 7 5 mg Friday, other days 5 mg  Will recheck 4 weeks 2/8/2022  Patient verbalizes understanding

## 2022-01-15 ENCOUNTER — IMMUNIZATIONS (OUTPATIENT)
Dept: FAMILY MEDICINE CLINIC | Facility: HOSPITAL | Age: 57
End: 2022-01-15

## 2022-01-15 DIAGNOSIS — Z23 ENCOUNTER FOR IMMUNIZATION: Primary | ICD-10-CM

## 2022-01-15 PROCEDURE — 91300 COVID-19 PFIZER VACC 0.3 ML: CPT

## 2022-01-15 PROCEDURE — 0001A COVID-19 PFIZER VACC 0.3 ML: CPT

## 2022-02-03 ENCOUNTER — PATIENT OUTREACH (OUTPATIENT)
Dept: SURGERY | Facility: CLINIC | Age: 57
End: 2022-02-03

## 2022-02-03 NOTE — PROGRESS NOTES
RW Sliding Fee Scale was completed and e-mailed to the Steven Perez  Copy of application was scanned into ct's chart

## 2022-02-07 ENCOUNTER — ANTICOAG VISIT (OUTPATIENT)
Dept: CARDIOLOGY CLINIC | Facility: CLINIC | Age: 57
End: 2022-02-07

## 2022-02-07 ENCOUNTER — HOSPITAL ENCOUNTER (OUTPATIENT)
Dept: MAMMOGRAPHY | Facility: CLINIC | Age: 57
Discharge: HOME | End: 2022-02-07
Payer: MEDICARE

## 2022-02-07 ENCOUNTER — APPOINTMENT (OUTPATIENT)
Dept: LAB | Facility: HOSPITAL | Age: 57
End: 2022-02-07
Payer: MEDICARE

## 2022-02-07 DIAGNOSIS — Z12.31 ENCOUNTER FOR SCREENING MAMMOGRAM FOR HIGH-RISK PATIENT: ICD-10-CM

## 2022-02-07 DIAGNOSIS — I23.6 LV (LEFT VENTRICULAR) MURAL THROMBUS FOLLOWING MI (HCC): ICD-10-CM

## 2022-02-07 DIAGNOSIS — I25.5 ISCHEMIC CARDIOMYOPATHY: Primary | ICD-10-CM

## 2022-02-07 DIAGNOSIS — Z79.01 LONG TERM CURRENT USE OF ANTICOAGULANT THERAPY: ICD-10-CM

## 2022-02-14 DIAGNOSIS — I51.3 MURAL THROMBUS OF LEFT VENTRICLE: ICD-10-CM

## 2022-02-15 RX ORDER — WARFARIN SODIUM 5 MG/1
TABLET ORAL
Qty: 135 TABLET | Refills: 3 | Status: SHIPPED | OUTPATIENT
Start: 2022-02-15 | End: 2022-04-25 | Stop reason: SDUPTHER

## 2022-02-17 ENCOUNTER — PATIENT OUTREACH (OUTPATIENT)
Dept: SURGERY | Facility: CLINIC | Age: 57
End: 2022-02-17

## 2022-02-21 ENCOUNTER — PATIENT OUTREACH (OUTPATIENT)
Dept: SURGERY | Facility: CLINIC | Age: 57
End: 2022-02-21

## 2022-02-23 ENCOUNTER — PATIENT OUTREACH (OUTPATIENT)
Dept: SURGERY | Facility: CLINIC | Age: 57
End: 2022-02-23

## 2022-02-23 NOTE — PROGRESS NOTES
Cm reached out hospital financial counselor, Nieves Daily, in regards to pending RW sliding fee scale  Quentin resent application to Nieves Daily

## 2022-02-25 ENCOUNTER — REMOTE DEVICE CLINIC VISIT (OUTPATIENT)
Dept: CARDIOLOGY CLINIC | Facility: CLINIC | Age: 57
End: 2022-02-25
Payer: MEDICARE

## 2022-02-25 DIAGNOSIS — Z95.810 PRESENCE OF AUTOMATIC CARDIOVERTER/DEFIBRILLATOR (AICD): Primary | ICD-10-CM

## 2022-02-25 PROCEDURE — 93295 DEV INTERROG REMOTE 1/2/MLT: CPT | Performed by: INTERNAL MEDICINE

## 2022-02-25 PROCEDURE — 93296 REM INTERROG EVL PM/IDS: CPT | Performed by: INTERNAL MEDICINE

## 2022-02-25 NOTE — PROGRESS NOTES
Results for orders placed or performed in visit on 02/25/22   Cardiac EP device report    Narrative    MDT-SINGLE CHAMBER ICD - ACTIVE SYSTEM IS MRI CONDITIONAL  CARELINK TRANSMISSION: BATTERY VOLTAGE ADEQUATE (8 YRS)  : <0 1%  ALL AVAILABLE LEAD PARAMETERS WITHIN NORMAL LIMITS  NO SIGNIFICANT HIGH RATE EPISODES  OPTI-VOL WITHIN NORMAL LIMITS  APPROPRIATELY FUNCTIONING ICD    [address] [place] [address]

## 2022-03-03 ENCOUNTER — PATIENT OUTREACH (OUTPATIENT)
Dept: SURGERY | Facility: CLINIC | Age: 57
End: 2022-03-03

## 2022-03-08 ENCOUNTER — ANTICOAG VISIT (OUTPATIENT)
Dept: CARDIOLOGY CLINIC | Facility: CLINIC | Age: 57
End: 2022-03-08

## 2022-03-08 ENCOUNTER — APPOINTMENT (OUTPATIENT)
Dept: LAB | Facility: HOSPITAL | Age: 57
End: 2022-03-08
Attending: INTERNAL MEDICINE
Payer: MEDICARE

## 2022-03-08 DIAGNOSIS — I25.5 ISCHEMIC CARDIOMYOPATHY: Primary | ICD-10-CM

## 2022-03-08 DIAGNOSIS — I23.6 LV (LEFT VENTRICULAR) MURAL THROMBUS FOLLOWING MI (HCC): ICD-10-CM

## 2022-03-08 DIAGNOSIS — B20 HIV DISEASE (HCC): ICD-10-CM

## 2022-03-08 DIAGNOSIS — Z79.01 LONG TERM CURRENT USE OF ANTICOAGULANT THERAPY: ICD-10-CM

## 2022-03-08 LAB
25(OH)D3 SERPL-MCNC: 52.6 NG/ML (ref 30–100)
ALBUMIN SERPL BCP-MCNC: 4 G/DL (ref 3–5.2)
ALP SERPL-CCNC: 65 U/L (ref 43–122)
ALT SERPL W P-5'-P-CCNC: 16 U/L
ANION GAP SERPL CALCULATED.3IONS-SCNC: 6 MMOL/L (ref 5–14)
AST SERPL W P-5'-P-CCNC: 27 U/L (ref 14–36)
BACTERIA UR QL AUTO: ABNORMAL /HPF
BASOPHILS # BLD AUTO: 0 THOUSANDS/ΜL (ref 0–0.1)
BASOPHILS NFR BLD AUTO: 1 % (ref 0–1)
BILIRUB SERPL-MCNC: 0.75 MG/DL
BILIRUB UR QL STRIP: NEGATIVE
BUN SERPL-MCNC: 11 MG/DL (ref 5–25)
CALCIUM SERPL-MCNC: 8.9 MG/DL (ref 8.4–10.2)
CHLORIDE SERPL-SCNC: 108 MMOL/L (ref 97–108)
CLARITY UR: ABNORMAL
CO2 SERPL-SCNC: 28 MMOL/L (ref 22–30)
COLOR UR: ABNORMAL
CREAT SERPL-MCNC: 1.34 MG/DL (ref 0.6–1.2)
CREAT UR-MCNC: 119 MG/DL
EOSINOPHIL # BLD AUTO: 0.1 THOUSAND/ΜL (ref 0–0.4)
EOSINOPHIL NFR BLD AUTO: 1 % (ref 0–6)
ERYTHROCYTE [DISTWIDTH] IN BLOOD BY AUTOMATED COUNT: 15.9 %
GFR SERPL CREATININE-BSD FRML MDRD: 44 ML/MIN/1.73SQ M
GLUCOSE P FAST SERPL-MCNC: 102 MG/DL (ref 70–99)
GLUCOSE UR STRIP-MCNC: NEGATIVE MG/DL
HCT VFR BLD AUTO: 42.1 % (ref 36–46)
HGB BLD-MCNC: 13.8 G/DL (ref 12–16)
HGB UR QL STRIP.AUTO: 25
KETONES UR STRIP-MCNC: NEGATIVE MG/DL
LEUKOCYTE ESTERASE UR QL STRIP: 500
LYMPHOCYTES # BLD AUTO: 1.9 THOUSANDS/ΜL (ref 0.5–4)
LYMPHOCYTES NFR BLD AUTO: 39 % (ref 25–45)
MCH RBC QN AUTO: 30.3 PG (ref 26–34)
MCHC RBC AUTO-ENTMCNC: 32.7 G/DL (ref 31–36)
MCV RBC AUTO: 93 FL (ref 80–100)
MICROALBUMIN UR-MCNC: 15.7 MG/L (ref 0–20)
MICROALBUMIN/CREAT 24H UR: 13 MG/G CREATININE (ref 0–30)
MONOCYTES # BLD AUTO: 0.3 THOUSAND/ΜL (ref 0.2–0.9)
MONOCYTES NFR BLD AUTO: 6 % (ref 1–10)
NEUTROPHILS # BLD AUTO: 2.5 THOUSANDS/ΜL (ref 1.8–7.8)
NEUTS SEG NFR BLD AUTO: 53 % (ref 45–65)
NITRITE UR QL STRIP: NEGATIVE
NON-SQ EPI CELLS URNS QL MICRO: ABNORMAL /HPF
PH UR STRIP.AUTO: 7 [PH]
PLATELET # BLD AUTO: 195 THOUSANDS/UL (ref 150–450)
PMV BLD AUTO: 9.6 FL (ref 8.9–12.7)
POTASSIUM SERPL-SCNC: 3.9 MMOL/L (ref 3.6–5)
PROT SERPL-MCNC: 7.5 G/DL (ref 5.9–8.4)
PROT UR STRIP-MCNC: ABNORMAL MG/DL
PTH-INTACT SERPL-MCNC: 92 PG/ML (ref 18.4–80.1)
RBC # BLD AUTO: 4.55 MILLION/UL (ref 4–5.2)
RBC #/AREA URNS AUTO: ABNORMAL /HPF
SODIUM SERPL-SCNC: 142 MMOL/L (ref 137–147)
SP GR UR STRIP.AUTO: 1.01 (ref 1–1.04)
UROBILINOGEN UA: NEGATIVE MG/DL
WBC # BLD AUTO: 4.7 THOUSAND/UL (ref 4.5–11)
WBC #/AREA URNS AUTO: ABNORMAL /HPF

## 2022-03-08 PROCEDURE — 82570 ASSAY OF URINE CREATININE: CPT

## 2022-03-08 PROCEDURE — 87536 HIV-1 QUANT&REVRSE TRNSCRPJ: CPT

## 2022-03-08 PROCEDURE — 80053 COMPREHEN METABOLIC PANEL: CPT

## 2022-03-08 PROCEDURE — 82306 VITAMIN D 25 HYDROXY: CPT

## 2022-03-08 PROCEDURE — 36415 COLL VENOUS BLD VENIPUNCTURE: CPT

## 2022-03-08 PROCEDURE — 83970 ASSAY OF PARATHORMONE: CPT

## 2022-03-08 PROCEDURE — 85025 COMPLETE CBC W/AUTO DIFF WBC: CPT

## 2022-03-08 PROCEDURE — 86361 T CELL ABSOLUTE COUNT: CPT

## 2022-03-08 PROCEDURE — 82043 UR ALBUMIN QUANTITATIVE: CPT

## 2022-03-08 PROCEDURE — 81001 URINALYSIS AUTO W/SCOPE: CPT

## 2022-03-08 NOTE — PROGRESS NOTES
Spoke with patient, advised INR high, no changes in her diet or medications  Advised to hold today and tomorrow, then go back to normal dosing 7 5mg Fri, 5 mg other days     Will recheck in 3/16/22

## 2022-03-09 LAB
BASOPHILS # BLD AUTO: 0 X10E3/UL (ref 0–0.2)
BASOPHILS NFR BLD AUTO: 1 %
CD3+CD4+ CELLS # BLD: 471 /UL (ref 359–1519)
CD3+CD4+ CELLS NFR BLD: 21.4 % (ref 30.8–58.5)
EOSINOPHIL # BLD AUTO: 0.1 X10E3/UL (ref 0–0.4)
EOSINOPHIL NFR BLD AUTO: 1 %
ERYTHROCYTE [DISTWIDTH] IN BLOOD BY AUTOMATED COUNT: 14.2 % (ref 11.7–15.4)
HCT VFR BLD AUTO: 41.6 % (ref 34–46.6)
HGB BLD-MCNC: 14.2 G/DL (ref 11.1–15.9)
IMM GRANULOCYTES # BLD: 0 X10E3/UL (ref 0–0.1)
IMM GRANULOCYTES NFR BLD: 0 %
LYMPHOCYTES # BLD AUTO: 2.2 X10E3/UL (ref 0.7–3.1)
LYMPHOCYTES NFR BLD AUTO: 40 %
MCH RBC QN AUTO: 30.3 PG (ref 26.6–33)
MCHC RBC AUTO-ENTMCNC: 34.1 G/DL (ref 31.5–35.7)
MCV RBC AUTO: 89 FL (ref 79–97)
MONOCYTES # BLD AUTO: 0.3 X10E3/UL (ref 0.1–0.9)
MONOCYTES NFR BLD AUTO: 6 %
NEUTROPHILS # BLD AUTO: 2.7 X10E3/UL (ref 1.4–7)
NEUTROPHILS NFR BLD AUTO: 52 %
PLATELET # BLD AUTO: 205 X10E3/UL (ref 150–450)
RBC # BLD AUTO: 4.69 X10E6/UL (ref 3.77–5.28)
WBC # BLD AUTO: 5.4 X10E3/UL (ref 3.4–10.8)

## 2022-03-12 LAB
HIV1 RNA # SERPL NAA+PROBE: <20 COPIES/ML
HIV1 RNA SERPL NAA+PROBE-LOG#: NORMAL LOG10COPY/ML

## 2022-03-16 ENCOUNTER — ANTICOAG VISIT (OUTPATIENT)
Dept: CARDIOLOGY CLINIC | Facility: CLINIC | Age: 57
End: 2022-03-16

## 2022-03-16 ENCOUNTER — APPOINTMENT (OUTPATIENT)
Dept: LAB | Facility: HOSPITAL | Age: 57
End: 2022-03-16
Payer: MEDICARE

## 2022-03-16 DIAGNOSIS — I25.5 ISCHEMIC CARDIOMYOPATHY: Primary | ICD-10-CM

## 2022-03-16 DIAGNOSIS — Z79.01 LONG TERM CURRENT USE OF ANTICOAGULANT THERAPY: ICD-10-CM

## 2022-03-16 DIAGNOSIS — I23.6 LV (LEFT VENTRICULAR) MURAL THROMBUS FOLLOWING MI (HCC): ICD-10-CM

## 2022-03-16 NOTE — PROGRESS NOTES
Spoke with patient, advised INR good, will continue same dose 7 5 mg Fri, 5 mg other days   Will recheck in 2 weeks 3/30/22

## 2022-03-22 DIAGNOSIS — I23.6 LV (LEFT VENTRICULAR) MURAL THROMBUS FOLLOWING MI (HCC): ICD-10-CM

## 2022-03-22 RX ORDER — WARFARIN SODIUM 1 MG/1
TABLET ORAL
Qty: 30 TABLET | Refills: 5 | Status: SHIPPED | OUTPATIENT
Start: 2022-03-22 | End: 2022-04-22

## 2022-03-24 ENCOUNTER — TELEPHONE (OUTPATIENT)
Dept: SURGERY | Facility: CLINIC | Age: 57
End: 2022-03-24

## 2022-03-24 DIAGNOSIS — Z21 HIV POSITIVE (HCC): ICD-10-CM

## 2022-03-24 RX ORDER — BICTEGRAVIR SODIUM, EMTRICITABINE, AND TENOFOVIR ALAFENAMIDE FUMARATE 50; 200; 25 MG/1; MG/1; MG/1
1 TABLET ORAL
Qty: 90 TABLET | Refills: 1 | Status: SHIPPED | OUTPATIENT
Start: 2022-03-24 | End: 2022-07-22 | Stop reason: SDUPTHER

## 2022-03-24 NOTE — TELEPHONE ENCOUNTER
Patient called requesting prescription for April and May for [facility] [place] [place] as she will be away for travel, leaving May 1st to May 30th  Patient to call insurance for vacation approval of extra fill

## 2022-03-25 ENCOUNTER — PATIENT OUTREACH (OUTPATIENT)
Dept: SURGERY | Facility: CLINIC | Age: 57
End: 2022-03-25

## 2022-03-30 ENCOUNTER — ANTICOAG VISIT (OUTPATIENT)
Dept: CARDIOLOGY CLINIC | Facility: CLINIC | Age: 57
End: 2022-03-30

## 2022-03-30 ENCOUNTER — APPOINTMENT (OUTPATIENT)
Dept: LAB | Facility: HOSPITAL | Age: 57
End: 2022-03-30
Payer: MEDICARE

## 2022-03-30 DIAGNOSIS — Z79.01 LONG TERM CURRENT USE OF ANTICOAGULANT THERAPY: ICD-10-CM

## 2022-03-30 DIAGNOSIS — I25.5 ISCHEMIC CARDIOMYOPATHY: Primary | ICD-10-CM

## 2022-03-30 DIAGNOSIS — I23.6 LV (LEFT VENTRICULAR) MURAL THROMBUS FOLLOWING MI (HCC): ICD-10-CM

## 2022-03-30 NOTE — PROGRESS NOTES
Spoke with patient, advised INR good, will continue same dose 7 5 mg Fri, 5 mg all other days, will recheck in 3 weeks 4/20/22

## 2022-03-31 ENCOUNTER — PATIENT OUTREACH (OUTPATIENT)
Dept: SURGERY | Facility: CLINIC | Age: 57
End: 2022-03-31

## 2022-04-05 ENCOUNTER — PATIENT OUTREACH (OUTPATIENT)
Dept: SURGERY | Facility: CLINIC | Age: 57
End: 2022-04-05

## 2022-04-08 ENCOUNTER — DOCUMENTATION (OUTPATIENT)
Dept: SURGERY | Facility: CLINIC | Age: 57
End: 2022-04-08

## 2022-04-08 ENCOUNTER — PATIENT OUTREACH (OUTPATIENT)
Dept: SURGERY | Facility: CLINIC | Age: 57
End: 2022-04-08

## 2022-04-08 ENCOUNTER — OFFICE VISIT (OUTPATIENT)
Dept: SURGERY | Facility: CLINIC | Age: 57
End: 2022-04-08
Payer: MEDICARE

## 2022-04-08 VITALS
BODY MASS INDEX: 32.51 KG/M2 | SYSTOLIC BLOOD PRESSURE: 98 MMHG | WEIGHT: 165.6 LBS | OXYGEN SATURATION: 98 % | TEMPERATURE: 96.9 F | HEIGHT: 60 IN | HEART RATE: 70 BPM | DIASTOLIC BLOOD PRESSURE: 68 MMHG

## 2022-04-08 DIAGNOSIS — Z11.3 ENCOUNTER FOR SCREENING FOR INFECTIONS WITH A PREDOMINANTLY SEXUAL MODE OF TRANSMISSION: ICD-10-CM

## 2022-04-08 DIAGNOSIS — B20 HIV DISEASE (HCC): Primary | ICD-10-CM

## 2022-04-08 DIAGNOSIS — Z72.0 TOBACCO ABUSE: ICD-10-CM

## 2022-04-08 DIAGNOSIS — N18.31 STAGE 3A CHRONIC KIDNEY DISEASE (HCC): ICD-10-CM

## 2022-04-08 DIAGNOSIS — I25.5 ISCHEMIC CARDIOMYOPATHY: ICD-10-CM

## 2022-04-08 DIAGNOSIS — Z11.1 SCREENING FOR TUBERCULOSIS: ICD-10-CM

## 2022-04-08 DIAGNOSIS — Z11.3 ROUTINE SCREENING FOR STI (SEXUALLY TRANSMITTED INFECTION): ICD-10-CM

## 2022-04-08 PROCEDURE — 99214 OFFICE O/P EST MOD 30 MIN: CPT | Performed by: INTERNAL MEDICINE

## 2022-04-08 NOTE — PROGRESS NOTES
Assessment/Plan: [facility] approached pt within ID clinic to explore emotional, cognitive and behavioral displays' stability by completing the PHQ-9 screening  During today's contact, pt expressed not having pressing issues to address, describing feeling "well"  Smoking remission was explored which pt shared not been smoking at the time, sustaining her sobriety in a long term basis  Pt mentioned been sleeping and eating well, along with being "stable" by relaxing on a daily basis, and not beig employed at the time  Before contact's completion, it was explored pt's willingness to complete the PHQ-9 screening, scoring 0 as a display of non-depressive traits, and without SI or HI  At the end of contact, pt was encouraged to consider ongoing [employer] interventions which she agreed to benefit from as needed  Community Health     Today patient present with   Chief Complaint   Patient presents with    HIV Positive    Follow-up     Patient would likely benefit from: Addressing pt's self-care patterns  Consider/focus/continue: Monitoring pt's emotional, cognitive and behavioral displays' stability  Stage of change: Pre-contemplation  Plan/ Behavioral Recommendations: Ongoing  interventions per pt's coordinated care, and/or pt's request of services  Diagnoses and all orders for this visit:    HIV disease ([facility] [address] )  -     CBC and differential; Future  -     Comprehensive metabolic panel; Future  -     HIV-1 RNA, quantitative, PCR; Future  -     T-helper cells (CD4) count; Future  -     Hemoglobin A1C; Future  -     Chlamydia/GC amplified DNA by PCR; Future  -     UA (URINE) with reflex to Scope; Future  -     Quantiferon TB Gold Plus; Future  -     RPR; Future  -     Hepatitis C antibody; Future    Screening for tuberculosis  -     Quantiferon TB Gold Plus; Future    Routine screening for STI (sexually transmitted infection)  -     Chlamydia/GC amplified DNA by PCR;  Future    Encounter for screening for infections with a predominantly sexual mode of transmission  -     Chlamydia/GC amplified DNA by PCR; Future          Subjective:     Patient ID: Francis Faith is a 65 y o  female  HPI    History of Present Illness:      Patient is being seen for annual behavioral health assessment  Patient denies current behavioral health concerns  [unfilled]       Review of Systems      Objective:     Physical Exam      [facility]    Orientation     Person: yes    Place: yes    Time: yes    Appearance    Well Developed: yes healthy    Uncomfortable: no    Normal Body Odor: yes    Smells of Feces: no    Smells of Urine: no    Disheveled: no    Well Nourished: yes overweight    Grooming Unkempt: no    Poor Eye Contact: no    Hirsute: no    Looks Tired: no    Acutely Exhausted: noappearance reflects stated age    Mood and Affect:     Appropriate: yes    Euthymicyes    Irritable: no    Angry: no    Anxious: yes    Depressed:no    Blunted:no    Labile: no    Restricted: no    Harm to Self or Others: No SI or HI were disclosed, nor displayed throughout contact  Substance Abuse: None reported by pt  Tobacco Use Disorder in Full Remission

## 2022-04-08 NOTE — PROGRESS NOTES
Pastoral Care Progress Note    2022  Patient: Bang Ricci : 1965  Encounter Date & Time: 2022   MRN: [id number]        The  met to maintain a relationship of empathy, trust and caring  Ms Bib Navas shared she was doing well and still attending her Lutheran services on [employer]  She reported being grateful to take her meds and weight loss  The  celebrated with Ms Bib Navas reported her spirituality helps her to take care of herself  The  offered her dogtags with Scripture on them  Ms Bib Navas thanked the [address]  The  encouraged Ms Bib Navas to reach out if she needed any support  Chaplaincy Interventions Utilized:   Empowerment: Encouraged self-care    Exploration: Explored spiritual needs & resources    Relationship Building: Cultivated a relationship of care and support and Listened empathically    Ritual: Celebrated with patient/family and Provided Catholic resources    Chaplaincy Outcomes Achieved:   Identified priorities    Spiritual Coping Strategies Utilized:   Connectedness and Respect for own body     [address]  ([facility])   Spiritual Beliefs/Perceptions   Concept of God Accepting   God's Role in Disease Natural   Relationship with God Close   Unmet Spiritual Needs none expressed   Psychosocial   Psychosocial (WDL) WDL   Length of Time/Family Visitation 6-15 min   Stress Factors   Patient Stress Factors None identified   Coping Responses   Patient Coping Accepting        22 0745   Clinical Encounter Type   Visited With Patient   Routine Visit Follow-up   Continue Visiting Yes   Bahai Encounters   Bahai Needs Bahai articles  (Dog tags with Scripture)   Patient Spiritual Encounters   Spiritual Assessment 5   Suffering Severity 5   Coping 5   Spiritual Encounter Notes See progress note

## 2022-04-08 NOTE — PROGRESS NOTES
Progress Note - Infectious Disease   Francis Faith 65 y o  female MRN: [id number]  Unit/Bed#:  Encounter: [id number]    Impression/Plan:  1  HIV - doing well on Biktarvy with an undetectable viral load and last CD4 count in the 400s in Jan 2021  · Continue ART regimen of Biktarvy  · Recheck labs in 5 months and follow up in 6 months  · Patient was provided medication, adherence and prevention education      2  CKD stage 3 - creatinine stable 1 34 mg/dl  · Repeat BMP and UA prior to next visit  · Continue follow-up with nephrology     3  Tobacco dependence - Pt has cut back to smoking 1 cigarette/day  · Pt encouraged to cease smoking cigarettes     4  Ischemic Cardiomyopathy: with mural thrombus  She has AICD in place     · Patient follows up with cardiology     5  Health maintenance:      · Pt has received influenza vaccine this past season  · Pt has received 2 doses of COVID vaccine and one booster  · Screen for TB with quantiferon gold  · Screen for STI with RPR and urine GC/chlamydia  · Check Hep C antibody  · Check HbA1c    My recommendations were discussed with the patient verbalized understanding  Patient care coordinated with PCP Ralph      Subjective:  Pt presents for routine HIV follow-up  Patient claims 100% adherence with ART regimen  Overall pt is feeling well  She denies fever, chills, nausea, vomiting or diarrhea, cough or shortness of breath  Followup portions patient history reviewed and updated as: Allergies, current medications, past medical history, past social history, past surgical history, and the problem list     Objective:  Vitals:  Vitals:    04/08/22 0806   BP: 98/68   BP Location: Left arm   Patient Position: Sitting   Cuff Size: Standard   Pulse: 70   Temp: (!) 96 9 °F (36 1 °C)   SpO2: 98%   Weight: 75 1 kg (165 lb 9 6 oz)   Height: 5' (1 524 m)     Physical Exam:   General Appearance:  Alert, interactive, appearing well,  nontoxic, no acute distress     Throat: Deferred as pt is wearing a facemask   Neck: Supple    Lungs:   Clear to auscultation bilaterally; no wheezes; respirations unlabored   Heart:  S1, S2, RRR; no murmur, rub or gallop   Abdomen:   Soft, non-tender, non-distended  Extremities: No cyanosis or distal leg edema b/l   Neurologic: AAO to person, surroundings, conversant, fluent speech   Skin: No rash     Labs, Imaging, & Other studies:   All pertinent labs and imaging studies were personally reviewed    Lab Results   Component Value Date    K 3 9 03/08/2022     03/08/2022    CO2 28 03/08/2022    BUN 11 03/08/2022    CREATININE 1 34 (H) 03/08/2022    GLUF 102 (H) 03/08/2022    CALCIUM 8 9 03/08/2022    AST 27 03/08/2022    ALT 16 03/08/2022    ALKPHOS 65 03/08/2022    EGFR 44 03/08/2022     Lab Results   Component Value Date    WBC 4 70 03/08/2022    WBC 5 4 03/08/2022    HGB 13 8 03/08/2022    HGB 14 2 03/08/2022    HCT 42 1 03/08/2022    HCT 41 6 03/08/2022    MCV 93 03/08/2022    MCV 89 03/08/2022     03/08/2022     03/08/2022     Lab Results   Component Value Date    HEPCAB Non-reactive 07/02/2021     Lab Results   Component Value Date    HAV Non-reactive 11/02/2018    HEPCAB Non-reactive 07/02/2021     Lab Results   Component Value Date    RPR Non-Reactive 07/02/2021     CD4 ABS   Date/Time Value Ref Range Status   03/08/2022 11:30  359 - 1519 /uL Final     HIV-1 RNA by PCR, Qn   Date/Time Value Ref Range Status   03/08/2022 11:30 AM <20 copies/mL Final     Comment:     HIV-1 RNA not detected  The reportable range for this assay is 20 to 10,000,000  copies HIV-1 RNA/mL             Current Outpatient Medications:     albuterol (ProAir HFA) 90 mcg/act inhaler, Inhale 2 puffs every 6 (six) hours as needed for wheezing, Disp: 8 5 g, Rfl: 0    albuterol (PROVENTIL HFA,VENTOLIN HFA) 90 mcg/act inhaler, Inhale 2 puffs every 4 (four) hours as needed for wheezing, Disp: 1 Inhaler, Rfl: 0    aspirin (ECOTRIN LOW STRENGTH) 81 mg EC tablet, Take 1 tablet (81 mg total) by mouth daily, Disp: 81 tablet, Rfl: 10    atorvastatin (LIPITOR) 80 mg tablet, TAKE 1 TABLET BY MOUTH EVERY DAY, Disp: 30 tablet, Rfl: 5    benzonatate (TESSALON PERLES) 100 mg capsule, Take 1 capsule (100 mg total) by mouth 3 (three) times a day as needed for cough (Patient not taking: Reported on 12/8/2021 ), Disp: 20 capsule, Rfl: 0    bictegravir-emtricitab-tenofovir alafenamide (Biktarvy) -25 MG tablet, Take 1 tablet by mouth daily with breakfast, Disp: 90 tablet, Rfl: 1    budesonide-formoterol (SYMBICORT) 160-4 5 mcg/act inhaler, Inhale 1 puff every 6 (six) hours as needed (shortness of breath) Rinse mouth after use   (Patient not taking: Reported on 12/8/2021 ), Disp: 1 Inhaler, Rfl: 2    carvedilol (COREG) 6 25 mg tablet, TAKE ONE (1) TABLET BY MOUTH TWICE DAILY WITH MEALS, Disp: 60 tablet, Rfl: 5    Entresto 49-51 MG TABS, TAKE 1 TABLET BY MOUTH TWICE DAILY, Disp: 60 tablet, Rfl: 5    Spacer/Aero-Holding Chambers (Procare Spacer/Adult Mask) MARK, 1 Device by Does not apply route daily, Disp: 1 Device, Rfl: 0    warfarin (COUMADIN) 1 mg tablet, TAKE 1 TABLET BY MOUTH DAILY OR AS DIRECTED, Disp: 30 tablet, Rfl: 5    warfarin (COUMADIN) 5 mg tablet, 1 to 1 5 tablets daily as directed by MD, Disp: 135 tablet, Rfl: 3

## 2022-04-08 NOTE — PROGRESS NOTES
Assessment    Annual nutrition assessment   Last annual was 4/12/2021    Clinical Data/Client History    HIV: yes  AIDS: no    : Kobe Chinchilla     Socio- Economic Status: SNAP, Cooks and Grocery shops    Living Situation: Apartment and [address] Prep/Access: Refrigerator, Stove and Microwave    Psycho Social Factors: None discussed    Functional Status: Ambulatory, Able to [employer] and Prepared Own Meals    Activity Level: Strength/resistance training at home 3 days per week     Ambulation Difficulty with None discussed     Oral Problems: Denied problems chewing/swallowing, has top and bottom dentures      Last Dental Exam: Has appt in [place] next month for denture adjustments     Procedures Performed: n/a    Typical Food/Beverage Intake:  Diet recall was brief     · Breakfast egg and le sandwich or oatmeal   · Lunch meat/cheese sandwich, chips   · Dinner rice, meat, beans, salad   · Snacks m&m's, doritos   · Fluids water     Appetite: Good    Supplements: Yes  vitamin D and C    Food Intolerance: Denied n/v/d/c    Weight Change Percent: 9# (5%) intended weight loss     Time Period of Weight Change: 4 months    Significant % Weight Loss: 5    Usual Body Weight:   173# (Apr-21)  174# (Oct-21)  174# (Dec-21)    Current Body Weight:   165# (75 1 kg)  5' (1 524 m)  BMI 33 98  # (59 1 kg)  127% IBW    Nutrition Diagnosis    Problem: Overweight/Obesity    Related to: Estimated intake inconsistent with measured nutritional needs    As Evidenced By: Dietary Recall and BMI >30    Intervention Diet Prescription    Nutritional needs based on:   [place] Jeor REE 1266 kcal, 1 4 AF, -250 kcal  0 8 g/kg BW   35 ml/kg IBW    · ~1500 kcal  · ~60 g protein  · ~2100 ml fluid  · 2 g Na    Current intake: exceeds estimated nutrtional     Snack/Supplement Recommendations: Separate supplements 6 hrs from ART     Nutrition Recommendations: Include more fruits and vegetables, advised caution with excess sodium from processed foods     Goals: No goals established at present; encourage healthy weight loss, increased fruits/vegetables, limited Na     Nutrition Education Intervention: Provided     Person Educated: patient    Topics Discussed: general assessment items     Teaching Method: Verbal    Readiness to Change: Contemplation:  (Acknowledging that there is a problem but not yet ready or sure of wanting to make a change)    Visit Summary    RD met with Claudia Maurice in conjunction with her ID clinic appt, in order to complete annual nutrition assessment per pankaj requirements  Pt was doing well, she did not have any nutritional questions or concerns for RD at this time  She has intentionally lost weight and has been working out at home on her machine 3 days per week  Encouraged cardio exercise as tolerated and as advised by cardiology team    RD advised caution with excess sodium from processed foods  Chart shows that pt has good f/u with cardiology team, and is on coumadin  Pt is still interested in using the [employer], but has not yet  Lab Results   Component Value Date    HGBA1C 5 7 (H) 07/02/2021    HGBA1C 5 5 02/20/2020    HGBA1C 5 9 05/15/2019     Lab Results   Component Value Date    GLUF 102 (H) 03/08/2022    LDLCALC 65 12/27/2021    CREATININE 1 34 (H) 03/08/2022     Lab Results   Component Value Date    CREATININE 1 34 (H) 03/08/2022    BUN 11 03/08/2022    K 3 9 03/08/2022     03/08/2022    CO2 28 03/08/2022       Monitor weight trend, HgbA1C, renal function, nutritional lifestyle        Marilyn James, MS, RD, LDN

## 2022-04-18 ENCOUNTER — APPOINTMENT (OUTPATIENT)
Dept: LAB | Facility: HOSPITAL | Age: 57
End: 2022-04-18
Attending: INTERNAL MEDICINE
Payer: MEDICARE

## 2022-04-18 DIAGNOSIS — Z11.1 SCREENING FOR TUBERCULOSIS: ICD-10-CM

## 2022-04-18 DIAGNOSIS — B20 HIV DISEASE (HCC): ICD-10-CM

## 2022-04-18 DIAGNOSIS — Z11.3 ENCOUNTER FOR SCREENING FOR INFECTIONS WITH A PREDOMINANTLY SEXUAL MODE OF TRANSMISSION: ICD-10-CM

## 2022-04-18 DIAGNOSIS — Z11.3 ROUTINE SCREENING FOR STI (SEXUALLY TRANSMITTED INFECTION): ICD-10-CM

## 2022-04-19 ENCOUNTER — ANTICOAG VISIT (OUTPATIENT)
Dept: CARDIOLOGY CLINIC | Facility: CLINIC | Age: 57
End: 2022-04-19

## 2022-04-19 DIAGNOSIS — I25.5 ISCHEMIC CARDIOMYOPATHY: Primary | ICD-10-CM

## 2022-04-19 DIAGNOSIS — Z79.01 LONG TERM CURRENT USE OF ANTICOAGULANT THERAPY: ICD-10-CM

## 2022-04-19 DIAGNOSIS — I23.6 LV (LEFT VENTRICULAR) MURAL THROMBUS FOLLOWING MI (HCC): ICD-10-CM

## 2022-04-19 NOTE — PROGRESS NOTES
I spoke with patient, advised INR good, will continue 7 5 mg, 5 mg all other days  Requesting recheck 5/16/22  Patient states she will be out of town 4/26/22 to 5/31/22, asking if she can wait till she gets back  I advised that is too long  She will take script and have [id number]  Script mailed to patient

## 2022-04-20 ENCOUNTER — TELEPHONE (OUTPATIENT)
Dept: SURGERY | Facility: CLINIC | Age: 57
End: 2022-04-20

## 2022-04-20 NOTE — TELEPHONE ENCOUNTER
Patient aware vacation fill for medication was approved and should call pharmacy to confirm medication is ready for   Patient aware and expressed understanding

## 2022-04-22 DIAGNOSIS — I23.6 LV (LEFT VENTRICULAR) MURAL THROMBUS FOLLOWING MI (HCC): ICD-10-CM

## 2022-04-22 RX ORDER — WARFARIN SODIUM 1 MG/1
TABLET ORAL
Qty: 30 TABLET | Refills: 10 | Status: SHIPPED | OUTPATIENT
Start: 2022-04-22

## 2022-04-25 ENCOUNTER — OFFICE VISIT (OUTPATIENT)
Dept: CARDIOLOGY CLINIC | Facility: CLINIC | Age: 57
End: 2022-04-25
Payer: MEDICARE

## 2022-04-25 VITALS
SYSTOLIC BLOOD PRESSURE: 112 MMHG | BODY MASS INDEX: 31.92 KG/M2 | OXYGEN SATURATION: 97 % | HEIGHT: 60 IN | DIASTOLIC BLOOD PRESSURE: 80 MMHG | HEART RATE: 69 BPM | WEIGHT: 162.6 LBS

## 2022-04-25 DIAGNOSIS — I25.10 TRIPLE VESSEL CORONARY ARTERY DISEASE: Primary | ICD-10-CM

## 2022-04-25 DIAGNOSIS — I51.3 MURAL THROMBUS OF LEFT VENTRICLE: ICD-10-CM

## 2022-04-25 DIAGNOSIS — I25.2 PAST HEART ATTACK: ICD-10-CM

## 2022-04-25 DIAGNOSIS — E66.9 OBESITY (BMI 30-39.9): ICD-10-CM

## 2022-04-25 DIAGNOSIS — Z95.810 IMPLANTABLE CARDIOVERTER-DEFIBRILLATOR (ICD) IN SITU: ICD-10-CM

## 2022-04-25 DIAGNOSIS — Z72.0 TOBACCO ABUSE: ICD-10-CM

## 2022-04-25 DIAGNOSIS — E78.5 HYPERLIPIDEMIA, UNSPECIFIED HYPERLIPIDEMIA TYPE: ICD-10-CM

## 2022-04-25 DIAGNOSIS — E78.2 MIXED HYPERLIPIDEMIA: ICD-10-CM

## 2022-04-25 DIAGNOSIS — Z79.01 LONG TERM CURRENT USE OF ANTICOAGULANT THERAPY: ICD-10-CM

## 2022-04-25 DIAGNOSIS — I25.5 ISCHEMIC CARDIOMYOPATHY: ICD-10-CM

## 2022-04-25 DIAGNOSIS — J45.20 MILD INTERMITTENT ASTHMA WITHOUT COMPLICATION: ICD-10-CM

## 2022-04-25 DIAGNOSIS — I51.89 IMPAIRED LEFT VENTRICULAR FUNCTION: ICD-10-CM

## 2022-04-25 DIAGNOSIS — I10 ESSENTIAL HYPERTENSION: ICD-10-CM

## 2022-04-25 DIAGNOSIS — I25.2 OLD MI (MYOCARDIAL INFARCTION): ICD-10-CM

## 2022-04-25 DIAGNOSIS — N18.31 STAGE 3A CHRONIC KIDNEY DISEASE (HCC): ICD-10-CM

## 2022-04-25 DIAGNOSIS — I23.6 LV (LEFT VENTRICULAR) MURAL THROMBUS FOLLOWING MI (HCC): ICD-10-CM

## 2022-04-25 PROCEDURE — 99214 OFFICE O/P EST MOD 30 MIN: CPT | Performed by: INTERNAL MEDICINE

## 2022-04-25 RX ORDER — SACUBITRIL AND VALSARTAN 49; 51 MG/1; MG/1
1 TABLET, FILM COATED ORAL 2 TIMES DAILY
Qty: 60 TABLET | Refills: 5 | Status: SHIPPED | OUTPATIENT
Start: 2022-04-25 | End: 2022-07-21 | Stop reason: SDUPTHER

## 2022-04-25 RX ORDER — WARFARIN SODIUM 5 MG/1
TABLET ORAL
Qty: 135 TABLET | Refills: 3 | Status: SHIPPED | OUTPATIENT
Start: 2022-04-25 | End: 2022-08-08 | Stop reason: SDUPTHER

## 2022-04-25 RX ORDER — CARVEDILOL 6.25 MG/1
6.25 TABLET ORAL 2 TIMES DAILY WITH MEALS
Qty: 60 TABLET | Refills: 5 | Status: SHIPPED | OUTPATIENT
Start: 2022-04-25 | End: 2022-06-06 | Stop reason: SDUPTHER

## 2022-04-25 RX ORDER — ATORVASTATIN CALCIUM 80 MG/1
80 TABLET, FILM COATED ORAL DAILY
Qty: 30 TABLET | Refills: 5 | Status: SHIPPED | OUTPATIENT
Start: 2022-04-25 | End: 2022-07-21 | Stop reason: SDUPTHER

## 2022-04-25 RX ORDER — ASPIRIN 81 MG/1
81 TABLET ORAL DAILY
Qty: 81 TABLET | Refills: 11 | Status: SHIPPED | OUTPATIENT
Start: 2022-04-25 | End: 2022-07-21 | Stop reason: SDUPTHER

## 2022-04-25 NOTE — PROGRESS NOTES
[facility]  [address] [facility]   [phone]  [id number]  Phone#  [phone]   Fax#  [phone]  *-*-*-*-*-*-*-*-*-*-*-*-*-*-*-*-*-*-*-*-*-*-*-*-*-*-*-*-*-*-*-*-*-*-*-*-*-*-*-*-*-*-*-*-*-*-*-*-*-*-*-*-*-*                                   Cardiology Follow Up      ENCOUNTER DATE: 22 1:49 PM  PATIENT NAME: Scot Quan   : 1965    MRN: [id number]  AGE:57 y o  SEX: female  [address] Rachna Caro MD     PRIMARY CARE PHYSICIAN: JOSE ROBERTO Bolanos    ACTIVE DIAGNOSIS THIS VISIT  1  Triple vessel coronary artery disease     2  Ischemic cardiomyopathy     3  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 ([address] )     4  Mixed hyperlipidemia     5  Essential hypertension     6  Impaired left ventricular function     7  Long term current use of anticoagulant therapy     8  old extensive anterior, apical, lateral and distal inferior apical MI     9  Stage 3a chronic kidney disease ([address] )     10  Implantable cardioverter-defibrillator (ICD) in situ     11  Mild intermittent asthma without complication     12  Tobacco abuse     13  Obesity (BMI 30-39  9)       ACTIVE PROBLEM LIST  Patient Active Problem List   Diagnosis    Ischemic cardiomyopathy    Triple vessel coronary artery disease    HIV disease [facility] )    Essential hypertension    Mixed hyperlipidemia    Impaired left ventricular function    Long term current use of anticoagulant therapy    old extensive anterior, apical, lateral and distal inferior apical MI    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 ([address] )    CKD (chronic kidney disease) stage 3, GFR 30-59 ml/min ([facility])    Implantable cardioverter-defibrillator (ICD) in situ    Mild intermittent asthma without complication    ASCUS with positive high risk HPV cervical    Tobacco abuse    Obesity (BMI 30-39  9)       CARDIOLOGY SPECIALTY COMMENTS  Patient was referred to us from [facility]   She experienced a myocardial infarction on February 28, 2017 in [place]  She has subsequently moved to [facility]  She had a cardiac catheterization taken at [facility]  Patient's cardiac catheterization CD revealed the left main coronary artery to be nonobstructed  The left anterior descending artery was 100% in its midportion after a large 1st diagonal branch which was nonobstructed  The circumflex artery obtuse marginal was 90%  The right coronary artery in its midportion with 50-60%  The large distal LAD filled from right to left collaterals  [facility] had recommended CABG but the patient had refused at that time  No ventriculogram was performed but an echocardiogram which I performed had demonstrated extensive LV damage  A nuclear stress test demonstrated minimal with any it ischemia  I did not feel that the patient would improve with CABG  Patient is free of angina pectoris or shortness of breath  She is doing quite well for her degree of LV dysfunction  04/11/2017 echocardiogram severe LV dysfunction, EF 30%, mild LV enlargement, apical aneurysm with akinesis to paradoxical motion  Large organized apical thrombus  Grade 1 diastolic dysfunction, normal pulmonary artery pressures     05/15/2017 stress test: Severe LV dysfunction, EF 20%  Mild LV enlargement  Anterior apical aneurysm with paradoxical motion  Extensive myocardial infarctions involving the distal anterior apical and inferior apical walls  Negative for Persantine induced ischemia       12/13/2017 ICD: single-chamber  03/12/2019 echocardiogram:  Severe LV systolic dysfunction, EF 85-96% apex is paradoxical   Grade 1 diastolic dysfunction  Thrombus in the septal apex with smoke    INTERVAL HISTORY:         patient has no cardiac complaints  Patient denies chest discomfort or shortness of breath  Patient has no palpitations  Patient denies symptoms of dizziness, lightheadedness or near-syncope/syncope    Patient denies leg edema  Patient denies symptoms of orthopnea or paroxysmal nocturnal dyspnea  Her blood pressure is good  Her cholesterol is excellent  DISCUSSION/PLAN:            Return in 6 months   repeat EKG on return      Lab Studies:    Lab Results   Component Value Date    CHOLESTEROL 125 12/27/2021    CHOLESTEROL 152 07/02/2021    CHOLESTEROL 147 04/07/2020     Lab Results   Component Value Date    TRIG 119 12/27/2021    TRIG 124 07/02/2021    TRIG 162 (H) 04/07/2020     Lab Results   Component Value Date    HDL 36 (L) 12/27/2021    HDL 49 07/02/2021    HDL 41 04/07/2020     Lab Results   Component Value Date    LDLCALC 65 12/27/2021    LDLCALC 78 07/02/2021    LDLCALC 74 04/07/2020       Lab Results   Component Value Date    LDLDIRECT 76 46 02/14/2019       Lab Results   Component Value Date    NTBNP 556 (H) 03/14/2020     Lab Results   Component Value Date    HGBA1C 5 7 (H) 07/02/2021      Lab Results   Component Value Date    EGFR 44 03/08/2022    EGFR 41 11/01/2021    EGFR 43 (L) 08/27/2021    SODIUM 142 03/08/2022    SODIUM 141 11/01/2021    SODIUM 145 08/27/2021    K 3 9 03/08/2022    K 3 8 11/01/2021    K 4 4 08/27/2021     03/08/2022     11/01/2021     (H) 08/27/2021    CO2 28 03/08/2022    CO2 31 (H) 11/01/2021    CO2 26 08/27/2021    BUN 11 03/08/2022    BUN 13 11/01/2021    BUN 16 08/27/2021    CREATININE 1 34 (H) 03/08/2022    CREATININE 1 44 (H) 11/01/2021    CREATININE 1 39 (H) 08/27/2021     Lab Results   Component Value Date    WBC 4 70 03/08/2022    WBC 5 4 03/08/2022    WBC 4 00 (L) 11/01/2021    HGB 13 8 03/08/2022    HGB 14 2 03/08/2022    HGB 13 5 11/01/2021    HCT 42 1 03/08/2022    HCT 41 6 03/08/2022    HCT 40 7 11/01/2021    MCV 93 03/08/2022    MCV 89 03/08/2022    MCV 95 11/01/2021    MCH 30 3 03/08/2022    MCH 30 3 03/08/2022    MCH 31 6 11/01/2021    MCHC 32 7 03/08/2022    MCHC 34 1 03/08/2022    MCHC 33 2 11/01/2021     03/08/2022     03/08/2022     11/01/2021      Lab Results   Component Value Date    CALCIUM 8 9 03/08/2022    CALCIUM 10 0 11/01/2021    CALCIUM 9 7 08/27/2021    AST 27 03/08/2022    AST 26 08/19/2021    AST 28 01/27/2021    ALT 16 03/08/2022    ALT 17 08/19/2021    ALT 15 01/27/2021    ALKPHOS 65 03/08/2022    ALKPHOS 62 08/19/2021    ALKPHOS 60 01/27/2021    MG 2 1 12/13/2017       Lab Results   Component Value Date    TROPONINI <0 01 03/14/2020     No results found for this visit on 04/25/22        Current Outpatient Medications:     albuterol (ProAir HFA) 90 mcg/act inhaler, Inhale 2 puffs every 6 (six) hours as needed for wheezing, Disp: 8 5 g, Rfl: 0    albuterol (PROVENTIL HFA,VENTOLIN HFA) 90 mcg/act inhaler, Inhale 2 puffs every 4 (four) hours as needed for wheezing, Disp: 1 Inhaler, Rfl: 0    aspirin (ECOTRIN LOW STRENGTH) 81 mg EC tablet, Take 1 tablet (81 mg total) by mouth daily, Disp: 81 tablet, Rfl: 10    atorvastatin (LIPITOR) 80 mg tablet, TAKE 1 TABLET BY MOUTH EVERY DAY, Disp: 30 tablet, Rfl: 5    bictegravir-emtricitab-tenofovir alafenamide (Biktarvy) -25 MG tablet, Take 1 tablet by mouth daily with breakfast, Disp: 90 tablet, Rfl: 1    carvedilol (COREG) 6 25 mg tablet, TAKE ONE (1) TABLET BY MOUTH TWICE DAILY WITH MEALS, Disp: 60 tablet, Rfl: 5    Entresto 49-51 MG TABS, TAKE 1 TABLET BY MOUTH TWICE DAILY, Disp: 60 tablet, Rfl: 5    Spacer/Aero-Holding Chambers (Procare Spacer/Adult Mask) MARK, 1 Device by Does not apply route daily, Disp: 1 Device, Rfl: 0    warfarin (COUMADIN) 1 mg tablet, TAKE 1 TABLET BY MOUTH DAILY OR AS DIRECTED, Disp: 30 tablet, Rfl: 10    warfarin (COUMADIN) 5 mg tablet, 1 to 1 5 tablets daily as directed by MD, Disp: 135 tablet, Rfl: 3  Allergies   Allergen Reactions    No Active Allergies     No Known Allergies        Past Medical History:   Diagnosis Date    Asthma     Coronary artery disease     defibrillator    HIV positive ([facility] )      Social History     Socioeconomic History    Marital status: Single     Spouse name: Not on file    Number of children: Not on file    Years of education: Not on file    Highest education level: Not on file   Occupational History    Not on file   Tobacco Use    Smoking status: Former Smoker     Packs/day: 1 00     Years: 30 00     Pack years: 30 00     Types: Cigarettes     Start date: 3/8/1994     Quit date: 2021     Years since quittin 8    Smokeless tobacco: Former User     Quit date: 2019    Tobacco comment: Pt smokes 1 cig daily   Vaping Use    Vaping Use: Never used   Substance and Sexual Activity    Alcohol use: Not Currently     Comment: ocassionally    Drug use: No    Sexual activity: Not Currently   Other Topics Concern    Not on file   Social History Narrative    Not on file     Social Determinants of Health     Financial Resource Strain: Not on file   Food Insecurity: Not on file   Transportation Needs: Not on file   Physical Activity: Not on file   Stress: Not on file   Social Connections: Not on file   Intimate Partner Violence: Not on file   Housing Stability: Not on file      Family History   Problem Relation Age of Onset    Other Father         GI bleed    Suicidality Brother     Drug abuse Brother         overdose     Past Surgical History:   Procedure Laterality Date    ANGIOPLASTY      CARDIAC DEFIBRILLATOR PLACEMENT      CORONARY ANGIOPLASTY      pci placement    TOTAL ABDOMINAL HYSTERECTOMY         PREVIOUS WEIGHTS:   Wt Readings from Last 10 Encounters:   22 73 8 kg (162 lb 9 6 oz)   22 75 1 kg (165 lb 9 6 oz)   21 78 9 kg (174 lb)   10/20/21 78 9 kg (174 lb)   10/18/21 78 5 kg (173 lb)   10/08/21 79 5 kg (175 lb 3 2 oz)   21 78 2 kg (172 lb 6 oz)   21 77 7 kg (171 lb 3 2 oz)   21 78 5 kg (173 lb)   21 77 6 kg (171 lb)        Review of Systems:  Review of Systems   Respiratory: Negative for cough, choking, chest tightness, shortness of breath and wheezing  Cardiovascular: Negative for chest pain, palpitations and leg swelling  Musculoskeletal: Negative for gait problem  Skin: Negative for rash  Neurological: Negative for dizziness, tremors, syncope, weakness, light-headedness, numbness and headaches  Psychiatric/Behavioral: Negative for agitation and behavioral problems  The patient is not hyperactive  Physical Exam:  /80   Pulse 69   Ht 5' (1 524 m)   Wt 73 8 kg (162 lb 9 6 oz)   SpO2 97%   BMI 31 76 kg/m²     Physical Exam  Constitutional:       General: She is not in acute distress  Appearance: She is well-developed  HENT:      Head: Normocephalic and atraumatic  Neck:      Thyroid: No thyromegaly  Vascular: No carotid bruit or JVD  Trachea: No tracheal deviation  Cardiovascular:      Rate and Rhythm: Normal rate and regular rhythm  Pulses: Normal pulses  Heart sounds: Normal heart sounds  No murmur heard  No friction rub  No gallop  Pulmonary:      Effort: Pulmonary effort is normal  No respiratory distress  Breath sounds: Normal breath sounds  No wheezing, rhonchi or rales  Chest:      Chest wall: No tenderness  Musculoskeletal:         General: Normal range of motion  Cervical back: Normal range of motion and neck supple  Right lower leg: No edema  Left lower leg: No edema  Skin:     General: Skin is warm and dry  Neurological:      General: No focal deficit present  Mental Status: She is alert and oriented to person, place, and time  Psychiatric:         Mood and Affect: Mood normal          Behavior: Behavior normal          Thought Content:  Thought content normal          Judgment: Judgment normal          -------------------------------------------------------------------------------   CARDIAC EP DEVICE REPORT  Results for orders placed in visit on 02/25/22    Cardiac EP device report    Narrative  MDT-SINGLE CHAMBER ICD - [address] [place] [address] CONDITIONAL  CARELINK TRANSMISSION: BATTERY VOLTAGE ADEQUATE (8 YRS)  : <0 1%  ALL AVAILABLE LEAD PARAMETERS WITHIN NORMAL LIMITS  NO SIGNIFICANT HIGH RATE EPISODES  OPTI-VOL WITHIN NORMAL LIMITS  APPROPRIATELY FUNCTIONING ICD  [address] [place] [address]      Results for orders placed in visit on 11/23/21    Cardiac EP device report    Narrative  MDT-SINGLE CHAMBER ICD - [address] [place] [address] Dr INTERROGATED IN THE [place] OFFICE  BATTERY VOLTAGE ADEQUATE (8 7 YRS)  : <0 1% ALL LEAD PARAMETERS WITHIN NORMAL LIMITS  NO SIGNIFICANT HIGH RATE EPISODES  NO PROGRAMMING CHANGES MADE TO DEVICE PARAMETERS  OPTI-VOL WITHIN NORMAL LIMITS  APPROPRIATELY FUNCTIONING ICD  [address] [place] [address]      ======================================================  Imaging:   I have personally reviewed pertinent reports  Portions of the record may have been created with voice recognition software  Occasional wrong word or "sound a like" substitutions may have occurred due to the inherent limitations of voice recognition software  Read the chart carefully and recognize, using context, where substitutions have occurred      SIGNATURES:   Tete Onofre MD

## 2022-05-04 ENCOUNTER — PATIENT OUTREACH (OUTPATIENT)
Dept: SURGERY | Facility: CLINIC | Age: 57
End: 2022-05-04

## 2022-05-04 NOTE — PROGRESS NOTES
Cm attempted to schedule recert appointment with ct  Ct is aware her recert was due by 7/53/2485  Ct let this cm know that she was away until 6/1/2022 and would contact this cm to schedule recert appointment

## 2022-05-16 ENCOUNTER — APPOINTMENT (OUTPATIENT)
Dept: LAB | Facility: HOSPITAL | Age: 57
End: 2022-05-16
Payer: MEDICARE

## 2022-05-16 ENCOUNTER — ANTICOAG VISIT (OUTPATIENT)
Dept: CARDIOLOGY CLINIC | Facility: CLINIC | Age: 57
End: 2022-05-16

## 2022-05-16 DIAGNOSIS — I23.6 LV (LEFT VENTRICULAR) MURAL THROMBUS FOLLOWING MI (HCC): ICD-10-CM

## 2022-05-16 DIAGNOSIS — I25.5 ISCHEMIC CARDIOMYOPATHY: Primary | ICD-10-CM

## 2022-05-16 DIAGNOSIS — Z79.01 LONG TERM CURRENT USE OF ANTICOAGULANT THERAPY: ICD-10-CM

## 2022-05-16 NOTE — PROGRESS NOTES
Spoke with patient, advised INR high, no changes in her medications or diet  Advised to hold tonight and tomorrow, then go back to 7 5 mg Fri, 5 mg all other days   Will recheck in 2 weeks 5/31/22 (due to holiday)

## 2022-06-06 ENCOUNTER — PATIENT OUTREACH (OUTPATIENT)
Dept: SURGERY | Facility: CLINIC | Age: 57
End: 2022-06-06

## 2022-06-06 DIAGNOSIS — I25.10 TRIPLE VESSEL CORONARY ARTERY DISEASE: ICD-10-CM

## 2022-06-06 DIAGNOSIS — I25.5 ISCHEMIC CARDIOMYOPATHY: ICD-10-CM

## 2022-06-06 RX ORDER — CARVEDILOL 6.25 MG/1
6.25 TABLET ORAL 2 TIMES DAILY WITH MEALS
Qty: 60 TABLET | Refills: 5 | Status: SHIPPED | OUTPATIENT
Start: 2022-06-06 | End: 2022-06-06 | Stop reason: SDUPTHER

## 2022-06-06 RX ORDER — CARVEDILOL 6.25 MG/1
6.25 TABLET ORAL 2 TIMES DAILY WITH MEALS
Qty: 60 TABLET | Refills: 0 | Status: SHIPPED | OUTPATIENT
Start: 2022-06-06 | End: 2022-06-29

## 2022-06-06 NOTE — PROGRESS NOTES
Cm attempted to contact ct to schedule recert  Message was left  Ct returned this cm's phone call  Ct agreeable to meeting with this cm on 6/20/2022 after appointment with the clinic  Ct is aware to bring copy of award letter to appointment

## 2022-06-07 ENCOUNTER — TELEPHONE (OUTPATIENT)
Dept: CARDIOLOGY CLINIC | Facility: CLINIC | Age: 57
End: 2022-06-07

## 2022-06-07 ENCOUNTER — APPOINTMENT (OUTPATIENT)
Dept: LAB | Facility: HOSPITAL | Age: 57
End: 2022-06-07
Payer: MEDICARE

## 2022-06-07 ENCOUNTER — RA CDI HCC (OUTPATIENT)
Dept: OTHER | Facility: HOSPITAL | Age: 57
End: 2022-06-07

## 2022-06-07 ENCOUNTER — OFFICE VISIT (OUTPATIENT)
Dept: FAMILY MEDICINE CLINIC | Facility: CLINIC | Age: 57
End: 2022-06-07

## 2022-06-07 ENCOUNTER — ANTICOAG VISIT (OUTPATIENT)
Dept: CARDIOLOGY CLINIC | Facility: CLINIC | Age: 57
End: 2022-06-07

## 2022-06-07 VITALS
BODY MASS INDEX: 30.02 KG/M2 | OXYGEN SATURATION: 96 % | WEIGHT: 159 LBS | HEIGHT: 61 IN | RESPIRATION RATE: 16 BRPM | SYSTOLIC BLOOD PRESSURE: 118 MMHG | TEMPERATURE: 98.7 F | DIASTOLIC BLOOD PRESSURE: 70 MMHG | HEART RATE: 80 BPM

## 2022-06-07 DIAGNOSIS — J45.20 MILD INTERMITTENT ASTHMA WITHOUT COMPLICATION: ICD-10-CM

## 2022-06-07 DIAGNOSIS — J06.9 VIRAL UPPER RESPIRATORY ILLNESS: Primary | ICD-10-CM

## 2022-06-07 DIAGNOSIS — Z79.01 LONG TERM CURRENT USE OF ANTICOAGULANT THERAPY: ICD-10-CM

## 2022-06-07 DIAGNOSIS — I23.6 LV (LEFT VENTRICULAR) MURAL THROMBUS FOLLOWING MI (HCC): ICD-10-CM

## 2022-06-07 DIAGNOSIS — B35.1 ONYCHOMYCOSIS: ICD-10-CM

## 2022-06-07 DIAGNOSIS — I25.5 ISCHEMIC CARDIOMYOPATHY: Primary | ICD-10-CM

## 2022-06-07 PROBLEM — J45.901 ACUTE ASTHMA EXACERBATION: Status: ACTIVE | Noted: 2022-06-07

## 2022-06-07 LAB
SARS-COV-2 AG UPPER RESP QL IA: NEGATIVE
VALID CONTROL: NORMAL

## 2022-06-07 PROCEDURE — 99213 OFFICE O/P EST LOW 20 MIN: CPT

## 2022-06-07 PROCEDURE — 87811 SARS-COV-2 COVID19 W/OPTIC: CPT

## 2022-06-07 RX ORDER — ALBUTEROL SULFATE 90 UG/1
2 AEROSOL, METERED RESPIRATORY (INHALATION) EVERY 4 HOURS PRN
Qty: 18 G | Refills: 0 | Status: SHIPPED | OUTPATIENT
Start: 2022-06-07 | End: 2022-06-07

## 2022-06-07 RX ORDER — ALBUTEROL SULFATE 90 UG/1
2 AEROSOL, METERED RESPIRATORY (INHALATION) EVERY 4 HOURS PRN
Qty: 18 G | Refills: 0 | Status: SHIPPED | OUTPATIENT
Start: 2022-06-07 | End: 2022-07-15 | Stop reason: ALTCHOICE

## 2022-06-07 NOTE — PROGRESS NOTES
[facility]  coding opportunities        CQ[facility]     Patients Insurance     Medicare Insurance: Zeyad Corey

## 2022-06-07 NOTE — PROGRESS NOTES
Pt called denies any changes will hold x2 they 2 5 mg on 6/10 then 5 mg daily will recheck on 6/16/22

## 2022-06-07 NOTE — PROGRESS NOTES
Assessment/Plan:    Viral upper respiratory illness  Recommend supportive management  Rapid covid-19 test negative in office  ED precautions discussed  Mild intermittent asthma without complication  Not in a current exacerbation  In office neb tx given in office  LCTA post nebulizer treatment  Refilled inhaler  Return & ED precautions discussed         Diagnoses and all orders for this visit:    Viral upper respiratory illness  -     Discontinue: albuterol (PROVENTIL HFA,VENTOLIN HFA) 90 mcg/act inhaler; Inhale 2 puffs every 4 (four) hours as needed for wheezing or shortness of breath  -     POCT Rapid Covid Ag  -     albuterol (PROVENTIL HFA,VENTOLIN HFA) 90 mcg/act inhaler; Inhale 2 puffs every 4 (four) hours as needed for wheezing or shortness of breath    Onychomycosis  -     Discontinue: ciclopirox (PENLAC) 8 % solution; Apply topically daily at bedtime  -     ciclopirox (PENLAC) 8 % solution; Apply topically daily at bedtime    Mild intermittent asthma without complication          Subjective:      Patient ID: Liz Luna is a 65 y o  female  Cough  Patient complains of fever, nasal congestion and productive cough with sputum described as yellow  Symptoms began 3 days ago  Symptoms have been unchanged since that time  The cough is dry and productive and is aggravated by nothing  Patient does not have new pets  Patient does have a history of asthma  She has not had her inhaler  She denies SOB or wheezing  Patient does not have a history of environmental allergens  Patient has not traveled recently  Patient does not have a history of smoking  Patient has not had a previous chest x-ray  Patient has not had a PPD done  She reports a subjective  fever yesterday, which self resolved  No fever in the office          The following portions of the patient's history were reviewed and updated as appropriate: allergies, current medications, past family history, past medical history, past social history, past surgical history and problem list     Review of Systems   Constitutional: Negative for chills  HENT: Positive for congestion  Negative for ear pain and sore throat  Eyes: Negative for pain and visual disturbance  Respiratory: Positive for cough  Negative for shortness of breath  Cardiovascular: Negative for chest pain and palpitations  Gastrointestinal: Negative for abdominal pain and vomiting  Genitourinary: Negative for dysuria and hematuria  Musculoskeletal: Negative for arthralgias and back pain  Skin: Negative for color change and rash  Neurological: Negative for seizures and syncope  All other systems reviewed and are negative  Objective:      /70 (BP Location: Right arm, Patient Position: Sitting, Cuff Size: Standard)   Pulse 80   Temp 98 7 °F (37 1 °C) (Temporal)   Resp 16   Ht 5' 1" (1 549 m)   Wt 72 1 kg (159 lb)   SpO2 96%   Breastfeeding No   BMI 30 04 kg/m²          Physical Exam  Vitals and nursing note reviewed  Constitutional:       Appearance: She is obese  HENT:      Head: Normocephalic and atraumatic  Right Ear: External ear normal       Left Ear: External ear normal       Nose: Nose normal    Eyes:      Extraocular Movements: Extraocular movements intact  Conjunctiva/sclera: Conjunctivae normal       Pupils: Pupils are equal, round, and reactive to light  Cardiovascular:      Rate and Rhythm: Normal rate and regular rhythm  Pulses: Normal pulses  Heart sounds: Normal heart sounds  Pulmonary:      Effort: Pulmonary effort is normal       Breath sounds: Wheezing present  Abdominal:      General: Bowel sounds are normal       Palpations: Abdomen is soft  Tenderness: There is no abdominal tenderness  Musculoskeletal:         General: Normal range of motion  Cervical back: Normal range of motion  Skin:     General: Skin is warm and dry  Comments: Onychomycosis present on right index finger     Neurological: General: No focal deficit present  Mental Status: She is alert and oriented to person, place, and time  Mental status is at baseline  Psychiatric:         Mood and Affect: Mood normal          Behavior: Behavior normal          Thought Content:  Thought content normal

## 2022-06-07 NOTE — ASSESSMENT & PLAN NOTE
Not in a current exacerbation  In office neb tx given in office  LCTA post nebulizer treatment     Refilled inhaler  Return & ED precautions discussed Resume regular diet.

## 2022-06-08 ENCOUNTER — APPOINTMENT (EMERGENCY)
Dept: RADIOLOGY | Facility: HOSPITAL | Age: 57
End: 2022-06-08
Payer: MEDICARE

## 2022-06-08 ENCOUNTER — REMOTE DEVICE CLINIC VISIT (OUTPATIENT)
Dept: CARDIOLOGY CLINIC | Facility: CLINIC | Age: 57
End: 2022-06-08
Payer: MEDICARE

## 2022-06-08 ENCOUNTER — HOSPITAL ENCOUNTER (EMERGENCY)
Facility: HOSPITAL | Age: 57
Discharge: HOME | End: 2022-06-08
Attending: EMERGENCY MEDICINE
Payer: MEDICARE

## 2022-06-08 VITALS
DIASTOLIC BLOOD PRESSURE: 93 MMHG | SYSTOLIC BLOOD PRESSURE: 145 MMHG | WEIGHT: 160 LBS | HEART RATE: 68 BPM | BODY MASS INDEX: 30.23 KG/M2 | OXYGEN SATURATION: 95 % | RESPIRATION RATE: 20 BRPM | TEMPERATURE: 98.4 F

## 2022-06-08 DIAGNOSIS — Z95.810 AICD (AUTOMATIC CARDIOVERTER/DEFIBRILLATOR) PRESENT: Primary | ICD-10-CM

## 2022-06-08 DIAGNOSIS — J06.9 VIRAL URI WITH COUGH: Primary | ICD-10-CM

## 2022-06-08 PROCEDURE — 87636 SARSCOV2 & INF A&B AMP PRB: CPT | Performed by: PHYSICIAN ASSISTANT

## 2022-06-08 PROCEDURE — 94760 N-INVAS EAR/PLS OXIMETRY 1: CPT

## 2022-06-08 PROCEDURE — 94644 CONT INHLJ TX 1ST HOUR: CPT

## 2022-06-08 PROCEDURE — 99284 EMERGENCY DEPT VISIT MOD MDM: CPT

## 2022-06-08 PROCEDURE — 93296 REM INTERROG EVL PM/IDS: CPT | Performed by: INTERNAL MEDICINE

## 2022-06-08 PROCEDURE — 93295 DEV INTERROG REMOTE 1/2/MLT: CPT | Performed by: INTERNAL MEDICINE

## 2022-06-08 PROCEDURE — 94645 CONT INHLJ TX EACH ADDL HOUR: CPT

## 2022-06-08 PROCEDURE — 71045 X-RAY EXAM CHEST 1 VIEW: CPT

## 2022-06-08 PROCEDURE — 99284 EMERGENCY DEPT VISIT MOD MDM: CPT | Performed by: PHYSICIAN ASSISTANT

## 2022-06-08 RX ORDER — PREDNISONE 20 MG/1
60 TABLET ORAL ONCE
Status: COMPLETED | OUTPATIENT
Start: 2022-06-08 | End: 2022-06-08

## 2022-06-08 RX ORDER — GUAIFENESIN/DEXTROMETHORPHAN 100-10MG/5
10 SYRUP ORAL ONCE
Status: COMPLETED | OUTPATIENT
Start: 2022-06-08 | End: 2022-06-08

## 2022-06-08 RX ORDER — PREDNISONE 20 MG/1
60 TABLET ORAL DAILY
Qty: 15 TABLET | Refills: 0 | Status: SHIPPED | OUTPATIENT
Start: 2022-06-08 | End: 2022-06-13

## 2022-06-08 RX ORDER — SODIUM CHLORIDE FOR INHALATION 0.9 %
12 VIAL, NEBULIZER (ML) INHALATION ONCE
Status: COMPLETED | OUTPATIENT
Start: 2022-06-08 | End: 2022-06-08

## 2022-06-08 RX ORDER — FLUTICASONE PROPIONATE 50 MCG
1 SPRAY, SUSPENSION (ML) NASAL DAILY
Qty: 16 G | Refills: 0 | Status: SHIPPED | OUTPATIENT
Start: 2022-06-08 | End: 2022-07-25 | Stop reason: ALTCHOICE

## 2022-06-08 RX ORDER — ALBUTEROL SULFATE 90 UG/1
2 AEROSOL, METERED RESPIRATORY (INHALATION) EVERY 4 HOURS PRN
Qty: 6.7 G | Refills: 0 | Status: SHIPPED | OUTPATIENT
Start: 2022-06-08 | End: 2022-07-15 | Stop reason: ALTCHOICE

## 2022-06-08 RX ORDER — LORATADINE 10 MG/1
10 TABLET ORAL DAILY
Qty: 30 TABLET | Refills: 0 | Status: SHIPPED | OUTPATIENT
Start: 2022-06-08 | End: 2022-07-15 | Stop reason: ALTCHOICE

## 2022-06-08 RX ADMIN — ISODIUM CHLORIDE 12 ML: 0.03 SOLUTION RESPIRATORY (INHALATION) at 10:21

## 2022-06-08 RX ADMIN — ISODIUM CHLORIDE 12 ML: 0.03 SOLUTION RESPIRATORY (INHALATION) at 11:36

## 2022-06-08 RX ADMIN — PREDNISONE 60 MG: 20 TABLET ORAL at 10:15

## 2022-06-08 RX ADMIN — IPRATROPIUM BROMIDE 1 MG: 0.5 SOLUTION RESPIRATORY (INHALATION) at 10:21

## 2022-06-08 RX ADMIN — ALBUTEROL SULFATE 10 MG: 2.5 SOLUTION RESPIRATORY (INHALATION) at 11:36

## 2022-06-08 RX ADMIN — ALBUTEROL SULFATE 10 MG: 2.5 SOLUTION RESPIRATORY (INHALATION) at 10:21

## 2022-06-08 RX ADMIN — GUAIFENESIN AND DEXTROMETHORPHAN 10 ML: 100; 10 SYRUP ORAL at 10:16

## 2022-06-08 NOTE — PROGRESS NOTES
Results for orders placed or performed in visit on 06/08/22   Cardiac EP device report    Narrative    MDT-SINGLE CHAMBER ICD - ACTIVE SYSTEM IS MRI CONDITIONAL  CARELINK TRANSMISSION: BATTERY STATUS "8 YRS "  0%  ALL AVAILABLE LEAD PARAMETERS WITHIN NORMAL LIMITS  1 DEVICE CLASSIFIED VHR NOTED; NO THERAPIES GIVEN  1 DEVICE CLASSIFIED AT/AF, 0% BURDEN  PT ON COREG & WARFARIN  EF 25% (2019)  OPTI-VOL WITHIN NORMAL LIMITS  NORMAL DEVICE FUNCTION   NC

## 2022-06-08 NOTE — ED PROVIDER NOTES
History  Chief Complaint   Patient presents with    Cough     Pt reports cough, congestion, phlegm      Patient is a 72-year-old female past medical history significant for HIV, well managed with undetectable levels recently, previous ICD/pacemaker placement, hypertension and asthma for which she reports she has never been intubated or admitted to intensive care unit presented today for evaluation nasal congestion, coughing and wheezing ongoing for the past 4 days  Patient reports he had a visit with family care provider yesterday during which she was tested for COVID noted to be negative, given an albuterol breathing treatment and had some improvement however notes that she continued to have coughing congestion and shortness of breath prompting her presentation to the ER today  Patient reports wheezing, productive cough with mucus, chest and nasal congestion and sinus pressure  Patient reports she is concerned this is an infection and reports she was not taking any other medications with the exception of albuterol for her symptoms yesterday  Patient denies abdominal pain, nausea vomiting  Patient reports no chest pain, palpitations  Patient denies any lightheadedness, dizziness or syncope  History provided by:  Patient   used: No        Prior to Admission Medications   Prescriptions Last Dose Informant Patient Reported? Taking?    Spacer/Aero-Holding Chambers (Procare Spacer/Adult Mask) MARK  Self No No   Si Device by Does not apply route daily   albuterol (PROVENTIL HFA,VENTOLIN HFA) 90 mcg/act inhaler   No No   Sig: Inhale 2 puffs every 4 (four) hours as needed for wheezing or shortness of breath   aspirin (ECOTRIN LOW STRENGTH) 81 mg EC tablet   No No   Sig: Take 1 tablet (81 mg total) by mouth daily   atorvastatin (LIPITOR) 80 mg tablet   No No   Sig: Take 1 tablet (80 mg total) by mouth daily   bictegravir-emtricitab-tenofovir alafenamide (Biktarvy) -25 MG tablet  Self No No   Sig: Take 1 tablet by mouth daily with breakfast   carvedilol (COREG) 6 25 mg tablet   No No   Sig: Take 1 tablet (6 25 mg total) by mouth 2 (two) times a day with meals   ciclopirox (PENLAC) 8 % solution   No No   Sig: Apply topically daily at bedtime   sacubitril-valsartan (Entresto) 49-51 MG TABS   No No   Sig: Take 1 tablet by mouth 2 (two) times a day   warfarin (COUMADIN) 1 mg tablet  Self No No   Sig: TAKE 1 TABLET BY MOUTH DAILY OR AS DIRECTED   warfarin (COUMADIN) 5 mg tablet   No No   Si to 1 5 tablets daily as directed by MD      Facility-Administered Medications: None       Past Medical History:   Diagnosis Date    Asthma     Coronary artery disease     defibrillator    HIV positive ([facility] [address] )        Past Surgical History:   Procedure Laterality Date    ANGIOPLASTY      CARDIAC DEFIBRILLATOR PLACEMENT      CORONARY ANGIOPLASTY      pci placement    TOTAL ABDOMINAL HYSTERECTOMY         Family History   Problem Relation Age of Onset    Other Father         GI bleed    Suicidality Brother     Drug abuse Brother         overdose     I have reviewed and agree with the history as documented  E-Cigarette/Vaping    E-Cigarette Use Never User      E-Cigarette/Vaping Substances     Social History     Tobacco Use    Smoking status: Former Smoker     Packs/day: 1 00     Years: 30 00     Pack years: 30 00     Types: Cigarettes     Start date: 3/8/1994     Quit date: 2021     Years since quittin 9    Smokeless tobacco: Former User     Quit date: 2019    Tobacco comment: Pt smokes 1 cig daily   Vaping Use    Vaping Use: Never used   Substance Use Topics    Alcohol use: Yes     Comment: ocassionally    Drug use: No       Review of Systems   Constitutional: Positive for fatigue  Negative for chills and fever  HENT: Positive for congestion, rhinorrhea, sinus pressure and sneezing  Negative for ear pain and sore throat  Eyes: Negative for redness     Respiratory: Positive for cough and wheezing  Negative for chest tightness and shortness of breath  Cardiovascular: Negative for chest pain and palpitations  Gastrointestinal: Negative for abdominal pain, nausea and vomiting  Genitourinary: Negative for dysuria and hematuria  Musculoskeletal: Negative  Skin: Negative for rash  Neurological: Negative for dizziness, syncope, light-headedness and numbness  Physical Exam  Physical Exam  Vitals and nursing note reviewed  Constitutional:       Appearance: She is well-developed  Comments: Well-appearing female in no acute distress speaking full sentences conscious alert oriented   HENT:      Head: Normocephalic  Nose: Rhinorrhea present  Eyes:      General: No scleral icterus  Cardiovascular:      Rate and Rhythm: Normal rate and regular rhythm  Pulmonary:      Effort: Pulmonary effort is normal       Breath sounds: Normal breath sounds  No stridor  Comments: Patient speaking in full sentences managing oral secretions without difficulty, bilateral expiratory wheezing noted in all lung fields  Productive cough noted throughout exam   Abdominal:      General: There is no distension  Palpations: Abdomen is soft  Tenderness: There is no abdominal tenderness  Musculoskeletal:         General: Normal range of motion  Skin:     General: Skin is warm and dry  Capillary Refill: Capillary refill takes less than 2 seconds  Neurological:      Mental Status: She is alert and oriented to person, place, and time           Vital Signs  ED Triage Vitals   Temperature Pulse Respirations Blood Pressure SpO2   06/08/22 0959 06/08/22 0959 06/08/22 0959 06/08/22 0959 06/08/22 0959   98 4 °F (36 9 °C) 80 16 110/70 96 %      Temp Source Heart Rate Source Patient Position - Orthostatic VS BP Location FiO2 (%)   06/08/22 0959 06/08/22 0959 06/08/22 0959 06/08/22 0959 --   Oral Monitor Sitting Left arm       Pain Score       06/08/22 1126       No Pain Vitals:    06/08/22 0959 06/08/22 1126 06/08/22 1235   BP: 110/70 108/65 145/93   Pulse: 80 63 68   Patient Position - Orthostatic VS: Sitting Lying Sitting         Visual Acuity      ED Medications  Medications   predniSONE tablet 60 mg (60 mg Oral Given 6/8/22 1015)   albuterol inhalation solution 10 mg (10 mg Nebulization Given 6/8/22 1021)   ipratropium (ATROVENT) 0 02 % inhalation solution 1 mg (1 mg Nebulization Given 6/8/22 1021)   sodium chloride 0 9 % inhalation solution 12 mL (12 mL Nebulization Given 6/8/22 1021)   dextromethorphan-guaiFENesin (ROBITUSSIN DM) oral syrup 10 mL (10 mL Oral Given 6/8/22 1016)   albuterol inhalation solution 10 mg (10 mg Nebulization Given 6/8/22 1136)   sodium chloride 0 9 % inhalation solution 12 mL (12 mL Nebulization Given 6/8/22 1136)       Diagnostic Studies  Results Reviewed     Procedure Component Value Units Date/Time    COVID/FLU - 24 hour TAT [[id number]] Collected: 06/08/22 1027    Lab Status: In process Specimen: Nares from Nose Updated: 06/08/22 1034                 XR chest portable   Final Result by Beka Green MD (06/08 1212)      No acute cardiopulmonary disease  In the setting of clinically suspected/proven COVID-19, this plain film appearance does not contain findings that raise concern for viral pneumonia such as COVID-19, but does not rule out this diagnosis  Workstation performed: [id number]                    Procedures  Procedures         ED Course  ED Course as of 06/08/22 1241   Wed Jun 08, 2022   1114 Patient reports significant improvement in symptoms patient does still have some bibasilar wheezing noted on exhalation it is agreeable for an additional breathing treatment  Patient reports she will feel well enough for discharge after the 2nd treatment  Will re-evaluate  1206 Patient was reexamined at this time and informed of laboratory and/or imaging results and was found to be stable for discharge    Return to emergency department criteria was reviewed with the patient who verbalized understanding and was agreeable to discharge and the treatment plan at this time  SBIRT 20yo+    Flowsheet Row Most Recent Value   SBIRT (25 yo +)    In order to provide better care to our patients, we are screening all of our patients for alcohol and drug use  Would it be okay to ask you these screening questions? No Filed at: 06/08/2022 1020                    MDM  Number of Diagnoses or Management Options  Viral URI with cough  Diagnosis management comments: All imaging and/or lab testing discussed with patient, strict return to ED precautions discussed  Patient recommended to follow up promptly with appropriate outpatient provider  Patient and/or family members verbalizes understanding and agrees with plan  Patient is stable for discharge      Portions of the record may have been created with voice recognition software  Occasional wrong word or "sound a like" substitutions may have occurred due to the inherent limitations of voice recognition software  Read the chart carefully and recognize, using context, where substitutions have occurred  Disposition  Final diagnoses:   Viral URI with cough     Time reflects when diagnosis was documented in both MDM as applicable and the Disposition within this note     Time User Action Codes Description Comment    6/8/2022 11:18 AM Marco A Quijano Add [J06 9] Viral URI with cough       ED Disposition     ED Disposition   Discharge    Condition   Stable    Date/Time   Wed Jun 8, 2022 11:45 AM    Comment   Ike Vides discharge to home/self care                 Follow-up Information     Follow up With Specialties Details Why [address], CRNP Nurse Practitioner Schedule an appointment as soon as possible for a visit in 2 days As needed [address]  [address]            Discharge Medication List as of 6/8/2022 12:00 PM      START taking these medications    Details   albuterol (5 mg/mL) 0 5 % nebulizer solution Take 0 5 mL (2 5 mg total) by nebulization every 6 (six) hours as needed for wheezing, Starting Wed 6/8/2022, Print      !! albuterol (PROVENTIL HFA,VENTOLIN HFA) 90 mcg/act inhaler Inhale 2 puffs every 4 (four) hours as needed for wheezing, Starting Wed 6/8/2022, Print      fluticasone (FLONASE) 50 mcg/act nasal spray 1 spray into each nostril daily, Starting Wed 6/8/2022, Print      loratadine (CLARITIN) 10 mg tablet Take 1 tablet (10 mg total) by mouth daily, Starting Wed 6/8/2022, Print      predniSONE 20 mg tablet Take 3 tablets (60 mg total) by mouth daily for 5 days, Starting Wed 6/8/2022, Until Mon 6/13/2022, Print       !! - Potential duplicate medications found  Please discuss with provider        CONTINUE these medications which have NOT CHANGED    Details   !! albuterol (PROVENTIL HFA,VENTOLIN HFA) 90 mcg/act inhaler Inhale 2 puffs every 4 (four) hours as needed for wheezing or shortness of breath, Starting Tue 6/7/2022, Normal      aspirin (ECOTRIN LOW STRENGTH) 81 mg EC tablet Take 1 tablet (81 mg total) by mouth daily, Starting Mon 4/25/2022, Normal      atorvastatin (LIPITOR) 80 mg tablet Take 1 tablet (80 mg total) by mouth daily, Starting Mon 4/25/2022, Normal      bictegravir-emtricitab-tenofovir alafenamide (Biktarvy) -25 MG tablet Take 1 tablet by mouth daily with breakfast, Starting Thu 3/24/2022, Normal      carvedilol (COREG) 6 25 mg tablet Take 1 tablet (6 25 mg total) by mouth 2 (two) times a day with meals, Starting Mon 6/6/2022, Normal      ciclopirox (PENLAC) 8 % solution Apply topically daily at bedtime, Starting Tue 6/7/2022, Normal      sacubitril-valsartan (Entresto) 49-51 MG TABS Take 1 tablet by mouth 2 (two) times a day, Starting Mon 4/25/2022, Normal      Spacer/Aero-Holding Chambers (Procare Spacer/Adult Mask) MARK 1 Device by Does not apply route daily, Starting Sat 11/7/2020, Normal      !! warfarin (COUMADIN) 1 mg tablet TAKE 1 TABLET BY MOUTH DAILY OR AS DIRECTED, Normal      !! warfarin (COUMADIN) 5 mg tablet 1 to 1 5 tablets daily as directed by MD, Normal       !! - Potential duplicate medications found  Please discuss with provider  No discharge procedures on file      PDMP Review     None          ED Provider  Electronically Signed by           Eva Johnson PA-C  06/08/22 4278

## 2022-06-09 LAB
FLUAV RNA RESP QL NAA+PROBE: NEGATIVE
FLUBV RNA RESP QL NAA+PROBE: NEGATIVE
SARS-COV-2 RNA RESP QL NAA+PROBE: NEGATIVE

## 2022-06-14 ENCOUNTER — PATIENT OUTREACH (OUTPATIENT)
Dept: SURGERY | Facility: CLINIC | Age: 57
End: 2022-06-14

## 2022-06-17 ENCOUNTER — APPOINTMENT (OUTPATIENT)
Dept: LAB | Facility: HOSPITAL | Age: 57
End: 2022-06-17
Payer: MEDICARE

## 2022-06-17 ENCOUNTER — ANTICOAG VISIT (OUTPATIENT)
Dept: CARDIOLOGY CLINIC | Facility: CLINIC | Age: 57
End: 2022-06-17

## 2022-06-17 DIAGNOSIS — I23.6 LV (LEFT VENTRICULAR) MURAL THROMBUS FOLLOWING MI (HCC): ICD-10-CM

## 2022-06-17 DIAGNOSIS — I25.5 ISCHEMIC CARDIOMYOPATHY: Primary | ICD-10-CM

## 2022-06-17 DIAGNOSIS — Z79.01 LONG TERM CURRENT USE OF ANTICOAGULANT THERAPY: ICD-10-CM

## 2022-06-17 NOTE — PROGRESS NOTES
Called patient INR elevated was on Prednisone for 5 days   the patient will hold for 2 days then 5 mg daily recheck on 6/23/22

## 2022-06-18 ENCOUNTER — HOSPITAL ENCOUNTER (EMERGENCY)
Facility: HOSPITAL | Age: 57
Discharge: HOME | End: 2022-06-18
Attending: EMERGENCY MEDICINE | Admitting: EMERGENCY MEDICINE
Payer: MEDICARE

## 2022-06-18 ENCOUNTER — APPOINTMENT (EMERGENCY)
Dept: RADIOLOGY | Facility: HOSPITAL | Age: 57
End: 2022-06-18
Payer: MEDICARE

## 2022-06-18 VITALS
BODY MASS INDEX: 29.91 KG/M2 | HEART RATE: 64 BPM | RESPIRATION RATE: 20 BRPM | TEMPERATURE: 98.4 F | WEIGHT: 158.29 LBS | SYSTOLIC BLOOD PRESSURE: 98 MMHG | DIASTOLIC BLOOD PRESSURE: 55 MMHG | OXYGEN SATURATION: 94 %

## 2022-06-18 DIAGNOSIS — R79.1 ELEVATED INR: ICD-10-CM

## 2022-06-18 DIAGNOSIS — J40 BRONCHITIS: Primary | ICD-10-CM

## 2022-06-18 LAB
ALBUMIN SERPL BCP-MCNC: 3.7 G/DL (ref 3–5.2)
ALP SERPL-CCNC: 53 U/L (ref 43–122)
ALT SERPL W P-5'-P-CCNC: 14 U/L
ANION GAP SERPL CALCULATED.3IONS-SCNC: 5 MMOL/L (ref 5–14)
AST SERPL W P-5'-P-CCNC: 29 U/L (ref 14–36)
BASOPHILS # BLD AUTO: 0.02 THOUSANDS/ΜL (ref 0–0.1)
BASOPHILS NFR BLD AUTO: 0 % (ref 0–1)
BILIRUB SERPL-MCNC: 1.02 MG/DL
BUN SERPL-MCNC: 15 MG/DL (ref 5–25)
CALCIUM SERPL-MCNC: 9.1 MG/DL (ref 8.4–10.2)
CARDIAC TROPONIN I PNL SERPL HS: 11 NG/L (ref 8–18)
CHLORIDE SERPL-SCNC: 105 MMOL/L (ref 97–108)
CO2 SERPL-SCNC: 30 MMOL/L (ref 22–30)
CREAT SERPL-MCNC: 1.32 MG/DL (ref 0.6–1.2)
EOSINOPHIL # BLD AUTO: 0.06 THOUSAND/ΜL (ref 0–0.61)
EOSINOPHIL NFR BLD AUTO: 1 % (ref 0–6)
ERYTHROCYTE [DISTWIDTH] IN BLOOD BY AUTOMATED COUNT: 14.3 % (ref 11.6–15.1)
GFR SERPL CREATININE-BSD FRML MDRD: 45 ML/MIN/1.73SQ M
GLUCOSE SERPL-MCNC: 117 MG/DL (ref 70–99)
HCT VFR BLD AUTO: 38 % (ref 34.8–46.1)
HGB BLD-MCNC: 12.3 G/DL (ref 11.5–15.4)
IMM GRANULOCYTES # BLD AUTO: 0.03 THOUSAND/UL (ref 0–0.2)
IMM GRANULOCYTES NFR BLD AUTO: 0 % (ref 0–2)
INR PPP: 5.52 (ref 0.84–1.19)
LYMPHOCYTES # BLD AUTO: 1.73 THOUSANDS/ΜL (ref 0.6–4.47)
LYMPHOCYTES NFR BLD AUTO: 24 % (ref 14–44)
MCH RBC QN AUTO: 30.8 PG (ref 26.8–34.3)
MCHC RBC AUTO-ENTMCNC: 32.4 G/DL (ref 31.4–37.4)
MCV RBC AUTO: 95 FL (ref 82–98)
MONOCYTES # BLD AUTO: 0.61 THOUSAND/ΜL (ref 0.17–1.22)
MONOCYTES NFR BLD AUTO: 9 % (ref 4–12)
NEUTROPHILS # BLD AUTO: 4.67 THOUSANDS/ΜL (ref 1.85–7.62)
NEUTS SEG NFR BLD AUTO: 66 % (ref 43–75)
NRBC BLD AUTO-RTO: 0 /100 WBCS
NT-PROBNP SERPL-MCNC: 952 PG/ML (ref 0–299)
PLATELET # BLD AUTO: 250 THOUSANDS/UL (ref 149–390)
PMV BLD AUTO: 9.8 FL (ref 8.9–12.7)
POTASSIUM SERPL-SCNC: 3.9 MMOL/L (ref 3.6–5)
PROT SERPL-MCNC: 6.8 G/DL (ref 5.9–8.4)
PROTHROMBIN TIME: 47.2 SECONDS (ref 11.6–14.5)
RBC # BLD AUTO: 3.99 MILLION/UL (ref 3.81–5.12)
SODIUM SERPL-SCNC: 140 MMOL/L (ref 137–147)
WBC # BLD AUTO: 7.12 THOUSAND/UL (ref 4.31–10.16)

## 2022-06-18 PROCEDURE — 85025 COMPLETE CBC W/AUTO DIFF WBC: CPT | Performed by: EMERGENCY MEDICINE

## 2022-06-18 PROCEDURE — 99285 EMERGENCY DEPT VISIT HI MDM: CPT | Performed by: EMERGENCY MEDICINE

## 2022-06-18 PROCEDURE — 99283 EMERGENCY DEPT VISIT LOW MDM: CPT

## 2022-06-18 PROCEDURE — 85610 PROTHROMBIN TIME: CPT | Performed by: EMERGENCY MEDICINE

## 2022-06-18 PROCEDURE — 87636 SARSCOV2 & INF A&B AMP PRB: CPT | Performed by: EMERGENCY MEDICINE

## 2022-06-18 PROCEDURE — 83880 ASSAY OF NATRIURETIC PEPTIDE: CPT | Performed by: EMERGENCY MEDICINE

## 2022-06-18 PROCEDURE — 80053 COMPREHEN METABOLIC PANEL: CPT | Performed by: EMERGENCY MEDICINE

## 2022-06-18 PROCEDURE — 71046 X-RAY EXAM CHEST 2 VIEWS: CPT

## 2022-06-18 PROCEDURE — 36415 COLL VENOUS BLD VENIPUNCTURE: CPT | Performed by: EMERGENCY MEDICINE

## 2022-06-18 PROCEDURE — 84484 ASSAY OF TROPONIN QUANT: CPT | Performed by: EMERGENCY MEDICINE

## 2022-06-18 RX ORDER — AMOXICILLIN 500 MG/1
500 CAPSULE ORAL EVERY 12 HOURS SCHEDULED
Qty: 14 CAPSULE | Refills: 0 | Status: SHIPPED | OUTPATIENT
Start: 2022-06-18 | End: 2022-06-18 | Stop reason: SDUPTHER

## 2022-06-18 RX ORDER — AMOXICILLIN 500 MG/1
500 CAPSULE ORAL EVERY 12 HOURS SCHEDULED
Qty: 14 CAPSULE | Refills: 0 | Status: SHIPPED | OUTPATIENT
Start: 2022-06-18 | End: 2022-06-21 | Stop reason: SDUPTHER

## 2022-06-18 NOTE — ED PROVIDER NOTES
History  Chief Complaint   Patient presents with    Cold Like Symptoms     Pt reports "I was here last Tuesday but I don't feel better, I have a cough and congestion, back and body pains, chills fever"     65year old female, presenting for 1 month of cough productive of yellow plhegm  No fevers chills, n/v/d, dysuria or frequency  No sick contacts, no recent travel  Seen for previously, told it was asthma exacerbation  Got better with steroids  Does not want more steroids  Has inhalers, wants new nebulizer, but has appointment with PCP in 2 days  No sob  No CP  No hemoptysis  No leg swelling  Hx of asthma, HIV (well controlled, undetectable levels 6 months ago) and ischemic cardiomyopathy with ICD  Prior to Admission Medications   Prescriptions Last Dose Informant Patient Reported? Taking?    Spacer/Aero-Holding Chambers (Procare Spacer/Adult Mask) MARK  Self No No   Si Device by Does not apply route daily   albuterol (5 mg/mL) 0 5 % nebulizer solution   No No   Sig: Take 0 5 mL (2 5 mg total) by nebulization every 6 (six) hours as needed for wheezing   albuterol (PROVENTIL HFA,VENTOLIN HFA) 90 mcg/act inhaler   No No   Sig: Inhale 2 puffs every 4 (four) hours as needed for wheezing or shortness of breath   albuterol (PROVENTIL HFA,VENTOLIN HFA) 90 mcg/act inhaler   No No   Sig: Inhale 2 puffs every 4 (four) hours as needed for wheezing   aspirin (ECOTRIN LOW STRENGTH) 81 mg EC tablet   No No   Sig: Take 1 tablet (81 mg total) by mouth daily   atorvastatin (LIPITOR) 80 mg tablet   No No   Sig: Take 1 tablet (80 mg total) by mouth daily   bictegravir-emtricitab-tenofovir alafenamide (Biktarvy) -25 MG tablet  Self No No   Sig: Take 1 tablet by mouth daily with breakfast   carvedilol (COREG) 6 25 mg tablet   No No   Sig: Take 1 tablet (6 25 mg total) by mouth 2 (two) times a day with meals   ciclopirox (PENLAC) 8 % solution   No No   Sig: Apply topically daily at bedtime   fluticasone (FLONASE) 50 mcg/act nasal spray   No No   Si spray into each nostril daily   loratadine (CLARITIN) 10 mg tablet   No No   Sig: Take 1 tablet (10 mg total) by mouth daily   sacubitril-valsartan (Entresto) 49-51 MG TABS   No No   Sig: Take 1 tablet by mouth 2 (two) times a day   warfarin (COUMADIN) 1 mg tablet  Self No No   Sig: TAKE 1 TABLET BY MOUTH DAILY OR AS DIRECTED   warfarin (COUMADIN) 5 mg tablet   No No   Si to 1 5 tablets daily as directed by MD      Facility-Administered Medications: None       Past Medical History:   Diagnosis Date    Asthma     Coronary artery disease     defibrillator    HIV positive ([facility] [address] )        Past Surgical History:   Procedure Laterality Date    ANGIOPLASTY      CARDIAC DEFIBRILLATOR PLACEMENT      CORONARY ANGIOPLASTY      pci placement    TOTAL ABDOMINAL HYSTERECTOMY         Family History   Problem Relation Age of Onset    Other Father         GI bleed    Suicidality Brother     Drug abuse Brother         overdose     I have reviewed and agree with the history as documented  E-Cigarette/Vaping    E-Cigarette Use Never User      E-Cigarette/Vaping Substances     Social History     Tobacco Use    Smoking status: Former Smoker     Packs/day: 1 00     Years: 30 00     Pack years: 30 00     Types: Cigarettes     Start date: 3/8/1994     Quit date: 2021     Years since quittin 9    Smokeless tobacco: Former User     Quit date: 2019    Tobacco comment: Pt smokes 1 cig daily   Vaping Use    Vaping Use: Never used   Substance Use Topics    Alcohol use: Not Currently     Comment: ocassionally    Drug use: No       Review of Systems   Constitutional: Negative  Negative for chills and fever  HENT: Negative  Negative for rhinorrhea, sore throat, trouble swallowing and voice change  Eyes: Negative  Negative for pain and visual disturbance  Respiratory: Positive for cough  Negative for shortness of breath and wheezing  Cardiovascular: Negative  Negative for chest pain and palpitations  Gastrointestinal: Negative for abdominal pain, diarrhea, nausea and vomiting  Genitourinary: Negative  Negative for dysuria and frequency  Musculoskeletal: Negative  Negative for neck pain and neck stiffness  Skin: Negative  Negative for rash  Neurological: Negative  Negative for dizziness, speech difficulty, weakness, light-headedness and numbness  Physical Exam  Physical Exam  Vitals and nursing note reviewed  Constitutional:       General: She is not in acute distress  Appearance: She is well-developed  HENT:      Head: Normocephalic and atraumatic  Eyes:      Conjunctiva/sclera: Conjunctivae normal       Pupils: Pupils are equal, round, and reactive to light  Neck:      Trachea: No tracheal deviation  Cardiovascular:      Rate and Rhythm: Normal rate and regular rhythm  Pulmonary:      Effort: Pulmonary effort is normal  No respiratory distress  Breath sounds: Normal breath sounds  No wheezing or rales  Abdominal:      General: Bowel sounds are normal  There is no distension  Palpations: Abdomen is soft  Tenderness: There is no abdominal tenderness  There is no guarding or rebound  Musculoskeletal:         General: No tenderness or deformity  Normal range of motion  Cervical back: Normal range of motion and neck supple  Skin:     General: Skin is warm and dry  Capillary Refill: Capillary refill takes less than 2 seconds  Findings: No rash  Neurological:      Mental Status: She is alert and oriented to person, place, and time     Psychiatric:         Behavior: Behavior normal          Vital Signs  ED Triage Vitals [06/18/22 0727]   Temperature Pulse Respirations Blood Pressure SpO2   98 4 °F (36 9 °C) 64 20 98/55 94 %      Temp Source Heart Rate Source Patient Position - Orthostatic VS BP Location FiO2 (%)   Oral Monitor Sitting Left arm --      Pain Score       9           Vitals:    06/18/22 0727 BP:  98/55   Pulse: 64   Patient Position - Orthostatic VS: Sitting         Visual Acuity      ED Medications  Medications - No data to display    Diagnostic Studies  Results Reviewed     Procedure Component Value Units Date/Time    Protime-INR [[id number]]  (Abnormal) Collected: 06/18/22 0751    Lab Status: Final result Specimen: Blood from Arm, Left Updated: 06/18/22 0832     Protime 47 2 seconds      INR 5 52    High Sensitivity Troponin I Random [[id number]]  (Normal) Collected: 06/18/22 0751    Lab Status: Final result Specimen: Blood from Arm, Left Updated: 06/18/22 0827     HS TnI random 11 ng/L     NT-BNP PRO [[id number]]  (Abnormal) Collected: 06/18/22 0751    Lab Status: Final result Specimen: Blood from Arm, Left Updated: 06/18/22 0825     NT-proBNP 952 pg/mL     Comprehensive metabolic panel [[id number]]  (Abnormal) Collected: 06/18/22 0751    Lab Status: Final result Specimen: Blood from Arm, Left Updated: 06/18/22 0825     Sodium 140 mmol/L      Potassium 3 9 mmol/L      Chloride 105 mmol/L      CO2 30 mmol/L      ANION GAP 5 mmol/L      BUN 15 mg/dL      Creatinine 1 32 mg/dL      Glucose 117 mg/dL      Calcium 9 1 mg/dL      AST 29 U/L      ALT 14 U/L      Alkaline Phosphatase 53 U/L      Total Protein 6 8 g/dL      Albumin 3 7 g/dL      Total Bilirubin 1 02 mg/dL      eGFR 45 ml/min/1 73sq m     Narrative:      Jessica guidelines for Chronic Kidney Disease (CKD):     Stage 1 with normal or high GFR (GFR > 90 mL/min/1 73 square meters)    Stage 2 Mild CKD (GFR = 60-89 mL/min/1 73 square meters)    Stage 3A Moderate CKD (GFR = 45-59 mL/min/1 73 square meters)    Stage 3B Moderate CKD (GFR = 30-44 mL/min/1 73 square meters)    Stage 4 Severe CKD (GFR = 15-29 mL/min/1 73 square meters)    Stage 5 End Stage CKD (GFR <15 mL/min/1 73 square meters)  Note: GFR calculation is accurate only with a steady state creatinine    CBC and differential [[id number]] Collected: 06/18/22 6471    Lab Status: Final result Specimen: Blood from Arm, Left Updated: 06/18/22 0807     WBC 7 12 Thousand/uL      RBC 3 99 Million/uL      Hemoglobin 12 3 g/dL      Hematocrit 38 0 %      MCV 95 fL      MCH 30 8 pg      MCHC 32 4 g/dL      RDW 14 3 %      MPV 9 8 fL      Platelets 345 Thousands/uL      nRBC 0 /100 WBCs      Neutrophils Relative 66 %      Immat GRANS % 0 %      Lymphocytes Relative 24 %      Monocytes Relative 9 %      Eosinophils Relative 1 %      Basophils Relative 0 %      Neutrophils Absolute 4 67 Thousands/µL      Immature Grans Absolute 0 03 Thousand/uL      Lymphocytes Absolute 1 73 Thousands/µL      Monocytes Absolute 0 61 Thousand/µL      Eosinophils Absolute 0 06 Thousand/µL      Basophils Absolute 0 02 Thousands/µL     COVID/FLU - 24 hour TAT [[id number]] Collected: 06/18/22 0751    Lab Status: In process Specimen: Nares from Nasopharyngeal Swab Updated: 06/18/22 0803                 XR chest 2 views   ED Interpretation by Nilesh Lassiter DO (06/18 7416)   Mild vascular congestion present  No acute pna or ptx appreciated  Procedures  Procedures         ED Course                                             MDM  Number of Diagnoses or Management Options  Bronchitis  Elevated INR  Diagnosis management comments: Known elevated INR, already has planned place with cardiologist   Otherwise no acute findings  Started on antibiotic for anti-inflammatory purposes  Follow-up and return precautions reviewed  I have reviewed the patient's visit and any testing done in the emergency department  They have verbalized their understanding of any testing done today and have no further questions or concerns regarding their care in the emergency room  They will follow up with their primary care physician as well as with any specialist in their discharge instructions  Strict return precautions were discussed         Amount and/or Complexity of Data Reviewed  Clinical lab tests: ordered and reviewed  Tests in the radiology section of CPT®: ordered and reviewed  Tests in the medicine section of CPT®: ordered and reviewed  Independent visualization of images, tracings, or specimens: yes        Disposition  Final diagnoses:   Bronchitis   Elevated INR     Time reflects when diagnosis was documented in both MDM as applicable and the Disposition within this note     Time User Action Codes Description Comment    6/18/2022  8:44 AM Jeanette Sago Add [J40] Bronchitis     6/18/2022  8:44 AM Jeanette Sago Add [R79 1] Elevated INR       ED Disposition     ED Disposition   Discharge    Condition   Stable    Date/Time   Sat Jun 18, 2022  8:44 AM    Comment   Graeme Castro discharge to home/self care  Follow-up Information     Follow up With Specialties Details Why Contact Info Additional [address] Guadalupe And JOSE ROBERTO Ham Nurse Practitioner   [address]  [address] [place] [id number]  [phone]       [facility] Schedule an appointment as soon as possible for a visit   [address] [place] [id number]  [address], [place], [address] [phone]          Patient's Medications   Discharge Prescriptions    AMOXICILLIN (AMOXIL) 500 MG CAPSULE    Take 1 capsule (500 mg total) by mouth every 12 (twelve) hours for 7 days       Start Date: 6/18/2022 End Date: 6/25/2022       Order Dose: 500 mg       Quantity: 14 capsule    Refills: 0       No discharge procedures on file      PDMP Review     None          ED Provider  Electronically Signed by           Gabby Francis DO  06/18/22 8413

## 2022-06-18 NOTE — Clinical Note
Shara Reagan was seen and treated in our emergency department on 6/18/2022  Diagnosis:     Airam    She may return on this date: 06/21/2022         If you have any questions or concerns, please don't hesitate to call        Alycia Hays, DO    ______________________________           _______________          _______________  Hospital Representative                              Date                                Time

## 2022-06-20 PROBLEM — J47.9 BRONCHIECTASIS WITHOUT COMPLICATION (HCC): Status: ACTIVE | Noted: 2022-06-20

## 2022-06-21 RX ORDER — AMOXICILLIN 500 MG/1
500 CAPSULE ORAL EVERY 12 HOURS SCHEDULED
Qty: 14 CAPSULE | Refills: 0 | Status: SHIPPED | OUTPATIENT
Start: 2022-06-21 | End: 2022-06-28

## 2022-06-23 ENCOUNTER — ANTICOAG VISIT (OUTPATIENT)
Dept: CARDIOLOGY CLINIC | Facility: CLINIC | Age: 57
End: 2022-06-23

## 2022-06-23 ENCOUNTER — APPOINTMENT (OUTPATIENT)
Dept: LAB | Facility: HOSPITAL | Age: 57
End: 2022-06-23
Payer: MEDICARE

## 2022-06-23 DIAGNOSIS — I42.5 OTHER RESTRICTIVE CARDIOMYOPATHY (HCC): ICD-10-CM

## 2022-06-23 DIAGNOSIS — I25.5 ISCHEMIC CARDIOMYOPATHY: Primary | ICD-10-CM

## 2022-06-23 DIAGNOSIS — Z79.01 LONG TERM CURRENT USE OF ANTICOAGULANT THERAPY: ICD-10-CM

## 2022-06-23 DIAGNOSIS — I23.6 LV (LEFT VENTRICULAR) MURAL THROMBUS FOLLOWING MI (HCC): ICD-10-CM

## 2022-06-23 LAB
INR PPP: 4.76 (ref 0.84–1.19)
PROTHROMBIN TIME: 42.1 SECONDS (ref 11.6–14.5)

## 2022-06-23 PROCEDURE — 36415 COLL VENOUS BLD VENIPUNCTURE: CPT

## 2022-06-23 PROCEDURE — 85610 PROTHROMBIN TIME: CPT

## 2022-06-23 NOTE — PROGRESS NOTES
Left message for patient, after reviewing chart, patient was started on antibiotic 6/21/22, advised to hold tonight and tomorrow (Fri), take 5 mg Sat and Sun, recheck 6/27/22    Advised to call with questions or concerns

## 2022-06-29 ENCOUNTER — TELEPHONE (OUTPATIENT)
Dept: NEPHROLOGY | Facility: CLINIC | Age: 57
End: 2022-06-29

## 2022-06-29 DIAGNOSIS — I25.10 TRIPLE VESSEL CORONARY ARTERY DISEASE: ICD-10-CM

## 2022-06-29 DIAGNOSIS — I25.5 ISCHEMIC CARDIOMYOPATHY: ICD-10-CM

## 2022-06-29 RX ORDER — CARVEDILOL 6.25 MG/1
TABLET ORAL
Qty: 60 TABLET | Refills: 0 | Status: SHIPPED | OUTPATIENT
Start: 2022-06-29 | End: 2022-07-21 | Stop reason: SDUPTHER

## 2022-06-29 NOTE — TELEPHONE ENCOUNTER
Patient called again to see if her medication was sent to the pharmacy  Advised patient that Dr Javon Hampton is rounding today so he will not be able to get back to her right away   Asked patient if she contacted her PCP in regards to her cream, patient stated "no just have him call me "

## 2022-06-29 NOTE — TELEPHONE ENCOUNTER
Received a call from patient asking if Dr Oriana Montoya can call in a cream because she has a UTI and her genital area is itching   Patient uses [facility] on [address]

## 2022-07-01 ENCOUNTER — PATIENT OUTREACH (OUTPATIENT)
Dept: SURGERY | Facility: CLINIC | Age: 57
End: 2022-07-01

## 2022-07-01 NOTE — PROGRESS NOTES
Ct agreeable to meeting with this cm before her appointment with the clinic on 7/15/2022 at 12:30 pm  Ct aware to bring her award letter to the appointment and new insurance card   Ct was made aware that her appointment with the clinic on 7/15/2022 is at 1 pm  Never smoker

## 2022-07-11 ENCOUNTER — APPOINTMENT (OUTPATIENT)
Dept: LAB | Facility: HOSPITAL | Age: 57
End: 2022-07-11
Payer: MEDICARE

## 2022-07-11 ENCOUNTER — HOSPITAL ENCOUNTER (OUTPATIENT)
Dept: MAMMOGRAPHY | Facility: CLINIC | Age: 57
Discharge: HOME | End: 2022-07-11
Payer: MEDICARE

## 2022-07-11 ENCOUNTER — HOSPITAL ENCOUNTER (EMERGENCY)
Facility: HOSPITAL | Age: 57
Discharge: HOME | End: 2022-07-11
Attending: EMERGENCY MEDICINE
Payer: MEDICARE

## 2022-07-11 VITALS
HEART RATE: 62 BPM | SYSTOLIC BLOOD PRESSURE: 102 MMHG | TEMPERATURE: 98 F | WEIGHT: 153 LBS | OXYGEN SATURATION: 99 % | DIASTOLIC BLOOD PRESSURE: 67 MMHG | RESPIRATION RATE: 16 BRPM | BODY MASS INDEX: 28.91 KG/M2

## 2022-07-11 DIAGNOSIS — Z13.9 ENCOUNTER FOR MEDICAL SCREENING EXAMINATION: Primary | ICD-10-CM

## 2022-07-11 DIAGNOSIS — I25.5 ISCHEMIC CARDIOMYOPATHY: ICD-10-CM

## 2022-07-11 LAB
ATRIAL RATE: 57 BPM
P AXIS: 66 DEGREES
PR INTERVAL: 120 MS
QRS AXIS: 16 DEGREES
QRSD INTERVAL: 88 MS
QT INTERVAL: 466 MS
QTC INTERVAL: 453 MS
T WAVE AXIS: 82 DEGREES
VENTRICULAR RATE: 57 BPM

## 2022-07-11 PROCEDURE — 93010 ELECTROCARDIOGRAM REPORT: CPT | Performed by: INTERNAL MEDICINE

## 2022-07-11 PROCEDURE — 99283 EMERGENCY DEPT VISIT LOW MDM: CPT

## 2022-07-11 PROCEDURE — 99284 EMERGENCY DEPT VISIT MOD MDM: CPT | Performed by: EMERGENCY MEDICINE

## 2022-07-11 PROCEDURE — 93005 ELECTROCARDIOGRAM TRACING: CPT

## 2022-07-11 PROCEDURE — 77063 BREAST TOMOSYNTHESIS BI: CPT

## 2022-07-11 PROCEDURE — 77067 SCR MAMMO BI INCL CAD: CPT

## 2022-07-11 NOTE — ED PROVIDER NOTES
History  Chief Complaint   Patient presents with    Medical Problem - Re-Evaluation     Pt reports she had chest pain on 22 while incarcerated  Had an EKG at the time and was supposed to have a follow-up EKG but was released from incarceration before that could be performed  Pt here today requesting an EKG only for follow-up  Pt denies chest pain or any other symptoms today  52-year-old female with history CAD and ischemic cardiomyopathy status post ICD placement presenting for medical evaluation and request an EKG  Patient was incarcerated on , and a week ago, had an episode of chest pain  They performed an EKG at that time and told her it was normal   However, she was informed she needed a repeat EKG  Patient was released from incarceration this past Friday and was sent home  Patient was discussing with her cardiologist today, who stated they would like her to have a repeat EKG  Patient states she has not had chest pain or any other symptoms for the last week  Denies fever, chills, upper respiratory symptoms, cough, nausea, vomiting, diaphoresis, abdominal pain, leg swelling  Patient missed a few doses of her medications, as well, but she had blood work performed this afternoon and will follow-up with cardiology for warfarin dosing  Patient declines further interventions or testing, stating she only would like the EKG  Patient has a follow-up with her primary care provider this week and is working with Cardiology to follow up with them, as well  Prior to Admission Medications   Prescriptions Last Dose Informant Patient Reported? Taking?    Spacer/Aero-Holding Chambers (Procare Spacer/Adult Mask) MARK  Self No No   Si Device by Does not apply route daily   albuterol (5 mg/mL) 0 5 % nebulizer solution   No No   Sig: Take 0 5 mL (2 5 mg total) by nebulization every 6 (six) hours as needed for wheezing   albuterol (PROVENTIL HFA,VENTOLIN HFA) 90 mcg/act inhaler   No No   Sig: Inhale 2 puffs every 4 (four) hours as needed for wheezing or shortness of breath   albuterol (PROVENTIL HFA,VENTOLIN HFA) 90 mcg/act inhaler   No No   Sig: Inhale 2 puffs every 4 (four) hours as needed for wheezing   aspirin (ECOTRIN LOW STRENGTH) 81 mg EC tablet   No No   Sig: Take 1 tablet (81 mg total) by mouth daily   atorvastatin (LIPITOR) 80 mg tablet   No No   Sig: Take 1 tablet (80 mg total) by mouth daily   bictegravir-emtricitab-tenofovir alafenamide (Biktarvy) -25 MG tablet  Self No No   Sig: Take 1 tablet by mouth daily with breakfast   carvedilol (COREG) 6 25 mg tablet   No No   Sig: TAKE 1 TABLET BY MOUTH 2 TIMES A DAY WITH MEALS   ciclopirox (PENLAC) 8 % solution   No No   Sig: Apply topically daily at bedtime   fluticasone (FLONASE) 50 mcg/act nasal spray   No No   Si spray into each nostril daily   loratadine (CLARITIN) 10 mg tablet   No No   Sig: Take 1 tablet (10 mg total) by mouth daily   sacubitril-valsartan (Entresto) 49-51 MG TABS   No No   Sig: Take 1 tablet by mouth 2 (two) times a day   warfarin (COUMADIN) 1 mg tablet  Self No No   Sig: TAKE 1 TABLET BY MOUTH DAILY OR AS DIRECTED   warfarin (COUMADIN) 5 mg tablet   No No   Si to 1 5 tablets daily as directed by MD      Facility-Administered Medications: None       Past Medical History:   Diagnosis Date    Asthma     Coronary artery disease     defibrillator    HIV positive (HCC)        Past Surgical History:   Procedure Laterality Date    ANGIOPLASTY      CARDIAC DEFIBRILLATOR PLACEMENT      CORONARY ANGIOPLASTY      pci placement    TOTAL ABDOMINAL HYSTERECTOMY         Family History   Problem Relation Age of Onset    No Known Problems Mother     Other Father         GI bleed    No Known Problems Sister     No Known Problems Sister     No Known Problems Maternal Grandmother     No Known Problems Maternal Grandfather     No Known Problems Paternal Grandmother     No Known Problems Paternal Grandfather    [facility] Suicidality Brother     Drug abuse Brother         overdose    No Known Problems Maternal Aunt     No Known Problems Maternal Aunt     No Known Problems Maternal Aunt     No Known Problems Paternal Aunt      I have reviewed and agree with the history as documented  E-Cigarette/Vaping    E-Cigarette Use Never User      E-Cigarette/Vaping Substances     Social History     Tobacco Use    Smoking status: Current Every Day Smoker     Packs/day: 1 00     Years: 30 00     Pack years: 30 00     Types: Cigarettes     Start date: 3/8/1994     Last attempt to quit: 2021     Years since quittin 0    Smokeless tobacco: Former User     Quit date: 2019    Tobacco comment: Pt smokes 1 cig daily   Vaping Use    Vaping Use: Never used   Substance Use Topics    Alcohol use: Not Currently     Comment: ocassionally    Drug use: No       Review of Systems   Constitutional: Negative for appetite change, chills, diaphoresis and fever  HENT: Negative for congestion, rhinorrhea and sore throat  Eyes: Negative for pain, discharge and visual disturbance  Respiratory: Negative for cough and shortness of breath  Cardiovascular: Negative for chest pain, palpitations and leg swelling  Gastrointestinal: Negative for abdominal pain, diarrhea, nausea and vomiting  Genitourinary: Negative for dysuria, frequency and hematuria  Musculoskeletal: Negative for arthralgias and myalgias  Skin: Negative for color change and rash  Neurological: Negative for dizziness, syncope, weakness, light-headedness, numbness and headaches  Hematological: Does not bruise/bleed easily  Physical Exam  Physical Exam  Vitals and nursing note reviewed  Constitutional:       General: She is not in acute distress  Appearance: Normal appearance  HENT:      Head: Normocephalic and atraumatic  Nose: Nose normal       Mouth/Throat:      Mouth: Mucous membranes are moist    Eyes:      General: No scleral icterus  Extraocular Movements: Extraocular movements intact  Conjunctiva/sclera: Conjunctivae normal       Pupils: Pupils are equal, round, and reactive to light  Cardiovascular:      Rate and Rhythm: Normal rate and regular rhythm  Pulmonary:      Effort: Pulmonary effort is normal  No respiratory distress  Breath sounds: No wheezing, rhonchi or rales  Abdominal:      General: There is no distension  Palpations: Abdomen is soft  Tenderness: There is no abdominal tenderness  There is no guarding or rebound  Musculoskeletal:         General: No swelling, tenderness or deformity  Cervical back: Neck supple  Right lower leg: No edema  Left lower leg: No edema  Skin:     General: Skin is warm and dry  Findings: No rash  Neurological:      General: No focal deficit present  Mental Status: She is alert and oriented to person, place, and time  Mental status is at baseline        Gait: Gait normal    Psychiatric:         Mood and Affect: Mood normal          Behavior: Behavior normal          Vital Signs  ED Triage Vitals [07/11/22 1547]   Temperature Pulse Respirations Blood Pressure SpO2   98 °F (36 7 °C) 62 16 102/67 99 %      Temp Source Heart Rate Source Patient Position - Orthostatic VS BP Location FiO2 (%)   Oral Monitor Sitting Left arm --      Pain Score       No Pain           Vitals:    07/11/22 1547   BP: 102/67   Pulse: 62   Patient Position - Orthostatic VS: Sitting         Visual Acuity      ED Medications  Medications - No data to display    Diagnostic Studies  Results Reviewed     None                 No orders to display              Procedures  ECG 12 Lead Documentation Only    Date/Time: 7/11/2022 4:12 PM  Performed by: Sabino Mclean MD  Authorized by: Sabino Mclean MD     Indications / Diagnosis:  Evaluation for ischemia  ECG reviewed by me, the ED Provider: yes    Patient location:  ED  Previous ECG:     Previous ECG:  Compared to current    Comparison ECG info:  3/2020    Similarity:  Changes noted  Interpretation:     Interpretation: abnormal    Rate:     ECG rate:  57    ECG rate assessment: bradycardic    Rhythm:     Rhythm: sinus bradycardia    Ectopy:     Ectopy: none    QRS:     QRS axis:  Normal    QRS intervals:  Normal  Conduction:     Conduction: normal    ST segments:     ST segments:  Normal  T waves:     T waves: normal    Q waves:     Q waves:  V2, V3 and V4  Comments:      Low voltage, old Q wave changes; when compared to previous, rate is slower, and PVCs no longer present  ED Course                                             MDM  Number of Diagnoses or Management Options  Encounter for medical screening examination  Ischemic cardiomyopathy  Diagnosis management comments: 50-year-old female presenting for medical evaluation and repeat EKG after an episode of chest pain last week while incarcerated  Clinically, patient is well-appearing with normal vital signs  She denies acute complaints  No evidence of fluid overload or heart failure  Patient has not had any events with her ICD  EKG was performed and is without acute ischemia or malignant dysrhythmia  Compared to previous EKG, PVCs are no longer present  Rate is somewhat slower  Patient declines other lab work at this time stating she had some done today and will follow up with Cardiology regarding her medications and warfarin  Encouraged follow-up with her PCP as well  Patient will return with any new or worsening symptoms  She is in agreement and understanding of these instructions        Disposition  Final diagnoses:   Encounter for medical screening examination   Ischemic cardiomyopathy     Time reflects when diagnosis was documented in both MDM as applicable and the Disposition within this note     Time User Action Codes Description Comment    7/11/2022  4:10 PM Michelle Parks Add [Z13 9] Encounter for medical screening examination 7/11/2022  4:10 PM Yanique Waite Add [I25 5] Ischemic cardiomyopathy       ED Disposition     ED Disposition   Discharge    Condition   Stable    Date/Time   Mon Jul 11, 2022  4:10 PM    Comment   Ambika Villarreal discharge to home/self care  Follow-up Information     Follow up With Specialties Details Why [facility] Practition[facility] Schedule an appointment as soon as possible for a visit   [address]  [address]      Irwin Stern MD Cardiology Schedule an appointment as soon as possible for a visit   18   [address] [place] [address] [address] [facility]  [phone]            Current Discharge Medication List      CONTINUE these medications which have NOT CHANGED    Details   albuterol (5 mg/mL) 0 5 % nebulizer solution Take 0 5 mL (2 5 mg total) by nebulization every 6 (six) hours as needed for wheezing  Qty: 20 mL, Refills: 0    Associated Diagnoses: Viral URI with cough      !! albuterol (PROVENTIL HFA,VENTOLIN HFA) 90 mcg/act inhaler Inhale 2 puffs every 4 (four) hours as needed for wheezing or shortness of breath  Qty: 18 g, Refills: 0    Comments: Substitution to a formulary equivalent within the same pharmaceutical class is authorized  Associated Diagnoses: Viral upper respiratory illness      !! albuterol (PROVENTIL HFA,VENTOLIN HFA) 90 mcg/act inhaler Inhale 2 puffs every 4 (four) hours as needed for wheezing  Qty: 6 7 g, Refills: 0    Comments: Substitution to a formulary equivalent within the same pharmaceutical class is authorized  Associated Diagnoses: Viral URI with cough      aspirin (ECOTRIN LOW STRENGTH) 81 mg EC tablet Take 1 tablet (81 mg total) by mouth daily  Qty: 81 tablet, Refills: 11    Associated Diagnoses:  Old MI (myocardial infarction)      atorvastatin (LIPITOR) 80 mg tablet Take 1 tablet (80 mg total) by mouth daily  Qty: 30 tablet, Refills: 5    Associated Diagnoses: Hyperlipidemia, unspecified hyperlipidemia type      bictegravir-emtricitab-tenofovir alafenamide (Biktarvy) -25 MG tablet Take 1 tablet by mouth daily with breakfast  Qty: 90 tablet, Refills: 1    Associated Diagnoses: HIV positive (HCC)      carvedilol (COREG) 6 25 mg tablet TAKE 1 TABLET BY MOUTH 2 TIMES A DAY WITH MEALS  Qty: 60 tablet, Refills: 0    Associated Diagnoses: Triple vessel coronary artery disease; Ischemic cardiomyopathy      ciclopirox (PENLAC) 8 % solution Apply topically daily at bedtime  Qty: 6 6 mL, Refills: 0    Associated Diagnoses: Onychomycosis      fluticasone (FLONASE) 50 mcg/act nasal spray 1 spray into each nostril daily  Qty: 16 g, Refills: 0    Associated Diagnoses: Viral URI with cough      loratadine (CLARITIN) 10 mg tablet Take 1 tablet (10 mg total) by mouth daily  Qty: 30 tablet, Refills: 0    Associated Diagnoses: Viral URI with cough      sacubitril-valsartan (Entresto) 49-51 MG TABS Take 1 tablet by mouth 2 (two) times a day  Qty: 60 tablet, Refills: 5    Associated Diagnoses: Ischemic cardiomyopathy; Past heart attack      Spacer/Aero-Holding Chambers (Procare Spacer/Adult Mask) MARK 1 Device by Does not apply route daily  Qty: 1 Device, Refills: 0    Comments: May substitute for lower cost generic for insurance approval   Associated Diagnoses: Mild intermittent asthma without complication      !! warfarin (COUMADIN) 1 mg tablet TAKE 1 TABLET BY MOUTH DAILY OR AS DIRECTED  Qty: 30 tablet, Refills: 10    Associated Diagnoses: 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 ([address] )      ! ! warfarin (COUMADIN) 5 mg tablet 1 to 1 5 tablets daily as directed by MD  Qty: 135 tablet, Refills: 3    Associated Diagnoses: Mural thrombus of left ventricle       !! - Potential duplicate medications found  Please discuss with provider  No discharge procedures on file      PDMP Review     None          ED Provider  Electronically Signed by           Jerry Rodriguez MD  07/11/22 [address] [facility],[address]

## 2022-07-12 ENCOUNTER — ANTICOAG VISIT (OUTPATIENT)
Dept: CARDIOLOGY CLINIC | Facility: CLINIC | Age: 57
End: 2022-07-12

## 2022-07-12 DIAGNOSIS — I25.5 ISCHEMIC CARDIOMYOPATHY: Primary | ICD-10-CM

## 2022-07-12 DIAGNOSIS — I23.6 LV (LEFT VENTRICULAR) MURAL THROMBUS FOLLOWING MI (HCC): ICD-10-CM

## 2022-07-12 DIAGNOSIS — Z79.01 LONG TERM CURRENT USE OF ANTICOAGULANT THERAPY: ICD-10-CM

## 2022-07-12 NOTE — PROGRESS NOTES
Spoke with patient, advised INR low, states she was placed in skilled nursing 7/1/22 and did not receive any coumadin until 7/5/22, they gave her 5 mg until she got out 7/8/22, she took 7 5 mg then 5 mg daily     Advised to take 7 5 mg tonight then go back to 5 mg daily will recheck next week 7/18/22

## 2022-07-14 NOTE — ASSESSMENT & PLAN NOTE
Currently she has stopped smoking as of today but did restart smoking for the last 3 months due to all the recent deaths in the family  · Stressed the importance of 100% smoking cessation  · Pt is using nicotine lozenges 4 mg and is happy with them  Nicotine Dependency - Primary    Counseled for greater than 15 minutes on the importance of smoking cessation  Education was given regarding the cardiovascular effects of how nicotine affects the heart, lungs, kidneys, and peripheral vascular system    Referred to [facility] [address] [place] [address] for enrollment in smoking cessation program

## 2022-07-14 NOTE — PROGRESS NOTES
Assessment/Plan:    HIV disease ([address] )    Doing well on Biktarvy with an undetectable VL  Pt reports 100% medication compliance ON HAART  Stressed the importance of 100% medication adherence  HIV Counseling:    Viral Load: < 20    CD4 Count: 092    Denies side effects  Stressed the importance of adherence  Continue follow up in the ID clinic with Dr Patricio Garcia or Dr Ashley Vincent  Reviewed the most recent labs, including the CD4 and viral load  Discussed the risks and benefits of treatment options, instructions for management, importance of treatment adherence, and reduction of risk factors  Educated on possible medication side effects  Counseled on routes of HIV transmission, including the risk of  infection  Emphasized that viral suppression is the best method to prevent HIV transmission  At this time the pt denies the need for HIV testing of anyone in their life  Total encounter time was greater than 35 minutes  Greater than 20 minutes were spent on counseling and patient education  Pt voices understanding and agreement with treatment plan  Mild intermittent asthma without complication  Pt reports  · Abx completed  · Continue with smoking cessation  · No clinical indication at this time for need of nebulizer   · Continue albuterol inhaler   · Would add ICS  · Complete PFT's  · Referral to pulmonary if no improvement with ICS  · Recommend smoking cessation    Tobacco abuse  Currently she has stopped smoking as of today but did restart smoking for the last 3 months due to all the recent deaths in the family  · Stressed the importance of 100% smoking cessation  · Pt is using nicotine lozenges 4 mg and is happy with them  Nicotine Dependency - Primary    Counseled for greater than 15 minutes on the importance of smoking cessation  Education was given regarding the cardiovascular effects of how nicotine affects the heart, lungs, kidneys, and peripheral vascular system    Referred to [facility] [address] [place] [address] for enrollment in smoking cessation program       Essential hypertension  Well controlled  · Continue coreg   · Continue Entresto  · Follow with cardiology     Bronchiectasis without complication ([facility] )  Resolved, feeling better  · Completed abx  · Using rescue inhaler 2 X week  · If pt has another exacerbation would consider PFT's and referral to pulmonary  · Discussed starting ICS but pt felt it was not needed at this time  · Call office with any worsening respiratory symptoms or use of rescue inhaler        Aggression  Worsening  Pt admits to feeling and having aggressive behaviors over the past couple of months  · Pt does not want medication  · Pt agreeable to meet with [address] [place] [address] for CBT    Unintentional weight loss  Feels good with recent weight loss  Loss 7 lb  BMI: 28 53  Suspect recent weight loss is from poor appetite due to recent deaths and incarceration  · Continue to monitor  · Follow with [place] [facility]  · Annual CT scan was negative  · Mammogram results pending       Diagnoses and all orders for this visit:    HIV disease ([facility] )    Mild intermittent asthma without complication    Tobacco abuse  -     nicotine polacrilex (COMMIT) 4 MG lozenge;  Apply 1 lozenge (4 mg total) to the mouth or throat as needed for smoking cessation    Essential hypertension    Aggression    Unintentional weight loss    Bronchiectasis without complication (HCC)         Lab Results   Component Value Date    K 3 9 06/18/2022     06/18/2022    CO2 30 06/18/2022    BUN 15 06/18/2022    CREATININE 1 32 (H) 06/18/2022    GLUF 102 (H) 03/08/2022    CALCIUM 9 1 06/18/2022    AST 29 06/18/2022    ALT 14 06/18/2022    ALKPHOS 53 06/18/2022    EGFR 45 06/18/2022     Lab Results   Component Value Date    WBC 7 12 06/18/2022    HGB 12 3 06/18/2022    HCT 38 0 06/18/2022    MCV 95 06/18/2022     06/18/2022       Patient Active Problem List   Diagnosis    Ischemic cardiomyopathy    Triple vessel coronary artery disease    [facility] disease ([facility] )    Essential hypertension    Mixed hyperlipidemia    Impaired left ventricular function    Long term current use of anticoagulant therapy    old extensive anterior, apical, lateral and distal inferior apical MI    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 (HCC)    CKD (chronic kidney disease) stage 3, GFR 30-59 ml/min (HCC)    Implantable cardioverter-defibrillator (ICD) in situ    Mild intermittent asthma without complication    ASCUS with positive high risk HPV cervical    Tobacco abuse    Obesity (BMI 30-39  9)    Viral upper respiratory illness    Onychomycosis    Bron[facility]ctasis without complication ([facility])    Aggression    Unintentional weight loss     Subjective:      Patient ID: Ambika Villarreal is a 65 y o  female  HPI  Pt presents for follow up visit, management of HIV, and chronic health care conditions  Pt was recently seen twice for cough that was dx as exacerbation of asthma was given UDN in ER and steroids with improvement but cough continued and was seen in ER on 6/18 for ongoing cough  CXR: Cardiomediastinal silhouette appears unremarkable  No congestion or focal area of consolidation  The streaky opacity in the medial right lung base demonstrated to represent chronic bronchiectasis and scar on the prior CT is not appreciably changed  Osseous structures appear within normal limits for patient age  IMPRESSION:  No acute cardiopulmonary disease  Pt was given amoxicillin 500 mg q 12 hrs X 7 days  Pt has called office asking for nebulizer machine to help with her cough  Pt has had 1 exacerbation that did improve with steroids  Cough could be related to other issues  Pt was incarcerated on July 1, 2022 and was released on July 5,2022    Pt reports while she was incarcerated she had an episode of chest pain and EKG was done which was reported to pt and normal but at release she was told to have a repeat EKG and when to ER on 7/11/22 for EKG and showed SB rate 57 and Pt presents for follow up visit, management of HIV, and chronic health care conditions  Pt was recently seen twice for cough that was dx as exacerbation of asthma was given UDN in ER and steroids with improvement but cough continued and was seen in ER on 6/18 for ongoing cough  CXR: Cardiomediastinal silhouette appears unremarkable  No congestion or focal area of consolidation  The streaky opacity in the medial right lung base demonstrated to represent chronic bronchiectasis and scar on the prior CT is not appreciably changed  Osseous structures appear within normal limits for patient age  IMPRESSION:  No acute cardiopulmonary disease  Pt was given amoxicillin 500 mg q 12 hrs X 7 days  Pt has called office asking for nebulizer machine to help with her cough  Pt has had 1 exacerbation that did improve with steroids  Cough could be related to other issues  Pt also call Dr Cielo Davies office requesting a "cream for UTI "  There was no testing or documentation to support UTI and LEONARDO AGUIRRE called pt to inquire about what was going on and pt would like a cream for vaginal itching  Pt was told she would need to be seen with vaginal exam and declined earlier visit  Pt has been going through a lot from experiencing several family deaths to be incarcerated  Pt has lost 7 lb over the past month and is happy she lost the weight but it was unintentional and most likely related to lack of appetite  Pt was picked up and incarcerated from July 1 to the 8th for an outstanding 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 years 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  Pt was not given her HIV medication for 4 days  Pt has developed chest pain, EKG was normal but pt was not taken to medical while incarcerated  Pt reports everything is fine she her URI infection last month and has been using rescue inhaler a couple times of month    Pt was inquiring for a nebulizer machine but is not having an respiratory issues or using daily inhalers  Pt reports she is not having any vaginal discharge and does not need an exam       Pt was seen by Maryanne Cleveland CM from [facility] and pt is interested in transferring her medications to [facility]  The following portions of the patient's history were reviewed and updated as appropriate: allergies, current medications, past family history, past medical history, past social history and problem list     Review of Systems   Constitutional: Negative  HENT: Negative  Gastrointestinal: Negative  Musculoskeletal: Negative  Skin: Negative  Neurological: Negative  Psychiatric/Behavioral: Negative for suicidal ideas  Objective:      /67 (BP Location: Right arm, Patient Position: Sitting, Cuff Size: Standard)   Pulse 66   Temp 97 5 °F (36 4 °C)   Resp 16   Ht 5' 1" (1 549 m)   Wt 68 5 kg (151 lb)   SpO2 98%   BMI 28 53 kg/m²          Physical Exam  Vitals and nursing note reviewed  Constitutional:       General: She is not in acute distress  Appearance: Normal appearance  She is not ill-appearing  HENT:      Mouth/Throat:      Mouth: Mucous membranes are moist       Pharynx: Oropharynx is clear  Cardiovascular:      Rate and Rhythm: Normal rate  Chest Wall: PMI is not displaced  Pulses: Normal pulses  Heart sounds: Murmur heard  Systolic murmur is present  Pulmonary:      Effort: Pulmonary effort is normal       Breath sounds: Normal breath sounds  Abdominal:      General: Bowel sounds are normal       Palpations: Abdomen is soft  Musculoskeletal:         General: Normal range of motion  Skin:     General: Skin is dry  Neurological:      Mental Status: She is alert and oriented to person, place, and time  Psychiatric:         Behavior: Behavior normal          Thought Content: Thought content normal          Judgment: Judgment normal        BMI Counseling: Body mass index is 28 53 kg/m²   The BMI is above normal  Nutrition recommendations include reducing portion sizes, decreasing overall calorie intake, 3-5 servings of fruits/vegetables daily, reducing fast food intake, consuming healthier snacks, moderation in carbohydrate intake and increasing intake of lean protein  Exercise recommendations include exercising 3-5 times per week and strength training exercises

## 2022-07-14 NOTE — ASSESSMENT & PLAN NOTE
Pt reports  · Abx completed  · Continue with smoking cessation  · No clinical indication at this time for need of nebulizer   · Continue albuterol inhaler   · Would add ICS  · Complete PFT's  · Referral to pulmonary if no improvement with ICS  · Recommend smoking cessation

## 2022-07-14 NOTE — ASSESSMENT & PLAN NOTE
Doing well on Biktarvy with an undetectable VL  Pt reports 100% medication compliance ON HAART  Stressed the importance of 100% medication adherence  HIV Counseling:    Viral Load: < 20    CD4 Count: 254    Denies side effects  Stressed the importance of adherence  Continue follow up in the ID clinic with Dr Arina Leon or Dr Oscar Ruiz  Reviewed the most recent labs, including the CD4 and viral load  Discussed the risks and benefits of treatment options, instructions for management, importance of treatment adherence, and reduction of risk factors  Educated on possible medication side effects  Counseled on routes of HIV transmission, including the risk of  infection  Emphasized that viral suppression is the best method to prevent HIV transmission  At this time the pt denies the need for HIV testing of anyone in their life  Total encounter time was greater than 35 minutes  Greater than 20 minutes were spent on counseling and patient education  Pt voices understanding and agreement with treatment plan

## 2022-07-15 ENCOUNTER — OFFICE VISIT (OUTPATIENT)
Dept: SURGERY | Facility: CLINIC | Age: 57
End: 2022-07-15
Payer: MEDICARE

## 2022-07-15 ENCOUNTER — PATIENT OUTREACH (OUTPATIENT)
Dept: SURGERY | Facility: CLINIC | Age: 57
End: 2022-07-15

## 2022-07-15 VITALS
HEART RATE: 66 BPM | RESPIRATION RATE: 16 BRPM | SYSTOLIC BLOOD PRESSURE: 106 MMHG | DIASTOLIC BLOOD PRESSURE: 67 MMHG | OXYGEN SATURATION: 98 % | HEIGHT: 61 IN | TEMPERATURE: 97.5 F | WEIGHT: 151 LBS | BODY MASS INDEX: 28.51 KG/M2

## 2022-07-15 DIAGNOSIS — J45.20 MILD INTERMITTENT ASTHMA WITHOUT COMPLICATION: ICD-10-CM

## 2022-07-15 DIAGNOSIS — B20 HIV DISEASE (HCC): Primary | ICD-10-CM

## 2022-07-15 DIAGNOSIS — I10 ESSENTIAL HYPERTENSION: ICD-10-CM

## 2022-07-15 DIAGNOSIS — R63.4 UNINTENTIONAL WEIGHT LOSS: ICD-10-CM

## 2022-07-15 DIAGNOSIS — R46.89 AGGRESSION: ICD-10-CM

## 2022-07-15 DIAGNOSIS — Z72.0 TOBACCO ABUSE: ICD-10-CM

## 2022-07-15 DIAGNOSIS — J47.9 BRONCHIECTASIS WITHOUT COMPLICATION (HCC): ICD-10-CM

## 2022-07-15 PROCEDURE — 99215 OFFICE O/P EST HI 40 MIN: CPT | Performed by: NURSE PRACTITIONER

## 2022-07-15 RX ORDER — POLYETHYLENE GLYCOL 3350 17 G
4 POWDER IN PACKET (EA) ORAL AS NEEDED
Qty: 100 LOZENGE | Refills: 5 | Status: SHIPPED | OUTPATIENT
Start: 2022-07-15

## 2022-07-15 NOTE — PROGRESS NOTES
Ct came in for scheduled recert appointment  Ct reports complete understanding of HIV disease process, transmission, and medications  Ct reports she is able to meet own basic needs and is able to access community assistance as needed  Ct reports consistent and reliable access to transportation  Ct reports medical care coverage/health insurance  Ct reports being self sufficient and can independently follow up on own referrals and access care and services  Ct reports stable housing  Ct reports not being sexually active  Ct reports no difficulties with substance use  Ct reports own dental insurance; able to access dental services  Ct reports current mental health concerns  Ning Mann was made aware  Ct reports steady source of income  Ct reports no language/cultural barriers  Acuity scale was completed and scanned into ct's chart  RW Part B form was completed and scanned into ct's chart with supporting documents  Ct discussion took place with Francis Kim is regards to ct missing her HIV medications while she was incarcerated

## 2022-07-15 NOTE — ASSESSMENT & PLAN NOTE
Resolved, feeling better      · Completed abx  · Using rescue inhaler 2 X week  · If pt has another exacerbation would consider PFT's and referral to pulmonary  · Discussed starting ICS but pt felt it was not needed at this time  · Call office with any worsening respiratory symptoms or use of rescue inhaler

## 2022-07-15 NOTE — ASSESSMENT & PLAN NOTE
Feels good with recent weight loss  Loss 7 lb  BMI: 28 53  Suspect recent weight loss is from poor appetite due to recent deaths and incarceration     · Continue to monitor  · Follow with [place] [facility]  · Annual CT scan was negative  · Mammogram results pending

## 2022-07-15 NOTE — PROGRESS NOTES
Assessment/Plan: [facility] approached pt after PCP's intervention to address current emotional, cognitive and behavioral health's stability  During today's contact, PCP discussed concerns expressed by pt regarding ongoing emotional reactivity due to extended grief-processing  Pt disclosed that several members of her family passed away, including her sister which led pt's nephews and nieces to disclose to pt that she needs to address her agressiveness and anger professionally  Pt disclosed the need to talk to "someone" about her behavioral displays, yet acknowledged that her reactivity is part of her personality  Pt described not wanting to be under MM, disclosing that the last time she acquired Tx, she was at the [facility], and the medications provided weren't helpful  Positive reinforcements were provided to pt for her courage to address current emotional, cognitive and behavioral aspects, been encouraged to consider ongoing [facility] interventions to explore the source of her emotional reactivity  Before contact's completion, a brief review of the stages of grief were discussed, focusing on the anger stage, and how the emotion can be a "mask" to cover core emotional and cognitive processing that may be difficult to address  Pt requested a f/u session on 7/19/22 to discuss the subject in discussion  [facility]     Today patient present with   Chief Complaint   Patient presents with    Follow-up     Patient would likely benefit from: Addressing stages of grief and ongoing emotional reactivity  Consider/focus/continue: Source of emotional reactivity  Stage of change: Preparation  Plan/ Behavioral Recommendations: Ongoing  interventions per pt's coordinated care, and/or pt's request of services  Diagnoses and all orders for this visit:    HIV disease ([facility] )    Mild intermittent asthma without complication    Tobacco abuse  -     nicotine polacrilex (COMMIT) 4 MG lozenge;  Apply 1 lozenge (4 mg total) to the mouth or throat as needed for smoking cessation    Essential hypertension    Aggression    Unintentional weight loss    Bronchiectasis without complication [facility])          Discussion:     Patient can reach out to [facility] if they experiences changes in their behavioral health  Subjective:     Patient ID: Gracie Valles is a 65 y o  female  HPI    History of Present Illness: The patient is seeing the Ro[facility] Luev[facility] today for a routine behavioral health follow up  Review of Systems      Objective:     Physical Exam      [facility]    Orientation     Person: yes    Place: yes    Time: yes    Appearance    Well Developed: yes healthy    Uncomfortable: no    Normal Body Odor: yes    Smells of Feces: no    Smells of Urine: no    Disheveled: no    Well Nourished: yes overweight    Grooming Unkempt: no    Poor Eye Contact: no    Hirsute: no    Looks Tired: no    Acutely Exhausted: noappearance reflects stated age    Mood and Affect:     Appropriate: yes    Euthymicyes    Irritable: no    Angry: no    Anxious: no    Depressed:yes    Blunted:yes    Labile: no    Restricted: no    Harm to Self or Others: No SI or HI were disclosed, nor displayed throughout contact  Substance Abuse: Tobacco Use Disorder, Severe

## 2022-07-15 NOTE — ASSESSMENT & PLAN NOTE
Worsening  Pt admits to feeling and having aggressive behaviors over the past couple of months    · Pt does not want medication  · Pt agreeable to meet with [facility] for CBT

## 2022-07-18 ENCOUNTER — APPOINTMENT (OUTPATIENT)
Dept: LAB | Facility: HOSPITAL | Age: 57
End: 2022-07-18
Payer: MEDICARE

## 2022-07-18 ENCOUNTER — PATIENT OUTREACH (OUTPATIENT)
Dept: SURGERY | Facility: CLINIC | Age: 57
End: 2022-07-18

## 2022-07-18 ENCOUNTER — ANTICOAG VISIT (OUTPATIENT)
Dept: CARDIOLOGY CLINIC | Facility: CLINIC | Age: 57
End: 2022-07-18

## 2022-07-18 DIAGNOSIS — I25.5 ISCHEMIC CARDIOMYOPATHY: Primary | ICD-10-CM

## 2022-07-18 DIAGNOSIS — Z79.01 LONG TERM CURRENT USE OF ANTICOAGULANT THERAPY: ICD-10-CM

## 2022-07-18 DIAGNOSIS — I23.6 LV (LEFT VENTRICULAR) MURAL THROMBUS FOLLOWING MI (HCC): ICD-10-CM

## 2022-07-18 LAB
ALBUMIN SERPL BCP-MCNC: 4.2 G/DL (ref 3.5–5)
ALP SERPL-CCNC: 60 U/L (ref 43–122)
ALT SERPL W P-5'-P-CCNC: 16 U/L
ANION GAP SERPL CALCULATED.3IONS-SCNC: 8 MMOL/L (ref 5–14)
AST SERPL W P-5'-P-CCNC: 28 U/L (ref 14–36)
BASOPHILS # BLD AUTO: 0.06 THOUSANDS/ΜL (ref 0–0.1)
BASOPHILS NFR BLD AUTO: 1 % (ref 0–1)
BILIRUB SERPL-MCNC: 0.86 MG/DL (ref 0.2–1)
BUN SERPL-MCNC: 13 MG/DL (ref 5–25)
CALCIUM SERPL-MCNC: 9.4 MG/DL (ref 8.4–10.2)
CHLORIDE SERPL-SCNC: 109 MMOL/L (ref 96–108)
CO2 SERPL-SCNC: 24 MMOL/L (ref 21–32)
CREAT SERPL-MCNC: 1.32 MG/DL (ref 0.6–1.2)
EOSINOPHIL # BLD AUTO: 0.12 THOUSAND/ΜL (ref 0–0.61)
EOSINOPHIL NFR BLD AUTO: 2 % (ref 0–6)
ERYTHROCYTE [DISTWIDTH] IN BLOOD BY AUTOMATED COUNT: 14.3 % (ref 11.6–15.1)
EST. AVERAGE GLUCOSE BLD GHB EST-MCNC: 126 MG/DL
GFR SERPL CREATININE-BSD FRML MDRD: 45 ML/MIN/1.73SQ M
GLUCOSE P FAST SERPL-MCNC: 106 MG/DL (ref 70–99)
HBA1C MFR BLD: 6 %
HCT VFR BLD AUTO: 44.1 % (ref 34.8–46.1)
HCV AB SER QL: NORMAL
HGB BLD-MCNC: 14.5 G/DL (ref 11.5–15.4)
IMM GRANULOCYTES # BLD AUTO: 0.02 THOUSAND/UL (ref 0–0.2)
IMM GRANULOCYTES NFR BLD AUTO: 0 % (ref 0–2)
LYMPHOCYTES # BLD AUTO: 2.17 THOUSANDS/ΜL (ref 0.6–4.47)
LYMPHOCYTES NFR BLD AUTO: 34 % (ref 14–44)
MCH RBC QN AUTO: 31 PG (ref 26.8–34.3)
MCHC RBC AUTO-ENTMCNC: 32.9 G/DL (ref 31.4–37.4)
MCV RBC AUTO: 94 FL (ref 82–98)
MONOCYTES # BLD AUTO: 0.4 THOUSAND/ΜL (ref 0.17–1.22)
MONOCYTES NFR BLD AUTO: 6 % (ref 4–12)
NEUTROPHILS # BLD AUTO: 3.55 THOUSANDS/ΜL (ref 1.85–7.62)
NEUTS SEG NFR BLD AUTO: 57 % (ref 43–75)
NRBC BLD AUTO-RTO: 0 /100 WBCS
PLATELET # BLD AUTO: 216 THOUSANDS/UL (ref 149–390)
PMV BLD AUTO: 11.3 FL (ref 8.9–12.7)
POTASSIUM SERPL-SCNC: 4.4 MMOL/L (ref 3.5–5.3)
PROT SERPL-MCNC: 7.6 G/DL (ref 6.4–8.4)
RBC # BLD AUTO: 4.68 MILLION/UL (ref 3.81–5.12)
SODIUM SERPL-SCNC: 141 MMOL/L (ref 135–147)
WBC # BLD AUTO: 6.32 THOUSAND/UL (ref 4.31–10.16)

## 2022-07-18 PROCEDURE — 86361 T CELL ABSOLUTE COUNT: CPT

## 2022-07-18 PROCEDURE — 86480 TB TEST CELL IMMUN MEASURE: CPT

## 2022-07-18 PROCEDURE — 83036 HEMOGLOBIN GLYCOSYLATED A1C: CPT

## 2022-07-18 PROCEDURE — 36415 COLL VENOUS BLD VENIPUNCTURE: CPT

## 2022-07-18 PROCEDURE — 85025 COMPLETE CBC W/AUTO DIFF WBC: CPT

## 2022-07-18 PROCEDURE — 86592 SYPHILIS TEST NON-TREP QUAL: CPT

## 2022-07-18 PROCEDURE — 87536 HIV-1 QUANT&REVRSE TRNSCRPJ: CPT

## 2022-07-18 PROCEDURE — 80053 COMPREHEN METABOLIC PANEL: CPT

## 2022-07-18 PROCEDURE — 86803 HEPATITIS C AB TEST: CPT

## 2022-07-18 NOTE — PROGRESS NOTES
Spoke with patient, advised INR low, no missed doses or changes in medications or diet   Advised to take 7 5 mg Mon Fri, 5 mg all other days, will recheck next week 7/25/22

## 2022-07-18 NOTE — PROGRESS NOTES
Cm contacted [facility]  Randy Jin let this cm know that ct needed to see an oral surgeon to get implant #22 & #27 removed  Randy Jin let this cm know that they made a referral for ct to see an oral surgeon  Cm contacted Jessi [address] [place] [address] let this cm know that the [facility] office in [place] was the only office accepting ct's insurance  Appointment for ct was scheduled for 12/14/2022 at 3:20 pm     KELLY signed my ct was scanned into ct's chart for [facility]  Cm contacted ct to make her aware of appointment  Ct agreeable to this cm texting appointment day, time, contact information, and address to ct  This cm texted ct above information

## 2022-07-19 LAB
BASOPHILS # BLD AUTO: 0 X10E3/UL (ref 0–0.2)
BASOPHILS NFR BLD AUTO: 1 %
CD3+CD4+ CELLS # BLD: 402 /UL (ref 359–1519)
CD3+CD4+ CELLS NFR BLD: 20.1 % (ref 30.8–58.5)
EOSINOPHIL # BLD AUTO: 0.1 X10E3/UL (ref 0–0.4)
EOSINOPHIL NFR BLD AUTO: 1 %
ERYTHROCYTE [DISTWIDTH] IN BLOOD BY AUTOMATED COUNT: 14.3 % (ref 11.7–15.4)
HCT VFR BLD AUTO: 39.4 % (ref 34–46.6)
HGB BLD-MCNC: 13.3 G/DL (ref 11.1–15.9)
IMM GRANULOCYTES # BLD: 0 X10E3/UL (ref 0–0.1)
IMM GRANULOCYTES NFR BLD: 0 %
LYMPHOCYTES # BLD AUTO: 2 X10E3/UL (ref 0.7–3.1)
LYMPHOCYTES NFR BLD AUTO: 31 %
MCH RBC QN AUTO: 31.4 PG (ref 26.6–33)
MCHC RBC AUTO-ENTMCNC: 33.8 G/DL (ref 31.5–35.7)
MCV RBC AUTO: 93 FL (ref 79–97)
MONOCYTES # BLD AUTO: 0.4 X10E3/UL (ref 0.1–0.9)
MONOCYTES NFR BLD AUTO: 6 %
NEUTROPHILS # BLD AUTO: 3.8 X10E3/UL (ref 1.4–7)
NEUTROPHILS NFR BLD AUTO: 61 %
PLATELET # BLD AUTO: 239 X10E3/UL (ref 150–450)
RBC # BLD AUTO: 4.24 X10E6/UL (ref 3.77–5.28)
RPR SER QL: NORMAL
WBC # BLD AUTO: 6.3 X10E3/UL (ref 3.4–10.8)

## 2022-07-21 DIAGNOSIS — I25.2 PAST HEART ATTACK: ICD-10-CM

## 2022-07-21 DIAGNOSIS — I25.10 TRIPLE VESSEL CORONARY ARTERY DISEASE: ICD-10-CM

## 2022-07-21 DIAGNOSIS — I25.2 OLD MI (MYOCARDIAL INFARCTION): ICD-10-CM

## 2022-07-21 DIAGNOSIS — I25.5 ISCHEMIC CARDIOMYOPATHY: ICD-10-CM

## 2022-07-21 DIAGNOSIS — E78.5 HYPERLIPIDEMIA, UNSPECIFIED HYPERLIPIDEMIA TYPE: ICD-10-CM

## 2022-07-21 RX ORDER — SACUBITRIL AND VALSARTAN 49; 51 MG/1; MG/1
1 TABLET, FILM COATED ORAL 2 TIMES DAILY
Qty: 60 TABLET | Refills: 5 | Status: SHIPPED | OUTPATIENT
Start: 2022-07-21

## 2022-07-21 RX ORDER — ATORVASTATIN CALCIUM 80 MG/1
80 TABLET, FILM COATED ORAL DAILY
Qty: 30 TABLET | Refills: 5 | Status: SHIPPED | OUTPATIENT
Start: 2022-07-21

## 2022-07-21 RX ORDER — ASPIRIN 81 MG/1
81 TABLET ORAL DAILY
Qty: 81 TABLET | Refills: 11 | Status: SHIPPED | OUTPATIENT
Start: 2022-07-21

## 2022-07-21 RX ORDER — CARVEDILOL 6.25 MG/1
6.25 TABLET ORAL 2 TIMES DAILY WITH MEALS
Qty: 60 TABLET | Refills: 5 | Status: SHIPPED | OUTPATIENT
Start: 2022-07-21

## 2022-07-22 DIAGNOSIS — Z21 HIV POSITIVE (HCC): ICD-10-CM

## 2022-07-22 RX ORDER — BICTEGRAVIR SODIUM, EMTRICITABINE, AND TENOFOVIR ALAFENAMIDE FUMARATE 50; 200; 25 MG/1; MG/1; MG/1
1 TABLET ORAL
Qty: 90 TABLET | Refills: 1 | Status: SHIPPED | OUTPATIENT
Start: 2022-07-22 | End: 2023-02-15

## 2022-07-25 ENCOUNTER — PATIENT OUTREACH (OUTPATIENT)
Dept: SURGERY | Facility: CLINIC | Age: 57
End: 2022-07-25

## 2022-07-25 ENCOUNTER — APPOINTMENT (OUTPATIENT)
Dept: LAB | Facility: CLINIC | Age: 57
End: 2022-07-25
Payer: MEDICARE

## 2022-07-25 LAB
BACTERIA UR QL AUTO: ABNORMAL /HPF
BILIRUB UR QL STRIP: NEGATIVE
CAOX CRY URNS QL MICRO: ABNORMAL /HPF
CLARITY UR: ABNORMAL
COLOR UR: YELLOW
GLUCOSE UR STRIP-MCNC: NEGATIVE MG/DL
HGB UR QL STRIP.AUTO: NEGATIVE
KETONES UR STRIP-MCNC: ABNORMAL MG/DL
LEUKOCYTE ESTERASE UR QL STRIP: ABNORMAL
MUCOUS THREADS UR QL AUTO: ABNORMAL
NITRITE UR QL STRIP: NEGATIVE
NON-SQ EPI CELLS URNS QL MICRO: ABNORMAL /HPF
PH UR STRIP.AUTO: 6 [PH]
PROT UR STRIP-MCNC: ABNORMAL MG/DL
RBC #/AREA URNS AUTO: ABNORMAL /HPF
SP GR UR STRIP.AUTO: 1.03 (ref 1–1.03)
UROBILINOGEN UR STRIP-ACNC: 2 MG/DL
WBC #/AREA URNS AUTO: ABNORMAL /HPF

## 2022-07-25 PROCEDURE — 87491 CHLMYD TRACH DNA AMP PROBE: CPT

## 2022-07-25 PROCEDURE — 87591 N.GONORRHOEAE DNA AMP PROB: CPT

## 2022-07-25 PROCEDURE — 81001 URINALYSIS AUTO W/SCOPE: CPT

## 2022-07-26 ENCOUNTER — ANTICOAG VISIT (OUTPATIENT)
Dept: CARDIOLOGY CLINIC | Facility: CLINIC | Age: 57
End: 2022-07-26

## 2022-07-26 DIAGNOSIS — Z79.01 LONG TERM CURRENT USE OF ANTICOAGULANT THERAPY: ICD-10-CM

## 2022-07-26 DIAGNOSIS — I23.6 LV (LEFT VENTRICULAR) MURAL THROMBUS FOLLOWING MI (HCC): ICD-10-CM

## 2022-07-26 DIAGNOSIS — I25.5 ISCHEMIC CARDIOMYOPATHY: Primary | ICD-10-CM

## 2022-07-26 LAB
C TRACH DNA SPEC QL NAA+PROBE: NEGATIVE
N GONORRHOEA DNA SPEC QL NAA+PROBE: NEGATIVE

## 2022-07-26 NOTE — PROGRESS NOTES
Spoke with patient, advised INR slightly elevated and went up quickly, advised to take 7 5 mg Mon only, 5 mg all other days   Will recheck 8/8/22

## 2022-08-08 DIAGNOSIS — I51.3 MURAL THROMBUS OF LEFT VENTRICLE: ICD-10-CM

## 2022-08-08 RX ORDER — WARFARIN SODIUM 5 MG/1
TABLET ORAL
Qty: 135 TABLET | Refills: 3 | Status: SHIPPED | OUTPATIENT
Start: 2022-08-08

## 2022-08-09 ENCOUNTER — APPOINTMENT (OUTPATIENT)
Dept: LAB | Facility: HOSPITAL | Age: 57
End: 2022-08-09
Payer: MEDICARE

## 2022-08-09 ENCOUNTER — ANTICOAG VISIT (OUTPATIENT)
Dept: CARDIOLOGY CLINIC | Facility: CLINIC | Age: 57
End: 2022-08-09

## 2022-08-09 DIAGNOSIS — I25.5 ISCHEMIC CARDIOMYOPATHY: Primary | ICD-10-CM

## 2022-08-09 DIAGNOSIS — I23.6 LV (LEFT VENTRICULAR) MURAL THROMBUS FOLLOWING MI (HCC): ICD-10-CM

## 2022-08-09 DIAGNOSIS — Z79.01 LONG TERM CURRENT USE OF ANTICOAGULANT THERAPY: ICD-10-CM

## 2022-08-12 RX ORDER — BICTEGRAVIR SODIUM, EMTRICITABINE, AND TENOFOVIR ALAFENAMIDE FUMARATE 50; 200; 25 MG/1; MG/1; MG/1
1 TABLET ORAL
Qty: 90 TABLET | Refills: 1 | Status: SHIPPED | OUTPATIENT
Start: 2022-08-12

## 2022-08-25 ENCOUNTER — APPOINTMENT (OUTPATIENT)
Dept: LAB | Facility: HOSPITAL | Age: 57
End: 2022-08-25
Payer: MEDICARE

## 2022-08-25 ENCOUNTER — ANTICOAG VISIT (OUTPATIENT)
Dept: CARDIOLOGY CLINIC | Facility: CLINIC | Age: 57
End: 2022-08-25

## 2022-08-25 DIAGNOSIS — Z79.01 LONG TERM CURRENT USE OF ANTICOAGULANT THERAPY: ICD-10-CM

## 2022-08-25 DIAGNOSIS — I25.5 ISCHEMIC CARDIOMYOPATHY: Primary | ICD-10-CM

## 2022-08-25 DIAGNOSIS — I23.6 LV (LEFT VENTRICULAR) MURAL THROMBUS FOLLOWING MI (HCC): ICD-10-CM

## 2022-08-26 ENCOUNTER — REMOTE DEVICE CLINIC VISIT (OUTPATIENT)
Dept: CARDIOLOGY CLINIC | Facility: CLINIC | Age: 57
End: 2022-08-26
Payer: MEDICARE

## 2022-08-26 DIAGNOSIS — Z95.810 PRESENCE OF AUTOMATIC CARDIOVERTER/DEFIBRILLATOR (AICD): Primary | ICD-10-CM

## 2022-08-26 PROCEDURE — 93296 REM INTERROG EVL PM/IDS: CPT | Performed by: INTERNAL MEDICINE

## 2022-08-26 PROCEDURE — 93295 DEV INTERROG REMOTE 1/2/MLT: CPT | Performed by: INTERNAL MEDICINE

## 2022-08-29 NOTE — PROGRESS NOTES
Results for orders placed or performed in visit on 08/26/22   Cardiac EP device report    Narrative    MDT-SINGLE CHAMBER ICD - ACTIVE SYSTEM IS MRI CONDITIONAL  CARELINK TRANSMISSION: BATTERY VOLTAGE ADEQUATE  (7 5 YRS)  <1%  ALL AVAILABLE LEAD PARAMETERS WITHIN NORMAL LIMITS  1 NSVT EPISODE DETECTED 14 BEATS @ 320ms  NO THERAPIES  OPTI-VOL FLUID THRESHOLD CROSSED 7/31 AND IS ON GOING  RECHECK IN ONE MONTH  NORMAL DEVICE FUNCTION  ---SHAH

## 2022-09-08 ENCOUNTER — APPOINTMENT (OUTPATIENT)
Dept: LAB | Facility: HOSPITAL | Age: 57
End: 2022-09-08
Payer: MEDICARE

## 2022-09-08 ENCOUNTER — ANTICOAG VISIT (OUTPATIENT)
Dept: CARDIOLOGY CLINIC | Facility: CLINIC | Age: 57
End: 2022-09-08

## 2022-09-08 DIAGNOSIS — Z79.01 LONG TERM CURRENT USE OF ANTICOAGULANT THERAPY: ICD-10-CM

## 2022-09-08 DIAGNOSIS — I23.6 LV (LEFT VENTRICULAR) MURAL THROMBUS FOLLOWING MI (HCC): ICD-10-CM

## 2022-09-08 DIAGNOSIS — I25.5 ISCHEMIC CARDIOMYOPATHY: Primary | ICD-10-CM

## 2022-09-20 ENCOUNTER — PATIENT OUTREACH (OUTPATIENT)
Dept: SURGERY | Facility: CLINIC | Age: 57
End: 2022-09-20

## 2022-09-23 ENCOUNTER — ANTICOAG VISIT (OUTPATIENT)
Dept: CARDIOLOGY CLINIC | Facility: CLINIC | Age: 57
End: 2022-09-23

## 2022-09-23 ENCOUNTER — APPOINTMENT (OUTPATIENT)
Dept: LAB | Facility: HOSPITAL | Age: 57
End: 2022-09-23
Payer: MEDICARE

## 2022-09-23 DIAGNOSIS — Z79.01 LONG TERM CURRENT USE OF ANTICOAGULANT THERAPY: ICD-10-CM

## 2022-09-23 DIAGNOSIS — I25.5 ISCHEMIC CARDIOMYOPATHY: Primary | ICD-10-CM

## 2022-09-23 DIAGNOSIS — I23.6 LV (LEFT VENTRICULAR) MURAL THROMBUS FOLLOWING MI (HCC): ICD-10-CM

## 2022-09-29 DIAGNOSIS — N18.31 STAGE 3A CHRONIC KIDNEY DISEASE (HCC): Primary | ICD-10-CM

## 2022-10-04 ENCOUNTER — APPOINTMENT (OUTPATIENT)
Dept: LAB | Facility: HOSPITAL | Age: 57
End: 2022-10-04
Attending: INTERNAL MEDICINE
Payer: MEDICARE

## 2022-10-04 ENCOUNTER — REMOTE DEVICE CLINIC VISIT (OUTPATIENT)
Dept: CARDIOLOGY CLINIC | Facility: CLINIC | Age: 57
End: 2022-10-04
Payer: MEDICARE

## 2022-10-04 DIAGNOSIS — Z95.810 PRESENCE OF AUTOMATIC CARDIOVERTER/DEFIBRILLATOR (AICD): Primary | ICD-10-CM

## 2022-10-04 DIAGNOSIS — N18.31 STAGE 3A CHRONIC KIDNEY DISEASE (HCC): ICD-10-CM

## 2022-10-04 LAB
25(OH)D3 SERPL-MCNC: 51.4 NG/ML (ref 30–100)
ALBUMIN SERPL BCP-MCNC: 4 G/DL (ref 3.5–5)
ALP SERPL-CCNC: 58 U/L (ref 43–122)
ALT SERPL W P-5'-P-CCNC: 17 U/L
ANION GAP SERPL CALCULATED.3IONS-SCNC: 6 MMOL/L (ref 5–14)
AST SERPL W P-5'-P-CCNC: 21 U/L (ref 14–36)
BACTERIA UR QL AUTO: ABNORMAL /HPF
BILIRUB SERPL-MCNC: 0.85 MG/DL (ref 0.2–1)
BILIRUB UR QL STRIP: NEGATIVE
BUN SERPL-MCNC: 16 MG/DL (ref 5–25)
CALCIUM SERPL-MCNC: 9.3 MG/DL (ref 8.4–10.2)
CHLORIDE SERPL-SCNC: 108 MMOL/L (ref 96–108)
CLARITY UR: CLEAR
CO2 SERPL-SCNC: 27 MMOL/L (ref 21–32)
COLOR UR: ABNORMAL
CREAT SERPL-MCNC: 1.31 MG/DL (ref 0.6–1.2)
CREAT UR-MCNC: 264 MG/DL
ERYTHROCYTE [DISTWIDTH] IN BLOOD BY AUTOMATED COUNT: 13.7 % (ref 11.6–15.1)
GFR SERPL CREATININE-BSD FRML MDRD: 45 ML/MIN/1.73SQ M
GLUCOSE P FAST SERPL-MCNC: 104 MG/DL (ref 70–99)
GLUCOSE UR STRIP-MCNC: NEGATIVE MG/DL
HCT VFR BLD AUTO: 41.7 % (ref 34.8–46.1)
HGB BLD-MCNC: 13.3 G/DL (ref 11.5–15.4)
HGB UR QL STRIP.AUTO: 10
KETONES UR STRIP-MCNC: ABNORMAL MG/DL
LEUKOCYTE ESTERASE UR QL STRIP: 100
MCH RBC QN AUTO: 30.6 PG (ref 26.8–34.3)
MCHC RBC AUTO-ENTMCNC: 31.9 G/DL (ref 31.4–37.4)
MCV RBC AUTO: 96 FL (ref 82–98)
MICROALBUMIN UR-MCNC: 60.3 MG/L (ref 0–20)
MICROALBUMIN/CREAT 24H UR: 23 MG/G CREATININE (ref 0–30)
MUCOUS THREADS UR QL AUTO: ABNORMAL
NITRITE UR QL STRIP: NEGATIVE
NON-SQ EPI CELLS URNS QL MICRO: ABNORMAL /HPF
PH UR STRIP.AUTO: 5 [PH]
PLATELET # BLD AUTO: 167 THOUSANDS/UL (ref 149–390)
PMV BLD AUTO: 10.9 FL (ref 8.9–12.7)
POTASSIUM SERPL-SCNC: 4.3 MMOL/L (ref 3.5–5.3)
PROT SERPL-MCNC: 7.4 G/DL (ref 6.4–8.4)
PROT UR STRIP-MCNC: ABNORMAL MG/DL
PTH-INTACT SERPL-MCNC: 99.6 PG/ML (ref 18.4–80.1)
RBC # BLD AUTO: 4.35 MILLION/UL (ref 3.81–5.12)
RBC #/AREA URNS AUTO: ABNORMAL /HPF
SODIUM SERPL-SCNC: 141 MMOL/L (ref 135–147)
SP GR UR STRIP.AUTO: 1.02 (ref 1–1.04)
UROBILINOGEN UA: 1 MG/DL
WBC # BLD AUTO: 5.61 THOUSAND/UL (ref 4.31–10.16)
WBC #/AREA URNS AUTO: ABNORMAL /HPF

## 2022-10-04 PROCEDURE — 83970 ASSAY OF PARATHORMONE: CPT

## 2022-10-04 PROCEDURE — 82570 ASSAY OF URINE CREATININE: CPT

## 2022-10-04 PROCEDURE — G2066 INTER DEVC REMOTE 30D: HCPCS | Performed by: INTERNAL MEDICINE

## 2022-10-04 PROCEDURE — 36415 COLL VENOUS BLD VENIPUNCTURE: CPT

## 2022-10-04 PROCEDURE — 82306 VITAMIN D 25 HYDROXY: CPT

## 2022-10-04 PROCEDURE — 85027 COMPLETE CBC AUTOMATED: CPT

## 2022-10-04 PROCEDURE — 82043 UR ALBUMIN QUANTITATIVE: CPT

## 2022-10-04 PROCEDURE — 81001 URINALYSIS AUTO W/SCOPE: CPT

## 2022-10-04 PROCEDURE — 93297 REM INTERROG DEV EVAL ICPMS: CPT | Performed by: INTERNAL MEDICINE

## 2022-10-04 PROCEDURE — 80053 COMPREHEN METABOLIC PANEL: CPT

## 2022-10-05 NOTE — PROGRESS NOTES
Results for orders placed or performed in visit on 10/04/22   Cardiac EP device report    Narrative    MDT-SINGLE CHAMBER ICD - ACTIVE SYSTEM IS MRI CONDITIONAL  CARELINK TRANSMISSION: OPTI-VOL FLUID THRESHOLD CROSSED 7-31  BATTERY VOLTAGE ADEQUATE  (7 1 YRS)  <1%  ALL AVAILABLE LEAD PARAMETERS WITHIN NORMAL LIMITS  NO SIGNIFICANT HIGH RATE EPISODES  NORMAL DEVICE FUNCTION  ----SHAH

## 2022-10-06 ENCOUNTER — PATIENT OUTREACH (OUTPATIENT)
Dept: SURGERY | Facility: CLINIC | Age: 57
End: 2022-10-06

## 2022-10-06 ENCOUNTER — TELEPHONE (OUTPATIENT)
Dept: NEPHROLOGY | Facility: CLINIC | Age: 57
End: 2022-10-06

## 2022-10-06 NOTE — TELEPHONE ENCOUNTER
Appointment Confirmation   Person confirmed appointment with  If not patient, name of the person Patient    Date and time of appointment 10/7 12:00    Patient acknowledged and will be at appointment? yes    Did you advise the patient that they will need a urine sample if they are a new patient?  N/A    Did you advise the patient to bring their current medications for verification? (including any OTC) Yes    Additional Information

## 2022-10-07 ENCOUNTER — DOCUMENTATION (OUTPATIENT)
Dept: SURGERY | Facility: CLINIC | Age: 57
End: 2022-10-07

## 2022-10-07 ENCOUNTER — OFFICE VISIT (OUTPATIENT)
Dept: SURGERY | Facility: CLINIC | Age: 57
End: 2022-10-07
Payer: MEDICARE

## 2022-10-07 ENCOUNTER — OFFICE VISIT (OUTPATIENT)
Dept: NEPHROLOGY | Facility: CLINIC | Age: 57
End: 2022-10-07
Payer: MEDICARE

## 2022-10-07 VITALS
HEART RATE: 60 BPM | SYSTOLIC BLOOD PRESSURE: 98 MMHG | WEIGHT: 150 LBS | BODY MASS INDEX: 28.32 KG/M2 | DIASTOLIC BLOOD PRESSURE: 60 MMHG | HEIGHT: 61 IN

## 2022-10-07 VITALS
BODY MASS INDEX: 28.32 KG/M2 | DIASTOLIC BLOOD PRESSURE: 56 MMHG | HEART RATE: 62 BPM | HEIGHT: 61 IN | SYSTOLIC BLOOD PRESSURE: 92 MMHG | TEMPERATURE: 96.7 F | WEIGHT: 150 LBS | OXYGEN SATURATION: 98 %

## 2022-10-07 DIAGNOSIS — I51.89 IMPAIRED LEFT VENTRICULAR FUNCTION: ICD-10-CM

## 2022-10-07 DIAGNOSIS — Z23 NEED FOR INFLUENZA VACCINATION: Primary | ICD-10-CM

## 2022-10-07 DIAGNOSIS — N18.31 STAGE 3A CHRONIC KIDNEY DISEASE (HCC): Primary | ICD-10-CM

## 2022-10-07 DIAGNOSIS — B20 HIV DISEASE (HCC): ICD-10-CM

## 2022-10-07 DIAGNOSIS — Z72.0 TOBACCO ABUSE: ICD-10-CM

## 2022-10-07 DIAGNOSIS — N18.31 STAGE 3A CHRONIC KIDNEY DISEASE (HCC): ICD-10-CM

## 2022-10-07 DIAGNOSIS — I25.5 ISCHEMIC CARDIOMYOPATHY: ICD-10-CM

## 2022-10-07 PROCEDURE — 90682 RIV4 VACC RECOMBINANT DNA IM: CPT

## 2022-10-07 PROCEDURE — G0008 ADMIN INFLUENZA VIRUS VAC: HCPCS

## 2022-10-07 PROCEDURE — 99213 OFFICE O/P EST LOW 20 MIN: CPT | Performed by: INTERNAL MEDICINE

## 2022-10-07 NOTE — PROGRESS NOTES
Progress Note - Infectious Disease   Dorie Cunha 65 y o  female MRN: [id number]  Unit/Bed#:  Encounter: [id number]    Impression/Plan:  1  HIV - doing well on Biktarvy with an undetectable viral load and CD4 count of 402     · Continue ART regimen of Biktarvy  · Recheck labs in 5 months and follow up in 6 months    · Patient was provided medication, adherence and prevention education      2  CKD stage 3 - creatinine stable 1 34 mg/dl  · Repeat BMP and UA prior to next visit  · Continue follow-up with nephrology     3  Tobacco dependence - Pt is smoking 5 cigarettes/day  · Pt encouraged to cease smoking cigarettes  · Pt will continue nicotine lozenges     4  Ischemic Cardiomyopathy: with mural thrombus  She has AICD in place     · Patient follows up with cardiology     5  Health maintenance:      · Pt will receive influenza vaccine today  · Pt has received 2 doses of COVID vaccine and one booster    My recommendations were discussed with the patient in detail who verbalized understanding  Patient care coordinated with JOSE ROBERTO Rosas  Subjective:  Pt presents for routine HIV follow-up  Patient claims 100% adherence with ART regimen  Overall pt is feeling well  She denies fever, chills, nausea, vomiting or diarrhea, cough or shortness of breath  Followup portions patient history reviewed and updated as: Allergies, current medications, past medical history, past social history, past surgical history, and the problem list     Objective:  Vitals:  Vitals:    10/07/22 0801   BP: 92/56   BP Location: Left arm   Patient Position: Sitting   Cuff Size: Standard   Pulse: 62   Temp: (!) 96 7 °F (35 9 °C)   SpO2: 98%   Weight: 68 kg (150 lb)   Height: 5' 1" (1 549 m)     Physical Exam:   General Appearance:  Alert, interactive, appearing well,  nontoxic, no acute distress     Throat:   Oropharynx moist without lesions   Neck: Supple    Lungs:   Clear to auscultation bilaterally; no wheezes; respirations unlabored Heart:  S1, S2, RRR; no murmur, rub or gallop   Abdomen:   Soft, non-tender, non-distended  Extremities: No cyanosis or distal leg edema b/l   Neurologic: AAO to person, surroundings, conversant, fluent speech   Skin: No rash     Labs, Imaging, & Other studies:   All pertinent labs and imaging studies were personally reviewed    Lab Results   Component Value Date    K 4 3 10/04/2022     10/04/2022    CO2 27 10/04/2022    BUN 16 10/04/2022    CREATININE 1 31 (H) 10/04/2022    GLUF 104 (H) 10/04/2022    CALCIUM 9 3 10/04/2022    AST 21 10/04/2022    ALT 17 10/04/2022    ALKPHOS 58 10/04/2022    EGFR 45 10/04/2022     Lab Results   Component Value Date    WBC 5 61 10/04/2022    HGB 13 3 10/04/2022    HCT 41 7 10/04/2022    MCV 96 10/04/2022     10/04/2022     Lab Results   Component Value Date    HEPCAB Non-reactive 07/18/2022     Lab Results   Component Value Date    HAV Non-reactive 11/02/2018    HEPCAB Non-reactive 07/18/2022     Lab Results   Component Value Date    RPR Non-Reactive 07/18/2022     CD4 ABS   Date/Time Value Ref Range Status   07/18/2022 11:13  359 - 1519 /uL Final     HIV-1 RNA by PCR, Qn   Date/Time Value Ref Range Status   07/18/2022 11:13 AM <20 copies/mL Final     Comment:     HIV-1 RNA not detected  The reportable range for this assay is 20 to 10,000,000  copies HIV-1 RNA/mL         Current Outpatient Medications:     aspirin (ECOTRIN LOW STRENGTH) 81 mg EC tablet, Take 1 tablet (81 mg total) by mouth daily, Disp: 81 tablet, Rfl: 11    atorvastatin (LIPITOR) 80 mg tablet, Take 1 tablet (80 mg total) by mouth daily, Disp: 30 tablet, Rfl: 5    bictegravir-emtricitab-tenofovir alafenamide (Biktarvy) -25 MG tablet, Take 1 tablet by mouth daily with breakfast, Disp: 90 tablet, Rfl: 1    bictegravir-emtricitab-tenofovir alafenamide (Biktarvy) -25 MG tablet, Take 1 tablet by mouth daily with breakfast, Disp: 90 tablet, Rfl: 1    carvedilol (COREG) 6 25 mg tablet, Take 1 tablet (6 25 mg total) by mouth 2 (two) times a day with meals, Disp: 60 tablet, Rfl: 5    nicotine polacrilex (COMMIT) 4 MG lozenge, Apply 1 lozenge (4 mg total) to the mouth or throat as needed for smoking cessation, Disp: 100 lozenge, Rfl: 5    sacubitril-valsartan (Entresto) 49-51 MG TABS, Take 1 tablet by mouth 2 (two) times a day, Disp: 60 tablet, Rfl: 5    warfarin (COUMADIN) 1 mg tablet, TAKE 1 TABLET BY MOUTH DAILY OR AS DIRECTED, Disp: 30 tablet, Rfl: 10    warfarin (COUMADIN) 5 mg tablet, 1 to 1 5 tablets daily as directed by MD, Disp: 135 tablet, Rfl: 3

## 2022-10-07 NOTE — PROGRESS NOTES
RD met with Gustavo Rudolph in conjunction with ID clinic appt, in order to check in on nutritional status, offer any education/support as warranted and pt receptive  Gustavo Rudolph advised that she has been doing well nutritionally, eating more vegetables, drinking more water, and has started exercising again at the [facility]  Pt enjoys weight lifting  Gustavo Rudolph was pleased with weight loss associated with efforts she has been making  She had no questions or concerns for RD at this time  RD positively reinforced her progress  Pt with close cardiology f/u  Wt Readings from Last 3 Encounters:   10/07/22 68 kg (150 lb)   07/15/22 68 5 kg (151 lb)   07/11/22 69 4 kg (153 lb)     165# (Apr-22)  174# (Dec-21)    Lab Results   Component Value Date    HGBA1C 6 0 (H) 07/18/2022    HGBA1C 5 7 (H) 07/02/2021    HGBA1C 5 5 02/20/2020     Lab Results   Component Value Date    GLUF 104 (H) 10/04/2022    LDLCALC 65 12/27/2021    CREATININE 1 31 (H) 10/04/2022     Monitor weight trend, diabetes labs       Dong Gilbert, MS, RD, LDN

## 2022-10-07 NOTE — PROGRESS NOTES
NEPHROLOGY OUTPATIENT PROGRESS NOTE   Deion Valderrama 65 y o  female MRN: [id number]  Reason for visit:  Kidney disease    ASSESSMENT and PLAN:  1  Chronic kidney disease, stage III A, EGFR currently 45, baseline creatinine 1 3   2  Ischemic cardiomyopathy, volume status currently appears stable, continues with Entresto   3  Chronic pyuria with bacteriuria, appears to be colonized, asymptomatic, deferring treatment at this time  4  Secondary hyperparathyroidism, PTH stable at 99 6  5  HIV, as per Infectious Disease, previous viral load undetectable with CD4 count of 402    Overall renal function remains quite stable   No significant proteinuria   Blood pressure appears stable   No changes from Nephrology standpoint, will continue with surveillance monitoring, follow-up in 1 year with repeat labs at that time    SUBJECTIVE / INTERVAL HISTORY:  She has been doing well  Offers no new complaints  Activities have not been limited  Denies any chest pain shortness of breath or significant swelling  Appetite is stable  OBJECTIVE:  BP 98/60 (BP Location: Right arm, Patient Position: Sitting, Cuff Size: Standard)   Pulse 60   Ht 5' 1" (1 549 m)   Wt 68 kg (150 lb)   BMI 28 34 kg/m²   Vitals:    10/07/22 1146   Weight: 68 kg (150 lb)       Physical Exam  Constitutional:       Appearance: She is not ill-appearing  HENT:      Head: Normocephalic and atraumatic  Eyes:      General: No scleral icterus  Cardiovascular:      Rate and Rhythm: Normal rate and regular rhythm  Pulmonary:      Effort: Pulmonary effort is normal       Breath sounds: Normal breath sounds  Abdominal:      General: There is no distension  Palpations: Abdomen is soft  Musculoskeletal:      Right lower leg: No edema  Left lower leg: No edema  Skin:     General: Skin is warm and dry  Neurological:      Mental Status: She is alert and oriented to person, place, and time             Medications:    Current Outpatient Medications:     aspirin (ECOTRIN LOW STRENGTH) 81 mg EC tablet, Take 1 tablet (81 mg total) by mouth daily, Disp: 81 tablet, Rfl: 11    atorvastatin (LIPITOR) 80 mg tablet, Take 1 tablet (80 mg total) by mouth daily, Disp: 30 tablet, Rfl: 5    bictegravir-emtricitab-tenofovir alafenamide (Biktarvy) -25 MG tablet, Take 1 tablet by mouth daily with breakfast, Disp: 90 tablet, Rfl: 1    bictegravir-emtricitab-tenofovir alafenamide (Biktarvy) -25 MG tablet, Take 1 tablet by mouth daily with breakfast, Disp: 90 tablet, Rfl: 1    carvedilol (COREG) 6 25 mg tablet, Take 1 tablet (6 25 mg total) by mouth 2 (two) times a day with meals, Disp: 60 tablet, Rfl: 5    nicotine polacrilex (COMMIT) 4 MG lozenge, Apply 1 lozenge (4 mg total) to the mouth or throat as needed for smoking cessation, Disp: 100 lozenge, Rfl: 5    sacubitril-valsartan (Entresto) 49-51 MG TABS, Take 1 tablet by mouth 2 (two) times a day, Disp: 60 tablet, Rfl: 5    warfarin (COUMADIN) 1 mg tablet, TAKE 1 TABLET BY MOUTH DAILY OR AS DIRECTED, Disp: 30 tablet, Rfl: 10    warfarin (COUMADIN) 5 mg tablet, 1 to 1 5 tablets daily as directed by MD, Disp: 135 tablet, Rfl: 3    Laboratory Results:  Results for orders placed or performed in visit on 10/04/22   Comprehensive metabolic panel   Result Value Ref Range    Sodium 141 135 - 147 mmol/L    Potassium 4 3 3 5 - 5 3 mmol/L    Chloride 108 96 - 108 mmol/L    CO2 27 21 - 32 mmol/L    ANION GAP 6 5 - 14 mmol/L    BUN 16 5 - 25 mg/dL    Creatinine 1 31 (H) 0 60 - 1 20 mg/dL    Glucose, Fasting 104 (H) 70 - 99 mg/dL    Calcium 9 3 8 4 - 10 2 mg/dL    AST 21 14 - 36 U/L    ALT 17 <35 U/L    Alkaline Phosphatase 58 43 - 122 U/L    Total Protein 7 4 6 4 - 8 4 g/dL    Albumin 4 0 3 5 - 5 0 g/dL    Total Bilirubin 0 85 0 20 - 1 00 mg/dL    eGFR 45 ml/min/1 73sq m   PTH, intact   Result Value Ref Range    PTH 99 6 (H) 18 4 - 80 1 pg/mL   Urinalysis with microscopic   Result Value Ref Range Clarity, UA Clear Clear, Other    Color, UA Cherelle (A) Straw, Yellow, Pale Yellow    Specific Gravity, UA 1 025 1 003 - 1 040    pH, UA 5 0 4 5, 5 0, 5 5, 6 0, 6 5, 7 0, 7 5, 8 0    Glucose, UA Negative Negative mg/dl    Ketones, UA 5 (Trace) (A) Negative mg/dl    Occult Blood, UA 10 0 (A) Negative    Protein, UA 30 (1+) (A) Negative mg/dl    Nitrite, UA Negative Negative    Bilirubin, UA Negative Negative    Leukocytes,  0 (A) Negative    WBC, UA 20-30 (A) None Seen, 2-4, 5-60 /hpf    RBC, UA 1-2 (A) None Seen, 2-4 /hpf    Bacteria, UA Moderate (A) None Seen, Occasional /hpf    Epithelial Cells Moderate (A) None Seen, Occasional /hpf    MUCUS THREADS Moderate (A) None Seen    UROBILINOGEN UA 1 0 1 0, Negative mg/dL   Microalbumin / creatinine urine ratio   Result Value Ref Range    Creatinine, Ur 264 0 mg/dL    Microalbum  ,U,Random 60 3 (H) 0 0 - 20 0 mg/L    Microalb Creat Ratio 23 0 - 30 mg/g creatinine   Vitamin D 25 hydroxy   Result Value Ref Range    Vit D, 25-Hydroxy 51 4 30 0 - 100 0 ng/mL   CBC and Platelet   Result Value Ref Range    WBC 5 61 4 31 - 10 16 Thousand/uL    RBC 4 35 3 81 - 5 12 Million/uL    Hemoglobin 13 3 11 5 - 15 4 g/dL    Hematocrit 41 7 34 8 - 46 1 %    MCV 96 82 - 98 fL    MCH 30 6 26 8 - 34 3 pg    MCHC 31 9 31 4 - 37 4 g/dL    RDW 13 7 11 6 - 15 1 %    Platelets 559 673 - 396 Thousands/uL    MPV 10 9 8 9 - 12 7 fL

## 2022-10-11 PROBLEM — J06.9 VIRAL UPPER RESPIRATORY ILLNESS: Status: RESOLVED | Noted: 2022-06-07 | Resolved: 2022-10-11

## 2022-10-14 ENCOUNTER — APPOINTMENT (OUTPATIENT)
Dept: LAB | Facility: HOSPITAL | Age: 57
End: 2022-10-14
Payer: MEDICARE

## 2022-10-14 ENCOUNTER — ANTICOAG VISIT (OUTPATIENT)
Dept: CARDIOLOGY CLINIC | Facility: CLINIC | Age: 57
End: 2022-10-14

## 2022-10-14 DIAGNOSIS — B20 HIV DISEASE (HCC): ICD-10-CM

## 2022-10-14 DIAGNOSIS — Z79.01 LONG TERM CURRENT USE OF ANTICOAGULANT THERAPY: ICD-10-CM

## 2022-10-14 DIAGNOSIS — I23.6 LV (LEFT VENTRICULAR) MURAL THROMBUS FOLLOWING MI (HCC): ICD-10-CM

## 2022-10-14 DIAGNOSIS — I25.5 ISCHEMIC CARDIOMYOPATHY: Primary | ICD-10-CM

## 2022-10-14 LAB
ALBUMIN SERPL BCP-MCNC: 4 G/DL (ref 3.5–5)
ALP SERPL-CCNC: 53 U/L (ref 43–122)
ALT SERPL W P-5'-P-CCNC: 13 U/L
ANION GAP SERPL CALCULATED.3IONS-SCNC: 7 MMOL/L (ref 5–14)
AST SERPL W P-5'-P-CCNC: 20 U/L (ref 14–36)
BASOPHILS # BLD AUTO: 0.02 THOUSANDS/ΜL (ref 0–0.1)
BASOPHILS NFR BLD AUTO: 0 % (ref 0–1)
BILIRUB SERPL-MCNC: 0.74 MG/DL (ref 0.2–1)
BUN SERPL-MCNC: 14 MG/DL (ref 5–25)
CALCIUM SERPL-MCNC: 9.2 MG/DL (ref 8.4–10.2)
CHLORIDE SERPL-SCNC: 105 MMOL/L (ref 96–108)
CO2 SERPL-SCNC: 28 MMOL/L (ref 21–32)
CREAT SERPL-MCNC: 1.34 MG/DL (ref 0.6–1.2)
EOSINOPHIL # BLD AUTO: 0.07 THOUSAND/ΜL (ref 0–0.61)
EOSINOPHIL NFR BLD AUTO: 2 % (ref 0–6)
ERYTHROCYTE [DISTWIDTH] IN BLOOD BY AUTOMATED COUNT: 13.7 % (ref 11.6–15.1)
GFR SERPL CREATININE-BSD FRML MDRD: 44 ML/MIN/1.73SQ M
GLUCOSE SERPL-MCNC: 95 MG/DL (ref 70–99)
HCT VFR BLD AUTO: 42.2 % (ref 34.8–46.1)
HGB BLD-MCNC: 13.6 G/DL (ref 11.5–15.4)
IMM GRANULOCYTES # BLD AUTO: 0.01 THOUSAND/UL (ref 0–0.2)
IMM GRANULOCYTES NFR BLD AUTO: 0 % (ref 0–2)
LYMPHOCYTES # BLD AUTO: 2.11 THOUSANDS/ΜL (ref 0.6–4.47)
LYMPHOCYTES NFR BLD AUTO: 44 % (ref 14–44)
MCH RBC QN AUTO: 30.8 PG (ref 26.8–34.3)
MCHC RBC AUTO-ENTMCNC: 32.2 G/DL (ref 31.4–37.4)
MCV RBC AUTO: 96 FL (ref 82–98)
MONOCYTES # BLD AUTO: 0.32 THOUSAND/ΜL (ref 0.17–1.22)
MONOCYTES NFR BLD AUTO: 7 % (ref 4–12)
NEUTROPHILS # BLD AUTO: 2.28 THOUSANDS/ΜL (ref 1.85–7.62)
NEUTS SEG NFR BLD AUTO: 47 % (ref 43–75)
NRBC BLD AUTO-RTO: 0 /100 WBCS
PLATELET # BLD AUTO: 165 THOUSANDS/UL (ref 149–390)
PMV BLD AUTO: 10.9 FL (ref 8.9–12.7)
POTASSIUM SERPL-SCNC: 4.4 MMOL/L (ref 3.5–5.3)
PROT SERPL-MCNC: 7.1 G/DL (ref 6.4–8.4)
RBC # BLD AUTO: 4.42 MILLION/UL (ref 3.81–5.12)
SODIUM SERPL-SCNC: 140 MMOL/L (ref 135–147)
WBC # BLD AUTO: 4.81 THOUSAND/UL (ref 4.31–10.16)

## 2022-10-14 PROCEDURE — 87536 HIV-1 QUANT&REVRSE TRNSCRPJ: CPT

## 2022-10-14 PROCEDURE — 86361 T CELL ABSOLUTE COUNT: CPT

## 2022-10-14 PROCEDURE — 85025 COMPLETE CBC W/AUTO DIFF WBC: CPT

## 2022-10-14 PROCEDURE — 80053 COMPREHEN METABOLIC PANEL: CPT

## 2022-10-14 NOTE — PROGRESS NOTES
Spoke with patient, advised INR low, no missed doses, advised to take 7 5 mg today, then 7 5 mg Mon, 5 mg all other days, will recheck in 2 weeks 10/28/22

## 2022-10-15 LAB
BASOPHILS # BLD AUTO: 0 X10E3/UL (ref 0–0.2)
BASOPHILS NFR BLD AUTO: 1 %
CD3+CD4+ CELLS # BLD: 462 /UL (ref 359–1519)
CD3+CD4+ CELLS NFR BLD: 23.1 % (ref 30.8–58.5)
EOSINOPHIL # BLD AUTO: 0.1 X10E3/UL (ref 0–0.4)
EOSINOPHIL NFR BLD AUTO: 2 %
ERYTHROCYTE [DISTWIDTH] IN BLOOD BY AUTOMATED COUNT: 13.7 % (ref 11.7–15.4)
HCT VFR BLD AUTO: 41.3 % (ref 34–46.6)
HGB BLD-MCNC: 13.9 G/DL (ref 11.1–15.9)
IMM GRANULOCYTES # BLD: 0 X10E3/UL (ref 0–0.1)
IMM GRANULOCYTES NFR BLD: 0 %
LYMPHOCYTES # BLD AUTO: 2 X10E3/UL (ref 0.7–3.1)
LYMPHOCYTES NFR BLD AUTO: 43 %
MCH RBC QN AUTO: 31.2 PG (ref 26.6–33)
MCHC RBC AUTO-ENTMCNC: 33.7 G/DL (ref 31.5–35.7)
MCV RBC AUTO: 93 FL (ref 79–97)
MONOCYTES # BLD AUTO: 0.3 X10E3/UL (ref 0.1–0.9)
MONOCYTES NFR BLD AUTO: 6 %
NEUTROPHILS # BLD AUTO: 2.2 X10E3/UL (ref 1.4–7)
NEUTROPHILS NFR BLD AUTO: 48 %
PLATELET # BLD AUTO: 167 X10E3/UL (ref 150–450)
RBC # BLD AUTO: 4.46 X10E6/UL (ref 3.77–5.28)
WBC # BLD AUTO: 4.6 X10E3/UL (ref 3.4–10.8)

## 2022-10-18 LAB
HIV1 RNA # SERPL NAA+PROBE: 50 COPIES/ML
HIV1 RNA SERPL NAA+PROBE-LOG#: 1.7 LOG10COPY/ML

## 2022-10-28 ENCOUNTER — APPOINTMENT (OUTPATIENT)
Dept: LAB | Facility: HOSPITAL | Age: 57
End: 2022-10-28
Payer: MEDICARE

## 2022-10-28 ENCOUNTER — ANTICOAG VISIT (OUTPATIENT)
Dept: CARDIOLOGY CLINIC | Facility: CLINIC | Age: 57
End: 2022-10-28

## 2022-10-28 DIAGNOSIS — Z79.01 LONG TERM CURRENT USE OF ANTICOAGULANT THERAPY: ICD-10-CM

## 2022-10-28 DIAGNOSIS — I25.5 ISCHEMIC CARDIOMYOPATHY: ICD-10-CM

## 2022-10-28 DIAGNOSIS — I25.5 ISCHEMIC CARDIOMYOPATHY: Primary | ICD-10-CM

## 2022-10-28 DIAGNOSIS — N18.31 STAGE 3A CHRONIC KIDNEY DISEASE (HCC): ICD-10-CM

## 2022-10-28 DIAGNOSIS — I51.89 IMPAIRED LEFT VENTRICULAR FUNCTION: ICD-10-CM

## 2022-10-28 DIAGNOSIS — I23.6 LV (LEFT VENTRICULAR) MURAL THROMBUS FOLLOWING MI (HCC): ICD-10-CM

## 2022-10-28 NOTE — PROGRESS NOTES
Spoke with patient, advised INR good, continue 7 5 mg Mon, 5 mg all other days, will recheck 11/21/22

## 2022-11-14 ENCOUNTER — PATIENT OUTREACH (OUTPATIENT)
Dept: SURGERY | Facility: CLINIC | Age: 57
End: 2022-11-14

## 2022-11-22 ENCOUNTER — ANTICOAG VISIT (OUTPATIENT)
Dept: CARDIOLOGY CLINIC | Facility: CLINIC | Age: 57
End: 2022-11-22

## 2022-11-22 ENCOUNTER — APPOINTMENT (OUTPATIENT)
Dept: LAB | Facility: HOSPITAL | Age: 57
End: 2022-11-22

## 2022-11-22 DIAGNOSIS — Z79.01 LONG TERM CURRENT USE OF ANTICOAGULANT THERAPY: ICD-10-CM

## 2022-11-22 DIAGNOSIS — N18.31 STAGE 3A CHRONIC KIDNEY DISEASE (HCC): ICD-10-CM

## 2022-11-22 DIAGNOSIS — I25.5 ISCHEMIC CARDIOMYOPATHY: Primary | ICD-10-CM

## 2022-11-22 DIAGNOSIS — I23.6 LV (LEFT VENTRICULAR) MURAL THROMBUS FOLLOWING MI (HCC): ICD-10-CM

## 2022-11-22 LAB
ALBUMIN SERPL BCP-MCNC: 4.5 G/DL (ref 3.5–5)
ANION GAP SERPL CALCULATED.3IONS-SCNC: 9 MMOL/L (ref 5–14)
BASOPHILS # BLD AUTO: 0.04 THOUSANDS/ÂΜL (ref 0–0.1)
BASOPHILS NFR BLD AUTO: 1 % (ref 0–1)
BUN SERPL-MCNC: 13 MG/DL (ref 5–25)
CALCIUM SERPL-MCNC: 9.9 MG/DL (ref 8.4–10.2)
CHLORIDE SERPL-SCNC: 109 MMOL/L (ref 96–108)
CO2 SERPL-SCNC: 27 MMOL/L (ref 21–32)
CREAT SERPL-MCNC: 1.59 MG/DL (ref 0.6–1.2)
EOSINOPHIL # BLD AUTO: 0.04 THOUSAND/ÂΜL (ref 0–0.61)
EOSINOPHIL NFR BLD AUTO: 1 % (ref 0–6)
ERYTHROCYTE [DISTWIDTH] IN BLOOD BY AUTOMATED COUNT: 13.9 % (ref 11.6–15.1)
GFR SERPL CREATININE-BSD FRML MDRD: 36 ML/MIN/1.73SQ M
GLUCOSE P FAST SERPL-MCNC: 113 MG/DL (ref 70–99)
HCT VFR BLD AUTO: 45.7 % (ref 34.8–46.1)
HGB BLD-MCNC: 14.5 G/DL (ref 11.5–15.4)
IMM GRANULOCYTES # BLD AUTO: 0.01 THOUSAND/UL (ref 0–0.2)
IMM GRANULOCYTES NFR BLD AUTO: 0 % (ref 0–2)
INR PPP: 4.15 (ref 0.84–1.19)
LYMPHOCYTES # BLD AUTO: 1.85 THOUSANDS/ÂΜL (ref 0.6–4.47)
LYMPHOCYTES NFR BLD AUTO: 30 % (ref 14–44)
MCH RBC QN AUTO: 30.1 PG (ref 26.8–34.3)
MCHC RBC AUTO-ENTMCNC: 31.7 G/DL (ref 31.4–37.4)
MCV RBC AUTO: 95 FL (ref 82–98)
MONOCYTES # BLD AUTO: 0.27 THOUSAND/ÂΜL (ref 0.17–1.22)
MONOCYTES NFR BLD AUTO: 4 % (ref 4–12)
NEUTROPHILS # BLD AUTO: 3.89 THOUSANDS/ÂΜL (ref 1.85–7.62)
NEUTS SEG NFR BLD AUTO: 64 % (ref 43–75)
NRBC BLD AUTO-RTO: 0 /100 WBCS
PHOSPHATE SERPL-MCNC: 3 MG/DL (ref 2.5–4.8)
PLATELET # BLD AUTO: 189 THOUSANDS/UL (ref 149–390)
PMV BLD AUTO: 10.9 FL (ref 8.9–12.7)
POTASSIUM SERPL-SCNC: 4.7 MMOL/L (ref 3.5–5.3)
PROTHROMBIN TIME: 40.7 SECONDS (ref 11.6–14.5)
PTH-INTACT SERPL-MCNC: 105.1 PG/ML (ref 18.4–80.1)
RBC # BLD AUTO: 4.81 MILLION/UL (ref 3.81–5.12)
SODIUM SERPL-SCNC: 145 MMOL/L (ref 135–147)
URATE SERPL-MCNC: 6.1 MG/DL (ref 2–7.5)
WBC # BLD AUTO: 6.1 THOUSAND/UL (ref 4.31–10.16)

## 2022-11-22 NOTE — PROGRESS NOTES
Spoke with patient, advised INR high, no changes, advised to hold tonight, then go back to 5 mg daily and recheck in 2 weeks 12/6/22

## 2022-11-23 ENCOUNTER — PATIENT OUTREACH (OUTPATIENT)
Dept: SURGERY | Facility: CLINIC | Age: 57
End: 2022-11-23

## 2022-11-23 NOTE — PROGRESS NOTES
RW sliding fee scale application was completed, sent to the hospital counselors, and scanned into ct's chart

## 2022-11-29 ENCOUNTER — PATIENT OUTREACH (OUTPATIENT)
Dept: SURGERY | Facility: CLINIC | Age: 57
End: 2022-11-29

## 2022-12-01 ENCOUNTER — IN-CLINIC DEVICE VISIT (OUTPATIENT)
Dept: CARDIOLOGY CLINIC | Facility: CLINIC | Age: 57
End: 2022-12-01

## 2022-12-01 DIAGNOSIS — Z95.810 AICD (AUTOMATIC CARDIOVERTER/DEFIBRILLATOR) PRESENT: Primary | ICD-10-CM

## 2022-12-01 NOTE — PROGRESS NOTES
Results for orders placed or performed in visit on 12/01/22   Cardiac EP device report    Narrative    MDT-SINGLE CHAMBER ICD - ACTIVE SYSTEM IS MRI CONDITIONAL  DEVICE INTERROGATED IN THE [facility] OFFICE: BATTERY VOLTAGE ADEQUATE-6 9 YRS   0%  ALL AVAILABLE LEAD PARAMETERS WITHIN NORMAL LIMITS  NO SIGNIFICANT HIGH RATE EPISODES  OPTI-VOL FLUID THRESHOLD CROSSED & ONGOING SINCE 8/2022-CKD3  NO PROGRAMMING CHANGES MADE TO DEVICE PARAMETERS  NORMAL DEVICE FUNCTION   NC

## 2022-12-04 ENCOUNTER — HOSPITAL ENCOUNTER (EMERGENCY)
Facility: HOSPITAL | Age: 57
Discharge: HOME | End: 2022-12-04
Attending: EMERGENCY MEDICINE

## 2022-12-04 VITALS
DIASTOLIC BLOOD PRESSURE: 66 MMHG | RESPIRATION RATE: 22 BRPM | WEIGHT: 143.6 LBS | TEMPERATURE: 98.2 F | OXYGEN SATURATION: 99 % | HEART RATE: 54 BPM | SYSTOLIC BLOOD PRESSURE: 114 MMHG | BODY MASS INDEX: 27.13 KG/M2

## 2022-12-04 DIAGNOSIS — J11.1 INFLUENZA-LIKE ILLNESS: Primary | ICD-10-CM

## 2022-12-04 LAB
FLUAV RNA RESP QL NAA+PROBE: NEGATIVE
FLUBV RNA RESP QL NAA+PROBE: NEGATIVE
RSV RNA RESP QL NAA+PROBE: NEGATIVE
SARS-COV-2 RNA RESP QL NAA+PROBE: POSITIVE

## 2022-12-04 RX ORDER — GUAIFENESIN/DEXTROMETHORPHAN 100-10MG/5
5 SYRUP ORAL 3 TIMES DAILY PRN
Qty: 118 ML | Refills: 0 | Status: SHIPPED | OUTPATIENT
Start: 2022-12-04 | End: 2022-12-14

## 2022-12-04 RX ORDER — FLUTICASONE PROPIONATE 50 MCG
1 SPRAY, SUSPENSION (ML) NASAL DAILY
Qty: 16 G | Refills: 0 | Status: SHIPPED | OUTPATIENT
Start: 2022-12-04

## 2022-12-04 RX ORDER — ACETAMINOPHEN 325 MG/1
650 TABLET ORAL ONCE
Status: COMPLETED | OUTPATIENT
Start: 2022-12-04 | End: 2022-12-04

## 2022-12-04 RX ORDER — ACETAMINOPHEN 500 MG
500 TABLET ORAL EVERY 6 HOURS PRN
Qty: 30 TABLET | Refills: 0 | Status: SHIPPED | OUTPATIENT
Start: 2022-12-04

## 2022-12-04 RX ADMIN — ACETAMINOPHEN 650 MG: 325 TABLET ORAL at 17:10

## 2022-12-05 NOTE — RESULT ENCOUNTER NOTE
Patient reviewed positive covid test on [employer]    Message sent through [facility]

## 2022-12-07 ENCOUNTER — PATIENT OUTREACH (OUTPATIENT)
Dept: SURGERY | Facility: CLINIC | Age: 57
End: 2022-12-07

## 2022-12-09 ENCOUNTER — ANTICOAG VISIT (OUTPATIENT)
Dept: CARDIOLOGY CLINIC | Facility: CLINIC | Age: 57
End: 2022-12-09

## 2022-12-09 ENCOUNTER — APPOINTMENT (OUTPATIENT)
Dept: LAB | Facility: HOSPITAL | Age: 57
End: 2022-12-09

## 2022-12-09 DIAGNOSIS — I23.6 LV (LEFT VENTRICULAR) MURAL THROMBUS FOLLOWING MI (HCC): ICD-10-CM

## 2022-12-09 DIAGNOSIS — Z79.01 LONG TERM CURRENT USE OF ANTICOAGULANT THERAPY: ICD-10-CM

## 2022-12-09 DIAGNOSIS — I25.5 ISCHEMIC CARDIOMYOPATHY: Primary | ICD-10-CM

## 2022-12-09 NOTE — PROGRESS NOTES
Spoke with patient, advised INR good, continue 5 mg daily will recheck next week 12/16/22  Patient going to [place] 12/17/22 not sure when she will be home

## 2022-12-20 ENCOUNTER — ANTICOAG VISIT (OUTPATIENT)
Dept: CARDIOLOGY CLINIC | Facility: CLINIC | Age: 57
End: 2022-12-20

## 2022-12-20 ENCOUNTER — APPOINTMENT (OUTPATIENT)
Dept: LAB | Facility: HOSPITAL | Age: 57
End: 2022-12-20

## 2022-12-20 DIAGNOSIS — Z79.01 LONG TERM CURRENT USE OF ANTICOAGULANT THERAPY: ICD-10-CM

## 2022-12-20 DIAGNOSIS — I23.6 LV (LEFT VENTRICULAR) MURAL THROMBUS FOLLOWING MI (HCC): ICD-10-CM

## 2022-12-20 DIAGNOSIS — I25.5 ISCHEMIC CARDIOMYOPATHY: Primary | ICD-10-CM

## 2022-12-27 ENCOUNTER — PATIENT OUTREACH (OUTPATIENT)
Dept: SURGERY | Facility: CLINIC | Age: 57
End: 2022-12-27

## 2022-12-27 NOTE — PROGRESS NOTES
Ct contacted this cm to let her know she needed a labs referral due to taking care of her mother  Ct let this cm know that she tired calling the clinic, but was unable to reach anyone  This cm let ct know that she would let the clinic know and ask them to also give her a call

## 2022-12-28 ENCOUNTER — PATIENT OUTREACH (OUTPATIENT)
Dept: SURGERY | Facility: CLINIC | Age: 57
End: 2022-12-28

## 2023-01-03 ENCOUNTER — APPOINTMENT (OUTPATIENT)
Dept: LAB | Facility: HOSPITAL | Age: 58
End: 2023-01-03

## 2023-01-04 ENCOUNTER — ANTICOAG VISIT (OUTPATIENT)
Dept: CARDIOLOGY CLINIC | Facility: CLINIC | Age: 58
End: 2023-01-04

## 2023-01-04 DIAGNOSIS — I25.5 ISCHEMIC CARDIOMYOPATHY: Primary | ICD-10-CM

## 2023-01-04 DIAGNOSIS — I23.6 LV (LEFT VENTRICULAR) MURAL THROMBUS FOLLOWING MI (HCC): ICD-10-CM

## 2023-01-04 DIAGNOSIS — Z79.01 LONG TERM CURRENT USE OF ANTICOAGULANT THERAPY: ICD-10-CM

## 2023-01-13 ENCOUNTER — PATIENT OUTREACH (OUTPATIENT)
Dept: SURGERY | Facility: CLINIC | Age: 58
End: 2023-01-13

## 2023-01-13 DIAGNOSIS — I25.2 PAST HEART ATTACK: ICD-10-CM

## 2023-01-13 DIAGNOSIS — E78.5 HYPERLIPIDEMIA, UNSPECIFIED HYPERLIPIDEMIA TYPE: ICD-10-CM

## 2023-01-13 DIAGNOSIS — I25.5 ISCHEMIC CARDIOMYOPATHY: ICD-10-CM

## 2023-01-13 DIAGNOSIS — I25.10 TRIPLE VESSEL CORONARY ARTERY DISEASE: ICD-10-CM

## 2023-01-13 NOTE — PROGRESS NOTES
This cm contacted ct to schedule 6 month assessment  Ct let this cm know she was out of town until the 25th of this month and would call this cm when she returns

## 2023-01-14 RX ORDER — ATORVASTATIN CALCIUM 80 MG/1
TABLET, FILM COATED ORAL
Qty: 30 TABLET | Refills: 5 | Status: SHIPPED | OUTPATIENT
Start: 2023-01-14

## 2023-01-14 RX ORDER — SACUBITRIL AND VALSARTAN 49; 51 MG/1; MG/1
TABLET, FILM COATED ORAL
Qty: 60 TABLET | Refills: 5 | Status: SHIPPED | OUTPATIENT
Start: 2023-01-14

## 2023-01-14 RX ORDER — CARVEDILOL 6.25 MG/1
TABLET ORAL
Qty: 60 TABLET | Refills: 5 | Status: SHIPPED | OUTPATIENT
Start: 2023-01-14

## 2023-01-26 ENCOUNTER — PATIENT OUTREACH (OUTPATIENT)
Dept: SURGERY | Facility: CLINIC | Age: 58
End: 2023-01-26

## 2023-01-26 NOTE — ASSESSMENT & PLAN NOTE
Doing well on Biktarvy with a near undetectable VL of 50 copies  Pt reports 100% medication compliance on HAART  Stressed the importance of 100% medication adherence  HIV Counseling:    Viral Load: 50 copies    CD4 Count: 660      Denies side effects  Stressed the importance of adherence  Continue follow up in the ID clinic with Dr Jason Del Rosario or Dr Orville Max  Reviewed the most recent labs, including the CD4 and viral load  Discussed the risks and benefits of treatment options, instructions for management, importance of treatment adherence, and reduction of risk factors  Educated on possible medication side effects  Counseled on routes of HIV transmission, including the risk of  infection  Emphasized that viral suppression is the best method to prevent HIV transmission  At this time the pt denies the need for HIV testing of anyone in their life  Total encounter time was greater than 35 minutes  Greater than 20 minutes were spent on counseling and patient education  Pt voices understanding and agreement with treatment plan

## 2023-01-26 NOTE — PROGRESS NOTES
Pt presents for MWV and management of HIV   PMH: HIV, HTN, CKD, obesity, smoking hx, asthma, ischemic cardiomyopathy, TVD, onychomycosis, hyperlipidemia, LVF dysfunction, LV thrombus, bronchiectasis, long term anticoagulant use, aggression, ASCUS, covid infection, and ICD  Pt was positive for covid on 12/4/22  Pt reports    Pt does not endorse any fever, chills, nausea, vomiting, diarrhea, or night sweats

## 2023-01-27 ENCOUNTER — OFFICE VISIT (OUTPATIENT)
Dept: SURGERY | Facility: CLINIC | Age: 58
End: 2023-01-27

## 2023-01-27 ENCOUNTER — PATIENT OUTREACH (OUTPATIENT)
Dept: SURGERY | Facility: CLINIC | Age: 58
End: 2023-01-27

## 2023-01-27 VITALS
DIASTOLIC BLOOD PRESSURE: 84 MMHG | RESPIRATION RATE: 18 BRPM | WEIGHT: 146.4 LBS | HEART RATE: 61 BPM | OXYGEN SATURATION: 99 % | TEMPERATURE: 97.4 F | SYSTOLIC BLOOD PRESSURE: 117 MMHG | BODY MASS INDEX: 27.66 KG/M2

## 2023-01-27 DIAGNOSIS — Z87.891 ENCOUNTER FOR SCREENING FOR MALIGNANT NEOPLASM OF LUNG IN FORMER SMOKER WHO QUIT IN PAST 15 YEARS WITH 30 PACK YEAR HISTORY OR GREATER: ICD-10-CM

## 2023-01-27 DIAGNOSIS — Z00.00 MEDICARE ANNUAL WELLNESS VISIT, SUBSEQUENT: ICD-10-CM

## 2023-01-27 DIAGNOSIS — Z87.891 PERSONAL HISTORY OF TOBACCO USE, PRESENTING HAZARDS TO HEALTH: ICD-10-CM

## 2023-01-27 DIAGNOSIS — Z78.0 POSTMENOPAUSAL: ICD-10-CM

## 2023-01-27 DIAGNOSIS — Z12.2 ENCOUNTER FOR SCREENING FOR MALIGNANT NEOPLASM OF LUNG IN FORMER SMOKER WHO QUIT IN PAST 15 YEARS WITH 30 PACK YEAR HISTORY OR GREATER: ICD-10-CM

## 2023-01-27 DIAGNOSIS — Z78.0 ENCOUNTER FOR OSTEOPOROSIS SCREENING IN ASYMPTOMATIC POSTMENOPAUSAL PATIENT: ICD-10-CM

## 2023-01-27 DIAGNOSIS — R73.03 PREDIABETES: ICD-10-CM

## 2023-01-27 DIAGNOSIS — Z13.820 ENCOUNTER FOR OSTEOPOROSIS SCREENING IN ASYMPTOMATIC POSTMENOPAUSAL PATIENT: ICD-10-CM

## 2023-01-27 DIAGNOSIS — Z23 NEED FOR HEPATITIS A VACCINATION: ICD-10-CM

## 2023-01-27 DIAGNOSIS — E78.2 MIXED HYPERLIPIDEMIA: ICD-10-CM

## 2023-01-27 DIAGNOSIS — Z12.31 ENCOUNTER FOR SCREENING MAMMOGRAM FOR MALIGNANT NEOPLASM OF BREAST: ICD-10-CM

## 2023-01-27 DIAGNOSIS — B20 HIV DISEASE (HCC): Primary | ICD-10-CM

## 2023-01-27 DIAGNOSIS — Z12.31 ENCOUNTER FOR SCREENING MAMMOGRAM FOR HIGH-RISK PATIENT: ICD-10-CM

## 2023-01-27 DIAGNOSIS — Z12.2 SCREENING FOR MALIGNANT NEOPLASM OF RESPIRATORY ORGAN: ICD-10-CM

## 2023-01-27 NOTE — PROGRESS NOTES
Ct contacted this cm  Ct agreeable to meeting with this cm after her appointment with the clinic  Ct reports complete understanding of HIV disease process, transmission, and medications  Ct reports she is able to meet own basic needs and is able to access community assistance as needed  Ct reports consistent and reliable access to transportation  Ct reports medical care coverage/health insurance  Ct reports being self sufficient and can independently follow up on own referrals and access care and services  Ct reports stable housing  Ct reports not being sexually active in the last 3 months  Ct reports no difficulties with substance use  Ct reports own dental insurance; able to access dental services  Ct reports no history of mental health  Ct reports steady source of income  Ct reports no language/cultural barriers  Acuity scale was completed and scanned into ct's chart

## 2023-01-27 NOTE — PROGRESS NOTES
Assessment and Plan:   · Discussed going to local pharmacy for shingrix vaccine  · Recent covid infection and would hold off on covid vaccine since she has natural immunity  Problem List Items Addressed This Visit        Other    HIV disease ([address] ) - Primary     Doing well on Biktarvy with a near undetectable VL of 50 copies  Pt reports 100% medication compliance on HAART  Stressed the importance of 100% medication adherence  HIV Counseling:    Viral Load: 50 copies    CD4 Count: 628      Denies side effects  Stressed the importance of adherence  Continue follow up in the ID clinic with Dr Marissa Teresa or Dr Elena Fisher  Reviewed the most recent labs, including the CD4 and viral load  Discussed the risks and benefits of treatment options, instructions for management, importance of treatment adherence, and reduction of risk factors  Educated on possible medication side effects  Counseled on routes of HIV transmission, including the risk of  infection  Emphasized that viral suppression is the best method to prevent HIV transmission  At this time the pt denies the need for HIV testing of anyone in their life  Total encounter time was greater than 35 minutes  Greater than 20 minutes were spent on counseling and patient education  Pt voices understanding and agreement with treatment plan               Relevant Orders    CT lung screening program    Mixed hyperlipidemia    Relevant Orders    Lipid panel   Other Visit Diagnoses     Encounter for screening for malignant neoplasm of lung in former smoker who quit in past 15 years with 30 pack year history or greater        Relevant Orders    CT lung screening program    Personal history of tobacco use, presenting hazards to health        Relevant Orders    CT lung screening program    DXA bone density spine hip and pelvis    Screening for malignant neoplasm of respiratory organ        Need for hepatitis A vaccination        Relevant Orders    HEPATITIS A VACCINE ADULT IM (Completed)    Medicare annual wellness visit, subsequent        Encounter for screening mammogram for malignant neoplasm of breast        Relevant Orders    Mammo screening bilateral w 3d & cad    Encounter for screening mammogram for high-risk patient        Postmenopausal        Relevant Orders    Mammo screening bilateral w 3d & cad    Prediabetes        Relevant Orders    HEMOGLOBIN A1C W/ EAG ESTIMATION    Encounter for osteoporosis screening in asymptomatic postmenopausal patient        Relevant Orders    DXA bone density spine hip and pelvis           Preventive health issues were discussed with patient, and age appropriate screening tests were ordered as noted in patient's After Visit Summary  Personalized health advice and appropriate referrals for health education or preventive services given if needed, as noted in patient's After Visit Summary  History of Present Illness:     Patient presents for a Medicare Wellness Visit and management of HIV   PMH: HIV, HTN, CKD, obesity, smoking hx, asthma, ischemic cardiomyopathy, TVD, onychomycosis, hyperlipidemia, LVF dysfunction, LV thrombus, bronchiectasis, long term anticoagulant use, aggression, ASCUS, covid infection, and ICD  Pt was positive for covid on 12/4/22  Pt reports she is doing well  Pt denies any issues or concerns at this time  Pt reports recovering from covid infection 12/4/22  Pt does not endorse any fever, chills, nausea, vomiting, diarrhea, or night sweats    HPI   Patient Care Team:  Darline Patel as PCP - General (Nurse Practitioner)  DO Myranda Berger DO (Nephrology)     Review of Systems:     Review of Systems   Constitutional: Negative  HENT: Negative  Respiratory: Negative  Cardiovascular: Negative  Gastrointestinal: Negative  Genitourinary: Negative  Musculoskeletal: Negative  Neurological: Negative  Psychiatric/Behavioral: Negative           Problem List:     Patient Active Problem List   Diagnosis   • Ischemic cardiomyopathy   • Triple vessel coronary artery disease   • HIV disease ([address] )   • Essential hypertension   • Mixed hyperlipidemia   • Impaired left ventricular function   • Long term current use of anticoagulant therapy   • old extensive anterior, apical, lateral and distal inferior apical MI   • 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 ([facility] )   • CKD (chronic kidney disease) stage 3, GFR 30-59 ml/min ([facility])   • Implantable cardioverter-defibrillator (ICD) in situ   • Mild intermittent asthma without complication   • ASCUS with positive high risk HPV cervical   • Tobacco abuse   • Obesity (BMI 30-39  9)   • Onychomycosis   • Bronchiectasis without complication ([facility])   • Aggression   • Unintentional weight loss      Past Medical and Surgical History:     Past Medical History:   Diagnosis Date   • Asthma    • Coronary artery disease     defibrillator   • HIV positive ([address] )      Past Surgical History:   Procedure Laterality Date   • ANGIOPLASTY     • CARDIAC DEFIBRILLATOR PLACEMENT     • CORONARY ANGIOPLASTY      pci placement   • TOTAL ABDOMINAL HYSTERECTOMY     • US GUIDED INJECTION FOR RESEARCH STUDY  5/7/2018   • US GUIDED INJECTION FOR RESEARCH STUDY  1/18/2019   • US GUIDED INJECTION FOR RESEARCH STUDY  5/7/2018   • US GUIDED INJECTION FOR RESEARCH STUDY  5/14/2018      Family History:     Family History   Problem Relation Age of Onset   • No Known Problems Mother    • Other Father         GI bleed   • No Known Problems Sister    • No Known Problems Sister    • No Known Problems Maternal Grandmother    • No Known Problems Maternal Grandfather    • No Known Problems Paternal Grandmother    • No Known Problems Paternal Grandfather    • Suicidality Brother    • Drug abuse Brother         overdose   • No Known Problems Maternal Aunt    • No Known Problems Maternal Aunt    • No Known Problems Maternal Aunt    • No Known Problems Paternal Aunt Social History:     Social History     Socioeconomic History   • Marital status: Single     Spouse name: None   • Number of children: None   • Years of education: None   • Highest education level: None   Occupational History   • None   Tobacco Use   • Smoking status: Every Day     Packs/day: 1 00     Years: 30 00     Pack years: 30 00     Types: Cigarettes     Start date: 3/8/1994   • Smokeless tobacco: Former     Quit date: 8/12/2019   • Tobacco comments:     Pt smokes 5 cig daily   Vaping Use   • Vaping Use: Never used   Substance and Sexual Activity   • Alcohol use: Yes     Comment: socially   • Drug use: No   • Sexual activity: Not Currently   Other Topics Concern   • None   Social History Narrative   • None     Social Determinants of Health     Financial Resource Strain: Not on file   Food Insecurity: No Food Insecurity   • Worried About Running Out of Food in the Last Year: Never true   • Ran Out of Food in the Last Year: Never true   Transportation Needs: Not on file   Physical Activity: Not on file   Stress: Not on file   Social Connections: Not on file   Intimate Partner Violence: Not on file   Housing Stability: Not on file      Medications and Allergies:     Current Outpatient Medications   Medication Sig Dispense Refill   • aspirin (ECOTRIN LOW STRENGTH) 81 mg EC tablet Take 1 tablet (81 mg total) by mouth daily 81 tablet 11   • atorvastatin (LIPITOR) 80 mg tablet TAKE 1 TABLET BY MOUTH DAILY 30 tablet 5   • bictegravir-emtricitab-tenofovir alafenamide (Biktarvy) -25 MG tablet Take 1 tablet by mouth daily with breakfast 90 tablet 1   • carvedilol (COREG) 6 25 mg tablet TAKE 1 TABLET BY MOUTH TWICE A DAY WITH MEALS 60 tablet 5   • Entresto 49-51 MG TABS TAKE 1 TABLET BY MOUTH TWICE A DAY 60 tablet 5   • menthol-cetylpyridinium (CEPACOL) 3 MG lozenge Take 1 lozenge (3 mg total) by mouth as needed for sore throat 30 lozenge 0   • warfarin (COUMADIN) 1 mg tablet TAKE 1 TABLET BY MOUTH DAILY OR AS DIRECTED 30 tablet 10   • warfarin (COUMADIN) 5 mg tablet 1 to 1 5 tablets daily as directed by  tablet 3   • acetaminophen (TYLENOL) 500 mg tablet Take 1 tablet (500 mg total) by mouth every 6 (six) hours as needed for mild pain (Patient not taking: Reported on 1/27/2023) 30 tablet 0   • bictegravir-emtricitab-tenofovir alafenamide (Biktarvy) -25 MG tablet Take 1 tablet by mouth daily with breakfast 90 tablet 1   • fluticasone (FLONASE) 50 mcg/act nasal spray 1 spray into each nostril daily (Patient not taking: Reported on 1/27/2023) 16 g 0   • nicotine polacrilex (COMMIT) 4 MG lozenge USE 1 LOZENGE AS NEEDED FOR SMOKING CESSATION (Patient not taking: Reported on 1/27/2023) 72 lozenge 5     No current facility-administered medications for this visit  Allergies   Allergen Reactions   • No Active Allergies    • No Known Allergies       Immunizations:     Immunization History   Administered Date(s) Administered   • COVID-19 PFIZER VACCINE 0 3 ML IM 03/29/2021, 04/23/2021, 01/15/2022   • Hep A, adult 12/08/2021, 01/27/2023   • INFLUENZA 10/04/2017, 10/04/2017, 09/22/2020   • Influenza, injectable, quadrivalent, preservative free 0 5 mL 10/08/2019, 09/22/2020   • Influenza, recombinant, quadrivalent,injectable, preservative free 10/12/2018, 10/08/2021, 10/07/2022   • Meningococcal MCV4P 03/01/2019, 05/03/2019   • Pneumococcal Polysaccharide PPV23 09/12/2018   • Tdap 11/08/2019      Health Maintenance:         Topic Date Due   • Breast Cancer Screening: Mammogram  07/11/2023   • Colorectal Cancer Screening  01/31/2024   • Hepatitis C Screening  Completed   • Lung Cancer Screening  Discontinued         Topic Date Due   • COVID-19 Vaccine (4 - Booster for Pfizer series) 03/12/2022      Medicare Screening Tests and Risk Assessments:     Nazanin Maguire is here for her Subsequent Wellness visit  Health Risk Assessment:   Patient rates overall health as very good   Patient feels that their physical health rating is much better  Patient is very satisfied with their life  Eyesight was rated as same  Hearing was rated as same  Patient feels that their emotional and mental health rating is much better  Patients states they are never, rarely angry  Patient states they are never, rarely unusually tired/fatigued  Pain experienced in the last 7 days has been none  Patient states that she has experienced weight loss or gain in last 6 months  Patient lost some weight but gaining it now  Fall Risk Screening: In the past year, patient has experienced: no history of falling in past year      Urinary Incontinence Screening:   Patient has not leaked urine accidently in the last six months  Home Safety:  Patient does not have trouble with stairs inside or outside of their home  Patient has working smoke alarms and has working carbon monoxide detector  Home safety hazards include: none  Nutrition:   Current diet is Regular  Medications:   Patient is not currently taking any over-the-counter supplements  Patient is able to manage medications  Activities of Daily Living (ADLs)/Instrumental Activities of Daily Living (IADLs):   Walk and transfer into and out of bed and chair?: Yes  Dress and groom yourself?: Yes    Bathe or shower yourself?: Yes    Feed yourself? Yes  Do your laundry/housekeeping?: Yes  Manage your money, pay your bills and track your expenses?: Yes  Make your own meals?: Yes    Do your own shopping?: Yes    Previous Hospitalizations:   Any hospitalizations or ED visits within the last 12 months?: Yes    How many hospitalizations have you had in the last year?: 1-2    Hospitalization Comments: Had covid, seen in ED and sent home      Advance Care Planning:   Living will: No    Durable POA for healthcare: No    Advanced directive: No    Advanced directive counseling given: No    Five wishes given: Yes      Cognitive Screening:   Provider or family/friend/caregiver concerned regarding cognition?: No    PREVENTIVE SCREENINGS      Cardiovascular Screening:    General: Screening Not Indicated and History Lipid Disorder    Due for: Lipid Panel      Diabetes Screening:     General: Screening Current      Colorectal Cancer Screening:     General: Screening Current      Breast Cancer Screening:     General: Screening Current    Due for: Mammogram        Cervical Cancer Screening:    General: Screening Not Indicated      Osteoporosis Screening:      Due for: Bone Density Ultrasound      Abdominal Aortic Aneurysm (AAA) Screening:        General: Screening Not Indicated      Lung Cancer Screening:     General: Risks and Benefits Discussed    Due for: Low Dose CT (LDCT)      Hepatitis C Screening:    General: Screening Current    Hep C Screening Accepted: Yes      Screening, Brief Intervention, and Referral to Treatment (SBIRT)    Screening  Typical number of drinks in a day: 0  Typical number of drinks in a week: 0  Interpretation: Low risk drinking behavior  Brief Intervention  Alcohol & drug use screenings were reviewed  No concerns regarding substance use disorder identified  Other Counseling Topics:   Car/seat belt/driving safety, skin self-exam, sunscreen and calcium and vitamin D intake and regular weightbearing exercise  No results found  Physical Exam:     /84 (BP Location: Right arm, Patient Position: Sitting, Cuff Size: Standard)   Pulse 61   Temp (!) 97 4 °F (36 3 °C) (Tympanic)   Resp 18   Wt 66 4 kg (146 lb 6 4 oz)   SpO2 99%   BMI 27 66 kg/m²     Physical Exam  Vitals and nursing note reviewed  Constitutional:       General: She is not in acute distress  Appearance: Normal appearance  She is not ill-appearing  HENT:      Mouth/Throat:      Mouth: Mucous membranes are moist       Pharynx: Oropharynx is clear  Cardiovascular:      Rate and Rhythm: Normal rate and regular rhythm  Chest Wall: PMI is not displaced  Pulses: Normal pulses        Heart sounds: Normal heart sounds  Pulmonary:      Effort: Pulmonary effort is normal       Breath sounds: Normal breath sounds  Abdominal:      General: Bowel sounds are normal       Palpations: Abdomen is soft  Musculoskeletal:         General: Normal range of motion  Skin:     General: Skin is warm and dry  Capillary Refill: Capillary refill takes less than 2 seconds  Neurological:      Mental Status: She is alert and oriented to person, place, and time  Psychiatric:         Mood and Affect: Mood normal          Behavior: Behavior normal          Thought Content:  Thought content normal          Judgment: Judgment normal           JOSE ROBERTO De La Torre

## 2023-01-27 NOTE — PATIENT INSTRUCTIONS
Medicare Preventive Visit Patient Instructions  Thank you for completing your Welcome to Medicare Visit or Medicare Annual Wellness Visit today  Your next wellness visit will be due in one year (1/28/2024)  The screening/preventive services that you may require over the next 5-10 years are detailed below  Some tests may not apply to you based off risk factors and/or age  Screening tests ordered at today's visit but not completed yet may show as past due  Also, please note that scanned in results may not display below  Preventive Screenings:  Service Recommendations Previous Testing/Comments   Colorectal Cancer Screening  * Colonoscopy    * Fecal Occult Blood Test (FOBT)/Fecal Immunochemical Test (FIT)  * Fecal DNA/Cologuard Test  * Flexible Sigmoidoscopy Age: 39-70 years old   Colonoscopy: every 10 years (may be performed more frequently if at higher risk)  OR  FOBT/FIT: every 1 year  OR  Cologuard: every 3 years  OR  Sigmoidoscopy: every 5 years  Screening may be recommended earlier than age 39 if at higher risk for colorectal cancer  Also, an individualized decision between you and your healthcare provider will decide whether screening between the ages of 74-80 would be appropriate  Colonoscopy: 01/29/2019  FOBT/FIT: Not on file  Cologuard: Not on file  Sigmoidoscopy: Not on file    Screening Current     Breast Cancer Screening Age: 36 years old  Frequency: every 1-2 years  Not required if history of left and right mastectomy Mammogram: 07/11/2022    Screening Current   Cervical Cancer Screening Between the ages of 21-29, pap smear recommended once every 3 years  Between the ages of 33-67, can perform pap smear with HPV co-testing every 5 years     Recommendations may differ for women with a history of total hysterectomy, cervical cancer, or abnormal pap smears in past  Pap Smear: 11/14/2019    Screening Not Indicated   Hepatitis C Screening Once for adults born between 1945 and 1965  More frequently in patients at high risk for Hepatitis C Hep C Antibody: 07/18/2022    Screening Current   Diabetes Screening 1-2 times per year if you're at risk for diabetes or have pre-diabetes Fasting glucose: 113 mg/dL (11/22/2022)  A1C: 6 0 % (7/18/2022)  Screening Current   Cholesterol Screening Once every 5 years if you don't have a lipid disorder  May order more often based on risk factors  Lipid panel: 12/27/2021    Screening Not Indicated  History Lipid Disorder     Other Preventive Screenings Covered by Medicare:  Abdominal Aortic Aneurysm (AAA) Screening: covered once if your at risk  You're considered to be at risk if you have a family history of AAA  Lung Cancer Screening: covers low dose CT scan once per year if you meet all of the following conditions: (1) Age 50-69; (2) No signs or symptoms of lung cancer; (3) Current smoker or have quit smoking within the last 15 years; (4) You have a tobacco smoking history of at least 20 pack years (packs per day multiplied by number of years you smoked); (5) You get a written order from a healthcare provider  Glaucoma Screening: covered annually if you're considered high risk: (1) You have diabetes OR (2) Family history of glaucoma OR (3)  aged 48 and older OR (3)  American aged 72 and older  Osteoporosis Screening: covered every 2 years if you meet one of the following conditions: (1) You're estrogen deficient and at risk for osteoporosis based off medical history and other findings; (2) Have a vertebral abnormality; (3) On glucocorticoid therapy for more than 3 months; (4) Have primary hyperparathyroidism; (5) On osteoporosis medications and need to assess response to drug therapy  Last bone density test (DXA Scan): Not on file  HIV Screening: covered annually if you're between the age of 12-76  Also covered annually if you are younger than 13 and older than 72 with risk factors for HIV infection   For pregnant patients, it is covered up to 3 times per pregnancy  Immunizations:  Immunization Recommendations   Influenza Vaccine Annual influenza vaccination during flu season is recommended for all persons aged >= 6 months who do not have contraindications   Pneumococcal Vaccine   * Pneumococcal conjugate vaccine = PCV13 (Prevnar 13), PCV15 (Vaxneuvance), PCV20 (Prevnar 20)  * Pneumococcal polysaccharide vaccine = PPSV23 (Pneumovax) Adults 25-60 years old: 1-3 doses may be recommended based on certain risk factors  Adults 72 years old: 1-2 doses may be recommended based off what pneumonia vaccine you previously received   Hepatitis B Vaccine 3 dose series if at intermediate or high risk (ex: diabetes, end stage renal disease, liver disease)   Tetanus (Td) Vaccine - COST NOT COVERED BY MEDICARE PART B Following completion of primary series, a booster dose should be given every 10 years to maintain immunity against tetanus  Td may also be given as tetanus wound prophylaxis  Tdap Vaccine - COST NOT COVERED BY MEDICARE PART B Recommended at least once for all adults  For pregnant patients, recommended with each pregnancy  Shingles Vaccine (Shingrix) - COST NOT COVERED BY MEDICARE PART B  2 shot series recommended in those aged 48 and above     Health Maintenance Due:      Topic Date Due    Lung Cancer Screening  12/30/2022    Breast Cancer Screening: Mammogram  07/11/2023    Colorectal Cancer Screening  01/31/2024    Hepatitis C Screening  Completed     Immunizations Due:      Topic Date Due    COVID-19 Vaccine (4 - Booster for Pfizer series) 03/12/2022    Hepatitis A Vaccine (2 of 2 - Risk 2-dose series) 06/08/2022     Advance Directives   What are advance directives? Advance directives are legal documents that state your wishes and plans for medical care  These plans are made ahead of time in case you lose your ability to make decisions for yourself   Advance directives can apply to any medical decision, such as the treatments you want, and if you want to donate organs  What are the types of advance directives? There are many types of advance directives, and each state has rules about how to use them  You may choose a combination of any of the following:  Living will: This is a written record of the treatment you want  You can also choose which treatments you do not want, which to limit, and which to stop at a certain time  This includes surgery, medicine, IV fluid, and tube feedings  Critical access hospital power of  for [facility]): This is a written record that states who you want to make healthcare choices for you when you are unable to make them for yourself  This person, called a proxy, is usually a family member or a friend  You may choose more than 1 proxy  Do not resuscitate (DNR) order:  A DNR order is used in case your heart stops beating or you stop breathing  It is a request not to have certain forms of treatment, such as CPR  A DNR order may be included in other types of advance directives  Medical directive: This covers the care that you want if you are in a coma, near death, or unable to make decisions for yourself  You can list the treatments you want for each condition  Treatment may include pain medicine, surgery, blood transfusions, dialysis, IV or tube feedings, and a ventilator (breathing machine)  Values history: This document has questions about your views, beliefs, and how you feel and think about life  This information can help others choose the care that you would choose  Why are advance directives important? An advance directive helps you control your care  Although spoken wishes may be used, it is better to have your wishes written down  Spoken wishes can be misunderstood, or not followed  Treatments may be given even if you do not want them  An advance directive may make it easier for your family to make difficult choices about your care     Cigarette Smoking and Your Health   Risks to your health if you smoke:  Nicotine and other chemicals found in tobacco damage every cell in your body  Even if you are a light smoker, you have an increased risk for cancer, heart disease, and lung disease  If you are pregnant or have diabetes, smoking increases your risk for complications  Benefits to your health if you stop smoking: You decrease respiratory symptoms such as coughing, wheezing, and shortness of breath  You reduce your risk for cancers of the lung, mouth, throat, kidney, bladder, pancreas, stomach, and cervix  If you already have cancer, you increase the benefits of chemotherapy  You also reduce your risk for cancer returning or a second cancer from developing  You reduce your risk for heart disease, blood clots, heart attack, and stroke  You reduce your risk for lung infections, and diseases such as pneumonia, asthma, chronic bronchitis, and emphysema  Your circulation improves  More oxygen can be delivered to your body  If you have diabetes, you lower your risk for complications, such as kidney, artery, and eye diseases  You also lower your risk for nerve damage  Nerve damage can lead to amputations, poor vision, and blindness  You improve your body's ability to heal and to fight infections  For more information and support to stop smoking:   [place]  Phone: 6- 368 - [phone]  Web Address: www [employer]  Weight Management   Why it is important to manage your weight:  Being overweight increases your risk of health conditions such as heart disease, high blood pressure, type 2 diabetes, and certain types of cancer  It can also increase your risk for osteoarthritis, sleep apnea, and other respiratory problems  Aim for a slow, steady weight loss  Even a small amount of weight loss can lower your risk of health problems  How to lose weight safely:  A safe and healthy way to lose weight is to eat fewer calories and get regular exercise   You can lose up about 1 pound a week by decreasing the number of calories you eat by 500 calories each day  Healthy meal plan for weight management:  A healthy meal plan includes a variety of foods, contains fewer calories, and helps you stay healthy  A healthy meal plan includes the following:  Eat whole-grain foods more often  A healthy meal plan should contain fiber  Fiber is the part of grains, fruits, and vegetables that is not broken down by your body  Whole-grain foods are healthy and provide extra fiber in your diet  Some examples of whole-grain foods are whole-wheat breads and pastas, oatmeal, brown rice, and bulgur  Eat a variety of vegetables every day  Include dark, leafy greens such as spinach, kale, maciej greens, and mustard greens  Eat yellow and orange vegetables such as carrots, sweet potatoes, and winter squash  Eat a variety of fruits every day  Choose fresh or canned fruit (canned in its own juice or light syrup) instead of juice  Fruit juice has very little or no fiber  Eat low-fat dairy foods  Drink fat-free (skim) milk or 1% milk  Eat fat-free yogurt and low-fat cottage cheese  Try low-fat cheeses such as mozzarella and other reduced-fat cheeses  Choose meat and other protein foods that are low in fat  Choose beans or other legumes such as split peas or lentils  Choose fish, skinless poultry (chicken or turkey), or lean cuts of red meat (beef or pork)  Before you cook meat or poultry, cut off any visible fat  Use less fat and oil  Try baking foods instead of frying them  Add less fat, such as margarine, sour cream, regular salad dressing and mayonnaise to foods  Eat fewer high-fat foods  Some examples of high-fat foods include french fries, doughnuts, ice cream, and cakes  Eat fewer sweets  Limit foods and drinks that are high in sugar  This includes candy, cookies, regular soda, and sweetened drinks  Exercise:  Exercise at least 30 minutes per day on most days of the week  Some examples of exercise include walking, biking, dancing, and swimming   You can also fit in more physical activity by taking the stairs instead of the elevator or parking farther away from stores  Ask your healthcare provider about the best exercise plan for you  © Copyright [employer] 2018 Information is for End User's use only and may not be sold, redistributed or otherwise used for commercial purposes  All illustrations and images included in CareNotes® are the copyrighted property of [employer] M [employer]  or [address] [place] [address]    Recommend getting the Shingrix vaccine

## 2023-01-30 ENCOUNTER — ANTICOAG VISIT (OUTPATIENT)
Dept: CARDIOLOGY CLINIC | Facility: CLINIC | Age: 58
End: 2023-01-30

## 2023-01-30 ENCOUNTER — APPOINTMENT (OUTPATIENT)
Dept: LAB | Facility: HOSPITAL | Age: 58
End: 2023-01-30

## 2023-01-30 DIAGNOSIS — E78.2 MIXED HYPERLIPIDEMIA: ICD-10-CM

## 2023-01-30 DIAGNOSIS — I25.5 ISCHEMIC CARDIOMYOPATHY: Primary | ICD-10-CM

## 2023-01-30 DIAGNOSIS — Z79.01 LONG TERM CURRENT USE OF ANTICOAGULANT THERAPY: ICD-10-CM

## 2023-01-30 DIAGNOSIS — R73.03 PREDIABETES: ICD-10-CM

## 2023-01-30 DIAGNOSIS — I23.6 LV (LEFT VENTRICULAR) MURAL THROMBUS FOLLOWING MI (HCC): ICD-10-CM

## 2023-01-30 LAB
EST. AVERAGE GLUCOSE BLD GHB EST-MCNC: 114 MG/DL
HBA1C MFR BLD: 5.6 %

## 2023-01-30 NOTE — PROGRESS NOTES
Pt called denied any changes of meds, missed doses will take 7/5 today then 5 daily as usual and recheck in 3 weeks

## 2023-01-30 NOTE — PROGRESS NOTES
Data: Clinical team provided on 1/27/23 the PHQ-9 screening template within PCP's appt to explore emotional, cognitive, and behavioral health's stability  Pt completed the document accordingly, scoring 2 as a display of minimal depressive traits without SI or HI  [facility] will f/u with direct contact upon next coordinated provider's appointment

## 2023-02-03 ENCOUNTER — PATIENT OUTREACH (OUTPATIENT)
Dept: SURGERY | Facility: CLINIC | Age: 58
End: 2023-02-03

## 2023-02-06 ENCOUNTER — PATIENT OUTREACH (OUTPATIENT)
Dept: SURGERY | Facility: CLINIC | Age: 58
End: 2023-02-06

## 2023-02-06 NOTE — PROGRESS NOTES
This cm contacted [place] [address] and requested they send ct's oral surgery referral to [address] [place] [address] per ct's request  Chavo Eid let this cm know that they would take care of it  This cm attempted to contact ct to make her aware  Message was left  Ct returned this cm's phone call  Ct was made aware [facility] would be sending the referral to [address] [place] [address]

## 2023-02-08 ENCOUNTER — HOSPITAL ENCOUNTER (OUTPATIENT)
Dept: CT IMAGING | Facility: HOSPITAL | Age: 58
Discharge: HOME | End: 2023-02-08

## 2023-02-08 DIAGNOSIS — Z87.891 ENCOUNTER FOR SCREENING FOR MALIGNANT NEOPLASM OF LUNG IN FORMER SMOKER WHO QUIT IN PAST 15 YEARS WITH 30 PACK YEAR HISTORY OR GREATER: ICD-10-CM

## 2023-02-08 DIAGNOSIS — B20 HIV DISEASE (HCC): ICD-10-CM

## 2023-02-08 DIAGNOSIS — Z87.891 PERSONAL HISTORY OF TOBACCO USE, PRESENTING HAZARDS TO HEALTH: ICD-10-CM

## 2023-02-08 DIAGNOSIS — Z12.2 ENCOUNTER FOR SCREENING FOR MALIGNANT NEOPLASM OF LUNG IN FORMER SMOKER WHO QUIT IN PAST 15 YEARS WITH 30 PACK YEAR HISTORY OR GREATER: ICD-10-CM

## 2023-02-11 PROBLEM — E66.9 OBESITY (BMI 30-39.9): Status: RESOLVED | Noted: 2021-10-17 | Resolved: 2023-02-11

## 2023-02-14 NOTE — PROGRESS NOTES
"Chief Complaint  Follow-up (Protruding mesh from hernia repair)    Subjective        Abi Rivera presents to [facility] GENERAL SURGERY  History of Present Illness    61-year-old lady here to see me for her right lower quadrant abdominal wound.  She underwent exploratory laparotomy with repair of duodenal ulcer, subsequent ventral incisional hernia repair with intra-abdominal mesh placement, bilateral component release anteriorly with onlay permanent mesh by Dr. Velasco last year.  She developed subcutaneous abscess which resulted in superficial skin breakdown.  She has been seeing wound care for the past year and now has exposed mesh in her right lower quadrant.    Objective   Vital Signs:  /84 (Cuff Size: Adult)   Pulse 88   Temp 98.4 °F (36.9 °C) (Infrared)   Ht 152.4 cm (60\")   Wt 54.2 kg (119 lb 6.4 oz)   SpO2 98%   BMI 23.32 kg/m²   Estimated body mass index is 23.32 kg/m² as calculated from the following:    Height as of this encounter: 152.4 cm (60\").    Weight as of this encounter: 54.2 kg (119 lb 6.4 oz).       BMI is within normal parameters. No other follow-up for BMI required.      Physical Exam  Constitutional:       General: She is not in acute distress.     Appearance: Normal appearance. She is not ill-appearing.   HENT:      Head: Normocephalic and atraumatic.      Right Ear: External ear normal.      Left Ear: External ear normal.   Eyes:      Extraocular Movements: Extraocular movements intact.      Conjunctiva/sclera: Conjunctivae normal.   Cardiovascular:      Rate and Rhythm: Normal rate and regular rhythm.   Pulmonary:      Effort: Pulmonary effort is normal. No respiratory distress.   Abdominal:      General: There is no distension.      Palpations: Abdomen is soft.      Comments: Right lower quadrant wound about 8 cm with exposed Prolene mesh at the base of the wound   Musculoskeletal:         General: No swelling or deformity.   Skin:     General: Skin is " Spoke with patient, advised INR a little low, no missed doses   Advised to take 7 5 mg tonight only instead of 5 mg, then go back to 5 mg daily and recheck in 2 weeks 1/3/23 warm and dry.   Neurological:      Mental Status: She is alert and oriented to person, place, and time. Mental status is at baseline.        Result Review :                   Assessment and Plan   Diagnoses and all orders for this visit:    1. Incisional hernia with obstruction but no gangrene (Primary)    2. Infected prosthetic mesh of abdominal wall, subsequent encounter      61-year-old lady here to see me for her right lower quadrant abdominal wound.  She underwent exploratory laparotomy with repair of duodenal ulcer, subsequent ventral incisional hernia repair with intra-abdominal mesh placement, bilateral component release anteriorly with onlay permanent mesh by Dr. Velasco last year.  She developed subcutaneous abscess which resulted in superficial skin breakdown.  She has been seeing wound care for the past year and now has exposed mesh in her right lower quadrant.  We discussed that this is likely chronically infected mesh and foreign body reaction is making wound not closed.  Discussed options and have agreed that we need to remove this mesh in order for the wound to heal.  Discussed risk benefits and alternatives include infection, bleeding, injury to surrounding structures and patient elected to proceed.           Follow Up   No follow-ups on file.  Patient was given instructions and counseling regarding her condition or for health maintenance advice. Please see specific information pulled into the AVS if appropriate.

## 2023-02-15 ENCOUNTER — OFFICE VISIT (OUTPATIENT)
Dept: CARDIOLOGY CLINIC | Facility: CLINIC | Age: 58
End: 2023-02-15

## 2023-02-15 VITALS
SYSTOLIC BLOOD PRESSURE: 110 MMHG | HEIGHT: 61 IN | DIASTOLIC BLOOD PRESSURE: 60 MMHG | HEART RATE: 65 BPM | WEIGHT: 145.5 LBS | BODY MASS INDEX: 27.47 KG/M2

## 2023-02-15 DIAGNOSIS — Z72.0 TOBACCO ABUSE: ICD-10-CM

## 2023-02-15 DIAGNOSIS — Z95.810 IMPLANTABLE CARDIOVERTER-DEFIBRILLATOR (ICD) IN SITU: ICD-10-CM

## 2023-02-15 DIAGNOSIS — I51.89 IMPAIRED LEFT VENTRICULAR FUNCTION: ICD-10-CM

## 2023-02-15 DIAGNOSIS — Z79.01 LONG TERM CURRENT USE OF ANTICOAGULANT THERAPY: ICD-10-CM

## 2023-02-15 DIAGNOSIS — N18.31 STAGE 3A CHRONIC KIDNEY DISEASE (HCC): ICD-10-CM

## 2023-02-15 DIAGNOSIS — I25.5 ISCHEMIC CARDIOMYOPATHY: ICD-10-CM

## 2023-02-15 DIAGNOSIS — I25.10 TRIPLE VESSEL CORONARY ARTERY DISEASE: Primary | ICD-10-CM

## 2023-02-15 DIAGNOSIS — J47.9 BRONCHIECTASIS WITHOUT COMPLICATION (HCC): ICD-10-CM

## 2023-02-15 DIAGNOSIS — I25.2 PAST HEART ATTACK: ICD-10-CM

## 2023-02-15 DIAGNOSIS — E78.2 MIXED HYPERLIPIDEMIA: ICD-10-CM

## 2023-02-15 DIAGNOSIS — I10 ESSENTIAL HYPERTENSION: ICD-10-CM

## 2023-02-15 DIAGNOSIS — I23.6 LV (LEFT VENTRICULAR) MURAL THROMBUS FOLLOWING MI (HCC): ICD-10-CM

## 2023-02-15 NOTE — PROGRESS NOTES
[facility]ARD[facility]OLOGY [facility]OC[facility]  [address] [facility], [place] [address] [facility]  Phone#  [phone]   Fax#  [phone]  *-*-*-*-*-*-*-*-*-*-*-*-*-*-*-*-*-*-*-*-*-*-*-*-*-*-*-*-*-*-*-*-*-*-*-*-*-*-*-*-*-*-*-*-*-*-*-*-*-*-*-*-*-*                                   Cardiology Follow Up      ENCOUNTER DATE: 02/15/23 9:50 AM  PATIENT NAME: Carli Monique   : 1965    MRN: [id number]  AGE:57 y o  SEX: female  [address] Rachna Caro MD     PRIMARY CARE PHYSICIAN: JOSE ROBERTO Song Si    ACTIVE DIAGNOSIS THIS VISIT  1  Triple vessel coronary artery disease  POCT ECG      2  Ischemic cardiomyopathy  Echo complete w/ contrast if indicated      3  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 ([facility] )  Echo complete w/ contrast if indicated      4  Mixed hyperlipidemia        5  Essential hypertension        6  Stage 3a chronic kidney disease ([facility] )        7  Implantable cardioverter-defibrillator (ICD) in situ        8  Long term current use of anticoagulant therapy        5  old extensive anterior, apical, lateral and distal inferior apical MI        10  Tobacco abuse        11  Impaired left ventricular function        12   Bronchiectasis without complication [facility])          ACTIVE PROBLEM LIST  Patient Active Problem List   Diagnosis   • Ischemic cardiomyopathy   • Triple vessel coronary artery disease   • HIV disease ([facility] )   • Essential hypertension   • Mixed hyperlipidemia   • Impaired left ventricular function   • Long term current use of anticoagulant therapy   • old extensive anterior, apical, lateral and distal inferior apical MI   • 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 ([facility] )   • CKD (chronic kidney disease) stage 3, GFR 30-59 ml/min ([facility])   • Implantable cardioverter-defibrillator (ICD) in situ   • Mild intermittent asthma without complication   • ASCUS with positive high risk HPV cervical   • Tobacco abuse   • Onychomycosis   • Bronchiectasis without complication ([facility])   • Aggression • Unintentional weight loss       CARDIOLOGY SPECIALTY COMMENTS  Patient was referred to us from [facility]  She experienced a myocardial infarction on February 28, 2017 in [place]  She has subsequently moved to [facility] area  She had a cardiac catheterization taken at [facility]  Patient's cardiac catheterization CD revealed the left main coronary artery to be nonobstructed  The left anterior descending artery was 100% in its midportion after a large 1st diagonal branch which was nonobstructed  The circumflex artery obtuse marginal was 90%  The right coronary artery in its midportion with 50-60%  The large distal LAD filled from right to left collaterals  [facility] had recommended CABG but the patient had refused at that time  No ventriculogram was performed but an echocardiogram which I performed had demonstrated extensive LV damage  A nuclear stress test demonstrated minimal with any it ischemia  I did not feel that the patient would improve with CABG  Patient is free of angina pectoris or shortness of breath  She is doing quite well for her degree of LV dysfunction  04/11/2017 echocardiogram severe LV dysfunction, EF 30%, mild LV enlargement, apical aneurysm with akinesis to paradoxical motion  Large organized apical thrombus  Grade 1 diastolic dysfunction, normal pulmonary artery pressures     05/15/2017 stress test: Severe LV dysfunction, EF 20%  Mild LV enlargement  Anterior apical aneurysm with paradoxical motion  Extensive myocardial infarctions involving the distal anterior apical and inferior apical walls  Negative for Persantine induced ischemia       12/13/2017 ICD: single-chamber  03/12/2019 echocardiogram: Severe LV systolic dysfunction, EF 57-33% apex is paradoxical  Grade 1 diastolic dysfunction   Thrombus in the septal apex with smoke    INTERVAL HISTORY:        Patient with a prior extensive anterior wall myocardial infarction  She is well compensated and has not had congestive heart failure  Her last echocardiogram was in March 2019 which demonstrated an LVEF of 25% with a paradoxical apex and thrombus in the apex  On warfarin anticoagulation  Recently her pacemaker has triggered an increase in thoracic fluid  She denies shortness of breath      DISCUSSION/PLAN:          · Recommend the patient be slightly more careful about her sodium to make sure her thoracic fluid does not increase further  · Obtain an echocardiogram  · Return in 6 months    Lab Studies:    Lab Results   Component Value Date    CHOLESTEROL 125 12/27/2021    CHOLESTEROL 152 07/02/2021    CHOLESTEROL 147 04/07/2020     Lab Results   Component Value Date    TRIG 119 12/27/2021    TRIG 124 07/02/2021    TRIG 162 (H) 04/07/2020     Lab Results   Component Value Date    HDL 36 (L) 12/27/2021    HDL 49 07/02/2021    HDL 41 04/07/2020     Lab Results   Component Value Date    LDLCALC 65 12/27/2021    LDLCALC 78 07/02/2021    LDLCALC 74 04/07/2020       Lab Results   Component Value Date    LDLDIRECT 76 46 02/14/2019       Lab Results   Component Value Date    NTBNP 952 (H) 06/18/2022    NTBNP 556 (H) 03/14/2020     Lab Results   Component Value Date    HGBA1C 5 6 01/30/2023      Lab Results   Component Value Date    EGFR 36 11/22/2022    EGFR 44 10/14/2022    EGFR 45 10/04/2022    SODIUM 145 11/22/2022    SODIUM 140 10/14/2022    SODIUM 141 10/04/2022    K 4 7 11/22/2022    K 4 4 10/14/2022    K 4 3 10/04/2022     (H) 11/22/2022     10/14/2022     10/04/2022    CO2 27 11/22/2022    CO2 28 10/14/2022    CO2 27 10/04/2022    BUN 13 11/22/2022    BUN 14 10/14/2022    BUN 16 10/04/2022    CREATININE 1 59 (H) 11/22/2022    CREATININE 1 34 (H) 10/14/2022    CREATININE 1 31 (H) 10/04/2022     Lab Results   Component Value Date    WBC 6 10 11/22/2022    WBC 4 81 10/14/2022    WBC 4 6 10/14/2022    HGB 14 5 11/22/2022    HGB 13 6 10/14/2022    HGB 13 9 10/14/2022    HCT 45 7 11/22/2022    HCT 42 2 10/14/2022    HCT 41 3 10/14/2022    MCV 95 11/22/2022    MCV 96 10/14/2022    MCV 93 10/14/2022    MCH 30 1 11/22/2022    MCH 30 8 10/14/2022    MCH 31 2 10/14/2022    MCHC 31 7 11/22/2022    MCHC 32 2 10/14/2022    MCHC 33 7 10/14/2022     11/22/2022     10/14/2022     10/14/2022      Lab Results   Component Value Date    CALCIUM 9 9 11/22/2022    CALCIUM 9 2 10/14/2022    CALCIUM 9 3 10/04/2022    AST 20 10/14/2022    AST 21 10/04/2022    AST 28 07/18/2022    ALT 13 10/14/2022    ALT 17 10/04/2022    ALT 16 07/18/2022    ALKPHOS 53 10/14/2022    ALKPHOS 58 10/04/2022    ALKPHOS 60 07/18/2022    MG 2 1 12/13/2017     Results for orders placed or performed in visit on 02/15/23   POCT ECG    Narrative    Normal sinus rhythm at a rate of 65 bpm   Old anterior septal and lateral wall myocardial infarction  Nonspecific T wave and flattening  Abnormal EKG           Current Outpatient Medications:   •  aspirin (ECOTRIN LOW STRENGTH) 81 mg EC tablet, Take 1 tablet (81 mg total) by mouth daily, Disp: 81 tablet, Rfl: 11  •  atorvastatin (LIPITOR) 80 mg tablet, TAKE 1 TABLET BY MOUTH DAILY, Disp: 30 tablet, Rfl: 5  •  bictegravir-emtricitab-tenofovir alafenamide (Biktarvy) -25 MG tablet, Take 1 tablet by mouth daily with breakfast, Disp: 90 tablet, Rfl: 1  •  carvedilol (COREG) 6 25 mg tablet, TAKE 1 TABLET BY MOUTH TWICE A DAY WITH MEALS, Disp: 60 tablet, Rfl: 5  •  Entresto 49-51 MG TABS, TAKE 1 TABLET BY MOUTH TWICE A DAY, Disp: 60 tablet, Rfl: 5  •  nicotine polacrilex (COMMIT) 4 MG lozenge, USE 1 LOZENGE AS NEEDED FOR SMOKING CESSATION, Disp: 72 lozenge, Rfl: 5  •  warfarin (COUMADIN) 1 mg tablet, TAKE 1 TABLET BY MOUTH DAILY OR AS DIRECTED, Disp: 30 tablet, Rfl: 10  •  warfarin (COUMADIN) 5 mg tablet, 1 to 1 5 tablets daily as directed by MD, Disp: 135 tablet, Rfl: 3  Allergies   Allergen Reactions   • No Active Allergies    • No Known Allergies        Past Medical History:   Diagnosis Date   • Asthma    • Coronary artery disease     defibrillator   • HIV positive ([facility] )      Social History     Socioeconomic History   • Marital status: Single     Spouse name: Not on file   • Number of children: Not on file   • Years of education: Not on file   • Highest education level: Not on file   Occupational History   • Not on file   Tobacco Use   • Smoking status: Every Day     Packs/day: 1 00     Years: 30 00     Pack years: 30 00     Types: Cigarettes     Start date: 3/8/1994   • Smokeless tobacco: Former     Quit date: 8/12/2019   • Tobacco comments:     Pt smokes 5 cig daily   Vaping Use   • Vaping Use: Never used   Substance and Sexual Activity   • Alcohol use: Yes     Comment: socially   • Drug use: No   • Sexual activity: Not Currently   Other Topics Concern   • Not on file   Social History Narrative   • Not on file     Social Determinants of Health     Financial Resource Strain: Not on file   Food Insecurity: No Food Insecurity   • Worried About Running Out of Food in the Last Year: Never true   • Ran Out of Food in the Last Year: Never true   Transportation Needs: Not on file   Physical Activity: Not on file   Stress: Not on file   Social Connections: Not on file   Intimate Partner Violence: Not on file   Housing Stability: Not on file      Family History   Problem Relation Age of Onset   • No Known Problems Mother    • Other Father         GI bleed   • No Known Problems Sister    • No Known Problems Sister    • No Known Problems Maternal Grandmother    • No Known Problems Maternal Grandfather    • No Known Problems Paternal Grandmother    • No Known Problems Paternal Grandfather    • Suicidality Brother    • Drug abuse Brother         overdose   • No Known Problems Maternal Aunt    • No Known Problems Maternal Aunt    • No Known Problems Maternal Aunt    • No Known Problems Paternal Aunt      Past Surgical History:   Procedure Laterality Date   • ANGIOPLASTY     • CARDIAC DEFIBRILLATOR PLACEMENT     • CORONARY ANGIOPLASTY      pci placement   • TOTAL ABDOMINAL HYSTERECTOMY     • US GUIDED INJECTION FOR RESEARCH STUDY  5/7/2018   • US GUIDED INJECTION FOR RESEARCH STUDY  1/18/2019   • US GUIDED INJECTION FOR RESEARCH STUDY  5/7/2018   • US GUIDED INJECTION FOR RESEARCH STUDY  5/14/2018       PREVIOUS WEIGHTS:   Wt Readings from Last 10 Encounters:   02/15/23 66 kg (145 lb 8 oz)   01/27/23 66 4 kg (146 lb 6 4 oz)   12/04/22 65 1 kg (143 lb 9 6 oz)   10/07/22 68 kg (150 lb)   10/07/22 68 kg (150 lb)   07/15/22 68 5 kg (151 lb)   07/11/22 69 4 kg (153 lb)   06/18/22 71 8 kg (158 lb 4 6 oz)   06/08/22 72 6 kg (160 lb)   06/07/22 72 1 kg (159 lb)        Review of Systems:  Review of Systems   Respiratory: Negative for cough, choking, chest tightness, shortness of breath and wheezing  Cardiovascular: Negative for chest pain, palpitations and leg swelling  Musculoskeletal: Negative for gait problem  Skin: Negative for rash  Neurological: Negative for dizziness, tremors, syncope, weakness, light-headedness, numbness and headaches  Psychiatric/Behavioral: Negative for agitation and behavioral problems  The patient is not hyperactive  Physical Exam:  /60   Pulse 65   Ht 5' 1" (1 549 m)   Wt 66 kg (145 lb 8 oz)   BMI 27 49 kg/m²     Physical Exam  Constitutional:       General: She is not in acute distress  Appearance: She is well-developed  HENT:      Head: Normocephalic and atraumatic  Neck:      Thyroid: No thyromegaly  Vascular: No carotid bruit or JVD  Trachea: No tracheal deviation  Cardiovascular:      Rate and Rhythm: Normal rate and regular rhythm  Pulses: Normal pulses  Heart sounds: Normal heart sounds  No murmur heard  No friction rub  No gallop  Pulmonary:      Effort: Pulmonary effort is normal  No respiratory distress  Breath sounds: Normal breath sounds  No wheezing, rhonchi or rales  Chest:      Chest wall: No tenderness  Musculoskeletal:         General: Normal range of motion  Cervical back: Normal range of motion and neck supple  Right lower leg: No edema  Left lower leg: No edema  Skin:     General: Skin is warm and dry  Neurological:      General: No focal deficit present  Mental Status: She is alert and oriented to person, place, and time  Psychiatric:         Mood and Affect: Mood normal          Behavior: Behavior normal          Thought Content: Thought content normal          Judgment: Judgment normal          -------------------------------------------------------------------------------   CARDIAC EP DEVICE REPORT  Results for orders placed in visit on 12/01/22    Cardiac EP device report    Narrative  MDT-SINGLE CHAMBER ICD - ACTIVE SYSTEM IS MRI CONDITIONAL  DEVICE INTERROGATED IN THE [place] OFFICE: BATTERY VOLTAGE ADEQUATE-6 9 YRS   0%  ALL AVAILABLE LEAD PARAMETERS WITHIN NORMAL LIMITS  NO SIGNIFICANT HIGH RATE EPISODES  OPTI-VOL FLUID THRESHOLD CROSSED & ONGOING SINCE 8/2022-CKD3  NO PROGRAMMING CHANGES MADE TO DEVICE PARAMETERS  NORMAL DEVICE FUNCTION  NC      Results for orders placed in visit on 10/04/22    Cardiac EP device report    Narrative  MDT-SINGLE CHAMBER ICD - ACTIVE SYSTEM IS MRI CONDITIONAL  CARELINK TRANSMISSION: OPTI-VOL FLUID THRESHOLD CROSSED 7-31  BATTERY VOLTAGE ADEQUATE  (7 1 YRS)  <1%  ALL AVAILABLE LEAD PARAMETERS WITHIN NORMAL LIMITS  NO SIGNIFICANT HIGH RATE EPISODES  NORMAL DEVICE FUNCTION  ----SHAH      ======================================================  Imaging:   I have personally reviewed pertinent reports  Portions of the record may have been created with voice recognition software  Occasional wrong word or "sound a like" substitutions may have occurred due to the inherent limitations of voice recognition software   Read the chart carefully and recognize, using context, where substitutions have occurred      SIGNATURES:   Gabby Purvis MD

## 2023-02-16 ENCOUNTER — TELEPHONE (OUTPATIENT)
Dept: SURGERY | Facility: CLINIC | Age: 58
End: 2023-02-16

## 2023-02-16 ENCOUNTER — PATIENT OUTREACH (OUTPATIENT)
Dept: SURGERY | Facility: CLINIC | Age: 58
End: 2023-02-16

## 2023-03-01 ENCOUNTER — HOSPITAL ENCOUNTER (OUTPATIENT)
Dept: BONE DENSITY | Facility: CLINIC | Age: 58
Discharge: HOME | End: 2023-03-01

## 2023-03-01 ENCOUNTER — APPOINTMENT (OUTPATIENT)
Dept: LAB | Facility: HOSPITAL | Age: 58
End: 2023-03-01

## 2023-03-01 ENCOUNTER — ANTICOAG VISIT (OUTPATIENT)
Dept: CARDIOLOGY CLINIC | Facility: CLINIC | Age: 58
End: 2023-03-01

## 2023-03-01 DIAGNOSIS — Z13.820 ENCOUNTER FOR OSTEOPOROSIS SCREENING IN ASYMPTOMATIC POSTMENOPAUSAL PATIENT: ICD-10-CM

## 2023-03-01 DIAGNOSIS — Z78.0 ENCOUNTER FOR OSTEOPOROSIS SCREENING IN ASYMPTOMATIC POSTMENOPAUSAL PATIENT: ICD-10-CM

## 2023-03-01 DIAGNOSIS — I25.5 ISCHEMIC CARDIOMYOPATHY: Primary | ICD-10-CM

## 2023-03-01 DIAGNOSIS — Z79.01 LONG TERM CURRENT USE OF ANTICOAGULANT THERAPY: ICD-10-CM

## 2023-03-01 DIAGNOSIS — I23.6 LV (LEFT VENTRICULAR) MURAL THROMBUS FOLLOWING MI (HCC): ICD-10-CM

## 2023-03-01 DIAGNOSIS — Z87.891 PERSONAL HISTORY OF TOBACCO USE, PRESENTING HAZARDS TO HEALTH: ICD-10-CM

## 2023-03-01 NOTE — PROGRESS NOTES
Spoke with patient, advised INR still a little low, current dose verified, no missed doses, changes in medications or diet   Advised to take 7 5 mg Wed, 5 mg all other days, will recheck in 3 weeks 3/22/23

## 2023-03-03 ENCOUNTER — REMOTE DEVICE CLINIC VISIT (OUTPATIENT)
Dept: CARDIOLOGY CLINIC | Facility: CLINIC | Age: 58
End: 2023-03-03

## 2023-03-03 DIAGNOSIS — Z95.810 PRESENCE OF AUTOMATIC CARDIOVERTER/DEFIBRILLATOR (AICD): Primary | ICD-10-CM

## 2023-03-03 NOTE — PROGRESS NOTES
Results for orders placed or performed in visit on 03/03/23   Cardiac EP device report    Narrative    MDT-SINGLE CHAMBER ICD - ACTIVE SYSTEM IS MRI CONDITIONAL  CARELINK TRANSMISSION: BATTERY VOLTAGE ADEQUATE (6 7 YRS)  : <0 1%  ALL AVAILABLE LEAD PARAMETERS WITHIN NORMAL LIMITS  NO SIGNIFICANT HIGH RATE EPISODES  OPTI-VOL WITHIN NORMAL LIMITS  APPROPRIATELY FUNCTIONING ICD    [address]

## 2023-03-17 ENCOUNTER — ANTICOAG VISIT (OUTPATIENT)
Dept: CARDIOLOGY CLINIC | Facility: CLINIC | Age: 58
End: 2023-03-17

## 2023-03-17 ENCOUNTER — APPOINTMENT (OUTPATIENT)
Dept: LAB | Facility: HOSPITAL | Age: 58
End: 2023-03-17

## 2023-03-17 DIAGNOSIS — I25.5 ISCHEMIC CARDIOMYOPATHY: Primary | ICD-10-CM

## 2023-03-17 DIAGNOSIS — Z79.01 LONG TERM CURRENT USE OF ANTICOAGULANT THERAPY: ICD-10-CM

## 2023-03-17 DIAGNOSIS — I23.6 LV (LEFT VENTRICULAR) MURAL THROMBUS FOLLOWING MI (HCC): ICD-10-CM

## 2023-03-17 NOTE — PROGRESS NOTES
Spoke with patient, advised INR good, current dose verified, advised to take 7 5 mg tonight, then 7 5 mg Mon Fri, 5 mg all other days, will recheck in 2 weeks 3/31/23

## 2023-03-30 ENCOUNTER — TELEPHONE (OUTPATIENT)
Dept: SURGERY | Facility: CLINIC | Age: 58
End: 2023-03-30

## 2023-03-30 DIAGNOSIS — N18.31 STAGE 3A CHRONIC KIDNEY DISEASE (HCC): ICD-10-CM

## 2023-03-30 DIAGNOSIS — B20 HIV DISEASE (HCC): Primary | ICD-10-CM

## 2023-03-30 NOTE — TELEPHONE ENCOUNTER
Spoke with patient and remind her to get labs done for ID appointment on 4/7  She said she will get it done tomorrow

## 2023-03-31 ENCOUNTER — DOCUMENTATION (OUTPATIENT)
Dept: NEPHROLOGY | Facility: CLINIC | Age: 58
End: 2023-03-31

## 2023-03-31 ENCOUNTER — APPOINTMENT (OUTPATIENT)
Dept: LAB | Facility: HOSPITAL | Age: 58
End: 2023-03-31

## 2023-03-31 ENCOUNTER — ANTICOAG VISIT (OUTPATIENT)
Dept: CARDIOLOGY CLINIC | Facility: CLINIC | Age: 58
End: 2023-03-31

## 2023-03-31 DIAGNOSIS — E78.2 MIXED HYPERLIPIDEMIA: ICD-10-CM

## 2023-03-31 DIAGNOSIS — I25.5 ISCHEMIC CARDIOMYOPATHY: Primary | ICD-10-CM

## 2023-03-31 DIAGNOSIS — I23.6 LV (LEFT VENTRICULAR) MURAL THROMBUS FOLLOWING MI (HCC): ICD-10-CM

## 2023-03-31 DIAGNOSIS — Z79.01 LONG TERM CURRENT USE OF ANTICOAGULANT THERAPY: ICD-10-CM

## 2023-03-31 LAB
ALBUMIN SERPL BCP-MCNC: 4.1 G/DL (ref 3.5–5)
ALP SERPL-CCNC: 49 U/L (ref 34–104)
ALT SERPL W P-5'-P-CCNC: 12 U/L (ref 7–52)
ANION GAP SERPL CALCULATED.3IONS-SCNC: 6 MMOL/L (ref 4–13)
AST SERPL W P-5'-P-CCNC: 16 U/L (ref 13–39)
BASOPHILS # BLD AUTO: 0.03 THOUSANDS/ÂΜL (ref 0–0.1)
BASOPHILS NFR BLD AUTO: 1 % (ref 0–1)
BILIRUB SERPL-MCNC: 0.6 MG/DL (ref 0.2–1)
BUN SERPL-MCNC: 12 MG/DL (ref 5–25)
CALCIUM SERPL-MCNC: 9.7 MG/DL (ref 8.4–10.2)
CHLORIDE SERPL-SCNC: 108 MMOL/L (ref 96–108)
CHOLEST SERPL-MCNC: 153 MG/DL
CO2 SERPL-SCNC: 29 MMOL/L (ref 21–32)
CREAT SERPL-MCNC: 1.43 MG/DL (ref 0.6–1.3)
EOSINOPHIL # BLD AUTO: 0.08 THOUSAND/ÂΜL (ref 0–0.61)
EOSINOPHIL NFR BLD AUTO: 2 % (ref 0–6)
ERYTHROCYTE [DISTWIDTH] IN BLOOD BY AUTOMATED COUNT: 13.4 % (ref 11.6–15.1)
GFR SERPL CREATININE-BSD FRML MDRD: 40 ML/MIN/1.73SQ M
GLUCOSE P FAST SERPL-MCNC: 86 MG/DL (ref 65–99)
HCT VFR BLD AUTO: 43.3 % (ref 34.8–46.1)
HDLC SERPL-MCNC: 61 MG/DL
HGB BLD-MCNC: 13.6 G/DL (ref 11.5–15.4)
IMM GRANULOCYTES # BLD AUTO: 0 THOUSAND/UL (ref 0–0.2)
IMM GRANULOCYTES NFR BLD AUTO: 0 % (ref 0–2)
LDLC SERPL CALC-MCNC: 73 MG/DL (ref 0–100)
LYMPHOCYTES # BLD AUTO: 2.28 THOUSANDS/ÂΜL (ref 0.6–4.47)
LYMPHOCYTES NFR BLD AUTO: 46 % (ref 14–44)
MCH RBC QN AUTO: 30.6 PG (ref 26.8–34.3)
MCHC RBC AUTO-ENTMCNC: 31.4 G/DL (ref 31.4–37.4)
MCV RBC AUTO: 97 FL (ref 82–98)
MONOCYTES # BLD AUTO: 0.35 THOUSAND/ÂΜL (ref 0.17–1.22)
MONOCYTES NFR BLD AUTO: 7 % (ref 4–12)
NEUTROPHILS # BLD AUTO: 2.14 THOUSANDS/ÂΜL (ref 1.85–7.62)
NEUTS SEG NFR BLD AUTO: 44 % (ref 43–75)
NONHDLC SERPL-MCNC: 92 MG/DL
NRBC BLD AUTO-RTO: 0 /100 WBCS
PLATELET # BLD AUTO: 166 THOUSANDS/UL (ref 149–390)
PMV BLD AUTO: 11.4 FL (ref 8.9–12.7)
POTASSIUM SERPL-SCNC: 4.3 MMOL/L (ref 3.5–5.3)
PROT SERPL-MCNC: 6.6 G/DL (ref 6.4–8.4)
RBC # BLD AUTO: 4.45 MILLION/UL (ref 3.81–5.12)
SODIUM SERPL-SCNC: 143 MMOL/L (ref 135–147)
TRIGL SERPL-MCNC: 93 MG/DL
WBC # BLD AUTO: 4.88 THOUSAND/UL (ref 4.31–10.16)

## 2023-03-31 NOTE — PROGRESS NOTES
Spoke with patient, advised INR good, current dose verified, states she was taking 7 5 mg Wed Fri, 5 mg all other days, advised that is fine, continue dose and will recheck in 3 weeks 4/21/23

## 2023-04-01 LAB
BASOPHILS # BLD AUTO: 0.1 X10E3/UL (ref 0–0.2)
BASOPHILS NFR BLD AUTO: 1 %
CD3+CD4+ CELLS # BLD: 642 /UL (ref 359–1519)
CD3+CD4+ CELLS NFR BLD: 27.9 % (ref 30.8–58.5)
EOSINOPHIL # BLD AUTO: 0.1 X10E3/UL (ref 0–0.4)
EOSINOPHIL NFR BLD AUTO: 2 %
ERYTHROCYTE [DISTWIDTH] IN BLOOD BY AUTOMATED COUNT: 13 % (ref 11.7–15.4)
HCT VFR BLD AUTO: 40.9 % (ref 34–46.6)
HGB BLD-MCNC: 13.8 G/DL (ref 11.1–15.9)
IMM GRANULOCYTES # BLD: 0 X10E3/UL (ref 0–0.1)
IMM GRANULOCYTES NFR BLD: 0 %
LYMPHOCYTES # BLD AUTO: 2.3 X10E3/UL (ref 0.7–3.1)
LYMPHOCYTES NFR BLD AUTO: 47 %
MCH RBC QN AUTO: 31.4 PG (ref 26.6–33)
MCHC RBC AUTO-ENTMCNC: 33.7 G/DL (ref 31.5–35.7)
MCV RBC AUTO: 93 FL (ref 79–97)
MONOCYTES # BLD AUTO: 0.3 X10E3/UL (ref 0.1–0.9)
MONOCYTES NFR BLD AUTO: 7 %
NEUTROPHILS # BLD AUTO: 2.1 X10E3/UL (ref 1.4–7)
NEUTROPHILS NFR BLD AUTO: 43 %
PLATELET # BLD AUTO: 160 X10E3/UL (ref 150–450)
RBC # BLD AUTO: 4.4 X10E6/UL (ref 3.77–5.28)
WBC # BLD AUTO: 4.9 X10E3/UL (ref 3.4–10.8)

## 2023-04-03 ENCOUNTER — DOCUMENTATION (OUTPATIENT)
Dept: SURGERY | Facility: CLINIC | Age: 58
End: 2023-04-03

## 2023-04-04 LAB
HIV1 RNA # SERPL NAA+PROBE: <20 COPIES/ML
HIV1 RNA SERPL NAA+PROBE-LOG#: NORMAL LOG10COPY/ML

## 2023-04-07 ENCOUNTER — DOCUMENTATION (OUTPATIENT)
Dept: SURGERY | Facility: CLINIC | Age: 58
End: 2023-04-07

## 2023-04-07 ENCOUNTER — OFFICE VISIT (OUTPATIENT)
Dept: SURGERY | Facility: CLINIC | Age: 58
End: 2023-04-07

## 2023-04-07 ENCOUNTER — PATIENT OUTREACH (OUTPATIENT)
Dept: SURGERY | Facility: CLINIC | Age: 58
End: 2023-04-07

## 2023-04-07 VITALS
HEART RATE: 66 BPM | TEMPERATURE: 96.2 F | RESPIRATION RATE: 16 BRPM | HEIGHT: 61 IN | SYSTOLIC BLOOD PRESSURE: 111 MMHG | DIASTOLIC BLOOD PRESSURE: 56 MMHG | OXYGEN SATURATION: 98 % | BODY MASS INDEX: 28.09 KG/M2 | WEIGHT: 148.8 LBS

## 2023-04-07 DIAGNOSIS — B20 HIV DISEASE (HCC): Primary | ICD-10-CM

## 2023-04-07 NOTE — PROGRESS NOTES
"Assessment    Martha Luna is a 63 y o  female seen by RD in conjunction with ID f/u  Martha Luna  is established patient (last annual was 4/8/2022)    PMHx:  CKD-III, HTN, hyperlipidemia, on anticoagulant therapy       Clinical Data/Client History    CD4 count:  642  Viral load:  <20  ART:   Biktarvy      , Patient Navigator: Rosana Mayes Coordinator: n/a    Food assistance: SNAP    Living situation: House or apartment  and in [address] [place], [place] [address]way Drive factors: n/a    Mobility:  self ambulates    Physical activity: Light exercises       Oral problems: Pt reports needing dental appt for loose dentures, is meeting with CM; wears dentures denied difficulties chewing and swallowing       Typical food/beverage intake:  Eats mostly at home   Diet recall was vague, did not wish to elaborate     · Breakfast toast, eggs, le, oatmeal   · Lunch salami cheese sandwich   · Dinner fish or steak, starch, broccoli, peas   · Snacks M&M's   · Beverages water     Appetite: Unchanged from normal    Vitamin, mineral, herbal supplements: vitamin D     Oral/enteral nutrition supplements:  no    GI problems: denied n/v/d/c    Food allergies/intolerances:  NKFA     Weight history:  150# (Oct-22)  146# (Jan-23)  145# (Feb-23)      Current body weight:  148# (67 5 kg)  Height:  5' 1\" (1 549 m)  BMI:  28 12  IBW:  105# (47 7 kg)  %IBW  141%    Weight change: 3# gain     Time period of weight change: 2 month    Clinically significant/severe: n/a      Nutrition-related labs:    Lab Results   Component Value Date    CREATININE 1 43 (H) 03/31/2023         Nutrition-related medications:     Current Outpatient Medications:   •  aspirin (ECOTRIN LOW STRENGTH) 81 mg EC tablet, Take 1 tablet (81 mg total) by mouth daily, Disp: 81 tablet, Rfl: 11  •  atorvastatin (LIPITOR) 80 mg tablet, TAKE 1 TABLET BY MOUTH DAILY, Disp: 30 tablet, Rfl: 5  •  bictegravir-emtricitab-tenofovir alafenamide (Biktarvy) -25 MG tablet, Take 1 " tablet by mouth daily with breakfast, Disp: 90 tablet, Rfl: 1  •  carvedilol (COREG) 6 25 mg tablet, TAKE 1 TABLET BY MOUTH TWICE A DAY WITH MEALS, Disp: 60 tablet, Rfl: 5  •  Entresto 49-51 MG TABS, TAKE 1 TABLET BY MOUTH TWICE A DAY, Disp: 60 tablet, Rfl: 5  •  nicotine polacrilex (COMMIT) 4 MG lozenge, USE 1 LOZENGE AS NEEDED FOR SMOKING CESSATION, Disp: 72 lozenge, Rfl: 5  •  warfarin (COUMADIN) 1 mg tablet, TAKE 1 TABLET BY MOUTH DAILY OR AS DIRECTED, Disp: 30 tablet, Rfl: 10  •  warfarin (COUMADIN) 5 mg tablet, 1 to 1 5 tablets daily as directed by MD, Disp: 135 tablet, Rfl: 3        Nutrition Diagnosis    Problem: not ready for diet/lifestyle change    Related to: unwilling or disinterested in learning/applying information     As Evidenced By: diet recall  patient interview       Estimated Nutritional Needs    [facility] REE:  1201 kcal     · ~1690 kcal (based on: 25 kcal/kg BW)  · ~54 g protein (based on: 0 8 g/kg BW)  · ~2000 ml fluid (based on: 30 ml/kg BW)      Current intake estimation: exceeds needs       Nutrition Intervention/Recommendations    Nutrition education intervention: no interest     Nutrition recommendations: Would advise caution with excess sodium     Supplement recommendations: Separate supplements at least 6 hrs from ART      Teaching Method: verbal     Person Educated: patient        Collaboration/referral of nutrition care:    (dental)       Goals:  Pt not interested in nutritional goals at this time     Monitoring/Evaluation:  Readiness for change      Visit Summary    RD met with Cherrie Radley during ID clinic appt, for annual nutrition assessment as per  pankaj requirements  Cherrie Peng reported doing fine, normal appetite, denied any nutrition-related difficulties  Diet recall was vague  RD offered nutrition education, regarding low sodium, pt was not interested at this time  Pt planned to meet with CM regarding dental care and needing loose dentures fixed  She had no questions/concerns for RD at this time  Check in with pt in conjunction with clinic appts, assess readiness to change, offer education as receptive        Susy Garcia, MS, RD, LDN

## 2023-04-07 NOTE — PROGRESS NOTES
"Assessment/Plan: Pt was approached by [facility] within ID clinic to explore multidimensional's stability  During today's contact, pt disclosed \"feeling much better\" since last intervention  Pt acknowledged that 3 consecutive loses triggered her rage, displaying disruptive behaviors within interpersonal relationships  Pt described been at the acceptance stage of grief, and keeping herself occupied by taking care of her elderly mother  Pt's emotions and cognitions were validated throughout contact, along with receiving positive reinforcements for her awareness, and behavioral improvement  Pt was encouraged to consider ongoing [facility] interventions to address emotional, and cognitive management, along with discussing the subject of psychosomatic projections which pt agreed to consider as needed  No SI or HI were disclosed, nor displayed throughout contact  Community Health     Today patient present with   Chief Complaint   Patient presents with   • HIV Positive     Patient would likely benefit from: Addressing cognitive, emotional, and behavioral health management, reviewing self-care habits  Consider/focus/continue: Monitoring pt's emotional, cognitive, and behavioral health's stability  Stage of change: Pre-contemplation  Plan/ Behavioral Recommendations: Ongoing  interventions per pt's coordinated care, and/or pt's request of services  Diagnoses and all orders for this visit:    HIV disease ([facility] )  -     CBC and differential  -     Comprehensive metabolic panel  -     Hepatitis C antibody  -     HIV-1 RNA, quantitative, PCR  -     Quantiferon TB Gold Plus  -     RPR-Syphilis Screening (Total Syphilis IGG/IGM)  -     UA (URINE) with reflex to Scope  -     T-helper cells (CD4) count  -     Chlamydia/GC amplified DNA by PCR          Discussion:     Patient can reach out to [employer] [facility] if they experiences changes in their behavioral health      Subjective:     Patient ID: Cristin Stone is a 63 y o  " female  HPI    History of Present Illness: The patient is seeing the [facility] today for a routine behavioral health follow up  Review of Systems      Objective:     Physical Exam      [facility]    Orientation     Person: yes    Place: yes    Time: yes    Appearance    Well Developed: yes healthy    Uncomfortable: no    Normal Body Odor: yes    Smells of Feces: no    Smells of Urine: no    Disheveled: no    Well Nourished: yes overweight    Grooming Unkempt: no    Poor Eye Contact: no    Hirsute: no    Looks Tired: no    Acutely Exhausted: noappearance reflects stated age    Mood and Affect:     Appropriate: yes    Euthymicyes    Irritable: no    Angry: no    Anxious: no    Depressed:no    Blunted:no    Labile: no    Restricted: no    Harm to Self or Others: No SI or hI were disclosed, nor displayed throughout contact  Substance Abuse: None reported by pt

## 2023-04-07 NOTE — PROGRESS NOTES
This cm met with ct after appointment with the clinic  Ct let this cm know that per [address] [place] [address], she has to get the screws in her mouth removed due to them being loose  Ct let this cm know that she was told by [employer] [facility] that the procedure would be $2,000  Ct provided this cm with a copy of the explanation of benefits she received from her insurance  Ct was made aware this cm would contact [address] [place] [address] for more information  [facility]  Message was left  [address] [place] [address] returned this cm's phone call  [facility] let this cm know that ct does not have any outstanding bills with them  This cm requested a denial letter and ct's treatment plan  [facility] let this cm know that she did not see a denial letter, but would reach out to billing about it  This cm provided [facility] with e-mail address  This cm contacted ct to make her aware  Ct was made aware once she received the treatment plan and denial letter, this cm would submit the documents to the funding agency  Ct was made aware if approved she could schedule an appointment with [facility]

## 2023-04-07 NOTE — PROGRESS NOTES
Progress Note - Infectious Disease   Nelia Young 63 y o  female MRN: [id number]  Unit/Bed#:  Encounter: [id number]      Impression/Plan:    1  HIV  Doing well on Biktarvy with an undetectable viral load and CD4 count of 642  Previous slight blip with VL 50 but now undetectable  She is tolerating ART without any side effects   -Continue ART regimen of Biktarvy   -Recheck labs in 5 months and follow up in 6 months    -Patient was provided medication, adherence and prevention education     2  CKD stage 3  Creatinine stable close to baseline 1 43   -monitor creatinine    -Continue follow-up with nephrology     3  Tobacco dependence  Patient previously smoking 5 cigarettes/day but states she has stopped smoking the past 5 days   -Patient encouraged to cease smoking cigarettes     4  Ischemic Cardiomyopathy  with mural thrombus  She has AICD in place      -Patient follows up with cardiology     5  Health maintenance  Hepatitis B immune, UTD Hepatitis A vaccination, Tdap, influenza, meningococcal   Received 3 doses of Pfizer COVID-vaccine  Has received PPSV23 but unclear if she has received Prevnar vaccination      -Consider Prevnar 20 vaccination at next visit if she has not received PCV 13     My recommendations were discussed with the patient in detail who verbalized understanding  Patient care coordinated with JOSE ROBERTO Rosas  Subjective:  Routine follow-up for HIV  Patient claims 100% adherence with Biktarvy  Patient denies any notable side effects  Overall she is feeling well  The patient denies any fever chills or sweats, denies any nausea vomiting or diarrhea, denies any cough or shortness of breath  She denies any chest pain and has been following regularly with cardiology  ROS:  A complete review of systems is negative other than that noted above in the subjective    Followup portions patient history reviewed and updated as:   Allergies, current medications, past medical history, past social history, "past surgical history, and the problem list    Objective:  Vitals:  Vitals:    04/07/23 0750   BP: 111/56   BP Location: Right arm   Patient Position: Sitting   Cuff Size: Standard   Pulse: 66   Resp: 16   Temp: (!) 96 2 °F (35 7 °C)   TempSrc: Tympanic   SpO2: 98%   Weight: 67 5 kg (148 lb 12 8 oz)   Height: 5' 1\" (1 549 m)       Physical Exam:   General Appearance:  Alert, interactive, appearing well,  nontoxic, no acute distress  Neck:   Supple without lymphadenopathy, no thyromegaly or masses   Throat: Oropharynx moist without lesions  Lungs:   Clear to auscultation bilaterally; no wheezes, rhonchi or rales; respirations unlabored   Heart:  RRR; no murmur, rub or gallop   Abdomen:   Soft, non-tender, non-distended, positive bowel sounds  Extremities: No clubbing, cyanosis or edema   Skin: No new rashes or lesions  No draining wounds noted         Labs, Imaging, & Other studies:   All pertinent labs and imaging studies were personally reviewed    Lab Results   Component Value Date    K 4 3 03/31/2023     03/31/2023    CO2 29 03/31/2023    BUN 12 03/31/2023    CREATININE 1 43 (H) 03/31/2023    GLUF 86 03/31/2023    CALCIUM 9 7 03/31/2023    AST 16 03/31/2023    ALT 12 03/31/2023    ALKPHOS 49 03/31/2023    EGFR 40 03/31/2023     Lab Results   Component Value Date    WBC 4 88 03/31/2023    WBC 4 9 03/31/2023    HGB 13 6 03/31/2023    HGB 13 8 03/31/2023    HCT 43 3 03/31/2023    HCT 40 9 03/31/2023    MCV 97 03/31/2023    MCV 93 03/31/2023     03/31/2023     03/31/2023     Lab Results   Component Value Date    HEPCAB Non-reactive 07/18/2022     Lab Results   Component Value Date    HAV Non-reactive 11/02/2018    HEPCAB Non-reactive 07/18/2022     Lab Results   Component Value Date    RPR Non-Reactive 07/18/2022     CD4 ABS   Date/Time Value Ref Range Status   03/31/2023 10:29  359 - 1519 /uL Final     HIV-1 RNA by PCR, Qn   Date/Time Value Ref Range Status   03/31/2023 10:29 AM <20 " copies/mL Final     Comment:     HIV-1 RNA not detected  The reportable range for this assay is 20 to 10,000,000  copies HIV-1 RNA/mL             Current Outpatient Medications:   •  bictegravir-emtricitab-tenofovir alafenamide (Biktarvy) -25 MG tablet, Take 1 tablet by mouth daily with breakfast, Disp: 90 tablet, Rfl: 1  •  aspirin (ECOTRIN LOW STRENGTH) 81 mg EC tablet, Take 1 tablet (81 mg total) by mouth daily, Disp: 81 tablet, Rfl: 11  •  atorvastatin (LIPITOR) 80 mg tablet, TAKE 1 TABLET BY MOUTH DAILY, Disp: 30 tablet, Rfl: 5  •  carvedilol (COREG) 6 25 mg tablet, TAKE 1 TABLET BY MOUTH TWICE A DAY WITH MEALS, Disp: 60 tablet, Rfl: 5  •  Entresto 49-51 MG TABS, TAKE 1 TABLET BY MOUTH TWICE A DAY, Disp: 60 tablet, Rfl: 5  •  nicotine polacrilex (COMMIT) 4 MG lozenge, USE 1 LOZENGE AS NEEDED FOR SMOKING CESSATION, Disp: 72 lozenge, Rfl: 5  •  warfarin (COUMADIN) 1 mg tablet, TAKE 1 TABLET BY MOUTH DAILY OR AS DIRECTED, Disp: 30 tablet, Rfl: 10  •  warfarin (COUMADIN) 5 mg tablet, 1 to 1 5 tablets daily as directed by MD, Disp: 135 tablet, Rfl: 3

## 2023-04-18 ENCOUNTER — APPOINTMENT (EMERGENCY)
Dept: CT IMAGING | Facility: HOSPITAL | Age: 58
End: 2023-04-18

## 2023-04-18 ENCOUNTER — HOSPITAL ENCOUNTER (INPATIENT)
Facility: HOSPITAL | Age: 58
LOS: 1 days | Discharge: HOME | End: 2023-04-20
Attending: EMERGENCY MEDICINE | Admitting: INTERNAL MEDICINE

## 2023-04-18 DIAGNOSIS — B20 HIV DISEASE (HCC): ICD-10-CM

## 2023-04-18 DIAGNOSIS — H57.12 PAIN OF LEFT EYE: ICD-10-CM

## 2023-04-18 DIAGNOSIS — R90.89 ABNORMAL BRAIN CT: ICD-10-CM

## 2023-04-18 DIAGNOSIS — H02.846 SWELLING OF EYELID, LEFT: Primary | ICD-10-CM

## 2023-04-18 PROBLEM — H05.012 ORBITAL CELLULITIS ON LEFT: Status: ACTIVE | Noted: 2023-04-18

## 2023-04-18 PROBLEM — R93.0 ABNORMAL CT SCAN, HEAD: Status: ACTIVE | Noted: 2023-04-18

## 2023-04-18 LAB
ANION GAP SERPL CALCULATED.3IONS-SCNC: 8 MMOL/L (ref 4–13)
BASOPHILS # BLD AUTO: 0.03 THOUSANDS/ΜL (ref 0–0.1)
BASOPHILS NFR BLD AUTO: 0 % (ref 0–1)
BUN SERPL-MCNC: 14 MG/DL (ref 5–25)
CALCIUM SERPL-MCNC: 9.8 MG/DL (ref 8.4–10.2)
CHLORIDE SERPL-SCNC: 108 MMOL/L (ref 96–108)
CO2 SERPL-SCNC: 28 MMOL/L (ref 21–32)
CREAT SERPL-MCNC: 1.31 MG/DL (ref 0.6–1.3)
EOSINOPHIL # BLD AUTO: 0.07 THOUSAND/ΜL (ref 0–0.61)
EOSINOPHIL NFR BLD AUTO: 1 % (ref 0–6)
ERYTHROCYTE [DISTWIDTH] IN BLOOD BY AUTOMATED COUNT: 13 % (ref 11.6–15.1)
EXT PREGNANCY TEST URINE: NEGATIVE
EXT. CONTROL: NORMAL
GFR SERPL CREATININE-BSD FRML MDRD: 45 ML/MIN/1.73SQ M
GLUCOSE SERPL-MCNC: 124 MG/DL (ref 65–140)
HCT VFR BLD AUTO: 44.2 % (ref 34.8–46.1)
HGB BLD-MCNC: 14.4 G/DL (ref 11.5–15.4)
IMM GRANULOCYTES # BLD AUTO: 0.02 THOUSAND/UL (ref 0–0.2)
IMM GRANULOCYTES NFR BLD AUTO: 0 % (ref 0–2)
INR PPP: 2.04 (ref 0.84–1.19)
LYMPHOCYTES # BLD AUTO: 1.34 THOUSANDS/ΜL (ref 0.6–4.47)
LYMPHOCYTES NFR BLD AUTO: 18 % (ref 14–44)
MCH RBC QN AUTO: 31.4 PG (ref 26.8–34.3)
MCHC RBC AUTO-ENTMCNC: 32.6 G/DL (ref 31.4–37.4)
MCV RBC AUTO: 96 FL (ref 82–98)
MONOCYTES # BLD AUTO: 0.42 THOUSAND/ΜL (ref 0.17–1.22)
MONOCYTES NFR BLD AUTO: 6 % (ref 4–12)
NEUTROPHILS # BLD AUTO: 5.79 THOUSANDS/ΜL (ref 1.85–7.62)
NEUTS SEG NFR BLD AUTO: 75 % (ref 43–75)
NRBC BLD AUTO-RTO: 0 /100 WBCS
PLATELET # BLD AUTO: 172 THOUSANDS/UL (ref 149–390)
PMV BLD AUTO: 10.3 FL (ref 8.9–12.7)
POTASSIUM SERPL-SCNC: 4.4 MMOL/L (ref 3.5–5.3)
PROTHROMBIN TIME: 23.5 SECONDS (ref 11.6–14.5)
RBC # BLD AUTO: 4.59 MILLION/UL (ref 3.81–5.12)
SODIUM SERPL-SCNC: 144 MMOL/L (ref 135–147)
WBC # BLD AUTO: 7.67 THOUSAND/UL (ref 4.31–10.16)

## 2023-04-18 RX ORDER — NICOTINE 21 MG/24HR
14 PATCH, TRANSDERMAL 24 HOURS TRANSDERMAL DAILY
Status: DISCONTINUED | OUTPATIENT
Start: 2023-04-19 | End: 2023-04-20 | Stop reason: HOSPADM

## 2023-04-18 RX ORDER — ATORVASTATIN CALCIUM 80 MG/1
80 TABLET, FILM COATED ORAL DAILY
Status: DISCONTINUED | OUTPATIENT
Start: 2023-04-18 | End: 2023-04-20 | Stop reason: HOSPADM

## 2023-04-18 RX ORDER — ACETAMINOPHEN 325 MG/1
650 TABLET ORAL EVERY 6 HOURS PRN
Status: DISCONTINUED | OUTPATIENT
Start: 2023-04-18 | End: 2023-04-20 | Stop reason: HOSPADM

## 2023-04-18 RX ORDER — VANCOMYCIN HYDROCHLORIDE 1 G/200ML
15 INJECTION, SOLUTION INTRAVENOUS ONCE
Status: COMPLETED | OUTPATIENT
Start: 2023-04-18 | End: 2023-04-18

## 2023-04-18 RX ORDER — VANCOMYCIN HYDROCHLORIDE 500 MG/100ML
500 INJECTION, SOLUTION INTRAVENOUS EVERY 12 HOURS
Status: DISCONTINUED | OUTPATIENT
Start: 2023-04-19 | End: 2023-04-20 | Stop reason: HOSPADM

## 2023-04-18 RX ORDER — TETRACAINE HYDROCHLORIDE 5 MG/ML
2 SOLUTION OPHTHALMIC ONCE
Status: COMPLETED | OUTPATIENT
Start: 2023-04-18 | End: 2023-04-18

## 2023-04-18 RX ORDER — WARFARIN SODIUM 5 MG/1
5 TABLET ORAL
Status: DISCONTINUED | OUTPATIENT
Start: 2023-04-18 | End: 2023-04-19

## 2023-04-18 RX ORDER — CARVEDILOL 6.25 MG/1
6.25 TABLET ORAL 2 TIMES DAILY WITH MEALS
Status: DISCONTINUED | OUTPATIENT
Start: 2023-04-19 | End: 2023-04-18

## 2023-04-18 RX ORDER — ONDANSETRON 2 MG/ML
4 INJECTION INTRAMUSCULAR; INTRAVENOUS EVERY 6 HOURS PRN
Status: DISCONTINUED | OUTPATIENT
Start: 2023-04-18 | End: 2023-04-20 | Stop reason: HOSPADM

## 2023-04-18 RX ORDER — ASPIRIN 81 MG/1
81 TABLET ORAL DAILY
Status: DISCONTINUED | OUTPATIENT
Start: 2023-04-19 | End: 2023-04-20 | Stop reason: HOSPADM

## 2023-04-18 RX ORDER — CARVEDILOL 6.25 MG/1
6.25 TABLET ORAL 2 TIMES DAILY WITH MEALS
Status: DISCONTINUED | OUTPATIENT
Start: 2023-04-18 | End: 2023-04-20 | Stop reason: HOSPADM

## 2023-04-18 RX ADMIN — SODIUM CHLORIDE 500 ML: 0.9 INJECTION, SOLUTION INTRAVENOUS at 16:11

## 2023-04-18 RX ADMIN — SACUBITRIL AND VALSARTAN 1 TABLET: 49; 51 TABLET, FILM COATED ORAL at 21:24

## 2023-04-18 RX ADMIN — ATORVASTATIN CALCIUM 80 MG: 80 TABLET, FILM COATED ORAL at 21:24

## 2023-04-18 RX ADMIN — CARVEDILOL 6.25 MG: 6.25 TABLET, FILM COATED ORAL at 21:24

## 2023-04-18 RX ADMIN — PIPERACILLIN AND TAZOBACTAM 3.38 G: 3; .375 INJECTION, POWDER, LYOPHILIZED, FOR SOLUTION INTRAVENOUS; PARENTERAL at 18:22

## 2023-04-18 RX ADMIN — WARFARIN SODIUM 5 MG: 5 TABLET ORAL at 22:47

## 2023-04-18 RX ADMIN — VANCOMYCIN HYDROCHLORIDE 1000 MG: 1 INJECTION, SOLUTION INTRAVENOUS at 16:51

## 2023-04-18 RX ADMIN — TETRACAINE HYDROCHLORIDE 2 DROP: 5 SOLUTION OPHTHALMIC at 15:02

## 2023-04-18 RX ADMIN — FLUORESCEIN SODIUM 1 STRIP: 1 STRIP OPHTHALMIC at 15:01

## 2023-04-18 RX ADMIN — PIPERACILLIN AND TAZOBACTAM 3.38 G: 3; .375 INJECTION, POWDER, LYOPHILIZED, FOR SOLUTION INTRAVENOUS; PARENTERAL at 23:42

## 2023-04-18 RX ADMIN — IOHEXOL 85 ML: 350 INJECTION, SOLUTION INTRAVENOUS at 16:06

## 2023-04-18 NOTE — ASSESSMENT & PLAN NOTE
· CT imaging results reviewed  · We will continue antibiotics with Zosyn/vancomycin  · Infectious disease consult  · MRI pending  · Ophthalmology consult  · IV fluids as needed  · Patient without any visual acuity changes, extraocular muscles are intact

## 2023-04-18 NOTE — ED NOTES
This nurse received order for IVPB antibiotics  This nurse asked if blood cultures should be obtained before initiating IVPB antibiotic and per Angelique Clement PAC blood cultures do not need to be obtained        Maeve Lin RN  04/18/23 1097

## 2023-04-18 NOTE — ASSESSMENT & PLAN NOTE
· Patient follows with cardiology as outpatient  · Status post AICD  · Continue home Coreg, Entresto  · Patient is also on Coumadin, follow-up INR and dose accordingly  · Continue baby aspirin

## 2023-04-18 NOTE — ED PROVIDER NOTES
History  Chief Complaint   Patient presents with   • Eye Swelling     Since Sunday  Swollen and red, and watery  Used warm cloth and visine       58-year-old female past medical history of HIV, coronary artery disease with defibrillator placement, asthma who presents to emergency department for left eye pain, redness, eyelid swelling  Irritation started 2 days ago with some redness and tearing  Eyelid swelling started yesterday  Only uses reading glasses  No previous eye surgery  History provided by:  Patient  Eye Problem  Location:  Left eye  Quality:  Tearing, foreign body sensation and dull  Duration:  2 days  Timing:  Constant  Progression:  Worsening  Context: not contact lens problem and not foreign body    Relieved by:  Nothing  Worsened by:  Eye movement  Ineffective treatments:  Eye drops (visine )  Associated symptoms: discharge, inflammation, itching, redness, swelling and tearing    Associated symptoms: no blurred vision, no crusting, no decreased vision, no double vision, no headaches, no nausea, no numbness, no photophobia, no scotomas, no vomiting and no weakness  Facial rash: periorbital erythema, no rash     Risk factors: recent URI (c/o rhinorrhea, congestion, sore throat that started on sunday as well )    Risk factors: no exposure to pinkeye and no recent herpes zoster        Prior to Admission Medications   Prescriptions Last Dose Informant Patient Reported? Taking?    Entresto 49-51 MG TABS   No No   Sig: TAKE 1 TABLET BY MOUTH TWICE A DAY   aspirin (ECOTRIN LOW STRENGTH) 81 mg EC tablet   No No   Sig: Take 1 tablet (81 mg total) by mouth daily   atorvastatin (LIPITOR) 80 mg tablet   No No   Sig: TAKE 1 TABLET BY MOUTH DAILY   bictegravir-emtricitab-tenofovir alafenamide (Biktarvy) -25 MG tablet   No No   Sig: Take 1 tablet by mouth daily with breakfast   carvedilol (COREG) 6 25 mg tablet   No No   Sig: TAKE 1 TABLET BY MOUTH TWICE A DAY WITH MEALS   nicotine polacrilex (COMMIT) 4 MG lozenge   No No   Sig: USE 1 LOZENGE AS NEEDED FOR SMOKING CESSATION   warfarin (COUMADIN) 1 mg tablet   No No   Sig: TAKE 1 TABLET BY MOUTH DAILY OR AS DIRECTED   warfarin (COUMADIN) 5 mg tablet   No No   Si to 1 5 tablets daily as directed by MD      Facility-Administered Medications: None       Past Medical History:   Diagnosis Date   • Asthma    • Coronary artery disease     defibrillator   • HIV positive ([facility] [address] )        Past Surgical History:   Procedure Laterality Date   • ANGIOPLASTY     • CARDIAC DEFIBRILLATOR PLACEMENT     • CORONARY ANGIOPLASTY      pci placement   • TOTAL ABDOMINAL HYSTERECTOMY     • US GUIDED INJECTION FOR RESEARCH STUDY  2018   • US GUIDED INJECTION FOR RESEARCH STUDY  2019   • US GUIDED INJECTION FOR RESEARCH STUDY  2018   • US GUIDED INJECTION FOR RESEARCH STUDY  2018       Family History   Problem Relation Age of Onset   • No Known Problems Mother    • Other Father         GI bleed   • No Known Problems Sister    • No Known Problems Sister    • No Known Problems Maternal Grandmother    • No Known Problems Maternal Grandfather    • No Known Problems Paternal Grandmother    • No Known Problems Paternal Grandfather    • Suicidality Brother    • Drug abuse Brother         overdose   • No Known Problems Maternal Aunt    • No Known Problems Maternal Aunt    • No Known Problems Maternal Aunt    • No Known Problems Paternal Aunt      I have reviewed and agree with the history as documented      E-Cigarette/Vaping   • E-Cigarette Use Never User      E-Cigarette/Vaping Substances     Social History     Tobacco Use   • Smoking status: Former     Packs/day: 1 00     Years: 30 00     Pack years: 30 00     Types: Cigarettes     Start date: 3/8/1994   • Smokeless tobacco: Former     Quit date: 2019   • Tobacco comments:     5 days without smoking   Vaping Use   • Vaping Use: Never used   Substance Use Topics   • Alcohol use: Yes     Comment: socially   • Drug use: No       Review of Systems   Constitutional: Negative for chills and fever  HENT: Positive for congestion, rhinorrhea and sore throat  Negative for trouble swallowing and voice change  Eyes: Positive for pain, discharge, redness and itching  Negative for blurred vision, double vision, photophobia and visual disturbance  Respiratory: Negative for cough, choking and shortness of breath  Cardiovascular: Negative for chest pain  Gastrointestinal: Negative for nausea and vomiting  Musculoskeletal: Negative for neck pain and neck stiffness  Skin: Positive for color change  Negative for wound  Neurological: Negative for dizziness, seizures, syncope, speech difficulty, weakness, numbness and headaches  Psychiatric/Behavioral: Negative for confusion  All other systems reviewed and are negative  Physical Exam  Physical Exam  Vitals and nursing note reviewed  Constitutional:       General: She is awake  She is not in acute distress  Appearance: Normal appearance  She is not toxic-appearing  HENT:      Head: Normocephalic and atraumatic  Left periorbital erythema present  No right periorbital erythema  Nose: Congestion present  Mouth/Throat:      Lips: Pink  Mouth: Mucous membranes are moist       Pharynx: Oropharynx is clear  Uvula midline  Posterior oropharyngeal erythema present  No pharyngeal swelling, oropharyngeal exudate or uvula swelling  Tonsils: No tonsillar exudate or tonsillar abscesses  Eyes:      General: Vision grossly intact  Right eye: No discharge  Left eye: Left eye discharge: tearing      Intraocular pressure: Right eye pressure is 8 mmHg  Left eye pressure is 10 mmHg  Measurements were taken using a handheld tonometer  Extraocular Movements: Extraocular movements intact  Right eye: Normal extraocular motion  Left eye: Normal extraocular motion (pain with EOM )        Conjunctiva/sclera:      Left eye: Left conjunctiva is injected  No chemosis or hemorrhage  Pupils: Pupils are equal, round, and reactive to light  Comments: L periorbital edema and erythema    Cardiovascular:      Rate and Rhythm: Normal rate and regular rhythm  Heart sounds: Normal heart sounds  Pulmonary:      Effort: Pulmonary effort is normal  No respiratory distress  Breath sounds: Normal breath sounds  Abdominal:      General: There is no distension  Palpations: Abdomen is soft  Musculoskeletal:      Cervical back: No rigidity  Skin:     General: Skin is warm and dry  Findings: Erythema present  No rash  Neurological:      General: No focal deficit present  Mental Status: She is alert  Cranial Nerves: No cranial nerve deficit  Sensory: No sensory deficit  Motor: No weakness        Coordination: Coordination normal       Gait: Gait normal          Vital Signs  ED Triage Vitals   Temperature Pulse Respirations Blood Pressure SpO2   04/18/23 1406 04/18/23 1406 04/18/23 1406 04/18/23 1406 04/18/23 1440   99 4 °F (37 4 °C) (!) 127 20 125/75 99 %      Temp src Heart Rate Source Patient Position - Orthostatic VS BP Location FiO2 (%)   -- 04/18/23 1440 04/18/23 1651 04/18/23 1651 --    Monitor Sitting Left arm       Pain Score       --                  Vitals:    04/18/23 1406 04/18/23 1440 04/18/23 1651 04/18/23 1808   BP: 125/75 124/71 108/82 123/75   Pulse: (!) 127 64 59 62   Patient Position - Orthostatic VS:   Sitting          Visual Acuity  Visual Acuity    Flowsheet Row Most Recent Value   Visual acuity R eye is 20/50   Visual acuity Left eye is 20/25   Visual acuity in both eyes is 20/20          ED Medications  Medications   piperacillin-tazobactam (ZOSYN) 3 375 g in sodium chloride 0 9 % 100 mL IVPB (3 375 g Intravenous New Bag 4/18/23 1822)   piperacillin-tazobactam (ZOSYN) 3 375 g in sodium chloride 0 9 % 100 mL IVPB (has no administration in time range)   vancomycin (VANCOCIN) IVPB (premix in dextrose) 750 mg 150 mL (has no administration in time range)   fluorescein sodium sterile ophthalmic strip 1 strip (1 strip Left Eye Given by Other 4/18/23 1501)   tetracaine 0 5 % ophthalmic solution 2 drop (2 drops Left Eye Given by Other 4/18/23 1502)   sodium chloride 0 9 % bolus 500 mL (500 mL Intravenous New Bag 4/18/23 1611)   iohexol (OMNIPAQUE) 350 MG/ML injection (SINGLE-DOSE) 85 mL (85 mL Intravenous Given 4/18/23 1606)   vancomycin (VANCOCIN) IVPB (premix in dextrose) 1,000 mg 200 mL (0 mg Intravenous Stopped 4/18/23 1808)       Diagnostic Studies  Results Reviewed     Procedure Component Value Units Date/Time    Protime-INR [[id number]]     Lab Status: No result Specimen: Blood     Basic metabolic panel [[id number]]  (Abnormal) Collected: 04/18/23 1529    Lab Status: Final result Specimen: Blood from Arm, Right Updated: 04/18/23 1552     Sodium 144 mmol/L      Potassium 4 4 mmol/L      Chloride 108 mmol/L      CO2 28 mmol/L      ANION GAP 8 mmol/L      BUN 14 mg/dL      Creatinine 1 31 mg/dL      Glucose 124 mg/dL      Calcium 9 8 mg/dL      eGFR 45 ml/min/1 73sq m     Narrative:      [place] guidelines for Chronic Kidney Disease (CKD):   •  Stage 1 with normal or high GFR (GFR > 90 mL/min/1 73 square meters)  •  Stage 2 Mild CKD (GFR = 60-89 mL/min/1 73 square meters)  •  Stage 3A Moderate CKD (GFR = 45-59 mL/min/1 73 square meters)  •  Stage 3B Moderate CKD (GFR = 30-44 mL/min/1 73 square meters)  •  Stage 4 Severe CKD (GFR = 15-29 mL/min/1 73 square meters)  •  Stage 5 End Stage CKD (GFR <15 mL/min/1 73 square meters)  Note: GFR calculation is accurate only with a steady state creatinine    CBC and differential [[id number]] Collected: 04/18/23 1529    Lab Status: Final result Specimen: Blood from Arm, Right Updated: 04/18/23 153     WBC 7 67 Thousand/uL      RBC 4 59 Million/uL      Hemoglobin 14 4 g/dL      Hematocrit 44 2 %      MCV 96 fL      MCH 31 4 pg      MCHC 32 6 g/dL      RDW 13 0 %      MPV 10 3 fL      Platelets 149 Thousands/uL      nRBC 0 /100 WBCs      Neutrophils Relative 75 %      Immat GRANS % 0 %      Lymphocytes Relative 18 %      Monocytes Relative 6 %      Eosinophils Relative 1 %      Basophils Relative 0 %      Neutrophils Absolute 5 79 Thousands/µL      Immature Grans Absolute 0 02 Thousand/uL      Lymphocytes Absolute 1 34 Thousands/µL      Monocytes Absolute 0 42 Thousand/µL      Eosinophils Absolute 0 07 Thousand/µL      Basophils Absolute 0 03 Thousands/µL     POCT pregnancy, urine [[id number]]  (Normal) Resulted: 04/18/23 1528    Lab Status: Final result Updated: 04/18/23 1528     EXT Preg Test, Ur Negative     Control Valid                 CT orbits/temporal bones/skull base w contrast   Final Result by Kristi Feldman MD (04/18 1651)      1  Left orbit preseptal soft tissue cellulitis and conjunctivitis  No post septal inflammation or abscess  2   Mildly inflamed right lacrimal gland  3   Incidentally noted 1 5 cm peripherally enhancing lesion in the inferolateral left frontal lobe  The lesion is possibly extra-axial   Recommend further evaluation with brain MRI without and with contrast       4   Bilateral maxillary sinus disease  Left nasal mucosal inflammation  The study was marked in EPIC for significant notification  Workstation performed: [id number]         MRI inpatient order    (Results Pending)              Procedures  Procedures         ED Course  ED Course as of 04/18/23 1828   Tue Apr 18, 2023   1421 CD4 642 on 3/31/23     1502 Will hold on fluorescein stain until after imaging  1552 Creatinine(!): 1 31  Improved from 2 weeks ago    1623 Concern for possible brain abscess  Discussed with attending, will start on high dose broad spectrum abx  CT final read pending   5100 IMPRESSION:     1   Left orbit preseptal soft tissue cellulitis and conjunctivitis    No post septal inflammation or abscess      2   Mildly "inflamed right lacrimal gland      3  Incidentally noted 1 5 cm peripherally enhancing lesion in the inferolateral left frontal lobe  The lesion is possibly extra-axial   Recommend further evaluation with brain MRI without and with contrast      4   Bilateral maxillary sinus disease  Left nasal mucosal inflammation  56 Tt'd neurosurgery due to continued concern for enhancing lesion in L frontal lobe possible extension of orbital cellulitis   1717 Dr Nicholas Parikh of Neurosurgery who recommends getting MRI with contrast  He recommends \"If there's celebrities then continue abx for cerebritis  If not don't abx for orbital infection  If there anything neurosurgical, please feel free to contact us again\"  No transfer or STAT MRI were recommended by Neurosurgery  SBIRT 22yo+    Flowsheet Row Most Recent Value   Initial Alcohol Screen: US AUDIT-C     1  How often do you have a drink containing alcohol? 0 Filed at: 04/18/2023 1409   2  How many drinks containing alcohol do you have on a typical day you are drinking? 0 Filed at: 04/18/2023 1409   3b  FEMALE Any Age, or MALE 65+: How often do you have 4 or more drinks on one occassion? 0 Filed at: 04/18/2023 1409   Audit-C Score 0 Filed at: 04/18/2023 1409   ALICIA: How many times in the past year have you    Used an illegal drug or used a prescription medication for non-medical reasons? Never Filed at: 04/18/2023 1409                    Medical Decision Making  HIV+ compliant with her Biktarvy, CD4 count 642  she presented with L eye redness and periorbital swelling and erythema  URI symptoms  PERRL  EOMI (but pain with L EOM)  L conjunctival injection  Denies vision changes  Concern for orbital cellulitis, CT orbits ordered  No leukocytosis  Did not meet sepsis criteria  there was a peripherally enhancing lesion in L frontal lobe  we are concerned it could be an extension of orbital cellulitis   Radiology read it as an incidental " "finding, and said preseptal cellulitis with conjunctivitis  Discussed with attending due to immunocompromise, high clinical concern,  IV antibiotics were started prior to read for the concern  Discussed with Neurosurgery who recommend getting an MRI with contrast, continue IV antibiotics if cerebritis and discontinue if orbital cellulitis  No transfer recommended  Will admit for IV antibiotics and MRI in AM      All imaging and/or lab testing discussed with patient  Patient and/or family members verbalizes understanding and agrees with plan for admission  Patient is stable for admission      Portions of the record may have been created with voice recognition software  Occasional wrong word or \"sound a like\" substitutions may have occurred due to the inherent limitations of voice recognition software  Read the chart carefully and recognize, using context, where substitutions have occurred  Amount and/or Complexity of Data Reviewed  Labs: ordered  Decision-making details documented in ED Course  Radiology: ordered  Risk  Prescription drug management  Decision regarding hospitalization  Disposition  Final diagnoses:   Swelling of eyelid, left   Abnormal brain CT   Pain of left eye     Time reflects when diagnosis was documented in both MDM as applicable and the Disposition within this note     Time User Action Codes Description Comment    4/18/2023  5:59 PM Leslie Davis Add [H02 846] Swelling of eyelid, left     4/18/2023  5:59 PM Renata April [R90 [id number]] Abnormal brain CT     4/18/2023  5:59 PM Leslie Davis Add [H57 12] Pain of left eye     4/18/2023  6:22 PM Kathryn Pill Add [B20] HIV disease [facility])       ED Disposition     ED Disposition   Admit    Condition   Stable    Date/Time   Tue Apr 18, 2023  6:00 PM    Comment   Case was discussed with ALEISHA and the patient's admission status was agreed to be Admission Status: observation status to the service of Dr Robel Palencia              Follow-up " Information    None         Patient's Medications   Discharge Prescriptions    No medications on file       No discharge procedures on file      PDMP Review     None          ED Provider  Electronically Signed by           Anayeli Yusuf PA-C  04/18/23 1240

## 2023-04-18 NOTE — ASSESSMENT & PLAN NOTE
Physical Therapy Daily Treatment Note     Name: Emiliano Rodas [facility] Number: [id number]    Therapy Diagnosis:   Encounter Diagnoses   Name Primary?    Cervical pain (neck)     Chronic pain of both shoulders     Weakness of shoulder     Posture imbalance      Physician: Haja Ivey MD    Visit Date: 6/18/2020    Physician Orders: PT Eval and Treat   Medical Diagnosis from Referral:   M75.22 (ICD-10-CM) - Bicipital tendinitis of left shoulder   M75.120 (ICD-10-CM) - Complete tear of rotator cuff   M25.611 (ICD-10-CM) - Decreased range of motion of right shoulder         Evaluation Date: 6/2/2020  Authorization Period Expiration: 6/2/2021  Plan of Care Expiration: 9/2/2020  Visit # / Visits authorized: 2/5     Time In: 1200  Time Out: 1255  Total Billable Time: 45  minutes     Precautions: A- fib and L TKA (5 years ago)      Subjective     Pt reports: most of pain is lateral shoulder blade and in neck especially in the morning    He was somewhat compliant with home exercise program.  Response to previous treatment: fair  Functional change: ongoing    Pain: 2/10  Location: R shoulder      Objective     Emiliano received therapeutic exercises to develop strength, endurance, ROM, flexibility and posture for 30 minutes including:    UBE 3/3   Pec stretch doorways 3x30 seconds  +lat pulldown 30# 2x12  Standing ER GTB 2x15 ea  Wall clock YTB 2x5  Prone shoulder I's 2x10 2#  Prone shoulder T's 2x10 2#   Prone shoulder Y's 2x10 1#    NV: talk about dry needling    Emiliano received the following manual therapy techniques:  were applied to the: B scapular mobs for 15 minutes, including:   Scapular mobs in all direction   +sub occipital release  +STM to B upper trap   +cervical manual distraction  +cervical ROM (B rot and lat flex)      Emiliano received hot pack for 10   minutes to B scapular .        Home Exercises Provided and Patient Education Provided     Education provided:   - Cont HEP    Written Home  Lab Results   Component Value Date    EGFR 45 04/18/2023    EGFR 40 03/31/2023    EGFR 36 11/22/2022    CREATININE 1 31 (H) 04/18/2023    CREATININE 1 43 (H) 03/31/2023    CREATININE 1 59 (H) 11/22/2022   · Creatinine at baseline    Continue routine lab monitoring Exercises Provided: yes.  Exercises were reviewed and Emiliano was able to demonstrate them prior to the end of the session.  Emiliano demonstrated good  understanding of the education provided.     See EMR under Patient Instructions for exercises provided 6/10/2020.    Assessment     Pt tolerated session well today. He presents with tenderness to lateral R scapula, poor posture, and weak posterior shoulder musculature. He also presents with cervical/upper trap pain and tenderness. Focused manual therapy on reducing tenderness and improving soft tissue mobility in cervical and upper trap musculature. Focused therex on improving strength of periscapular musculature. Pt might benefit from dry needling at periscapular muscles.     Emiliano is progressing well towards his goals.   Pt prognosis is Good.     Pt will continue to benefit from skilled outpatient physical therapy to address the deficits listed in the problem list box on initial evaluation, provide pt/family education and to maximize pt's level of independence in the home and community environment.     Pt's spiritual, cultural and educational needs considered and pt agreeable to plan of care and goals.     Anticipated barriers to physical therapy: none    GOALS: Short Term Goals: 4 weeks   1.Report decreased in pain at worse less than  <   / =  5  /10  to increase tolerance for functional mobility. On going  2. Pt to improve Cervical and shoulder range of motion by 10% to allow for improved functional mobility to allow for improvement in IADLs. On going  3. Increased B shoulder  MMT 1/2 grade to increase tolerance for ADL and work activities. On going  4. Pt to report be conscious of impaired sitting and standing posture daily to decrease pain. On going  5. Pt to tolerate HEP to improve ROM and independence with ADL's. On going     Long Term Goals: 8 weeks  1.Report decreased in pain at worse less than  <   / =  2 /10  to increase tolerance for functional  mobility  2.  Patient will demonstrate improved overall function per FOTO Survey to CJ = at least 20% but < 40% impaired, limited or restricted score or less.  3.Increased B Shoulders  MMT 1 grade to increase tolerance for ADL and work activities.  4. Pt to report and demonstrate proper posture in standing and sitting to decrease pain  5. Pt to be Independent with HEP to improve ROM and independence with ADL's.  6. Pt to improve Cervical range of motion by 75% to allow for improved functional mobility to allow for improvement in IADLs.       Plan     Outpatient Physical Therapy 2 times weekly for 12 weeks to include the following interventions: Manual Therapy, Moist Heat/ Ice, Neuromuscular Re-ed, Patient Education, Therapeutic Activites and Therapeutic Exercise.     Consider dry needling next visit.        Freddy Banerjee, PT, DPT  6/18/2020

## 2023-04-18 NOTE — ASSESSMENT & PLAN NOTE
·  Incidentally noted 1 5 cm peripherally enhancing lesion in the inferolateral left frontal lobe   The lesion is possibly extra-axial   Recommend further evaluation with brain MRI without and with contrast  · ER provider reviewed case with neurosurgery  · Recommend MRI brain  · No acute intervention needed at this time, no need for transfer  · Follow-up MRI brain and reach back out to neurosurgery with results  · Patient currently neurologically intact, no neurodeficits noted

## 2023-04-18 NOTE — H&P
[facility]  H&P  Name: Stephan Flowers 63 y o  female I MRN: [id number]  Unit/Bed#: ED 15 I Date of Admission: 4/18/2023   Date of Service: 4/18/2023 I Hospital Day: 0      Assessment/Plan   * Orbital cellulitis on left  Assessment & Plan  · CT imaging results reviewed  · We will continue antibiotics with Zosyn/vancomycin  · Infectious disease consult  · MRI pending  · Ophthalmology consult  · IV fluids as needed  · Patient without any visual acuity changes, extraocular muscles are intact    Abnormal CT scan, head  Assessment & Plan  ·  Incidentally noted 1 5 cm peripherally enhancing lesion in the inferolateral left frontal lobe   The lesion is possibly extra-axial   Recommend further evaluation with brain MRI without and with contrast  · ER provider reviewed case with neurosurgery  · Recommend MRI brain  · No acute intervention needed at this time, no need for transfer  · Follow-up MRI brain and reach back out to neurosurgery with results  · Patient currently neurologically intact, no neurodeficits noted    Tobacco abuse  Assessment & Plan  Nicotine patch    CKD (chronic kidney disease) stage 3, GFR 30-59 ml/min [facility])  Assessment & Plan  Lab Results   Component Value Date    EGFR 45 04/18/2023    EGFR 40 03/31/2023    EGFR 36 11/22/2022    CREATININE 1 31 (H) 04/18/2023    CREATININE 1 43 (H) 03/31/2023    CREATININE 1 59 (H) 11/22/2022   · Creatinine at baseline  Continue routine lab monitoring    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 [facility])  Assessment & Plan  · We will follow-up INR level    Dose Coumadin accordingly    [facility] disease ([facility] )  Assessment & Plan  · Last CD4 count in March of this year was 642  · Follow-up repeat CD4 count  · Patient follows with infectious disease as outpatient  · Consult ID    Triple vessel coronary artery disease  Assessment & Plan  · Patient follows with cardiology as outpatient  · Status post AICD  · Continue home Coreg, Entresto  · Patient is also on Coumadin, follow-up INR and dose accordingly  · Continue baby aspirin         VTE Prophylaxis: Warfarin (Coumadin)  Code Status: Full code  POLST: There is no POLST form on file for this patient (pre-hospital)  Discussion with family: Updated patient regarding plan of care    Anticipated Length of Stay:  Patient will be admitted on an Observation basis with an anticipated length of stay of  < 2 midnights  Justification for Hospital Stay: Orbital cellulitis    Chief Complaint:   Left eye redness    History of Present Illness:    Panda Mosley is a 63 y o  female who presents with left eye redness  Patient states that symptoms started on Sunday  Denies any trauma  No new eyedrops  Patient wears reading glasses  Denies any change in visual acuity  Patient states that she noticed redness of the eye which progressed to erythema around the eye  Denies any fever or chills  No nausea or vomiting  No significant drainage  Patient tried Visine eyedrops yesterday with no improvement  No previous episodes  No one else sick at home with any eye issues  Patient was started on antibiotics in the ED  CT head showed left orbit preseptal soft tissue cellulitis and conjunctivitis  Also noted to have peripheral enhancing lesion in left frontal lobe which ER provider reviewed with neurosurgery  No acute intervention needed at this time recommended MRI brain which is currently pending    Review of Systems:  Review of Systems   Constitutional: Negative for chills and fever  Eyes: Positive for redness and itching  Negative for photophobia and visual disturbance  All other systems reviewed and are negative        Past Medical and Surgical History:   Past Medical History:   Diagnosis Date   • Asthma    • Coronary artery disease     defibrillator   • HIV positive ([address] )        Past Surgical History:   Procedure Laterality Date   • ANGIOPLASTY     • CARDIAC DEFIBRILLATOR PLACEMENT     • CORONARY ANGIOPLASTY pci placement   • TOTAL ABDOMINAL HYSTERECTOMY     • US GUIDED INJECTION FOR RESEARCH STUDY  5/7/2018   • US GUIDED INJECTION FOR RESEARCH STUDY  1/18/2019   • US GUIDED INJECTION FOR RESEARCH STUDY  5/7/2018   • US GUIDED INJECTION FOR RESEARCH STUDY  5/14/2018       Meds/Allergies:  Prior to Admission medications    Medication Sig Start Date End Date Taking? Authorizing Provider   aspirin (ECOTRIN LOW STRENGTH) 81 mg EC tablet Take 1 tablet (81 mg total) by mouth daily 7/21/22   Jonah Herman MD   atorvastatin (LIPITOR) 80 mg tablet TAKE 1 TABLET BY MOUTH DAILY 1/14/23   Jonah Herman MD   bictegravir-emtricitab-tenofovir alafenamide [facility]) -32 MG tablet Take 1 tablet by mouth daily with breakfast 8/12/22   JOSE ROBERTO Rogers   carvedilol (COREG) 6 25 mg tablet TAKE 1 TABLET BY MOUTH TWICE A DAY WITH MEALS 1/14/23   Jonah Herman MD   Entresto 49-51 MG TABS TAKE 1 TABLET BY MOUTH TWICE A DAY 1/14/23   Jonah Herman MD   nicotine polacrilex (COMMIT) 4 MG lozenge USE 1 LOZENGE AS NEEDED FOR SMOKING CESSATION 12/19/22   JOSE ROBERTO Rogers   warfarin (COUMADIN) 1 mg tablet TAKE 1 TABLET BY MOUTH DAILY OR AS DIRECTED 4/22/22   Jonah Herman MD   warfarin (COUMADIN) 5 mg tablet 1 to 1 5 tablets daily as directed by MD 8/8/22   Jonah Herman MD     I have reviewed home medications with patient personally  Allergies:    Allergies   Allergen Reactions   • No Active Allergies    • No Known Allergies        Social History:  Marital Status: Single   Patient Pre-hospital Living Situation: Lives with family  Patient Pre-hospital Level of Mobility: Ambulatory  Patient Pre-hospital Diet Restrictions: None  Substance Use History:   Social History     Substance and Sexual Activity   Alcohol Use Yes    Comment: socially     Social History     Tobacco Use   Smoking Status Former   • Packs/day: 1 00   • Years: 30 00   • Pack years: 30 00   • Types: Cigarettes   • Start date: 3/8/1994   Smokeless Tobacco Former   • Quit date: 8/12/2019   Tobacco Comments    5 days without smoking     Social History     Substance and Sexual Activity   Drug Use No       Family History:  I have reviewed the patients family history    Physical Exam:   Vitals:   Blood Pressure: 123/75 (04/18/23 1808)  Pulse: 62 (04/18/23 1808)  Temperature: 99 4 °F (37 4 °C) (04/18/23 1406)  Respirations: 18 (04/18/23 1808)  Weight - Scale: 68 2 kg (150 lb 5 7 oz) (04/18/23 1406)  SpO2: 99 % (04/18/23 1651)    Physical Exam  Constitutional:       General: She is not in acute distress  HENT:      Head: Normocephalic and atraumatic  Nose: Nose normal       Mouth/Throat:      Mouth: Mucous membranes are moist    Eyes:      Extraocular Movements: Extraocular movements intact  Pupils: Pupils are equal, round, and reactive to light  Comments: Redness of left conjunctive a noted  Surrounding erythema around left eye  Extraocular muscles are intact  Visual acuity intact  Cardiovascular:      Rate and Rhythm: Normal rate and regular rhythm  Pulmonary:      Effort: Pulmonary effort is normal  No respiratory distress  Abdominal:      Palpations: Abdomen is soft  Tenderness: There is no abdominal tenderness  Musculoskeletal:         General: Normal range of motion  Cervical back: Normal range of motion and neck supple  Skin:     General: Skin is warm and dry  Neurological:      General: No focal deficit present  Mental Status: She is alert  Mental status is at baseline  Cranial Nerves: No cranial nerve deficit  Psychiatric:         Mood and Affect: Mood normal          Behavior: Behavior normal          Additional Data:   Lab Results: I have personally reviewed pertinent reports      Results from last 7 days   Lab Units 04/18/23  1529   WBC Thousand/uL 7 67   HEMOGLOBIN g/dL 14 4   HEMATOCRIT % 44 2   PLATELETS Thousands/uL 172   NEUTROS PCT % 75   LYMPHS PCT % 18   MONOS PCT % 6   EOS PCT % 1     Results from last 7 days   Lab Units 04/18/23  1529   SODIUM mmol/L 144   POTASSIUM mmol/L 4 4   CHLORIDE mmol/L 108   CO2 mmol/L 28   BUN mg/dL 14   CREATININE mg/dL 1 31*   CALCIUM mg/dL 9 8                   Imaging: I have personally reviewed pertinent reports  CT orbits/temporal bones/skull base w contrast   Final Result by Víctor Marrero MD (04/18 1651)      1  Left orbit preseptal soft tissue cellulitis and conjunctivitis  No post septal inflammation or abscess  2   Mildly inflamed right lacrimal gland  3   Incidentally noted 1 5 cm peripherally enhancing lesion in the inferolateral left frontal lobe  The lesion is possibly extra-axial   Recommend further evaluation with brain MRI without and with contrast       4   Bilateral maxillary sinus disease  Left nasal mucosal inflammation  The study was marked in EPIC for significant notification  Workstation performed: [id number]         MRI inpatient order    (Results Pending)     Epic Records Reviewed: Yes     ** Please Note: This note has been constructed using a voice recognition system   **

## 2023-04-19 LAB
ANION GAP SERPL CALCULATED.3IONS-SCNC: 6 MMOL/L (ref 4–13)
BASOPHILS # BLD AUTO: 0.03 THOUSANDS/ΜL (ref 0–0.1)
BASOPHILS NFR BLD AUTO: 1 % (ref 0–1)
BUN SERPL-MCNC: 15 MG/DL (ref 5–25)
CALCIUM SERPL-MCNC: 8.9 MG/DL (ref 8.4–10.2)
CHLORIDE SERPL-SCNC: 110 MMOL/L (ref 96–108)
CO2 SERPL-SCNC: 29 MMOL/L (ref 21–32)
CREAT SERPL-MCNC: 1.36 MG/DL (ref 0.6–1.3)
EOSINOPHIL # BLD AUTO: 0.07 THOUSAND/ΜL (ref 0–0.61)
EOSINOPHIL NFR BLD AUTO: 2 % (ref 0–6)
ERYTHROCYTE [DISTWIDTH] IN BLOOD BY AUTOMATED COUNT: 13.3 % (ref 11.6–15.1)
GFR SERPL CREATININE-BSD FRML MDRD: 43 ML/MIN/1.73SQ M
GLUCOSE SERPL-MCNC: 120 MG/DL (ref 65–140)
HCT VFR BLD AUTO: 38.4 % (ref 34.8–46.1)
HGB BLD-MCNC: 12.4 G/DL (ref 11.5–15.4)
IMM GRANULOCYTES # BLD AUTO: 0 THOUSAND/UL (ref 0–0.2)
IMM GRANULOCYTES NFR BLD AUTO: 0 % (ref 0–2)
INR PPP: 2.46 (ref 0.84–1.19)
LYMPHOCYTES # BLD AUTO: 0.99 THOUSANDS/ΜL (ref 0.6–4.47)
LYMPHOCYTES NFR BLD AUTO: 24 % (ref 14–44)
MCH RBC QN AUTO: 31.4 PG (ref 26.8–34.3)
MCHC RBC AUTO-ENTMCNC: 32.3 G/DL (ref 31.4–37.4)
MCV RBC AUTO: 97 FL (ref 82–98)
MONOCYTES # BLD AUTO: 0.28 THOUSAND/ΜL (ref 0.17–1.22)
MONOCYTES NFR BLD AUTO: 7 % (ref 4–12)
NEUTROPHILS # BLD AUTO: 2.81 THOUSANDS/ΜL (ref 1.85–7.62)
NEUTS SEG NFR BLD AUTO: 66 % (ref 43–75)
NRBC BLD AUTO-RTO: 0 /100 WBCS
PLATELET # BLD AUTO: 148 THOUSANDS/UL (ref 149–390)
PMV BLD AUTO: 11 FL (ref 8.9–12.7)
POTASSIUM SERPL-SCNC: 4.3 MMOL/L (ref 3.5–5.3)
PROTHROMBIN TIME: 27.1 SECONDS (ref 11.6–14.5)
RBC # BLD AUTO: 3.95 MILLION/UL (ref 3.81–5.12)
SODIUM SERPL-SCNC: 145 MMOL/L (ref 135–147)
WBC # BLD AUTO: 4.18 THOUSAND/UL (ref 4.31–10.16)

## 2023-04-19 RX ORDER — WARFARIN SODIUM 5 MG/1
7.5 TABLET ORAL
Status: DISCONTINUED | OUTPATIENT
Start: 2023-04-19 | End: 2023-04-20 | Stop reason: HOSPADM

## 2023-04-19 RX ADMIN — BICTEGRAVIR SODIUM, EMTRICITABINE, AND TENOFOVIR ALAFENAMIDE FUMARATE 1 TABLET: 50; 200; 25 TABLET ORAL at 08:29

## 2023-04-19 RX ADMIN — CARVEDILOL 6.25 MG: 6.25 TABLET, FILM COATED ORAL at 16:35

## 2023-04-19 RX ADMIN — CARVEDILOL 6.25 MG: 6.25 TABLET, FILM COATED ORAL at 08:29

## 2023-04-19 RX ADMIN — ATORVASTATIN CALCIUM 80 MG: 80 TABLET, FILM COATED ORAL at 21:06

## 2023-04-19 RX ADMIN — ASPIRIN 81 MG: 81 TABLET, COATED ORAL at 08:29

## 2023-04-19 RX ADMIN — PIPERACILLIN AND TAZOBACTAM 3.38 G: 3; .375 INJECTION, POWDER, LYOPHILIZED, FOR SOLUTION INTRAVENOUS; PARENTERAL at 05:34

## 2023-04-19 RX ADMIN — PIPERACILLIN AND TAZOBACTAM 3.38 G: 3; .375 INJECTION, POWDER, LYOPHILIZED, FOR SOLUTION INTRAVENOUS; PARENTERAL at 23:34

## 2023-04-19 RX ADMIN — WARFARIN SODIUM 7.5 MG: 5 TABLET ORAL at 16:35

## 2023-04-19 RX ADMIN — SACUBITRIL AND VALSARTAN 1 TABLET: 49; 51 TABLET, FILM COATED ORAL at 08:29

## 2023-04-19 RX ADMIN — VANCOMYCIN HYDROCHLORIDE 500 MG: 500 INJECTION, SOLUTION INTRAVENOUS at 16:35

## 2023-04-19 RX ADMIN — NICOTINE 14 MG: 14 PATCH, EXTENDED RELEASE TRANSDERMAL at 08:29

## 2023-04-19 RX ADMIN — VANCOMYCIN HYDROCHLORIDE 500 MG: 500 INJECTION, SOLUTION INTRAVENOUS at 04:26

## 2023-04-19 RX ADMIN — PIPERACILLIN AND TAZOBACTAM 3.38 G: 3; .375 INJECTION, POWDER, LYOPHILIZED, FOR SOLUTION INTRAVENOUS; PARENTERAL at 11:58

## 2023-04-19 RX ADMIN — PIPERACILLIN AND TAZOBACTAM 3.38 G: 3; .375 INJECTION, POWDER, LYOPHILIZED, FOR SOLUTION INTRAVENOUS; PARENTERAL at 17:33

## 2023-04-19 NOTE — PLAN OF CARE
Problem: PAIN - ADULT  Goal: Verbalizes/displays adequate comfort level or baseline comfort level  Description: Interventions:  - Encourage patient to monitor pain and request assistance  - Assess pain using appropriate pain scale  - Administer analgesics based on type and severity of pain and evaluate response  - Implement non-pharmacological measures as appropriate and evaluate response  - Consider cultural and social influences on pain and pain management  - Notify physician/advanced practitioner if interventions unsuccessful or patient reports new pain  Outcome: Progressing     Problem: INFECTION - ADULT  Goal: Absence or prevention of progression during hospitalization  Description: INTERVENTIONS:  - Assess and monitor for signs and symptoms of infection  - Monitor lab/diagnostic results  - Monitor all insertion sites, i e  indwelling lines, tubes, and drains  - Monitor endotracheal if appropriate and nasal secretions for changes in amount and color  - [place] appropriate cooling/warming therapies per order  - Administer medications as ordered  - Instruct and encourage patient and family to use good hand hygiene technique  - Identify and instruct in appropriate isolation precautions for identified infection/condition  Outcome: Progressing  Goal: Absence of fever/infection during neutropenic period  Description: INTERVENTIONS:  - Monitor WBC    Outcome: Progressing     Problem: Knowledge Deficit  Goal: Patient/family/caregiver demonstrates understanding of disease process, treatment plan, medications, and discharge instructions  Description: Complete learning assessment and assess knowledge base    Interventions:  - Provide teaching at level of understanding  - Provide teaching via preferred learning methods  Outcome: Progressing

## 2023-04-19 NOTE — UTILIZATION REVIEW
Initial Clinical Review    Pt initially admitted as Observation on 4/18  Changed to Inpatient on 4/19  Pt requiring continued stay d/t ongoing IV ABX for orbital cellulitis  Admission: Date/Time/Statement:   Admission Orders (From admission, onward)     Ordered        04/19/23 1544  Inpatient Admission  Once            04/18/23 1800  Place in Observation  Once                      Orders Placed This Encounter   Procedures   • Inpatient Admission     Standing Status:   Standing     Number of Occurrences:   1     Order Specific Question:   Level of Care     Answer:   Med Surg [16]     Order Specific Question:   Estimated length of stay     Answer:   More than 2 Midnights     Order Specific Question:   Certification     Answer:   I certify that inpatient services are medically necessary for this patient for a duration of greater than two midnights  See H&P and MD Progress Notes for additional information about the patient's course of treatment  ED Arrival Information     Expected   -    Arrival   4/18/2023 13:14    Acuity   Less Urgent            Means of arrival   Walk-In    Escorted by   Self    Service   Hospitalist    Admission type   Emergency            Arrival complaint   swollen eye           Chief Complaint   Patient presents with   • Eye Swelling     Since Sunday  Swollen and red, and watery  Used warm cloth and visine         Initial Presentation: 63 y o  female who presented self from home to [address] [place], [place] [address] [facility] ED  Admitted in observation status for evaluation and treatment of orbital cellulitis  PMHx: Asthma, CAD, HIV  Presented w/ L eye redness since Sunday, progressing to erythema around eye as well  Tried Visine drops at home without improvement  On exam, L conjunctiva red, erythema surrounding L eye  CT showed L orbit preseptal soft tissue cellulitis and conjunctivitis, incidental finding of lesion in L frontal lobe   Plan: MRI brain, IV ABX, IVF, Trend labs, replete electrolytes as needed; continue PTA meds  Neurosurgery, ID consulted  04/19/23 Changed to Inpatient Status: Pt w/ L orbital cellulitis  Exam unchanged  Plan: continue IV ABX, Trend labs, replete electrolytes as needed; continue PTA meds  Neurosurgery: Pt w/ incidental finding of L frontal mass; can obtain MRI outpatient  And follow-up w/ neurosurgery post MRI       04/20/23 Day 2: Pt reports erythema and edema greatly improved  No change in vision  Plan: continue ABX, continue PTA meds, supportive care  Trend labs, replete electrolytes as needed      ED Triage Vitals   Temperature Pulse Respirations Blood Pressure SpO2   04/18/23 1406 04/18/23 1406 04/18/23 1406 04/18/23 1406 04/18/23 1440   99 4 °F (37 4 °C) (!) 127 20 125/75 99 %      Temp Source Heart Rate Source Patient Position - Orthostatic VS BP Location FiO2 (%)   04/18/23 1944 04/18/23 1440 04/18/23 1651 04/18/23 1651 --   Temporal Monitor Sitting Left arm       Pain Score       04/18/23 2015       No Pain          Wt Readings from Last 1 Encounters:   04/18/23 68 kg (149 lb 14 6 oz)     Additional Vital Signs:   Date/Time Temp Pulse Resp BP MAP (mmHg) SpO2 O2 Device   04/20/23 0809 -- 58 18 119/83 91 -- --   04/20/23 0707 97 1 °F (36 2 °C) Abnormal  56 16 100/60 66 98 % None (Room air)   04/19/23 2254 97 4 °F (36 3 °C) Abnormal  57 18 100/64 73 98 % None (Room air)   04/19/23 1724 -- -- -- 107/69 -- -- --   04/19/23 1535 97 8 °F (36 6 °C) 59 18 117/73 81 99 % None (Room air)     Date/Time Temp Pulse Resp BP MAP (mmHg) SpO2 O2 Device   04/19/23 0737 97 5 °F (36 4 °C) 55 18 119/70 -- 97 % None (Room air)   04/18/23 2300 97 5 °F (36 4 °C) 56 18 133/75 95 98 % None (Room air)   04/18/23 1944 97 8 °F (36 6 °C) 58 18 130/86 104 98 % None (Room air)   04/18/23 1808 -- 62 18 123/75 -- -- --   04/18/23 1651 -- 59 20 108/82 -- 99 % None (Room air)   04/18/23 1529 -- -- -- -- -- -- None (Room air)   04/18/23 1440 -- 64 18 124/71 -- 99 % None (Room air)     Pertinent Labs/Diagnostic Test Results:   CT orbits/temporal bones/skull base w contrast   Final Result by Kristi Feldman MD (04/18 1651)      1  Left orbit preseptal soft tissue cellulitis and conjunctivitis  No post septal inflammation or abscess  2   Mildly inflamed right lacrimal gland  3   Incidentally noted 1 5 cm peripherally enhancing lesion in the inferolateral left frontal lobe  The lesion is possibly extra-axial   Recommend further evaluation with brain MRI without and with contrast       4   Bilateral maxillary sinus disease  Left nasal mucosal inflammation  The study was marked in EPIC for significant notification        Workstation performed: [id number]               Results from last 7 days   Lab Units 04/20/23  0550 04/19/23  0526 04/18/23  1529   WBC Thousand/uL 3 73* 4 18* 7 67   HEMOGLOBIN g/dL 12 2 12 4 14 4   HEMATOCRIT % 37 8 38 4 44 2   PLATELETS Thousands/uL 155 148* 172   NEUTROS ABS Thousands/µL 2 52 2 81 5 79         Results from last 7 days   Lab Units 04/20/23  0550 04/19/23  0526 04/18/23  1529   SODIUM mmol/L 143 145 144   POTASSIUM mmol/L 4 2 4 3 4 4   CHLORIDE mmol/L 110* 110* 108   CO2 mmol/L 23 29 28   ANION GAP mmol/L 10 6 8   BUN mg/dL 16 15 14   CREATININE mg/dL 1 19 1 36* 1 31*   EGFR ml/min/1 73sq m 50 43 45   CALCIUM mg/dL 8 7 8 9 9 8   MAGNESIUM mg/dL 2 2  --   --    PHOSPHORUS mg/dL 2 4*  --   --              Results from last 7 days   Lab Units 04/20/23  0550 04/19/23  0526 04/18/23  1529   GLUCOSE RANDOM mg/dL 81 120 124     Results from last 7 days   Lab Units 04/20/23  0550 04/19/23  0526 04/18/23  2037   PROTIME seconds 27 8* 27 1* 23 5*   INR  2 54* 2 46* 2 04*         ED Treatment:   Medication Administration from 04/18/2023 1314 to 04/18/2023 1932       Date/Time Order Dose Route Action     04/18/2023 1501 EDT fluorescein sodium sterile ophthalmic strip 1 strip 1 strip Left Eye Given by Other     04/18/2023 1502 EDT tetracaine 0 5 % ophthalmic solution 2 drop 2 drop Left Eye Given by Other     04/18/2023 1611 EDT sodium chloride 0 9 % bolus 500 mL 500 mL Intravenous New Bag     04/18/2023 1606 EDT iohexol (OMNIPAQUE) 350 MG/ML injection (SINGLE-DOSE) 85 mL 85 mL Intravenous Given     04/18/2023 1651 EDT vancomycin (VANCOCIN) IVPB (premix in dextrose) 1,000 mg 200 mL 1,000 mg Intravenous New Bag     04/18/2023 1822 EDT piperacillin-tazobactam (ZOSYN) 3 375 g in sodium chloride 0 9 % 100 mL IVPB 3 375 g Intravenous New Bag        Past Medical History:   Diagnosis Date   • Asthma    • Coronary artery disease     defibrillator   • HIV positive ([facility] )      Present on Admission:  • Triple vessel coronary artery disease  • 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 ([facility])  • Tobacco abuse  • HIV disease ([facility] )  • CKD (chronic kidney disease) stage 3, GFR 30-59 ml/min ([facility])  • Orbital cellulitis on left  • Abnormal CT scan, head      Admitting Diagnosis: HIV disease ([facility] ) [B20]  Eye swelling [H57 89]  Abnormal brain CT [R90 89]  Pain of left eye [H57 12]  Swelling of eyelid, left [H02 846]  Age/Sex: 63 y o  female  Admission Orders:  Cardiac Diet  SCDs  Scheduled Medications:  aspirin, 81 mg, Oral, Daily  atorvastatin, 80 mg, Oral, Daily  bictegravir-emtricitab-tenofovir alafenamide, 1 tablet, Oral, Daily With Breakfast  carvedilol, 6 25 mg, Oral, BID With Meals  nicotine, 14 mg, Transdermal, Daily  piperacillin-tazobactam, 3 375 g, Intravenous, Q6H  sacubitril-valsartan, 1 tablet, Oral, BID  vancomycin, 500 mg, Intravenous, Q12H  warfarin, 7 5 mg, Oral, Daily With Dinner    Continuous IV Infusions: none    PRN Meds:  acetaminophen, 650 mg, Oral, Q6H PRN  ondansetron, 4 mg, Intravenous, Q6H PRN        IP CONSULT TO PHARMACY  IP CONSULT TO NEUROSURGERY    Network Utilization Review Department  ATTENTION: Please call with any questions or concerns to [phone] and carefully listen to the prompts so that you are directed to the right person   All voicemails are confidential   Maykel March all requests for admission clinical reviews, approved or denied determinations and any other requests to dedicated fax number below belonging to the campus where the patient is receiving treatment   List of dedicated fax numbers for the Facilities:  [address] DENIALS (Administrative/Medical Necessity) [phone]   [address] (Maternity/NICU/Pediatrics) [phone]   919 Dora Gonzalez [phone]   [facility]  [phone]   [facility] [address] 28 [phone]   1554 [facility] 134 815 [facility] [phone]

## 2023-04-19 NOTE — PROGRESS NOTES
[facility]  Progress Note  Name: Rafa Yeboah  MRN: [id number]  Unit/Bed#: 7T [facility] 708-01 I Date of Admission: 4/18/2023   Date of Service: 4/19/2023  Hospital Day: 0    Assessment/Plan   * Orbital cellulitis on left  Assessment & Plan  · CT imaging results reviewed  · We will continue antibiotics with Zosyn/vancomycin  · Infectious disease consult  · MRI unable to be obtained at this campus  · Ophthalmology consult pending  · IV fluids as needed  · Patient without any visual acuity changes, extraocular muscles are intact    Abnormal CT scan, head  Assessment & Plan  ·  Incidentally noted 1 5 cm peripherally enhancing lesion in the inferolateral left frontal lobe   The lesion is possibly extra-axial   Recommend further evaluation with brain MRI without and with contrast  · ER provider reviewed case with neurosurgery  · Recommend MRI brain ; will need to obtain MRI brain at [place] [address] due to pacemaker  · No acute intervention needed at this time, no need for transfer  · Follow-up MRI brain and reach back out to neurosurgery with results  · Patient currently neurologically intact, no neurodeficits noted    Tobacco abuse  Assessment & Plan  Nicotine patch    CKD (chronic kidney disease) stage 3, GFR 30-59 ml/min [facility])  Assessment & Plan  Lab Results   Component Value Date    EGFR 43 04/19/2023    EGFR 45 04/18/2023    EGFR 40 03/31/2023    CREATININE 1 36 (H) 04/19/2023    CREATININE 1 31 (H) 04/18/2023    CREATININE 1 43 (H) 03/31/2023   · Creatinine at baseline  Continue routine lab monitoring    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 [facility])  Assessment & Plan  · We will follow-up INR level    Dose Coumadin accordingly    HIV disease ([facility] )  Assessment & Plan  · Last CD4 count in March of this year was 642  · Follow-up repeat CD4 count  · Patient follows with Infectious Disease as outpatient  · Consult ID    Triple vessel coronary artery disease  Assessment & Plan  · Patient follows with cardiology as outpatient  · Status post AICD  · Continue home Coreg, Entresto  · Patient is also on Coumadin, follow-up INR and dose accordingly  · Continue baby aspirin         VTE Pharmacologic Prophylaxis: Coumadin    Patient Centered Rounds:  Patient care rounds were performed with nursing    Discussions with Specialists or Other Care Team Provider: Case management    Education and Discussions with Family / Patient: Spoke with patient at bedside    Time Spent for Care: 30  More than 50% of total time spent on counseling and coordination of care as described above  Current Length of Stay: 0 day(s)    Current Patient Status: Observation     Certification Statement: The patient will continue to require additional inpatient hospital stay due to orbital cellulitis    Discharge Plan: Home with family support once medically stable    Code Status: Level 1 - Full Code      Subjective:   Patient seen and examined at bedside  No acute events overnight  Denies chest pain, SOB, diaphoresis, nausea/vomiting/diarrhea, fevers/chills  Objective:     Vitals:   Temp (24hrs), Av 1 °F (36 7 °C), Min:97 5 °F (36 4 °C), Max:99 4 °F (37 4 °C)    Temp:  [97 5 °F (36 4 °C)-99 4 °F (37 4 °C)] 97 5 °F (36 4 °C)  HR:  [] 55  Resp:  [18-20] 18  BP: (108-133)/(70-86) 119/70  SpO2:  [97 %-99 %] 97 %  Body mass index is 27 42 kg/m²  Input and Output Summary (last 24 hours): Intake/Output Summary (Last 24 hours) at 2023 0902  Last data filed at 2023 2100  Gross per 24 hour   Intake 560 ml   Output --   Net 560 ml       Physical Exam:     Physical Exam  Vitals and nursing note reviewed  Constitutional:       General: She is not in acute distress  Appearance: She is well-developed  HENT:      Head: Normocephalic and atraumatic  Eyes:      Conjunctiva/sclera: Conjunctivae normal    Cardiovascular:      Rate and Rhythm: Normal rate and regular rhythm  Heart sounds:  No murmur heard  Pulmonary:      Effort: Pulmonary effort is normal  No respiratory distress  Breath sounds: Normal breath sounds  Abdominal:      Palpations: Abdomen is soft  Tenderness: There is no abdominal tenderness  Musculoskeletal:         General: No swelling  Cervical back: Neck supple  Skin:     General: Skin is warm and dry  Capillary Refill: Capillary refill takes less than 2 seconds  Neurological:      Mental Status: She is alert  Psychiatric:         Mood and Affect: Mood normal          Additional Data:     Labs:  I have reviewed pertinent results     Results from last 7 days   Lab Units 04/19/23  0526   WBC Thousand/uL 4 18*   HEMOGLOBIN g/dL 12 4   HEMATOCRIT % 38 4   PLATELETS Thousands/uL 148*   NEUTROS PCT % 66   LYMPHS PCT % 24   MONOS PCT % 7   EOS PCT % 2     Results from last 7 days   Lab Units 04/19/23  0526   SODIUM mmol/L 145   POTASSIUM mmol/L 4 3   CHLORIDE mmol/L 110*   CO2 mmol/L 29   BUN mg/dL 15   CREATININE mg/dL 1 36*   ANION GAP mmol/L 6   CALCIUM mg/dL 8 9   GLUCOSE RANDOM mg/dL 120     Results from last 7 days   Lab Units 04/19/23  0526   INR  2 46*                     Imaging: I have reviewed pertinent imaging       Recent Cultures (last 7 days):           Last 24 Hours Medication List:   Current Facility-Administered Medications   Medication Dose Route Frequency Provider Last Rate   • acetaminophen  650 mg Oral Q6H PRN Marilee Petty MD     • aspirin  81 mg Oral Daily Marilee Petty MD     • atorvastatin  80 mg Oral Daily Marilee Petty MD     • bictegravir-emtricitab-tenofovir alafenamide  1 tablet Oral Daily With Breakfast Marilee Petty MD     • carvedilol  6 25 mg Oral BID With Meals Marilee Petty MD     • nicotine  14 mg Transdermal Daily Marilee Petty MD     • ondansetron  4 mg Intravenous Q6H PRN Marilee Petty MD     • piperacillin-tazobactam  3 375 g Intravenous Q6H Sumair Tawnya Callejas MD Stopped (04/19/23 2080)   • sacubitril-valsartan  1 tablet Oral BID Debra Bryant MD     • vancomycin  500 mg Intravenous Q12H Debra Bryant MD Stopped (04/19/23 0530)   • warfarin  5 mg Oral Daily With Dinner Su De Anda PA-C          Today, Patient Was Seen By: Emmett Odell DO    ** Please Note: Dictation voice to text software may have been used in the creation of this document   **

## 2023-04-19 NOTE — ASSESSMENT & PLAN NOTE
Lab Results   Component Value Date    EGFR 43 04/19/2023    EGFR 45 04/18/2023    EGFR 40 03/31/2023    CREATININE 1 36 (H) 04/19/2023    CREATININE 1 31 (H) 04/18/2023    CREATININE 1 43 (H) 03/31/2023   · Creatinine at baseline    Continue routine lab monitoring

## 2023-04-19 NOTE — ASSESSMENT & PLAN NOTE
· Last CD4 count in March of this year was 642  · Follow-up repeat CD4 count  · Patient follows with Infectious Disease as outpatient  · Consult ID

## 2023-04-19 NOTE — OCCUPATIONAL THERAPY NOTE
"Occupational Therapy Evaluation     Patient Name: Kelsi Pham  Today's Date: 4/19/2023  Problem List  Principal Problem:    Orbital cellulitis on left  Active Problems:    Triple vessel coronary artery disease    HIV disease ([facility] )    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 ([facility])    CKD (chronic kidney disease) stage 3, GFR 30-59 ml/min ([facility])    Tobacco abuse    Abnormal CT scan, head    Past Medical History  Past Medical History:   Diagnosis Date   • Asthma    • Coronary artery disease     defibrillator   • [facility] )      Past Surgical History  Past Surgical History:   Procedure Laterality Date   • ANGIOPLASTY     • CARDIAC DEFIBRILLATOR PLACEMENT     • CORONARY ANGIOPLASTY      pci placement   • TOTAL ABDOMINAL HYSTERECTOMY     • US GUIDED INJECTION FOR RESEARCH STUDY  5/7/2018   • US GUIDED INJECTION FOR RESEARCH STUDY  1/18/2019   • US GUIDED INJECTION FOR RESEARCH STUDY  5/7/2018   • US GUIDED INJECTION FOR RESEARCH STUDY  5/14/2018 04/19/23 1000   OT Last Visit   OT Visit Date 04/19/23   Note Type   Note type Evaluation   Pain Assessment   Pain Assessment Tool 0-10   Pain Score No Pain   Restrictions/Precautions   Weight Bearing Precautions Per Order No   Home Living   Type of Home Apartment  (2nd floor)   Home Layout One level;Stairs to enter with rails   Bathroom Accessibility Accessible   Prior Function   Level of [place] Independent with ADLs; Independent with functional mobility; Independent with R Cortin[address] Trey [address] in the last 6 months 0   Vocational Other (Comment)  (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)   Subjective   Subjective \"I could be better\"   ADL   Eating Assistance 7  Independent   Grooming Assistance 7  Independent   UB Bathing Assistance 7  Independent   LB Bathing Assistance 6  Modified Independent   UB Dressing Assistance 7  Independent   LB Dressing Assistance 6  Modified independent   [address]  7  Independent   Bed Mobility   Supine to Sit " 6  Modified independent   Additional items Increased time required   Sit to Supine 6  Modified independent   Additional items Increased time required   Transfers   Sit to Stand 7  Independent   Stand to Sit 7  Independent   Additional Comments no AD   Functional Mobility   Functional Mobility 7  Independent   Additional Comments household distances with no AD   Balance   Static Sitting Good   Dynamic Sitting Good   Static Standing Fair +   Dynamic Standing Fair   Ambulatory Fair   Activity Tolerance   Activity Tolerance Patient tolerated treatment well   Medical Staff Made Aware Spoke to CM   Nurse Made Aware spoke to RN   RUE Assessment   RUE Assessment WFL   LUE Assessment   LUE Assessment WFL   Hand Function   Gross Motor Coordination Functional   Fine Motor Coordination Functional   Psychosocial   Psychosocial (WDL) WDL   Perception   Inattention/Neglect Appears intact   Cognition   Overall Cognitive Status WFL   Arousal/Participation Alert; Cooperative   Attention Within functional limits   Orientation Level Oriented X4   Memory Within functional limits   Following Commands Follows all commands and directions without difficulty   Assessment   Assessment  Pt is a 63 y o  female seen for OT evaluation s/p admit to [place] [facility] on 4/18/2023 w/ Orbital cellulitis on left  CT showed 1 5 cm peripherally enhancing lesion in the inferolateral left frontal lobe  Neuro following  OT completed an extensive review of pt's medical and social history  Pt  has a past medical history of Asthma, Coronary artery disease, and HIV positive (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  Upon evaluation, pt Independent with transfers and mobility, ambulating household distances with no AD  UE ROM/strength WFL  Pt currently Nidia/I with ADLs  Pt appears to be performing at functional baseline  Based on OT evaluation, assessment(s), performance deficits listed, and current level of function, pt identified as a moderate complexity evaluation  From OT standpoint, recommendation at time of d/c would be home with no rehab needs      Goals   Patient Goals to go home   Plan   OT Frequency Eval only   Recommendation   OT Discharge Recommendation No rehabilitation needs   AM-PAC Daily Activity Inpatient   Lower Body Dressing 4   Bathing 4   Toileting 4   Upper Body Dressing 4   Grooming 4   Eating 4   Daily Activity Raw Score 24   Daily Activity Standardized Score (Calc for Raw Score >=11) 57 54   End of Consult   Education Provided Yes   Patient Position at End of Consult Supine   Nurse Communication Nurse aware of consult         Robyn Sams MS, OTR/L

## 2023-04-19 NOTE — CONSULTS
"e-Consult (IPC)     Inpatient consult to Neurosurgery  Consult performed by: Pito Skaggs PA-C  Consult ordered by: Carlos Palmer MD           Contacted by Carmen Valles @ [address] [place] [address]  Nelia Hector 63 y o  female MRN: [id number]  Unit/Bed#: 7T [facility] 708-01 Encounter: [id number]    Reason for Consult    Per provider report, patient presents with left orbital cellulitis  ID following, started on abx  No vision changes noted  Imaging obtained showing incidental finding of left frontal dural based mass, likely meningioma  Unfortunately patient has PPM and must have MRI [facility] for this  Available past medical history,social history, surgical history, medication list, drug allergies and review of systems were reviewed  /70 (BP Location: Left arm)   Pulse 55   Temp 97 5 °F (36 4 °C) (Temporal)   Resp 18   Ht 5' 2\" (1 575 m)   Wt 68 kg (149 lb 14 6 oz)   SpO2 97%   BMI 27 42 kg/m²      Clinical exam per provider report, GCS 15 and neuro intact  Imaging personally reviewed  CT orbits/temporal/skull base w/contrast, 4/18/23: Left orbit preseptal soft tissue cellulitis and conjunctivitis  No post septal inflammation or abscess  Incidentally noted 1 5 cm peripherally enhancing lesion in the inferolateral left frontal lobe  The lesion is possibly extra-axial    Assessment and Recommendations    1  Imaging reviewed and likely meningioma  2  Consider outpatient MRI imaging  3  No transfer indicated at this time  4  Follow up in our office in 2-4 weeks after MRI brain obtained  Please order MRI brain w/wo contrast    All questions answered  Provider is in agreement with the course of action  11-20 minutes, >50% of the total time devoted to medical consultative verbal/EMR discussion between providers  Written report will be generated in the EMR        "

## 2023-04-19 NOTE — ASSESSMENT & PLAN NOTE
·  Incidentally noted 1 5 cm peripherally enhancing lesion in the inferolateral left frontal lobe   The lesion is possibly extra-axial   Recommend further evaluation with brain MRI without and with contrast  · ER provider reviewed case with neurosurgery  · Recommend MRI brain ; will need to obtain MRI brain at [place] [address] due to pacemaker  · No acute intervention needed at this time, no need for transfer  · Follow-up MRI brain and reach back out to neurosurgery with results  · Patient currently neurologically intact, no neurodeficits noted

## 2023-04-19 NOTE — PLAN OF CARE
Problem: PAIN - ADULT  Goal: Verbalizes/displays adequate comfort level or baseline comfort level  Description: Interventions:  - Encourage patient to monitor pain and request assistance  - Assess pain using appropriate pain scale  - Administer analgesics based on type and severity of pain and evaluate response  - Implement non-pharmacological measures as appropriate and evaluate response  - Consider cultural and social influences on pain and pain management  - Notify physician/advanced practitioner if interventions unsuccessful or patient reports new pain  Outcome: Progressing     Problem: INFECTION - ADULT  Goal: Absence or prevention of progression during hospitalization  Description: INTERVENTIONS:  - Assess and monitor for signs and symptoms of infection  - Monitor lab/diagnostic results  - Monitor all insertion sites, i e  indwelling lines, tubes, and drains  - Monitor endotracheal if appropriate and nasal secretions for changes in amount and color  - [place] appropriate cooling/warming therapies per order  - Administer medications as ordered  - Instruct and encourage patient and family to use good hand hygiene technique  - Identify and instruct in appropriate isolation precautions for identified infection/condition  Outcome: Progressing  Goal: Absence of fever/infection during neutropenic period  Description: INTERVENTIONS:  - Monitor WBC    Outcome: Progressing     Problem: SAFETY ADULT  Goal: Patient will remain free of falls  Description: INTERVENTIONS:  - Educate patient/family on patient safety including physical limitations  - Instruct patient to call for assistance with activity   - Consult OT/PT to assist with strengthening/mobility   - Keep Call bell within reach  - Keep bed low and locked with side rails adjusted as appropriate  - Keep care items and personal belongings within reach  - Initiate and maintain comfort rounds    - Apply yellow socks and bracelet for high fall risk patients  - Consider moving patient to room near nurses station  Outcome: Progressing  Goal: Maintain or return to baseline ADL function  Description: INTERVENTIONS:  -  Assess patient's ability to carry out ADLs; assess patient's baseline for ADL function and identify physical deficits which impact ability to perform ADLs (bathing, care of mouth/teeth, toileting, grooming, dressing, etc )  - Assess/evaluate cause of self-care deficits   - Assess range of motion  - Assess patient's mobility; develop plan if impaired  - Assess patient's need for assistive devices and provide as appropriate  - Encourage maximum independence but intervene and supervise when necessary  - Involve family in performance of ADLs  - Assess for home care needs following discharge   - Consider OT consult to assist with ADL evaluation and planning for discharge  - Provide patient education as appropriate  Outcome: Progressing  Goal: Maintains/Returns to pre admission functional level  Description: INTERVENTIONS:  - Perform BMAT or MOVE assessment daily    - Set and communicate daily mobility goal to care team and patient/family/caregiver     - Collaborate with rehabilitation services on mobility goals if consulted    - Out of bed for toileting  - Record patient progress and toleration of activity level   Outcome: Progressing     Problem: DISCHARGE PLANNING  Goal: Discharge to home or other facility with appropriate resources  Description: INTERVENTIONS:  - Identify barriers to discharge w/patient and caregiver  - Arrange for needed discharge resources and transportation as appropriate  - Identify discharge learning needs (meds, wound care, etc )  - Arrange for interpretive services to assist at discharge as needed  - Refer to Case Management Department for coordinating discharge planning if the patient needs post-hospital services based on physician/advanced practitioner order or complex needs related to functional status, cognitive ability, or social support system  Outcome: Progressing     Problem: Knowledge Deficit  Goal: Patient/family/caregiver demonstrates understanding of disease process, treatment plan, medications, and discharge instructions  Description: Complete learning assessment and assess knowledge base    Interventions:  - Provide teaching at level of understanding  - Provide teaching via preferred learning methods  Outcome: Progressing

## 2023-04-19 NOTE — ASSESSMENT & PLAN NOTE
· CT imaging results reviewed  · We will continue antibiotics with Zosyn/vancomycin  · Infectious disease consult  · MRI unable to be obtained at this campus  · Ophthalmology consult pending  · IV fluids as needed  · Patient without any visual acuity changes, extraocular muscles are intact

## 2023-04-19 NOTE — PROGRESS NOTES
Марина Bustillo is a 63 y o  female who is currently ordered Vancomycin IV with management by the Pharmacy Consult service  Relevant clinical data and objective / subjective history reviewed  Vancomycin Assessment:  1  Indication and Goal AUC/Trough: Soft tissue (goal -600, trough >10)  2  Clinical Status: stable  3  Renal Function:  SCr: 1 36 mg/dL  CrCl: 41 2 mL/min  4  Days of Therapy: 2  5  Current Dose: 500 mg IV every 12 hours  Vancomycin Plan:  1  Dosing: Continue current vancomycin dose   2  Estimated AUC: 434 mcg*hr/mL  3  Estimated Trough: 15 mcg/mL  4  Next Level: 4/20/2023 at 04:00  5  Renal Function Monitoring: Daily BMP and [place] will continue to follow closely for s/sx of nephrotoxicity, infusion reactions and appropriateness of therapy  BMP and CBC will be ordered per protocol  We will continue to follow the patient’s culture results and clinical progress daily      Yadira Stinson, Pharmacist

## 2023-04-20 VITALS
HEIGHT: 62 IN | HEART RATE: 58 BPM | DIASTOLIC BLOOD PRESSURE: 83 MMHG | OXYGEN SATURATION: 98 % | RESPIRATION RATE: 18 BRPM | BODY MASS INDEX: 27.59 KG/M2 | WEIGHT: 149.91 LBS | TEMPERATURE: 97.1 F | SYSTOLIC BLOOD PRESSURE: 119 MMHG

## 2023-04-20 LAB
ANION GAP SERPL CALCULATED.3IONS-SCNC: 10 MMOL/L (ref 4–13)
BASOPHILS # BLD AUTO: 0 X10E3/UL (ref 0–0.2)
BASOPHILS # BLD AUTO: 0.02 THOUSANDS/ΜL (ref 0–0.1)
BASOPHILS NFR BLD AUTO: 0 %
BASOPHILS NFR BLD AUTO: 1 % (ref 0–1)
BUN SERPL-MCNC: 16 MG/DL (ref 5–25)
CALCIUM SERPL-MCNC: 8.7 MG/DL (ref 8.4–10.2)
CD3+CD4+ CELLS # BLD: 365 /UL (ref 359–1519)
CD3+CD4+ CELLS NFR BLD: 26.1 % (ref 30.8–58.5)
CHLORIDE SERPL-SCNC: 110 MMOL/L (ref 96–108)
CO2 SERPL-SCNC: 23 MMOL/L (ref 21–32)
CREAT SERPL-MCNC: 1.19 MG/DL (ref 0.6–1.3)
EOSINOPHIL # BLD AUTO: 0.09 THOUSAND/ΜL (ref 0–0.61)
EOSINOPHIL # BLD AUTO: 0.1 X10E3/UL (ref 0–0.4)
EOSINOPHIL NFR BLD AUTO: 1 %
EOSINOPHIL NFR BLD AUTO: 2 % (ref 0–6)
ERYTHROCYTE [DISTWIDTH] IN BLOOD BY AUTOMATED COUNT: 12.9 % (ref 11.7–15.4)
ERYTHROCYTE [DISTWIDTH] IN BLOOD BY AUTOMATED COUNT: 13.2 % (ref 11.6–15.1)
GFR SERPL CREATININE-BSD FRML MDRD: 50 ML/MIN/1.73SQ M
GLUCOSE SERPL-MCNC: 81 MG/DL (ref 65–140)
HCT VFR BLD AUTO: 37.8 % (ref 34.8–46.1)
HCT VFR BLD AUTO: 42.5 % (ref 34–46.6)
HGB BLD-MCNC: 12.2 G/DL (ref 11.5–15.4)
HGB BLD-MCNC: 14.1 G/DL (ref 11.1–15.9)
IMM GRANULOCYTES # BLD AUTO: 0.01 THOUSAND/UL (ref 0–0.2)
IMM GRANULOCYTES # BLD: 0 X10E3/UL (ref 0–0.1)
IMM GRANULOCYTES NFR BLD AUTO: 0 % (ref 0–2)
IMM GRANULOCYTES NFR BLD: 0 %
INR PPP: 2.54 (ref 0.84–1.19)
LYMPHOCYTES # BLD AUTO: 0.84 THOUSANDS/ΜL (ref 0.6–4.47)
LYMPHOCYTES # BLD AUTO: 1.4 X10E3/UL (ref 0.7–3.1)
LYMPHOCYTES NFR BLD AUTO: 23 %
LYMPHOCYTES NFR BLD AUTO: 23 % (ref 14–44)
MAGNESIUM SERPL-MCNC: 2.2 MG/DL (ref 1.9–2.7)
MCH RBC QN AUTO: 30.7 PG (ref 26.8–34.3)
MCH RBC QN AUTO: 31.3 PG (ref 26.6–33)
MCHC RBC AUTO-ENTMCNC: 32.3 G/DL (ref 31.4–37.4)
MCHC RBC AUTO-ENTMCNC: 33.2 G/DL (ref 31.5–35.7)
MCV RBC AUTO: 94 FL (ref 79–97)
MCV RBC AUTO: 95 FL (ref 82–98)
MONOCYTES # BLD AUTO: 0.2 X10E3/UL (ref 0.1–0.9)
MONOCYTES # BLD AUTO: 0.25 THOUSAND/ΜL (ref 0.17–1.22)
MONOCYTES NFR BLD AUTO: 4 %
MONOCYTES NFR BLD AUTO: 7 % (ref 4–12)
NEUTROPHILS # BLD AUTO: 2.52 THOUSANDS/ΜL (ref 1.85–7.62)
NEUTROPHILS # BLD AUTO: 4.3 X10E3/UL (ref 1.4–7)
NEUTROPHILS NFR BLD AUTO: 72 %
NEUTS SEG NFR BLD AUTO: 67 % (ref 43–75)
NRBC BLD AUTO-RTO: 0 /100 WBCS
PHOSPHATE SERPL-MCNC: 2.4 MG/DL (ref 2.7–4.5)
PLATELET # BLD AUTO: 155 THOUSANDS/UL (ref 149–390)
PLATELET # BLD AUTO: 168 X10E3/UL (ref 150–450)
PMV BLD AUTO: 11.3 FL (ref 8.9–12.7)
POTASSIUM SERPL-SCNC: 4.2 MMOL/L (ref 3.5–5.3)
PROTHROMBIN TIME: 27.8 SECONDS (ref 11.6–14.5)
RBC # BLD AUTO: 3.97 MILLION/UL (ref 3.81–5.12)
RBC # BLD AUTO: 4.51 X10E6/UL (ref 3.77–5.28)
SODIUM SERPL-SCNC: 143 MMOL/L (ref 135–147)
VANCOMYCIN TROUGH SERPL-MCNC: 13.2 UG/ML (ref 10–20)
WBC # BLD AUTO: 3.73 THOUSAND/UL (ref 4.31–10.16)
WBC # BLD AUTO: 6 X10E3/UL (ref 3.4–10.8)

## 2023-04-20 RX ORDER — DOXYCYCLINE HYCLATE 100 MG/1
100 CAPSULE ORAL EVERY 12 HOURS SCHEDULED
Qty: 14 CAPSULE | Refills: 0 | Status: SHIPPED | OUTPATIENT
Start: 2023-04-20 | End: 2023-04-27

## 2023-04-20 RX ADMIN — ASPIRIN 81 MG: 81 TABLET, COATED ORAL at 08:11

## 2023-04-20 RX ADMIN — VANCOMYCIN HYDROCHLORIDE 500 MG: 500 INJECTION, SOLUTION INTRAVENOUS at 04:49

## 2023-04-20 RX ADMIN — PIPERACILLIN AND TAZOBACTAM 3.38 G: 3; .375 INJECTION, POWDER, LYOPHILIZED, FOR SOLUTION INTRAVENOUS; PARENTERAL at 05:54

## 2023-04-20 RX ADMIN — NICOTINE 14 MG: 14 PATCH, EXTENDED RELEASE TRANSDERMAL at 08:12

## 2023-04-20 RX ADMIN — CARVEDILOL 6.25 MG: 6.25 TABLET, FILM COATED ORAL at 08:12

## 2023-04-20 RX ADMIN — BICTEGRAVIR SODIUM, EMTRICITABINE, AND TENOFOVIR ALAFENAMIDE FUMARATE 1 TABLET: 50; 200; 25 TABLET ORAL at 08:12

## 2023-04-20 RX ADMIN — SACUBITRIL AND VALSARTAN 1 TABLET: 49; 51 TABLET, FILM COATED ORAL at 08:12

## 2023-04-20 NOTE — PROGRESS NOTES
Vickki Peabody is a 63 y o  female who is currently ordered Vancomycin IV with management by the Pharmacy Consult service  Relevant clinical data and objective / subjective history reviewed  Vancomycin Assessment:  Indication and Goal AUC/Trough: Soft tissue (goal -600, trough >10)  Clinical Status: stable  Renal Function:  SCr: 1 36 mg/dL  Days of Therapy: 3  Current Dose: 500 mg IV every 12 hours  Vancomycin Plan:  New Dosin mg IV every 12 hours  Estimated AUC: 416 mcg*hr/mL  Estimated Trough: 15 1 mcg/mL  Renal Function Monitoring: Daily BMP and [place] will continue to follow closely for s/sx of nephrotoxicity, infusion reactions and appropriateness of therapy  BMP and CBC will be ordered per protocol  We will continue to follow the patient’s culture results and clinical progress daily      Robert Mitchell, Pharmacist

## 2023-04-20 NOTE — PLAN OF CARE
Problem: PAIN - ADULT  Goal: Verbalizes/displays adequate comfort level or baseline comfort level  Description: Interventions:  - Encourage patient to monitor pain and request assistance  - Assess pain using appropriate pain scale  - Administer analgesics based on type and severity of pain and evaluate response  - Implement non-pharmacological measures as appropriate and evaluate response  - Consider cultural and social influences on pain and pain management  - Notify physician/advanced practitioner if interventions unsuccessful or patient reports new pain  4/20/2023 1023 by Trisha Moreno RN  Outcome: Completed  4/20/2023 1022 by Trisha Moreno RN  Outcome: Progressing     Problem: INFECTION - ADULT  Goal: Absence or prevention of progression during hospitalization  Description: INTERVENTIONS:  - Assess and monitor for signs and symptoms of infection  - Monitor lab/diagnostic results  - Monitor all insertion sites, i e  indwelling lines, tubes, and drains  - Monitor endotracheal if appropriate and nasal secretions for changes in amount and color  - [place] appropriate cooling/warming therapies per order  - Administer medications as ordered  - Instruct and encourage patient and family to use good hand hygiene technique  - Identify and instruct in appropriate isolation precautions for identified infection/condition  4/20/2023 1023 by Trisha Moreno RN  Outcome: Completed  4/20/2023 1022 by Trisha Moreno RN  Outcome: Progressing  Goal: Absence of fever/infection during neutropenic period  Description: INTERVENTIONS:  - Monitor WBC    4/20/2023 1023 by Trisha Moreno RN  Outcome: Completed  4/20/2023 1022 by Trisha Moreno RN  Outcome: Progressing     Problem: SAFETY ADULT  Goal: Patient will remain free of falls  Description: INTERVENTIONS:  - Educate patient/family on patient safety including physical limitations  - Instruct patient to call for assistance with activity   - Consult OT/PT to assist with strengthening/mobility   - Keep Call bell within reach  - Keep bed low and locked with side rails adjusted as appropriate  - Keep care items and personal belongings within reach  - Initiate and maintain comfort rounds    - Apply yellow socks and bracelet for high fall risk patients  - Consider moving patient to room near nurses station  4/20/2023 1023 by Belinda Chen RN  Outcome: Completed  4/20/2023 1022 by Belinda Chen RN  Outcome: Progressing  Goal: Maintain or return to baseline ADL function  Description: INTERVENTIONS:  -  Assess patient's ability to carry out ADLs; assess patient's baseline for ADL function and identify physical deficits which impact ability to perform ADLs (bathing, care of mouth/teeth, toileting, grooming, dressing, etc )  - Assess/evaluate cause of self-care deficits   - Assess range of motion  - Assess patient's mobility; develop plan if impaired  - Assess patient's need for assistive devices and provide as appropriate  - Encourage maximum independence but intervene and supervise when necessary  - Involve family in performance of ADLs  - Assess for home care needs following discharge   - Consider OT consult to assist with ADL evaluation and planning for discharge  - Provide patient education as appropriate  4/20/2023 1023 by Belinda Chen RN  Outcome: Completed  4/20/2023 1022 by Belinda Chen RN  Outcome: Progressing  Goal: Maintains/Returns to pre admission functional level  Description: INTERVENTIONS:  - Perform BMAT or MOVE assessment daily    - Set and communicate daily mobility goal to care team and patient/family/caregiver       - Out of bed for toileting  - Record patient progress and toleration of activity level   4/20/2023 1023 by Belinda Chen RN  Outcome: Completed  4/20/2023 1022 by Belinda Chen RN  Outcome: Progressing     Problem: DISCHARGE PLANNING  Goal: Discharge to home or other facility with appropriate resources  Description: INTERVENTIONS:  - Identify barriers to discharge w/patient and caregiver  - Arrange for needed discharge resources and transportation as appropriate  - Identify discharge learning needs (meds, wound care, etc )  - Arrange for interpretive services to assist at discharge as needed  - Refer to Case Management Department for coordinating discharge planning if the patient needs post-hospital services based on physician/advanced practitioner order or complex needs related to functional status, cognitive ability, or social support system  4/20/2023 1023 by Marybel Garcia RN  Outcome: Completed  4/20/2023 1022 by Marybel Garcia RN  Outcome: Progressing     Problem: Knowledge Deficit  Goal: Patient/family/caregiver demonstrates understanding of disease process, treatment plan, medications, and discharge instructions  Description: Complete learning assessment and assess knowledge base    Interventions:  - Provide teaching at level of understanding  - Provide teaching via preferred learning methods  4/20/2023 1023 by Marybel Garcia RN  Outcome: Completed  4/20/2023 1022 by Marybel Garcia RN  Outcome: Progressing

## 2023-04-20 NOTE — PLAN OF CARE
Problem: PAIN - ADULT  Goal: Verbalizes/displays adequate comfort level or baseline comfort level  Description: Interventions:  - Encourage patient to monitor pain and request assistance  - Assess pain using appropriate pain scale  - Administer analgesics based on type and severity of pain and evaluate response  - Implement non-pharmacological measures as appropriate and evaluate response  - Consider cultural and social influences on pain and pain management  - Notify physician/advanced practitioner if interventions unsuccessful or patient reports new pain  Outcome: Progressing     Problem: INFECTION - ADULT  Goal: Absence or prevention of progression during hospitalization  Description: INTERVENTIONS:  - Assess and monitor for signs and symptoms of infection  - Monitor lab/diagnostic results  - Monitor all insertion sites, i e  indwelling lines, tubes, and drains  - Monitor endotracheal if appropriate and nasal secretions for changes in amount and color  - [place] appropriate cooling/warming therapies per order  - Administer medications as ordered  - Instruct and encourage patient and family to use good hand hygiene technique  - Identify and instruct in appropriate isolation precautions for identified infection/condition  Outcome: Progressing  Goal: Absence of fever/infection during neutropenic period  Description: INTERVENTIONS:  - Monitor WBC    Outcome: Progressing     Problem: DISCHARGE PLANNING  Goal: Discharge to home or other facility with appropriate resources  Description: INTERVENTIONS:  - Identify barriers to discharge w/patient and caregiver  - Arrange for needed discharge resources and transportation as appropriate  - Identify discharge learning needs (meds, wound care, etc )  - Arrange for interpretive services to assist at discharge as needed  - Refer to Case Management Department for coordinating discharge planning if the patient needs post-hospital services based on physician/advanced practitioner order or complex needs related to functional status, cognitive ability, or social support system  Outcome: Progressing

## 2023-04-20 NOTE — ASSESSMENT & PLAN NOTE
· CT imaging results reviewed  · We will continue antibiotics with Zosyn/vancomycin  · Infectious disease consult  · MRI unable to be obtained at this campus  · Ophthalmology consult pending ; recommend antibiotics and supportive care  · IV fluids as needed  · Patient without any visual acuity changes, extraocular muscles are intact

## 2023-04-20 NOTE — NURSING NOTE
Pt discharged to home after instructions given on home medication including antibiotic as well as follow ups, pt states understanding, will make an appointment for MRI and also  her medication from her pharmacy   Educated on tobacco cessation, no distress on discharge

## 2023-04-20 NOTE — ASSESSMENT & PLAN NOTE
·  Incidentally noted 1 5 cm peripherally enhancing lesion in the inferolateral left frontal lobe   The lesion is possibly extra-axial   Recommend further evaluation with brain MRI without and with contrast  · ER provider reviewed case with neurosurgery  · Recommend MRI brain ; will need to obtain MRI brain at [place] [address] due to pacemaker  · No acute intervention needed at this time, no need for transfer  · Follow-up MRI brain and reach back out to neurosurgery with results  · Patient currently neurologically intact, no neurodeficits noted

## 2023-04-20 NOTE — DISCHARGE SUMMARY
[facility]  [facility] 1965, 63 [facility]  female MRN: [id number]  Unit/Bed#: 7T [facility] 708-01 Encounter: [id number]  Primary Care Provider: JOSE ROBERTO Bill   Date and time admitted to hospital: 4/18/2023  2:19 PM    * Orbital cellulitis on left  Assessment & Plan  · CT imaging results reviewed  · We will continue antibiotics with Zosyn/vancomycin  · Infectious disease consult  · MRI unable to be obtained at this campus  · Ophthalmology consult pending ; recommend antibiotics and supportive care  · IV fluids as needed  · Patient without any visual acuity changes, extraocular muscles are intact    Abnormal CT scan, head  Assessment & Plan  ·  Incidentally noted 1 5 cm peripherally enhancing lesion in the inferolateral left frontal lobe   The lesion is possibly extra-axial   Recommend further evaluation with brain MRI without and with contrast  · ER provider reviewed case with neurosurgery  · Recommend MRI brain ; will need to obtain MRI brain at [place] [address] due to pacemaker  · No acute intervention needed at this time, no need for transfer  · Follow-up MRI brain and reach back out to neurosurgery with results  · Patient currently neurologically intact, no neurodeficits noted    Tobacco abuse  Assessment & Plan  Nicotine patch    CKD (chronic kidney disease) stage 3, GFR 30-59 ml/min [facility])  Assessment & Plan  Lab Results   Component Value Date    EGFR 50 04/20/2023    EGFR 43 04/19/2023    EGFR 45 04/18/2023    CREATININE 1 19 04/20/2023    CREATININE 1 36 (H) 04/19/2023    CREATININE 1 31 (H) 04/18/2023   · Creatinine at baseline  Continue routine lab monitoring    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 [facility])  Assessment & Plan  · We will follow-up INR level    Dose Coumadin accordingly    HIV disease ([facility] [address] )  Assessment & Plan  · Last CD4 count in March of this year was 642  · Follow-up repeat CD4 count  · Patient follows with Infectious Disease as outpatient  · Consult ID    Triple vessel coronary artery disease  Assessment & Plan  · Patient follows with cardiology as outpatient  · Status post AICD  · Continue home Coreg, Entresto  · Patient is also on Coumadin, follow-up INR and dose accordingly  · Continue baby aspirin      Discharging Physician / Practitioner: Breann Chappell DO  PCP: Symone Weems, [address] [place] [address]  Admission Date:   Admission Orders (From admission, onward)     Ordered        04/19/23 1544  Inpatient Admission  Once            04/18/23 1800  Place in Observation  Once                      Discharge Date: 04/20/23    Medical Problems     Resolved Problems  Date Reviewed: 4/20/2023   None         Consultations During Hospital Stay: Not applicable    Procedures Performed: Not applicable    Significant Findings / Test Results:     CT orbits/temporal bones/skull base w contrast    Result Date: 4/18/2023  Impression: 1  Left orbit preseptal soft tissue cellulitis and conjunctivitis  No post septal inflammation or abscess  2   Mildly inflamed right lacrimal gland  3   Incidentally noted 1 5 cm peripherally enhancing lesion in the inferolateral left frontal lobe  The lesion is possibly extra-axial   Recommend further evaluation with brain MRI without and with contrast  4   Bilateral maxillary sinus disease  Left nasal mucosal inflammation  The study was marked in EPIC for significant notification  Workstation performed: [id number]       Incidental Findings:  Incidentally noted 1 5 cm peripherally enhancing lesion in the inferolateral left frontal lobe  The lesion is possibly extra-axial     Test Results Pending at Discharge (will require follow up): Not applicable     Outpatient Tests Requested: MRI brain    Reason for Admission:     Hospital Course:     Lebron Neves is a 63 y o  female who presents with left eye redness  Patient states that symptoms started on Sunday  Denies any trauma  No new eyedrops    Patient wears reading "glasses  Denies any change in visual acuity  Patient states that she noticed redness of the eye which progressed to erythema around the eye  Denies any fever or chills  No nausea or vomiting  No significant drainage  Patient tried [place] eyedrops yesterday with no improvement  No previous episodes  No one else sick at home with any eye issues  Patient was started on antibiotics in the ED  CT head showed left orbit preseptal soft tissue cellulitis and conjunctivitis  Also noted to have peripheral enhancing lesion in left frontal lobe which ER provider reviewed with neurosurgery  No acute intervention needed at this time recommended MRI brain which is currently pending     The patient did well and her preseptal cellulitis has improved greatly  She has no changes in vision or any other visual disturbances  She has remained hemodynamically stable and saturating well on room air throughout her hospital course  A 7-day course of doxycycline was sent to the patient's pharmacy  She was strongly encouraged to resume all preadmission medications at all preadmission dosages  She was also encouraged to follow-up with her PCP and HIV physician within 7-14 days  Condition at Discharge: stable     Discharge Day Visit / Exam:     Subjective:  Patient seen and examined at bedside  No acute events overnight  Denies chest pain, SOB, diaphoresis, nausea/vomiting/diarrhea, fevers/chills  Vitals: Blood Pressure: 119/83 (04/20/23 0809)  Pulse: 58 (04/20/23 0809)  Temperature: (!) 97 1 °F (36 2 °C) (04/20/23 0707)  Temp Source: Temporal (04/20/23 0707)  Respirations: 18 (04/20/23 0809)  Height: 5' 2\" (157 5 cm) (04/18/23 1944)  Weight - Scale: 68 kg (149 lb 14 6 oz) (04/18/23 1944)  SpO2: 98 % (04/20/23 0707)     Exam:   Physical Exam  Vitals and nursing note reviewed  Constitutional:       General: She is not in acute distress  Appearance: She is well-developed  HENT:      Head: Normocephalic and atraumatic   " Eyes:      Conjunctiva/sclera: Conjunctivae normal    Cardiovascular:      Rate and Rhythm: Normal rate and regular rhythm  Heart sounds: No murmur heard  Pulmonary:      Effort: Pulmonary effort is normal  No respiratory distress  Breath sounds: Normal breath sounds  Abdominal:      Palpations: Abdomen is soft  Tenderness: There is no abdominal tenderness  Musculoskeletal:         General: No swelling  Cervical back: Neck supple  Skin:     General: Skin is warm and dry  Capillary Refill: Capillary refill takes less than 2 seconds  Neurological:      Mental Status: She is alert  Psychiatric:         Mood and Affect: Mood normal            Discharge instructions/Information to patient and family:   See after visit summary for information provided to patient and family  Provisions for Follow-Up Care:  See after visit summary for information related to follow-up care and any pertinent home health orders  Disposition:     Home with family support  Doxycycline x7 days  Outpatient follow-up with PCP  Resume preadmission medications     Discharge Statement:  I spent 60 minutes discharging the patient  This time was spent on the day of discharge  I had direct contact with the patient on the day of discharge  Greater than 50% of the total time was spent examining patient, answering all patient questions, arranging and discussing plan of care with patient as well as directly providing post-discharge instructions  Additional time then spent on discharge activities  Discharge Medications:  See after visit summary for reconciled discharge medications provided to patient and family        ** Please Note: This note has been constructed using a voice recognition system **

## 2023-04-20 NOTE — INCIDENTAL FINDINGS
The following findings require follow up:  Radiographic finding   Findin  Left orbit preseptal soft tissue cellulitis and conjunctivitis  No post septal inflammation or abscess      2   Mildly inflamed right lacrimal gland      3  Incidentally noted 1 5 cm peripherally enhancing lesion in the inferolateral left frontal lobe  The lesion is possibly extra-axial   Recommend further evaluation with brain MRI without and with contrast      4   Bilateral maxillary sinus disease  Left nasal mucosal inflammation     Follow up required: MRI brain   Follow up should be done within 3 week(s)    Please notify the following clinician to assist with the follow up:   Neurosurgery

## 2023-04-20 NOTE — PLAN OF CARE
Problem: PAIN - ADULT  Goal: Verbalizes/displays adequate comfort level or baseline comfort level  Description: Interventions:  - Encourage patient to monitor pain and request assistance  - Assess pain using appropriate pain scale  - Administer analgesics based on type and severity of pain and evaluate response  - Implement non-pharmacological measures as appropriate and evaluate response  - Consider cultural and social influences on pain and pain management  - Notify physician/advanced practitioner if interventions unsuccessful or patient reports new pain  Outcome: Progressing     Problem: INFECTION - ADULT  Goal: Absence or prevention of progression during hospitalization  Description: INTERVENTIONS:  - Assess and monitor for signs and symptoms of infection  - Monitor lab/diagnostic results  - Monitor all insertion sites, i e  indwelling lines, tubes, and drains  - Monitor endotracheal if appropriate and nasal secretions for changes in amount and color  - [place] appropriate cooling/warming therapies per order  - Administer medications as ordered  - Instruct and encourage patient and family to use good hand hygiene technique  - Identify and instruct in appropriate isolation precautions for identified infection/condition  Outcome: Progressing  Goal: Absence of fever/infection during neutropenic period  Description: INTERVENTIONS:  - Monitor WBC    Outcome: Progressing     Problem: SAFETY ADULT  Goal: Patient will remain free of falls  Description: INTERVENTIONS:  - Educate patient/family on patient safety including physical limitations  - Instruct patient to call for assistance with activity   - Consult OT/PT to assist with strengthening/mobility   - Keep Call bell within reach  - Keep bed low and locked with side rails adjusted as appropriate  - Keep care items and personal belongings within reach  - Initiate and maintain comfort rounds    - Apply yellow socks and bracelet for high fall risk patients  - Consider moving patient to room near nurses station  Outcome: Progressing  Goal: Maintain or return to baseline ADL function  Description: INTERVENTIONS:  -  Assess patient's ability to carry out ADLs; assess patient's baseline for ADL function and identify physical deficits which impact ability to perform ADLs (bathing, care of mouth/teeth, toileting, grooming, dressing, etc )  - Assess/evaluate cause of self-care deficits   - Assess range of motion  - Assess patient's mobility; develop plan if impaired  - Assess patient's need for assistive devices and provide as appropriate  - Encourage maximum independence but intervene and supervise when necessary  - Involve family in performance of ADLs  - Assess for home care needs following discharge   - Consider OT consult to assist with ADL evaluation and planning for discharge  - Provide patient education as appropriate  Outcome: Progressing  Goal: Maintains/Returns to pre admission functional level  Description: INTERVENTIONS:  - Perform BMAT or MOVE assessment daily    - Set and communicate daily mobility goal to care team and patient/family/caregiver     - Collaborate with rehabilitation services on mobility goals if consulted    - Out of bed for toileting  - Record patient progress and toleration of activity level   Outcome: Progressing     Problem: DISCHARGE PLANNING  Goal: Discharge to home or other facility with appropriate resources  Description: INTERVENTIONS:  - Identify barriers to discharge w/patient and caregiver  - Arrange for needed discharge resources and transportation as appropriate  - Identify discharge learning needs (meds, wound care, etc )  - Arrange for interpretive services to assist at discharge as needed  - Refer to Case Management Department for coordinating discharge planning if the patient needs post-hospital services based on physician/advanced practitioner order or complex needs related to functional status, cognitive ability, or social support system  Outcome: Progressing     Problem: Knowledge Deficit  Goal: Patient/family/caregiver demonstrates understanding of disease process, treatment plan, medications, and discharge instructions  Description: Complete learning assessment and assess knowledge base    Interventions:  - Provide teaching at level of understanding  - Provide teaching via preferred learning methods  Outcome: Progressing

## 2023-04-21 NOTE — UTILIZATION REVIEW
NOTIFICATION OF ADMISSION DISCHARGE   This is a Notification of Discharge from [address]  Please be advised that this patient has been discharge from our facility  Below you will find the admission and discharge date and time including the patient’s disposition  UTILIZATION REVIEW CONTACT:  Yair Shaw MA  Utilization   Network Utilization Review Department  Phone: [phone] x carefully listen to the prompts  All voicemails are confidential   Email: [email or web address]     ADMISSION INFORMATION  PRESENTATION DATE: 4/18/2023  2:19 PM  OBERVATION ADMISSION DATE:   INPATIENT ADMISSION DATE: 4/19/23  3:44 PM   DISCHARGE DATE: 4/20/2023 10:35 AM   DISPOSITION:Home/Self Care    IMPORTANT INFORMATION:  Send all requests for admission clinical reviews, approved or denied determinations and any other requests to dedicated fax number below belonging to the campus where the patient is receiving treatment   List of dedicated fax numbers:  [address] DENIALS (Administrative/Medical Necessity) [phone]   [address] (Maternity/NICU/Pediatrics) [phone]   [facility] [phone]   [place] [address] [phone]   [address] [phone]   220 [facility] [phone] 90 [facility] [phone]   [address] [place] [address] [phone]   [facility]  [phone]   4053 [facility] [phone]   412 [facility] [address] [facility] [phone]

## 2023-04-24 ENCOUNTER — OFFICE VISIT (OUTPATIENT)
Dept: SURGERY | Facility: CLINIC | Age: 58
End: 2023-04-24

## 2023-04-24 VITALS
HEART RATE: 66 BPM | DIASTOLIC BLOOD PRESSURE: 56 MMHG | BODY MASS INDEX: 27.46 KG/M2 | HEIGHT: 62 IN | TEMPERATURE: 96.7 F | RESPIRATION RATE: 18 BRPM | WEIGHT: 149.2 LBS | OXYGEN SATURATION: 94 % | SYSTOLIC BLOOD PRESSURE: 113 MMHG

## 2023-04-24 DIAGNOSIS — Z23 NEED FOR PNEUMOCOCCAL VACCINATION: ICD-10-CM

## 2023-04-24 DIAGNOSIS — H05.012 ORBITAL CELLULITIS ON LEFT: ICD-10-CM

## 2023-04-24 DIAGNOSIS — J45.20 MILD INTERMITTENT ASTHMA WITHOUT COMPLICATION: ICD-10-CM

## 2023-04-24 DIAGNOSIS — Z76.89 ENCOUNTER FOR SUPPORT AND COORDINATION OF TRANSITION OF CARE: ICD-10-CM

## 2023-04-24 DIAGNOSIS — Z79.01 LONG TERM CURRENT USE OF ANTICOAGULANT THERAPY: ICD-10-CM

## 2023-04-24 DIAGNOSIS — R93.0 ABNORMAL CT OF THE HEAD: ICD-10-CM

## 2023-04-24 DIAGNOSIS — Z79.01 CHRONIC ANTICOAGULATION: ICD-10-CM

## 2023-04-24 DIAGNOSIS — G93.9 LESION OF LEFT FRONTAL LOBE OF BRAIN: ICD-10-CM

## 2023-04-24 DIAGNOSIS — I10 ESSENTIAL HYPERTENSION: ICD-10-CM

## 2023-04-24 DIAGNOSIS — J30.2 SEASONAL ALLERGIES: ICD-10-CM

## 2023-04-24 DIAGNOSIS — B20 HIV DISEASE (HCC): Primary | ICD-10-CM

## 2023-04-24 DIAGNOSIS — Z95.810 IMPLANTABLE CARDIOVERTER-DEFIBRILLATOR (ICD) IN SITU: ICD-10-CM

## 2023-04-24 DIAGNOSIS — J34.1 MUCOUS RETENTION CYST OF MAXILLARY SINUS: ICD-10-CM

## 2023-04-24 RX ORDER — LORATADINE 10 MG/1
10 TABLET ORAL DAILY
Qty: 90 TABLET | Refills: 0 | Status: SHIPPED | OUTPATIENT
Start: 2023-04-24

## 2023-04-24 RX ORDER — FLUTICASONE PROPIONATE 50 MCG
1 SPRAY, SUSPENSION (ML) NASAL DAILY
Qty: 15.8 ML | Refills: 3 | Status: SHIPPED | OUTPATIENT
Start: 2023-04-24

## 2023-04-24 NOTE — PROGRESS NOTES
Assessment/Plan:    HIV disease ([address] )  Doing well on Biktarvy with an undetectable VL  Pt reports 100% medication compliance on HAART  Stressed the importance of 100% medication adherence  HIV Counseling:    Viral Load: < 20    CD4 Count: 095      Denies side effects  Stressed the importance of adherence  Continue follow up in the ID clinic with Dr Saleem Luna or Dr Magdiel López  Reviewed the most recent labs, including the CD4 and viral load  Discussed the risks and benefits of treatment options, instructions for management, importance of treatment adherence, and reduction of risk factors  Educated on possible medication side effects  Counseled on routes of HIV transmission, including the risk of  infection  Emphasized that viral suppression is the best method to prevent HIV transmission  At this time the pt denies the need for HIV testing of anyone in their life  Total encounter time was greater than 35 minutes  Greater than 20 minutes were spent on counseling and patient education  Pt voices understanding and agreement with treatment plan  Mild intermittent asthma without complication  No signs of acute exacerbation  · Continue to monitor     Implantable cardioverter-defibrillator (ICD) in situ  Will need MRI at [facility] due to ICD  Essential hypertension  Well controlled  · Continue current HTN regimen  · Follow with Cardiology     Long term current use of anticoagulant therapy  On Doxycycline and Warfarin  · Discussed abx may potentiate the effects of Warfarin and she did report to cardiologies office  · Pt aware of increased risk of bleeding  · Due for INR next week  · Day 7 of abx     Orbital cellulitis on left  Improving  Day 4 of abx  · Complete all 7 days abx  · Call office or go to ER if periorbital cellulitis worsening    Lesion of left frontal lobe of brain  Incidental finding on CT or orbits    · MRI w wo contrast ordered  · F/u neurosurgery appt May 9, 2023  · Needs MRI to be completed at [facility] due to ICD    Seasonal allergies  Worsening runny nose and eye tearing  · Start Claritin 10 mg PO QD  · Start Flonase 1 spray in each nostril QD    Mucous retention cyst of maxillary sinus  Incidental finding on CT orbit  · Referral to ENT placed       Diagnoses and all orders for this visit:    HIV disease ([facility] )    Need for pneumococcal vaccination  -     Pneumococcal Conjugate Vaccine 20-valent (Pcv20)    Mild intermittent asthma without complication    Chronic anticoagulation    Encounter for support and coordination of transition of care    Seasonal allergies  -     loratadine (CLARITIN) 10 mg tablet; Take 1 tablet (10 mg total) by mouth daily  -     fluticasone (FLONASE) 50 mcg/act nasal spray; 1 spray into each nostril daily    Mucous retention cyst of maxillary sinus  -     Ambulatory Referral to Otolaryngology; Future    Abnormal CT of the head  -     MRI brain w wo contrast; Future    Lesion of left frontal lobe of brain  -     MRI brain w wo contrast; Future    Implantable cardioverter-defibrillator (ICD) in situ    Essential hypertension    Long term current use of anticoagulant therapy    Orbital cellulitis on left          Subjective:      Patient ID: Atif Quintanilla is a 63 y o  female  HPI  Pt is being seen today for TCM visit and for management of HIV   PMH: HIV, asthma, ischemic cardiomyopathy, HTN, dyslipidemia, TVD, LV mural thrombus, onchyomycosis, CKD, chronic anticoagulant, and ICD  Pt was seen in [place] [address] on 4/18/23 for left eye redness that started on Sunday  No new trauma or eyedrops, or change in visual acuity, and bilateral maxillary sinus disease    Pt had CT orbits/temporal/skull with contrast that showed left orbit periseptal soft tissues cellulitis and conjunctivitis with mild inflammation of the right lacrimal gland, and incindental finding of 1 5 cm peripherally enhancing lesion in the inferolateral left frontal lobe "with further recommendations for MRI brain w and wo contrast   Pt was discharged home on doxycycline x 4 days  Pt reports her eyes are runny and thought she has a sinus infection when she went to ER but had periorbital cellulitis  Pt still with watery eyes, running nose, and irritation to eyes  Pt denies any discharge  Left eye swelling has completely resolved with abx and using ice  Pt is not taking any OTC allergy medication  Left eye swelling has improved  Pt does not endorse any fever, chills, nausea, vomiting, diarrhea, or night sweats  The following portions of the patient's history were reviewed and updated as appropriate: allergies, current medications, past family history, past medical history, past social history, past surgical history and problem list     Review of Systems   HENT: Positive for postnasal drip and rhinorrhea  Negative for congestion, facial swelling, sinus pressure, sinus pain, sneezing and sore throat  Eyes: Positive for itching  Negative for discharge, redness and visual disturbance  Watery eyes  Respiratory: Negative  Cardiovascular: Negative  Gastrointestinal: Negative  Neurological: Negative  Objective:      /56 (BP Location: Right arm, Patient Position: Sitting, Cuff Size: Standard)   Pulse 66   Temp (!) 96 7 °F (35 9 °C) (Tympanic)   Resp 18   Ht 5' 2\" (1 575 m)   Wt 67 7 kg (149 lb 3 2 oz)   SpO2 94%   PF 97 L/min   BMI 27 29 kg/m²          Physical Exam  Vitals and nursing note reviewed  Constitutional:       General: She is not in acute distress  Appearance: Normal appearance  She is not ill-appearing  HENT:      Right Ear: Tympanic membrane normal       Left Ear: Tympanic membrane normal       Nose: Mucosal edema and rhinorrhea present  Right Turbinates: Enlarged and swollen  Left Turbinates: Enlarged and swollen  Right Sinus: No maxillary sinus tenderness or frontal sinus tenderness        Left " Sinus: No maxillary sinus tenderness or frontal sinus tenderness  Mouth/Throat:      Mouth: Mucous membranes are moist       Pharynx: Oropharyngeal exudate and posterior oropharyngeal erythema present  Eyes:      General:         Right eye: No discharge  Left eye: No discharge  Extraocular Movements: Extraocular movements intact  Conjunctiva/sclera: Conjunctivae normal       Pupils: Pupils are equal, round, and reactive to light  Cardiovascular:      Rate and Rhythm: Normal rate and regular rhythm  Chest Wall: PMI is not displaced  Heart sounds: Normal heart sounds  Pulmonary:      Effort: Pulmonary effort is normal       Breath sounds: Normal breath sounds  Abdominal:      General: Bowel sounds are normal       Palpations: Abdomen is soft  Musculoskeletal:         General: Normal range of motion  Lymphadenopathy:      Cervical: No cervical adenopathy  Skin:     General: Skin is warm and dry  Capillary Refill: Capillary refill takes less than 2 seconds  Neurological:      Mental Status: She is alert and oriented to person, place, and time  Psychiatric:         Mood and Affect: Mood normal          Behavior: Behavior normal          Thought Content:  Thought content normal          Judgment: Judgment normal

## 2023-04-24 NOTE — ASSESSMENT & PLAN NOTE
Incidental finding on CT or orbits    · MRI w wo contrast ordered  · F/u neurosurgery appt May 9, 2023  · Needs MRI to be completed at [facility] due to ICD

## 2023-04-24 NOTE — ASSESSMENT & PLAN NOTE
Doing well on Biktarvy with an undetectable VL  Pt reports 100% medication compliance on HAART  Stressed the importance of 100% medication adherence  HIV Counseling:    Viral Load: < 20    CD4 Count: 106      Denies side effects  Stressed the importance of adherence  Continue follow up in the ID clinic with Dr Cristian Miner or Dr Roselyn Osuna  Reviewed the most recent labs, including the CD4 and viral load  Discussed the risks and benefits of treatment options, instructions for management, importance of treatment adherence, and reduction of risk factors  Educated on possible medication side effects  Counseled on routes of HIV transmission, including the risk of  infection  Emphasized that viral suppression is the best method to prevent HIV transmission  At this time the pt denies the need for HIV testing of anyone in their life  Total encounter time was greater than 35 minutes  Greater than 20 minutes were spent on counseling and patient education  Pt voices understanding and agreement with treatment plan

## 2023-04-24 NOTE — ASSESSMENT & PLAN NOTE
On Doxycycline and Warfarin    · Discussed abx may potentiate the effects of Warfarin and she did report to cardiologies office  · Pt aware of increased risk of bleeding  · Due for INR next week  · Day 7 of abx

## 2023-04-24 NOTE — ASSESSMENT & PLAN NOTE
Improving  Day 4 of abx    · Complete all 7 days abx  · Call office or go to ER if periorbital cellulitis worsening

## 2023-04-24 NOTE — ASSESSMENT & PLAN NOTE
Worsening runny nose and eye tearing    · Start Claritin 10 mg PO QD  · Start Flonase 1 spray in each nostril QD

## 2023-04-25 ENCOUNTER — PATIENT OUTREACH (OUTPATIENT)
Dept: SURGERY | Facility: CLINIC | Age: 58
End: 2023-04-25

## 2023-04-25 NOTE — PROGRESS NOTES
RW sliding fee scale application was completed and sent to hospital financial counselors  Copy of application was scanned into ct's chart

## 2023-04-26 ENCOUNTER — PATIENT OUTREACH (OUTPATIENT)
Dept: SURGERY | Facility: CLINIC | Age: 58
End: 2023-04-26

## 2023-04-26 NOTE — PROGRESS NOTES
This cm attempted to contact [facility]'s OMS  Message was left  Salinas Inman returned this cm's phone call  Salinas Inman was aware this cm previously called for ct's denial letter from the insurance  [employer] was made aware the previous person this cm spoke to let this cm know she would speak to billing in regards to the denial letter  [address] let this cm know she would speak to billing again

## 2023-04-28 ENCOUNTER — APPOINTMENT (OUTPATIENT)
Dept: LAB | Facility: HOSPITAL | Age: 58
End: 2023-04-28

## 2023-04-28 ENCOUNTER — ANTICOAG VISIT (OUTPATIENT)
Dept: CARDIOLOGY CLINIC | Facility: CLINIC | Age: 58
End: 2023-04-28

## 2023-04-28 DIAGNOSIS — Z79.01 LONG TERM CURRENT USE OF ANTICOAGULANT THERAPY: ICD-10-CM

## 2023-04-28 DIAGNOSIS — I25.5 ISCHEMIC CARDIOMYOPATHY: Primary | ICD-10-CM

## 2023-04-28 DIAGNOSIS — I23.6 LV (LEFT VENTRICULAR) MURAL THROMBUS FOLLOWING MI (HCC): ICD-10-CM

## 2023-04-28 LAB
ALBUMIN SERPL BCP-MCNC: 3.8 G/DL (ref 3.5–5)
ALP SERPL-CCNC: 48 U/L (ref 34–104)
ALT SERPL W P-5'-P-CCNC: 14 U/L (ref 7–52)
ANION GAP SERPL CALCULATED.3IONS-SCNC: 9 MMOL/L (ref 4–13)
AST SERPL W P-5'-P-CCNC: 17 U/L (ref 13–39)
BASOPHILS # BLD AUTO: 0.04 THOUSANDS/ΜL (ref 0–0.1)
BASOPHILS NFR BLD AUTO: 1 % (ref 0–1)
BILIRUB SERPL-MCNC: 0.53 MG/DL (ref 0.2–1)
BILIRUB UR QL STRIP: NEGATIVE
BUN SERPL-MCNC: 14 MG/DL (ref 5–25)
CALCIUM SERPL-MCNC: 9.5 MG/DL (ref 8.4–10.2)
CHLORIDE SERPL-SCNC: 105 MMOL/L (ref 96–108)
CLARITY UR: ABNORMAL
CO2 SERPL-SCNC: 27 MMOL/L (ref 21–32)
COLOR UR: ABNORMAL
CREAT SERPL-MCNC: 1.33 MG/DL (ref 0.6–1.3)
EOSINOPHIL # BLD AUTO: 0.08 THOUSAND/ΜL (ref 0–0.61)
EOSINOPHIL NFR BLD AUTO: 2 % (ref 0–6)
ERYTHROCYTE [DISTWIDTH] IN BLOOD BY AUTOMATED COUNT: 13 % (ref 11.6–15.1)
GFR SERPL CREATININE-BSD FRML MDRD: 44 ML/MIN/1.73SQ M
GLUCOSE P FAST SERPL-MCNC: 113 MG/DL (ref 65–99)
GLUCOSE UR STRIP-MCNC: NEGATIVE MG/DL
HCT VFR BLD AUTO: 41.1 % (ref 34.8–46.1)
HCV AB SER QL: NORMAL
HGB BLD-MCNC: 13.4 G/DL (ref 11.5–15.4)
HGB UR QL STRIP.AUTO: NEGATIVE
IMM GRANULOCYTES # BLD AUTO: 0.02 THOUSAND/UL (ref 0–0.2)
IMM GRANULOCYTES NFR BLD AUTO: 0 % (ref 0–2)
KETONES UR STRIP-MCNC: NEGATIVE MG/DL
LEUKOCYTE ESTERASE UR QL STRIP: NEGATIVE
LYMPHOCYTES # BLD AUTO: 1.69 THOUSANDS/ΜL (ref 0.6–4.47)
LYMPHOCYTES NFR BLD AUTO: 33 % (ref 14–44)
MCH RBC QN AUTO: 31.4 PG (ref 26.8–34.3)
MCHC RBC AUTO-ENTMCNC: 32.6 G/DL (ref 31.4–37.4)
MCV RBC AUTO: 96 FL (ref 82–98)
MONOCYTES # BLD AUTO: 0.32 THOUSAND/ΜL (ref 0.17–1.22)
MONOCYTES NFR BLD AUTO: 6 % (ref 4–12)
NEUTROPHILS # BLD AUTO: 3.01 THOUSANDS/ΜL (ref 1.85–7.62)
NEUTS SEG NFR BLD AUTO: 58 % (ref 43–75)
NITRITE UR QL STRIP: NEGATIVE
NRBC BLD AUTO-RTO: 0 /100 WBCS
PH UR STRIP.AUTO: 6 [PH]
PLATELET # BLD AUTO: 189 THOUSANDS/UL (ref 149–390)
PMV BLD AUTO: 10.4 FL (ref 8.9–12.7)
POTASSIUM SERPL-SCNC: 3.7 MMOL/L (ref 3.5–5.3)
PROT SERPL-MCNC: 6.8 G/DL (ref 6.4–8.4)
PROT UR STRIP-MCNC: NEGATIVE MG/DL
RBC # BLD AUTO: 4.27 MILLION/UL (ref 3.81–5.12)
SODIUM SERPL-SCNC: 141 MMOL/L (ref 135–147)
SP GR UR STRIP.AUTO: 1.02 (ref 1–1.04)
TREPONEMA PALLIDUM IGG+IGM AB [PRESENCE] IN SERUM OR PLASMA BY IMMUNOASSAY: NORMAL
UROBILINOGEN UA: NEGATIVE MG/DL
WBC # BLD AUTO: 5.16 THOUSAND/UL (ref 4.31–10.16)

## 2023-04-28 NOTE — PROGRESS NOTES
Pt called finished antibiotics yesterday will arabella 7/5 today then same recheck in 2 weeks 5/12/23

## 2023-04-29 LAB
BASOPHILS # BLD AUTO: 0 X10E3/UL (ref 0–0.2)
BASOPHILS NFR BLD AUTO: 1 %
CD3+CD4+ CELLS # BLD: 510 /UL (ref 359–1519)
CD3+CD4+ CELLS NFR BLD: 30 % (ref 30.8–58.5)
EOSINOPHIL # BLD AUTO: 0.1 X10E3/UL (ref 0–0.4)
EOSINOPHIL NFR BLD AUTO: 2 %
ERYTHROCYTE [DISTWIDTH] IN BLOOD BY AUTOMATED COUNT: 13.2 % (ref 11.7–15.4)
HCT VFR BLD AUTO: 35.9 % (ref 34–46.6)
HGB BLD-MCNC: 12.2 G/DL (ref 11.1–15.9)
IMM GRANULOCYTES # BLD: 0 X10E3/UL (ref 0–0.1)
IMM GRANULOCYTES NFR BLD: 0 %
LYMPHOCYTES # BLD AUTO: 1.7 X10E3/UL (ref 0.7–3.1)
LYMPHOCYTES NFR BLD AUTO: 32 %
MCH RBC QN AUTO: 31.8 PG (ref 26.6–33)
MCHC RBC AUTO-ENTMCNC: 34 G/DL (ref 31.5–35.7)
MCV RBC AUTO: 94 FL (ref 79–97)
MONOCYTES # BLD AUTO: 0.3 X10E3/UL (ref 0.1–0.9)
MONOCYTES NFR BLD AUTO: 5 %
NEUTROPHILS # BLD AUTO: 3.2 X10E3/UL (ref 1.4–7)
NEUTROPHILS NFR BLD AUTO: 60 %
PLATELET # BLD AUTO: 225 X10E3/UL (ref 150–450)
RBC # BLD AUTO: 3.84 X10E6/UL (ref 3.77–5.28)
WBC # BLD AUTO: 5.3 X10E3/UL (ref 3.4–10.8)

## 2023-05-01 LAB
GAMMA INTERFERON BACKGROUND BLD IA-ACNC: 0.03 IU/ML
HIV1 RNA # SERPL NAA+PROBE: <20 COPIES/ML
HIV1 RNA SERPL NAA+PROBE-LOG#: NORMAL LOG10COPY/ML
M TB IFN-G BLD-IMP: NEGATIVE
M TB IFN-G CD4+ BCKGRND COR BLD-ACNC: 0.01 IU/ML
M TB IFN-G CD4+ BCKGRND COR BLD-ACNC: 0.02 IU/ML
MITOGEN IGNF BCKGRD COR BLD-ACNC: >10 IU/ML

## 2023-05-02 LAB
C TRACH DNA SPEC QL NAA+PROBE: NEGATIVE
N GONORRHOEA DNA SPEC QL NAA+PROBE: NEGATIVE

## 2023-05-04 ENCOUNTER — PATIENT OUTREACH (OUTPATIENT)
Dept: SURGERY | Facility: CLINIC | Age: 58
End: 2023-05-04

## 2023-05-09 ENCOUNTER — OFFICE VISIT (OUTPATIENT)
Dept: NEUROSURGERY | Facility: CLINIC | Age: 58
End: 2023-05-09

## 2023-05-09 VITALS
WEIGHT: 147 LBS | TEMPERATURE: 97.5 F | HEART RATE: 63 BPM | OXYGEN SATURATION: 97 % | DIASTOLIC BLOOD PRESSURE: 60 MMHG | HEIGHT: 62 IN | RESPIRATION RATE: 16 BRPM | SYSTOLIC BLOOD PRESSURE: 98 MMHG | BODY MASS INDEX: 27.05 KG/M2

## 2023-05-09 DIAGNOSIS — R93.0 ABNORMAL CT SCAN, HEAD: Primary | ICD-10-CM

## 2023-05-09 NOTE — PROGRESS NOTES
Assessment/Plan:    Abnormal CT scan, head  · As addressed inb HPI   · nonfocal examination  · She denies headache , nausea , voniting,  No complaints   Imagining --as per result in the imagining section  Plan   · Reviewed CT orbits/temporal  Bones/skull base head  Explained the nature of the intracranial abnormality  Is unknown , need MRI with contrast imagining to further clarify brain lesion  · Discussed with checkout to schedule MRI that was previously ordered , then schedule f/u appointment within 2-3 days of completion  Patient has a cardiac pacemaker, need to coordinate completion with product Rep  · Explained red flag signs of increased intracranial pressure  if develop go to emergency room for  · Advised if she has additional questions or concerns call the office  assessment             Abnormal CT scan, head         Subjective: New patient visit referred for ED after intracranial abnormality seen on CT Head  4/18/23  Patient ID: Sachin Ellis is a 63 y o  female PM/SH  Triple vessel coronary rtery disease  HIV , CKD 3  tobacco abuse , LVH mural thrombosis, AICD/pacemaker cardiac    HPI   Presented in the ED 4/18/2023 for orbital cellulitis  As part of assessment to determine etiology a CT orbits/temporal  Bones/skull base head  Was obtained  that showed  Incidentally noted 1 5 cm peripherally enhancing lesion in the inferolateral left frontal lobe  The lesion is possibly extra-axial   Recommend further evaluation with brain MRI without and with contrast   [facility]   Follow-up with neurosurgerygery OP appointment after mRI completion  Patient presents today for initial neurosurgery visit  As per patient MRI was approved by insurance but it is not scheduled        Social disability 20 13 HIV +--- Medicaid  Age iun place  Care  For mother Amercian ---        REVIEW OF SYSTEMS  Review of Systems Hematological: Does not bruise/bleed easily  Warfarin, ASA   All other systems reviewed and are negative  Meds/Allergies     Current Outpatient Medications   Medication Sig Dispense Refill   • aspirin (ECOTRIN LOW STRENGTH) 81 mg EC tablet Take 1 tablet (81 mg total) by mouth daily 81 tablet 11   • atorvastatin (LIPITOR) 80 mg tablet TAKE 1 TABLET BY MOUTH DAILY 30 tablet 5   • bictegravir-emtricitab-tenofovir alafenamide (Biktarvy) -25 MG tablet Take 1 tablet by mouth daily with breakfast 90 tablet 1   • carvedilol (COREG) 6 25 mg tablet TAKE 1 TABLET BY MOUTH TWICE A DAY WITH MEALS 60 tablet 5   • Entresto 49-51 MG TABS TAKE 1 TABLET BY MOUTH TWICE A DAY 60 tablet 5   • fluticasone (FLONASE) 50 mcg/act nasal spray 1 spray into each nostril daily (Patient taking differently: 1 spray into each nostril as needed) 15 8 mL 3   • loratadine (CLARITIN) 10 mg tablet Take 1 tablet (10 mg total) by mouth daily (Patient taking differently: Take 10 mg by mouth as needed) 90 tablet 0   • nicotine polacrilex (COMMIT) 4 MG lozenge USE 1 LOZENGE AS NEEDED FOR SMOKING CESSATION 72 lozenge 5   • warfarin (COUMADIN) 5 mg tablet 1 to 1 5 tablets daily as directed by  tablet 3     No current facility-administered medications for this visit         Allergies   Allergen Reactions   • No Active Allergies    • No Known Allergies        PAST HISTORY    Past Medical History:   Diagnosis Date   • Asthma    • Coronary artery disease     defibrillator   • HIV positive ([facility] [address] )        Past Surgical History:   Procedure Laterality Date   • ANGIOPLASTY     • CARDIAC DEFIBRILLATOR PLACEMENT     • CORONARY ANGIOPLASTY      pci placement   • TOTAL ABDOMINAL HYSTERECTOMY     • US GUIDED INJECTION FOR RESEARCH STUDY  5/7/2018   • US GUIDED INJECTION FOR RESEARCH STUDY  1/18/2019   • US GUIDED INJECTION FOR RESEARCH STUDY  5/7/2018   • [facility] [address] STUDY  5/14/2018       Social History     Tobacco Use   • "Smoking status: Former     Packs/day: 0 50     Types: Cigarettes     Quit date: 2023     Years since quittin 1   • Smokeless tobacco: Never   Vaping Use   • Vaping Use: Never used   Substance Use Topics   • Alcohol use: Yes     Comment: socially   • Drug use: No       Family History   Problem Relation Age of Onset   • No Known Problems Mother    • Other Father         GI bleed   • No Known Problems Sister    • No Known Problems Sister    • No Known Problems Maternal Grandmother    • No Known Problems Maternal Grandfather    • No Known Problems Paternal Grandmother    • No Known Problems Paternal Grandfather    • Suicidality Brother    • Drug abuse Brother         overdose   • No Known Problems Maternal Aunt    • No Known Problems Maternal Aunt    • No Known Problems Maternal Aunt    • No Known Problems Paternal Aunt        The following portions of the patient's history were reviewed and updated as appropriate: allergies, current medications, past family history, past medical history, past social history, past surgical history and problem list       EXAM    Vitals:Blood pressure 98/60, pulse 63, temperature 97 5 °F (36 4 °C), temperature source Tympanic, resp  rate 16, height 5' 2\" (1 575 m), weight 66 7 kg (147 lb), SpO2 97 %, not currently breastfeeding  ,Body mass index is 26 89 kg/m²  Physical Exam  Vitals and nursing note reviewed  Constitutional:       General: She is not in acute distress  Appearance: Normal appearance  She is normal weight  She is not ill-appearing, toxic-appearing or diaphoretic  HENT:      Head: Normocephalic and atraumatic  Eyes:      General: No scleral icterus  Right eye: No discharge  Left eye: No discharge  Extraocular Movements: Extraocular movements intact  Pupils: Pupils are equal, round, and reactive to light  Cardiovascular:      Rate and Rhythm: Normal rate     Pulmonary:      Effort: Pulmonary effort is normal  No respiratory " distress  Musculoskeletal:      Right lower leg: No edema  Left lower leg: No edema  Neurological:      General: No focal deficit present  Mental Status: She is alert  Mental status is at baseline  She is disoriented  Sensory: No sensory deficit  Motor: No weakness  Coordination: Coordination normal       Gait: Gait is intact  Gait normal       Deep Tendon Reflexes: Reflexes normal    Psychiatric:         Mood and Affect: Mood normal          Speech: Speech normal          Behavior: Behavior normal          Neurologic Exam     Mental Status   Oriented to person  Oriented to place  Oriented to country, city, area, street and number  Oriented to time  Oriented to year, month, date, day and season  Attention: normal    Speech: speech is normal   Level of consciousness: alert  Knowledge: good  Cranial Nerves     CN III, IV, VI   Pupils are equal, round, and reactive to light    Nystagmus: none   Diplopia: none  Ophthalmoparesis: none  Upgaze: normal  Downgaze: normal  Conjugate gaze: present    CN VIII   Hearing: intact    CN XI   Right sternocleidomastoid strength: normal  Left sternocleidomastoid strength: normal  Right trapezius strength: normal  Left trapezius strength: normal    Motor Exam   Muscle bulk: normal  Overall muscle tone: normal    Strength   Right deltoid: 5/5  Left deltoid: 5/5  Right biceps: 5/5  Left biceps: 5/5  Right triceps: 5/5  Left triceps: 5/5  Right wrist flexion: 5/5  Left wrist flexion: 5/5  Right wrist extension: 5/5  Left wrist extension: 5/5  Right interossei: 5/5  Left interossei: 5/5  Right iliopsoas: 5/5  Left iliopsoas: 5/5  Right quadriceps: 5/5  Left quadriceps: 5/5  Right hamstrin/5  Left hamstrin/5  Right anterior tibial: 5/5  Left anterior tibial: 5/5  Right gastroc: 5/5  Left gastroc: 5/5    Sensory Exam   Light touch normal      Gait, Coordination, and Reflexes     Gait  Gait: normal      Imaging Studies  CT orbits/temporal bones/skull base w contrast    Result Date: 4/18/2023  Narrative: CT ORBITS WITH CONTRAST  INDICATION:   Orbital cellulitis suspected r/o orbital cellulitis  COMPARISON: None TECHNIQUE: Axial CT imaging through the orbits was performed  In addition, sagittal and coronal reformatted images were submitted for interpretation  Radiation dose length product (DLP) for this visit:  155 mGy-cm   This examination, like all CT scans performed in the [facility], was performed utilizing techniques to minimize radiation dose exposure, including the use of iterative reconstruction and automated exposure control  IV Contrast:  85 mL of iohexol (OMNIPAQUE) IMAGE QUALITY: Diagnostic  FINDINGS: ORBITS: There is left periorbital inflammation/cellulitis and conjunctival hyperenhancement  There is no post septal inflammation  Mildly inflamed right lacrimal gland  SINUSES AND NASAL CAVITY: Polypoid mucosal thickening and/or retention cysts in bilateral maxillary sinuses (left greater than right)  Left nasal mucosal inflammation  SOFT TISSUES: See above  BONY STRUCTURES: Normal bony structures  VISUALIZED BRAIN: Incidentally noted peripherally enhancing lesion in the left inferolateral frontal lobe measuring 1 2 x 1 5 x 1 5 cm (series 6 image 65)  Impression: 1  Left orbit preseptal soft tissue cellulitis and conjunctivitis  No post septal inflammation or abscess  2   Mildly inflamed right lacrimal gland  3   Incidentally noted 1 5 cm peripherally enhancing lesion in the inferolateral left frontal lobe  The lesion is possibly extra-axial   Recommend further evaluation with brain MRI without and with contrast  4   Bilateral maxillary sinus disease  Left nasal mucosal inflammation  The study was marked in EPIC for significant notification   Workstation performed: [id number]                   2/89/23 COMPARISON: CT lung screening of 30 2021     TECHNIQUE:  Unenhanced CT examination of the chest was performed utilizing a low dose protocol  Reformatted images were created in axial, sagittal, and coronal planes        Radiation dose length product (DLP) for this visit:  46 3 mGy-cm   This examination, like all CT scans performed in the [facility], was performed utilizing techniques to minimize radiation dose exposure, including the use of iterative   reconstruction and automated exposure control       FINDINGS:     LUNGS:  Stable right lower lobe traction bronchiectasis and scarring  Stable 2 mm right apical (series 3 image 36) and left upper lobe (series 3 image 74) nodules  No new or suspicious nodules  There is no tracheal or endobronchial lesion      PLEURA:  Unremarkable      HEART/GREAT VESSELS: Atherosclerotic aortic and coronary artery calcification is noted  Heart is otherwise unremarkable  No thoracic aortic aneurysm      MEDIASTINUM AND RUBY:  Unremarkable      CHEST WALL AND LOWER NECK:  Right chest wall AICD with right ventricular lead      VISUALIZED STRUCTURES IN THE UPPER ABDOMEN:  Unremarkable      OSSEOUS STRUCTURES:  No acute fracture or destructive osseous lesion      IMPRESSION:     Stable scattered 2 mm nodules  No new or suspicious nodules        Lung-RADS2, benign appearance or behavior  Continue annual screening with LDCT in 12 months  Echo complete w/ contrast if indicated    Result Date: 4/13/2023  Narrative: •  Left Ventricle: Left ventricular cavity size is at the upper limits of normal   There is no definite thrombus seen in the apex  Definity was used but did not fill the apex completely with swirling of contrast  Wall thickness is normal  The left ventricular ejection fraction is 42% by biplane measurement  Systolic function is mild to moderately reduced  The anterior and lateral walls are significantly hypokinetic  The distal anterior wall, distal lateral wall, distal inferior wall and distal septum extending into the apex are severely hypokinetic to akinetic  Diastolic function is mildly abnormal, consistent with grade I (abnormal) relaxation  Left atrial filling pressure is normal        I have personally reviewed pertinent reports  and I have personally reviewed pertinent films in PACS    I have spent a total time of 30   minutes on 05/09/23 in caring for this patient including Diagnostic results, Risks and benefits of tx options, Instructions for management, Patient and family education, Importance of tx compliance, Risk factor reductions, Impressions, Counseling / Coordination of care, Documenting in the medical record, Reviewing / ordering tests, medicine, procedures   and Obtaining or reviewing history

## 2023-05-09 NOTE — ASSESSMENT & PLAN NOTE
Abnormal CT scan, head4/18/23   Assessment & Plan  •  Incidentally noted 1 5 cm peripherally enhancing lesion in the inferolateral left frontal lobe   The lesion is possibly extra-axial   Recommend further evaluation with brain MRI without and with contrast  • ER provider reviewed case with neurosurgery  • Recommend MRI brain ; will need to obtain MRI brain at [place] [address] due to pacemaker  • No acute intervention needed at this time, no need for transfer  • Follow-up MRI brain and reach back out to neurosurgery with results  • Patient currently neurologically intact, no neurodeficits noted

## 2023-05-10 NOTE — ASSESSMENT & PLAN NOTE
· As addressed inb HPI   · nonfocal examination  · She denies headache , nausea , voniting,  No complaints   Imagining --as per result in the imagining section  Plan   · Reviewed CT orbits/temporal  Bones/skull base head  Explained the nature of the intracranial abnormality  Is unknown , need MRI with contrast imagining to further clarify brain lesion  · Discussed with checkout to schedule MRI that was previously ordered , then schedule f/u appointment within 2-3 days of completion  Patient has a cardiac pacemaker, need to coordinate completion with product Rep  · Explained red flag signs of increased intracranial pressure  if develop go to emergency room for  · Advised if she has additional questions or concerns call the office    assessment

## 2023-05-17 ENCOUNTER — ANTICOAG VISIT (OUTPATIENT)
Dept: CARDIOLOGY CLINIC | Facility: CLINIC | Age: 58
End: 2023-05-17

## 2023-05-17 ENCOUNTER — APPOINTMENT (OUTPATIENT)
Dept: LAB | Facility: HOSPITAL | Age: 58
End: 2023-05-17

## 2023-05-17 DIAGNOSIS — I25.5 ISCHEMIC CARDIOMYOPATHY: Primary | ICD-10-CM

## 2023-05-17 DIAGNOSIS — Z79.01 LONG TERM CURRENT USE OF ANTICOAGULANT THERAPY: ICD-10-CM

## 2023-05-17 DIAGNOSIS — I23.6 LV (LEFT VENTRICULAR) MURAL THROMBUS FOLLOWING MI (HCC): ICD-10-CM

## 2023-05-17 NOTE — PROGRESS NOTES
Spoke with patient, advised INR low, no missed doses, changes in medications or diet   Current dose verified, advised to take 10 mg tonight only instead of 7 5 mg, then take 7 5 mg Wed Sat, 5 mg all other days, will recheck in  2 weeks 5/31/23

## 2023-05-18 ENCOUNTER — PATIENT OUTREACH (OUTPATIENT)
Dept: SURGERY | Facility: CLINIC | Age: 58
End: 2023-05-18

## 2023-05-18 NOTE — PROGRESS NOTES
This contacted [address] [place] [address] in regards to ct's insurance denial letter  Message was left

## 2023-05-23 ENCOUNTER — HOSPITAL ENCOUNTER (OUTPATIENT)
Dept: RADIOLOGY | Facility: HOSPITAL | Age: 58
Discharge: HOME | End: 2023-05-23

## 2023-05-23 DIAGNOSIS — R93.0 ABNORMAL CT OF THE HEAD: ICD-10-CM

## 2023-05-23 DIAGNOSIS — G93.9 LESION OF LEFT FRONTAL LOBE OF BRAIN: ICD-10-CM

## 2023-05-23 RX ADMIN — GADOBUTROL 7 ML: 604.72 INJECTION INTRAVENOUS at 09:55

## 2023-05-23 NOTE — NURSING NOTE
Pt ambulates without assistance  [employer] Device interrogation for MRI done by BARON Tinajero clinical specialist  Device set to MRI safe mode @ 70 bpm     MRI brain with/without contrast complete  Pt tolerated well  VSS throughout scan  Pt alert and oriented on room air  No complaints or visible signs of distress  Device reprogrammed to prior settings per Cardiology by Shawanda Cunha RN

## 2023-05-26 ENCOUNTER — PATIENT OUTREACH (OUTPATIENT)
Dept: SURGERY | Facility: CLINIC | Age: 58
End: 2023-05-26

## 2023-05-26 NOTE — PROGRESS NOTES
This cm attempted to contact [address] [place] [address] in regards to denial letter  Message was left

## 2023-05-26 NOTE — PROGRESS NOTES
This cm reached out to ct to let her know she was still waiting for the denial letter from [address] [place] [address]  Ct was made aware this cm has followed up with [facility] and left messages, but no one has returned this cm's phone call

## 2023-05-31 ENCOUNTER — TELEPHONE (OUTPATIENT)
Dept: SURGERY | Facility: CLINIC | Age: 58
End: 2023-05-31

## 2023-05-31 NOTE — TELEPHONE ENCOUNTER
Called pt to review MRI results  Pt aware and does have a f/u appt with Neurosurgery on 6/5/23 to discuss results and plan of care  All questions asked and answered

## 2023-06-01 ENCOUNTER — ANTICOAG VISIT (OUTPATIENT)
Dept: CARDIOLOGY CLINIC | Facility: CLINIC | Age: 58
End: 2023-06-01

## 2023-06-01 ENCOUNTER — APPOINTMENT (OUTPATIENT)
Dept: LAB | Facility: HOSPITAL | Age: 58
End: 2023-06-01
Payer: MEDICARE

## 2023-06-01 DIAGNOSIS — I23.6 LV (LEFT VENTRICULAR) MURAL THROMBUS FOLLOWING MI (HCC): ICD-10-CM

## 2023-06-01 DIAGNOSIS — Z79.01 LONG TERM CURRENT USE OF ANTICOAGULANT THERAPY: ICD-10-CM

## 2023-06-01 DIAGNOSIS — I25.5 ISCHEMIC CARDIOMYOPATHY: Primary | ICD-10-CM

## 2023-06-01 NOTE — PROGRESS NOTES
Spoke with patient, advised INR good, current dose verified, continue 7 5 mg Wed Sat, 5 mg all other days, will recheck in 2 weeks 6/15/23

## 2023-06-02 ENCOUNTER — REMOTE DEVICE CLINIC VISIT (OUTPATIENT)
Dept: CARDIOLOGY CLINIC | Facility: CLINIC | Age: 58
End: 2023-06-02

## 2023-06-02 DIAGNOSIS — Z95.810 PRESENCE OF AUTOMATIC CARDIOVERTER/DEFIBRILLATOR (AICD): Primary | ICD-10-CM

## 2023-06-02 NOTE — PROGRESS NOTES
Results for orders placed or performed in visit on 06/02/23   Cardiac EP device report    Narrative    MDT-SINGLE CHAMBER ICD - ACTIVE SYSTEM IS MRI CONDITIONAL  CARELINK TRANSMISSION: BATTERY VOLTAGE ADEQUATE (6 YRS)   0 2% (VVI 40 PPM); ALL AVAILABLE LEAD PARAMETERS WITHIN NORMAL LIMITS  NO SIGNIFICANT HIGH RATE EPISODES  OPTI-VOL WITHIN NORMAL LIMITS  NORMAL DEVICE FUNCTION    ES

## 2023-06-05 ENCOUNTER — OFFICE VISIT (OUTPATIENT)
Dept: NEUROSURGERY | Facility: CLINIC | Age: 58
End: 2023-06-05
Payer: MEDICARE

## 2023-06-05 ENCOUNTER — TELEPHONE (OUTPATIENT)
Dept: NEUROSURGERY | Facility: CLINIC | Age: 58
End: 2023-06-05

## 2023-06-05 VITALS
BODY MASS INDEX: 27.05 KG/M2 | HEART RATE: 67 BPM | OXYGEN SATURATION: 93 % | DIASTOLIC BLOOD PRESSURE: 60 MMHG | WEIGHT: 147 LBS | HEIGHT: 62 IN | SYSTOLIC BLOOD PRESSURE: 98 MMHG | RESPIRATION RATE: 16 BRPM | TEMPERATURE: 98 F

## 2023-06-05 DIAGNOSIS — D32.9 MENINGIOMA (HCC): Primary | ICD-10-CM

## 2023-06-05 PROBLEM — D32.0 MENINGIOMA, CEREBRAL (HCC): Status: ACTIVE | Noted: 2023-06-05

## 2023-06-05 PROCEDURE — 99214 OFFICE O/P EST MOD 30 MIN: CPT | Performed by: NURSE PRACTITIONER

## 2023-06-05 RX ORDER — MELATONIN
2000 DAILY
COMMUNITY

## 2023-06-05 NOTE — TELEPHONE ENCOUNTER
6/5/23 Patient will be contacted by central scheduling as per Arlene Castro to schedule MRI BRAIN DUE TO HER HAVING A PACEMAKER  PATIENT ADVISED AT CHECK OUT TO CALL BACK NEUROSURGERY FOR A FOLLOW UP IN 1-2 WEEKS WITH [address] AFTER HER MRI BRAIN

## 2023-06-05 NOTE — PROGRESS NOTES
Assessment/Plan:    Meningioma, cerebral ([facility] [address] )  · As addressed in HPI  · Nonfocal neurological examination  · She presents with no complaints  · She denies symptoms that would be associated with a meningioma such as a headache, seizure, mental status change, speech / vision change, sensory / motor change, or other neurological change  Imagining   · 5/25/23 MRI Brain w/wo There is a mass identified within the lateral aspect of the left anterior cranial fossa best seen on series 10 image 15 which demonstrates some low signal on susceptibility imaging and peripheral enhancement, consistent with meningioma,  possibly partially calcified  There is mild mass effect upon the adjacent left frontal lobe from this 1 5 x 1 3 x 1 2 cm extra-axial mass  No edema within the adjacent frontal lobe  Plan   · Reviewed MRI brain w/wo contrast imaging with patient   · Educated on the natural nature of meningioma and surveillance recommendations as per nccn guidelines  · As per in NCCN guidelines brain MRI with contrast at 3, 6, and 12 months (at intervals  from  incidental finding) then every 6 true 12 months for 5 years then every 1-3 years as clinically indicated  · MRI of the brain with and without contrast ordered, for f/u in 3 months due August 2023  · Patient instructed to complete labs at least 1 week before MRI  · Advised if she develops changes in vision double vision blurry vision, headaches, hearing loss ringing in the ears, changes in mental status, memory loss, seizures, loss of smell, weakness in arms or legs, difficulty speaking reinforcing that most signs occur slowly but if occur she should report immediately to her closest emergency room  Advised to contact [address] [place] [address]  office or present to emergency room if she/he experience new/worsening headache, seizure, mental status change, speech / vision change, sensory / motor change, or other neurological change        · Return to office after completion of MRI with contrast/wo  · Advise If she has additional questions or concerns encouraged to contact the neurosurgery office immediately  · AVS Education meningioma              Subjective: follow-up visit after initial MRI brain w/wo imagining surveillance for meningioma     Patient ID: Kalen Espino is a 63 y o  female : Triple vessel coronary rtery disease  HIV , CKD 3  tobacco abuse , LVH mural thrombosis, AICD/pacemaker cardiac- MDT-SINGLE CHAMBER ICD - ACTIVE SYSTEM IS MRI CONDITIONAL CARELINK    HPI   Presented in the ED 4/18/2023 for orbital cellulitis  As part of assessment to determine etiology a CT orbits/temporal  Bones/skull base head  Was obtained  that showed  Incidentally noted 1 5 cm peripherally enhancing lesion in the inferolateral left frontal lobe   The lesion is possibly extra-axial   Recommend further evaluation with brain MRI without and with contrast   An MRI of the brain  [facility]   Follow-up with neurosurgerygery OP appointment after mRI completion      Patient presents today for initial neurosurgery visit  As per patient MRI was approved by insurance but it is not scheduled          RTO for f/u visit status post MRI Brain w/wo contrast completion for meningioma  Social disability 20 13 HIV +--- Medicaid  Age iun place  Care  For mother Amercian ---    REVIEW OF SYSTEMS  Review of Systems   Constitutional: Negative  HENT: Negative  Eyes: Negative  Respiratory: Negative  Cardiovascular: Negative  Gastrointestinal: Negative  Endocrine: Negative  Genitourinary: Negative  Musculoskeletal: Negative  Skin: Negative  Allergic/Immunologic: Positive for environmental allergies  Neurological: Negative  Hematological: Does not bruise/bleed easily (Patient on ASA 81 and Coumadin)  Psychiatric/Behavioral: Negative            Meds/Allergies     Current Outpatient Medications   Medication Sig Dispense Refill   • aspirin (ECOTRIN LOW STRENGTH) 81 mg EC tablet Take 1 tablet (81 mg total) by mouth daily 81 tablet 11   • atorvastatin (LIPITOR) 80 mg tablet TAKE 1 TABLET BY MOUTH DAILY 30 tablet 5   • bictegravir-emtricitab-tenofovir alafenamide (Biktarvy) -25 MG tablet Take 1 tablet by mouth daily with breakfast 90 tablet 1   • carvedilol (COREG) 6 25 mg tablet TAKE 1 TABLET BY MOUTH TWICE A DAY WITH MEALS 60 tablet 5   • cholecalciferol (VITAMIN D3) 1,000 units tablet Take 2,000 Units by mouth daily     • cyanocobalamin (VITAMIN B-12) 100 MCG tablet Take 100 mcg by mouth daily     • Entresto 49-51 MG TABS TAKE 1 TABLET BY MOUTH TWICE A DAY 60 tablet 5   • fluticasone (FLONASE) 50 mcg/act nasal spray 1 spray into each nostril daily (Patient taking differently: 1 spray into each nostril as needed) 15 8 mL 3   • loratadine (CLARITIN) 10 mg tablet Take 1 tablet (10 mg total) by mouth daily (Patient taking differently: Take 10 mg by mouth as needed) 90 tablet 0   • nicotine polacrilex (COMMIT) 4 MG lozenge USE 1 LOZENGE AS NEEDED FOR SMOKING CESSATION 72 lozenge 5   • warfarin (COUMADIN) 5 mg tablet 1 to 1 5 tablets daily as directed by  tablet 3     No current facility-administered medications for this visit         Allergies   Allergen Reactions   • No Active Allergies    • No Known Allergies        PAST HISTORY    Past Medical History:   Diagnosis Date   • Asthma    • Coronary artery disease     defibrillator   • HIV positive ([facility] [address] )        Past Surgical History:   Procedure Laterality Date   • ANGIOPLASTY     • CARDIAC DEFIBRILLATOR PLACEMENT     • CORONARY ANGIOPLASTY      pci placement   • TOTAL ABDOMINAL HYSTERECTOMY     • US GUIDED INJECTION FOR RESEARCH STUDY  5/7/2018   • US GUIDED INJECTION FOR RESEARCH STUDY  1/18/2019   • US GUIDED INJECTION FOR RESEARCH STUDY  5/7/2018   • US GUIDED INJECTION FOR RESEARCH STUDY  5/14/2018       Social History     Tobacco Use   • Smoking status: Former     Packs/day: 0 50 "Types: Cigarettes     Quit date: 2023     Years since quittin 1   • Smokeless tobacco: Never   Vaping Use   • Vaping Use: Never used   Substance Use Topics   • Alcohol use: Yes     Comment: socially   • Drug use: No       Family History   Problem Relation Age of Onset   • No Known Problems Mother    • Other Father         GI bleed   • No Known Problems Sister    • No Known Problems Sister    • No Known Problems Maternal Grandmother    • No Known Problems Maternal Grandfather    • No Known Problems Paternal Grandmother    • No Known Problems Paternal Grandfather    • Suicidality Brother    • Drug abuse Brother         overdose   • No Known Problems Maternal Aunt    • No Known Problems Maternal Aunt    • No Known Problems Maternal Aunt    • No Known Problems Paternal Aunt        The following portions of the patient's history were reviewed and updated as appropriate: allergies, current medications, past family history, past medical history, past social history, past surgical history and problem list       EXAM    Vitals:Blood pressure 98/60, pulse 67, temperature 98 °F (36 7 °C), resp  rate 16, height 5' 2\" (1 575 m), weight 66 7 kg (147 lb), SpO2 93 %, not currently breastfeeding  ,Body mass index is 26 89 kg/m²  Physical Exam  Vitals and nursing note reviewed  Exam conducted with a chaperone present (nephew)  Constitutional:       General: She is not in acute distress  Appearance: She is normal weight  She is not ill-appearing or diaphoretic  HENT:      Head: Normocephalic and atraumatic  Eyes:      General: No scleral icterus  Right eye: No discharge  Left eye: No discharge  Pulmonary:      Effort: Pulmonary effort is normal  No respiratory distress  Musculoskeletal:      Right lower leg: No edema  Left lower leg: No edema  Skin:     General: Skin is warm and dry  Neurological:      General: No focal deficit present        Mental Status: She is alert and oriented to " person, place, and time  Cranial Nerves: No cranial nerve deficit  Sensory: No sensory deficit  Motor: No weakness  Coordination: Coordination normal       Gait: Gait is intact  Gait normal       Deep Tendon Reflexes: Reflexes normal    Psychiatric:         Mood and Affect: Mood normal          Neurologic Exam     Mental Status   Oriented to person, place, and time  Level of consciousness: alert    Motor Exam   Muscle bulk: normal    Strength   Right deltoid: 5/5  Left deltoid: 5/5  Right biceps: 5/5  Left biceps: 5/5  Right triceps: 5/5  Left triceps: 5/5  Right wrist flexion: 5/5  Left wrist flexion: 5/5  Right wrist extension: 5/5  Left wrist extension: 5/5  Right interossei: 5/5  Left interossei: 5/5  Right iliopsoas: 5/5  Left iliopsoas: 5/5  Right quadriceps: 5/5  Left quadriceps: 5/5  Right hamstrin/5  Left hamstrin/5  Right anterior tibial: 5/5  Left anterior tibial: 5/5  Right gastroc: 5/5  Left gastroc: 5/5    Sensory Exam   Light touch normal      Gait, Coordination, and Reflexes     Gait  Gait: normal      Imaging Studies  Cardiac EP device report    Result Date: 2023  Narrative: MDT-SINGLE CHAMBER ICD - ACTIVE SYSTEM IS MRI CONDITIONAL CARELINK TRANSMISSION: BATTERY VOLTAGE ADEQUATE (6 YRS)   0 2% (VVI 40 PPM); ALL AVAILABLE LEAD PARAMETERS WITHIN NORMAL LIMITS  NO SIGNIFICANT HIGH RATE EPISODES  OPTI-VOL WITHIN NORMAL LIMITS  NORMAL DEVICE FUNCTION  ES     MRI brain w wo contrast    Result Date: 2023  Narrative: MRI BRAIN WITH AND WITHOUT CONTRAST INDICATION: R93 0: Abnormal findings on diagnostic imaging of skull and head, not elsewhere classified G93 9: Disorder of brain, unspecified  COMPARISON:  None  TECHNIQUE: Multiplanar, multisequence imaging of the brain was performed before and after gadolinium administration  IV Contrast:  7 mL of Gadobutrol injection (SINGLE-DOSE) IMAGE QUALITY:   Diagnostic   FINDINGS: BRAIN PARENCHYMA: There is a mass identified within the lateral aspect of the left anterior cranial fossa best seen on series 10 image 15 which demonstrates some low signal on susceptibility imaging and peripheral enhancement, consistent with meningioma,  possibly partially calcified  There is mild mass effect upon the adjacent left frontal lobe from this 1 5 x 1 3 x 1 2 cm extra-axial mass  No edema within the adjacent frontal lobe  Small scattered hyperintensities on T2/FLAIR imaging are noted in the periventricular and subcortical white matter demonstrating an appearance that is statistically most likely to represent moderate microangiopathic change  There is an old lacunar infarct identified within the right lentiform nucleus and an old lacunar infarct identified within the medial aspect of the basal ganglia as well as the caudate body  Postcontrast imaging of the brain demonstrates no abnormal enhancement  VENTRICLES: No obstructive hydrocephalus  Mild ex vacuo dilatation of the body of the right lateral ventricle  SELLA AND PITUITARY GLAND:  Normal  ORBITS:  Normal  PARANASAL SINUSES: Mucosal thickening of the maxillary sinuses bilaterally  VASCULATURE:  Evaluation of the major intracranial vasculature demonstrates appropriate flow voids  CALVARIUM AND SKULL BASE:  Normal  EXTRACRANIAL SOFT TISSUES:  Normal      Impression: 1 5 x 1 3 x 1 2 cm meningioma within the lateral aspect of the left anterior cranial fossa  Moderate chronic microangiopathic change within the brain parenchyma including old lacunar infarcts within the right basal ganglia  Workstation performed: [id number]       I have personally reviewed pertinent reports     and I have personally reviewed pertinent films in PACS    I have spent a total time of 30 minutes on 06/05/23 in caring for this patient including Diagnostic results, Risks and benefits of tx options, Instructions for management, Patient and family education, Importance of tx compliance, Risk factor reductions, Impressions, Counseling / Coordination of care, Documenting in the medical record, Reviewing / ordering tests, medicine, procedures  , Obtaining or reviewing history   and Communicating with other healthcare professionals

## 2023-06-06 NOTE — ASSESSMENT & PLAN NOTE
· As addressed in HPI  · Nonfocal neurological examination  · She presents with no complaints  · She denies symptoms that would be associated with a meningioma such as a headache, seizure, mental status change, speech / vision change, sensory / motor change, or other neurological change  Imagining   · 5/25/23 MRI Brain w/wo There is a mass identified within the lateral aspect of the left anterior cranial fossa best seen on series 10 image 15 which demonstrates some low signal on susceptibility imaging and peripheral enhancement, consistent with meningioma,  possibly partially calcified  There is mild mass effect upon the adjacent left frontal lobe from this 1 5 x 1 3 x 1 2 cm extra-axial mass  No edema within the adjacent frontal lobe  Plan   · Reviewed MRI brain w/wo contrast imaging with patient   · Educated on the natural nature of meningioma and surveillance recommendations as per nccn guidelines  · As per in NCCN guidelines brain MRI with contrast at 3, 6, and 12 months (at intervals  from  incidental finding) then every 6 true 12 months for 5 years then every 1-3 years as clinically indicated  · MRI of the brain with and without contrast ordered, for f/u in 3 months due August 2023  · Patient instructed to complete labs at least 1 week before MRI  · Advised if she develops changes in vision double vision blurry vision, headaches, hearing loss ringing in the ears, changes in mental status, memory loss, seizures, loss of smell, weakness in arms or legs, difficulty speaking reinforcing that most signs occur slowly but if occur she should report immediately to her closest emergency room  Advised to contact [address] [place] [address]  office or present to emergency room if she/he experience new/worsening headache, seizure, mental status change, speech / vision change, sensory / motor change, or other neurological change  · Return to office after completion of MRI with contrast/wo    · Advise If she has additional questions or concerns encouraged to contact the neurosurgery office immediately    · AVS Education meningioma

## 2023-06-08 ENCOUNTER — PATIENT OUTREACH (OUTPATIENT)
Dept: SURGERY | Facility: CLINIC | Age: 58
End: 2023-06-08

## 2023-06-08 NOTE — PROGRESS NOTES
This cm reached out to practice administrator, Marisabel Espinoza from [address] in regards to ct's denial letter from the insurance  Marisabel Espinoza let this cm know that there was no denial letter, just the authorization response stating services are not covered  Marisabel Espinoza provided this cm with authorization response  This cm sent an e-mail to practice administrator, Bakari rGeenwood from [address] [place] [address] of agreement that the clients are not to be billed directly for the services provided, and ensure that [employer] funds are the payor of last resort  Copy of agreement was scanned into ct's chart  This cm search for ct's dentist's credentials [facility]  Copy of dentist's credentials were scanned into ct's chart  Auth was completed and sent to supervisor, Favio Bernal with treatment plan and authorization response with Mary Wakefield from [employer] huang'd on the e-mail  Copy of auth was scanned into ct's chart  Mary Wakefield from [employer] reach out to this cm in regards to [place] request  Mary Wakefield wanted to know necessity of services and requested a letter from the dentist explaining so  This cm contacted [address] [place] [address] and requested a letter from ct's dentist indicating if the treatment was medically necessary  [address] [place] [address] provided this cm with the document  Copy of the document was provided to Mary Wakefield from [facility], and scanned into ct's chart  Auth was approved  Auth approval was scanned into ct's chart  This cm contacted ct to make her aware the auth was approved and to schedule her appointment with [address]  This cm provided ct with contact number for oral surgeon's office

## 2023-06-15 ENCOUNTER — APPOINTMENT (OUTPATIENT)
Dept: LAB | Facility: HOSPITAL | Age: 58
End: 2023-06-15
Payer: MEDICARE

## 2023-06-15 ENCOUNTER — OFFICE VISIT (OUTPATIENT)
Dept: OTOLARYNGOLOGY | Facility: CLINIC | Age: 58
End: 2023-06-15
Payer: MEDICARE

## 2023-06-15 ENCOUNTER — ANTICOAG VISIT (OUTPATIENT)
Dept: CARDIOLOGY CLINIC | Facility: CLINIC | Age: 58
End: 2023-06-15

## 2023-06-15 VITALS
TEMPERATURE: 98 F | WEIGHT: 147 LBS | HEIGHT: 62 IN | HEART RATE: 68 BPM | BODY MASS INDEX: 27.05 KG/M2 | OXYGEN SATURATION: 97 %

## 2023-06-15 DIAGNOSIS — Z79.01 LONG TERM CURRENT USE OF ANTICOAGULANT THERAPY: ICD-10-CM

## 2023-06-15 DIAGNOSIS — I25.5 ISCHEMIC CARDIOMYOPATHY: Primary | ICD-10-CM

## 2023-06-15 DIAGNOSIS — J34.1 MUCOUS RETENTION CYST OF MAXILLARY SINUS: Primary | ICD-10-CM

## 2023-06-15 DIAGNOSIS — B20 HIV DISEASE (HCC): ICD-10-CM

## 2023-06-15 DIAGNOSIS — I23.6 LV (LEFT VENTRICULAR) MURAL THROMBUS FOLLOWING MI (HCC): ICD-10-CM

## 2023-06-15 PROCEDURE — 99204 OFFICE O/P NEW MOD 45 MIN: CPT | Performed by: OTOLARYNGOLOGY

## 2023-06-15 NOTE — PROGRESS NOTES
Spoke with patient, advised INR low again, current dose verified, advised to take 5 mg Mon Wed Fri, 7 5 mg all other days, will recheck in 2 weeks 6/29/23

## 2023-06-15 NOTE — PROGRESS NOTES
[facility]  Addy Sterling 63 y.o. female MRN: [id number]  Encounter: [id number]  Amparo Victoria MD  Office : [phone]  Also available on [employer]    Thank you for referring Addy Sterling for an evaluation. My recommendations are included. Please do not hesitate to contact me with any questions you may have. ASSESSMENT AND PLAN:      1. Mucous retention cyst of maxillary sinus  Ambulatory Referral to Otolaryngology      2. HIV disease [facility]          Patient with mucous retention cyst incidentally found during CT scan for evaluation of orbital cellulitis. There is no indication of active infection, no adjacent mucosal thickening, no air-fluid level, no involvement of any of the sinuses in the vicinity of the orbit to suspect that the orbital cellulitis was secondary to a sinus infection. Since she has absolutely no symptoms related to the cysts no intervention needed. Patient to continue follow-up with infectious disease for the purpose of the HIV therapy.  ______________________________________________________________________    Reason for consultation : History of orbital cellulitis    HPI: Addy Sterling is a 63 y.o. female who was admitted to [facility] on April 2023 with left orbital cellulitis mostly preseptal.  She was treated with antibiotics. A CT scan of the sinuses and orbits was done that demonstrated no retrobulbar or abscess or cellulitis, only edema and cellulitis on the upper eyelid mostly. Ethmoid cells, frontal cells, and maxillary sinuses in the vicinity of the orbit are completely healthy. Both maxillary sinuses have retention cysts in the floor, she is edentulous. There is no opacification of any ethmoid cell or presence of air-fluid level on the sinuses. When interrogated she denies any significant nasal symptoms, she is able to breathe through her nose, denies rhinorrhea.     Patient does have a history of HIV on antiretroviral therapy followed by infectious disease, CD count very close to the minimal normal, HIV on PCR nondetectable. Also cardiac history on chronic anticoagulation    PRIOR VISIT, ENDOSCOPIC EVALUATION :     REVIEW OF SYSTEMS:    Review of systems:  10 Point ROS was performed and negative except as above or otherwise noted in the medical record.     Historical Information   Past Medical History:   Diagnosis Date   • Asthma    • Coronary artery disease     defibrillator   • HIV positive ([address])      Past Surgical History:   Procedure Laterality Date   • ANGIOPLASTY     • CARDIAC DEFIBRILLATOR PLACEMENT     • CORONARY ANGIOPLASTY      pci placement   • TOTAL ABDOMINAL HYSTERECTOMY     • US GUIDED INJECTION FOR RESEARCH STUDY  2018   • US GUIDED INJECTION FOR RESEARCH STUDY  2019   • US GUIDED INJECTION FOR RESEARCH STUDY  2018   • US GUIDED INJECTION FOR RESEARCH STUDY  2018     Social History   Social History     Substance and Sexual Activity   Alcohol Use Yes    Comment: socially     Social History     Substance and Sexual Activity   Drug Use No     Social History     Tobacco Use   Smoking Status Former   • Packs/day: 0.50   • Types: Cigarettes   • Quit date: 2023   • Years since quittin.3   Smokeless Tobacco Never     Family History   Problem Relation Age of Onset   • No Known Problems Mother    • Other Father         GI bleed   • No Known Problems Sister    • No Known Problems Sister    • No Known Problems Maternal Grandmother    • No Known Problems Maternal Grandfather    • No Known Problems Paternal Grandmother    • No Known Problems Paternal Grandfather    • Suicidality Brother    • Drug abuse Brother         overdose   • No Known Problems Maternal Aunt    • No Known Problems Maternal Aunt    • No Known Problems Maternal Aunt    • No Known Problems Paternal Aunt        Meds/Allergies       Current Outpatient Medications:   •  bictegravir-emtricitab-tenofovir alafenamide (Biktarvy) -25 MG tablet  •  cholecalciferol (VITAMIN D3) 1,000 units tablet  •  cyanocobalamin (VITAMIN B-12) 100 MCG tablet  •  warfarin (COUMADIN) 5 mg tablet  •  Aspirin Low Dose 81 MG EC tablet  •  atorvastatin (LIPITOR) 80 mg tablet  •  calcium carbonate-vitamin D 500 mg-5 mcg per tablet  •  carvedilol (COREG) 6.25 mg tablet  •  Entresto 49-51 MG TABS  •  nicotine polacrilex (COMMIT) 4 MG lozenge    Allergies   Allergen Reactions   • No Active Allergies    • No Known Allergies          PHYSICAL EXAM:    Pulse 68, temperature 98 °F (36.7 °C), temperature source Temporal, height 5' 2" (1.575 m), weight 66.7 kg (147 lb), SpO2 97 %, not currently breastfeeding. Body mass index is 26.89 kg/m². Constitutional: Oriented to person, place, and time. Well-developed and well-nourished, no apparent distress, non-toxic appearance. Cooperative, able to hear and answer questions without difficulty. Voice: Normal voice quality. Head: Normocephalic, atraumatic. No scars, masses or lesions. Face: Symmetric, no edema, no sinus tenderness. Eyes: Vision grossly intact, extra-ocular movement intact. Right Ear: External ear normal.  Auditory canal clear. Tympanic membrane well-appearing, without retraction or scarring. No fluid present. No post-auricular erythema or tenderness  Left Ear: External ear normal.  Auditory canal clear. Tympanic membrane well-appearing, without retraction or scarring. No fluid present. No post-auricular erythema or tenderness. Nose: Septum midline, nares clear. Mucosa moist, turbinates well appearing. No crusting, polyps or discharge evident. Oral cavity: Edentulous, wearing superior and inferior dentures. Mucosa moist, lips normal.  Tongue mobile, floor of mouth normal.  Hard palate unremarkable. No masses or lesions. Oropharynx: Uvula is midline, soft palate normal.  Unremarkable oropharyngeal inlet. Tonsils unremarkable. Posterior pharyngeal wall clear. No masses or lesions.   Salivary glands: Parotid glands and submandibular glands symmetric, no enlargement or tenderness. Neck: Normal laryngeal elevation with swallow. Trachea midline. No masses or lesions. No palpable adenopathy. Thyroid: normal in size, unremarkable without tenderness or palpable nodules. Pulmonary/Chest: Normal effort and rate. No respiratory distress. Musculoskeletal: Normal range of motion. Neurological: Cranial nerves 2-12 intact. Skin: Skin is warm and dry. Psychiatric: Normal mood and affect. Lab Results: As described in H&P  Imaging Studies: I have personally reviewed images on the PACS system and :   CT scan temporal bone with retention cysts on the floor of maxillary sinuses bilaterally. Single 9 mm cyst right maxillary and 3 cysts (better seen on sagittal view) 9 to 12 mm diameter in left maxillary sinus. There are no mucosal lesions or air-fluid level on the maxillary sinuses in direct vicinity to the orbit. No edema of mucosa to suspect chronic inflammation or air fluid level in maxillary sinuses. Ethmoid cells are completely clear, frontal sinuses are completely clear. Sphenoid sinuses well aerated without any lesions or air-fluid levels either. Septum midline. MRI confirms that the lesions seen on the CT scan correspond to mucous retention cysts as they have the proper signal of fluid containing cysts.

## 2023-06-29 ENCOUNTER — ANTICOAG VISIT (OUTPATIENT)
Dept: CARDIOLOGY CLINIC | Facility: CLINIC | Age: 58
End: 2023-06-29

## 2023-06-29 ENCOUNTER — APPOINTMENT (OUTPATIENT)
Dept: LAB | Facility: HOSPITAL | Age: 58
End: 2023-06-29
Payer: MEDICARE

## 2023-06-29 DIAGNOSIS — I23.6 LV (LEFT VENTRICULAR) MURAL THROMBUS FOLLOWING MI (HCC): ICD-10-CM

## 2023-06-29 DIAGNOSIS — I25.5 ISCHEMIC CARDIOMYOPATHY: Primary | ICD-10-CM

## 2023-06-29 DIAGNOSIS — Z79.01 LONG TERM CURRENT USE OF ANTICOAGULANT THERAPY: ICD-10-CM

## 2023-06-29 NOTE — PROGRESS NOTES
Spoke with patient, advised INR even lower, no changes in medications, diet or health   Current dose verified, advised to take 10 mg tonight, then 5 mg Mon Fri, 7 5 mg all other days, will recheck in 2 weeks 7/13/23

## 2023-07-14 ENCOUNTER — ANTICOAG VISIT (OUTPATIENT)
Dept: CARDIOLOGY CLINIC | Facility: CLINIC | Age: 58
End: 2023-07-14

## 2023-07-14 ENCOUNTER — HOSPITAL ENCOUNTER (OUTPATIENT)
Dept: MAMMOGRAPHY | Facility: CLINIC | Age: 58
End: 2023-07-14
Payer: MEDICARE

## 2023-07-14 ENCOUNTER — APPOINTMENT (OUTPATIENT)
Dept: LAB | Facility: HOSPITAL | Age: 58
End: 2023-07-14
Payer: MEDICARE

## 2023-07-14 ENCOUNTER — PATIENT OUTREACH (OUTPATIENT)
Dept: SURGERY | Facility: CLINIC | Age: 58
End: 2023-07-14

## 2023-07-14 VITALS — BODY MASS INDEX: 27.05 KG/M2 | WEIGHT: 147 LBS | HEIGHT: 62 IN

## 2023-07-14 DIAGNOSIS — Z12.31 ENCOUNTER FOR SCREENING MAMMOGRAM FOR MALIGNANT NEOPLASM OF BREAST: ICD-10-CM

## 2023-07-14 DIAGNOSIS — Z79.01 LONG TERM CURRENT USE OF ANTICOAGULANT THERAPY: ICD-10-CM

## 2023-07-14 DIAGNOSIS — Z78.0 POSTMENOPAUSAL: ICD-10-CM

## 2023-07-14 DIAGNOSIS — I25.5 ISCHEMIC CARDIOMYOPATHY: Primary | ICD-10-CM

## 2023-07-14 DIAGNOSIS — I23.6 LV (LEFT VENTRICULAR) MURAL THROMBUS FOLLOWING MI (HCC): ICD-10-CM

## 2023-07-14 PROCEDURE — 77063 BREAST TOMOSYNTHESIS BI: CPT

## 2023-07-14 PROCEDURE — 77067 SCR MAMMO BI INCL CAD: CPT

## 2023-07-14 NOTE — PROGRESS NOTES
Spoke with patient, advised INR good, current dose verified, continue 5 mg Mon Fri, 7.5 mg all other days, will recheck in 3 weeks 8/4/23

## 2023-07-14 NOTE — PROGRESS NOTES
This cm contacted ct in regards to recert appointment. Ct agreeable to completing recert with this cm today.     Ct reports complete understanding of HIV disease process, transmission, and medications. Ct reports she is able to meet own basic needs and is able to access community assistance as needed. Ct reports consistent and reliable access to transportation. Ct reports medical care coverage/health insurance. Ct reports being self sufficient and can independently follow up on own referrals and access care and services. Ct reports stable housing. Ct reports not being sexually active in the last 3 months. Ct reports no difficulties with substance use. Ct reports being aware of dental services and needs occasional assistance accessing dental services. Ct let this cm know that she was on a wait list for [facility] for getting the screws removed from her mouth. Ct reports no history of mental health. Ct reports steady source of income. Ct reports no language/cultural barriers.      Acuity scale was completed and scanned into ct's chart. RW part B form was completed and scanned into ct's chart.

## 2023-07-17 ENCOUNTER — TELEPHONE (OUTPATIENT)
Dept: SURGERY | Facility: CLINIC | Age: 58
End: 2023-07-17

## 2023-07-17 DIAGNOSIS — E78.5 HYPERLIPIDEMIA, UNSPECIFIED HYPERLIPIDEMIA TYPE: ICD-10-CM

## 2023-07-17 DIAGNOSIS — I25.10 TRIPLE VESSEL CORONARY ARTERY DISEASE: ICD-10-CM

## 2023-07-17 DIAGNOSIS — I25.2 PAST HEART ATTACK: ICD-10-CM

## 2023-07-17 DIAGNOSIS — I25.5 ISCHEMIC CARDIOMYOPATHY: ICD-10-CM

## 2023-07-17 DIAGNOSIS — I25.2 OLD MI (MYOCARDIAL INFARCTION): ICD-10-CM

## 2023-07-17 RX ORDER — ATORVASTATIN CALCIUM 80 MG/1
TABLET, FILM COATED ORAL
Qty: 30 TABLET | Refills: 5 | Status: SHIPPED | OUTPATIENT
Start: 2023-07-17

## 2023-07-17 RX ORDER — CARVEDILOL 6.25 MG/1
TABLET ORAL
Qty: 60 TABLET | Refills: 5 | Status: SHIPPED | OUTPATIENT
Start: 2023-07-17

## 2023-07-17 RX ORDER — ASPIRIN 81 MG/1
TABLET, COATED ORAL
Qty: 30 TABLET | Refills: 5 | Status: SHIPPED | OUTPATIENT
Start: 2023-07-17

## 2023-07-19 RX ORDER — SACUBITRIL AND VALSARTAN 49; 51 MG/1; MG/1
TABLET, FILM COATED ORAL
Qty: 60 TABLET | Refills: 5 | Status: SHIPPED | OUTPATIENT
Start: 2023-07-19

## 2023-07-27 NOTE — PROGRESS NOTES
Assessment/Plan:    HIV disease ([address])  Doing well on Biktarvy with an undetectable VL. Pt reports 100% medication compliance on HAART. Stressed the importance of 100% medication adherence. HIV Counseling:    Viral Load: < 20    CD4 Count: 510      Denies side effects. Stressed the importance of adherence. Continue follow up in the ID clinic with Dr. Apollo Giron or Dr. Ashley Campbell. Reviewed the most recent labs, including the CD4 and viral load. Discussed the risks and benefits of treatment options, instructions for management, importance of treatment adherence, and reduction of risk factors. Educated on possible medication side effects. Counseled on routes of HIV transmission, including the risk of  infection. Emphasized that viral suppression is the best method to prevent HIV transmission. At this time the pt denies the need for HIV testing of anyone in their life. Total encounter time was greater than 35 minutes. Greater than 20 minutes were spent on counseling and patient education. Pt voices understanding and agreement with treatment plan. Essential hypertension  At goal.    · Continue HTN  · Follow with cardiology     Tobacco abuse  Doing well with smoking cessation for the past 5 months. · Continue with smoking cessation  · Continue with nicotine lozenges    Nicotine Dependency - Primary    Counseled for greater than 15 minutes on the importance of smoking cessation. Education was given regarding the cardiovascular effects of how nicotine affects the heart, lungs, kidneys, and peripheral vascular system. Referred to [facility] for enrollment in smoking cessation program.      Unintentional weight loss  [place]. No further weight loss.   · Continue life style modifications    CKD (chronic kidney disease) stage 3, GFR 30-59 ml/min ([facility])  Lab Results   Component Value Date    EGFR 44 2023    EGFR 50 2023    EGFR 43 2023    CREATININE 1.33 (H) 2023    CREATININE 1.19 04/20/2023    CREATININE 1.36 (H) 04/19/2023   Remains at baseline. · Continue to follow with Nephrology    Age-related osteoporosis without current pathological fracture  New dx. · Discussed life style modifications  · Add calcium to vitamin d  · Resistance training and exercise daiy  · Repeat in 2 years DXA    Meningioma, cerebral ([address])  Feeling good. Denies any sxs associated with meningiomas at this time. · Repeat MRI scheduled for 8/22  · F/u neurosurgery visit 8/26  · Repeat any new symptoms    Weakness of extremity  Feeling pain and weakness in b/l knees when getting up worse in am/  Most likely related to OA. Would like to try water therapy at [address] with mom. · Referral placed        Diagnoses and all orders for this visit:    HIV disease ([address])    Weakness of extremity  -     Ambulatory Referral to Physical Therapy; Future    Age-related osteoporosis without current pathological fracture  -     calcium carbonate-vitamin D 500 mg-5 mcg per tablet; Take 1 tablet by mouth daily with breakfast    Tobacco abuse    Essential hypertension    Unintentional weight loss    Stage 3a chronic kidney disease (HCC)    Meningioma, cerebral (HCC)          Subjective:      Patient ID: Heaven Lo is a 63 y.o. female. HPI  Pt is being seen for 6 month follow up visit for management of HIV. Pt reports she is doing good and does not have any concerns or issues. She completed the Shingrix series. She is scheduled for her MRI with PA on 8/22 and then will see neurosurgery on 8/26. Pt does not endorse any fever, chills, nausea, vomiting, cough, SOB, diarrhea, or night sweats. The following portions of the patient's history were reviewed and updated as appropriate: allergies, current medications, past family history, past medical history, past social history, past surgical history and problem list.    Review of Systems   Constitutional: Negative. HENT: Negative. Respiratory: Negative.     Cardiovascular: Negative. Gastrointestinal: Negative. Genitourinary: Negative. Musculoskeletal: Positive for arthralgias. Neurological: Negative. Psychiatric/Behavioral: Negative. Objective:      /85   Pulse 64   Temp (!) 96.4 °F (35.8 °C) (Tympanic)   Resp 17   Ht 5' 2" (1.575 m)   Wt 67.5 kg (148 lb 12.8 oz)   SpO2 98%   BMI 27.22 kg/m²          Physical Exam  Vitals and nursing note reviewed. Constitutional:       General: She is not in acute distress. Appearance: Normal appearance. She is not ill-appearing. Neck:      Vascular: No JVD. Cardiovascular:      Rate and Rhythm: Normal rate and regular rhythm. Chest Wall: PMI is not displaced. Pulses: Normal pulses. Heart sounds: Normal heart sounds. Pulmonary:      Effort: Pulmonary effort is normal.      Breath sounds: Normal breath sounds. Abdominal:      General: Bowel sounds are normal.      Palpations: Abdomen is soft. Musculoskeletal:         General: No swelling or tenderness. Skin:     General: Skin is warm and dry. Capillary Refill: Capillary refill takes less than 2 seconds. Neurological:      Mental Status: She is alert and oriented to person, place, and time. Psychiatric:         Mood and Affect: Mood normal.         Behavior: Behavior normal.         Thought Content: Thought content normal.       BMI Counseling: Body mass index is 27.22 kg/m². The BMI is above normal. Nutrition recommendations include reducing portion sizes, decreasing overall calorie intake, reducing fast food intake, consuming healthier snacks, decreasing soda and/or juice intake, moderation in carbohydrate intake and increasing intake of lean protein. Exercise recommendations include moderate aerobic physical activity for 150 minutes/week, exercising 3-5 times per week and strength training exercises.

## 2023-07-27 NOTE — ASSESSMENT & PLAN NOTE
Doing well on Biktarvy with an undetectable VL. Pt reports 100% medication compliance on HAART. Stressed the importance of 100% medication adherence. HIV Counseling:    Viral Load: < 20    CD4 Count: 510      Denies side effects. Stressed the importance of adherence. Continue follow up in the ID clinic with Dr. Jazzmine Edgar or Dr. Emma Rios. Reviewed the most recent labs, including the CD4 and viral load. Discussed the risks and benefits of treatment options, instructions for management, importance of treatment adherence, and reduction of risk factors. Educated on possible medication side effects. Counseled on routes of HIV transmission, including the risk of  infection. Emphasized that viral suppression is the best method to prevent HIV transmission. At this time the pt denies the need for HIV testing of anyone in their life. Total encounter time was greater than 35 minutes. Greater than 20 minutes were spent on counseling and patient education. Pt voices understanding and agreement with treatment plan.

## 2023-07-28 ENCOUNTER — PATIENT OUTREACH (OUTPATIENT)
Dept: SURGERY | Facility: CLINIC | Age: 58
End: 2023-07-28

## 2023-07-28 ENCOUNTER — OFFICE VISIT (OUTPATIENT)
Dept: SURGERY | Facility: CLINIC | Age: 58
End: 2023-07-28
Payer: MEDICARE

## 2023-07-28 VITALS
TEMPERATURE: 96.4 F | BODY MASS INDEX: 27.38 KG/M2 | OXYGEN SATURATION: 98 % | HEART RATE: 64 BPM | RESPIRATION RATE: 17 BRPM | SYSTOLIC BLOOD PRESSURE: 135 MMHG | WEIGHT: 148.8 LBS | DIASTOLIC BLOOD PRESSURE: 85 MMHG | HEIGHT: 62 IN

## 2023-07-28 DIAGNOSIS — M81.0 AGE-RELATED OSTEOPOROSIS WITHOUT CURRENT PATHOLOGICAL FRACTURE: ICD-10-CM

## 2023-07-28 DIAGNOSIS — N18.31 STAGE 3A CHRONIC KIDNEY DISEASE (HCC): ICD-10-CM

## 2023-07-28 DIAGNOSIS — D32.0 MENINGIOMA, CEREBRAL (HCC): ICD-10-CM

## 2023-07-28 DIAGNOSIS — R63.4 UNINTENTIONAL WEIGHT LOSS: ICD-10-CM

## 2023-07-28 DIAGNOSIS — B20 HIV DISEASE (HCC): Primary | ICD-10-CM

## 2023-07-28 DIAGNOSIS — R29.898 WEAKNESS OF EXTREMITY: ICD-10-CM

## 2023-07-28 DIAGNOSIS — Z72.0 TOBACCO ABUSE: ICD-10-CM

## 2023-07-28 DIAGNOSIS — I10 ESSENTIAL HYPERTENSION: ICD-10-CM

## 2023-07-28 PROCEDURE — 99214 OFFICE O/P EST MOD 30 MIN: CPT | Performed by: NURSE PRACTITIONER

## 2023-07-28 RX ORDER — B-COMPLEX WITH VITAMIN C
1 TABLET ORAL
Qty: 90 TABLET | Refills: 0 | Status: SHIPPED | OUTPATIENT
Start: 2023-07-28

## 2023-07-28 NOTE — ASSESSMENT & PLAN NOTE
New dx.   · Discussed life style modifications  · Add calcium to vitamin d  · Resistance training and exercise daiy  · Repeat in 2 years DXA

## 2023-07-28 NOTE — ASSESSMENT & PLAN NOTE
Feeling pain and weakness in b/l knees when getting up worse in am/  Most likely related to OA. Would like to try water therapy at [address] with mom.   · Referral placed

## 2023-07-28 NOTE — ASSESSMENT & PLAN NOTE
Lab Results   Component Value Date    EGFR 44 04/28/2023    EGFR 50 04/20/2023    EGFR 43 04/19/2023    CREATININE 1.33 (H) 04/28/2023    CREATININE 1.19 04/20/2023    CREATININE 1.36 (H) 04/19/2023   Remains at baseline.   · Continue to follow with Nephrology

## 2023-07-28 NOTE — ASSESSMENT & PLAN NOTE
Doing well with smoking cessation for the past 5 months. · Continue with smoking cessation  · Continue with nicotine lozenges    Nicotine Dependency - Primary    Counseled for greater than 15 minutes on the importance of smoking cessation. Education was given regarding the cardiovascular effects of how nicotine affects the heart, lungs, kidneys, and peripheral vascular system.   Referred to [phone] [id number] [facility] for enrollment in smoking cessation program.

## 2023-07-28 NOTE — ASSESSMENT & PLAN NOTE
Feeling good. Denies any sxs associated with meningiomas at this time.   · Repeat MRI scheduled for 8/22  · F/u neurosurgery visit 8/26  · Repeat any new symptoms

## 2023-08-11 ENCOUNTER — APPOINTMENT (OUTPATIENT)
Dept: LAB | Facility: HOSPITAL | Age: 58
End: 2023-08-11
Payer: MEDICARE

## 2023-08-11 ENCOUNTER — ANTICOAG VISIT (OUTPATIENT)
Dept: CARDIOLOGY CLINIC | Facility: CLINIC | Age: 58
End: 2023-08-11

## 2023-08-11 DIAGNOSIS — I23.6 LV (LEFT VENTRICULAR) MURAL THROMBUS FOLLOWING MI (HCC): ICD-10-CM

## 2023-08-11 DIAGNOSIS — Z79.01 LONG TERM CURRENT USE OF ANTICOAGULANT THERAPY: ICD-10-CM

## 2023-08-11 DIAGNOSIS — Z21 HIV POSITIVE (HCC): ICD-10-CM

## 2023-08-11 DIAGNOSIS — I51.3 MURAL THROMBUS OF LEFT VENTRICLE: ICD-10-CM

## 2023-08-11 DIAGNOSIS — I25.5 ISCHEMIC CARDIOMYOPATHY: Primary | ICD-10-CM

## 2023-08-11 NOTE — PROGRESS NOTES
Spoke with patient, advised INR high, no changes in medications, diet or health.  Advised to hold tonight, then go back to 5 mg Mon Fri, 7.5 mg all other days, will recheck 8/22/23

## 2023-08-14 RX ORDER — BICTEGRAVIR SODIUM, EMTRICITABINE, AND TENOFOVIR ALAFENAMIDE FUMARATE 50; 200; 25 MG/1; MG/1; MG/1
1 TABLET ORAL
Qty: 90 TABLET | Refills: 1 | Status: SHIPPED | OUTPATIENT
Start: 2023-08-14

## 2023-08-14 RX ORDER — WARFARIN SODIUM 5 MG/1
TABLET ORAL
Qty: 135 TABLET | Refills: 3 | Status: SHIPPED | OUTPATIENT
Start: 2023-08-14

## 2023-08-22 ENCOUNTER — OFFICE VISIT (OUTPATIENT)
Dept: CARDIOLOGY CLINIC | Facility: CLINIC | Age: 58
End: 2023-08-22
Payer: MEDICARE

## 2023-08-22 ENCOUNTER — HOSPITAL ENCOUNTER (OUTPATIENT)
Dept: RADIOLOGY | Facility: HOSPITAL | Age: 58
Discharge: HOME | End: 2023-08-22
Payer: MEDICARE

## 2023-08-22 VITALS
BODY MASS INDEX: 27.29 KG/M2 | DIASTOLIC BLOOD PRESSURE: 70 MMHG | HEART RATE: 64 BPM | SYSTOLIC BLOOD PRESSURE: 104 MMHG | WEIGHT: 149.2 LBS

## 2023-08-22 DIAGNOSIS — E78.2 MIXED HYPERLIPIDEMIA: ICD-10-CM

## 2023-08-22 DIAGNOSIS — Z79.01 LONG TERM CURRENT USE OF ANTICOAGULANT THERAPY: ICD-10-CM

## 2023-08-22 DIAGNOSIS — I25.10 TRIPLE VESSEL CORONARY ARTERY DISEASE: Primary | ICD-10-CM

## 2023-08-22 DIAGNOSIS — D32.9 MENINGIOMA (HCC): ICD-10-CM

## 2023-08-22 DIAGNOSIS — N18.31 STAGE 3A CHRONIC KIDNEY DISEASE (HCC): ICD-10-CM

## 2023-08-22 DIAGNOSIS — Z95.810 IMPLANTABLE CARDIOVERTER-DEFIBRILLATOR (ICD) IN SITU: ICD-10-CM

## 2023-08-22 DIAGNOSIS — Z72.0 TOBACCO ABUSE: ICD-10-CM

## 2023-08-22 DIAGNOSIS — I10 ESSENTIAL HYPERTENSION: ICD-10-CM

## 2023-08-22 DIAGNOSIS — I23.6 LV (LEFT VENTRICULAR) MURAL THROMBUS FOLLOWING MI (HCC): ICD-10-CM

## 2023-08-22 DIAGNOSIS — I25.5 ISCHEMIC CARDIOMYOPATHY: ICD-10-CM

## 2023-08-22 PROCEDURE — G1004 CDSM NDSC: HCPCS

## 2023-08-22 PROCEDURE — 99214 OFFICE O/P EST MOD 30 MIN: CPT | Performed by: INTERNAL MEDICINE

## 2023-08-22 PROCEDURE — 70553 MRI BRAIN STEM W/O & W/DYE: CPT

## 2023-08-22 PROCEDURE — A9585 GADOBUTROL INJECTION: HCPCS | Performed by: NURSE PRACTITIONER

## 2023-08-22 RX ORDER — GADOBUTROL 604.72 MG/ML
6 INJECTION INTRAVENOUS
Status: COMPLETED | OUTPATIENT
Start: 2023-08-22 | End: 2023-08-22

## 2023-08-22 RX ADMIN — GADOBUTROL 6 ML: 604.72 INJECTION INTRAVENOUS at 14:59

## 2023-08-22 NOTE — NURSING NOTE
Device interrogation for MRI. Normal device function prior to MRI. Leads and device meet all requirements per policy for MRI. Device programmed OVO per Cardiology for MRI. Patient has no complaints. Vital signs monitored throughout by BEVERLY Martinez RN. Normal device function post MRI. Device reprogrammed to prior settings per Cardiology.

## 2023-08-22 NOTE — PROGRESS NOTES
[facility]  [address] [facility] [address] [facility]  Phone#  [phone]   Fax#  [phone]  *-*-*-*-*-*-*-*-*-*-*-*-*-*-*-*-*-*-*-*-*-*-*-*-*-*-*-*-*-*-*-*-*-*-*-*-*-*-*-*-*-*-*-*-*-*-*-*-*-*-*-*-*-*                                   Cardiology Follow Up      ENCOUNTER DATE: 23 5:00 PM  PATIENT NAME: Mike Price   : 1965    MRN: [id number]  AGE:58 y.o. SEX: female  [address], [place]     PRIMARY CARE PHYSICIAN: JOSE ROBERTO Mancera    ACTIVE DIAGNOSIS THIS VISIT  1. Triple vessel coronary artery disease        2. Ischemic cardiomyopathy        3. 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 ([address])        4. Mixed hyperlipidemia        5. Essential hypertension        6. Implantable cardioverter-defibrillator (ICD) in situ        7. Stage 3a chronic kidney disease ([address])        8. Long term current use of anticoagulant therapy        9.  Tobacco abuse          ACTIVE PROBLEM LIST  Patient Active Problem List   Diagnosis   • Ischemic cardiomyopathy   • Triple vessel coronary artery disease   • HIV disease ([address])   • Essential hypertension   • Mixed hyperlipidemia   • Impaired left ventricular function   • Long term current use of anticoagulant therapy   • old extensive anterior, apical, lateral and distal inferior apical MI   • 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 ([address])   • CKD (chronic kidney disease) stage 3, GFR 30-59 ml/min ([facility])   • Implantable cardioverter-defibrillator (ICD) in situ   • Mild intermittent asthma without complication   • ASCUS with positive high risk HPV cervical   • Tobacco abuse   • Onychomycosis   • Bronchiectasis without complication ([facility])   • Aggression   • Unintentional weight loss   • Orbital cellulitis on left   • Abnormal CT scan, head   • Lesion of left frontal lobe of brain   • Seasonal allergies   • Mucous retention cyst of maxillary sinus   • Meningioma, cerebral (HCC)   • Age-related osteoporosis without current pathological fracture   • Weakness of extremity       CARDIOLOGY SPECIALTY COMMENTS  Patient was referred to us from [facility]. She experienced a myocardial infarction on February 28, 2017 in [facility]. She has subsequently moved to [facility] area. She had a cardiac catheterization taken at [facility]. Patient's cardiac catheterization CD revealed the left main coronary artery to be nonobstructed. The left anterior descending artery was 100% in its midportion after a large 1st diagonal branch which was nonobstructed. The circumflex artery obtuse marginal was 90%. The right coronary artery in its midportion with 50-60%. The large distal LAD filled from right to left collaterals. [facility] had recommended CABG but the patient had refused at that time. No ventriculogram was performed but an echocardiogram which I performed had demonstrated extensive LV damage. A nuclear stress test demonstrated minimal with any it ischemia. I did not feel that the patient would improve with CABG. Patient is free of angina pectoris or shortness of breath. She is doing quite well for her degree of LV dysfunction. 04/11/2017 echocardiogram severe LV dysfunction, EF 30%, mild LV enlargement, apical aneurysm with akinesis to paradoxical motion. Large organized apical thrombus. Grade 1 diastolic dysfunction, normal pulmonary artery pressures     05/15/2017 stress test: Severe LV dysfunction, EF 20%. Mild LV enlargement. Anterior apical aneurysm with paradoxical motion. Extensive myocardial infarctions involving the distal anterior apical and inferior apical walls. Negative for Persantine induced ischemia.      12/13/2017 ICD: single-chamber. 03/12/2019 echocardiogram: Severe LV systolic dysfunction, EF 36-98% apex is paradoxical. Grade 1 diastolic dysfunction.  Thrombus in the septal apex with smoke    4/12/2023 echocardiogram: LVEF 42% with cavity upper limits of normal.  Anterior walls are hypokinetic. Ubly is hypokinetic to akinetic. Grade 1 diastolic dysfunction. INTERVAL HISTORY:        Patient has no cardiac complaints. Patient denies chest discomfort or shortness of breath. Patient has no palpitations. Patient denies symptoms of dizziness, lightheadedness or near-syncope/syncope. Patient denies leg edema. Patient denies symptoms of orthopnea or paroxysmal nocturnal dyspnea. She had an echocardiogram performed April 12, 2023 which demonstrated her LVEF to have improved to 42%. In 2019 her EF was 25 to 27%. Her last lipid profile on March 31, 2023 demonstrated total cholesterol 153, triglycerides 93, HDL 61, LDL calculated 73. DISCUSSION/PLAN:          Return in 6 months.   EKG on return    Lab Studies:    Lab Results   Component Value Date    CHOLESTEROL 153 03/31/2023    CHOLESTEROL 125 12/27/2021    CHOLESTEROL 152 07/02/2021     Lab Results   Component Value Date    TRIG 93 03/31/2023    TRIG 119 12/27/2021    TRIG 124 07/02/2021     Lab Results   Component Value Date    HDL 61 03/31/2023    HDL 36 (L) 12/27/2021    HDL 49 07/02/2021     Lab Results   Component Value Date    LDLCALC 73 03/31/2023    LDLCALC 65 12/27/2021    LDLCALC 78 07/02/2021       Lab Results   Component Value Date    LDLDIRECT 76.46 02/14/2019       Lab Results   Component Value Date    NTBNP 952 (H) 06/18/2022    NTBNP 556 (H) 03/14/2020     Lab Results   Component Value Date    HGBA1C 5.6 01/30/2023      Lab Results   Component Value Date    EGFR 44 04/28/2023    EGFR 50 04/20/2023    EGFR 43 04/19/2023    SODIUM 141 04/28/2023    SODIUM 143 04/20/2023    SODIUM 145 04/19/2023    K 3.7 04/28/2023    K 4.2 04/20/2023    K 4.3 04/19/2023     04/28/2023     (H) 04/20/2023     (H) 04/19/2023    CO2 27 04/28/2023    CO2 23 04/20/2023    CO2 29 04/19/2023    BUN 14 04/28/2023    BUN 16 04/20/2023    BUN 15 04/19/2023    CREATININE 1.33 (H) 04/28/2023 CREATININE 1.19 04/20/2023    CREATININE 1.36 (H) 04/19/2023     Lab Results   Component Value Date    WBC 5.16 04/28/2023    WBC 5.3 04/28/2023    WBC 3.73 (L) 04/20/2023    HGB 13.4 04/28/2023    HGB 12.2 04/28/2023    HGB 12.2 04/20/2023    HCT 41.1 04/28/2023    HCT 35.9 04/28/2023    HCT 37.8 04/20/2023    MCV 96 04/28/2023    MCV 94 04/28/2023    MCV 95 04/20/2023    MCH 31.4 04/28/2023    MCH 31.8 04/28/2023    MCH 30.7 04/20/2023    MCHC 32.6 04/28/2023    MCHC 34.0 04/28/2023    MCHC 32.3 04/20/2023     04/28/2023     04/28/2023     04/20/2023      Lab Results   Component Value Date    CALCIUM 9.5 04/28/2023    CALCIUM 8.7 04/20/2023    CALCIUM 8.9 04/19/2023    AST 17 04/28/2023    AST 16 03/31/2023    AST 20 10/14/2022    ALT 14 04/28/2023    ALT 12 03/31/2023    ALT 13 10/14/2022    ALKPHOS 48 04/28/2023    ALKPHOS 49 03/31/2023    ALKPHOS 53 10/14/2022    MG 2.2 04/20/2023    MG 2.1 12/13/2017     No results found for this visit on 08/22/23.       Current Outpatient Medications:   •  Aspirin Low Dose 81 MG EC tablet, TAKE 1 TABLET BY MOUTH DAILY, Disp: 30 tablet, Rfl: 5  •  atorvastatin (LIPITOR) 80 mg tablet, TAKE 1 TABLET BY MOUTH DAILY, Disp: 30 tablet, Rfl: 5  •  Biktarvy -25 MG tablet, TAKE 1 TABLET BY MOUTH ONCE DAILY WITH BREAKFAST, Disp: 90 tablet, Rfl: 1  •  calcium carbonate-vitamin D 500 mg-5 mcg per tablet, Take 1 tablet by mouth daily with breakfast, Disp: 90 tablet, Rfl: 0  •  carvedilol (COREG) 6.25 mg tablet, TAKE 1 TABLET BY MOUTH TWICE A DAY WITH MEALS, Disp: 60 tablet, Rfl: 5  •  cholecalciferol (VITAMIN D3) 1,000 units tablet, Take 2,000 Units by mouth daily, Disp: , Rfl:   •  cyanocobalamin (VITAMIN B-12) 100 MCG tablet, Take 100 mcg by mouth daily, Disp: , Rfl:   •  Entresto 49-51 MG TABS, TAKE 1 TABLET BY MOUTH TWICE A DAY, Disp: 60 tablet, Rfl: 5  •  nicotine polacrilex (COMMIT) 4 MG lozenge, USE 1 LOZENGE AS NEEDED FOR SMOKING CESSATION, Disp: 72 lozenge, Rfl: 5  •  warfarin (COUMADIN) 5 mg tablet, TAKE 1 TO 1 AND 1/2 TABLETS BY MOUTH DAILY AS DIRECTED BY MD, Disp: 135 tablet, Rfl: 3  No current facility-administered medications for this visit.   Allergies   Allergen Reactions   • No Active Allergies    • No Known Allergies        Past Medical History:   Diagnosis Date   • Asthma    • Coronary artery disease     defibrillator   • HIV positive ([address] [facility])      Social History     Socioeconomic History   • Marital status: Single     Spouse name: Not on file   • Number of children: Not on file   • Years of education: Not on file   • Highest education level: Not on file   Occupational History   • Not on file   Tobacco Use   • Smoking status: Former     Packs/day: 0.50     Types: Cigarettes     Quit date: 2023     Years since quittin.3   • Smokeless tobacco: Never   Vaping Use   • Vaping Use: Never used   Substance and Sexual Activity   • Alcohol use: Yes     Comment: socially   • Drug use: No   • Sexual activity: Not Currently   Other Topics Concern   • Not on file   Social History Narrative   • Not on file     Social Determinants of Health     Financial Resource Strain: Not on file   Food Insecurity: No Food Insecurity (2023)    Hunger Vital Sign    • Worried About Running Out of Food in the Last Year: Never true    • Ran Out of Food in the Last Year: Never true   Transportation Needs: Not on file   Physical Activity: Not on file   Stress: Not on file   Social Connections: Not on file   Intimate Partner Violence: Not on file   Housing Stability: Not on file      Family History   Problem Relation Age of Onset   • No Known Problems Mother    • Other Father         GI bleed   • No Known Problems Sister    • No Known Problems Sister    • No Known Problems Maternal Grandmother    • No Known Problems Maternal Grandfather    • No Known Problems Paternal Grandmother    • No Known Problems Paternal Grandfather    • Suicidality Brother    • Drug abuse Brother overdose   • No Known Problems Maternal Aunt    • No Known Problems Maternal Aunt    • No Known Problems Maternal Aunt    • No Known Problems Paternal Aunt      Past Surgical History:   Procedure Laterality Date   • ANGIOPLASTY     • CARDIAC DEFIBRILLATOR PLACEMENT     • CORONARY ANGIOPLASTY      pci placement   • TOTAL ABDOMINAL HYSTERECTOMY     • US GUIDED INJECTION FOR RESEARCH STUDY  5/7/2018   • US GUIDED INJECTION FOR RESEARCH STUDY  1/18/2019   • US GUIDED INJECTION FOR RESEARCH STUDY  5/7/2018   • US GUIDED INJECTION FOR RESEARCH STUDY  5/14/2018       PREVIOUS WEIGHTS:   Wt Readings from Last 10 Encounters:   08/22/23 67.7 kg (149 lb 3.2 oz)   07/28/23 67.5 kg (148 lb 12.8 oz)   07/14/23 66.7 kg (147 lb)   06/15/23 66.7 kg (147 lb)   08/22/23 68 kg (150 lb)   06/05/23 66.7 kg (147 lb)   05/09/23 66.7 kg (147 lb)   04/24/23 67.7 kg (149 lb 3.2 oz)   04/18/23 68 kg (149 lb 14.6 oz)   04/18/23 68 kg (149 lb 14.6 oz)        Review of Systems:  Review of Systems   Respiratory: Negative for cough, choking, chest tightness, shortness of breath and wheezing. Cardiovascular: Negative for chest pain, palpitations and leg swelling. Musculoskeletal: Negative for gait problem. Skin: Negative for rash. Neurological: Negative for dizziness, tremors, syncope, weakness, light-headedness, numbness and headaches. Psychiatric/Behavioral: Negative for agitation and behavioral problems. The patient is not hyperactive. Physical Exam:  /70 (BP Location: Left arm, Patient Position: Sitting, Cuff Size: Adult)   Pulse 64   Wt 67.7 kg (149 lb 3.2 oz)   BMI 27.29 kg/m²     Physical Exam  Constitutional:       General: She is not in acute distress. Appearance: She is well-developed. HENT:      Head: Normocephalic and atraumatic. Neck:      Thyroid: No thyromegaly. Vascular: No carotid bruit or JVD. Trachea: No tracheal deviation.    Cardiovascular:      Rate and Rhythm: Normal rate and regular rhythm. Pulses: Normal pulses. Heart sounds: Normal heart sounds. No murmur heard. No friction rub. No gallop. Pulmonary:      Effort: Pulmonary effort is normal. No respiratory distress. Breath sounds: Normal breath sounds. No wheezing, rhonchi or rales. Chest:      Chest wall: No tenderness. Musculoskeletal:         General: Normal range of motion. Cervical back: Normal range of motion and neck supple. Right lower leg: No edema. Left lower leg: No edema. Skin:     General: Skin is warm and dry. Neurological:      General: No focal deficit present. Mental Status: She is alert and oriented to person, place, and time. Psychiatric:         Mood and Affect: Mood normal.         Behavior: Behavior normal.         Thought Content: Thought content normal.         Judgment: Judgment normal.         -------------------------------------------------------------------------------   CARDIAC EP DEVICE REPORT  Results for orders placed in visit on 06/02/23    Cardiac EP device report    Narrative  MDT-SINGLE CHAMBER ICD - ACTIVE SYSTEM IS MRI CONDITIONAL  CARELINK TRANSMISSION: BATTERY VOLTAGE ADEQUATE (6 YRS).  0.2% (VVI 40 PPM); ALL AVAILABLE LEAD PARAMETERS WITHIN NORMAL LIMITS. NO SIGNIFICANT HIGH RATE EPISODES. OPTI-VOL WITHIN NORMAL LIMITS. NORMAL DEVICE FUNCTION. ES      Results for orders placed in visit on 03/03/23    Cardiac EP device report    Narrative  MDT-SINGLE CHAMBER ICD - ACTIVE SYSTEM IS MRI CONDITIONAL  CARELINK TRANSMISSION: BATTERY VOLTAGE ADEQUATE (6.7 YRS). : <0.1%. ALL AVAILABLE LEAD PARAMETERS WITHIN NORMAL LIMITS. NO SIGNIFICANT HIGH RATE EPISODES. OPTI-VOL WITHIN NORMAL LIMITS. APPROPRIATELY FUNCTIONING ICD. [address] [facility] [address]      ======================================================  Imaging:   I have personally reviewed pertinent reports. I spent 30 minutes on the patient's office visit. This time was spent on the day of the visit.  I had direct contact with the patient in the office on the day of the visit. Greater than 50% of the total time was spent obtaining a history, examining patient, answering all patient questions, arranging and discussing plan of care with patient as well as directly providing instructions. Additional time then spent on orders and office chart. Portions of the record may have been created with voice recognition software. Occasional wrong word or "sound a like" substitutions may have occurred due to the inherent limitations of voice recognition software. Read the chart carefully and recognize, using context, where substitutions have occurred.     SIGNATURES:   Maggie Edouard MD

## 2023-08-22 NOTE — NURSING NOTE
Medtronic device interrogation for MRI done by BARON Marquis clinical specialist. Device set to MRI safe mode--programmed off. MRI brain w/without contrast complete. Pt tolerated well. VSS throughout scan. Pt alert and oriented on room air. No complaints or visible signs of distress. Device reprogrammed to prior settings per Cardiology by Patti Alvares RN.

## 2023-08-24 ENCOUNTER — APPOINTMENT (OUTPATIENT)
Dept: LAB | Facility: CLINIC | Age: 58
End: 2023-08-24
Payer: MEDICARE

## 2023-08-24 ENCOUNTER — TELEPHONE (OUTPATIENT)
Dept: CARDIOLOGY CLINIC | Facility: CLINIC | Age: 58
End: 2023-08-24

## 2023-08-24 DIAGNOSIS — Z79.01 LONG TERM CURRENT USE OF ANTICOAGULANT THERAPY: Primary | ICD-10-CM

## 2023-08-24 DIAGNOSIS — Z79.01 LONG TERM CURRENT USE OF ANTICOAGULANT THERAPY: ICD-10-CM

## 2023-08-24 PROCEDURE — 36415 COLL VENOUS BLD VENIPUNCTURE: CPT

## 2023-08-25 ENCOUNTER — APPOINTMENT (OUTPATIENT)
Dept: LAB | Facility: HOSPITAL | Age: 58
End: 2023-08-25
Payer: MEDICARE

## 2023-08-25 DIAGNOSIS — Z79.01 LONG TERM CURRENT USE OF ANTICOAGULANT THERAPY: ICD-10-CM

## 2023-08-25 LAB
INR PPP: 3.92 (ref 0.84–1.19)
PROTHROMBIN TIME: 38.6 SECONDS (ref 11.6–14.5)

## 2023-08-25 PROCEDURE — 36415 COLL VENOUS BLD VENIPUNCTURE: CPT

## 2023-08-25 PROCEDURE — 85610 PROTHROMBIN TIME: CPT

## 2023-08-28 ENCOUNTER — OFFICE VISIT (OUTPATIENT)
Dept: NEUROSURGERY | Facility: CLINIC | Age: 58
End: 2023-08-28
Payer: MEDICARE

## 2023-08-28 ENCOUNTER — TELEPHONE (OUTPATIENT)
Dept: NEUROSURGERY | Facility: CLINIC | Age: 58
End: 2023-08-28

## 2023-08-28 ENCOUNTER — ANTICOAG VISIT (OUTPATIENT)
Dept: CARDIOLOGY CLINIC | Facility: CLINIC | Age: 58
End: 2023-08-28

## 2023-08-28 VITALS
SYSTOLIC BLOOD PRESSURE: 98 MMHG | HEIGHT: 62 IN | OXYGEN SATURATION: 99 % | HEART RATE: 62 BPM | TEMPERATURE: 97 F | BODY MASS INDEX: 27.42 KG/M2 | DIASTOLIC BLOOD PRESSURE: 62 MMHG | WEIGHT: 149 LBS

## 2023-08-28 DIAGNOSIS — D32.0 MENINGIOMA, CEREBRAL (HCC): Primary | ICD-10-CM

## 2023-08-28 DIAGNOSIS — I25.5 ISCHEMIC CARDIOMYOPATHY: Primary | ICD-10-CM

## 2023-08-28 DIAGNOSIS — Z79.01 LONG TERM CURRENT USE OF ANTICOAGULANT THERAPY: ICD-10-CM

## 2023-08-28 DIAGNOSIS — I23.6 LV (LEFT VENTRICULAR) MURAL THROMBUS FOLLOWING MI (HCC): ICD-10-CM

## 2023-08-28 PROCEDURE — 99214 OFFICE O/P EST MOD 30 MIN: CPT | Performed by: PHYSICIAN ASSISTANT

## 2023-08-28 NOTE — PROGRESS NOTES
Spoke with patient, advised INR still high, advised to take 2.5 mg tonight, then 5 mg Mon Wed Fri, 7.5 mg all other days, will recheck in 2 weeks 9/11/23

## 2023-08-28 NOTE — TELEPHONE ENCOUNTER
8/28/23 Patient will be contacted by central scheduling dept as she has a pacemaker provided central scheduling with medtronic information so patient can be scheduled. Follow up with dr. Theodore jimenez on 11/28/23. Patient advised if mri brain is after follow up appt she should call to reschedule.

## 2023-08-28 NOTE — ASSESSMENT & PLAN NOTE
· Pt presents for 3 month follow up for left frontal mass, likely meningioma. · Initially noted noted on CT orbits in 4/18/23 during workup for orbital cellulitis. · Imaging reviewed personally. Final results below discussed with the patient. · MRI brain w/wo 8/22/2023: No significant interval change evident as compared to 5/23/2023 MRI brain. Again noted left-sided anterior cranial fossa meningioma with mild mass effect upon the left frontal lobe and no associated brain reaction/vasogenic edema. Again noted old right basal ganglia, centrum semiovale and right frontal lobe infarctions. Mild to moderate altered signal involving white matter discussed above is nonspecific regarding etiology; findings most often relating to chronic small vessel white matter ischemic disease.     Plan  · Again discussed with patient the natural history of meningiomas. · Given stable imaging, at this time no neurosurgical interventions anticipated. · Recommend continued surveillance per NCCN guidelines. Recommend 6 months (from initial imaging) follow up with repeat MRI brain w/wo. · Discussed plan of care with patient who showed understanding. · Patient made aware to contact neurosurgery with any questions or concerns.

## 2023-08-28 NOTE — PROGRESS NOTES
Neurosurgery Office Note  Beryle Pence 63 y.o. female MRN: [id number]      Assessment/Plan     Meningioma, cerebral ([address])  · Pt presents for 3 month follow up for left frontal mass, likely meningioma. · Initially noted noted on CT orbits in 4/18/23 during workup for orbital cellulitis. · Imaging reviewed personally. Final results below discussed with the patient. · MRI brain w/wo 8/22/2023: No significant interval change evident as compared to 5/23/2023 MRI brain. Again noted left-sided anterior cranial fossa meningioma with mild mass effect upon the left frontal lobe and no associated brain reaction/vasogenic edema. Again noted old right basal ganglia, centrum semiovale and right frontal lobe infarctions. Mild to moderate altered signal involving white matter discussed above is nonspecific regarding etiology; findings most often relating to chronic small vessel white matter ischemic disease.     Plan  · Again discussed with patient the natural history of meningiomas. · Given stable imaging, at this time no neurosurgical interventions anticipated. · Recommend continued surveillance per NCCN guidelines. Recommend 6 months (from initial imaging) follow up with repeat MRI brain w/wo. · Discussed plan of care with patient who showed understanding. · Patient made aware to contact neurosurgery with any questions or concerns. Diagnoses and all orders for this visit:    Meningioma, cerebral ([address])  -     MRI brain with and without contrast; Future  -     BUN; Future  -     Creatinine, serum;  Future          I have spent a total time of 45 minutes on 08/28/23 in caring for this patient including Diagnostic results, Risks and benefits of tx options, Instructions for management, Patient and family education, Impressions, Counseling / Coordination of care, Documenting in the medical record, Reviewing / ordering tests, medicine, procedures  , Obtaining or reviewing history   and Communicating with other healthcare professionals . CHIEF COMPLAINT    Chief Complaint   Patient presents with   • Follow-up       HISTORY    History of Present Illness     63y.o. year old female     With past medical history of triple-vessel coronary artery disease, HIV, CKD stage III, tobacco use disorder, LVH mural thrombosis, AICD/cardiac pacemaker (Mri Conditional) who presents for approximately 3-4 month follow up for left frontal likely meningioma that was initially noted on CT orbits in 4/18/23 during workup for orbital cellulitis. Patient denies any new neurological deficits since her prior visit. Reports that, she had some issues with coordination she was playing basketball with throwing the ball to the basketball ring. Patient reports the last summer she also noticed some issues with her coordination. Patient denies any headache, dizziness, lightheadedness or visual disturbance. Patient denies any new numbness, tingling or weakness. Pt denies any difficulty walking or issues with her gait. Pt denies nausea or vomiting. Pt accompanied by her nephew to this visit. REVIEW OF SYSTEMS    Review of Systems   Constitutional: Negative. HENT: Negative. Eyes: Negative. Respiratory: Negative. Cardiovascular: Negative. Gastrointestinal: Negative. Endocrine: Negative. Genitourinary: Negative. Musculoskeletal: Negative. Skin: Negative. Allergic/Immunologic: Positive for environmental allergies. Neurological: Negative. Reports feeling off balance, noticed during the summer    Hematological: Does not bruise/bleed easily (Patient on ASA 81 and Coumadin). Psychiatric/Behavioral: Negative. ROS obtained by MA. Reviewed. See HPI.      Meds/Allergies     Current Outpatient Medications   Medication Sig Dispense Refill   • Aspirin Low Dose 81 MG EC tablet TAKE 1 TABLET BY MOUTH DAILY 30 tablet 5   • atorvastatin (LIPITOR) 80 mg tablet TAKE 1 TABLET BY MOUTH DAILY 30 tablet 5   • Biktarvy -25 MG tablet TAKE 1 TABLET BY MOUTH ONCE DAILY WITH BREAKFAST 90 tablet 1   • calcium carbonate-vitamin D 500 mg-5 mcg per tablet Take 1 tablet by mouth daily with breakfast 90 tablet 0   • carvedilol (COREG) 6.25 mg tablet TAKE 1 TABLET BY MOUTH TWICE A DAY WITH MEALS 60 tablet 5   • cholecalciferol (VITAMIN D3) 1,000 units tablet Take 2,000 Units by mouth daily     • cyanocobalamin (VITAMIN B-12) 100 MCG tablet Take 100 mcg by mouth daily     • Entresto 49-51 MG TABS TAKE 1 TABLET BY MOUTH TWICE A DAY 60 tablet 5   • nicotine polacrilex (COMMIT) 4 MG lozenge USE 1 LOZENGE AS NEEDED FOR SMOKING CESSATION 72 lozenge 5   • warfarin (COUMADIN) 5 mg tablet TAKE 1 TO 1 AND 1/2 TABLETS BY MOUTH DAILY AS DIRECTED BY  tablet 3     No current facility-administered medications for this visit.        Allergies   Allergen Reactions   • No Active Allergies    • No Known Allergies        PAST HISTORY    Past Medical History:   Diagnosis Date   • Asthma    • Coronary artery disease     defibrillator   • HIV positive ([address] [facility])        Past Surgical History:   Procedure Laterality Date   • ANGIOPLASTY     • CARDIAC DEFIBRILLATOR PLACEMENT     • CORONARY ANGIOPLASTY      pci placement   • TOTAL ABDOMINAL HYSTERECTOMY     • US GUIDED INJECTION FOR RESEARCH STUDY  2018   • US GUIDED INJECTION FOR RESEARCH STUDY  2019   • US GUIDED INJECTION FOR RESEARCH STUDY  2018   • US GUIDED INJECTION FOR RESEARCH STUDY  2018       Social History     Tobacco Use   • Smoking status: Former     Packs/day: 0.50     Types: Cigarettes     Quit date: 2023     Years since quittin.4   • Smokeless tobacco: Never   Vaping Use   • Vaping Use: Never used   Substance Use Topics   • Alcohol use: Yes     Comment: socially   • Drug use: No       Family History   Problem Relation Age of Onset   • No Known Problems Mother    • Other Father         GI bleed   • No Known Problems Sister    • No Known Problems Sister    • No Known Problems Maternal Grandmother    • No Known Problems Maternal Grandfather    • No Known Problems Paternal Grandmother    • No Known Problems Paternal Grandfather    • Suicidality Brother    • Drug abuse Brother         overdose   • No Known Problems Maternal Aunt    • No Known Problems Maternal Aunt    • No Known Problems Maternal Aunt    • No Known Problems Paternal Aunt          Above history personally reviewed. EXAM    Vitals:Blood pressure 98/62, pulse 62, temperature (!) 97 °F (36.1 °C), temperature source Temporal, height 5' 2" (1.575 m), weight 67.6 kg (149 lb), SpO2 99 %, not currently breastfeeding. ,Body mass index is 27.25 kg/m². Physical Exam  Constitutional:       General: She is not in acute distress. Appearance: She is well-developed. HENT:      Head: Normocephalic and atraumatic. Eyes:      Pupils: Pupils are equal, round, and reactive to light. Neck:      Trachea: No tracheal deviation. Cardiovascular:      Rate and Rhythm: Normal rate. Pulmonary:      Effort: Pulmonary effort is normal.   Abdominal:      Palpations: Abdomen is soft. Tenderness: There is no abdominal tenderness. There is no guarding. Musculoskeletal:      Cervical back: Neck supple. Skin:     General: Skin is warm and dry. Coloration: Skin is not pale. Findings: No rash. Neurological:      Mental Status: She is alert and oriented to person, place, and time. Comments: GCS 15, Awake, Alert, Oriented x 3    Motor: STONE, strength 5/5 throughout    Sensation:  intact to LT X 4     Reflexes: 2+ and symmetric, no zapata's or clonus     Coordination: no drift bilateral upper extremities   Psychiatric:         Behavior: Behavior normal.         Neurologic Exam     Mental Status   Oriented to person, place, and time. Cranial Nerves     CN III, IV, VI   Pupils are equal, round, and reactive to light.         MEDICAL DECISION MAKING    Imaging Studies:     MRI brain with and without contrast    Result Date: 8/26/2023  Narrative: MRI BRAIN WITH AND WITHOUT CONTRAST INDICATION: D32.9: Benign neoplasm of meninges, unspecified. COMPARISON: Relevant examinations including MRI brain 5/23/2023. TECHNIQUE: Multiplanar, multisequence imaging of the brain was performed before and after gadolinium administration. IV Contrast:  6 mL of Gadobutrol injection (SINGLE-DOSE) IMAGE QUALITY:   Diagnostic. FINDINGS: BRAIN PARENCHYMA AND ADJACENT EXTRA-AXIAL SPACES: Again noted a well-circumscribed dural based mass along the left anterior aspect of the anterior cranial fossa measuring approximately 1.5 x 1.5 cm in AP and transverse diameter series 12 axial image 68 unchanged as compared to series 11 axial image 65 from the 5/23/2023 MRI. There is no associated brain reaction/vasogenic edema. There is mild associated mass effect upon the adjacent left frontal lobe. Again noted old right basal ganglia/right centrum semiovale infarction and 1 cm old cortical lateral right frontal lobe infarction middle frontal gyrus. Again noted in an otherwise relatively symmetric distribution mild to moderate altered signal involving supratentorial periventricular and deep white matter on FLAIR and T2-weighted images. There are no other areas of abnormal signal intensity or pathologic contrast enhancement evident within brain parenchyma. There is no other expansile mass evident. VENTRICLES: Normal size for age. SELLA AND PITUITARY GLAND: No significant abnormality evident. ORBITS: There is no significant abnormality evident. PARANASAL SINUSES AND TEMPORAL BONES: There is mild mucosal thickening as well as a few polyps or mucous retention cysts within the bilateral maxillary sinuses ranging in size up to 2 cm in diameter. The paranasal sinuses and temporal bones otherwise appear to be well aerated. There is no middle or inner ear abnormality evident. VASCULATURE:  No significant vascular abnormality evident.  CALVARIUM, SKULL BASE, AND UPPER CERVICAL SPINE:  No significant abnormality evident. EXTRACRANIAL SOFT TISSUES:  No significant abnormality evident. Impression: No significant interval change evident as compared to 5/23/2023 MRI brain. Again noted left-sided anterior cranial fossa meningioma with mild mass effect upon the left frontal lobe and no associated brain reaction/vasogenic edema. Again noted old right basal ganglia, centrum semiovale and right frontal lobe infarctions. Mild to moderate altered signal involving white matter discussed above is nonspecific regarding etiology; findings most often relating to chronic small vessel white matter ischemic disease. Polyps or mucous retention cysts in the bilateral maxillary sinuses. Workstation performed: [id number]       I have personally reviewed pertinent reports.    and I have personally reviewed pertinent films in PACS

## 2023-09-01 ENCOUNTER — REMOTE DEVICE CLINIC VISIT (OUTPATIENT)
Dept: CARDIOLOGY CLINIC | Facility: CLINIC | Age: 58
End: 2023-09-01
Payer: MEDICARE

## 2023-09-01 DIAGNOSIS — Z95.810 AICD (AUTOMATIC CARDIOVERTER/DEFIBRILLATOR) PRESENT: Primary | ICD-10-CM

## 2023-09-01 PROCEDURE — 93295 DEV INTERROG REMOTE 1/2/MLT: CPT | Performed by: INTERNAL MEDICINE

## 2023-09-01 PROCEDURE — 93296 REM INTERROG EVL PM/IDS: CPT | Performed by: INTERNAL MEDICINE

## 2023-09-01 NOTE — PROGRESS NOTES
Results for orders placed or performed in visit on 09/01/23   Cardiac EP device report    Narrative    MDT-SINGLE CHAMBER ICD - ACTIVE SYSTEM IS MRI CONDITIONAL  CARELINK TRANSMISSION: BATTERY VOLTAGE ADEQUATE (5.7 YRS).  <0.1% (VVI 40 PPM); ALL AVAILABLE LEAD PARAMETERS WITHIN NORMAL LIMITS. NO SIGNIFICANT HIGH RATE EPISODES. OPTI-VOL WITHIN NORMAL LIMITS. NORMAL DEVICE FUNCTION.   ES

## 2023-09-11 ENCOUNTER — APPOINTMENT (OUTPATIENT)
Dept: LAB | Facility: HOSPITAL | Age: 58
End: 2023-09-11
Payer: MEDICARE

## 2023-09-11 DIAGNOSIS — Z79.01 LONG TERM CURRENT USE OF ANTICOAGULANT THERAPY: ICD-10-CM

## 2023-09-11 DIAGNOSIS — D32.0 MENINGIOMA, CEREBRAL (HCC): ICD-10-CM

## 2023-09-11 LAB
INR PPP: 4 (ref 0.84–1.19)
PROTHROMBIN TIME: 39.2 SECONDS (ref 11.6–14.5)

## 2023-09-11 PROCEDURE — 36415 COLL VENOUS BLD VENIPUNCTURE: CPT

## 2023-09-11 PROCEDURE — 85610 PROTHROMBIN TIME: CPT

## 2023-09-12 ENCOUNTER — ANTICOAG VISIT (OUTPATIENT)
Dept: CARDIOLOGY CLINIC | Facility: CLINIC | Age: 58
End: 2023-09-12

## 2023-09-12 DIAGNOSIS — Z79.01 LONG TERM CURRENT USE OF ANTICOAGULANT THERAPY: ICD-10-CM

## 2023-09-12 DIAGNOSIS — I23.6 LV (LEFT VENTRICULAR) MURAL THROMBUS FOLLOWING MI (HCC): ICD-10-CM

## 2023-09-12 DIAGNOSIS — I25.5 ISCHEMIC CARDIOMYOPATHY: Primary | ICD-10-CM

## 2023-09-12 NOTE — PROGRESS NOTES
Left message for patient, advised INR high, advised if she was taking 5 mg Mon Wed Fri, 7.5 mg all other days, to take 2.5 mg tonight only, then 7.5 mg Mon Fri, 5 mg all other days, will recheck next week 9/21/23    Advised to call if she was not take 5 mg Mon Wed Fri, 7.5 mg all other day or with questions or concerns.

## 2023-09-21 ENCOUNTER — APPOINTMENT (OUTPATIENT)
Dept: LAB | Facility: CLINIC | Age: 58
End: 2023-09-21
Payer: MEDICARE

## 2023-09-21 DIAGNOSIS — Z79.01 LONG TERM CURRENT USE OF ANTICOAGULANT THERAPY: ICD-10-CM

## 2023-09-21 LAB
INR PPP: 2.75 (ref 0.84–1.19)
PROTHROMBIN TIME: 29.4 SECONDS (ref 11.6–14.5)

## 2023-09-21 PROCEDURE — 85610 PROTHROMBIN TIME: CPT

## 2023-09-21 PROCEDURE — 36415 COLL VENOUS BLD VENIPUNCTURE: CPT

## 2023-09-22 ENCOUNTER — ANTICOAG VISIT (OUTPATIENT)
Dept: CARDIOLOGY CLINIC | Facility: CLINIC | Age: 58
End: 2023-09-22

## 2023-09-22 DIAGNOSIS — I25.5 ISCHEMIC CARDIOMYOPATHY: Primary | ICD-10-CM

## 2023-09-22 DIAGNOSIS — Z79.01 LONG TERM CURRENT USE OF ANTICOAGULANT THERAPY: ICD-10-CM

## 2023-09-22 DIAGNOSIS — I23.6 LV (LEFT VENTRICULAR) MURAL THROMBUS FOLLOWING MI (HCC): ICD-10-CM

## 2023-09-22 NOTE — PROGRESS NOTES
PC to patient with better INR of 2.75. Advised to take same dose of warfarin and have another INR in 3 weeks on Oct 12,  Patient taking 5 mgs MWF and 7.5 mgs all other days.

## 2023-09-25 ENCOUNTER — TELEPHONE (OUTPATIENT)
Dept: SURGERY | Facility: CLINIC | Age: 58
End: 2023-09-25

## 2023-09-25 DIAGNOSIS — B20 HIV DISEASE (HCC): Primary | ICD-10-CM

## 2023-09-25 DIAGNOSIS — N18.31 STAGE 3A CHRONIC KIDNEY DISEASE (HCC): ICD-10-CM

## 2023-09-26 ENCOUNTER — DOCUMENTATION (OUTPATIENT)
Dept: CARDIOLOGY CLINIC | Facility: CLINIC | Age: 58
End: 2023-09-26

## 2023-10-02 ENCOUNTER — TELEPHONE (OUTPATIENT)
Dept: NEPHROLOGY | Facility: CLINIC | Age: 58
End: 2023-10-02

## 2023-10-02 DIAGNOSIS — N18.31 STAGE 3A CHRONIC KIDNEY DISEASE (HCC): Primary | ICD-10-CM

## 2023-10-02 DIAGNOSIS — I10 ESSENTIAL HYPERTENSION: ICD-10-CM

## 2023-10-05 ENCOUNTER — APPOINTMENT (OUTPATIENT)
Dept: LAB | Facility: CLINIC | Age: 58
End: 2023-10-05
Payer: MEDICARE

## 2023-10-05 DIAGNOSIS — I10 ESSENTIAL HYPERTENSION: ICD-10-CM

## 2023-10-05 DIAGNOSIS — N18.31 STAGE 3A CHRONIC KIDNEY DISEASE (HCC): ICD-10-CM

## 2023-10-05 LAB
ALBUMIN SERPL BCP-MCNC: 4.2 G/DL (ref 3.5–5)
ANION GAP SERPL CALCULATED.3IONS-SCNC: 8 MMOL/L
BUN SERPL-MCNC: 23 MG/DL (ref 5–25)
CALCIUM SERPL-MCNC: 10 MG/DL (ref 8.4–10.2)
CHLORIDE SERPL-SCNC: 107 MMOL/L (ref 96–108)
CO2 SERPL-SCNC: 27 MMOL/L (ref 21–32)
CREAT SERPL-MCNC: 1.57 MG/DL (ref 0.6–1.3)
CREAT UR-MCNC: 169.1 MG/DL
ERYTHROCYTE [DISTWIDTH] IN BLOOD BY AUTOMATED COUNT: 13.8 % (ref 11.6–15.1)
GFR SERPL CREATININE-BSD FRML MDRD: 36 ML/MIN/1.73SQ M
GLUCOSE P FAST SERPL-MCNC: 104 MG/DL (ref 65–99)
HCT VFR BLD AUTO: 39.5 % (ref 34.8–46.1)
HGB BLD-MCNC: 12.9 G/DL (ref 11.5–15.4)
MCH RBC QN AUTO: 30.9 PG (ref 26.8–34.3)
MCHC RBC AUTO-ENTMCNC: 32.7 G/DL (ref 31.4–37.4)
MCV RBC AUTO: 95 FL (ref 82–98)
MICROALBUMIN UR-MCNC: 17.5 MG/L
MICROALBUMIN/CREAT 24H UR: 10 MG/G CREATININE (ref 0–30)
PHOSPHATE SERPL-MCNC: 3.8 MG/DL (ref 2.7–4.5)
PLATELET # BLD AUTO: 175 THOUSANDS/UL (ref 149–390)
PMV BLD AUTO: 10.9 FL (ref 8.9–12.7)
POTASSIUM SERPL-SCNC: 4.8 MMOL/L (ref 3.5–5.3)
PTH-INTACT SERPL-MCNC: 54.2 PG/ML (ref 12–88)
RBC # BLD AUTO: 4.18 MILLION/UL (ref 3.81–5.12)
SODIUM SERPL-SCNC: 142 MMOL/L (ref 135–147)
URATE SERPL-MCNC: 5.5 MG/DL (ref 2–7.5)
WBC # BLD AUTO: 4.38 THOUSAND/UL (ref 4.31–10.16)

## 2023-10-05 PROCEDURE — 80069 RENAL FUNCTION PANEL: CPT

## 2023-10-05 PROCEDURE — 84550 ASSAY OF BLOOD/URIC ACID: CPT

## 2023-10-05 PROCEDURE — 82570 ASSAY OF URINE CREATININE: CPT

## 2023-10-05 PROCEDURE — 83970 ASSAY OF PARATHORMONE: CPT

## 2023-10-05 PROCEDURE — 82043 UR ALBUMIN QUANTITATIVE: CPT

## 2023-10-05 PROCEDURE — 85027 COMPLETE CBC AUTOMATED: CPT

## 2023-10-05 PROCEDURE — 36415 COLL VENOUS BLD VENIPUNCTURE: CPT

## 2023-10-06 ENCOUNTER — TELEPHONE (OUTPATIENT)
Dept: SURGERY | Facility: CLINIC | Age: 58
End: 2023-10-06

## 2023-10-11 ENCOUNTER — OFFICE VISIT (OUTPATIENT)
Dept: NEPHROLOGY | Facility: CLINIC | Age: 58
End: 2023-10-11

## 2023-10-11 VITALS
DIASTOLIC BLOOD PRESSURE: 68 MMHG | SYSTOLIC BLOOD PRESSURE: 104 MMHG | BODY MASS INDEX: 28.52 KG/M2 | HEART RATE: 62 BPM | HEIGHT: 62 IN | WEIGHT: 155 LBS

## 2023-10-11 DIAGNOSIS — I51.89 IMPAIRED LEFT VENTRICULAR FUNCTION: ICD-10-CM

## 2023-10-11 DIAGNOSIS — I25.5 ISCHEMIC CARDIOMYOPATHY: ICD-10-CM

## 2023-10-11 DIAGNOSIS — N18.31 STAGE 3A CHRONIC KIDNEY DISEASE (HCC): Primary | ICD-10-CM

## 2023-10-11 NOTE — PROGRESS NOTES
NEPHROLOGY OUTPATIENT PROGRESS NOTE   Laith Urbina 63 y.o. female MRN: [id number]  Reason for visit: Chronic kidney disease    ASSESSMENT and PLAN:  Chronic kidney disease, stage III, baseline creatinine appears between 1.3 and 1.5 most recent creatinine 1.57 estimated GFR 36 underlying disease most likely secondary to cardiorenal syndrome  Cardiomyopathy, recent ejection fraction improved to 42%. Noted diastolic dysfunction. Volume status currently appears stable  Cerebral meningioma, following with neurosurgery  HIV, following with infectious disease    Overall renal function remains fairly stable  Suspect fluctuant renal function may be secondary to volume status  No significant proteinuria noted. Recommend repeat labs in 6 months assuming stable can follow-up in 1 year. SUBJECTIVE / INTERVAL HISTORY:  She has been doing reasonably well. Denies any recent hospitalizations or illnesses. Denies any changes in her medications. No reports of chest pain shortness of breath or significant swelling. Denies any dizziness or lightheadedness. OBJECTIVE:  /68 (BP Location: Left arm, Patient Position: Sitting, Cuff Size: Standard)   Pulse 62   Ht 5' 2" (1.575 m)   Wt 70.3 kg (155 lb)   BMI 28.35 kg/m²   Vitals:    10/11/23 1050   Weight: 70.3 kg (155 lb)       Physical Exam  Constitutional:       Appearance: She is not ill-appearing. Eyes:      General: No scleral icterus. Cardiovascular:      Rate and Rhythm: Normal rate and regular rhythm. Pulmonary:      Effort: Pulmonary effort is normal.      Breath sounds: Normal breath sounds. Abdominal:      General: There is no distension. Palpations: Abdomen is soft. Musculoskeletal:      Right lower leg: No edema. Left lower leg: No edema. Skin:     General: Skin is warm and dry. Findings: No rash. Neurological:      Mental Status: She is alert and oriented to person, place, and time.            Medications:    Current Outpatient Medications:     Aspirin Low Dose 81 MG EC tablet, TAKE 1 TABLET BY MOUTH DAILY, Disp: 30 tablet, Rfl: 5    atorvastatin (LIPITOR) 80 mg tablet, TAKE 1 TABLET BY MOUTH DAILY, Disp: 30 tablet, Rfl: 5    Biktarvy -25 MG tablet, TAKE 1 TABLET BY MOUTH ONCE DAILY WITH BREAKFAST, Disp: 90 tablet, Rfl: 1    calcium carbonate-vitamin D 500 mg-5 mcg per tablet, Take 1 tablet by mouth daily with breakfast, Disp: 90 tablet, Rfl: 0    carvedilol (COREG) 6.25 mg tablet, TAKE 1 TABLET BY MOUTH TWICE A DAY WITH MEALS, Disp: 60 tablet, Rfl: 5    cholecalciferol (VITAMIN D3) 1,000 units tablet, Take 2,000 Units by mouth daily, Disp: , Rfl:     cyanocobalamin (VITAMIN B-12) 100 MCG tablet, Take 100 mcg by mouth daily, Disp: , Rfl:     Entresto 49-51 MG TABS, TAKE 1 TABLET BY MOUTH TWICE A DAY, Disp: 60 tablet, Rfl: 5    nicotine polacrilex (COMMIT) 4 MG lozenge, USE 1 LOZENGE AS NEEDED FOR SMOKING CESSATION, Disp: 72 lozenge, Rfl: 5    warfarin (COUMADIN) 5 mg tablet, TAKE 1 TO 1 AND 1/2 TABLETS BY MOUTH DAILY AS DIRECTED BY MD, Disp: 135 tablet, Rfl: 3    Laboratory Results:  Results for orders placed or performed in visit on 10/05/23   CBC and Platelet   Result Value Ref Range    WBC 4.38 4.31 - 10.16 Thousand/uL    RBC 4.18 3.81 - 5.12 Million/uL    Hemoglobin 12.9 11.5 - 15.4 g/dL    Hematocrit 39.5 34.8 - 46.1 %    MCV 95 82 - 98 fL    MCH 30.9 26.8 - 34.3 pg    MCHC 32.7 31.4 - 37.4 g/dL    RDW 13.8 11.6 - 15.1 %    Platelets 864 707 - 630 Thousands/uL    MPV 10.9 8.9 - 12.7 fL   PTH, intact   Result Value Ref Range    PTH 54.2 12.0 - 88.0 pg/mL   Uric acid   Result Value Ref Range    Uric Acid 5.5 2.0 - 7.5 mg/dL   Renal function panel   Result Value Ref Range    Albumin 4.2 3.5 - 5.0 g/dL    Calcium 10.0 8.4 - 10.2 mg/dL    Phosphorus 3.8 2.7 - 4.5 mg/dL    BUN 23 5 - 25 mg/dL    Creatinine 1.57 (H) 0.60 - 1.30 mg/dL    Sodium 142 135 - 147 mmol/L    Potassium 4.8 3.5 - 5.3 mmol/L    Chloride 107 96 - 108 mmol/L    CO2 27 21 - 32 mmol/L    ANION GAP 8 mmol/L    eGFR 36 ml/min/1.73sq m    Glucose, Fasting 104 (H) 65 - 99 mg/dL     *Note: Due to a large number of results and/or encounters for the requested time period, some results have not been displayed. A complete set of results can be found in Results Review.

## 2023-10-12 ENCOUNTER — APPOINTMENT (OUTPATIENT)
Dept: LAB | Facility: HOSPITAL | Age: 58
End: 2023-10-12
Payer: MEDICARE

## 2023-10-12 ENCOUNTER — TELEPHONE (OUTPATIENT)
Dept: SURGERY | Facility: CLINIC | Age: 58
End: 2023-10-12

## 2023-10-12 DIAGNOSIS — I51.89 IMPAIRED LEFT VENTRICULAR FUNCTION: ICD-10-CM

## 2023-10-12 DIAGNOSIS — N18.31 STAGE 3A CHRONIC KIDNEY DISEASE (HCC): ICD-10-CM

## 2023-10-12 DIAGNOSIS — Z79.01 LONG TERM CURRENT USE OF ANTICOAGULANT THERAPY: ICD-10-CM

## 2023-10-12 DIAGNOSIS — I25.5 ISCHEMIC CARDIOMYOPATHY: ICD-10-CM

## 2023-10-12 LAB
ALBUMIN SERPL BCP-MCNC: 4.2 G/DL (ref 3.5–5)
ALBUMIN SERPL BCP-MCNC: 4.5 G/DL (ref 3.5–5)
ALP SERPL-CCNC: 55 U/L (ref 34–104)
ALT SERPL W P-5'-P-CCNC: 13 U/L (ref 7–52)
ANION GAP SERPL CALCULATED.3IONS-SCNC: 6 MMOL/L
ANION GAP SERPL CALCULATED.3IONS-SCNC: 7 MMOL/L
AST SERPL W P-5'-P-CCNC: 16 U/L (ref 13–39)
BASOPHILS # BLD AUTO: 0.03 THOUSANDS/ÂΜL (ref 0–0.1)
BASOPHILS NFR BLD AUTO: 1 % (ref 0–1)
BILIRUB SERPL-MCNC: 0.69 MG/DL (ref 0.2–1)
BUN SERPL-MCNC: 18 MG/DL (ref 5–25)
BUN SERPL-MCNC: 19 MG/DL (ref 5–25)
CALCIUM SERPL-MCNC: 9.4 MG/DL (ref 8.4–10.2)
CALCIUM SERPL-MCNC: 9.7 MG/DL (ref 8.4–10.2)
CHLORIDE SERPL-SCNC: 107 MMOL/L (ref 96–108)
CHLORIDE SERPL-SCNC: 108 MMOL/L (ref 96–108)
CO2 SERPL-SCNC: 28 MMOL/L (ref 21–32)
CO2 SERPL-SCNC: 29 MMOL/L (ref 21–32)
CREAT SERPL-MCNC: 1.45 MG/DL (ref 0.6–1.3)
CREAT SERPL-MCNC: 1.46 MG/DL (ref 0.6–1.3)
CREAT UR-MCNC: 148.9 MG/DL
EOSINOPHIL # BLD AUTO: 0.07 THOUSAND/ÂΜL (ref 0–0.61)
EOSINOPHIL NFR BLD AUTO: 2 % (ref 0–6)
ERYTHROCYTE [DISTWIDTH] IN BLOOD BY AUTOMATED COUNT: 13.9 % (ref 11.6–15.1)
ERYTHROCYTE [DISTWIDTH] IN BLOOD BY AUTOMATED COUNT: 14 % (ref 11.6–15.1)
GFR SERPL CREATININE-BSD FRML MDRD: 39 ML/MIN/1.73SQ M
GFR SERPL CREATININE-BSD FRML MDRD: 40 ML/MIN/1.73SQ M
GLUCOSE P FAST SERPL-MCNC: 80 MG/DL (ref 65–99)
GLUCOSE SERPL-MCNC: 102 MG/DL (ref 65–140)
HCT VFR BLD AUTO: 41.3 % (ref 34.8–46.1)
HCT VFR BLD AUTO: 41.3 % (ref 34.8–46.1)
HGB BLD-MCNC: 13.1 G/DL (ref 11.5–15.4)
HGB BLD-MCNC: 13.4 G/DL (ref 11.5–15.4)
IMM GRANULOCYTES # BLD AUTO: 0.01 THOUSAND/UL (ref 0–0.2)
IMM GRANULOCYTES NFR BLD AUTO: 0 % (ref 0–2)
LYMPHOCYTES # BLD AUTO: 1.9 THOUSANDS/ÂΜL (ref 0.6–4.47)
LYMPHOCYTES NFR BLD AUTO: 42 % (ref 14–44)
MCH RBC QN AUTO: 29.8 PG (ref 26.8–34.3)
MCH RBC QN AUTO: 30.9 PG (ref 26.8–34.3)
MCHC RBC AUTO-ENTMCNC: 31.7 G/DL (ref 31.4–37.4)
MCHC RBC AUTO-ENTMCNC: 32.4 G/DL (ref 31.4–37.4)
MCV RBC AUTO: 94 FL (ref 82–98)
MCV RBC AUTO: 95 FL (ref 82–98)
MICROALBUMIN UR-MCNC: 15.9 MG/L
MICROALBUMIN/CREAT 24H UR: 11 MG/G CREATININE (ref 0–30)
MONOCYTES # BLD AUTO: 0.27 THOUSAND/ÂΜL (ref 0.17–1.22)
MONOCYTES NFR BLD AUTO: 6 % (ref 4–12)
NEUTROPHILS # BLD AUTO: 2.3 THOUSANDS/ÂΜL (ref 1.85–7.62)
NEUTS SEG NFR BLD AUTO: 49 % (ref 43–75)
NRBC BLD AUTO-RTO: 0 /100 WBCS
PHOSPHATE SERPL-MCNC: 2.8 MG/DL (ref 2.7–4.5)
PLATELET # BLD AUTO: 156 THOUSANDS/UL (ref 149–390)
PLATELET # BLD AUTO: 163 THOUSANDS/UL (ref 149–390)
PMV BLD AUTO: 10.8 FL (ref 8.9–12.7)
PMV BLD AUTO: 11.4 FL (ref 8.9–12.7)
POTASSIUM SERPL-SCNC: 4.5 MMOL/L (ref 3.5–5.3)
POTASSIUM SERPL-SCNC: 4.6 MMOL/L (ref 3.5–5.3)
PROT SERPL-MCNC: 7.4 G/DL (ref 6.4–8.4)
PTH-INTACT SERPL-MCNC: 90.8 PG/ML (ref 12–88)
RBC # BLD AUTO: 4.34 MILLION/UL (ref 3.81–5.12)
RBC # BLD AUTO: 4.39 MILLION/UL (ref 3.81–5.12)
SODIUM SERPL-SCNC: 142 MMOL/L (ref 135–147)
SODIUM SERPL-SCNC: 143 MMOL/L (ref 135–147)
URATE SERPL-MCNC: 5.1 MG/DL (ref 2–7.5)
WBC # BLD AUTO: 4.54 THOUSAND/UL (ref 4.31–10.16)
WBC # BLD AUTO: 4.58 THOUSAND/UL (ref 4.31–10.16)

## 2023-10-12 PROCEDURE — 82570 ASSAY OF URINE CREATININE: CPT

## 2023-10-12 PROCEDURE — 82043 UR ALBUMIN QUANTITATIVE: CPT

## 2023-10-12 PROCEDURE — 36415 COLL VENOUS BLD VENIPUNCTURE: CPT

## 2023-10-12 PROCEDURE — 85027 COMPLETE CBC AUTOMATED: CPT

## 2023-10-12 PROCEDURE — 83970 ASSAY OF PARATHORMONE: CPT

## 2023-10-12 PROCEDURE — 84550 ASSAY OF BLOOD/URIC ACID: CPT

## 2023-10-12 PROCEDURE — 80069 RENAL FUNCTION PANEL: CPT

## 2023-10-12 NOTE — TELEPHONE ENCOUNTER
Spoke with patient and let her know they didn't run her HIV labs again. She is going back to get them done.

## 2023-10-12 NOTE — TELEPHONE ENCOUNTER
I had previously called the patient to cancel her appointment because she didn't get her labs done but she called and said she was going to get them done. Patient will be put back on schedule.

## 2023-10-12 NOTE — TELEPHONE ENCOUNTER
LVM for patient to call back. Wanted to let her know I cancelled her appointment due to her labs not being done.

## 2023-10-13 ENCOUNTER — OFFICE VISIT (OUTPATIENT)
Dept: SURGERY | Facility: CLINIC | Age: 58
End: 2023-10-13

## 2023-10-13 VITALS
HEART RATE: 61 BPM | SYSTOLIC BLOOD PRESSURE: 101 MMHG | RESPIRATION RATE: 17 BRPM | WEIGHT: 152.6 LBS | TEMPERATURE: 96.7 F | HEIGHT: 62 IN | BODY MASS INDEX: 28.08 KG/M2 | OXYGEN SATURATION: 96 % | DIASTOLIC BLOOD PRESSURE: 65 MMHG

## 2023-10-13 DIAGNOSIS — Z13.1 SCREENING FOR DIABETES MELLITUS: ICD-10-CM

## 2023-10-13 DIAGNOSIS — E78.2 MIXED HYPERLIPIDEMIA: ICD-10-CM

## 2023-10-13 DIAGNOSIS — Z23 NEED FOR INFLUENZA VACCINATION: ICD-10-CM

## 2023-10-13 DIAGNOSIS — D72.89 OTHER SPECIFIED DISORDERS OF WHITE BLOOD CELLS: ICD-10-CM

## 2023-10-13 DIAGNOSIS — Z20.2 CONTACT WITH AND (SUSPECTED) EXPOSURE TO INFECTIONS WITH A PREDOMINANTLY SEXUAL MODE OF TRANSMISSION: ICD-10-CM

## 2023-10-13 DIAGNOSIS — B20 HIV DISEASE (HCC): Primary | ICD-10-CM

## 2023-10-13 DIAGNOSIS — Z11.3 ENCOUNTER FOR SCREENING FOR BACTERIAL SEXUALLY TRANSMITTED DISEASE: ICD-10-CM

## 2023-10-13 DIAGNOSIS — Z11.1 SCREENING-PULMONARY TB: ICD-10-CM

## 2023-10-13 DIAGNOSIS — Z72.89 OTHER PROBLEMS RELATED TO LIFESTYLE: ICD-10-CM

## 2023-10-13 DIAGNOSIS — N18.31 STAGE 3A CHRONIC KIDNEY DISEASE (HCC): ICD-10-CM

## 2023-10-13 LAB
BASOPHILS # BLD AUTO: 0 X10E3/UL (ref 0–0.2)
BASOPHILS NFR BLD AUTO: 1 %
CD3+CD4+ CELLS # BLD: 544 /UL (ref 359–1519)
CD3+CD4+ CELLS NFR BLD: 25.9 % (ref 30.8–58.5)
EOSINOPHIL # BLD AUTO: 0.1 X10E3/UL (ref 0–0.4)
EOSINOPHIL NFR BLD AUTO: 2 %
ERYTHROCYTE [DISTWIDTH] IN BLOOD BY AUTOMATED COUNT: 13.8 % (ref 11.7–15.4)
HCT VFR BLD AUTO: 34.7 % (ref 34–46.6)
HGB BLD-MCNC: 11.1 G/DL (ref 11.1–15.9)
HIV1 RNA # PLAS NAA DL=20: NOT DETECTED {COPIES}/ML
IMM GRANULOCYTES # BLD: 0 X10E3/UL (ref 0–0.1)
IMM GRANULOCYTES NFR BLD: 0 %
LYMPHOCYTES # BLD AUTO: 2.1 X10E3/UL (ref 0.7–3.1)
LYMPHOCYTES NFR BLD AUTO: 40 %
MCH RBC QN AUTO: 30.3 PG (ref 26.6–33)
MCHC RBC AUTO-ENTMCNC: 32 G/DL (ref 31.5–35.7)
MCV RBC AUTO: 95 FL (ref 79–97)
MONOCYTES # BLD AUTO: 0.3 X10E3/UL (ref 0.1–0.9)
MONOCYTES NFR BLD AUTO: 6 %
NEUTROPHILS # BLD AUTO: 2.7 X10E3/UL (ref 1.4–7)
NEUTROPHILS NFR BLD AUTO: 51 %
PLATELET # BLD AUTO: 187 X10E3/UL (ref 150–450)
RBC # BLD AUTO: 3.66 X10E6/UL (ref 3.77–5.28)
WBC # BLD AUTO: 5.4 X10E3/UL (ref 3.4–10.8)

## 2023-10-13 NOTE — PROGRESS NOTES
Progress Note - Infectious Disease   Aleah Printers 63 y.o. female MRN: [id number]  Unit/Bed#:  Encounter: [id number]      Impression/Plan:    1. HIV. Doing well on Biktarvy. Labs in process but previous undetectable viral load and CD4 count of 642. She is tolerating ART without any side effects              -Continue ART regimen of Biktarvy              -Recheck labs in 5 months to assess for virologic control, drug toxicity   -Follow up in 6 months               -Patient was provided medication, adherence and prevention education     2. CKD stage 3. Creatinine stable close to baseline ~1.4              -monitor creatinine               -Continue follow-up with nephrology     3. Tobacco dependence. Reports she has not smoked in 2 months              -Encouraged continued smoking cessation     4. Ischemic Cardiomyopathy. with mural thrombus. She has AICD in place.                -Patient follows up with cardiology     5. Health maintenance. Hepatitis B immune, UTD Hepatitis A vaccination, Tdap, pneumococcal, meningococcal.  Received 3 doses of Pfizer COVID-vaccine. -influenza vaccine today    6. Meningioma. Discovered during recent admission for facial cellulitis. Follows with neurosurgery      My recommendations were discussed with the patient in detail who verbalized understanding. Patient care coordinated with JOSE ROBERTO Rosas. Subjective:  Routine follow-up for HIV. Patient claims 100% adherence with Biktarvy. Patient denies any notable side effects. Overall she is feeling well. The patient denies any fever chills or sweats, denies any nausea vomiting or diarrhea, denies any cough or shortness of breath. Hasn't smoked in 2 months. ROS:  A complete review of systems is negative other than that noted above in the subjective    Followup portions patient history reviewed and updated as:   Allergies, current medications, past medical history, past social history, past surgical history, and the problem list    Objective:  Vitals:  Vitals:    10/13/23 0810   BP: 101/65   BP Location: Left arm   Patient Position: Sitting   Cuff Size: Standard   Pulse: 61   Resp: 17   Temp: (!) 96.7 °F (35.9 °C)   TempSrc: Tympanic   SpO2: 96%   Weight: 69.2 kg (152 lb 9.6 oz)   Height: 5' 2" (1.575 m)       Physical Exam:   General Appearance:  Alert, interactive, appearing well,  nontoxic, no acute distress. Neck:   Supple without lymphadenopathy, no thyromegaly or masses   Throat: Oropharynx moist without lesions. Lungs:   Clear to auscultation bilaterally; no wheezes, rhonchi or rales; respirations unlabored   Heart:  RRR; no murmur, rub or gallop   Abdomen:   Soft, non-tender, non-distended, positive bowel sounds. Extremities: No clubbing, cyanosis or edema   Skin: No new rashes or lesions. No draining wounds noted. Labs, Imaging, & Other studies:   All pertinent labs and imaging studies were personally reviewed    Lab Results   Component Value Date    K 4.5 10/12/2023     10/12/2023    CO2 28 10/12/2023    BUN 19 10/12/2023    CREATININE 1.45 (H) 10/12/2023    GLUF 80 10/12/2023    CALCIUM 9.7 10/12/2023    AST 16 10/12/2023    ALT 13 10/12/2023    ALKPHOS 55 10/12/2023    EGFR 40 10/12/2023     Lab Results   Component Value Date    WBC 4.58 10/12/2023    HGB 13.4 10/12/2023    HCT 41.3 10/12/2023    MCV 95 10/12/2023     10/12/2023     Lab Results   Component Value Date    HEPCAB Non-reactive 04/28/2023     Lab Results   Component Value Date    HAV Non-reactive 11/02/2018    HEPCAB Non-reactive 04/28/2023     Lab Results   Component Value Date    RPR Non-Reactive 07/18/2022     CD4 ABS   Date/Time Value Ref Range Status   04/28/2023 01:57  359 - 1519 /uL Final     HIV-1 RNA by PCR, Qn   Date/Time Value Ref Range Status   04/28/2023 01:57 PM <20 copies/mL Final     Comment:     HIV-1 RNA detected  The reportable range for this assay is 20 to 10,000,000  copies HIV-1 RNA/mL. Current Outpatient Medications:     Biktarvy -25 MG tablet, TAKE 1 TABLET BY MOUTH ONCE DAILY WITH BREAKFAST, Disp: 90 tablet, Rfl: 1    Aspirin Low Dose 81 MG EC tablet, TAKE 1 TABLET BY MOUTH DAILY, Disp: 30 tablet, Rfl: 5    atorvastatin (LIPITOR) 80 mg tablet, TAKE 1 TABLET BY MOUTH DAILY, Disp: 30 tablet, Rfl: 5    calcium carbonate-vitamin D 500 mg-5 mcg per tablet, Take 1 tablet by mouth daily with breakfast, Disp: 90 tablet, Rfl: 0    carvedilol (COREG) 6.25 mg tablet, TAKE 1 TABLET BY MOUTH TWICE A DAY WITH MEALS, Disp: 60 tablet, Rfl: 5    cholecalciferol (VITAMIN D3) 1,000 units tablet, Take 2,000 Units by mouth daily, Disp: , Rfl:     cyanocobalamin (VITAMIN B-12) 100 MCG tablet, Take 100 mcg by mouth daily, Disp: , Rfl:     Entresto 49-51 MG TABS, TAKE 1 TABLET BY MOUTH TWICE A DAY, Disp: 60 tablet, Rfl: 5    nicotine polacrilex (COMMIT) 4 MG lozenge, USE 1 LOZENGE AS NEEDED FOR SMOKING CESSATION, Disp: 72 lozenge, Rfl: 5    warfarin (COUMADIN) 5 mg tablet, TAKE 1 TO 1 AND 1/2 TABLETS BY MOUTH DAILY AS DIRECTED BY MD, Disp: 135 tablet, Rfl: 3

## 2023-10-13 NOTE — PROGRESS NOTES
Assessment/Plan: Pt was approached by [facility] within ID clinic to explore multidimensional stability by completing the PHQ-9 screening. During today's contact, pt disclosed being stable at the time. It was explored pt's stages of grief regarding disclosed recent loses. Pt described "accepting" their passing and being at peace. Pt's emotions and cognitions were validated throughout contact, along with receiving positive reinforcements for her awareness and emotional regulatory applications. A brief psycho-educational conversation was developed regarding the importance of addressing core emotions and cognitions assertively to avoid suppressions that may lead to disruptive behavioral's reactivity. Before session's completion, pt completed the PHQ-9 assessment, scoring 4 as a display of minimal depressive traits without SI or HI. Ongoing  interventions were offered which pt agreed to benefit from as needed. [facility]     Today patient present with   Chief Complaint   Patient presents with    HIV Positive     Patient would likely benefit from: Addressing stages of grief and emotional regulations' skills. Consider/focus/continue: Monitoring pt's emotional, cognitive, and behavioral health's reactivity. Stage of change: Pre-contemplation  Plan/ Behavioral Recommendations: Ongoing  interventions per pt's coordinated care, and/or pt's request of services.        Diagnoses and all orders for this visit:    HIV disease ([address])  -     CBC and differential  -     Comprehensive metabolic panel  -     Hepatitis C antibody  -     Lipid panel  -     Quantiferon TB Gold Plus Assay  -     RPR-Syphilis Screening (Total Syphilis IGG/IGM)  -     UA (URINE) with reflex to Scope  -     T-helper cells (CD4) count  -     Chlamydia/GC amplified DNA by PCR  -     Hemoglobin A1C  -     HIV-1 RNA, Quantitative PCR, [facility]  -     influenza vaccine, quadrivalent, 0.5 mL, preservative-free, for adult and pediatric patients 6 mos+ (AFLURIA, FLUARIX, FLULAVAL, FLUZONE)    Need for influenza vaccination  -     influenza vaccine, quadrivalent, 0.5 mL, preservative-free, for adult and pediatric patients 6 mos+ (AFLURIA, FLUARIX, FLULAVAL, FLUZONE)    Stage 3a chronic kidney disease ([address])  -     Comprehensive metabolic panel    Mixed hyperlipidemia  -     Lipid panel    Screening for diabetes mellitus  -     Hemoglobin A1C    Screening-pulmonary TB  -     Quantiferon TB Gold Plus Assay    Contact with and (suspected) exposure to infections with a predominantly sexual mode of transmission  -     RPR-Syphilis Screening (Total Syphilis IGG/IGM)  -     UA (URINE) with reflex to Scope  -     Chlamydia/GC amplified DNA by PCR    Encounter for screening for bacterial sexually transmitted disease  -     RPR-Syphilis Screening (Total Syphilis IGG/IGM)  -     UA (URINE) with reflex to Scope  -     Chlamydia/GC amplified DNA by PCR    Other problems related to lifestyle  -     RPR-Syphilis Screening (Total Syphilis IGG/IGM)  -     UA (URINE) with reflex to Scope  -     Chlamydia/GC amplified DNA by PCR    Other specified disorders of white blood cells  -     RPR-Syphilis Screening (Total Syphilis IGG/IGM)  -     UA (URINE) with reflex to Scope  -     Chlamydia/GC amplified DNA by PCR          Subjective:     Patient ID: Layla Hewitt is a 63 y.o. female. HPI    History of Present Illness:      Patient is being seen for annual behavioral health assessment. Patient reports their behavioral health concerns are stable.     [unfilled]       Review of Systems      Objective:     Physical Exam      [facility]    Orientation     Person: yes    Place: yes    Time: yes    Appearance    Well Developed: yes healthy    Uncomfortable: no    Normal Body Odor: yes    Smells of Feces: no    Smells of Urine: no    Disheveled: no    Well Nourished: yes overweight    Grooming Unkempt: no    Poor Eye Contact: no    Hirsute: no    Looks Tired: yes    Acutely Exhausted: noappearance reflects stated age    Mood and Affect:     Appropriate: yes    Euthymicyes    Irritable: no    Angry: no    Anxious: no    Depressed:no    Blunted:no    Labile: no    Restricted: no    Harm to Self or Others: None reported. Substance Abuse: None reported.

## 2023-10-16 ENCOUNTER — TELEPHONE (OUTPATIENT)
Dept: SURGERY | Facility: CLINIC | Age: 58
End: 2023-10-16

## 2023-10-16 ENCOUNTER — PATIENT OUTREACH (OUTPATIENT)
Dept: SURGERY | Facility: CLINIC | Age: 58
End: 2023-10-16

## 2023-10-16 NOTE — PROGRESS NOTES
Per ct request, this cm met with ct after her appointment with the clinic. Ct let this cm know [facility] put ct on a wait list to get the screws in her mouth removed. Ct let this cm know that it's been over 6 months and she has not heard from them. Ct was made aware this cm would reach out to them. This cm spoke to Deepti Dueñas from [facility]. This cm asked if ct was still on a waitlist for oral surgery. Alesha Santos let this cm know that ct was never on a waitlist and per call log, there were no calls from ct. Alesha Santos let this cm know she would have someone reach out to her to schedule an appointment. This cm reached out to ct to make her aware. Ct let this cm know she has an appointment scheduled on Thursday.

## 2023-10-16 NOTE — TELEPHONE ENCOUNTER
Raquel from [facility] called stating Fauzia Pettit called them requesting to go down to [facility] for her nicotine patch.

## 2023-10-17 ENCOUNTER — TELEPHONE (OUTPATIENT)
Dept: NEPHROLOGY | Facility: CLINIC | Age: 58
End: 2023-10-17

## 2023-10-17 DIAGNOSIS — T65.222D TOXIC EFFECT OF TOBACCO CIGARETTE, INTENTIONAL SELF-HARM, SUBSEQUENT ENCOUNTER: ICD-10-CM

## 2023-10-17 DIAGNOSIS — Z72.0 TOBACCO ABUSE: Primary | ICD-10-CM

## 2023-10-17 RX ORDER — POLYETHYLENE GLYCOL 3350 17 G
2 POWDER IN PACKET (EA) ORAL AS NEEDED
Qty: 100 EACH | Refills: 2 | Status: SHIPPED | OUTPATIENT
Start: 2023-10-17

## 2023-10-17 NOTE — TELEPHONE ENCOUNTER
Called patient and went over the following information:     Patients kidney function is stable on repeat laboratory studies. Patient verbally understood and had no further questions for me at this time.

## 2023-10-17 NOTE — TELEPHONE ENCOUNTER
----- Message from Prema Posey DO sent at 10/17/2023  9:14 AM EDT -----  Please let her know that her kidney function is stable on repeat laboratory studies.   Thank you.  ----- Message -----  From: Lab, Background User  Sent: 10/12/2023   3:06 PM EDT  To: Prema Posey DO

## 2023-10-19 ENCOUNTER — PATIENT OUTREACH (OUTPATIENT)
Dept: SURGERY | Facility: CLINIC | Age: 58
End: 2023-10-19

## 2023-10-19 NOTE — PROGRESS NOTES
Ct reached out to this cm to let her know that she had her appointment today with [facility] to have the screws in her mouth removed.  Ct let this cm know that she would reach out to this  once her mouth was healed up to get linked with a dentist to get her impressions for dentures,
[id number] Detailed

## 2023-10-20 ENCOUNTER — APPOINTMENT (OUTPATIENT)
Dept: LAB | Facility: HOSPITAL | Age: 58
End: 2023-10-20
Payer: MEDICARE

## 2023-10-20 ENCOUNTER — ANTICOAG VISIT (OUTPATIENT)
Dept: CARDIOLOGY CLINIC | Facility: CLINIC | Age: 58
End: 2023-10-20

## 2023-10-20 DIAGNOSIS — M81.0 AGE-RELATED OSTEOPOROSIS WITHOUT CURRENT PATHOLOGICAL FRACTURE: ICD-10-CM

## 2023-10-20 DIAGNOSIS — I23.6 LV (LEFT VENTRICULAR) MURAL THROMBUS FOLLOWING MI (HCC): ICD-10-CM

## 2023-10-20 DIAGNOSIS — I25.5 ISCHEMIC CARDIOMYOPATHY: Primary | ICD-10-CM

## 2023-10-20 DIAGNOSIS — Z79.01 LONG TERM CURRENT USE OF ANTICOAGULANT THERAPY: ICD-10-CM

## 2023-10-20 LAB
INR PPP: 2.75 (ref 0.84–1.19)
PROTHROMBIN TIME: 29.4 SECONDS (ref 11.6–14.5)

## 2023-10-20 PROCEDURE — 85610 PROTHROMBIN TIME: CPT

## 2023-10-20 PROCEDURE — 36415 COLL VENOUS BLD VENIPUNCTURE: CPT

## 2023-10-20 PROCEDURE — 86480 TB TEST CELL IMMUN MEASURE: CPT | Performed by: STUDENT IN AN ORGANIZED HEALTH CARE EDUCATION/TRAINING PROGRAM

## 2023-10-20 NOTE — PROGRESS NOTES
Spoke with patient, advised INR good, current dose verified, continue 7.5 mg Mon Fri, 5 mg all other days, will recheck in 4 weeks 11/17/23

## 2023-10-22 LAB
GAMMA INTERFERON BACKGROUND BLD IA-ACNC: <0 IU/ML
M TB IFN-G BLD-IMP: NEGATIVE
M TB IFN-G CD4+ BCKGRND COR BLD-ACNC: 0 IU/ML
M TB IFN-G CD4+ BCKGRND COR BLD-ACNC: 0 IU/ML
MITOGEN IGNF BCKGRD COR BLD-ACNC: 10 IU/ML

## 2023-10-23 DIAGNOSIS — T65.222D TOXIC EFFECT OF TOBACCO CIGARETTE, INTENTIONAL SELF-HARM, SUBSEQUENT ENCOUNTER: Primary | ICD-10-CM

## 2023-10-23 DIAGNOSIS — Z11.1 SCREENING-PULMONARY TB: Primary | ICD-10-CM

## 2023-11-14 ENCOUNTER — PATIENT OUTREACH (OUTPATIENT)
Dept: SURGERY | Facility: CLINIC | Age: 58
End: 2023-11-14

## 2023-11-14 NOTE — PROGRESS NOTES
Ct left this cm a message. This cm reached out to ct. Ct let this cm know she needed to see a dentist due to still having discomfort from recent oral surgery. Ct let this cm know she should see [facility]. This cm contacted [facility]. Message was left. Yves Ortiz returned this cm's phone call. Ct's has an appointment scheduled for 11:20 am.    Ct was made aware of her appointment. This cm scheduled ct a [employer] for appointment.  time 10:30 am.    Ct was made aware of scheduled Lyft. Ct was made aware to contact this cm after appointment for a ride home.

## 2023-11-15 ENCOUNTER — PATIENT OUTREACH (OUTPATIENT)
Dept: SURGERY | Facility: CLINIC | Age: 58
End: 2023-11-15

## 2023-11-15 NOTE — PROGRESS NOTES
Ct let this cm know she needed a Lyft home. Ct let this cm know the oral surgeon said she has a cyst and they gave her an oral rinse to use. This cm scheduled ct a Lyft home.

## 2023-11-17 ENCOUNTER — PATIENT OUTREACH (OUTPATIENT)
Dept: SURGERY | Facility: CLINIC | Age: 58
End: 2023-11-17

## 2023-11-17 NOTE — PROGRESS NOTES
This cm received a message from ct. This cm spoke to ct. Ct let this cm know she wanted to see the dentist due to still having pain in her mouth. Ct agreeable to be seen to be evaluated. Ct requested an appointment with dental on Monday after 1 pm.    This cm contacted [address] [facility] [address]. [facility] does not have any appointments available for ct's requested time due to short week. [facility] let this cm know to have ct contact them on Monday. Ct was made aware. This cm provided ct with contact number for [facility].

## 2023-11-20 ENCOUNTER — TELEPHONE (OUTPATIENT)
Dept: NEUROSURGERY | Facility: CLINIC | Age: 58
End: 2023-11-20

## 2023-11-20 NOTE — TELEPHONE ENCOUNTER
Patient m to r/s appt for 11/28 the mri has not been done. Called patient [facility] for a call back.
No

## 2023-11-21 ENCOUNTER — TELEPHONE (OUTPATIENT)
Dept: NEUROSURGERY | Facility: CLINIC | Age: 58
End: 2023-11-21

## 2023-11-21 NOTE — TELEPHONE ENCOUNTER
patient is on waitlist to schedule MRI because she has defibrilltor, once MRI is scheduled she will call back and reschedul appt

## 2023-11-24 ENCOUNTER — APPOINTMENT (OUTPATIENT)
Dept: LAB | Facility: HOSPITAL | Age: 58
End: 2023-11-24
Payer: MEDICARE

## 2023-11-24 ENCOUNTER — ANTICOAG VISIT (OUTPATIENT)
Dept: CARDIOLOGY CLINIC | Facility: CLINIC | Age: 58
End: 2023-11-24

## 2023-11-24 DIAGNOSIS — I25.5 ISCHEMIC CARDIOMYOPATHY: Primary | ICD-10-CM

## 2023-11-24 DIAGNOSIS — Z79.01 LONG TERM CURRENT USE OF ANTICOAGULANT THERAPY: ICD-10-CM

## 2023-11-24 DIAGNOSIS — I23.6 LV (LEFT VENTRICULAR) MURAL THROMBUS FOLLOWING MI (HCC): ICD-10-CM

## 2023-11-24 LAB
ALBUMIN SERPL BCP-MCNC: 4 G/DL (ref 3.5–5)
ALP SERPL-CCNC: 50 U/L (ref 34–104)
ALT SERPL W P-5'-P-CCNC: 18 U/L (ref 7–52)
ANION GAP SERPL CALCULATED.3IONS-SCNC: 6 MMOL/L
AST SERPL W P-5'-P-CCNC: 16 U/L (ref 13–39)
BACTERIA UR QL AUTO: ABNORMAL /HPF
BASOPHILS # BLD AUTO: 0.03 THOUSANDS/ÂΜL (ref 0–0.1)
BASOPHILS NFR BLD AUTO: 1 % (ref 0–1)
BILIRUB SERPL-MCNC: 0.69 MG/DL (ref 0.2–1)
BILIRUB UR QL STRIP: NEGATIVE
BUN SERPL-MCNC: 16 MG/DL (ref 5–25)
CALCIUM SERPL-MCNC: 9.4 MG/DL (ref 8.4–10.2)
CHLORIDE SERPL-SCNC: 106 MMOL/L (ref 96–108)
CHOLEST SERPL-MCNC: 164 MG/DL
CLARITY UR: ABNORMAL
CO2 SERPL-SCNC: 28 MMOL/L (ref 21–32)
COLOR UR: ABNORMAL
CREAT SERPL-MCNC: 1.45 MG/DL (ref 0.6–1.3)
EOSINOPHIL # BLD AUTO: 0.08 THOUSAND/ÂΜL (ref 0–0.61)
EOSINOPHIL NFR BLD AUTO: 2 % (ref 0–6)
ERYTHROCYTE [DISTWIDTH] IN BLOOD BY AUTOMATED COUNT: 13.8 % (ref 11.6–15.1)
EST. AVERAGE GLUCOSE BLD GHB EST-MCNC: 117 MG/DL
GFR SERPL CREATININE-BSD FRML MDRD: 40 ML/MIN/1.73SQ M
GLUCOSE P FAST SERPL-MCNC: 138 MG/DL (ref 65–99)
GLUCOSE UR STRIP-MCNC: NEGATIVE MG/DL
HBA1C MFR BLD: 5.7 %
HCT VFR BLD AUTO: 40.1 % (ref 34.8–46.1)
HCV AB SER QL: NORMAL
HDLC SERPL-MCNC: 53 MG/DL
HGB BLD-MCNC: 13 G/DL (ref 11.5–15.4)
HGB UR QL STRIP.AUTO: 10
IMM GRANULOCYTES # BLD AUTO: 0.01 THOUSAND/UL (ref 0–0.2)
IMM GRANULOCYTES NFR BLD AUTO: 0 % (ref 0–2)
INR PPP: 2.73 (ref 0.84–1.19)
KETONES UR STRIP-MCNC: NEGATIVE MG/DL
LDLC SERPL CALC-MCNC: 77 MG/DL (ref 0–100)
LEUKOCYTE ESTERASE UR QL STRIP: 500
LYMPHOCYTES # BLD AUTO: 1.92 THOUSANDS/ÂΜL (ref 0.6–4.47)
LYMPHOCYTES NFR BLD AUTO: 43 % (ref 14–44)
MCH RBC QN AUTO: 31 PG (ref 26.8–34.3)
MCHC RBC AUTO-ENTMCNC: 32.4 G/DL (ref 31.4–37.4)
MCV RBC AUTO: 96 FL (ref 82–98)
MONOCYTES # BLD AUTO: 0.28 THOUSAND/ÂΜL (ref 0.17–1.22)
MONOCYTES NFR BLD AUTO: 6 % (ref 4–12)
NEUTROPHILS # BLD AUTO: 2.11 THOUSANDS/ÂΜL (ref 1.85–7.62)
NEUTS SEG NFR BLD AUTO: 48 % (ref 43–75)
NITRITE UR QL STRIP: POSITIVE
NON-SQ EPI CELLS URNS QL MICRO: ABNORMAL /HPF
NONHDLC SERPL-MCNC: 111 MG/DL
NRBC BLD AUTO-RTO: 0 /100 WBCS
PH UR STRIP.AUTO: 6 [PH]
PLATELET # BLD AUTO: 179 THOUSANDS/UL (ref 149–390)
PMV BLD AUTO: 10.8 FL (ref 8.9–12.7)
POTASSIUM SERPL-SCNC: 4.1 MMOL/L (ref 3.5–5.3)
PROT SERPL-MCNC: 6.7 G/DL (ref 6.4–8.4)
PROT UR STRIP-MCNC: NEGATIVE MG/DL
PROTHROMBIN TIME: 29.2 SECONDS (ref 11.6–14.5)
RBC # BLD AUTO: 4.19 MILLION/UL (ref 3.81–5.12)
RBC #/AREA URNS AUTO: ABNORMAL /HPF
SODIUM SERPL-SCNC: 140 MMOL/L (ref 135–147)
SP GR UR STRIP.AUTO: 1.02 (ref 1–1.04)
TREPONEMA PALLIDUM IGG+IGM AB [PRESENCE] IN SERUM OR PLASMA BY IMMUNOASSAY: NORMAL
TRIGL SERPL-MCNC: 172 MG/DL
UROBILINOGEN UA: NEGATIVE MG/DL
WBC # BLD AUTO: 4.43 THOUSAND/UL (ref 4.31–10.16)
WBC #/AREA URNS AUTO: ABNORMAL /HPF

## 2023-11-24 PROCEDURE — 36415 COLL VENOUS BLD VENIPUNCTURE: CPT

## 2023-11-24 PROCEDURE — 85610 PROTHROMBIN TIME: CPT

## 2023-11-24 NOTE — PROGRESS NOTES
Spoke with patient, advised INR good, continue 7.5 mg Mon Fri, 5 mg all other days, will recheck in 4 weeks 12/22/23

## 2023-11-25 LAB
BASOPHILS # BLD AUTO: 0 X10E3/UL (ref 0–0.2)
BASOPHILS NFR BLD AUTO: 1 %
CD3+CD4+ CELLS # BLD: 511 /UL (ref 359–1519)
CD3+CD4+ CELLS NFR BLD: 26.9 % (ref 30.8–58.5)
EOSINOPHIL # BLD AUTO: 0.1 X10E3/UL (ref 0–0.4)
EOSINOPHIL NFR BLD AUTO: 2 %
ERYTHROCYTE [DISTWIDTH] IN BLOOD BY AUTOMATED COUNT: 14.3 % (ref 11.7–15.4)
HCT VFR BLD AUTO: 23.3 % (ref 34–46.6)
HGB BLD-MCNC: 7.9 G/DL (ref 11.1–15.9)
IMM GRANULOCYTES # BLD: 0 X10E3/UL (ref 0–0.1)
IMM GRANULOCYTES NFR BLD: 0 %
LYMPHOCYTES # BLD AUTO: 1.9 X10E3/UL (ref 0.7–3.1)
LYMPHOCYTES NFR BLD AUTO: 37 %
MCH RBC QN AUTO: 31.5 PG (ref 26.6–33)
MCHC RBC AUTO-ENTMCNC: 33.9 G/DL (ref 31.5–35.7)
MCV RBC AUTO: 93 FL (ref 79–97)
MONOCYTES # BLD AUTO: 0.4 X10E3/UL (ref 0.1–0.9)
MONOCYTES NFR BLD AUTO: 8 %
NEUTROPHILS # BLD AUTO: 2.6 X10E3/UL (ref 1.4–7)
NEUTROPHILS NFR BLD AUTO: 52 %
NRBC BLD AUTO-RTO: 1 % (ref 0–0)
PLATELET # BLD AUTO: 233 X10E3/UL (ref 150–450)
RBC # BLD AUTO: 2.51 X10E6/UL (ref 3.77–5.28)
WBC # BLD AUTO: 5 X10E3/UL (ref 3.4–10.8)

## 2023-11-26 LAB
C TRACH DNA SPEC QL NAA+PROBE: NEGATIVE
N GONORRHOEA DNA SPEC QL NAA+PROBE: NEGATIVE

## 2023-11-27 LAB — HIV1 RNA # PLAS NAA DL=20: ABNORMAL {COPIES}/ML

## 2023-11-30 ENCOUNTER — PATIENT OUTREACH (OUTPATIENT)
Dept: SURGERY | Facility: CLINIC | Age: 58
End: 2023-11-30

## 2023-11-30 DIAGNOSIS — I25.10 TRIPLE VESSEL CORONARY ARTERY DISEASE: ICD-10-CM

## 2023-11-30 DIAGNOSIS — I25.5 ISCHEMIC CARDIOMYOPATHY: ICD-10-CM

## 2023-11-30 RX ORDER — CARVEDILOL 6.25 MG/1
TABLET ORAL
Qty: 60 TABLET | Refills: 5 | Status: SHIPPED | OUTPATIENT
Start: 2023-11-30

## 2023-11-30 NOTE — PROGRESS NOTES
This cm received a message from ct. This cm attempted to contact. Message was left. Ct discussion took place with housing supervisor, Iveth Zarate in regards to ct's recent visit with [facility]. This cm attempted to contact [facility]. Message was left. Angel Luis Bernardo returned this cm's phone call.  Ct should follow up with her dentist.

## 2023-12-04 ENCOUNTER — TELEPHONE (OUTPATIENT)
Dept: NEUROSURGERY | Facility: CLINIC | Age: 58
End: 2023-12-04

## 2023-12-04 ENCOUNTER — PATIENT OUTREACH (OUTPATIENT)
Dept: SURGERY | Facility: CLINIC | Age: 58
End: 2023-12-04

## 2023-12-04 NOTE — TELEPHONE ENCOUNTER
12/4/23 PT CB TO INFORM OFFICE SHE IS STILL ON THE WAIT LIST TO [address] [facility] MRI BRAIN - INFORMED HER TO CONTACT OFFICE ONCE IT HAS BEEN [address]

## 2023-12-04 NOTE — PROGRESS NOTES
This cm reached out to ct. This cm let ct know she spoke to [address] [place] [address] who said ct needs to follow up with her dentist. Austin Jaramillo let this cm know she already followed up with her dentist who said they were unable to put screws back in due to gums being weak.  Ct let this cm know she needed to find another dentist.

## 2023-12-05 ENCOUNTER — REMOTE DEVICE CLINIC VISIT (OUTPATIENT)
Dept: CARDIOLOGY CLINIC | Facility: CLINIC | Age: 58
End: 2023-12-05

## 2023-12-05 DIAGNOSIS — Z95.810 AICD (AUTOMATIC CARDIOVERTER/DEFIBRILLATOR) PRESENT: Primary | ICD-10-CM

## 2023-12-05 PROCEDURE — RECHECK: Performed by: INTERNAL MEDICINE

## 2023-12-05 NOTE — PROGRESS NOTES
Results for orders placed or performed in visit on 12/05/23   Cardiac EP device report    Narrative    MDT-SINGLE CHAMBER ICD - ACTIVE SYSTEM IS MRI CONDITIONAL  CARELINK TRANSMISSION: BATTERY STATUS "5 YRS."  0%. ALL AVAILABLE LEAD PARAMETERS WITHIN NORMAL LIMITS. NO SIGNIFICANT HIGH RATE EPISODES. OPTI-VOL WITHIN NORMAL LIMITS. NORMAL DEVICE FUNCTION.  NC

## 2023-12-06 ENCOUNTER — PATIENT OUTREACH (OUTPATIENT)
Dept: SURGERY | Facility: CLINIC | Age: 58
End: 2023-12-06

## 2023-12-06 NOTE — PROGRESS NOTES
Ct left message for this cm. This cm attempted to return ct's call. Message was left. This cm attempted to contact [facility]. Message was left. Ct let this cm know she called her insurance for another dentist. Denita James let this cm know the dentist her insurance provided did not have availability until April 2024. This cm let ct know she contacted [facility] and left a message. Ct was made aware this cm would follow up with cm once this cm spoke to them. This cm received a message from [address] [place] [address]. This cm spoke to [address] [place] [address]. [facility] let this cm know that ct was unable to have the implants put back in due to bone weakness. [facility] let this cm know that ct was made aware prior to the procedure that the surgery could cause potential bone loss. This cm contacted ct and made her aware of conversation with [facility]. Ct let this cm know she needed dentures. Ct was made aware she could see Douglas Jin for dentures. Ct agreeable to this sending her [facility] contact information to schedule an appointment. This cm texted ct contact number for [facility].

## 2023-12-07 ENCOUNTER — PATIENT OUTREACH (OUTPATIENT)
Dept: SURGERY | Facility: CLINIC | Age: 58
End: 2023-12-07

## 2023-12-07 DIAGNOSIS — I25.5 ISCHEMIC CARDIOMYOPATHY: ICD-10-CM

## 2023-12-07 DIAGNOSIS — I25.2 PAST HEART ATTACK: ICD-10-CM

## 2023-12-07 NOTE — PROGRESS NOTES
Ct reached out to this cm to let her know she contacted [address] PETERSON let ct know she had to contact her insurance for a referral. Ct let this  know referral could take up to 3 weeks.

## 2023-12-08 RX ORDER — SACUBITRIL AND VALSARTAN 49; 51 MG/1; MG/1
TABLET, FILM COATED ORAL
Qty: 60 TABLET | Refills: 11 | Status: SHIPPED | OUTPATIENT
Start: 2023-12-08

## 2023-12-14 ENCOUNTER — TELEPHONE (OUTPATIENT)
Dept: NEUROSURGERY | Facility: CLINIC | Age: 58
End: 2023-12-14

## 2023-12-14 ENCOUNTER — HOSPITAL ENCOUNTER (OUTPATIENT)
Dept: RADIOLOGY | Facility: HOSPITAL | Age: 58
Discharge: HOME | End: 2023-12-14

## 2023-12-14 DIAGNOSIS — Z95.0 PACEMAKER: ICD-10-CM

## 2023-12-14 NOTE — TELEPHONE ENCOUNTER
I spoke with the patient informing her of the XRAY that should be completed prior to f/u with Dr. Marty Katz on 1/2/2024. Patient acknowledged and will schedule.

## 2023-12-15 ENCOUNTER — IN-CLINIC DEVICE VISIT (OUTPATIENT)
Dept: CARDIOLOGY CLINIC | Facility: CLINIC | Age: 58
End: 2023-12-15
Payer: MEDICARE

## 2023-12-15 DIAGNOSIS — Z95.810 AICD (AUTOMATIC CARDIOVERTER/DEFIBRILLATOR) PRESENT: Primary | ICD-10-CM

## 2023-12-15 PROCEDURE — 93282 PRGRMG EVAL IMPLANTABLE DFB: CPT | Performed by: INTERNAL MEDICINE

## 2023-12-15 NOTE — PROGRESS NOTES
Results for orders placed or performed in visit on 12/15/23   Cardiac EP device report    Narrative    MDT-SINGLE CHAMBER ICD - ACTIVE SYSTEM IS MRI CONDITIONAL  DEVICE INTERROGATED IN THE [employer] OFFICE: BATTERY VOLTAGE ADEQUATE-5 YRS.  0%. ALL AVAILABLE LEAD PARAMETERS WITHIN NORMAL LIMITS. NO SIGNIFICANT HIGH RATE EPISODES. OPTI-VOL WITHIN NORMAL LIMITS. NO PROGRAMMING CHANGES MADE TO DEVICE PARAMETERS. NORMAL DEVICE FUNCTION.  NC

## 2023-12-19 ENCOUNTER — HOSPITAL ENCOUNTER (OUTPATIENT)
Dept: RADIOLOGY | Facility: HOSPITAL | Age: 58
Discharge: HOME | End: 2023-12-19
Payer: MEDICARE

## 2023-12-19 DIAGNOSIS — D32.0 MENINGIOMA, CEREBRAL (HCC): ICD-10-CM

## 2023-12-19 PROCEDURE — A9585 GADOBUTROL INJECTION: HCPCS | Performed by: PHYSICIAN ASSISTANT

## 2023-12-19 PROCEDURE — 70553 MRI BRAIN STEM W/O & W/DYE: CPT

## 2023-12-19 PROCEDURE — G1004 CDSM NDSC: HCPCS

## 2023-12-19 RX ORDER — GADOBUTROL 604.72 MG/ML
7 INJECTION INTRAVENOUS
Status: COMPLETED | OUTPATIENT
Start: 2023-12-19 | End: 2023-12-19

## 2023-12-19 RX ADMIN — GADOBUTROL 7 ML: 604.72 INJECTION INTRAVENOUS at 11:01

## 2023-12-19 NOTE — NURSING NOTE
Medtronic device interrogation for MRI done by Barbara Angelo Rep.  Device set to MRI safe mode --turned off     MRI brain w/without contrast complete. Pt tolerated well.    VSS throughout scan. Pt alert and oriented on room air.   No complaints or visible signs of distress.    Device reprogrammed to prior settings per Cardiology by Barbara Dubon Rep.

## 2023-12-21 ENCOUNTER — APPOINTMENT (OUTPATIENT)
Dept: LAB | Facility: HOSPITAL | Age: 58
End: 2023-12-21
Payer: MEDICARE

## 2023-12-21 ENCOUNTER — ANTICOAG VISIT (OUTPATIENT)
Dept: CARDIOLOGY CLINIC | Facility: CLINIC | Age: 58
End: 2023-12-21

## 2023-12-21 DIAGNOSIS — I23.6 LV (LEFT VENTRICULAR) MURAL THROMBUS FOLLOWING MI (HCC): ICD-10-CM

## 2023-12-21 DIAGNOSIS — Z79.01 LONG TERM CURRENT USE OF ANTICOAGULANT THERAPY: ICD-10-CM

## 2023-12-21 DIAGNOSIS — I25.5 ISCHEMIC CARDIOMYOPATHY: Primary | ICD-10-CM

## 2023-12-21 LAB
INR PPP: 2.43 (ref 0.84–1.19)
PROTHROMBIN TIME: 26.7 SECONDS (ref 11.6–14.5)

## 2023-12-21 PROCEDURE — 36415 COLL VENOUS BLD VENIPUNCTURE: CPT

## 2023-12-21 PROCEDURE — 85610 PROTHROMBIN TIME: CPT

## 2023-12-21 NOTE — PROGRESS NOTES
Spoke with patient, advised INR a little low, current dose verified, advised to take 7.5 mg tonight only instead of 5 mg, then go back to 7.5 mg Mon Fri, 5 mg all other days, will recheck in 3 weeks 1/11/24

## 2023-12-28 DIAGNOSIS — I25.2 OLD MI (MYOCARDIAL INFARCTION): ICD-10-CM

## 2023-12-28 RX ORDER — ASPIRIN 81 MG/1
TABLET, COATED ORAL
Qty: 30 TABLET | Refills: 5 | Status: SHIPPED | OUTPATIENT
Start: 2023-12-28

## 2024-01-02 ENCOUNTER — OFFICE VISIT (OUTPATIENT)
Dept: NEUROSURGERY | Facility: CLINIC | Age: 59
End: 2024-01-02
Payer: MEDICARE

## 2024-01-02 VITALS
BODY MASS INDEX: 29.44 KG/M2 | WEIGHT: 160 LBS | OXYGEN SATURATION: 99 % | SYSTOLIC BLOOD PRESSURE: 100 MMHG | DIASTOLIC BLOOD PRESSURE: 62 MMHG | TEMPERATURE: 97.7 F | HEIGHT: 62 IN | HEART RATE: 62 BPM | RESPIRATION RATE: 20 BRPM

## 2024-01-02 DIAGNOSIS — D32.0 MENINGIOMA, CEREBRAL (HCC): Primary | ICD-10-CM

## 2024-01-02 PROCEDURE — 99213 OFFICE O/P EST LOW 20 MIN: CPT | Performed by: PHYSICIAN ASSISTANT

## 2024-01-02 NOTE — ASSESSMENT & PLAN NOTE
Pt presents for 3 month follow up for left frontal mass, likely meningioma.   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     Imaging:   MRI brain w/wo 12/19/2023: Stable extra-axial mass in the left anterior cranial fossa with mild mass effect consistent with meningioma.  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  Mild mucosal thickening versus mucous retention cyst in the maxillary sinuses bilaterally.  Mild to moderate white matter disease.  Diminished signal susceptibility in the posterior putamen consistent with hemosiderin staining.     Plan  Again discussed with patient the natural history of meningiomas  Given stable imaging, at this time no neurosurgical interventions anticipated  Recommend continued surveillance per NCCN guidelines. Recommend 6 months to document 1 year of stability. If stable at follow up consider yearly surveillance imaging  Discussed plan of care with patient who showed understanding.  Patient made aware to contact neurosurgery with any questions or concerns

## 2024-01-02 NOTE — PROGRESS NOTES
Neurosurgery Office Note  Airam Rainey 58 y.o. female MRN: [id number]      Assessment/Plan     Meningioma, cerebral (HCC)  Pt presents for 3 month follow up for left frontal mass, likely meningioma.   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     Imaging:   MRI brain w/wo 12/19/2023: Stable extra-axial mass in the left anterior cranial fossa with mild mass effect consistent with meningioma.  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  Mild mucosal thickening versus mucous retention cyst in the maxillary sinuses bilaterally.  Mild to moderate white matter disease.  Diminished signal susceptibility in the posterior putamen consistent with hemosiderin staining.     Plan  Again discussed with patient the natural history of meningiomas  Given stable imaging, at this time no neurosurgical interventions anticipated  Recommend continued surveillance per NCCN guidelines. Recommend 6 months to document 1 year of stability. If stable at follow up consider yearly surveillance imaging  Discussed plan of care with patient who showed understanding.  Patient made aware to contact neurosurgery with any questions or concerns     Diagnoses and all orders for this visit:    Meningioma, cerebral (HCC)  -     MRI brain w wo contrast; Future          I have spent a total time of 20 minutes on 01/02/24 in caring for this patient including Diagnostic results, Prognosis, Patient and family education, Impressions, Counseling / Coordination of care, Documenting in the medical record, Reviewing / ordering tests, medicine, procedures  , and Obtaining or reviewing history  .      CHIEF COMPLAINT    Chief Complaint   Patient presents with    Follow-up     Meningioma, cerebral (HCC)       HISTORY    History of Present Illness     58 y.o. year old female with past medical history of triple-vessel coronary artery disease, HIV, CKD stage III, tobacco use disorder, LVH mural  thrombosis, AICD/cardiac pacemaker (Mri Conditional) who presents for ongoing follow-up of left frontal lesion likely meningioma that was initially noted on CT orbits in 4/18/23 during workup for orbital cellulitis.     Patient denies any new neurological deficits.  She offers no complaints or concerns.  She is at her baseline.  ROS is negative aside from some baseline balance troubles which she reports are slightly improved from what they were over the summer.  She presents unaccompanied to the office today.              See Discussion    REVIEW OF SYSTEMS    Review of Systems   Constitutional: Negative.    HENT: Negative.     Eyes: Negative.    Respiratory: Negative.     Cardiovascular: Negative.    Endocrine: Negative.    Genitourinary: Negative.    Musculoskeletal: Negative.    Skin: Negative.    Neurological: Negative.         Reports feeling off balance, noticed during the summer    Hematological:  Does not bruise/bleed easily (Patient on ASA 81 and Coumadin).   Psychiatric/Behavioral: Negative.         ROS obtained by MA. Reviewed. See HPI.     Meds/Allergies     Current Outpatient Medications   Medication Sig Dispense Refill    Aspirin Low Dose 81 MG EC tablet TAKE 1 TABLET BY MOUTH DAILY 30 tablet 5    atorvastatin (LIPITOR) 80 mg tablet TAKE 1 TABLET BY MOUTH DAILY 30 tablet 5    Biktarvy -25 MG tablet TAKE 1 TABLET BY MOUTH ONCE DAILY WITH BREAKFAST 90 tablet 1    Calcium Carb-Cholecalciferol (calcium carbonate-vitamin D) 500 mg-5 mcg tablet TAKE 1 TABLET BY MOUTH DAILY WITH BREAKFAST 90 tablet 1    carvedilol (COREG) 6.25 mg tablet TAKE 1 TABLET BY MOUTH TWICE A DAY WITH MEALS 60 tablet 5    cholecalciferol (VITAMIN D3) 1,000 units tablet Take 2,000 Units by mouth daily      cyanocobalamin (VITAMIN B-12) 100 MCG tablet Take 100 mcg by mouth daily      nicotine polacrilex (COMMIT) 4 MG lozenge Apply 1 lozenge (4 mg total) to the mouth or throat as needed for smoking cessation 300 each 2     "sacubitril-valsartan (Entresto) 49-51 MG TABS TAKE 1 TABLET BY MOUTH TWICE A DAY 60 tablet 11    warfarin (COUMADIN) 5 mg tablet TAKE 1 TO 1 AND 1/2 TABLETS BY MOUTH DAILY AS DIRECTED BY  tablet 3     No current facility-administered medications for this visit.       Allergies   Allergen Reactions    No Active Allergies     No Known Allergies        PAST HISTORY    Past Medical History:   Diagnosis Date    Asthma     Coronary artery disease     defibrillator    HIV positive (HCC)        Past Surgical History:   Procedure Laterality Date    ANGIOPLASTY      CARDIAC DEFIBRILLATOR PLACEMENT      CORONARY ANGIOPLASTY      pci placement    TOTAL ABDOMINAL HYSTERECTOMY      US GUIDED INJECTION FOR RESEARCH STUDY  2018    US GUIDED INJECTION FOR RESEARCH STUDY  2019    US GUIDED INJECTION FOR RESEARCH STUDY  2018    US GUIDED INJECTION FOR RESEARCH STUDY  2018       Social History     Tobacco Use    Smoking status: Former     Current packs/day: 0.00     Types: Cigarettes     Quit date: 2023     Years since quittin.7    Smokeless tobacco: Never   Vaping Use    Vaping status: Never Used   Substance Use Topics    Alcohol use: Yes     Comment: socially    Drug use: No       Family History   Problem Relation Age of Onset    No Known Problems Mother     Other Father         GI bleed    No Known Problems Sister     No Known Problems Sister     No Known Problems Maternal Grandmother     No Known Problems Maternal Grandfather     No Known Problems Paternal Grandmother     No Known Problems Paternal Grandfather     Suicidality Brother     Drug abuse Brother         overdose    No Known Problems Maternal Aunt     No Known Problems Maternal Aunt     No Known Problems Maternal Aunt     No Known Problems Paternal Aunt          Above history personally reviewed.       EXAM    Vitals:Blood pressure 100/62, pulse 62, temperature 97.7 °F (36.5 °C), temperature source Temporal, resp. rate 20, height 5' 2\" " (1.575 m), weight 72.6 kg (160 lb), SpO2 99%, not currently breastfeeding.,Body mass index is 29.26 kg/m².     Physical Exam  Constitutional:       Appearance: Normal appearance. She is well-developed.   HENT:      Head: Normocephalic and atraumatic.   Eyes:      Extraocular Movements: Extraocular movements intact and EOM normal.      Pupils: Pupils are equal, round, and reactive to light.   Neck:      Vascular: No JVD.      Trachea: No tracheal deviation.   Cardiovascular:      Rate and Rhythm: Normal rate.   Pulmonary:      Effort: Pulmonary effort is normal. No respiratory distress.   Musculoskeletal:         General: No deformity. Normal range of motion.      Cervical back: Normal range of motion and neck supple.   Skin:     General: Skin is warm and dry.   Neurological:      Mental Status: She is alert and oriented to person, place, and time.      Cranial Nerves: No cranial nerve deficit.      Sensory: No sensory deficit.      Motor: Motor strength is normal.No weakness.      Gait: Gait is intact.      Deep Tendon Reflexes: Reflexes are normal and symmetric.   Psychiatric:         Speech: Speech normal.         Behavior: Behavior normal.         Thought Content: Thought content normal.         Neurologic Exam     Mental Status   Oriented to person, place, and time.   Attention: normal.   Speech: speech is normal   Level of consciousness: alert  Knowledge: good.   Normal comprehension.     Cranial Nerves     CN III, IV, VI   Pupils are equal, round, and reactive to light.  Extraocular motions are normal.   Upgaze: normal  Downgaze: normal    CN V   Facial sensation intact.     CN VII   Facial expression full, symmetric.     CN VIII   CN VIII normal.   Hearing: intact    CN XI   CN XI normal.     CN XII   CN XII normal.   Tongue: not atrophic  Tongue deviation: none    Motor Exam   Muscle bulk: normal  Right arm tone: normal  Left arm tone: normal  Right leg tone: normal  Left leg tone: normal    Strength    Strength 5/5 throughout.     Sensory Exam   Light touch normal.     Gait, Coordination, and Reflexes     Gait  Gait: normal    Tremor   Resting tremor: absent  Action tremor: absent    MEDICAL DECISION MAKING    Imaging Studies:     MRI brain with and without contrast    Result Date: 12/26/2023  Narrative: MRI BRAIN WITH AND WITHOUT CONTRAST INDICATION: D32.0: Benign neoplasm of cerebral meninges. COMPARISON: Relevant examinations including MRI brain 8/22/2023. TECHNIQUE: Multiplanar, multisequence imaging of the brain was performed before and after gadolinium administration. IV Contrast:  7 mL of Gadobutrol injection (SINGLE-DOSE) IMAGE QUALITY:   Diagnostic. FINDINGS: BRAIN PARENCHYMA AND ADJACENT EXTRA-AXIAL SPACES: Again noted a well-circumscribed heterogeneously enhancing spheroid extra-axial dural based mass anterior aspect of the anterior cranial fossa on the left measuring approximately 1.5 cm in diameter most consistent with low-grade meningioma. No associated adjacent brain reaction/vasogenic edema. Mild associated mass effect upon the inferior frontal gyrus. Findings unchanged. Again noted old right basal ganglia and right centrum semiovale infarction as well as an approximately 1 cm old cortical infarction involving the right middle frontal gyrus. Again noted an approximately 4 mm focus of diminished signal susceptibility within the posterior putamen on the left. Again noted in an otherwise relatively symmetric distribution mild to moderate altered signal involving supratentorial periventricular and deep white matter on FLAIR and T2-weighted images. There are no other areas of abnormal signal intensity or pathologic contrast enhancement evident within brain parenchyma.  There is no other expansile mass evident. VENTRICLES: Normal size for age. SELLA AND PITUITARY GLAND: No significant abnormality evident. ORBITS: There is no significant abnormality evident. PARANASAL SINUSES AND TEMPORAL BONES: Again  noted mild mucosal thickening in a few polyps or mucous retention cysts ranging in size up to 2 cm involving the bilateral maxillary sinuses. The paranasal sinuses and temporal bones otherwise appear to be well  aerated. There is no middle or inner ear abnormality evident. VASCULATURE:  No significant vascular abnormality evident. CALVARIUM, SKULL BASE, AND UPPER CERVICAL SPINE:  No significant abnormality evident. EXTRACRANIAL SOFT TISSUES:  No significant abnormality evident.     Impression: No interval change evident compared to 8/22/2023 MRI brain. Again noted extra-axial mass left anterior cranial fossa with mild mass effect upon the adjacent left frontal lobe most consistent with low-grade meningioma. Again noted old right basal ganglia, centrum semiovale and right frontal lobe infarctions. Mild mucosal thickening and polyps or mucous retention cysts within the bilateral maxillary sinuses. Mild to moderate altered signal involving white matter discussed above is nonspecific regarding etiology; findings most often relating to chronic small vessel white matter ischemic disease. 4 mm focus of diminished signal susceptibility within the posterior putamen on the left consistent with hemosiderin staining from prior nonspecific microhemorrhage. Alexandru Taylor M.D., FACR Senior Member, [employer] Workstation performed: [id number]     XR follow up    Result Date: 12/19/2023  Narrative: CHEST INDICATION:   Z95.0: Presence of cardiac pacemaker.  Pre-MRI pacer/ICD check. COMPARISON: CXR 1/18/2022, chest CT 2/8/2023. EXAM PERFORMED/VIEWS:  XR FOLLOW UP (NO CHARGE). FINDINGS: Mild cardiomegaly with right subclavian pacemaker lead in right ventricle. No fractured or abandoned leads. No acute disease. Chronic scar and bronchiectasis/bronchiolectasis in the right lower lobe. No effusion or pneumothorax. Osseous structures normal for age.     Impression: No acute cardiopulmonary disease. ICD with 1-lead.  "No fractured or abandoned leads. Workstation performed: [id number]     Cardiac EP device report    Result Date: 12/15/2023  Narrative: MDT-SINGLE CHAMBER ICD - ACTIVE SYSTEM IS MRI CONDITIONAL DEVICE INTERROGATED IN THE [place] OFFICE: BATTERY VOLTAGE ADEQUATE-5 YRS.  0%. ALL AVAILABLE LEAD PARAMETERS WITHIN NORMAL LIMITS. NO SIGNIFICANT HIGH RATE EPISODES. OPTI-VOL WITHIN NORMAL LIMITS. NO PROGRAMMING CHANGES MADE TO DEVICE PARAMETERS. NORMAL DEVICE FUNCTION. NC     Cardiac EP device report    Result Date: 12/5/2023  Narrative: MDT-SINGLE CHAMBER ICD - ACTIVE SYSTEM IS MRI CONDITIONAL CARELINK TRANSMISSION: BATTERY STATUS \"5 YRS.\"  0%. ALL AVAILABLE LEAD PARAMETERS WITHIN NORMAL LIMITS. NO SIGNIFICANT HIGH RATE EPISODES. OPTI-VOL WITHIN NORMAL LIMITS. NORMAL DEVICE FUNCTION. NC       I have personally reviewed pertinent reports.   and I have personally reviewed pertinent films in PACS  "

## 2024-01-08 DIAGNOSIS — E78.5 HYPERLIPIDEMIA, UNSPECIFIED HYPERLIPIDEMIA TYPE: ICD-10-CM

## 2024-01-08 RX ORDER — ATORVASTATIN CALCIUM 80 MG/1
TABLET, FILM COATED ORAL
Qty: 30 TABLET | Refills: 5 | Status: SHIPPED | OUTPATIENT
Start: 2024-01-08

## 2024-01-11 ENCOUNTER — APPOINTMENT (OUTPATIENT)
Dept: LAB | Facility: HOSPITAL | Age: 59
End: 2024-01-11
Payer: MEDICARE

## 2024-01-11 ENCOUNTER — ANTICOAG VISIT (OUTPATIENT)
Dept: CARDIOLOGY CLINIC | Facility: CLINIC | Age: 59
End: 2024-01-11

## 2024-01-11 DIAGNOSIS — T65.222D TOXIC EFFECT OF TOBACCO CIGARETTE, INTENTIONAL SELF-HARM, SUBSEQUENT ENCOUNTER: ICD-10-CM

## 2024-01-11 DIAGNOSIS — Z79.01 LONG TERM CURRENT USE OF ANTICOAGULANT THERAPY: ICD-10-CM

## 2024-01-11 DIAGNOSIS — I25.5 ISCHEMIC CARDIOMYOPATHY: Primary | ICD-10-CM

## 2024-01-11 DIAGNOSIS — I23.6 LV (LEFT VENTRICULAR) MURAL THROMBUS FOLLOWING MI (HCC): ICD-10-CM

## 2024-01-11 LAB
INR PPP: 1.83 (ref 0.84–1.19)
PROTHROMBIN TIME: 21.5 SECONDS (ref 11.6–14.5)

## 2024-01-11 PROCEDURE — 36415 COLL VENOUS BLD VENIPUNCTURE: CPT

## 2024-01-11 PROCEDURE — 85610 PROTHROMBIN TIME: CPT

## 2024-01-11 RX ORDER — NICOTINE POLACRILEX 4 MG/1
LOZENGE ORAL
Qty: 243 EACH | Refills: 2 | Status: SHIPPED | OUTPATIENT
Start: 2024-01-11

## 2024-01-11 NOTE — PROGRESS NOTES
Spoke with patient, advised INR low, no missed doses, changes in medications, diet or health. Current dose verified, advised to take 7.5 mg tonight, then 7.5  mg Mon Wed Fri, 5 mg all other days, will recheck in 2 weeks 1/25/24

## 2024-01-29 ENCOUNTER — PATIENT OUTREACH (OUTPATIENT)
Dept: SURGERY | Facility: CLINIC | Age: 59
End: 2024-01-29

## 2024-01-29 ENCOUNTER — OFFICE VISIT (OUTPATIENT)
Dept: SURGERY | Facility: CLINIC | Age: 59
End: 2024-01-29
Payer: MEDICARE

## 2024-01-29 VITALS
WEIGHT: 167 LBS | HEART RATE: 64 BPM | SYSTOLIC BLOOD PRESSURE: 102 MMHG | DIASTOLIC BLOOD PRESSURE: 62 MMHG | BODY MASS INDEX: 30.73 KG/M2 | OXYGEN SATURATION: 97 % | TEMPERATURE: 97.9 F | RESPIRATION RATE: 16 BRPM | HEIGHT: 62 IN

## 2024-01-29 DIAGNOSIS — B20 HIV DISEASE (HCC): Primary | ICD-10-CM

## 2024-01-29 DIAGNOSIS — Z87.891 ENCOUNTER FOR SCREENING FOR MALIGNANT NEOPLASM OF LUNG IN FORMER SMOKER WHO QUIT IN PAST 15 YEARS WITH 30 PACK YEAR HISTORY OR GREATER: ICD-10-CM

## 2024-01-29 DIAGNOSIS — Z87.891 PERSONAL HISTORY OF TOBACCO USE, PRESENTING HAZARDS TO HEALTH: ICD-10-CM

## 2024-01-29 DIAGNOSIS — Z00.00 MEDICARE ANNUAL WELLNESS VISIT, SUBSEQUENT: ICD-10-CM

## 2024-01-29 DIAGNOSIS — Z12.12 ENCOUNTER FOR SCREENING FOR COLORECTAL CANCER IN HIGH RISK PATIENT: ICD-10-CM

## 2024-01-29 DIAGNOSIS — Z12.2 ENCOUNTER FOR SCREENING FOR MALIGNANT NEOPLASM OF LUNG IN FORMER SMOKER WHO QUIT IN PAST 15 YEARS WITH 30 PACK YEAR HISTORY OR GREATER: ICD-10-CM

## 2024-01-29 DIAGNOSIS — Z91.89 ENCOUNTER FOR SCREENING FOR COLORECTAL CANCER IN HIGH RISK PATIENT: ICD-10-CM

## 2024-01-29 DIAGNOSIS — Z12.31 ENCOUNTER FOR SCREENING MAMMOGRAM FOR BREAST CANCER: ICD-10-CM

## 2024-01-29 DIAGNOSIS — Z12.11 ENCOUNTER FOR SCREENING FOR COLORECTAL CANCER IN HIGH RISK PATIENT: ICD-10-CM

## 2024-01-29 PROBLEM — M85.88 OSTEOPENIA OF LUMBAR SPINE: Status: ACTIVE | Noted: 2024-01-29

## 2024-01-29 PROCEDURE — G0439 PPPS, SUBSEQ VISIT: HCPCS | Performed by: NURSE PRACTITIONER

## 2024-01-29 PROCEDURE — 99214 OFFICE O/P EST MOD 30 MIN: CPT | Performed by: NURSE PRACTITIONER

## 2024-01-29 NOTE — ASSESSMENT & PLAN NOTE
Doing well on Biktarvy with an undetectable VL.  Pt reports 100% medication compliance on HAART.  Stressed the importance of 100% medication adherence  Monitor CD4. HIV RNA, CMP, and CBCD for virologic response and drug toxicities  Follow up with Dr. Kaplan or Dr. Cortez   HIV Counseling:    Viral Load: detected < 20 copies    CD4 Count: 511      Denies side effects.  Stressed the importance of adherence.  Continue follow up in the ID clinic with Dr. Kaplan or Dr. Cortez.    Reviewed the most recent labs, including the CD4 and viral load.  Discussed the risks and benefits of treatment options, instructions for management, importance of treatment adherence, and reduction of risk factors.  Educated on possible medication side effects.    Counseled on routes of HIV transmission, including the risk of  infection.  Emphasized that viral suppression is the best method to prevent HIV transmission.  At this time the pt denies the need for HIV testing of anyone in their life.    Total encounter time was greater than 35 minutes.  Greater than 20 minutes were spent on counseling and patient education.  Pt voices understanding and agreement with treatment plan.

## 2024-01-29 NOTE — PATIENT INSTRUCTIONS
Medicare Preventive Visit Patient Instructions  Thank you for completing your Welcome to Medicare Visit or Medicare Annual Wellness Visit today. Your next wellness visit will be due in one year (1/29/2025).  The screening/preventive services that you may require over the next 5-10 years are detailed below. Some tests may not apply to you based off risk factors and/or age. Screening tests ordered at today's visit but not completed yet may show as past due. Also, please note that scanned in results may not display below.  Preventive Screenings:  Service Recommendations Previous Testing/Comments   Colorectal Cancer Screening  * Colonoscopy    * Fecal Occult Blood Test (FOBT)/Fecal Immunochemical Test (FIT)  * Fecal DNA/Cologuard Test  * Flexible Sigmoidoscopy Age: 45-75 years old   Colonoscopy: every 10 years (may be performed more frequently if at higher risk)  OR  FOBT/FIT: every 1 year  OR  Cologuard: every 3 years  OR  Sigmoidoscopy: every 5 years  Screening may be recommended earlier than age 45 if at higher risk for colorectal cancer. Also, an individualized decision between you and your healthcare provider will decide whether screening between the ages of 76-85 would be appropriate. Colonoscopy: 01/29/2019  FOBT/FIT: Not on file  Cologuard: Not on file  Sigmoidoscopy: 01/29/2019    Screening Current     Breast Cancer Screening Age: 40+ years old  Frequency: every 1-2 years  Not required if history of left and right mastectomy Mammogram: 07/14/2023    Screening Current   Cervical Cancer Screening Between the ages of 21-29, pap smear recommended once every 3 years.   Between the ages of 30-65, can perform pap smear with HPV co-testing every 5 years.   Recommendations may differ for women with a history of total hysterectomy, cervical cancer, or abnormal pap smears in past. Pap Smear: 11/14/2019    Screening Not Indicated   Hepatitis C Screening Once for adults born between 1945 and 1965  More frequently in  patients at high risk for Hepatitis C Hep C Antibody: 11/24/2023    Screening Current   Diabetes Screening 1-2 times per year if you're at risk for diabetes or have pre-diabetes Fasting glucose: 138 mg/dL (11/24/2023)  A1C: 5.7 % (11/24/2023)  Screening Current   Cholesterol Screening Once every 5 years if you don't have a lipid disorder. May order more often based on risk factors. Lipid panel: 11/24/2023    Screening Not Indicated  History Lipid Disorder     Other Preventive Screenings Covered by Medicare:  Abdominal Aortic Aneurysm (AAA) Screening: covered once if your at risk. You're considered to be at risk if you have a family history of AAA.  Lung Cancer Screening: covers low dose CT scan once per year if you meet all of the following conditions: (1) Age 55-77; (2) No signs or symptoms of lung cancer; (3) Current smoker or have quit smoking within the last 15 years; (4) You have a tobacco smoking history of at least 20 pack years (packs per day multiplied by number of years you smoked); (5) You get a written order from a healthcare provider.  Glaucoma Screening: covered annually if you're considered high risk: (1) You have diabetes OR (2) Family history of glaucoma OR (3)  aged 50 and older OR (4)  American aged 65 and older  Osteoporosis Screening: covered every 2 years if you meet one of the following conditions: (1) You're estrogen deficient and at risk for osteoporosis based off medical history and other findings; (2) Have a vertebral abnormality; (3) On glucocorticoid therapy for more than 3 months; (4) Have primary hyperparathyroidism; (5) On osteoporosis medications and need to assess response to drug therapy.   Last bone density test (DXA Scan): 03/01/2023.  HIV Screening: covered annually if you're between the age of 15-65. Also covered annually if you are younger than 15 and older than 65 with risk factors for HIV infection. For pregnant patients, it is covered up to 3 times  per pregnancy.    Immunizations:  Immunization Recommendations   Influenza Vaccine Annual influenza vaccination during flu season is recommended for all persons aged >= 6 months who do not have contraindications   Pneumococcal Vaccine   * Pneumococcal conjugate vaccine = PCV13 (Prevnar 13), PCV15 (Vaxneuvance), PCV20 (Prevnar 20)  * Pneumococcal polysaccharide vaccine = PPSV23 (Pneumovax) Adults 19-65 yo with certain risk factors or if 65+ yo  If never received any pneumonia vaccine: recommend Prevnar 20 (PCV20)  Give PCV20 if previously received 1 dose of PCV13 or PPSV23   Hepatitis B Vaccine 3 dose series if at intermediate or high risk (ex: diabetes, end stage renal disease, liver disease)   Respiratory syncytial virus (RSV) Vaccine - COVERED BY MEDICARE PART D  * RSVPreF3 (Arexvy) CDC recommends that adults 60 years of age and older may receive a single dose of RSV vaccine using shared clinical decision-making (SCDM)   Tetanus (Td) Vaccine - COST NOT COVERED BY MEDICARE PART B Following completion of primary series, a booster dose should be given every 10 years to maintain immunity against tetanus. Td may also be given as tetanus wound prophylaxis.   Tdap Vaccine - COST NOT COVERED BY MEDICARE PART B Recommended at least once for all adults. For pregnant patients, recommended with each pregnancy.   Shingles Vaccine (Shingrix) - COST NOT COVERED BY MEDICARE PART B  2 shot series recommended in those 19 years and older who have or will have weakened immune systems or those 50 years and older     Health Maintenance Due:      Topic Date Due   • Colorectal Cancer Screening  01/31/2024   • Breast Cancer Screening: Mammogram  07/14/2024   • Hepatitis C Screening  Completed   • Lung Cancer Screening  Discontinued     Immunizations Due:      Topic Date Due   • COVID-19 Vaccine (4 - 2023-24 season) 09/01/2023     Advance Directives   What are advance directives?  Advance directives are legal documents that state your  wishes and plans for medical care. These plans are made ahead of time in case you lose your ability to make decisions for yourself. Advance directives can apply to any medical decision, such as the treatments you want, and if you want to donate organs.   What are the types of advance directives?  There are many types of advance directives, and each state has rules about how to use them. You may choose a combination of any of the following:  Living will:  This is a written record of the treatment you want. You can also choose which treatments you do not want, which to limit, and which to stop at a certain time. This includes surgery, medicine, IV fluid, and tube feedings.   Durable power of  for healthcare (DPAHC):  This is a written record that states who you want to make healthcare choices for you when you are unable to make them for yourself. This person, called a proxy, is usually a family member or a friend. You may choose more than 1 proxy.  Do not resuscitate (DNR) order:  A DNR order is used in case your heart stops beating or you stop breathing. It is a request not to have certain forms of treatment, such as CPR. A DNR order may be included in other types of advance directives.  Medical directive:  This covers the care that you want if you are in a coma, near death, or unable to make decisions for yourself. You can list the treatments you want for each condition. Treatment may include pain medicine, surgery, blood transfusions, dialysis, IV or tube feedings, and a ventilator (breathing machine).  Values history:  This document has questions about your views, beliefs, and how you feel and think about life. This information can help others choose the care that you would choose.  Why are advance directives important?  An advance directive helps you control your care. Although spoken wishes may be used, it is better to have your wishes written down. Spoken wishes can be misunderstood, or not followed.  Treatments may be given even if you do not want them. An advance directive may make it easier for your family to make difficult choices about your care.   Urinary Incontinence   Urinary incontinence (UI)  is when you lose control of your bladder. UI develops because your bladder cannot store or empty urine properly. The 3 most common types of UI are stress incontinence, urge incontinence, or both.  Medicines:   May be given to help strengthen your bladder control. Report any side effects of medication to your healthcare provider.  Do pelvic muscle exercises often:  Your pelvic muscles help you stop urinating. Squeeze these muscles tight for 5 seconds, then relax for 5 seconds. Gradually work up to squeezing for 10 seconds. Do 3 sets of 15 repetitions a day, or as directed. This will help strengthen your pelvic muscles and improve bladder control.  Train your bladder:  Go to the bathroom at set times, such as every 2 hours, even if you do not feel the urge to go. You can also try to hold your urine when you feel the urge to go. For example, hold your urine for 5 minutes when you feel the urge to go. As that becomes easier, hold your urine for 10 minutes.   Self-care:   Keep a UI record.  Write down how often you leak urine and how much you leak. Make a note of what you were doing when you leaked urine.  Drink liquids as directed. You may need to limit the amount of liquid you drink to help control your urine leakage. Do not drink any liquid right before you go to bed. Limit or do not have drinks that contain caffeine or alcohol.   Prevent constipation.  Eat a variety of high-fiber foods. Good examples are high-fiber cereals, beans, vegetables, and whole-grain breads. Walking is the best way to trigger your intestines to have a bowel movement.  Exercise regularly and maintain a healthy weight.  Weight loss and exercise will decrease pressure on your bladder and help you control your leakage.   Use a catheter as  directed  to help empty your bladder. A catheter is a tiny, plastic tube that is put into your bladder to drain your urine.   Go to behavior therapy as directed.  Behavior therapy may be used to help you learn to control your urge to urinate.    Weight Management   Why it is important to manage your weight:  Being overweight increases your risk of health conditions such as heart disease, high blood pressure, type 2 diabetes, and certain types of cancer. It can also increase your risk for osteoarthritis, sleep apnea, and other respiratory problems. Aim for a slow, steady weight loss. Even a small amount of weight loss can lower your risk of health problems.  How to lose weight safely:  A safe and healthy way to lose weight is to eat fewer calories and get regular exercise. You can lose up about 1 pound a week by decreasing the number of calories you eat by 500 calories each day.   Healthy meal plan for weight management:  A healthy meal plan includes a variety of foods, contains fewer calories, and helps you stay healthy. A healthy meal plan includes the following:  Eat whole-grain foods more often.  A healthy meal plan should contain fiber. Fiber is the part of grains, fruits, and vegetables that is not broken down by your body. Whole-grain foods are healthy and provide extra fiber in your diet. Some examples of whole-grain foods are whole-wheat breads and pastas, oatmeal, brown rice, and bulgur.  Eat a variety of vegetables every day.  Include dark, leafy greens such as spinach, kale, maciej greens, and mustard greens. Eat yellow and orange vegetables such as carrots, sweet potatoes, and winter squash.   Eat a variety of fruits every day.  Choose fresh or canned fruit (canned in its own juice or light syrup) instead of juice. Fruit juice has very little or no fiber.  Eat low-fat dairy foods.  Drink fat-free (skim) milk or 1% milk. Eat fat-free yogurt and low-fat cottage cheese. Try low-fat cheeses such as  mozzarella and other reduced-fat cheeses.  Choose meat and other protein foods that are low in fat.  Choose beans or other legumes such as split peas or lentils. Choose fish, skinless poultry (chicken or turkey), or lean cuts of red meat (beef or pork). Before you cook meat or poultry, cut off any visible fat.   Use less fat and oil.  Try baking foods instead of frying them. Add less fat, such as margarine, sour cream, regular salad dressing and mayonnaise to foods. Eat fewer high-fat foods. Some examples of high-fat foods include french fries, doughnuts, ice cream, and cakes.  Eat fewer sweets.  Limit foods and drinks that are high in sugar. This includes candy, cookies, regular soda, and sweetened drinks.  Exercise:  Exercise at least 30 minutes per day on most days of the week. Some examples of exercise include walking, biking, dancing, and swimming. You can also fit in more physical activity by taking the stairs instead of the elevator or parking farther away from stores. Ask your healthcare provider about the best exercise plan for you.      © Copyright [employer] 2018 Information is for End User's use only and may not be sold, redistributed or otherwise used for commercial purposes. All illustrations and images included in CareNotes® are the copyrighted property of [employer] or [employer]

## 2024-01-29 NOTE — PROGRESS NOTES
Assessment and Plan:     Problem List Items Addressed This Visit          Other    HIV disease (HCC) - Primary     Doing well on Biktarvy with an undetectable VL.  Pt reports 100% medication compliance on HAART.  Stressed the importance of 100% medication adherence  Monitor CD4. HIV RNA, CMP, and CBCD for virologic response and drug toxicities  Follow up with Dr. Kaplan or Dr. Cortez   HIV Counseling:    Viral Load: detected < 20 copies    CD4 Count: 511      Denies side effects.  Stressed the importance of adherence.  Continue follow up in the ID clinic with Dr. Kaplan or Dr. Cortez.    Reviewed the most recent labs, including the CD4 and viral load.  Discussed the risks and benefits of treatment options, instructions for management, importance of treatment adherence, and reduction of risk factors.  Educated on possible medication side effects.    Counseled on routes of HIV transmission, including the risk of  infection.  Emphasized that viral suppression is the best method to prevent HIV transmission.  At this time the pt denies the need for HIV testing of anyone in their life.    Total encounter time was greater than 35 minutes.  Greater than 20 minutes were spent on counseling and patient education.  Pt voices understanding and agreement with treatment plan.             Relevant Orders    CT lung screening program     Other Visit Diagnoses       Personal history of tobacco use, presenting hazards to health        Relevant Orders    CT lung screening program    Encounter for screening for malignant neoplasm of lung in former smoker who quit in past 15 years with 30 pack year history or greater        Relevant Orders    CT lung screening program    Encounter for screening for colorectal cancer in high risk patient        Relevant Orders    Ambulatory referral to Gastroenterology    Medicare annual wellness visit, subsequent        Encounter for screening mammogram for breast cancer        Relevant Orders     Mammo screening bilateral w cad             Preventive health issues were discussed with patient, and age appropriate screening tests were ordered as noted in patient's After Visit Summary.  Personalized health advice and appropriate referrals for health education or preventive services given if needed, as noted in patient's After Visit Summary.     History of Present Illness:     Patient presents for a Medicare Wellness Visit and for management of HIV.  Airam reports she is doing well.  She denies any health care concerns.  She has a dental appt on 2/8/24.  She saw the eye doctor less than a year ago.  She is UTD on vaccines.     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, apical, lateral and distal inferior apical MI    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 (HCC)    CKD (chronic kidney disease) stage 3, GFR 30-59 ml/min (HCC)    Implantable cardioverter-defibrillator (ICD) in situ    Mild intermittent asthma without complication    ASCUS with positive high risk HPV cervical    Tobacco abuse    Onychomycosis    Bronchiectasis without complication (HCC)    Aggression    Unintentional weight loss    Orbital cellulitis on left    Abnormal CT scan, head    Lesion of left frontal lobe of brain    Seasonal allergies    Mucous retention cyst of maxillary sinus    Meningioma, cerebral (HCC)    Age-related osteoporosis without current pathological fracture    Weakness of extremity    Osteopenia of lumbar spine      Past Medical and Surgical History:      Past Medical History:   Diagnosis Date    Asthma     Coronary artery disease     defibrillator    HIV positive (HCC)      Past Surgical History:   Procedure Laterality Date    ANGIOPLASTY      CARDIAC DEFIBRILLATOR PLACEMENT      CORONARY ANGIOPLASTY      pci placement    TOTAL ABDOMINAL HYSTERECTOMY      US GUIDED INJECTION FOR RESEARCH STUDY  2018    US GUIDED INJECTION FOR RESEARCH STUDY  2019    US GUIDED INJECTION FOR RESEARCH STUDY  2018    US GUIDED INJECTION FOR RESEARCH STUDY  2018      Family History:     Family History   Problem Relation Age of Onset    No Known Problems Mother     Other Father         GI bleed    No Known Problems Sister     No Known Problems Sister     No Known Problems Maternal Grandmother     No Known Problems Maternal Grandfather     No Known Problems Paternal Grandmother     No Known Problems Paternal Grandfather     Suicidality Brother     Drug abuse Brother         overdose    No Known Problems Maternal Aunt     No Known Problems Maternal Aunt     No Known Problems Maternal Aunt     No Known Problems Paternal Aunt       Social History:     Social History     Socioeconomic History    Marital status: Single     Spouse name: None    Number of children: None    Years of education: None    Highest education level: None   Occupational History    None   Tobacco Use    Smoking status: Former     Current packs/day: 0.00     Average packs/day: 1 pack/day for 39.2 years (39.2 ttl pk-yrs)     Types: Cigarettes     Start date: 1984     Quit date: 2023     Years since quittin.8     Passive exposure: Past    Smokeless tobacco: Never   Vaping Use    Vaping status: Never Used   Substance and Sexual Activity    Alcohol use: Not Currently     Comment: socially    Drug use: No    Sexual activity: Not Currently   Other Topics Concern    None   Social History Narrative    None     Social Determinants of Health     Financial Resource Strain: Low Risk (12/15/2023)     Received from [facility] ([facility])    Financial Resource Strain     Sometimes people find that their income does not quite cover their living costs.  In the last 12 months, has this happened to you?: No     What is your current work situation? : Unemployed, and not seeking work (ex: student, retired, disabled, unpaid primary care giver)   Food Insecurity: Low Risk (12/15/2023)    Received from [facility] ([facility])    Food Insecurity     Within the past 12 months we worried whether our food would run out before we got the money to buy more.: Never true     Within the past 12 months the food we bought just didn't last and we didn't have money to get more. : Never true   Transportation Needs: Not on file   Physical Activity: Not on file   Stress: Low Risk (12/15/2023)    Received from [facility] ([facility])    Stress     Over the last 2 weeks, how often have you been bothered by the following problems: feeling nervous, anxious, on edge?: Not at all     Over the last 2 weeks, how often have you been bothered by the following problems: Not being able to stop or control worrying?: Not at all   Social Connections: Low Risk (12/15/2023)    Received from [facility] ([facility])    Social Connections     How often do you feel isolated from others?: Hardly ever   Intimate Partner Violence: Not on file   Housing Stability: Not on file      Medications and Allergies:     Current Outpatient Medications   Medication Sig Dispense Refill    Aspirin Low Dose 81 MG EC tablet TAKE 1 TABLET BY MOUTH DAILY 30 tablet 5    atorvastatin (LIPITOR) 80 mg tablet TAKE 1 TABLET BY MOUTH DAILY 30 tablet 5    Biktarvy -25 MG tablet TAKE 1 TABLET BY MOUTH ONCE DAILY WITH BREAKFAST 90 tablet 1    Calcium Carb-Cholecalciferol (calcium carbonate-vitamin D) 500 mg-5 mcg tablet TAKE 1 TABLET BY MOUTH DAILY WITH BREAKFAST 90 tablet 1    carvedilol (COREG) 6.25 mg tablet TAKE 1 TABLET BY MOUTH TWICE A DAY WITH  MEALS 60 tablet 5    cholecalciferol (VITAMIN D3) 1,000 units tablet Take 2,000 Units by mouth daily      cyanocobalamin (VITAMIN B-12) 100 MCG tablet Take 100 mcg by mouth daily      nicotine polacrilex (SM Nicotine Polacrilex) 4 MG lozenge APPLY 1 LOZENGE (4MG) TO MOUTH OR THROAT AS NEEDED FOR SMOKING CESSATION 243 each 2    sacubitril-valsartan (Entresto) 49-51 MG TABS TAKE 1 TABLET BY MOUTH TWICE A DAY 60 tablet 11    warfarin (COUMADIN) 5 mg tablet TAKE 1 TO 1 AND 1/2 TABLETS BY MOUTH DAILY AS DIRECTED BY MD Hess tablet 3     No current facility-administered medications for this visit.     Allergies   Allergen Reactions    No Active Allergies     No Known Allergies       Immunizations:     Immunization History   Administered Date(s) Administered    COVID-19 PFIZER VACCINE 0.3 ML IM 03/29/2021, 04/23/2021, 01/15/2022    Hep A, adult 12/08/2021, 01/27/2023    INFLUENZA 10/04/2017, 10/04/2017, 09/22/2020    Influenza, injectable, quadrivalent, preservative free 0.5 mL 10/08/2019, 09/22/2020, 10/13/2023    Influenza, recombinant, quadrivalent,injectable, preservative free 10/12/2018, 10/08/2021, 10/07/2022    Meningococcal MCV4P 03/01/2019, 05/03/2019    Pneumococcal Conjugate Vaccine 20-valent (Pcv20), Polysace 04/24/2023    Pneumococcal Polysaccharide PPV23 09/12/2018    Tdap 11/08/2019    Zoster Vaccine Recombinant 04/20/2023, 07/14/2023      Health Maintenance:         Topic Date Due    Colorectal Cancer Screening  01/31/2024    Breast Cancer Screening: Mammogram  07/14/2024    Hepatitis C Screening  Completed    Lung Cancer Screening  Discontinued         Topic Date Due    COVID-19 Vaccine (4 - 2023-24 season) 09/01/2023      Medicare Screening Tests and Risk Assessments:     Airam is here for her Subsequent Wellness visit.     Health Risk Assessment:   Patient rates overall health as excellent. Patient feels that their physical health rating is same. Patient is very satisfied with their life. Eyesight was  rated as same. Hearing was rated as same. Patient feels that their emotional and mental health rating is same. Patients states they are sometimes angry. Patient states they are sometimes unusually tired/fatigued. Pain experienced in the last 7 days has been none.     Fall Risk Screening:   In the past year, patient has experienced: no history of falling in past year      Urinary Incontinence Screening:   Patient has leaked urine accidently in the last six months. sometimes    Home Safety:  Patient does not have trouble with stairs inside or outside of their home. Patient has working smoke alarms and has working carbon monoxide detector. Home safety hazards include: none.     Nutrition:   Current diet is Regular.     Medications:   Patient is not currently taking any over-the-counter supplements. Patient is able to manage medications.     Activities of Daily Living (ADLs)/Instrumental Activities of Daily Living (IADLs):   Walk and transfer into and out of bed and chair?: Yes  Dress and groom yourself?: Yes    Bathe or shower yourself?: Yes    Feed yourself? Yes  Do your laundry/housekeeping?: Yes  Manage your money, pay your bills and track your expenses?: Yes  Make your own meals?: Yes    Do your own shopping?: Yes    Previous Hospitalizations:   Any hospitalizations or ED visits within the last 12 months?: No      Advance Care Planning:   Living will: No    Durable POA for healthcare: No      Cognitive Screening:   Provider or family/friend/caregiver concerned regarding cognition?: No    PREVENTIVE SCREENINGS      Cardiovascular Screening:    General: Screening Not Indicated and History Lipid Disorder      Diabetes Screening:     General: Screening Current      Colorectal Cancer Screening:     General: Screening Current      Breast Cancer Screening:     General: Screening Current      Cervical Cancer Screening:    General: Screening Not Indicated      Osteoporosis Screening:    General: Screening Not Indicated and  "History Osteoporosis      Abdominal Aortic Aneurysm (AAA) Screening:        General: Screening Not Indicated      Lung Cancer Screening:     General: Screening Not Indicated    Due for: Low Dose CT (LDCT)      Hepatitis C Screening:    General: Screening Current    Screening, Brief Intervention, and Referral to Treatment (SBIRT)    Screening  Typical number of drinks in a day: 0    Brief Intervention  Alcohol & drug use screenings were reviewed. No concerns regarding substance use disorder identified.     Other Counseling Topics:   Car/seat belt/driving safety, skin self-exam, sunscreen and calcium and vitamin D intake and regular weightbearing exercise.     No results found.     Physical Exam:     /62 (BP Location: Right arm, Patient Position: Sitting, Cuff Size: Standard)   Pulse 64   Temp 97.9 °F (36.6 °C) (Tympanic)   Resp 16   Ht 5' 2\" (1.575 m)   Wt 75.8 kg (167 lb)   SpO2 97%   BMI 30.54 kg/m²     Physical Exam  Vitals and nursing note reviewed.   Constitutional:       General: She is not in acute distress.     Appearance: Normal appearance. She is not ill-appearing.   HENT:      Head: Normocephalic.      Right Ear: Tympanic membrane normal.      Left Ear: Tympanic membrane normal.      Nose: Nose normal.      Mouth/Throat:      Mouth: Mucous membranes are moist.      Pharynx: Oropharynx is clear.   Eyes:      Extraocular Movements: Extraocular movements intact.      Pupils: Pupils are equal, round, and reactive to light.   Cardiovascular:      Rate and Rhythm: Normal rate and regular rhythm.      Chest Wall: PMI is not displaced.      Pulses: Normal pulses.      Heart sounds: Normal heart sounds.   Pulmonary:      Effort: Pulmonary effort is normal.      Breath sounds: Normal breath sounds.   Abdominal:      General: Abdomen is flat.      Palpations: Abdomen is soft.   Musculoskeletal:         General: Normal range of motion.   Lymphadenopathy:      Cervical: No cervical adenopathy.   Skin:     " General: Skin is warm and dry.      Capillary Refill: Capillary refill takes less than 2 seconds.   Neurological:      General: No focal deficit present.      Mental Status: She is alert and oriented to person, place, and time.   Psychiatric:         Mood and Affect: Mood normal.         Behavior: Behavior normal.         Thought Content: Thought content normal.         Judgment: Judgment normal.          JOSE ROBERTO Grace

## 2024-01-31 ENCOUNTER — APPOINTMENT (OUTPATIENT)
Dept: LAB | Facility: HOSPITAL | Age: 59
End: 2024-01-31
Payer: MEDICARE

## 2024-01-31 ENCOUNTER — ANTICOAG VISIT (OUTPATIENT)
Dept: CARDIOLOGY CLINIC | Facility: CLINIC | Age: 59
End: 2024-01-31

## 2024-01-31 DIAGNOSIS — I23.6 LV (LEFT VENTRICULAR) MURAL THROMBUS FOLLOWING MI (HCC): ICD-10-CM

## 2024-01-31 DIAGNOSIS — I25.5 ISCHEMIC CARDIOMYOPATHY: Primary | ICD-10-CM

## 2024-01-31 DIAGNOSIS — Z79.01 LONG TERM CURRENT USE OF ANTICOAGULANT THERAPY: ICD-10-CM

## 2024-01-31 LAB
INR PPP: 2.12 (ref 0.84–1.19)
PROTHROMBIN TIME: 24.1 SECONDS (ref 11.6–14.5)

## 2024-01-31 PROCEDURE — 85610 PROTHROMBIN TIME: CPT

## 2024-01-31 PROCEDURE — 36415 COLL VENOUS BLD VENIPUNCTURE: CPT

## 2024-01-31 NOTE — PROGRESS NOTES
Spoke with patient, advised INR still low, no missed doses or no other changes. Current dose verified. Advised to take 5 mg Mon Fri, 7.5 mg all other days, will recheck in 2 weeks 2/14/24

## 2024-02-01 ENCOUNTER — PATIENT OUTREACH (OUTPATIENT)
Dept: SURGERY | Facility: CLINIC | Age: 59
End: 2024-02-01

## 2024-02-01 NOTE — PROGRESS NOTES
Ct provided this cm with income information.    [facility] sliding fee scale application was completed and sent to the hospital financial counselor with counselor, Cherrie kumar on the email. Copy of application was scanned into ct's chart.

## 2024-02-02 DIAGNOSIS — Z21 HIV POSITIVE (HCC): ICD-10-CM

## 2024-02-05 RX ORDER — BICTEGRAVIR SODIUM, EMTRICITABINE, AND TENOFOVIR ALAFENAMIDE FUMARATE 50; 200; 25 MG/1; MG/1; MG/1
1 TABLET ORAL
Qty: 90 TABLET | Refills: 1 | Status: SHIPPED | OUTPATIENT
Start: 2024-02-05

## 2024-02-14 ENCOUNTER — ANTICOAG VISIT (OUTPATIENT)
Dept: CARDIOLOGY CLINIC | Facility: CLINIC | Age: 59
End: 2024-02-14

## 2024-02-14 ENCOUNTER — APPOINTMENT (OUTPATIENT)
Dept: LAB | Facility: HOSPITAL | Age: 59
End: 2024-02-14
Payer: MEDICARE

## 2024-02-14 DIAGNOSIS — I25.5 ISCHEMIC CARDIOMYOPATHY: Primary | ICD-10-CM

## 2024-02-14 DIAGNOSIS — Z79.01 LONG TERM CURRENT USE OF ANTICOAGULANT THERAPY: ICD-10-CM

## 2024-02-14 DIAGNOSIS — I23.6 LV (LEFT VENTRICULAR) MURAL THROMBUS FOLLOWING MI (HCC): ICD-10-CM

## 2024-02-14 LAB
INR PPP: 3.78 (ref 0.84–1.19)
PROTHROMBIN TIME: 37.5 SECONDS (ref 11.6–14.5)

## 2024-02-14 PROCEDURE — 36415 COLL VENOUS BLD VENIPUNCTURE: CPT

## 2024-02-14 PROCEDURE — 85610 PROTHROMBIN TIME: CPT

## 2024-02-14 NOTE — PROGRESS NOTES
Spoke with patient, advised INR a little elevated, no changes, current dose verified, advised to take 5 mg Mon Wed Fri, 7.5 mg all other days, will recheck in 2 weeks 2/28/24

## 2024-02-19 ENCOUNTER — OFFICE VISIT (OUTPATIENT)
Dept: CARDIOLOGY CLINIC | Facility: CLINIC | Age: 59
End: 2024-02-19
Payer: MEDICARE

## 2024-02-19 ENCOUNTER — PATIENT OUTREACH (OUTPATIENT)
Dept: SURGERY | Facility: CLINIC | Age: 59
End: 2024-02-19

## 2024-02-19 VITALS
WEIGHT: 166 LBS | HEART RATE: 57 BPM | HEIGHT: 62 IN | SYSTOLIC BLOOD PRESSURE: 96 MMHG | DIASTOLIC BLOOD PRESSURE: 62 MMHG | BODY MASS INDEX: 30.55 KG/M2

## 2024-02-19 DIAGNOSIS — I23.6 LV (LEFT VENTRICULAR) MURAL THROMBUS FOLLOWING MI (HCC): ICD-10-CM

## 2024-02-19 DIAGNOSIS — I25.10 TRIPLE VESSEL CORONARY ARTERY DISEASE: Primary | ICD-10-CM

## 2024-02-19 DIAGNOSIS — Z95.810 IMPLANTABLE CARDIOVERTER-DEFIBRILLATOR (ICD) IN SITU: ICD-10-CM

## 2024-02-19 DIAGNOSIS — Z72.0 TOBACCO ABUSE: ICD-10-CM

## 2024-02-19 DIAGNOSIS — I10 ESSENTIAL HYPERTENSION: ICD-10-CM

## 2024-02-19 DIAGNOSIS — N18.32 STAGE 3B CHRONIC KIDNEY DISEASE (HCC): ICD-10-CM

## 2024-02-19 DIAGNOSIS — Z79.01 LONG TERM CURRENT USE OF ANTICOAGULANT THERAPY: ICD-10-CM

## 2024-02-19 DIAGNOSIS — I25.5 ISCHEMIC CARDIOMYOPATHY: ICD-10-CM

## 2024-02-19 DIAGNOSIS — I25.2 PAST HEART ATTACK: ICD-10-CM

## 2024-02-19 PROCEDURE — 99214 OFFICE O/P EST MOD 30 MIN: CPT | Performed by: INTERNAL MEDICINE

## 2024-02-19 PROCEDURE — 93000 ELECTROCARDIOGRAM COMPLETE: CPT | Performed by: INTERNAL MEDICINE

## 2024-02-19 NOTE — PROGRESS NOTES
[facility]  [address] [place] [address]  Phone#  [phone]   Fax#  [phone]  *-*-*-*-*-*-*-*-*-*-*-*-*-*-*-*-*-*-*-*-*-*-*-*-*-*-*-*-*-*-*-*-*-*-*-*-*-*-*-*-*-*-*-*-*-*-*-*-*-*-*-*-*-*                                   Cardiology Follow Up      ENCOUNTER DATE: 24 10:24 AM  PATIENT NAME: Airam Rainey   : 1965    MRN: [id number]  AGE:58 y.o.      SEX: female  ENCOUNTER PROVIDER:Gibran Dennison MD     PRIMARY CARE PHYSICIAN: JOSE ROBERTO Grace    ACTIVE DIAGNOSIS THIS VISIT  1. Triple vessel coronary artery disease  POCT ECG      2. Ischemic cardiomyopathy        3. 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 (HCC)        4. Essential hypertension        5. Implantable cardioverter-defibrillator (ICD) in situ        6. Long term current use of anticoagulant therapy        7. old extensive anterior, apical, lateral and distal inferior apical MI        8. Tobacco abuse        9. Stage 3b chronic kidney disease (HCC)          ACTIVE PROBLEM LIST  Patient Active Problem List   Diagnosis    Ischemic cardiomyopathy    Triple vessel coronary artery disease    HIV disease (HCC)    Essential hypertension    Mixed hyperlipidemia    Impaired left ventricular function    Long term current use of anticoagulant therapy    old extensive anterior, apical, lateral and distal inferior apical MI    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 (HCC)    CKD (chronic kidney disease) stage 3, GFR 30-59 ml/min (HCC)    Implantable cardioverter-defibrillator (ICD) in situ    Mild intermittent asthma without complication    ASCUS with positive high risk HPV cervical    Tobacco abuse    Onychomycosis    Bronchiectasis without complication (HCC)    Aggression    Unintentional weight loss    Orbital cellulitis on left    Abnormal CT scan, head    Lesion of left frontal lobe of brain    Seasonal allergies    Mucous retention cyst of maxillary sinus    Meningioma, cerebral  (HCC)    Age-related osteoporosis without current pathological fracture    Weakness of extremity    Osteopenia of lumbar spine       CARDIOLOGY SPECIALTY COMMENTS  Patient was referred to us from Family Practice. She experienced a myocardial infarction on February 28, 2017 in [place]. She has subsequently moved to [facility]. She had a cardiac catheterization taken at [facility]. Patient's cardiac catheterization CD revealed the left main coronary artery to be nonobstructed. The left anterior descending artery was 100% in its midportion after a large 1st diagonal branch which was nonobstructed. The circumflex artery obtuse marginal was 90%. The right coronary artery in its midportion with 50-60%. The large distal LAD filled from right to left collaterals. [facility] had recommended CABG but the patient had refused at that time. No ventriculogram was performed but an echocardiogram which I performed had demonstrated extensive LV damage. A nuclear stress test demonstrated minimal with any it ischemia. I did not feel that the patient would improve with CABG.     Patient is free of angina pectoris or shortness of breath. She is doing quite well for her degree of LV dysfunction.     04/11/2017 echocardiogram severe LV dysfunction, EF 30%, mild LV enlargement, apical aneurysm with akinesis to paradoxical motion. Large organized apical thrombus. Grade 1 diastolic dysfunction, normal pulmonary artery pressures     05/15/2017 stress test: Severe LV dysfunction, EF 20%. Mild LV enlargement. Anterior apical aneurysm with paradoxical motion. Extensive myocardial infarctions involving the distal anterior apical and inferior apical walls. Negative for Persantine induced ischemia.      12/13/2017 ICD: single-chamber.    03/12/2019 echocardiogram: Severe LV systolic dysfunction, EF 25-27% apex is paradoxical. Grade 1 diastolic dysfunction. Thrombus in the septal apex  with smoke    4/12/2023 echocardiogram: LVEF 42% with cavity upper limits of normal.  Anterior walls are hypokinetic.  Nardin is hypokinetic to akinetic.  Grade 1 diastolic dysfunction.    INTERVAL HISTORY:        Patient who expands a significant anterior apical myocardial infarction in February 2017.  She had a cardiac catheterization that demonstrated triple-vessel coronary artery disease.  A nuclear stress test demonstrated only a fixed defect with no deja-infarction ischemia.  She has been managed medically since then.  She subsequently has had a single-chamber ICD placed.    Patient denies cardiac symptoms. Patient denies chest discomfort or shortness of breath.  Patient has no palpitations.  Patient denies symptoms of dizziness, lightheadedness or near-syncope/syncope.  Patient denies leg edema.  Patient denies symptoms of orthopnea or paroxysmal nocturnal dyspnea.    Her blood pressure is on the low side at 96 systolic but she denies any dizziness or lightheadedness when she gets up out of a chair or out of bed.    DISCUSSION/PLAN:          Continue present medications including Entresto 49/51 2 times daily, carvedilol 6.25 mg twice daily, atorvastatin 80 mg daily, warfarin 5 mg daily, aspirin 81 mg daily.  Return in 6 months.    Lab Studies:    Lab Results   Component Value Date    CHOLESTEROL 164 11/24/2023    CHOLESTEROL 153 03/31/2023    CHOLESTEROL 125 12/27/2021     Lab Results   Component Value Date    TRIG 172 (H) 11/24/2023    TRIG 93 03/31/2023    TRIG 119 12/27/2021     Lab Results   Component Value Date    HDL 53 11/24/2023    HDL 61 03/31/2023    HDL 36 (L) 12/27/2021     Lab Results   Component Value Date    LDLCALC 77 11/24/2023    LDLCALC 73 03/31/2023    LDLCALC 65 12/27/2021     Lab Results   Component Value Date    LDLDIRECT 76.46 02/14/2019       Lab Results   Component Value Date    NTBNP 952 (H) 06/18/2022    NTBNP 556 (H) 03/14/2020     Lab Results   Component Value Date    HGBA1C 5.7  (H) 11/24/2023      Lab Results   Component Value Date    EGFR 40 11/24/2023    EGFR 40 10/12/2023    EGFR 39 10/12/2023    SODIUM 140 11/24/2023    SODIUM 142 10/12/2023    SODIUM 143 10/12/2023    K 4.1 11/24/2023    K 4.5 10/12/2023    K 4.6 10/12/2023     11/24/2023     10/12/2023     10/12/2023    CO2 28 11/24/2023    CO2 28 10/12/2023    CO2 29 10/12/2023    BUN 16 11/24/2023    BUN 19 10/12/2023    BUN 18 10/12/2023    CREATININE 1.45 (H) 11/24/2023    CREATININE 1.45 (H) 10/12/2023    CREATININE 1.46 (H) 10/12/2023     Lab Results   Component Value Date    WBC 4.43 11/24/2023    WBC 5.0 11/24/2023    WBC 4.58 10/12/2023    WBC 5.4 10/12/2023    HGB 13.0 11/24/2023    HGB 7.9 (L) 11/24/2023    HGB 13.4 10/12/2023    HGB 11.1 10/12/2023    HCT 40.1 11/24/2023    HCT 23.3 (L) 11/24/2023    HCT 41.3 10/12/2023    HCT 34.7 10/12/2023    MCV 96 11/24/2023    MCV 93 11/24/2023    MCV 95 10/12/2023    MCV 95 10/12/2023    MCH 31.0 11/24/2023    MCH 31.5 11/24/2023    MCH 30.9 10/12/2023    MCH 30.3 10/12/2023    MCHC 32.4 11/24/2023    MCHC 33.9 11/24/2023    MCHC 32.4 10/12/2023    MCHC 32.0 10/12/2023     11/24/2023     11/24/2023     10/12/2023     10/12/2023      Lab Results   Component Value Date    CALCIUM 9.4 11/24/2023    CALCIUM 9.7 10/12/2023    CALCIUM 9.4 10/12/2023    AST 16 11/24/2023    AST 16 10/12/2023    AST 17 04/28/2023    ALT 18 11/24/2023    ALT 13 10/12/2023    ALT 14 04/28/2023    ALKPHOS 50 11/24/2023    ALKPHOS 55 10/12/2023    ALKPHOS 48 04/28/2023    MG 2.2 04/20/2023    MG 2.1 12/13/2017       Results for orders placed or performed in visit on 02/19/24   POCT ECG    Narrative    Sinus bradycardia at a rate of 57 bpm.  Low voltage QRS.  Old anterior septal myocardial infarction with inferior apical extension.  Abnormal EKG.         Current Outpatient Medications:     Aspirin Low Dose 81 MG EC tablet, TAKE 1 TABLET BY MOUTH DAILY, Disp: 30  tablet, Rfl: 5    atorvastatin (LIPITOR) 80 mg tablet, TAKE 1 TABLET BY MOUTH DAILY, Disp: 30 tablet, Rfl: 5    bictegravir-emtricitab-tenofovir alafenamide (Biktarvy) -25 MG tablet, TAKE 1 TABLET BY MOUTH ONCE DAILY WITH BREAKFAST, Disp: 90 tablet, Rfl: 1    Calcium Carb-Cholecalciferol (calcium carbonate-vitamin D) 500 mg-5 mcg tablet, TAKE 1 TABLET BY MOUTH DAILY WITH BREAKFAST, Disp: 90 tablet, Rfl: 1    carvedilol (COREG) 6.25 mg tablet, TAKE 1 TABLET BY MOUTH TWICE A DAY WITH MEALS, Disp: 60 tablet, Rfl: 5    cholecalciferol (VITAMIN D3) 1,000 units tablet, Take 2,000 Units by mouth daily, Disp: , Rfl:     cyanocobalamin (VITAMIN B-12) 100 MCG tablet, Take 100 mcg by mouth daily, Disp: , Rfl:     nicotine polacrilex (SM Nicotine Polacrilex) 4 MG lozenge, APPLY 1 LOZENGE (4MG) TO MOUTH OR THROAT AS NEEDED FOR SMOKING CESSATION, Disp: 243 each, Rfl: 2    sacubitril-valsartan (Entresto) 49-51 MG TABS, TAKE 1 TABLET BY MOUTH TWICE A DAY, Disp: 60 tablet, Rfl: 11    warfarin (COUMADIN) 5 mg tablet, TAKE 1 TO 1 AND 1/2 TABLETS BY MOUTH DAILY AS DIRECTED BY MD, Disp: 135 tablet, Rfl: 3  Allergies   Allergen Reactions    No Active Allergies     No Known Allergies        Past Medical History:   Diagnosis Date    Asthma     Coronary artery disease     defibrillator    HIV positive (HCC)      Social History     Socioeconomic History    Marital status: Single     Spouse name: Not on file    Number of children: Not on file    Years of education: Not on file    Highest education level: Not on file   Occupational History    Not on file   Tobacco Use    Smoking status: Former     Current packs/day: 0.00     Average packs/day: 1 pack/day for 39.2 years (39.2 ttl pk-yrs)     Types: Cigarettes     Start date: 1984     Quit date: 2023     Years since quittin.8     Passive exposure: Past    Smokeless tobacco: Never   Vaping Use    Vaping status: Never Used   Substance and Sexual Activity    Alcohol use: Not  Currently     Comment: socially    Drug use: No    Sexual activity: Not Currently   Other Topics Concern    Not on file   Social History Narrative    Not on file     Social Determinants of Health     Financial Resource Strain: Low Risk (12/15/2023)    Received from [facility]    Financial Resource Strain     Sometimes people find that their income does not quite cover their living costs.  In the last 12 months, has this happened to you?: No     What is your current work situation? : Unemployed, and not seeking work (ex: student, retired, disabled, unpaid primary care giver)   Food Insecurity: Low Risk (12/15/2023)    Received from [facility] ([facility])    Food Insecurity     Within the past 12 months we worried whether our food would run out before we got the money to buy more.: Never true     Within the past 12 months the food we bought just didn't last and we didn't have money to get more. : Never true   Transportation Needs: Not on file   Physical Activity: Not on file   Stress: Low Risk (12/15/2023)    Received from [facility] ([facility])    Stress     Over the last 2 weeks, how often have you been bothered by the following problems: feeling nervous, anxious, on edge?: Not at all     Over the last 2 weeks, how often have you been bothered by the following problems: Not being able to stop or control worrying?: Not at all   Social Connections: Low Risk (12/15/2023)    Received from [facility] ([facility])    Social Connections     How often do you feel isolated from others?: Hardly ever   Intimate Partner Violence: Not on file   Housing Stability: Not on file      Family History   Problem Relation Age of Onset    No Known Problems Mother     Other Father         GI bleed    No Known Problems Sister     No Known Problems Sister     No Known Problems Maternal Grandmother     No Known Problems Maternal Grandfather     No Known Problems Paternal Grandmother     No Known  "Problems Paternal Grandfather     Suicidality Brother     Drug abuse Brother         overdose    No Known Problems Maternal Aunt     No Known Problems Maternal Aunt     No Known Problems Maternal Aunt     No Known Problems Paternal Aunt      Past Surgical History:   Procedure Laterality Date    ANGIOPLASTY      CARDIAC DEFIBRILLATOR PLACEMENT      CORONARY ANGIOPLASTY      pci placement    TOTAL ABDOMINAL HYSTERECTOMY      US GUIDED INJECTION FOR RESEARCH STUDY  5/7/2018    US GUIDED INJECTION FOR RESEARCH STUDY  1/18/2019    US GUIDED INJECTION FOR RESEARCH STUDY  5/7/2018    US GUIDED INJECTION FOR RESEARCH STUDY  5/14/2018       PREVIOUS WEIGHTS:   Wt Readings from Last 10 Encounters:   02/19/24 75.3 kg (166 lb)   01/29/24 75.8 kg (167 lb)   01/02/24 72.6 kg (160 lb)   10/13/23 69.2 kg (152 lb 9.6 oz)   10/11/23 70.3 kg (155 lb)   12/19/23 68.9 kg (152 lb)   08/28/23 67.6 kg (149 lb)   08/22/23 67.7 kg (149 lb 3.2 oz)   07/28/23 67.5 kg (148 lb 12.8 oz)   07/14/23 66.7 kg (147 lb)        Review of Systems:  Review of Systems   Respiratory:  Negative for cough, choking, chest tightness, shortness of breath and wheezing.    Cardiovascular:  Negative for chest pain, palpitations and leg swelling.   Musculoskeletal:  Negative for gait problem.   Skin:  Negative for rash.   Neurological:  Negative for dizziness, tremors, syncope, weakness, light-headedness, numbness and headaches.   Psychiatric/Behavioral:  Negative for agitation and behavioral problems. The patient is not hyperactive.        Physical Exam:  BP 96/62 (BP Location: Right arm, Patient Position: Sitting, Cuff Size: Large)   Pulse 57   Ht 5' 2\" (1.575 m)   Wt 75.3 kg (166 lb)   BMI 30.36 kg/m²     Physical Exam  Constitutional:       General: She is not in acute distress.     Appearance: She is well-developed.   HENT:      Head: Normocephalic and atraumatic.   Neck:      Thyroid: No thyromegaly.      Vascular: No carotid bruit or JVD.      Trachea: No " "tracheal deviation.   Cardiovascular:      Rate and Rhythm: Normal rate and regular rhythm.      Pulses: Normal pulses.      Heart sounds: Normal heart sounds. No murmur heard.     No friction rub. No gallop.   Pulmonary:      Effort: Pulmonary effort is normal. No respiratory distress.      Breath sounds: Normal breath sounds. No wheezing, rhonchi or rales.   Chest:      Chest wall: No tenderness.   Musculoskeletal:         General: Normal range of motion.      Cervical back: Normal range of motion and neck supple.      Right lower leg: No edema.      Left lower leg: No edema.   Skin:     General: Skin is warm and dry.   Neurological:      General: No focal deficit present.      Mental Status: She is alert and oriented to person, place, and time.      Gait: Gait normal.   Psychiatric:         Mood and Affect: Mood normal.         Behavior: Behavior normal.         Thought Content: Thought content normal.         Judgment: Judgment normal.         -------------------------------------------------------------------------------   CARDIAC EP DEVICE REPORT  Results for orders placed in visit on 12/15/23    Cardiac EP device report    Narrative  MDT-SINGLE CHAMBER ICD - ACTIVE SYSTEM IS MRI CONDITIONAL  DEVICE INTERROGATED IN THE [facility] OFFICE: BATTERY VOLTAGE ADEQUATE-5 YRS.  0%. ALL AVAILABLE LEAD PARAMETERS WITHIN NORMAL LIMITS. NO SIGNIFICANT HIGH RATE EPISODES. OPTI-VOL WITHIN NORMAL LIMITS. NO PROGRAMMING CHANGES MADE TO DEVICE PARAMETERS. NORMAL DEVICE FUNCTION. NC      Results for orders placed in visit on 12/05/23    Cardiac EP device report    Narrative  MDT-SINGLE CHAMBER ICD - ACTIVE SYSTEM IS MRI CONDITIONAL  CARELINK TRANSMISSION: BATTERY STATUS \"5 YRS.\"  0%. ALL AVAILABLE LEAD PARAMETERS WITHIN NORMAL LIMITS. NO SIGNIFICANT HIGH RATE EPISODES. OPTI-VOL WITHIN NORMAL LIMITS. NORMAL DEVICE FUNCTION. NC      ======================================================  Imaging:   I have personally reviewed " "pertinent reports.      Portions of the record may have been created with voice recognition software. Occasional wrong word or \"sound a like\" substitutions may have occurred due to the inherent limitations of voice recognition software. Read the chart carefully and recognize, using context, where substitutions have occurred.    SIGNATURES:   Gibran Dennison MD   "

## 2024-02-20 ENCOUNTER — HOSPITAL ENCOUNTER (OUTPATIENT)
Dept: CT IMAGING | Facility: HOSPITAL | Age: 59
Discharge: HOME | End: 2024-02-20
Payer: MEDICARE

## 2024-02-20 DIAGNOSIS — Z12.2 ENCOUNTER FOR SCREENING FOR MALIGNANT NEOPLASM OF LUNG IN FORMER SMOKER WHO QUIT IN PAST 15 YEARS WITH 30 PACK YEAR HISTORY OR GREATER: ICD-10-CM

## 2024-02-20 DIAGNOSIS — Z87.891 PERSONAL HISTORY OF TOBACCO USE, PRESENTING HAZARDS TO HEALTH: ICD-10-CM

## 2024-02-20 DIAGNOSIS — Z87.891 ENCOUNTER FOR SCREENING FOR MALIGNANT NEOPLASM OF LUNG IN FORMER SMOKER WHO QUIT IN PAST 15 YEARS WITH 30 PACK YEAR HISTORY OR GREATER: ICD-10-CM

## 2024-02-20 DIAGNOSIS — B20 HIV DISEASE (HCC): ICD-10-CM

## 2024-02-20 PROCEDURE — 71271 CT THORAX LUNG CANCER SCR C-: CPT

## 2024-02-23 ENCOUNTER — PATIENT OUTREACH (OUTPATIENT)
Dept: SURGERY | Facility: CLINIC | Age: 59
End: 2024-02-23

## 2024-02-27 ENCOUNTER — TELEPHONE (OUTPATIENT)
Dept: SURGERY | Facility: CLINIC | Age: 59
End: 2024-02-27

## 2024-02-27 NOTE — TELEPHONE ENCOUNTER
Spoke with patient and I let her know her lung CT showed a stable lung nodule that can be followed up in 12 months for her annual CT lung screening.  Nodule is benign in presentation. Patient verbalizes understanding.

## 2024-03-01 ENCOUNTER — APPOINTMENT (OUTPATIENT)
Dept: LAB | Facility: HOSPITAL | Age: 59
End: 2024-03-01
Payer: MEDICARE

## 2024-03-01 ENCOUNTER — ANTICOAG VISIT (OUTPATIENT)
Dept: CARDIOLOGY CLINIC | Facility: CLINIC | Age: 59
End: 2024-03-01

## 2024-03-01 DIAGNOSIS — I23.6 LV (LEFT VENTRICULAR) MURAL THROMBUS FOLLOWING MI (HCC): ICD-10-CM

## 2024-03-01 DIAGNOSIS — Z79.01 LONG TERM CURRENT USE OF ANTICOAGULANT THERAPY: ICD-10-CM

## 2024-03-01 DIAGNOSIS — I25.5 ISCHEMIC CARDIOMYOPATHY: Primary | ICD-10-CM

## 2024-03-01 LAB
INR PPP: 1.83 (ref 0.84–1.19)
PROTHROMBIN TIME: 21.5 SECONDS (ref 11.6–14.5)

## 2024-03-01 PROCEDURE — 85610 PROTHROMBIN TIME: CPT

## 2024-03-01 PROCEDURE — 36415 COLL VENOUS BLD VENIPUNCTURE: CPT

## 2024-03-01 NOTE — PROGRESS NOTES
Spoke with patient, advised INR low, no missed doses, or changes in medications or diet. Current dose verified, advised to take 10 mg tonight only then go back to 5 mg Mon Wed Fri, 7.5 mg all other days, will recheck in 2 weeks 3/15/24

## 2024-03-05 ENCOUNTER — CONSULT (OUTPATIENT)
Dept: GASTROENTEROLOGY | Facility: MEDICAL CENTER | Age: 59
End: 2024-03-05
Payer: MEDICARE

## 2024-03-05 VITALS
HEART RATE: 70 BPM | WEIGHT: 166 LBS | BODY MASS INDEX: 30.55 KG/M2 | TEMPERATURE: 98.6 F | DIASTOLIC BLOOD PRESSURE: 84 MMHG | HEIGHT: 62 IN | SYSTOLIC BLOOD PRESSURE: 118 MMHG

## 2024-03-05 DIAGNOSIS — Z91.89 ENCOUNTER FOR SCREENING FOR COLORECTAL CANCER IN HIGH RISK PATIENT: ICD-10-CM

## 2024-03-05 DIAGNOSIS — Z12.11 ENCOUNTER FOR SCREENING FOR COLORECTAL CANCER IN HIGH RISK PATIENT: ICD-10-CM

## 2024-03-05 DIAGNOSIS — Z12.12 ENCOUNTER FOR SCREENING FOR COLORECTAL CANCER IN HIGH RISK PATIENT: ICD-10-CM

## 2024-03-05 PROCEDURE — 99203 OFFICE O/P NEW LOW 30 MIN: CPT | Performed by: STUDENT IN AN ORGANIZED HEALTH CARE EDUCATION/TRAINING PROGRAM

## 2024-03-05 NOTE — PROGRESS NOTES
[facility] - Outpatient Consultation  Airam Rainey 59 y.o. female MRN: [id number]  Encounter: [id number]      Assessment and Plan:    1. Encounter for screening for colorectal cancer in high risk patient        59 y.o. female w/ hx of CKD, MI 2017 w/ triple vessel disease managed medically (declined CABG), LV thrombus on warfarin, ischemic CMY (EF 42%) s/p ICD, HIV on ART (VL undetectable, CD4 511 in 11/2023) who is referred to GI for colon cancer screening.    Patient is at average risk for CRC and is due for screening. However, she has significant cardiac comorbidities which make her a poor candidate for elective colonoscopy with anesthesia unless absolutely necessary. It is a bit reassuring that her EF seems to be slowly recovering over the years.    Nevertheless, I think it is appropriate for her to continue with virtual CT colonographies every 5 years (most recently done at [facility] in 2019). If it is abnormal, colonoscopy would be indicated, and she would need to have this coordinated with cardiology clearance.    - CT colonography    Orders Placed This Encounter   Procedures    CT colonoscopy diagnostic w contrast    Basic metabolic panel     ______________________________________________________________________    History of Present Illness:    Airam Rainey is a 59 y.o. female w/ hx of CKD, MI 2017 w/ triple vessel disease managed medically (declined CABG), LV thrombus on warfarin, ischemic CMY (EF 42%) s/p ICD, HIV on ART (VL undetectable, CD4 511 in 11/2023) who is referred to GI for colon cancer screening.    Patient denies abdominal pain, diarrhea, constipation, hematochezia, melena, or unintentional weight loss. No family history of colon cancer.     She has never had a colonoscopy. However, she did undergo a CT colonography in 1/2019 at [facility] which was normal. This was done in lieu of a colonoscopy due to her significant cardiac comorbidities and elevated risk for  anesthesia.      Review of Systems:  As per HPI. Otherwise negative.      Historical Information   Past Medical History:   Diagnosis Date    Asthma     Coronary artery disease     defibrillator    HIV positive (HCC)      Past Surgical History:   Procedure Laterality Date    ANGIOPLASTY      CARDIAC DEFIBRILLATOR PLACEMENT      CORONARY ANGIOPLASTY      pci placement    TOTAL ABDOMINAL HYSTERECTOMY      US GUIDED INJECTION FOR RESEARCH STUDY  2018    US GUIDED INJECTION FOR RESEARCH STUDY  2019    US GUIDED INJECTION FOR RESEARCH STUDY  2018    US GUIDED INJECTION FOR RESEARCH STUDY  2018     Social History   Social History     Substance and Sexual Activity   Alcohol Use Not Currently    Comment: socially     Social History     Substance and Sexual Activity   Drug Use No     Social History     Tobacco Use   Smoking Status Former    Current packs/day: 0.00    Average packs/day: 1 pack/day for 39.2 years (39.2 ttl pk-yrs)    Types: Cigarettes    Start date: 1984    Quit date: 2023    Years since quittin.9    Passive exposure: Past   Smokeless Tobacco Never     Family History   Problem Relation Age of Onset    No Known Problems Mother     Other Father         GI bleed    No Known Problems Sister     No Known Problems Sister     No Known Problems Maternal Grandmother     No Known Problems Maternal Grandfather     No Known Problems Paternal Grandmother     No Known Problems Paternal Grandfather     Suicidality Brother     Drug abuse Brother         overdose    No Known Problems Maternal Aunt     No Known Problems Maternal Aunt     No Known Problems Maternal Aunt     No Known Problems Paternal Aunt        Meds/Allergies       Current Outpatient Medications:     Aspirin Low Dose 81 MG EC tablet    atorvastatin (LIPITOR) 80 mg tablet    bictegravir-emtricitab-tenofovir alafenamide (Biktarvy) -25 MG tablet    Calcium Carb-Cholecalciferol (calcium carbonate-vitamin D) 500 mg-5 mcg tablet     "carvedilol (COREG) 6.25 mg tablet    cholecalciferol (VITAMIN D3) 1,000 units tablet    cyanocobalamin (VITAMIN B-12) 100 MCG tablet    nicotine polacrilex (SM Nicotine Polacrilex) 4 MG lozenge    sacubitril-valsartan (Entresto) 49-51 MG TABS    warfarin (COUMADIN) 5 mg tablet    Allergies   Allergen Reactions    No Active Allergies     No Known Allergies            Objective     Blood pressure 118/84, pulse 70, temperature 98.6 °F (37 °C), temperature source Tympanic, height 5' 2\" (1.575 m), weight 75.3 kg (166 lb), not currently breastfeeding. Body mass index is 30.36 kg/m².        Physical Exam:      General: No acute distress  Abdomen: Soft, non-tender, non-distended, normoactive bowel sounds  Neuro: Awake, alert, oriented x 3    Lab Results:   Lab Results   Component Value Date/Time    WBC 4.43 11/24/2023 01:54 PM    WBC 5.0 11/24/2023 01:54 PM    HGB 13.0 11/24/2023 01:54 PM    HGB 7.9 (L) 11/24/2023 01:54 PM     11/24/2023 01:54 PM     11/24/2023 01:54 PM    SODIUM 140 11/24/2023 01:54 PM    K 4.1 11/24/2023 01:54 PM     11/24/2023 01:54 PM    CO2 28 11/24/2023 01:54 PM    BUN 16 11/24/2023 01:54 PM    CREATININE 1.45 (H) 11/24/2023 01:54 PM    AST 16 11/24/2023 01:54 PM    ALT 18 11/24/2023 01:54 PM    ALKPHOS 50 11/24/2023 01:54 PM    TBILI 0.69 11/24/2023 01:54 PM    ALB 4.0 11/24/2023 01:54 PM    INR 1.83 (H) 03/01/2024 01:43 PM       "

## 2024-03-19 ENCOUNTER — PATIENT OUTREACH (OUTPATIENT)
Dept: SURGERY | Facility: CLINIC | Age: 59
End: 2024-03-19

## 2024-03-19 ENCOUNTER — REMOTE DEVICE CLINIC VISIT (OUTPATIENT)
Dept: CARDIOLOGY CLINIC | Facility: CLINIC | Age: 59
End: 2024-03-19
Payer: MEDICARE

## 2024-03-19 DIAGNOSIS — Z95.810 IMPLANTABLE CARDIOVERTER-DEFIBRILLATOR (ICD) IN SITU: Primary | ICD-10-CM

## 2024-03-19 PROCEDURE — 93296 REM INTERROG EVL PM/IDS: CPT | Performed by: INTERNAL MEDICINE

## 2024-03-19 PROCEDURE — 93295 DEV INTERROG REMOTE 1/2/MLT: CPT | Performed by: INTERNAL MEDICINE

## 2024-03-19 NOTE — PROGRESS NOTES
This cm received a message from ct requesting MH services for her son.    Ct discussion took place with clinical team in regards to message from ct.    This cm returned ct's call. Message was left.    Ct returned this cm's phone call requesting MH services. Ct let this cm know her son was previously seen by psychiatry when he resided in [place]. Ct let this cm know he has recent/previous drug use and previously heard voices. Ct let this cm know that ct is not suicidal. Ct confirmed her son has insurance. Ct was made aware to contact the number on the back of his insurance card for which  office accept his insurance. This cm let ct know if it's an emergency, to call 911 and go to the hospital.

## 2024-03-19 NOTE — PROGRESS NOTES
Results for orders placed or performed in visit on 03/19/24   Cardiac EP device report    Narrative    MDT-SINGLE CHAMBER ICD - ACTIVE SYSTEM IS MRI CONDITIONAL  CARELINK TRANSMISSION: BATTERY VOLTAGE ADEQUATE (4.6 YRS).  <0.1% (VVI 40PPM); ALL AVAILABLE LEAD PARAMETERS WITHIN NORMAL LIMITS. NO SIGNIFICANT HIGH RATE EPISODES. OPTI-VOL WITHIN NORMAL LIMITS. NORMAL DEVICE FUNCTION.  ES

## 2024-03-20 ENCOUNTER — APPOINTMENT (OUTPATIENT)
Dept: LAB | Facility: HOSPITAL | Age: 59
End: 2024-03-20
Payer: MEDICARE

## 2024-03-20 ENCOUNTER — ANTICOAG VISIT (OUTPATIENT)
Dept: CARDIOLOGY CLINIC | Facility: CLINIC | Age: 59
End: 2024-03-20

## 2024-03-20 DIAGNOSIS — I25.5 ISCHEMIC CARDIOMYOPATHY: ICD-10-CM

## 2024-03-20 DIAGNOSIS — Z79.01 LONG TERM CURRENT USE OF ANTICOAGULANT THERAPY: ICD-10-CM

## 2024-03-20 DIAGNOSIS — I25.5 ISCHEMIC CARDIOMYOPATHY: Primary | ICD-10-CM

## 2024-03-20 DIAGNOSIS — N18.31 STAGE 3A CHRONIC KIDNEY DISEASE (HCC): ICD-10-CM

## 2024-03-20 DIAGNOSIS — I23.6 LV (LEFT VENTRICULAR) MURAL THROMBUS FOLLOWING MI (HCC): ICD-10-CM

## 2024-03-20 DIAGNOSIS — Z12.11 ENCOUNTER FOR SCREENING FOR COLORECTAL CANCER IN HIGH RISK PATIENT: ICD-10-CM

## 2024-03-20 DIAGNOSIS — I51.89 IMPAIRED LEFT VENTRICULAR FUNCTION: ICD-10-CM

## 2024-03-20 DIAGNOSIS — Z91.89 ENCOUNTER FOR SCREENING FOR COLORECTAL CANCER IN HIGH RISK PATIENT: ICD-10-CM

## 2024-03-20 DIAGNOSIS — Z12.12 ENCOUNTER FOR SCREENING FOR COLORECTAL CANCER IN HIGH RISK PATIENT: ICD-10-CM

## 2024-03-20 LAB
ALBUMIN SERPL BCP-MCNC: 4 G/DL (ref 3.5–5)
ANION GAP SERPL CALCULATED.3IONS-SCNC: 8 MMOL/L (ref 4–13)
BUN SERPL-MCNC: 18 MG/DL (ref 5–25)
CALCIUM SERPL-MCNC: 9.3 MG/DL (ref 8.4–10.2)
CHLORIDE SERPL-SCNC: 110 MMOL/L (ref 96–108)
CO2 SERPL-SCNC: 27 MMOL/L (ref 21–32)
CREAT SERPL-MCNC: 1.35 MG/DL (ref 0.6–1.3)
ERYTHROCYTE [DISTWIDTH] IN BLOOD BY AUTOMATED COUNT: 14.2 % (ref 11.6–15.1)
GFR SERPL CREATININE-BSD FRML MDRD: 43 ML/MIN/1.73SQ M
GLUCOSE P FAST SERPL-MCNC: 96 MG/DL (ref 65–99)
HCT VFR BLD AUTO: 39.6 % (ref 34.8–46.1)
HGB BLD-MCNC: 12.8 G/DL (ref 11.5–15.4)
INR PPP: 2.35 (ref 0.84–1.19)
MCH RBC QN AUTO: 29.9 PG (ref 26.8–34.3)
MCHC RBC AUTO-ENTMCNC: 32.3 G/DL (ref 31.4–37.4)
MCV RBC AUTO: 93 FL (ref 82–98)
PHOSPHATE SERPL-MCNC: 2.5 MG/DL (ref 2.7–4.5)
PLATELET # BLD AUTO: 177 THOUSANDS/UL (ref 149–390)
PMV BLD AUTO: 11.1 FL (ref 8.9–12.7)
POTASSIUM SERPL-SCNC: 4.2 MMOL/L (ref 3.5–5.3)
PROTHROMBIN TIME: 26.1 SECONDS (ref 11.6–14.5)
RBC # BLD AUTO: 4.28 MILLION/UL (ref 3.81–5.12)
SODIUM SERPL-SCNC: 145 MMOL/L (ref 135–147)
WBC # BLD AUTO: 4 THOUSAND/UL (ref 4.31–10.16)

## 2024-03-20 PROCEDURE — 85027 COMPLETE CBC AUTOMATED: CPT

## 2024-03-20 PROCEDURE — 80069 RENAL FUNCTION PANEL: CPT

## 2024-03-20 NOTE — PROGRESS NOTES
Spoke with patient, advised INR just a little low, advised to take 7.5 mg tonight only, then go back to 5 mg Mon Wed Fri, 7.5 mg all other days, will recheck in 3 weeks 4/10/24

## 2024-03-21 DIAGNOSIS — M81.0 AGE-RELATED OSTEOPOROSIS WITHOUT CURRENT PATHOLOGICAL FRACTURE: ICD-10-CM

## 2024-03-22 ENCOUNTER — TELEPHONE (OUTPATIENT)
Dept: NEPHROLOGY | Facility: CLINIC | Age: 59
End: 2024-03-22

## 2024-03-22 NOTE — TELEPHONE ENCOUNTER
Called patient and went over the following information:    Patients kidney function remained stable.    Patient verbally understood and had no further questions for me at this time.

## 2024-03-22 NOTE — TELEPHONE ENCOUNTER
----- Message from Masuod Vega, DO sent at 3/22/2024  3:30 PM EDT -----  Please let her know that her kidney function remained stable.  Thank you.

## 2024-03-25 ENCOUNTER — HOSPITAL ENCOUNTER (OUTPATIENT)
Dept: CT IMAGING | Facility: HOSPITAL | Age: 59
Discharge: HOME | End: 2024-03-25
Attending: STUDENT IN AN ORGANIZED HEALTH CARE EDUCATION/TRAINING PROGRAM
Payer: MEDICARE

## 2024-03-25 DIAGNOSIS — Z12.11 ENCOUNTER FOR SCREENING FOR COLORECTAL CANCER IN HIGH RISK PATIENT: ICD-10-CM

## 2024-03-25 DIAGNOSIS — Z12.12 ENCOUNTER FOR SCREENING FOR COLORECTAL CANCER IN HIGH RISK PATIENT: ICD-10-CM

## 2024-03-25 DIAGNOSIS — Z91.89 ENCOUNTER FOR SCREENING FOR COLORECTAL CANCER IN HIGH RISK PATIENT: ICD-10-CM

## 2024-03-25 PROCEDURE — 74261 CT COLONOGRAPHY DX: CPT

## 2024-04-08 ENCOUNTER — HOSPITAL ENCOUNTER (EMERGENCY)
Facility: HOSPITAL | Age: 59
Discharge: HOME | End: 2024-04-08
Attending: INTERNAL MEDICINE
Payer: MEDICARE

## 2024-04-08 ENCOUNTER — APPOINTMENT (EMERGENCY)
Dept: RADIOLOGY | Facility: HOSPITAL | Age: 59
End: 2024-04-08
Payer: MEDICARE

## 2024-04-08 ENCOUNTER — APPOINTMENT (OUTPATIENT)
Dept: LAB | Facility: HOSPITAL | Age: 59
End: 2024-04-08
Payer: MEDICARE

## 2024-04-08 ENCOUNTER — ANTICOAG VISIT (OUTPATIENT)
Dept: CARDIOLOGY CLINIC | Facility: CLINIC | Age: 59
End: 2024-04-08

## 2024-04-08 ENCOUNTER — TELEPHONE (OUTPATIENT)
Dept: SURGERY | Facility: CLINIC | Age: 59
End: 2024-04-08

## 2024-04-08 VITALS
RESPIRATION RATE: 16 BRPM | BODY MASS INDEX: 31.05 KG/M2 | OXYGEN SATURATION: 99 % | WEIGHT: 169.75 LBS | SYSTOLIC BLOOD PRESSURE: 116 MMHG | HEART RATE: 66 BPM | TEMPERATURE: 98 F | DIASTOLIC BLOOD PRESSURE: 64 MMHG

## 2024-04-08 DIAGNOSIS — I23.6 LV (LEFT VENTRICULAR) MURAL THROMBUS FOLLOWING MI (HCC): ICD-10-CM

## 2024-04-08 DIAGNOSIS — B20 HIV DISEASE (HCC): Primary | ICD-10-CM

## 2024-04-08 DIAGNOSIS — I25.5 ISCHEMIC CARDIOMYOPATHY: Primary | ICD-10-CM

## 2024-04-08 DIAGNOSIS — Z79.01 LONG TERM CURRENT USE OF ANTICOAGULANT THERAPY: ICD-10-CM

## 2024-04-08 DIAGNOSIS — I10 ESSENTIAL HYPERTENSION: ICD-10-CM

## 2024-04-08 DIAGNOSIS — M65.311 TRIGGER FINGER OF RIGHT THUMB: Primary | ICD-10-CM

## 2024-04-08 LAB
ALBUMIN SERPL BCP-MCNC: 4.2 G/DL (ref 3.5–5)
ALP SERPL-CCNC: 51 U/L (ref 34–104)
ALT SERPL W P-5'-P-CCNC: 12 U/L (ref 7–52)
ANION GAP SERPL CALCULATED.3IONS-SCNC: 8 MMOL/L (ref 4–13)
AST SERPL W P-5'-P-CCNC: 16 U/L (ref 13–39)
BASOPHILS # BLD AUTO: 0.04 THOUSANDS/ÂΜL (ref 0–0.1)
BASOPHILS NFR BLD AUTO: 1 % (ref 0–1)
BILIRUB SERPL-MCNC: 0.69 MG/DL (ref 0.2–1)
BUN SERPL-MCNC: 20 MG/DL (ref 5–25)
CALCIUM SERPL-MCNC: 9.7 MG/DL (ref 8.4–10.2)
CHLORIDE SERPL-SCNC: 106 MMOL/L (ref 96–108)
CO2 SERPL-SCNC: 29 MMOL/L (ref 21–32)
CREAT SERPL-MCNC: 1.56 MG/DL (ref 0.6–1.3)
EOSINOPHIL # BLD AUTO: 0.08 THOUSAND/ÂΜL (ref 0–0.61)
EOSINOPHIL NFR BLD AUTO: 1 % (ref 0–6)
ERYTHROCYTE [DISTWIDTH] IN BLOOD BY AUTOMATED COUNT: 14.6 % (ref 11.6–15.1)
GFR SERPL CREATININE-BSD FRML MDRD: 36 ML/MIN/1.73SQ M
GLUCOSE P FAST SERPL-MCNC: 87 MG/DL (ref 65–99)
HCT VFR BLD AUTO: 40.2 % (ref 34.8–46.1)
HGB BLD-MCNC: 12.8 G/DL (ref 11.5–15.4)
IMM GRANULOCYTES # BLD AUTO: 0.01 THOUSAND/UL (ref 0–0.2)
IMM GRANULOCYTES NFR BLD AUTO: 0 % (ref 0–2)
INR PPP: 3.41 (ref 0.84–1.19)
LYMPHOCYTES # BLD AUTO: 2.04 THOUSANDS/ÂΜL (ref 0.6–4.47)
LYMPHOCYTES NFR BLD AUTO: 30 % (ref 14–44)
MCH RBC QN AUTO: 29.3 PG (ref 26.8–34.3)
MCHC RBC AUTO-ENTMCNC: 31.8 G/DL (ref 31.4–37.4)
MCV RBC AUTO: 92 FL (ref 82–98)
MONOCYTES # BLD AUTO: 0.44 THOUSAND/ÂΜL (ref 0.17–1.22)
MONOCYTES NFR BLD AUTO: 7 % (ref 4–12)
NEUTROPHILS # BLD AUTO: 4.09 THOUSANDS/ÂΜL (ref 1.85–7.62)
NEUTS SEG NFR BLD AUTO: 61 % (ref 43–75)
NRBC BLD AUTO-RTO: 0 /100 WBCS
PLATELET # BLD AUTO: 184 THOUSANDS/UL (ref 149–390)
PMV BLD AUTO: 10.8 FL (ref 8.9–12.7)
POTASSIUM SERPL-SCNC: 4.1 MMOL/L (ref 3.5–5.3)
PROT SERPL-MCNC: 7.2 G/DL (ref 6.4–8.4)
PROTHROMBIN TIME: 34.7 SECONDS (ref 11.6–14.5)
RBC # BLD AUTO: 4.37 MILLION/UL (ref 3.81–5.12)
SODIUM SERPL-SCNC: 143 MMOL/L (ref 135–147)
WBC # BLD AUTO: 6.7 THOUSAND/UL (ref 4.31–10.16)

## 2024-04-08 PROCEDURE — 87536 HIV-1 QUANT&REVRSE TRNSCRPJ: CPT

## 2024-04-08 PROCEDURE — 80053 COMPREHEN METABOLIC PANEL: CPT

## 2024-04-08 PROCEDURE — 86361 T CELL ABSOLUTE COUNT: CPT

## 2024-04-08 PROCEDURE — 36415 COLL VENOUS BLD VENIPUNCTURE: CPT

## 2024-04-08 PROCEDURE — 85025 COMPLETE CBC W/AUTO DIFF WBC: CPT

## 2024-04-08 PROCEDURE — 73140 X-RAY EXAM OF FINGER(S): CPT

## 2024-04-08 PROCEDURE — 85610 PROTHROMBIN TIME: CPT

## 2024-04-08 RX ORDER — SENNOSIDES 8.6 MG
650 CAPSULE ORAL EVERY 8 HOURS PRN
Qty: 21 TABLET | Refills: 0 | Status: SHIPPED | OUTPATIENT
Start: 2024-04-08 | End: 2024-04-15

## 2024-04-08 RX ORDER — ACETAMINOPHEN 325 MG/1
650 TABLET ORAL ONCE
Status: COMPLETED | OUTPATIENT
Start: 2024-04-08 | End: 2024-04-08

## 2024-04-08 RX ADMIN — ACETAMINOPHEN 325MG 650 MG: 325 TABLET ORAL at 18:06

## 2024-04-08 NOTE — DISCHARGE INSTRUCTIONS
May use Tylenol as needed for pain. Referral to Orthopedic hand made. Please call and schedule appointment. Follow-up with PCP and return to ED for any worsening symptoms.

## 2024-04-08 NOTE — PROGRESS NOTES
Spoke with patient, advised INR good, current dose verified, continue 5 mg Mon Wed Fri, 7.5 mg all other days, will recheck in 2 weeks 4/22/24

## 2024-04-08 NOTE — ED PROVIDER NOTES
History  Chief Complaint   Patient presents with    Thumb Pain     Right thumb pain and stiffness x1 week. Denies injury/trauma      Patient is a 59-year old female with a past medical history including asthma, CAD, and HIV. Presents today with a chief complaint of right thumb pain. Reports that she started with pain and stiffness 1 week ago. States that there are times where her thumb locks up and she has to move it to extend it back down. Combination of thumb locking up and pain will wake her up out of her sleep. Patient also reports clicking and popping of the joint when she moves it. Denies any numbness or tingling in the thumb. Has not been taking any medications at home. Denies any trauma or injury or any other part of hand affected. Patient is left-handed and states that she does not work. Only repetitive motion reported is holding her phone.          Prior to Admission Medications   Prescriptions Last Dose Informant Patient Reported? Taking?   Aspirin Low Dose 81 MG EC tablet  Self No No   Sig: TAKE 1 TABLET BY MOUTH DAILY   Calcium Carb-Cholecalciferol (calcium carbonate-vitamin D) 500 mg-5 mcg tablet   No No   Sig: TAKE 1 TABLET BY MOUTH DAILY WITH BREAKFAST   atorvastatin (LIPITOR) 80 mg tablet   No No   Sig: TAKE 1 TABLET BY MOUTH DAILY   bictegravir-emtricitab-tenofovir alafenamide (Biktarvy) -25 MG tablet   No No   Sig: TAKE 1 TABLET BY MOUTH ONCE DAILY WITH BREAKFAST   carvedilol (COREG) 6.25 mg tablet  Self No No   Sig: TAKE 1 TABLET BY MOUTH TWICE A DAY WITH MEALS   cholecalciferol (VITAMIN D3) 1,000 units tablet  Self Yes No   Sig: Take 2,000 Units by mouth daily   cyanocobalamin (VITAMIN B-12) 100 MCG tablet  Self Yes No   Sig: Take 100 mcg by mouth daily   nicotine polacrilex (SM Nicotine Polacrilex) 4 MG lozenge   No No   Sig: APPLY 1 LOZENGE (4MG) TO MOUTH OR THROAT AS NEEDED FOR SMOKING CESSATION   sacubitril-valsartan (Entresto) 49-51 MG TABS  Self No No   Sig: TAKE 1 TABLET BY MOUTH  TWICE A DAY   warfarin (COUMADIN) 5 mg tablet  Self No No   Sig: TAKE 1 TO 1 AND 1/2 TABLETS BY MOUTH DAILY AS DIRECTED BY MD      Facility-Administered Medications: None       Past Medical History:   Diagnosis Date    Asthma     Coronary artery disease     defibrillator    HIV positive (HCC)        Past Surgical History:   Procedure Laterality Date    ANGIOPLASTY      CARDIAC DEFIBRILLATOR PLACEMENT      CORONARY ANGIOPLASTY      pci placement    TOTAL ABDOMINAL HYSTERECTOMY      US GUIDED INJECTION FOR RESEARCH STUDY  2018    US GUIDED INJECTION FOR RESEARCH STUDY  2019    US GUIDED INJECTION FOR RESEARCH STUDY  2018    US GUIDED INJECTION FOR RESEARCH STUDY  2018       Family History   Problem Relation Age of Onset    No Known Problems Mother     Other Father         GI bleed    No Known Problems Sister     No Known Problems Sister     No Known Problems Maternal Grandmother     No Known Problems Maternal Grandfather     No Known Problems Paternal Grandmother     No Known Problems Paternal Grandfather     Suicidality Brother     Drug abuse Brother         overdose    No Known Problems Maternal Aunt     No Known Problems Maternal Aunt     No Known Problems Maternal Aunt     No Known Problems Paternal Aunt      I have reviewed and agree with the history as documented.    E-Cigarette/Vaping    E-Cigarette Use Never User      E-Cigarette/Vaping Substances    Nicotine No     THC No     CBD No     Flavoring No     Other No     Unknown No      Social History     Tobacco Use    Smoking status: Former     Current packs/day: 0.00     Average packs/day: 1 pack/day for 39.2 years (39.2 ttl pk-yrs)     Types: Cigarettes     Start date: 1984     Quit date: 2023     Years since quittin.0     Passive exposure: Past    Smokeless tobacco: Never   Vaping Use    Vaping status: Never Used   Substance Use Topics    Alcohol use: Not Currently     Comment: socially    Drug use: No       Review of Systems    Constitutional:  Negative for chills and fever.   Respiratory:  Negative for chest tightness and shortness of breath.    Cardiovascular:  Negative for chest pain and palpitations.   Musculoskeletal:  Positive for arthralgias. Negative for myalgias.   Skin:  Negative for color change, rash and wound.   All other systems reviewed and are negative.      Physical Exam  Physical Exam  Vitals and nursing note reviewed.   Constitutional:       Appearance: Normal appearance.   Cardiovascular:      Rate and Rhythm: Normal rate and regular rhythm.      Pulses: Normal pulses.      Heart sounds: Normal heart sounds.   Pulmonary:      Effort: Pulmonary effort is normal.      Breath sounds: Normal breath sounds.   Musculoskeletal:         General: Tenderness (right thumb) present. No swelling.      Right hand: Tenderness present. Normal strength. Normal sensation. Normal capillary refill. Normal pulse.      Left hand: Normal.      Comments: Tenderness upon palpation of the right thumb. Popping noted to the intraphalangeal joint while patient flexed thumb. Phalen sign negative.    Skin:     General: Skin is warm and dry.      Capillary Refill: Capillary refill takes less than 2 seconds.      Findings: No erythema.   Neurological:      General: No focal deficit present.      Mental Status: She is alert and oriented to person, place, and time.   Psychiatric:         Mood and Affect: Mood normal.         Behavior: Behavior normal.         Vital Signs  ED Triage Vitals   Temperature Pulse Respirations Blood Pressure SpO2   04/08/24 1720 04/08/24 1721 04/08/24 1721 04/08/24 1721 04/08/24 1721   98 °F (36.7 °C) 66 16 116/64 99 %      Temp src Heart Rate Source Patient Position - Orthostatic VS BP Location FiO2 (%)   -- 04/08/24 1721 04/08/24 1721 04/08/24 1721 --    Monitor Lying Right arm       Pain Score       04/08/24 1721       5           Vitals:    04/08/24 1721   BP: 116/64   Pulse: 66   Patient Position - Orthostatic VS: Lying          Visual Acuity      ED Medications  Medications   acetaminophen (TYLENOL) tablet 650 mg (650 mg Oral Given 4/8/24 1806)       Diagnostic Studies  Results Reviewed       None                   XR thumb first digit-min 2 views RIGHT    (Results Pending)              Procedures  Splint application    Date/Time: 4/8/2024 6:14 PM    Performed by: JOSE ROBERTO Milian  Authorized by: JOSE ROBERTO Milian  Universal Protocol:  Consent: Verbal consent obtained.  Patient understanding: patient states understanding of the procedure being performed  Radiology Images displayed and confirmed. If images not available, report reviewed: imaging studies available  Patient identity confirmed: verbally with patient    Pre-procedure details:     Sensation:  Normal    Skin color:  Normal, no bruising  Procedure details:     Laterality:  Right    Location:  Finger    Finger:  R thumb    Splint type:  Finger splint, static  Post-procedure details:     Pain:  Unchanged    Sensation:  Normal    Skin color:  Normal skin color, no bruising, normal sensation    Patient tolerance of procedure:  Tolerated well, no immediate complications           ED Course                               SBIRT 20yo+      Flowsheet Row Most Recent Value   Initial Alcohol Screen: US AUDIT-C     1. How often do you have a drink containing alcohol? 0 Filed at: 04/08/2024 1756   2. How many drinks containing alcohol do you have on a typical day you are drinking?  0 Filed at: 04/08/2024 1756   3b. FEMALE Any Age, or MALE 65+: How often do you have 4 or more drinks on one occassion? 0 Filed at: 04/08/2024 1756   Audit-C Score 0 Filed at: 04/08/2024 1756   ALICIA: How many times in the past year have you...    Used an illegal drug or used a prescription medication for non-medical reasons? Never Filed at: 04/08/2024 1756                      Medical Decision Making  Patient is a 59-year-old female presenting for evaluation of right thumb pain. Patient's  signs are within limits. After examination of patient, differentials include but are not limited to: thumb dislocation and stenosing tenosynovitis.    Discussed with patient treatment plan to include an x-ray of thumb and pain medication. My interpretation of x-ray shows no acute osseous abnormality. Discussed with patient that she may continue to take Tylenol as needed for pain and a referral has been placed to [facility] in case of any worsening symptoms or warranting further evaluation. Patient's thumb placed in splint for comfort and to use while sleeping to prevent it from locking up. Encouraged follow-up with PCP and ED return precautions reviewed. Patient verbalizes understanding of discharge instructions.     Amount and/or Complexity of Data Reviewed  Radiology: ordered.     Details: Reviewed with patient.     Risk  OTC drugs.             Disposition  Final diagnoses:   Trigger finger of right thumb     Time reflects when diagnosis was documented in both MDM as applicable and the Disposition within this note       Time User Action Codes Description Comment    4/8/2024  6:08 PM Michelle Shoemaker Add [M65.311] Trigger finger of right thumb           ED Disposition       ED Disposition   Discharge    Condition   Stable    Date/Time   Mon Apr 8, 2024 1809    Comment   Airam Rainey discharge to home/self care.                   Follow-up Information       Follow up With Specialties Details Why Contact Info Additional Information    JOSE ROBERTO Grace Nurse Practitioner Call in 1 week  [address] [facility] [address] [phone]       [facility] Emergency Department Emergency Medicine  If symptoms worsen [address] [place] [id number] [phone] [facility] Emergency Department, [address] [place] [address], [id number]            Patient's Medications   Discharge Prescriptions    ACETAMINOPHEN (TYLENOL) 650 MG CR TABLET    Take 1 tablet  (650 mg total) by mouth every 8 (eight) hours as needed for mild pain for up to 7 days       Start Date: 4/8/2024  End Date: 4/15/2024       Order Dose: 650 mg       Quantity: 21 tablet    Refills: 0           PDMP Review       None            ED Provider  Electronically Signed by             JOSE ROBERTO Milian  04/09/24 9158

## 2024-04-08 NOTE — TELEPHONE ENCOUNTER
LVM for patient to call me back. Wanted to remind her to get her labs for her upcoming ID appointment on Friday 4/19.   I have personally seen and examined this patient. I have fully participated in the care of this patient. I have reviewed all pertinent clinical information, including history physical exam, plan and the Resident's note and agree except as noted

## 2024-04-09 ENCOUNTER — VBI (OUTPATIENT)
Dept: INTERNAL MEDICINE CLINIC | Facility: CLINIC | Age: 59
End: 2024-04-09

## 2024-04-09 LAB
BASOPHILS # BLD AUTO: 0 X10E3/UL (ref 0–0.2)
BASOPHILS NFR BLD AUTO: 1 %
CD3+CD4+ CELLS # BLD: 414 /UL (ref 359–1519)
CD3+CD4+ CELLS NFR BLD: 23 % (ref 30.8–58.5)
EOSINOPHIL # BLD AUTO: 0.1 X10E3/UL (ref 0–0.4)
EOSINOPHIL NFR BLD AUTO: 2 %
ERYTHROCYTE [DISTWIDTH] IN BLOOD BY AUTOMATED COUNT: 14.1 % (ref 11.7–15.4)
HCT VFR BLD AUTO: 35.6 % (ref 34–46.6)
HGB BLD-MCNC: 11.7 G/DL (ref 11.1–15.9)
IMM GRANULOCYTES # BLD: 0 X10E3/UL (ref 0–0.1)
IMM GRANULOCYTES NFR BLD: 0 %
LYMPHOCYTES # BLD AUTO: 1.8 X10E3/UL (ref 0.7–3.1)
LYMPHOCYTES NFR BLD AUTO: 28 %
MCH RBC QN AUTO: 29.7 PG (ref 26.6–33)
MCHC RBC AUTO-ENTMCNC: 32.9 G/DL (ref 31.5–35.7)
MCV RBC AUTO: 90 FL (ref 79–97)
MONOCYTES # BLD AUTO: 0.4 X10E3/UL (ref 0.1–0.9)
MONOCYTES NFR BLD AUTO: 6 %
NEUTROPHILS # BLD AUTO: 4.1 X10E3/UL (ref 1.4–7)
NEUTROPHILS NFR BLD AUTO: 63 %
PLATELET # BLD AUTO: 215 X10E3/UL (ref 150–450)
RBC # BLD AUTO: 3.94 X10E6/UL (ref 3.77–5.28)
WBC # BLD AUTO: 6.4 X10E3/UL (ref 3.4–10.8)

## 2024-04-09 NOTE — LETTER
[facility]  [address]  [place] [id number]    Date: 04/11/24    Airam Rianey  [address] [facility] [address]  [place] [address]    Dear Airam:                                                                                                                                Thank you for choosing [facility] emergency department for care.  Your primary care provider wants to make sure that your ongoing medical care is being addressed. If you require follow up care as a result of your emergency department visit, there are a few things the practice would like you to know.                As part of the network's continuing commitment to caring for our patients, we have added more same day appointments and have extended office hours to meet your medical needs. After hours, on-call physicians are available via your primary care provider's main office line.               We encourage you to contact our office prior to seeking treatment to discuss your symptoms with the medical staff.  Together, we can determine the correct course of action.  A majority of non-emergent conditions such as: common cold, flu-like symptoms, fevers, strains/sprains, dislocations, minor burns, cuts and animal bites can be treated at [facility] facilities. Diagnostic testing is available at some sites.               Of course, if you are experiencing a life threatening medical emergency call 911 or proceed directly to the nearest emergency room.    Your nearest [facility] facility is conveniently located at:    [facility]  [address] [place] [address] [address] [place] [address]  [phone]  SKIP THE WAIT  Conveniently offered at most [facility] locations  [place] your spot online at [email or web address]/locations or on the [facility]    Sincerely,    [facility]  Dept: [phone]

## 2024-04-09 NOTE — TELEPHONE ENCOUNTER
04/09/24 11:38 AM    Patient contacted post ED visit, first outreach attempt made. Message was left for patient to return a call to the VBI Department at [place]: Phone [phone].    Thank you.  Iram Jack MA  [facility]

## 2024-04-10 LAB — HIV1 RNA # PLAS NAA DL=20: NOT DETECTED {COPIES}/ML

## 2024-04-10 NOTE — TELEPHONE ENCOUNTER
04/10/24 10:10 AM    Patient contacted post ED visit, VBI department spoke with patient/caregiver and outreach was successful.    Thank you.  Iram Jack MA  [facility]

## 2024-04-10 NOTE — PROGRESS NOTES
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 ([facility] [address] )  Continue with Warfarin  INR 2 2 on 12/12, in therapeutic range  Next INR check on Jan 15th    HIV positive [facility])  She can follow-up with ID  CD4 count per patient is high with low viral load  She is complaint with her meds  Diagnoses and all orders for this visit:    Essential hypertension    Mixed hyperlipidemia    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 (HCC)    HIV positive ([facility] )          Subjective:      Patient ID: Justen Ojeda is a 47 y o  female  53F with PMH significant for HIV, CAD s/p MI with LV thrombus (s/p ICD placement) and HLD presents for follow-up  She reports feeling well and denies any complaints at this time      She recently underwent mammogram that revealed dense breast tissue to L breast and needs biopsy that is scheduled with Dr Reyna Ibrahim  Patient will need to stop the Warfarin and ASA on 01/9  Surgery is scheduled for 01/16/2019  Patient will have INR drawn 01/15  She will resume both ASA and Warfarin on 01/17  I discussed her case with Dr Tasneem Joy (cardiology) as well as office staff of Dr Reyna Ibrahim  I also called patient to give her specific instructions to when to stop her ASA and Warfarin  She is also undergoing virtual colonoscopy without anesthesia, scheduled on 01/29/2018 in [place]           The following portions of the patient's history were reviewed and updated as appropriate: allergies, current medications, past family history, past medical history, past social history, past surgical history Rob Clifton attended Intensive Cardiac Rehab today from 0900 to 1100. During his time he exercised and attended class.   His education today was a nutrition class titled: Salads and Dressings. Patient received handouts and class discussion pertaining to the topic.       and problem list     Review of Systems   Constitutional: Negative for chills and fever  Respiratory: Negative for cough and shortness of breath  Cardiovascular: Negative for chest pain and palpitations  Endocrine: Negative for polyphagia and polyuria  Musculoskeletal: Negative for back pain  Neurological: Negative for light-headedness, numbness and headaches  Objective:      /70 (BP Location: Right arm, Patient Position: Sitting, Cuff Size: Standard)   Pulse 59   Temp (!) 97 4 °F (36 3 °C) (Temporal)   Resp 16   Ht 5' 2" (1 575 m)   Wt 78 9 kg (174 lb)   SpO2 95%   BMI 31 83 kg/m²          Physical Exam   Constitutional: She appears well-developed and well-nourished  HENT:   Head: Normocephalic and atraumatic  Eyes: EOM are normal    Neck: Normal range of motion  Neck supple  Cardiovascular: Normal rate and normal heart sounds  Pulmonary/Chest: Effort normal and breath sounds normal  No respiratory distress  Abdominal: Soft  Bowel sounds are normal  She exhibits no distension  Neurological: She is alert  Skin: Skin is warm and dry  Psychiatric: She has a normal mood and affect

## 2024-04-11 NOTE — TELEPHONE ENCOUNTER
04/11/24 10:46 AM    Patient contacted post ED visit, phone outreaches were unsuccessful and a MyChart letter has been sent to the patient as follow-up.    Thank you.  Iram Jack MA  [facility]

## 2024-04-15 DIAGNOSIS — R73.01 IMPAIRED FASTING BLOOD SUGAR: ICD-10-CM

## 2024-04-15 DIAGNOSIS — B20 HIV DISEASE (HCC): Primary | ICD-10-CM

## 2024-04-15 DIAGNOSIS — D72.89 OTHER SPECIFIED DISORDERS OF WHITE BLOOD CELLS: ICD-10-CM

## 2024-04-15 DIAGNOSIS — Z20.2 CONTACT WITH AND (SUSPECTED) EXPOSURE TO INFECTIONS WITH A PREDOMINANTLY SEXUAL MODE OF TRANSMISSION: ICD-10-CM

## 2024-04-15 DIAGNOSIS — I10 ESSENTIAL HYPERTENSION: ICD-10-CM

## 2024-04-15 DIAGNOSIS — Z11.3 ENCOUNTER FOR SCREENING FOR BACTERIAL SEXUALLY TRANSMITTED DISEASE: ICD-10-CM

## 2024-04-15 DIAGNOSIS — Z11.1 SCREENING-PULMONARY TB: ICD-10-CM

## 2024-04-15 DIAGNOSIS — E78.2 MIXED HYPERLIPIDEMIA: ICD-10-CM

## 2024-04-15 DIAGNOSIS — Z13.1 SCREENING FOR DIABETES MELLITUS: ICD-10-CM

## 2024-04-15 DIAGNOSIS — Z72.89 OTHER PROBLEMS RELATED TO LIFESTYLE: ICD-10-CM

## 2024-04-16 ENCOUNTER — PATIENT OUTREACH (OUTPATIENT)
Dept: SURGERY | Facility: CLINIC | Age: 59
End: 2024-04-16

## 2024-04-16 ENCOUNTER — OFFICE VISIT (OUTPATIENT)
Dept: OBGYN CLINIC | Facility: CLINIC | Age: 59
End: 2024-04-16
Payer: MEDICARE

## 2024-04-16 VITALS
DIASTOLIC BLOOD PRESSURE: 70 MMHG | SYSTOLIC BLOOD PRESSURE: 120 MMHG | HEIGHT: 62 IN | BODY MASS INDEX: 31.1 KG/M2 | WEIGHT: 169 LBS

## 2024-04-16 DIAGNOSIS — M65.311 TRIGGER FINGER OF RIGHT THUMB: ICD-10-CM

## 2024-04-16 PROCEDURE — 20550 NJX 1 TENDON SHEATH/LIGAMENT: CPT | Performed by: PHYSICIAN ASSISTANT

## 2024-04-16 PROCEDURE — 99213 OFFICE O/P EST LOW 20 MIN: CPT | Performed by: SURGERY

## 2024-04-16 RX ORDER — BETAMETHASONE SODIUM PHOSPHATE AND BETAMETHASONE ACETATE 3; 3 MG/ML; MG/ML
3 INJECTION, SUSPENSION INTRA-ARTICULAR; INTRALESIONAL; INTRAMUSCULAR; SOFT TISSUE
Status: COMPLETED | OUTPATIENT
Start: 2024-04-16 | End: 2024-04-16

## 2024-04-16 RX ORDER — LIDOCAINE HYDROCHLORIDE 10 MG/ML
2.5 INJECTION, SOLUTION INFILTRATION; PERINEURAL
Status: COMPLETED | OUTPATIENT
Start: 2024-04-16 | End: 2024-04-16

## 2024-04-16 RX ADMIN — BETAMETHASONE SODIUM PHOSPHATE AND BETAMETHASONE ACETATE 3 MG: 3; 3 INJECTION, SUSPENSION INTRA-ARTICULAR; INTRALESIONAL; INTRAMUSCULAR; SOFT TISSUE at 09:00

## 2024-04-16 RX ADMIN — LIDOCAINE HYDROCHLORIDE 2.5 ML: 10 INJECTION, SOLUTION INFILTRATION; PERINEURAL at 09:00

## 2024-04-16 NOTE — PROGRESS NOTES
Assessment:    Right trigger thumb      Plan:    Steroid injection provided today and the patient tolerated well.  Activities as tolerated.  May follow-up on an as-needed basis with instructions to call the office if symptoms persist or return in the future.            Subjective:     HPI    Patient ID:  Airam Rainey is a left hand dominant 59 y.o. female here for evaluation of right thumb.  According to the patient, she has been experiencing pain of the right thumb with associated clicking and locking with range of motion for the last 2 weeks.  There is no injury or trauma to the right thumb.  She is unsure what brought this on.  There is no associated numbness and tingling.      The following portions of the patient's history were reviewed and updated as appropriate: allergies, current medications, past family history, past medical history, past social history, past surgical history, and problem list.    Review of Systems     Objective:    Imaging:  Right thumb x-rays 4/8/2024  FINDINGS:     No acute fracture or dislocation.     No significant degenerative changes.     No lytic or blastic osseous lesion.     Unremarkable soft tissues.     IMPRESSION:     No acute osseous abnormality.    Physical Exam     Vitals:    04/16/24 0858   BP: 120/70       General appearance:  NAD   Cardiac:  Regular rate  Lungs:  Unlabored breathing  Abdomen:  Non-distended    Orthopedic Examination:  Right thumb    Inspection: No open wounds or erythema.  No ecchymosis or swelling.      Palpation: Tender to palpation with palpable nodule A1 pulley.  Nontender CMC joint and first dorsal extensor compartment.    Range-of-motion: Active clicking and locking with thumb range of motion.    Strength: Normal    Sensation: Intact to light touch    Special Tests: Good cap refill at the fingertip  Palpable radial pulse    Hand/upper extremity injection: R thumb A1  Universal Protocol:  Consent: Verbal consent obtained.  Risks and benefits:  risks, benefits and alternatives were discussed  Consent given by: patient  Patient identity confirmed: verbally with patient  Supporting Documentation  Indications: pain and tendon swelling   Procedure Details  Condition:trigger finger Location: thumb - R thumb A1   Preparation: Patient was prepped and draped in the usual sterile fashion  Needle size: 22 G  Ultrasound guidance: no  Medications administered: 2.5 mL lidocaine 1 %; 3 mg betamethasone acetate-betamethasone sodium phosphate 6 (3-3) mg/mL  Patient tolerance: patient tolerated the procedure well with no immediate complications  Dressing:  Sterile dressing applied

## 2024-04-19 ENCOUNTER — OFFICE VISIT (OUTPATIENT)
Dept: SURGERY | Facility: CLINIC | Age: 59
End: 2024-04-19
Payer: MEDICARE

## 2024-04-19 ENCOUNTER — PATIENT OUTREACH (OUTPATIENT)
Dept: SURGERY | Facility: CLINIC | Age: 59
End: 2024-04-19

## 2024-04-19 VITALS
DIASTOLIC BLOOD PRESSURE: 68 MMHG | HEIGHT: 62 IN | SYSTOLIC BLOOD PRESSURE: 112 MMHG | BODY MASS INDEX: 31.39 KG/M2 | HEART RATE: 54 BPM | TEMPERATURE: 97 F | OXYGEN SATURATION: 97 % | RESPIRATION RATE: 17 BRPM | WEIGHT: 170.6 LBS

## 2024-04-19 DIAGNOSIS — N18.31 STAGE 3A CHRONIC KIDNEY DISEASE (HCC): ICD-10-CM

## 2024-04-19 DIAGNOSIS — B20 HIV DISEASE (HCC): Primary | ICD-10-CM

## 2024-04-19 PROCEDURE — 99214 OFFICE O/P EST MOD 30 MIN: CPT | Performed by: STUDENT IN AN ORGANIZED HEALTH CARE EDUCATION/TRAINING PROGRAM

## 2024-04-19 PROCEDURE — G2211 COMPLEX E/M VISIT ADD ON: HCPCS | Performed by: STUDENT IN AN ORGANIZED HEALTH CARE EDUCATION/TRAINING PROGRAM

## 2024-04-19 NOTE — PROGRESS NOTES
Assessment/Plan: Pt was approached by S within ID clinic to explore multidimensional stability by completing the PHQ-9 screening. During today's contact, pt disclosed being stable with no pressing issues to address. Pt shared been self-regulating emotions and cognitions by relying on support, and focusing on her self-care patterns.      Pt's emotions and cognitions were validated throughout contact, along with receiving positive reinforcements for her willingness to continue managing her emotions, cognitions, and behaviors on a heathy basis. Before session's completion, PHQ-9 screening was fulfilled, scoring (PHQ-9=0) as a display of minimal depressive traits without SI or HI. At the end of contact, pt was encouraged to continue benefiting from  interventions which pt agreed to utilize as needed.     [facility]     Today patient present with   Chief Complaint   Patient presents with    HIV Positive     Patient would likely benefit from: Addressing daily stressors that may trigger emotional and cognitive suppressions.   Consider/focus/continue: Monitoring pt's emotional, cognitive, and behavioral health's stability.   Stage of change: Pre-contemplation  Plan/ Behavioral Recommendations: Ongoing  interventions per pt's coordinated care, and/or pt's request of services.     There are no diagnoses linked to this encounter.      Subjective:     Patient ID: Airam Rainey is a 59 y.o. female.    HPI    History of Present Illness:      Patient is being seen for annual behavioral health assessment.   Patient denies current behavioral health concerns.    [unfilled]       Review of Systems      Objective:     Physical Exam      [facility]    Orientation     Person: yes    Place: yes    Time: yes    Appearance    Well Developed: yes healthy    Uncomfortable: no    Normal Body Odor: yes    Smells of Feces: no    Smells of Urine: no    Disheveled: no    Well Nourished: yes overweight    Grooming Unkempt:  no    Poor Eye Contact: no    Hirsute: no    Looks Tired: no    Acutely Exhausted: noappears younger    Mood and Affect:     Appropriate: yes    Euthymicyes    Irritable: no    Angry: no    Anxious: no    Depressed:no    Blunted:no    Labile: no    Restricted: no    Harm to Self or Others: None reported by pt.    Substance Abuse: Tobacco Use Disorder, in Full Remission.

## 2024-04-19 NOTE — PROGRESS NOTES
Ct met with this cm after her appointment with clinic. Ct let this cm know her MA was terminated. Ct agreeable to completing a MAWD application. Ct aware to submit her paystubs and proof of the car payments to this cm. Ct provided this cm with last month's bank statements.

## 2024-04-19 NOTE — PROGRESS NOTES
Progress Note - Infectious Disease   Airam Rainey 59 y.o. female MRN: [id number]  Unit/Bed#:  Encounter: [id number]      Impression/Plan:    1. HIV. Doing well on Biktarvy.  Tolerating ART without any side effects.  Viral load is undetectable and CD4 414              -Continue ART regimen of Biktarvy              -Recheck labs in 5 months to assess for virologic control, drug toxicity   -Follow up in 6 months               -Patient was provided medication, adherence and prevention education     2. CKD stage 3. Creatinine stable close to baseline 1.3-1.5              -monitor creatinine               -Continue follow-up with nephrology     3. Tobacco dependence. Reports she has not smoked in 8 months              -Encouraged continued smoking cessation     4. Ischemic Cardiomyopathy. with mural thrombus. She has AICD in place.                -Patient follows up with cardiology     5. Health maintenance. Hepatitis B immune, UTD Hepatitis A vaccination, Tdap, pneumococcal, meningococcal, influenza vaccination.  Received 3 doses of Pfizer COVID-vaccine.    6. Meningioma.  Follows with neurosurgery     My recommendations were discussed with the patient in detail who verbalized understanding.  Patient care coordinated with JOSE ROBERTO Rosas.     Subjective:  Routine follow-up for HIV.  Patient claims 100% adherence with Biktarvy. Patient denies any notable side effects.  Overall she is feeling well.  The patient denies any fever chills or sweats, denies any nausea vomiting or diarrhea, denies any cough or shortness of breath.  She has not smoked cigarettes for 8 months.  The patient was recently seen in the ED for trigger finger and underwent outpatient steroid injection with orthopedic surgery.    ROS:  A complete review of systems is negative other than that noted above in the subjective    Followup portions patient history reviewed and updated as:  Allergies, current medications, past medical history, past social history,  "past surgical history, and the problem list    Objective:  Vitals:  Vitals:    04/19/24 0738   BP: 112/68   BP Location: Right arm   Patient Position: Sitting   Cuff Size: Standard   Pulse: (!) 54   Resp: 17   Temp: (!) 97 °F (36.1 °C)   TempSrc: Tympanic   SpO2: 97%   Weight: 77.4 kg (170 lb 9.6 oz)   Height: 5' 2\" (1.575 m)       Physical Exam:   General Appearance:  Alert, interactive, appearing well,  nontoxic, no acute distress.   Neck:   Supple without lymphadenopathy, no thyromegaly or masses   Throat: Oropharynx moist without lesions.    Lungs:   Clear to auscultation bilaterally; no wheezes, rhonchi or rales; respirations unlabored   Heart:  RRR; no murmur, rub or gallop   Abdomen:   Soft, non-tender, non-distended, positive bowel sounds.     Extremities: No clubbing, cyanosis or edema   Skin: No new rashes or lesions. No draining wounds noted.       Labs, Imaging, & Other studies:   All pertinent labs and imaging studies were personally reviewed    Lab Results   Component Value Date    K 4.1 04/08/2024     04/08/2024    CO2 29 04/08/2024    BUN 20 04/08/2024    CREATININE 1.56 (H) 04/08/2024    GLUF 87 04/08/2024    CALCIUM 9.7 04/08/2024    AST 16 04/08/2024    ALT 12 04/08/2024    ALKPHOS 51 04/08/2024    EGFR 36 04/08/2024     Lab Results   Component Value Date    WBC 6.70 04/08/2024    WBC 6.4 04/08/2024    HGB 12.8 04/08/2024    HGB 11.7 04/08/2024    HCT 40.2 04/08/2024    HCT 35.6 04/08/2024    MCV 92 04/08/2024    MCV 90 04/08/2024     04/08/2024     04/08/2024     Lab Results   Component Value Date    HEPCAB Non-reactive 11/24/2023     Lab Results   Component Value Date    HAV Non-reactive 11/02/2018    HEPCAB Non-reactive 11/24/2023     Lab Results   Component Value Date    RPR Non-Reactive 07/18/2022     CD4 ABS   Date/Time Value Ref Range Status   04/08/2024 02:10  359 - 0539 /uL Final     HIV-1 RNA by PCR, Qn   Date/Time Value Ref Range Status   04/28/2023 01:57 PM " <20 copies/mL Final     Comment:     HIV-1 RNA detected  The reportable range for this assay is 20 to 10,000,000  copies HIV-1 RNA/mL.           Current Outpatient Medications:     Aspirin Low Dose 81 MG EC tablet, TAKE 1 TABLET BY MOUTH DAILY, Disp: 30 tablet, Rfl: 5    atorvastatin (LIPITOR) 80 mg tablet, TAKE 1 TABLET BY MOUTH DAILY, Disp: 30 tablet, Rfl: 5    bictegravir-emtricitab-tenofovir alafenamide (Biktarvy) -25 MG tablet, TAKE 1 TABLET BY MOUTH ONCE DAILY WITH BREAKFAST, Disp: 90 tablet, Rfl: 1    Calcium Carb-Cholecalciferol (calcium carbonate-vitamin D) 500 mg-5 mcg tablet, TAKE 1 TABLET BY MOUTH DAILY WITH BREAKFAST, Disp: 90 tablet, Rfl: 2    carvedilol (COREG) 6.25 mg tablet, TAKE 1 TABLET BY MOUTH TWICE A DAY WITH MEALS, Disp: 60 tablet, Rfl: 5    cholecalciferol (VITAMIN D3) 1,000 units tablet, Take 2,000 Units by mouth daily, Disp: , Rfl:     sacubitril-valsartan (Entresto) 49-51 MG TABS, TAKE 1 TABLET BY MOUTH TWICE A DAY, Disp: 60 tablet, Rfl: 11    warfarin (COUMADIN) 5 mg tablet, TAKE 1 TO 1 AND 1/2 TABLETS BY MOUTH DAILY AS DIRECTED BY MD, Disp: 135 tablet, Rfl: 3    cyanocobalamin (VITAMIN B-12) 100 MCG tablet, Take 100 mcg by mouth daily (Patient not taking: Reported on 4/19/2024), Disp: , Rfl:     nicotine polacrilex (SM Nicotine Polacrilex) 4 MG lozenge, APPLY 1 LOZENGE (4MG) TO MOUTH OR THROAT AS NEEDED FOR SMOKING CESSATION (Patient not taking: Reported on 4/19/2024), Disp: 243 each, Rfl: 2

## 2024-04-22 ENCOUNTER — ANTICOAG VISIT (OUTPATIENT)
Dept: CARDIOLOGY CLINIC | Facility: CLINIC | Age: 59
End: 2024-04-22

## 2024-04-22 ENCOUNTER — APPOINTMENT (OUTPATIENT)
Dept: LAB | Facility: HOSPITAL | Age: 59
End: 2024-04-22
Payer: MEDICARE

## 2024-04-22 DIAGNOSIS — I25.5 ISCHEMIC CARDIOMYOPATHY: Primary | ICD-10-CM

## 2024-04-22 DIAGNOSIS — I23.6 LV (LEFT VENTRICULAR) MURAL THROMBUS FOLLOWING MI (HCC): ICD-10-CM

## 2024-04-22 DIAGNOSIS — Z79.01 LONG TERM CURRENT USE OF ANTICOAGULANT THERAPY: ICD-10-CM

## 2024-04-22 LAB
ALBUMIN SERPL BCP-MCNC: 4 G/DL (ref 3.5–5)
ALP SERPL-CCNC: 47 U/L (ref 34–104)
ALT SERPL W P-5'-P-CCNC: 12 U/L (ref 7–52)
ANION GAP SERPL CALCULATED.3IONS-SCNC: 8 MMOL/L (ref 4–13)
AST SERPL W P-5'-P-CCNC: 18 U/L (ref 13–39)
BACTERIA UR QL AUTO: ABNORMAL /HPF
BASOPHILS # BLD AUTO: 0.03 THOUSANDS/ÂΜL (ref 0–0.1)
BASOPHILS NFR BLD AUTO: 1 % (ref 0–1)
BILIRUB SERPL-MCNC: 0.57 MG/DL (ref 0.2–1)
BILIRUB UR QL STRIP: NEGATIVE
BUN SERPL-MCNC: 18 MG/DL (ref 5–25)
CALCIUM SERPL-MCNC: 9.1 MG/DL (ref 8.4–10.2)
CHLORIDE SERPL-SCNC: 106 MMOL/L (ref 96–108)
CHOLEST SERPL-MCNC: 163 MG/DL
CLARITY UR: ABNORMAL
CO2 SERPL-SCNC: 29 MMOL/L (ref 21–32)
COLOR UR: YELLOW
CREAT SERPL-MCNC: 1.36 MG/DL (ref 0.6–1.3)
EOSINOPHIL # BLD AUTO: 0.08 THOUSAND/ÂΜL (ref 0–0.61)
EOSINOPHIL NFR BLD AUTO: 2 % (ref 0–6)
ERYTHROCYTE [DISTWIDTH] IN BLOOD BY AUTOMATED COUNT: 14.7 % (ref 11.6–15.1)
EST. AVERAGE GLUCOSE BLD GHB EST-MCNC: 123 MG/DL
GFR SERPL CREATININE-BSD FRML MDRD: 42 ML/MIN/1.73SQ M
GLUCOSE SERPL-MCNC: 78 MG/DL (ref 65–140)
GLUCOSE UR STRIP-MCNC: NEGATIVE MG/DL
HBA1C MFR BLD: 5.9 %
HCT VFR BLD AUTO: 40.3 % (ref 34.8–46.1)
HCV AB SER QL: NORMAL
HDLC SERPL-MCNC: 56 MG/DL
HGB BLD-MCNC: 13.1 G/DL (ref 11.5–15.4)
HGB UR QL STRIP.AUTO: 10
IMM GRANULOCYTES # BLD AUTO: 0.01 THOUSAND/UL (ref 0–0.2)
IMM GRANULOCYTES NFR BLD AUTO: 0 % (ref 0–2)
INR PPP: 3.79 (ref 0.84–1.19)
KETONES UR STRIP-MCNC: NEGATIVE MG/DL
LDLC SERPL CALC-MCNC: 45 MG/DL (ref 0–100)
LEUKOCYTE ESTERASE UR QL STRIP: 500
LYMPHOCYTES # BLD AUTO: 2 THOUSANDS/ÂΜL (ref 0.6–4.47)
LYMPHOCYTES NFR BLD AUTO: 42 % (ref 14–44)
MCH RBC QN AUTO: 29.5 PG (ref 26.8–34.3)
MCHC RBC AUTO-ENTMCNC: 32.5 G/DL (ref 31.4–37.4)
MCV RBC AUTO: 91 FL (ref 82–98)
MONOCYTES # BLD AUTO: 0.34 THOUSAND/ÂΜL (ref 0.17–1.22)
MONOCYTES NFR BLD AUTO: 7 % (ref 4–12)
MUCOUS THREADS UR QL AUTO: ABNORMAL
NEUTROPHILS # BLD AUTO: 2.32 THOUSANDS/ÂΜL (ref 1.85–7.62)
NEUTS SEG NFR BLD AUTO: 48 % (ref 43–75)
NITRITE UR QL STRIP: POSITIVE
NON-SQ EPI CELLS URNS QL MICRO: ABNORMAL /HPF
NONHDLC SERPL-MCNC: 107 MG/DL
NRBC BLD AUTO-RTO: 0 /100 WBCS
PH UR STRIP.AUTO: 6 [PH]
PLATELET # BLD AUTO: 181 THOUSANDS/UL (ref 149–390)
PMV BLD AUTO: 11 FL (ref 8.9–12.7)
POTASSIUM SERPL-SCNC: 3.6 MMOL/L (ref 3.5–5.3)
PROT SERPL-MCNC: 7 G/DL (ref 6.4–8.4)
PROT UR STRIP-MCNC: NEGATIVE MG/DL
PROTHROMBIN TIME: 37.6 SECONDS (ref 11.6–14.5)
RBC # BLD AUTO: 4.44 MILLION/UL (ref 3.81–5.12)
RBC #/AREA URNS AUTO: ABNORMAL /HPF
SODIUM SERPL-SCNC: 143 MMOL/L (ref 135–147)
SP GR UR STRIP.AUTO: 1.01 (ref 1–1.04)
TREPONEMA PALLIDUM IGG+IGM AB [PRESENCE] IN SERUM OR PLASMA BY IMMUNOASSAY: NORMAL
TRIGL SERPL-MCNC: 312 MG/DL
UROBILINOGEN UA: NEGATIVE MG/DL
WBC # BLD AUTO: 4.78 THOUSAND/UL (ref 4.31–10.16)
WBC #/AREA URNS AUTO: ABNORMAL /HPF

## 2024-04-22 PROCEDURE — 86480 TB TEST CELL IMMUN MEASURE: CPT

## 2024-04-22 PROCEDURE — 80061 LIPID PANEL: CPT

## 2024-04-22 PROCEDURE — 87491 CHLMYD TRACH DNA AMP PROBE: CPT

## 2024-04-22 PROCEDURE — 86803 HEPATITIS C AB TEST: CPT

## 2024-04-22 PROCEDURE — 81001 URINALYSIS AUTO W/SCOPE: CPT

## 2024-04-22 PROCEDURE — 87536 HIV-1 QUANT&REVRSE TRNSCRPJ: CPT

## 2024-04-22 PROCEDURE — 85610 PROTHROMBIN TIME: CPT

## 2024-04-22 PROCEDURE — 80053 COMPREHEN METABOLIC PANEL: CPT

## 2024-04-22 PROCEDURE — 85025 COMPLETE CBC W/AUTO DIFF WBC: CPT

## 2024-04-22 PROCEDURE — 86361 T CELL ABSOLUTE COUNT: CPT

## 2024-04-22 PROCEDURE — 36415 COLL VENOUS BLD VENIPUNCTURE: CPT

## 2024-04-22 PROCEDURE — 86780 TREPONEMA PALLIDUM: CPT

## 2024-04-22 PROCEDURE — 87591 N.GONORRHOEAE DNA AMP PROB: CPT

## 2024-04-22 PROCEDURE — 83036 HEMOGLOBIN GLYCOSYLATED A1C: CPT

## 2024-04-22 NOTE — PROGRESS NOTES
Spoke with patient, advised INR a little high, no changes, advised to take 2.5 mg tonight only, then go back to 5 mg Mon Wed Fri, 7.5 mg all other days, will recheck 4/8/24

## 2024-04-23 LAB
BASOPHILS # BLD AUTO: 0 X10E3/UL (ref 0–0.2)
BASOPHILS NFR BLD AUTO: 1 %
C TRACH DNA SPEC QL NAA+PROBE: NEGATIVE
CD3+CD4+ CELLS # BLD: 592 /UL (ref 359–1519)
CD3+CD4+ CELLS NFR BLD: 28.2 % (ref 30.8–58.5)
EOSINOPHIL # BLD AUTO: 0.1 X10E3/UL (ref 0–0.4)
EOSINOPHIL NFR BLD AUTO: 2 %
ERYTHROCYTE [DISTWIDTH] IN BLOOD BY AUTOMATED COUNT: 14.4 % (ref 11.7–15.4)
GAMMA INTERFERON BACKGROUND BLD IA-ACNC: 0.02 IU/ML
HCT VFR BLD AUTO: 37 % (ref 34–46.6)
HGB BLD-MCNC: 12.2 G/DL (ref 11.1–15.9)
IMM GRANULOCYTES # BLD: 0 X10E3/UL (ref 0–0.1)
IMM GRANULOCYTES NFR BLD: 0 %
LYMPHOCYTES # BLD AUTO: 2.1 X10E3/UL (ref 0.7–3.1)
LYMPHOCYTES NFR BLD AUTO: 42 %
M TB IFN-G BLD-IMP: NEGATIVE
M TB IFN-G CD4+ BCKGRND COR BLD-ACNC: 0.03 IU/ML
M TB IFN-G CD4+ BCKGRND COR BLD-ACNC: 0.04 IU/ML
MCH RBC QN AUTO: 30.5 PG (ref 26.6–33)
MCHC RBC AUTO-ENTMCNC: 33 G/DL (ref 31.5–35.7)
MCV RBC AUTO: 93 FL (ref 79–97)
MITOGEN IGNF BCKGRD COR BLD-ACNC: 9.98 IU/ML
MONOCYTES # BLD AUTO: 0.4 X10E3/UL (ref 0.1–0.9)
MONOCYTES NFR BLD AUTO: 7 %
N GONORRHOEA DNA SPEC QL NAA+PROBE: NEGATIVE
NEUTROPHILS # BLD AUTO: 2.4 X10E3/UL (ref 1.4–7)
NEUTROPHILS NFR BLD AUTO: 48 %
PLATELET # BLD AUTO: 199 X10E3/UL (ref 150–450)
RBC # BLD AUTO: 4 X10E6/UL (ref 3.77–5.28)
WBC # BLD AUTO: 4.9 X10E3/UL (ref 3.4–10.8)

## 2024-04-24 LAB — HIV1 RNA # PLAS NAA DL=20: NOT DETECTED {COPIES}/ML

## 2024-05-07 ENCOUNTER — PATIENT OUTREACH (OUTPATIENT)
Dept: SURGERY | Facility: CLINIC | Age: 59
End: 2024-05-07

## 2024-05-07 NOTE — PROGRESS NOTES
This cm received a bill from [facility]. This cm contacted ct in regard to bill. Ct confirmed she was seen back in October and November. Ct let this cm know she has another appointment on Thursday, but needed to reschedule. Ct let this cm know she was in [place] visiting her sister for mother's day and her birthday. Ct was made aware this cm would address the bill.    Dental auth request was completed and sent to supervisor, Malgorzata, with Leisa from [facility] cc'd on the e-mail. [facility] was scanned into ct's chart.     Leisa from [facility] aware this cm requested denial letter from [facility]. Leisa aware ct would not be getting the implants back in due to weak bone in her mouth.     Jose was approved and scanned into ct's chart.

## 2024-05-08 ENCOUNTER — PATIENT OUTREACH (OUTPATIENT)
Dept: SURGERY | Facility: CLINIC | Age: 59
End: 2024-05-08

## 2024-05-08 NOTE — PROGRESS NOTES
CM completed appropriate paperwork to address dental bill. Dental bill payment was e-mailed to supervisor, Malgorzata. Requisition check was signed. Bill was scanned into ct's chart.

## 2024-05-13 ENCOUNTER — DOCUMENTATION (OUTPATIENT)
Dept: SURGERY | Facility: CLINIC | Age: 59
End: 2024-05-13

## 2024-05-13 ENCOUNTER — TELEPHONE (OUTPATIENT)
Dept: SURGERY | Facility: CLINIC | Age: 59
End: 2024-05-13

## 2024-05-13 NOTE — PROGRESS NOTES
"[facility] Annual Nutrition Assessment    Airam Rainey is a 59 y.o. female  contacted by RD for completion of annual nutrition assessment     Airam Rainey  is established patient (last annual was 4/7/2023)    PMHx:  HTN, Osteoporosis, CKD-III, hyperlipidemia, anticoagulation therapy       Clinical Data/Client History    CD4 count:  414  Viral load:  <20  ART:   Biktarvy      , Patient Navigator: Trisha BASS   : n/a     Food assistance: SNAP    Living situation: House or apartment  in [place]    Psychosocial factors:  None discussed nor noted     Mobility:  not assessed - telephone encounter     Physical activity: walks sometimes       Oral health concerns:  Has dental appt this week to have dentures fixed       Typical food/beverage intake:  Eats out ~2x/week     Breakfast 2 eggs, crackers or toast   Lunch sandwich (meat/cheese), sometimes with fruits   Dinner i.e. spaghetti, steak or pork chop, vegetable (corn, peas, tomato, cabbage, lettuce)    Snacks chips, [employer]   Beverages water     Appetite:  Recent weight gain due to quitting smoking     OTC vitamin, mineral, herbal supplements: denied     Oral/enteral nutrition supplements:  n/a    GI problems: denied n/v/d/c    Food allergies/intolerances:  NKFA    Weight history:  152# (Oct-23)  166# (Feb-24)  170# (Apr-24)      Current body weight:  From Apr-24:  170# (77.4 kg)  Height:  5' 2\" (1.575 m)   BMI:  31.20  IBW +10%:  121# (55 kg)   %IBW  140%    Weight change: 18# increase over a 6 month period       Nutrition-related labs:    Lab Results   Component Value Date    CHOLESTEROL 163 04/22/2024    CHOLESTEROL 164 11/24/2023    CHOLESTEROL 153 03/31/2023     Lab Results   Component Value Date    HDL 56 04/22/2024    HDL 53 11/24/2023    HDL 61 03/31/2023     Lab Results   Component Value Date    TRIG 312 (H) 04/22/2024    TRIG 172 (H) 11/24/2023    TRIG 93 03/31/2023     Lab Results   Component Value Date    NONHDLC 107 04/22/2024    " NONHDLC 111 11/24/2023    NONHDLC 92 03/31/2023     Lab Results   Component Value Date    HGBA1C 5.9 (H) 04/22/2024    HGBA1C 5.7 (H) 11/24/2023    HGBA1C 5.6 01/30/2023     Lab Results   Component Value Date    GLUF 87 04/08/2024    LDLCALC 45 04/22/2024    CREATININE 1.36 (H) 04/22/2024         Current medications:     Current Outpatient Medications:     Aspirin Low Dose 81 MG EC tablet, TAKE 1 TABLET BY MOUTH DAILY, Disp: 30 tablet, Rfl: 5    atorvastatin (LIPITOR) 80 mg tablet, TAKE 1 TABLET BY MOUTH DAILY, Disp: 30 tablet, Rfl: 5    bictegravir-emtricitab-tenofovir alafenamide (Biktarvy) -25 MG tablet, TAKE 1 TABLET BY MOUTH ONCE DAILY WITH BREAKFAST, Disp: 90 tablet, Rfl: 1    Calcium Carb-Cholecalciferol (calcium carbonate-vitamin D) 500 mg-5 mcg tablet, TAKE 1 TABLET BY MOUTH DAILY WITH BREAKFAST, Disp: 90 tablet, Rfl: 2    carvedilol (COREG) 6.25 mg tablet, TAKE 1 TABLET BY MOUTH TWICE A DAY WITH MEALS, Disp: 60 tablet, Rfl: 5    cholecalciferol (VITAMIN D3) 1,000 units tablet, Take 2,000 Units by mouth daily, Disp: , Rfl:     cyanocobalamin (VITAMIN B-12) 100 MCG tablet, Take 100 mcg by mouth daily (Patient not taking: Reported on 4/19/2024), Disp: , Rfl:     nicotine polacrilex (SM Nicotine Polacrilex) 4 MG lozenge, APPLY 1 LOZENGE (4MG) TO MOUTH OR THROAT AS NEEDED FOR SMOKING CESSATION (Patient not taking: Reported on 4/19/2024), Disp: 243 each, Rfl: 2    sacubitril-valsartan (Entresto) 49-51 MG TABS, TAKE 1 TABLET BY MOUTH TWICE A DAY, Disp: 60 tablet, Rfl: 11    warfarin (COUMADIN) 5 mg tablet, TAKE 1 TO 1 AND 1/2 TABLETS BY MOUTH DAILY AS DIRECTED BY MD, Disp: 135 tablet, Rfl: 3      Physical findings/skin integrity:  not assessed - telephone encounter       Nutrition Diagnosis    Problem: unintended weight gain     Related to: energy intake > energy output over time  stressful life event or living situation     As Evidenced By: diet recall  patient interview  weight gain       Estimated  Nutritional Needs    Kayleigh Hood REE: 1311 kcal    ~4583-1044 kcal (based on: REE x 1.4, -500 kcal; 26 kcal/kg BW, -500 kcal)  ~46 g protein (based on: 0.6 g/kg BW)  ~1925 ml fluid (based on: 35 ml/kg IBW)      Current intake estimation: exceeds needs       Nutrition Intervention/Recommendations    Nutrition education intervention: provided    Nutrition recommendations: TRIG lowering MNT, encouraged continued close f/u with cardiology      Supplement recommendations: No supplement recommendations at this time      Teaching Method: verbal     Person Educated: patient      Comprehension: good    Receptivity: fair    Expected compliance: fair      Collaboration/referral of nutrition care:    Nutrition f/u as pt is receptive when she presents to PCP appt       Goals:    No SMART goals established at present     Monitoring/Evaluation:    BW  Labs (lipid panel, HgbA1C)   Dietary patterns   Exercise     Visit Summary    RD spoke with Airam Rainey over the telephone to complete annual nutrition assessment.      Airam Rainey admits to significant weight gain which she attributes to her quitting smoking.  Positively reinforced smoking cessation, encouraged increased exercise/walking to help manage weight.  RD also advised pt of elevated TRIG, reviewed some MNT recommendations including limiting starchy refined foods and replacing with whole grains, limiting added sugars in the diet, and limiting ETOH; encouraged vegetables.  Encouraged increased walking routine as well, at least 30 minutes 5 days per week.  Reminded pt to stay adherent with cardiology as she is on coumadin therapy, pt agreed.      Pt did not have any questions or nutritional concerns that she wanted to discuss with RD at this time.  Continue to check in pt as she is receptive and presents to PCP appts.      Joi Lucas, MS, RD, LDN

## 2024-05-16 ENCOUNTER — APPOINTMENT (OUTPATIENT)
Dept: LAB | Facility: HOSPITAL | Age: 59
End: 2024-05-16
Payer: MEDICARE

## 2024-05-16 ENCOUNTER — ANTICOAG VISIT (OUTPATIENT)
Dept: CARDIOLOGY CLINIC | Facility: CLINIC | Age: 59
End: 2024-05-16

## 2024-05-16 DIAGNOSIS — Z79.01 LONG TERM CURRENT USE OF ANTICOAGULANT THERAPY: ICD-10-CM

## 2024-05-16 DIAGNOSIS — I25.5 ISCHEMIC CARDIOMYOPATHY: Primary | ICD-10-CM

## 2024-05-16 DIAGNOSIS — I25.5 ISCHEMIC CARDIOMYOPATHY: ICD-10-CM

## 2024-05-16 DIAGNOSIS — I23.6 LV (LEFT VENTRICULAR) MURAL THROMBUS FOLLOWING MI (HCC): ICD-10-CM

## 2024-05-16 DIAGNOSIS — I25.10 TRIPLE VESSEL CORONARY ARTERY DISEASE: ICD-10-CM

## 2024-05-16 LAB
INR PPP: 3.77 (ref 0.84–1.19)
PROTHROMBIN TIME: 37.4 SECONDS (ref 11.6–14.5)

## 2024-05-16 PROCEDURE — 85610 PROTHROMBIN TIME: CPT

## 2024-05-16 PROCEDURE — 36415 COLL VENOUS BLD VENIPUNCTURE: CPT

## 2024-05-16 RX ORDER — CARVEDILOL 6.25 MG/1
TABLET ORAL
Qty: 60 TABLET | Refills: 5 | Status: SHIPPED | OUTPATIENT
Start: 2024-05-16

## 2024-05-16 NOTE — PROGRESS NOTES
Spoke with patient, advised INR still a little elevated, advised to take 7.5 mg Mon Wed Fri, 5 mg all other days, will recheck in 2 weeks 5/30/24

## 2024-06-05 ENCOUNTER — APPOINTMENT (OUTPATIENT)
Dept: LAB | Facility: HOSPITAL | Age: 59
End: 2024-06-05
Payer: MEDICARE

## 2024-06-05 DIAGNOSIS — Z79.01 LONG TERM CURRENT USE OF ANTICOAGULANT THERAPY: ICD-10-CM

## 2024-06-05 LAB
INR PPP: 3.08 (ref 0.84–1.19)
PROTHROMBIN TIME: 32.1 SECONDS (ref 11.6–14.5)

## 2024-06-05 PROCEDURE — 36415 COLL VENOUS BLD VENIPUNCTURE: CPT

## 2024-06-05 PROCEDURE — 85610 PROTHROMBIN TIME: CPT

## 2024-06-06 ENCOUNTER — ANTICOAG VISIT (OUTPATIENT)
Dept: CARDIOLOGY CLINIC | Facility: CLINIC | Age: 59
End: 2024-06-06

## 2024-06-06 DIAGNOSIS — Z79.01 LONG TERM CURRENT USE OF ANTICOAGULANT THERAPY: ICD-10-CM

## 2024-06-06 DIAGNOSIS — I23.6 LV (LEFT VENTRICULAR) MURAL THROMBUS FOLLOWING MI (HCC): ICD-10-CM

## 2024-06-06 DIAGNOSIS — I25.5 ISCHEMIC CARDIOMYOPATHY: Primary | ICD-10-CM

## 2024-06-06 NOTE — PROGRESS NOTES
Spoke with patient, advised INR good, current dose verified. Continue 7.5 mg Mon Wed Fri, 5 mg all other days, will recheck in 3 weeks 6/26/24

## 2024-06-12 DIAGNOSIS — E78.5 HYPERLIPIDEMIA, UNSPECIFIED HYPERLIPIDEMIA TYPE: ICD-10-CM

## 2024-06-12 DIAGNOSIS — I25.2 OLD MI (MYOCARDIAL INFARCTION): ICD-10-CM

## 2024-06-12 RX ORDER — ASPIRIN 81 MG/1
TABLET, COATED ORAL
Qty: 30 TABLET | Refills: 5 | Status: SHIPPED | OUTPATIENT
Start: 2024-06-12

## 2024-06-12 RX ORDER — ATORVASTATIN CALCIUM 80 MG/1
TABLET, FILM COATED ORAL
Qty: 30 TABLET | Refills: 5 | Status: SHIPPED | OUTPATIENT
Start: 2024-06-12

## 2024-06-18 ENCOUNTER — REMOTE DEVICE CLINIC VISIT (OUTPATIENT)
Dept: CARDIOLOGY CLINIC | Facility: CLINIC | Age: 59
End: 2024-06-18
Payer: MEDICARE

## 2024-06-18 DIAGNOSIS — Z95.810 IMPLANTABLE CARDIOVERTER-DEFIBRILLATOR (ICD) IN SITU: Primary | ICD-10-CM

## 2024-06-18 PROCEDURE — 93295 DEV INTERROG REMOTE 1/2/MLT: CPT | Performed by: INTERNAL MEDICINE

## 2024-06-18 PROCEDURE — 93296 REM INTERROG EVL PM/IDS: CPT | Performed by: INTERNAL MEDICINE

## 2024-06-18 NOTE — PROGRESS NOTES
Results for orders placed or performed in visit on 06/18/24   Cardiac EP device report    Narrative    MDT-SINGLE CHAMBER ICD - ACTIVE SYSTEM IS MRI CONDITIONAL  CARELINK TRANSMISSION: BATTERY VOLTAGE ADEQUATE (3.9 YRS).  <0.1% (VVI 40PPM); ALL AVAILABLE LEAD PARAMETERS WITHIN NORMAL LIMITS. 1 VT-NS EPISODE - 6 BEATS @  BPM. EF 42% (4/12/2023 ECHO); PATIENT TAKING ASA 81, WARFARIN, ENTRESTO, CARVEDILOL. OPTI-VOL WITHIN NORMAL LIMITS. NORMAL DEVICE FUNCTION.  ES

## 2024-06-20 ENCOUNTER — PATIENT OUTREACH (OUTPATIENT)
Dept: SURGERY | Facility: CLINIC | Age: 59
End: 2024-06-20

## 2024-06-25 ENCOUNTER — HOSPITAL ENCOUNTER (OUTPATIENT)
Dept: RADIOLOGY | Facility: HOSPITAL | Age: 59
Discharge: HOME | End: 2024-06-25
Payer: MEDICARE

## 2024-06-25 DIAGNOSIS — D32.0 MENINGIOMA, CEREBRAL (HCC): ICD-10-CM

## 2024-06-25 PROCEDURE — 70553 MRI BRAIN STEM W/O & W/DYE: CPT

## 2024-06-25 PROCEDURE — G1004 CDSM NDSC: HCPCS

## 2024-06-25 PROCEDURE — A9585 GADOBUTROL INJECTION: HCPCS | Performed by: NURSE PRACTITIONER

## 2024-06-25 RX ORDER — GADOBUTROL 604.72 MG/ML
7 INJECTION INTRAVENOUS
Status: COMPLETED | OUTPATIENT
Start: 2024-06-25 | End: 2024-06-25

## 2024-06-25 RX ADMIN — GADOBUTROL 7 ML: 604.72 INJECTION INTRAVENOUS at 14:46

## 2024-06-25 NOTE — NURSING NOTE
Patient arrived to MRI suite. Osei PIPER, EP specialist, arrived to interrogate pacer and place it in MRI safe mode. Patient taken to MRI room and placed on table where there she was prepped with cardiac monitoring and SPO2 monitoring by this RN. MRI scan begun with no issues.     Scan done patient tolerated well, no issues noted. Osei PIPER reprogrammed PM to prior settings. Patient left MRI suite with no issues.

## 2024-06-25 NOTE — NURSING NOTE
Device interrogation for MRI.  Normal device function prior to MRI.  Leads and device meet all requirements per policy for MRI.  Device programmed OVO per Cardiology for MRI.  Patient has no complaints.  Vital signs monitored throughout by DIDIER Adamson RN.  Normal device function post MRI.  Device reprogrammed to prior settings per Cardiology.

## 2024-06-27 ENCOUNTER — APPOINTMENT (OUTPATIENT)
Dept: LAB | Facility: HOSPITAL | Age: 59
End: 2024-06-27
Payer: MEDICARE

## 2024-06-27 ENCOUNTER — ANTICOAG VISIT (OUTPATIENT)
Dept: CARDIOLOGY CLINIC | Facility: CLINIC | Age: 59
End: 2024-06-27

## 2024-06-27 DIAGNOSIS — I23.6 LV (LEFT VENTRICULAR) MURAL THROMBUS FOLLOWING MI (HCC): ICD-10-CM

## 2024-06-27 DIAGNOSIS — Z79.01 LONG TERM CURRENT USE OF ANTICOAGULANT THERAPY: ICD-10-CM

## 2024-06-27 DIAGNOSIS — I25.5 ISCHEMIC CARDIOMYOPATHY: Primary | ICD-10-CM

## 2024-06-27 LAB
INR PPP: 4.11 (ref 0.84–1.19)
PROTHROMBIN TIME: 40.1 SECONDS (ref 11.6–14.5)

## 2024-06-27 PROCEDURE — 36415 COLL VENOUS BLD VENIPUNCTURE: CPT

## 2024-06-27 PROCEDURE — 85610 PROTHROMBIN TIME: CPT

## 2024-06-27 NOTE — PROGRESS NOTES
Spoke with patient, advised INR high, no changes, current dose verified. Advised to hold tonight, then go back to 7.5 mg Mon Wed Fri, 5 mg all other days, will recheck in 2 weeks 7/11/24

## 2024-07-01 NOTE — ASSESSMENT & PLAN NOTE
6 month follow up for left frontal mass, likely meningioma.   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   No new complaints   Exam: Nonfocal    Imaging:   MRI brain w/wo 6/25/24:  Stable 1.5 cm meningioma along the right frontal convexity. Persistent localized mass effect without underlying vasogenic edema. Chronic microangiopathic changes and remote infarcts as above. Stable left basal ganglia microhemorrhage    Plan  Reviewed images with patient.  Stable appearance to meningioma.  Discussed natural history of meningiomas. They are generally asymptomatic, benign and slow growing.    Recommend follow up as directed by the NCCN guidelines.   If there is growth or patient were to have symptoms, there are options for management including surgical resection or SRS.   Given stability, recommend follow up in 1 year with MRI brain w/wo to see AP (ICD is MRI conditional)  Review red flag s/s.   Call sooner with any questions or concerns.

## 2024-07-02 ENCOUNTER — OFFICE VISIT (OUTPATIENT)
Dept: NEUROSURGERY | Facility: CLINIC | Age: 59
End: 2024-07-02
Payer: MEDICARE

## 2024-07-02 ENCOUNTER — TELEPHONE (OUTPATIENT)
Dept: NEUROSURGERY | Facility: CLINIC | Age: 59
End: 2024-07-02

## 2024-07-02 VITALS
BODY MASS INDEX: 31.34 KG/M2 | SYSTOLIC BLOOD PRESSURE: 122 MMHG | OXYGEN SATURATION: 95 % | HEIGHT: 62 IN | TEMPERATURE: 97.4 F | WEIGHT: 170.3 LBS | DIASTOLIC BLOOD PRESSURE: 84 MMHG | RESPIRATION RATE: 18 BRPM | HEART RATE: 65 BPM

## 2024-07-02 DIAGNOSIS — D32.0 MENINGIOMA, CEREBRAL (HCC): Primary | ICD-10-CM

## 2024-07-02 PROCEDURE — 99213 OFFICE O/P EST LOW 20 MIN: CPT | Performed by: PHYSICIAN ASSISTANT

## 2024-07-02 NOTE — PROGRESS NOTES
Neurosurgery Office Note  Airam Rainey 59 y.o. female MRN: [id number]      Assessment & Plan     Meningioma, cerebral (HCC)  6 month follow up for left frontal mass, likely meningioma.   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   No new complaints   Exam: Nonfocal    Imaging:   MRI brain w/wo 6/25/24:  Stable 1.5 cm meningioma along the right frontal convexity. Persistent localized mass effect without underlying vasogenic edema. Chronic microangiopathic changes and remote infarcts as above. Stable left basal ganglia microhemorrhage    Plan  Reviewed images with patient.  Stable appearance to meningioma.  Discussed natural history of meningiomas. They are generally asymptomatic, benign and slow growing.    Recommend follow up as directed by the NCCN guidelines.   If there is growth or patient were to have symptoms, there are options for management including surgical resection or SRS.   Given stability, recommend follow up in 1 year with MRI brain w/wo to see AP (ICD is MRI conditional)  Review red flag s/s.   Call sooner with any questions or concerns.      Diagnoses and all orders for this visit:    Meningioma, cerebral (HCC)  -     BUN; Future  -     Creatinine, serum; Future  -     MRI brain w wo contrast; Future          I have spent a total time of 25 minutes on 07/02/24 in caring for this patient including Diagnostic results, Risks and benefits of tx options, Instructions for management, Patient and family education, Impressions, Counseling / Coordination of care, Documenting in the medical record, Reviewing / ordering tests, medicine, procedures  , and Obtaining or reviewing history  .      CHIEF COMPLAINT    Chief Complaint   Patient presents with    Follow-up       HISTORY    History of Present Illness     59 y.o. year old female     59-year-old female seen for 6-month follow-up of a meningioma.  This was incidentially noted on CT orbits in 4/18/23 during workup  for orbital cellulitis.  She is doing well since last seen with no new neurologic complaints. Past medical history of triple-vessel CAD, HIV, CKD stage III, tobacco use disorder, LVH mural thrombosis, AICD/cardiac pacemaker (MRI Conditional).        See Discussion    REVIEW OF SYSTEMS    Review of Systems   Constitutional: Negative.    HENT: Negative.     Eyes: Negative.    Respiratory: Negative.     Cardiovascular: Negative.    Gastrointestinal: Negative.    Endocrine: Negative.    Genitourinary: Negative.    Musculoskeletal:  Negative for gait problem.   Skin: Negative.    Allergic/Immunologic: Negative.    Neurological:  Negative for dizziness, tremors, seizures, speech difficulty, weakness, light-headedness, numbness and headaches.   Hematological:  Bruises/bleeds easily (medication).   Psychiatric/Behavioral: Negative.         ROS obtained by MA. Reviewed. See HPI.     Meds/Allergies     Current Outpatient Medications   Medication Sig Dispense Refill    Aspirin Low Dose 81 MG EC tablet TAKE 1 TABLET BY MOUTH DAILY 30 tablet 5    atorvastatin (LIPITOR) 80 mg tablet TAKE 1 TABLET BY MOUTH DAILY 30 tablet 5    bictegravir-emtricitab-tenofovir alafenamide (Biktarvy) -25 MG tablet TAKE 1 TABLET BY MOUTH ONCE DAILY WITH BREAKFAST 90 tablet 1    Calcium Carb-Cholecalciferol (calcium carbonate-vitamin D) 500 mg-5 mcg tablet TAKE 1 TABLET BY MOUTH DAILY WITH BREAKFAST 90 tablet 2    carvedilol (COREG) 6.25 mg tablet TAKE 1 TABLET BY MOUTH TWICE A DAY WITH MEALS 60 tablet 5    cholecalciferol (VITAMIN D3) 1,000 units tablet Take 2,000 Units by mouth daily      sacubitril-valsartan (Entresto) 49-51 MG TABS TAKE 1 TABLET BY MOUTH TWICE A DAY 60 tablet 11    warfarin (COUMADIN) 5 mg tablet TAKE 1 TO 1 AND 1/2 TABLETS BY MOUTH DAILY AS DIRECTED BY  tablet 3    cyanocobalamin (VITAMIN B-12) 100 MCG tablet Take 100 mcg by mouth daily (Patient not taking: Reported on 4/19/2024)      nicotine polacrilex (SM Nicotine  Polacrilex) 4 MG lozenge APPLY 1 LOZENGE (4MG) TO MOUTH OR THROAT AS NEEDED FOR SMOKING CESSATION (Patient not taking: Reported on 2024) 243 each 2     No current facility-administered medications for this visit.       Allergies   Allergen Reactions    No Active Allergies     No Known Allergies        PAST HISTORY    Past Medical History:   Diagnosis Date    Asthma     Coronary artery disease     defibrillator    HIV positive (HCC)        Past Surgical History:   Procedure Laterality Date    ANGIOPLASTY      CARDIAC DEFIBRILLATOR PLACEMENT      CORONARY ANGIOPLASTY      pci placement    TOTAL ABDOMINAL HYSTERECTOMY      US GUIDED INJECTION FOR RESEARCH STUDY  2018    US GUIDED INJECTION FOR RESEARCH STUDY  2019    US GUIDED INJECTION FOR RESEARCH STUDY  2018    US GUIDED INJECTION FOR RESEARCH STUDY  2018       Social History     Tobacco Use    Smoking status: Former     Current packs/day: 0.00     Average packs/day: 1 pack/day for 39.2 years (39.2 ttl pk-yrs)     Types: Cigarettes     Start date: 1984     Quit date: 2023     Years since quittin.2     Passive exposure: Past    Smokeless tobacco: Never   Vaping Use    Vaping status: Never Used   Substance Use Topics    Alcohol use: Not Currently     Comment: socially    Drug use: No       Family History   Problem Relation Age of Onset    No Known Problems Mother     Other Father         GI bleed    No Known Problems Sister     No Known Problems Sister     No Known Problems Maternal Grandmother     No Known Problems Maternal Grandfather     No Known Problems Paternal Grandmother     No Known Problems Paternal Grandfather     Suicidality Brother     Drug abuse Brother         overdose    No Known Problems Maternal Aunt     No Known Problems Maternal Aunt     No Known Problems Maternal Aunt     No Known Problems Paternal Aunt          Above history personally reviewed.       EXAM    Vitals:Blood pressure 122/84, pulse 65, temperature  "(!) 97.4 °F (36.3 °C), temperature source Temporal, resp. rate 18, height 5' 2\" (1.575 m), weight 77.2 kg (170 lb 4.8 oz), SpO2 95%, not currently breastfeeding.,Body mass index is 31.15 kg/m².     Physical Exam  Vitals reviewed.   Constitutional:       General: She is awake.      Appearance: Normal appearance.   HENT:      Head: Normocephalic and atraumatic.   Eyes:      Extraocular Movements: EOM normal.      Conjunctiva/sclera: Conjunctivae normal.      Pupils: Pupils are equal, round, and reactive to light.   Cardiovascular:      Rate and Rhythm: Normal rate.   Pulmonary:      Effort: Pulmonary effort is normal.   Skin:     General: Skin is warm and dry.   Neurological:      Mental Status: She is alert and oriented to person, place, and time.      Motor: Motor strength is normal.     Gait: Gait is intact.      Deep Tendon Reflexes:      Reflex Scores:       Bicep reflexes are 2+ on the right side and 2+ on the left side.       Brachioradialis reflexes are 2+ on the right side and 2+ on the left side.       Patellar reflexes are 2+ on the right side and 2+ on the left side.  Psychiatric:         Attention and Perception: Attention and perception normal.         Mood and Affect: Mood and affect normal.         Speech: Speech normal.         Behavior: Behavior normal. Behavior is cooperative.         Thought Content: Thought content normal.         Cognition and Memory: Cognition and memory normal.         Judgment: Judgment normal.         Neurologic Exam     Mental Status   Oriented to person, place, and time.   Follows 2 step commands.   Attention: normal. Concentration: normal.   Speech: speech is normal   Level of consciousness: alert  Knowledge: good. Able to perform simple calculations.   Able to name object. Able to repeat. Normal comprehension.     Cranial Nerves     CN III, IV, VI   Pupils are equal, round, and reactive to light.  Extraocular motions are normal.   Right pupil: Shape: regular. Reactivity: " brisk. Consensual response: intact.   Left pupil: Shape: regular. Reactivity: brisk. Consensual response: intact.   CN III: no CN III palsy  CN VI: no CN VI palsy  Nystagmus: none   Ophthalmoparesis: none  Upgaze: normal  Downgaze: normal  Conjugate gaze: present    CN V   Facial sensation intact.     CN VII   Facial expression full, symmetric.     CN VIII   Hearing: intact    CN XI   Right trapezius strength: normal  Left trapezius strength: normal    CN XII   CN XII normal.     Motor Exam   Muscle bulk: normal  Overall muscle tone: normal    Strength   Strength 5/5 throughout.     Sensory Exam   Light touch normal.     Gait, Coordination, and Reflexes     Gait  Gait: normal    Tremor   Resting tremor: absent  Intention tremor: absent  Action tremor: absent    Reflexes   Right brachioradialis: 2+  Left brachioradialis: 2+  Right biceps: 2+  Left biceps: 2+  Right patellar: 2+  Left patellar: 2+  Right Roland: absent  Left Roland: absent        MEDICAL DECISION MAKING    Imaging Studies:     MRI brain w wo contrast    Result Date: 7/1/2024  Narrative: MRI BRAIN WITH AND WITHOUT CONTRAST INDICATION: D32.0: Benign neoplasm of cerebral meninges. COMPARISON: MRI of the brain from 12/19/2023 TECHNIQUE: Multiplanar, multisequence imaging of the brain was performed before and after gadolinium administration. IV Contrast:  7 mL of Gadobutrol injection (SINGLE-DOSE) IMAGE QUALITY:   Diagnostic. FINDINGS: BRAIN PARENCHYMA: Redemonstrated well-circumscribed heterogeneously enhancing extra-axial dural based mass along the left anterior convexity measuring 1.5 x 1.4 x 1.2 cm, unchanged. Stable regional mass effect upon the inferior frontal gyrus however no underlying vasogenic edema. There is no midline shift. There is no acute intracranial hemorrhage. Stable small remote hemorrhage within the left posterior putamen. Diffusion imaging is unremarkable. Remote right basal ganglia and centrum semiovale ovale lacunar infarcts.  Stable FLAIR signal abnormality within the right middle middle frontal gyrus from prior insult. Small scattered hyperintensities on T2/FLAIR imaging are noted in the periventricular and subcortical white matter demonstrating an appearance that is statistically most likely to represent mild microangiopathic change. VENTRICLES:  Normal for the patient's age. SELLA AND PITUITARY GLAND:  Normal. ORBITS:  Normal. PARANASAL SINUSES: Polyps versus retention cysts within the maxillary sinuses. VASCULATURE:  Evaluation of the major intracranial vasculature demonstrates appropriate flow voids. CALVARIUM AND SKULL BASE:  Normal. EXTRACRANIAL SOFT TISSUES:  Normal.     Impression: -Stable 1.5 cm meningioma along the right frontal convexity. Persistent localized mass effect without underlying vasogenic edema. -Chronic microangiopathic changes and remote infarcts as above -Stable left basal ganglia microhemorrhage. Workstation performed: [id number]     Cardiac EP device report    Result Date: 6/18/2024  Narrative: MDT-SINGLE CHAMBER ICD - ACTIVE SYSTEM IS MRI CONDITIONAL CARELINK TRANSMISSION: BATTERY VOLTAGE ADEQUATE (3.9 YRS).  <0.1% (VVI 40PPM); ALL AVAILABLE LEAD PARAMETERS WITHIN NORMAL LIMITS. 1 VT-NS EPISODE - 6 BEATS @  BPM. EF 42% (4/12/2023 ECHO); PATIENT TAKING ASA 81, WARFARIN, ENTRESTO, CARVEDILOL. OPTI-VOL WITHIN NORMAL LIMITS. NORMAL DEVICE FUNCTION.  ES       I have personally reviewed pertinent reports.   and I have personally reviewed pertinent films in PACS

## 2024-07-02 NOTE — TELEPHONE ENCOUNTER
mri brain in 1 year patient was placed on the wait list by [facility] in central schedulingt wait list due to having a pacemaker. Patient was scheduled for a follow up with AP on 7/9/25 she was advised if her Mri is after the date of her follow up she should call to reschedule she stated she understood.

## 2024-07-08 DIAGNOSIS — Z21 HIV POSITIVE (HCC): ICD-10-CM

## 2024-07-08 RX ORDER — BICTEGRAVIR SODIUM, EMTRICITABINE, AND TENOFOVIR ALAFENAMIDE FUMARATE 50; 200; 25 MG/1; MG/1; MG/1
1 TABLET ORAL
Qty: 90 TABLET | Refills: 1 | Status: SHIPPED | OUTPATIENT
Start: 2024-07-08

## 2024-07-09 DIAGNOSIS — T65.222D TOXIC EFFECT OF TOBACCO CIGARETTE, INTENTIONAL SELF-HARM, SUBSEQUENT ENCOUNTER: ICD-10-CM

## 2024-07-16 ENCOUNTER — OFFICE VISIT (OUTPATIENT)
Dept: OBGYN CLINIC | Facility: CLINIC | Age: 59
End: 2024-07-16
Payer: MEDICARE

## 2024-07-16 VITALS
HEIGHT: 62 IN | BODY MASS INDEX: 31.28 KG/M2 | WEIGHT: 170 LBS | DIASTOLIC BLOOD PRESSURE: 72 MMHG | SYSTOLIC BLOOD PRESSURE: 100 MMHG

## 2024-07-16 DIAGNOSIS — M65.311 TRIGGER FINGER OF RIGHT THUMB: Primary | ICD-10-CM

## 2024-07-16 PROCEDURE — 20550 NJX 1 TENDON SHEATH/LIGAMENT: CPT | Performed by: SURGERY

## 2024-07-16 RX ORDER — LIDOCAINE HYDROCHLORIDE 10 MG/ML
2.5 INJECTION, SOLUTION INFILTRATION; PERINEURAL
Status: COMPLETED | OUTPATIENT
Start: 2024-07-16 | End: 2024-07-16

## 2024-07-16 RX ORDER — BETAMETHASONE SODIUM PHOSPHATE AND BETAMETHASONE ACETATE 3; 3 MG/ML; MG/ML
3 INJECTION, SUSPENSION INTRA-ARTICULAR; INTRALESIONAL; INTRAMUSCULAR; SOFT TISSUE
Status: COMPLETED | OUTPATIENT
Start: 2024-07-16 | End: 2024-07-16

## 2024-07-16 RX ADMIN — LIDOCAINE HYDROCHLORIDE 2.5 ML: 10 INJECTION, SOLUTION INFILTRATION; PERINEURAL at 14:00

## 2024-07-16 RX ADMIN — BETAMETHASONE SODIUM PHOSPHATE AND BETAMETHASONE ACETATE 3 MG: 3; 3 INJECTION, SUSPENSION INTRA-ARTICULAR; INTRALESIONAL; INTRAMUSCULAR; SOFT TISSUE at 14:00

## 2024-07-16 NOTE — PROGRESS NOTES
"HPI:  Pt is a 60 yo female with a right trigger thumb.  She received a steroid injection on 4/16/24 which helped for over 2 months.  She notes return of locking and discomfort of late.      PE:  RUE:  tender at thumb A1 pulley level, active locking noted, swelling at A1 pulley level, SILT    A/P:  Pt is a 60 yo female with a right trigger thumb.  -Discussed treatment options.   -Pt would prefer another steroid injection today.  Tolerated well.  -She will call if symptoms return to schedule right trigger thumb release under local.  Will obtained consent the day of surgery.  Must wait until about 3 months from steroid injection before surgery.   -NSAIDs as needed for post injection discomfort.   -F/U PRN.         Hand/upper extremity injection: R thumb A1  Universal Protocol:  Consent: Verbal consent obtained.  Risks and benefits: risks, benefits and alternatives were discussed  Consent given by: patient  Time out: Immediately prior to procedure a \"time out\" was called to verify the correct patient, procedure, equipment, support staff and site/side marked as required.  Timeout called at: 7/16/2024 1:55 PM.  Patient understanding: patient states understanding of the procedure being performed  Site marked: the operative site was marked  Patient identity confirmed: verbally with patient  Supporting Documentation  Indications: pain and tendon swelling   Procedure Details  Condition:trigger finger Location: thumb - R thumb A1   Preparation: Patient was prepped and draped in the usual sterile fashion  Needle size: 22 G  Ultrasound guidance: no  Approach: volar  Medications administered: 2.5 mL lidocaine 1 %; 3 mg betamethasone acetate-betamethasone sodium phosphate 6 (3-3) mg/mL  Patient tolerance: patient tolerated the procedure well with no immediate complications  Dressing:  Sterile dressing applied           "

## 2024-07-19 ENCOUNTER — PATIENT OUTREACH (OUTPATIENT)
Dept: SURGERY | Facility: CLINIC | Age: 59
End: 2024-07-19

## 2024-07-19 NOTE — PROGRESS NOTES
This cm received from ct. Ct let this cm know Jason Riggins Promacho was unable to help her with her teeth and was referred to someone else. Ct let this cm know Jason Rich provided ct with 2 people she could contact. Per ct, she wants to address it when she comes into the office on 7/29/2024. This cm let ct know she has been unable to submit the SPBP application or the MA application due to not having supporting documents. Ct verbalized understanding and let this cm know she would bring her documents in when she came into the clinic for her appointment. Ct let this cm know her MA was previously shut off due to being over income. Ct aware she would assist ct with applying for MAWD. Ct let this cm know she has pay stubs from November. Ct aware the county would not accept the pay stubs.

## 2024-07-25 ENCOUNTER — PATIENT OUTREACH (OUTPATIENT)
Dept: SURGERY | Facility: CLINIC | Age: 59
End: 2024-07-25

## 2024-07-26 ENCOUNTER — APPOINTMENT (EMERGENCY)
Dept: RADIOLOGY | Facility: HOSPITAL | Age: 59
End: 2024-07-26
Payer: MEDICARE

## 2024-07-26 ENCOUNTER — HOSPITAL ENCOUNTER (EMERGENCY)
Facility: HOSPITAL | Age: 59
Discharge: HOME | End: 2024-07-26
Attending: EMERGENCY MEDICINE
Payer: MEDICARE

## 2024-07-26 VITALS
TEMPERATURE: 100.7 F | BODY MASS INDEX: 31.08 KG/M2 | HEIGHT: 62 IN | RESPIRATION RATE: 18 BRPM | WEIGHT: 168.87 LBS | DIASTOLIC BLOOD PRESSURE: 65 MMHG | SYSTOLIC BLOOD PRESSURE: 95 MMHG | OXYGEN SATURATION: 100 % | HEART RATE: 75 BPM

## 2024-07-26 DIAGNOSIS — R68.83 CHILLS: Primary | ICD-10-CM

## 2024-07-26 DIAGNOSIS — R50.9 FEVER: ICD-10-CM

## 2024-07-26 LAB
ALBUMIN SERPL BCG-MCNC: 4.1 G/DL (ref 3.5–5)
ALP SERPL-CCNC: 65 U/L (ref 34–104)
ALT SERPL W P-5'-P-CCNC: 11 U/L (ref 7–52)
ANION GAP SERPL CALCULATED.3IONS-SCNC: 8 MMOL/L (ref 4–13)
AST SERPL W P-5'-P-CCNC: 14 U/L (ref 13–39)
BASOPHILS # BLD AUTO: 0.03 THOUSANDS/ÂΜL (ref 0–0.1)
BASOPHILS NFR BLD AUTO: 1 % (ref 0–1)
BILIRUB SERPL-MCNC: 1.11 MG/DL (ref 0.2–1)
BUN SERPL-MCNC: 17 MG/DL (ref 5–25)
CALCIUM SERPL-MCNC: 9.4 MG/DL (ref 8.4–10.2)
CHLORIDE SERPL-SCNC: 107 MMOL/L (ref 96–108)
CO2 SERPL-SCNC: 23 MMOL/L (ref 21–32)
CREAT SERPL-MCNC: 1.74 MG/DL (ref 0.6–1.3)
EOSINOPHIL # BLD AUTO: 0.01 THOUSAND/ÂΜL (ref 0–0.61)
EOSINOPHIL NFR BLD AUTO: 0 % (ref 0–6)
ERYTHROCYTE [DISTWIDTH] IN BLOOD BY AUTOMATED COUNT: 13.8 % (ref 11.6–15.1)
FLUAV RNA RESP QL NAA+PROBE: NEGATIVE
FLUBV RNA RESP QL NAA+PROBE: NEGATIVE
GFR SERPL CREATININE-BSD FRML MDRD: 31 ML/MIN/1.73SQ M
GLUCOSE SERPL-MCNC: 119 MG/DL (ref 65–140)
HCT VFR BLD AUTO: 42.4 % (ref 34.8–46.1)
HGB BLD-MCNC: 14.3 G/DL (ref 11.5–15.4)
IMM GRANULOCYTES # BLD AUTO: 0.02 THOUSAND/UL (ref 0–0.2)
IMM GRANULOCYTES NFR BLD AUTO: 0 % (ref 0–2)
LYMPHOCYTES # BLD AUTO: 0.82 THOUSANDS/ÂΜL (ref 0.6–4.47)
LYMPHOCYTES NFR BLD AUTO: 12 % (ref 14–44)
MCH RBC QN AUTO: 30.9 PG (ref 26.8–34.3)
MCHC RBC AUTO-ENTMCNC: 33.7 G/DL (ref 31.4–37.4)
MCV RBC AUTO: 92 FL (ref 82–98)
MONOCYTES # BLD AUTO: 0.4 THOUSAND/ÂΜL (ref 0.17–1.22)
MONOCYTES NFR BLD AUTO: 6 % (ref 4–12)
NEUTROPHILS # BLD AUTO: 5.31 THOUSANDS/ÂΜL (ref 1.85–7.62)
NEUTS SEG NFR BLD AUTO: 81 % (ref 43–75)
NRBC BLD AUTO-RTO: 0 /100 WBCS
PLATELET # BLD AUTO: 156 THOUSANDS/UL (ref 149–390)
PMV BLD AUTO: 10.6 FL (ref 8.9–12.7)
POTASSIUM SERPL-SCNC: 4 MMOL/L (ref 3.5–5.3)
PROT SERPL-MCNC: 7 G/DL (ref 6.4–8.4)
RBC # BLD AUTO: 4.63 MILLION/UL (ref 3.81–5.12)
RSV RNA RESP QL NAA+PROBE: NEGATIVE
S PYO DNA THROAT QL NAA+PROBE: NOT DETECTED
SARS-COV-2 RNA RESP QL NAA+PROBE: NEGATIVE
SODIUM SERPL-SCNC: 138 MMOL/L (ref 135–147)
WBC # BLD AUTO: 6.59 THOUSAND/UL (ref 4.31–10.16)

## 2024-07-26 PROCEDURE — 87651 STREP A DNA AMP PROBE: CPT | Performed by: EMERGENCY MEDICINE

## 2024-07-26 PROCEDURE — 96361 HYDRATE IV INFUSION ADD-ON: CPT

## 2024-07-26 PROCEDURE — 36415 COLL VENOUS BLD VENIPUNCTURE: CPT | Performed by: EMERGENCY MEDICINE

## 2024-07-26 PROCEDURE — 80053 COMPREHEN METABOLIC PANEL: CPT | Performed by: EMERGENCY MEDICINE

## 2024-07-26 PROCEDURE — 99283 EMERGENCY DEPT VISIT LOW MDM: CPT

## 2024-07-26 PROCEDURE — 71046 X-RAY EXAM CHEST 2 VIEWS: CPT

## 2024-07-26 PROCEDURE — 99284 EMERGENCY DEPT VISIT MOD MDM: CPT | Performed by: EMERGENCY MEDICINE

## 2024-07-26 PROCEDURE — 0241U HB NFCT DS VIR RESP RNA 4 TRGT: CPT | Performed by: EMERGENCY MEDICINE

## 2024-07-26 PROCEDURE — 96360 HYDRATION IV INFUSION INIT: CPT

## 2024-07-26 PROCEDURE — 85025 COMPLETE CBC W/AUTO DIFF WBC: CPT | Performed by: EMERGENCY MEDICINE

## 2024-07-26 RX ADMIN — SODIUM CHLORIDE 250 ML: 0.9 INJECTION, SOLUTION INTRAVENOUS at 20:39

## 2024-07-26 RX ADMIN — SODIUM CHLORIDE 250 ML: 0.9 INJECTION, SOLUTION INTRAVENOUS at 19:11

## 2024-07-26 NOTE — ED PROVIDER NOTES
Pt Name: Airam Rainey  MRN: [id number]  Birthdate 1965  Age/Sex: 59 y.o. female  Date of evaluation: 7/26/2024  PCP: JOSE ROBERTO Grace    CHIEF COMPLAINT    Chief Complaint   Patient presents with    Chills     Pt reports about 1pm today started with chills, dizzy, HA. Denies other symptoms at this time.          HPI    Airam presents to the Emergency Department complaining of fever and chills.  She is HIV positive and reports compliance with her medication and undetectable viral load.  She had chills and dizziness today.  She denies cough.  No nausea or vomiting.  No diarrhea or abdominal pain.         HPI      Past Medical and Surgical History    Past Medical History:   Diagnosis Date    Asthma     Coronary artery disease     defibrillator    HIV positive (HCC)        Past Surgical History:   Procedure Laterality Date    ANGIOPLASTY      CARDIAC DEFIBRILLATOR PLACEMENT      CORONARY ANGIOPLASTY      pci placement    TOTAL ABDOMINAL HYSTERECTOMY      US GUIDED INJECTION FOR RESEARCH STUDY  5/7/2018    US GUIDED INJECTION FOR RESEARCH STUDY  1/18/2019    US GUIDED INJECTION FOR RESEARCH STUDY  5/7/2018    US GUIDED INJECTION FOR RESEARCH STUDY  5/14/2018       Family History   Problem Relation Age of Onset    No Known Problems Mother     Other Father         GI bleed    No Known Problems Sister     No Known Problems Sister     No Known Problems Maternal Grandmother     No Known Problems Maternal Grandfather     No Known Problems Paternal Grandmother     No Known Problems Paternal Grandfather     Suicidality Brother     Drug abuse Brother         overdose    No Known Problems Maternal Aunt     No Known Problems Maternal Aunt     No Known Problems Maternal Aunt     No Known Problems Paternal Aunt        Social History     Tobacco Use    Smoking status: Former     Current packs/day: 0.00     Average packs/day: 1 pack/day for 39.2 years (39.2 ttl pk-yrs)     Types: Cigarettes     Start date: 1/1/1984      Quit date: 2023     Years since quittin.3     Passive exposure: Past    Smokeless tobacco: Never   Vaping Use    Vaping status: Never Used   Substance Use Topics    Alcohol use: Not Currently     Comment: socially    Drug use: No         .    Allergies    Allergies   Allergen Reactions    No Active Allergies     No Known Allergies        Home Medications    Prior to Admission medications    Medication Sig Start Date End Date Taking? Authorizing Provider   Aspirin Low Dose 81 MG EC tablet TAKE 1 TABLET BY MOUTH DAILY 24   Gibran Dennison MD   atorvastatin (LIPITOR) 80 mg tablet TAKE 1 TABLET BY MOUTH DAILY 24   Gibran Dennison MD   bictegravir-emtricitab-tenofovir alafenamide (Biktarvy) -25 MG tablet TAKE 1 TABLET BY MOUTH ONCE DAILY WITH BREAKFAST 24   JOSE ROBERTO Grace   Calcium Carb-Cholecalciferol (calcium carbonate-vitamin D) 500 mg-5 mcg tablet TAKE 1 TABLET BY MOUTH DAILY WITH BREAKFAST 3/22/24   JOSE ROBERTO Grace   carvedilol (COREG) 6.25 mg tablet TAKE 1 TABLET BY MOUTH TWICE A DAY WITH MEALS 24   Gibran Dennison MD   cholecalciferol (VITAMIN D3) 1,000 units tablet Take 2,000 Units by mouth daily    Historical Provider, MD   cyanocobalamin (VITAMIN B-12) 100 MCG tablet Take 100 mcg by mouth daily  Patient not taking: Reported on 2024    Historical Provider, MD   nicotine polacrilex (SM Nicotine Polacrilex) 4 MG lozenge Apply 1 lozenge (4 mg total) to the mouth or throat as needed for smoking cessation 24   JOSE ROBERTO Grace   sacubitril-valsartan (Entresto) 49-51 MG TABS TAKE 1 TABLET BY MOUTH TWICE A DAY 23   Gibrna Dennison MD   warfarin (COUMADIN) 5 mg tablet TAKE 1 TO 1 AND 1/2 TABLETS BY MOUTH DAILY AS DIRECTED BY MD 23   Gibran Dennison MD           Review of Systems    Review of Systems   Constitutional:  Positive for chills, fatigue and fever.   HENT:  Negative for ear pain and sore throat.    Eyes:  Negative for pain  and visual disturbance.   Respiratory:  Negative for cough and shortness of breath.    Cardiovascular:  Negative for chest pain and palpitations.   Gastrointestinal:  Negative for abdominal pain and vomiting.   Genitourinary:  Negative for dysuria and hematuria.   Musculoskeletal:  Negative for arthralgias and back pain.   Skin:  Negative for color change and rash.   Neurological:  Positive for dizziness. Negative for seizures and syncope.   All other systems reviewed and are negative.      Physical Exam      ED Triage Vitals [07/26/24 1739]   Temperature Pulse Respirations Blood Pressure SpO2   (!) 100.7 °F (38.2 °C) 103 18 109/61 97 %      Temp Source Heart Rate Source Patient Position - Orthostatic VS BP Location FiO2 (%)   Oral Monitor Sitting Right arm --      Pain Score       9               Physical Exam  Vitals and nursing note reviewed.   Constitutional:       General: She is not in acute distress.     Appearance: She is well-developed.   HENT:      Head: Normocephalic and atraumatic.   Eyes:      Conjunctiva/sclera: Conjunctivae normal.   Cardiovascular:      Rate and Rhythm: Normal rate and regular rhythm.      Heart sounds: No murmur heard.  Pulmonary:      Effort: Pulmonary effort is normal. No respiratory distress.      Breath sounds: Normal breath sounds.   Abdominal:      Palpations: Abdomen is soft.      Tenderness: There is no abdominal tenderness.   Musculoskeletal:         General: No swelling.      Cervical back: Neck supple.   Skin:     General: Skin is warm and dry.      Capillary Refill: Capillary refill takes less than 2 seconds.   Neurological:      Mental Status: She is alert.   Psychiatric:         Mood and Affect: Mood normal.         Assessment and Plan    Airam Rainey is a 59 y.o. female who presents with fever and chills. Physical examination unremarkable. Differential diagnosis (not completely inclusive) includes viral vs bacterial causes of infection. Plan will be to perform  diagnostic testing and treat symptomatically.  Of concern was her hypotension but she did feel significantly better after fluids.  She was not interesting in any additional diagnostic testing or treatments.        MDM  Number of Diagnoses or Management Options  Chills  Fever  Diagnosis management comments: Patient was given gentle hydration in the ER given her cardiac history.  She did have low blood pressure while here but after a while was refusing to stay for any additional evaluation or treatment.  She was unable to provide a urine sample but denied urinary symptoms.  She agreed to return the ED with any worsening of condition or development of new symptoms.          Diagnostic Results        Labs:    Results for orders placed or performed during the hospital encounter of 07/26/24   FLU/RSV/COVID - if FLU/RSV clinically relevant    Specimen: Nose; Nares   Result Value Ref Range    SARS-CoV-2 Negative Negative    INFLUENZA A PCR Negative Negative    INFLUENZA B PCR Negative Negative    RSV PCR Negative Negative   Strep A PCR    Specimen: Throat   Result Value Ref Range    STREP A PCR Not Detected Not Detected   CBC and differential   Result Value Ref Range    WBC 6.59 4.31 - 10.16 Thousand/uL    RBC 4.63 3.81 - 5.12 Million/uL    Hemoglobin 14.3 11.5 - 15.4 g/dL    Hematocrit 42.4 34.8 - 46.1 %    MCV 92 82 - 98 fL    MCH 30.9 26.8 - 34.3 pg    MCHC 33.7 31.4 - 37.4 g/dL    RDW 13.8 11.6 - 15.1 %    MPV 10.6 8.9 - 12.7 fL    Platelets 156 149 - 390 Thousands/uL    nRBC 0 /100 WBCs    Segmented % 81 (H) 43 - 75 %    Immature Grans % 0 0 - 2 %    Lymphocytes % 12 (L) 14 - 44 %    Monocytes % 6 4 - 12 %    Eosinophils Relative 0 0 - 6 %    Basophils Relative 1 0 - 1 %    Absolute Neutrophils 5.31 1.85 - 7.62 Thousands/µL    Absolute Immature Grans 0.02 0.00 - 0.20 Thousand/uL    Absolute Lymphocytes 0.82 0.60 - 4.47 Thousands/µL    Absolute Monocytes 0.40 0.17 - 1.22 Thousand/µL    Eosinophils Absolute 0.01 0.00 -  0.61 Thousand/µL    Basophils Absolute 0.03 0.00 - 0.10 Thousands/µL   Comprehensive metabolic panel   Result Value Ref Range    Sodium 138 135 - 147 mmol/L    Potassium 4.0 3.5 - 5.3 mmol/L    Chloride 107 96 - 108 mmol/L    CO2 23 21 - 32 mmol/L    ANION GAP 8 4 - 13 mmol/L    BUN 17 5 - 25 mg/dL    Creatinine 1.74 (H) 0.60 - 1.30 mg/dL    Glucose 119 65 - 140 mg/dL    Calcium 9.4 8.4 - 10.2 mg/dL    AST 14 13 - 39 U/L    ALT 11 7 - 52 U/L    Alkaline Phosphatase 65 34 - 104 U/L    Total Protein 7.0 6.4 - 8.4 g/dL    Albumin 4.1 3.5 - 5.0 g/dL    Total Bilirubin 1.11 (H) 0.20 - 1.00 mg/dL    eGFR 31 ml/min/1.73sq m     *Note: Due to a large number of results and/or encounters for the requested time period, some results have not been displayed. A complete set of results can be found in Results Review.       All labs reviewed and utilized in the medical decision making process    Radiology:    XR chest 2 views   Final Result      Chronic right lower lobe scar with no acute disease.            Workstation performed: [id number]             All radiology studies independently viewed by me and interpreted by the radiologist.    Procedure    Procedures      ED Course of Care and Re-Assessments        Medications   sodium chloride 0.9 % bolus 250 mL (0 mL Intravenous Stopped 7/26/24 2011)   sodium chloride 0.9 % bolus 250 mL (0 mL Intravenous Stopped 7/26/24 2110)           FINAL IMPRESSION    Final diagnoses:   Chills   Fever         DISPOSITION/PLAN    Time reflects when diagnosis was documented in both MDM as applicable and the Disposition within this note       Time User Action Codes Description Comment    7/26/2024  9:33 PM Margarita Stevenson Add [R68.83] Chills     7/26/2024  9:34 PM Margarita Stevenson Add [R50.9] Fever           ED Disposition       ED Disposition   Discharge    Condition   Stable    Date/Time   Fri Jul 26, 2024  9:33 PM    Comment   Airam Rainey discharge to home/self care.                    Follow-up Information       Follow up With Specialties Details Why Contact Info    JOSE ROBERTO Grace Nurse Practitioner Schedule an appointment as soon as possible for a visit   [address] [facility] [address]  [phone]                PATIENT REFERRED TO:    JOSE ROBERTO Grace  [address] [facility] [address]  [phone]    Schedule an appointment as soon as possible for a visit         DISCHARGE MEDICATIONS:    Discharge Medication List as of 7/26/2024  9:34 PM        CONTINUE these medications which have NOT CHANGED    Details   Aspirin Low Dose 81 MG EC tablet TAKE 1 TABLET BY MOUTH DAILY, Normal      atorvastatin (LIPITOR) 80 mg tablet TAKE 1 TABLET BY MOUTH DAILY, Normal      bictegravir-emtricitab-tenofovir alafenamide (Biktarvy) -25 MG tablet TAKE 1 TABLET BY MOUTH ONCE DAILY WITH BREAKFAST, Starting Mon 7/8/2024, Normal      Calcium Carb-Cholecalciferol (calcium carbonate-vitamin D) 500 mg-5 mcg tablet TAKE 1 TABLET BY MOUTH DAILY WITH BREAKFAST, Starting Fri 3/22/2024, Normal      carvedilol (COREG) 6.25 mg tablet TAKE 1 TABLET BY MOUTH TWICE A DAY WITH MEALS, Normal      cholecalciferol (VITAMIN D3) 1,000 units tablet Take 2,000 Units by mouth daily, Historical Med      cyanocobalamin (VITAMIN B-12) 100 MCG tablet Take 100 mcg by mouth daily, Historical Med      nicotine polacrilex (SM Nicotine Polacrilex) 4 MG lozenge Apply 1 lozenge (4 mg total) to the mouth or throat as needed for smoking cessation, Starting Tue 7/9/2024, Normal      sacubitril-valsartan (Entresto) 49-51 MG TABS TAKE 1 TABLET BY MOUTH TWICE A DAY, Normal      warfarin (COUMADIN) 5 mg tablet TAKE 1 TO 1 AND 1/2 TABLETS BY MOUTH DAILY AS DIRECTED BY MD, Normal             No discharge procedures on file.         Margarita Stevenson, DO Margarita Stevenson,   07/27/24 6337

## 2024-07-27 NOTE — ED NOTES
Pt still unable to provide urine sample at this time. Provider aware. Pt given water to drink.      Luann Monroe RN  07/26/24 2025

## 2024-07-27 NOTE — ED NOTES
Pt requesting to leave. Provider made aware and iv line pulled.      Luann Monroe, FELIZ  07/26/24 0688

## 2024-07-29 ENCOUNTER — OFFICE VISIT (OUTPATIENT)
Dept: SURGERY | Facility: CLINIC | Age: 59
End: 2024-07-29
Payer: MEDICARE

## 2024-07-29 ENCOUNTER — PATIENT OUTREACH (OUTPATIENT)
Dept: SURGERY | Facility: CLINIC | Age: 59
End: 2024-07-29

## 2024-07-29 ENCOUNTER — TREATMENT (OUTPATIENT)
Dept: SURGERY | Facility: CLINIC | Age: 59
End: 2024-07-29

## 2024-07-29 ENCOUNTER — VBI (OUTPATIENT)
Dept: ADMINISTRATIVE | Facility: OTHER | Age: 59
End: 2024-07-29

## 2024-07-29 VITALS
BODY MASS INDEX: 31.62 KG/M2 | HEIGHT: 62 IN | HEART RATE: 68 BPM | SYSTOLIC BLOOD PRESSURE: 110 MMHG | TEMPERATURE: 97.7 F | DIASTOLIC BLOOD PRESSURE: 61 MMHG | OXYGEN SATURATION: 99 % | WEIGHT: 171.8 LBS | RESPIRATION RATE: 17 BRPM

## 2024-07-29 DIAGNOSIS — Z23 NEED FOR MENACTRA VACCINATION: ICD-10-CM

## 2024-07-29 DIAGNOSIS — I10 ESSENTIAL HYPERTENSION: ICD-10-CM

## 2024-07-29 DIAGNOSIS — R50.9 FEVER OF UNKNOWN ORIGIN: ICD-10-CM

## 2024-07-29 DIAGNOSIS — N18.31 STAGE 3A CHRONIC KIDNEY DISEASE (HCC): ICD-10-CM

## 2024-07-29 DIAGNOSIS — Z72.0 TOBACCO ABUSE: ICD-10-CM

## 2024-07-29 DIAGNOSIS — R68.83 CHILLS: ICD-10-CM

## 2024-07-29 DIAGNOSIS — F32.A DEPRESSION, UNSPECIFIED DEPRESSION TYPE: Primary | ICD-10-CM

## 2024-07-29 DIAGNOSIS — B20 HIV DISEASE (HCC): Primary | ICD-10-CM

## 2024-07-29 DIAGNOSIS — R17 SERUM TOTAL BILIRUBIN ELEVATED: ICD-10-CM

## 2024-07-29 DIAGNOSIS — I23.6 LV (LEFT VENTRICULAR) MURAL THROMBUS FOLLOWING MI (HCC): ICD-10-CM

## 2024-07-29 DIAGNOSIS — I25.5 ISCHEMIC CARDIOMYOPATHY: ICD-10-CM

## 2024-07-29 DIAGNOSIS — E78.2 MIXED HYPERLIPIDEMIA: ICD-10-CM

## 2024-07-29 PROBLEM — R63.4 UNINTENTIONAL WEIGHT LOSS: Status: RESOLVED | Noted: 2022-07-15 | Resolved: 2024-07-29

## 2024-07-29 LAB
BACTERIA UR QL AUTO: ABNORMAL /HPF
BILIRUB UR QL STRIP: NEGATIVE
CAOX CRY URNS QL MICRO: ABNORMAL /HPF
CLARITY UR: ABNORMAL
COLOR UR: YELLOW
GLUCOSE UR STRIP-MCNC: NEGATIVE MG/DL
HGB UR QL STRIP.AUTO: NEGATIVE
HYALINE CASTS #/AREA URNS LPF: ABNORMAL /LPF
KETONES UR STRIP-MCNC: NEGATIVE MG/DL
LEUKOCYTE ESTERASE UR QL STRIP: ABNORMAL
MUCOUS THREADS UR QL AUTO: ABNORMAL
NITRITE UR QL STRIP: NEGATIVE
NON-SQ EPI CELLS URNS QL MICRO: ABNORMAL /HPF
PH UR STRIP.AUTO: 5.5 [PH]
PROT UR STRIP-MCNC: NEGATIVE MG/DL
RBC #/AREA URNS AUTO: ABNORMAL /HPF
SP GR UR STRIP.AUTO: 1.01 (ref 1–1.03)
UROBILINOGEN UR STRIP-ACNC: <2 MG/DL
WBC #/AREA URNS AUTO: ABNORMAL /HPF

## 2024-07-29 PROCEDURE — 90619 MENACWY-TT VACCINE IM: CPT

## 2024-07-29 PROCEDURE — 90471 IMMUNIZATION ADMIN: CPT

## 2024-07-29 PROCEDURE — 81001 URINALYSIS AUTO W/SCOPE: CPT | Performed by: NURSE PRACTITIONER

## 2024-07-29 PROCEDURE — 99214 OFFICE O/P EST MOD 30 MIN: CPT | Performed by: NURSE PRACTITIONER

## 2024-07-29 NOTE — TELEPHONE ENCOUNTER
07/29/24 12:07 PM    Patient contacted post ED visit, first outreach attempt made. Message was left for patient to return a call to the [facility] Department at [facility]: Phone [phone].    Thank you.  Kylee Zamora  [facility]

## 2024-07-29 NOTE — PROGRESS NOTES
Assessment/Plan: Pt was approached by S after completing session with assigned PCP to explore multidimensional stability by completing the PHQ-9 screening. During today's contact, pt disclosed being stable with no pressing issues to address. It was explored pt's emotional, cognitive, and behavioral management regarding her reactivity within disclosed family loss which pt described being under control within the acceptance stage of grief.     Pt's emotions and cognitions were validated throughout contact, along with receiving positive reinforcements for her awareness, and willingness to address her current multidimensional's stability. A brief psycho-educational conversation was developed regarding the importance of self-evaluation by focusing on emotional, cognitive, and behavioral regulations. Before session's completion, the depression screening was assessed, scoring (PHQ-9=0) as a display of minimal depressive without SI or HI. At the end of contact, pt was encouraged to consider ongoing  interventions which pt agreed to benefit from services as needed.     [facility]     Today patient present with No chief complaint on file.    Patient would likely benefit from: Addressing emotional, cognitive, and behavioral regulatory applications.   Consider/focus/continue: Monitoring pt's emotional, cognitive, and behavioral health's stability.   Stage of change: Maintenance  Plan/ Behavioral Recommendations: Ongoing  interventions per pt's coordinated care, and/or pt's request of services.       There are no diagnoses linked to this encounter.      Subjective:     Patient ID: Airam Rainey is a 59 y.o. female.    HPI    History of Present Illness:      Patient is being seen for annual behavioral health assessment.   Patient denies current behavioral health concerns.    [unfilled]       Review of Systems      Objective:     Physical Exam      [facility]    Orientation     Person: yes    Place:  yes    Time: yes    Appearance    Well Developed: yes healthy    Uncomfortable: no    Normal Body Odor: yes    Smells of Feces: no    Smells of Urine: no    Disheveled: no    Well Nourished: yes overweight    Grooming Unkempt: no    Poor Eye Contact: no    Hirsute: no    Looks Tired: no    Acutely Exhausted: noappearance reflects stated age    Mood and Affect:     Appropriate: yes    Euthymic: yes    Irritable: no    Angry: no    Anxious: no    Depressed:no    Blunted:no    Labile: no    Restricted: no    Harm to Self or Others: None reported by pt.     Substance Abuse: Tobacco Use Disorder, in Full Remission.

## 2024-07-29 NOTE — PATIENT INSTRUCTIONS
"  Patient Education     Heart failure   The Basics   Written by the doctors and editors at [facility]   What is heart failure? -- Heart failure is a condition in which the heart does not pump well. This causes the heart to lag behind in its job of moving blood throughout the body. As a result, fluid backs up in the body, and the organs in the body do not get as much blood as they need. This can lead to symptoms, such as swelling, trouble breathing, and feeling tired.  If you have heart failure, your heart has not actually \"failed\" or stopped beating. It just isn't working as well as it should.  What are the symptoms of heart failure? -- If your heart does not pump well, you might have no symptoms at first. But as the condition gets worse, it can cause:   Tiredness or weakness, or make you feel lightheaded or dizzy   Trouble breathing, which might lead you to be less active or to need extra pillows at night to sleep   A racing heartbeat, even while resting   Swelling in your feet, ankles, and legs (figure 1) or in your belly  Is there a test for heart failure? -- Yes. If your doctor or nurse thinks that you might have heart failure, they will do an exam, and might order some of the following tests:   Electrocardiogram (\"ECG\") - This test measures the electrical activity in your heart. It can show whether you have an abnormal heartbeat or had a heart attack in the past. These are some of the things that can cause heart failure.   A blood test known as \"brain natriuretic peptide\" (\"BNP\") or \"N-terminal pro-BNP\" (\"NT-proBNP\") - The BNP or NT-proBNP level is high in people with heart failure.   Chest X-ray - A chest X-ray shows if there is fluid in the lungs. It also shows the general shape of the heart and large blood vessels in the chest.   Echocardiogram - This test uses sound waves to create a picture of the heart as it beats. It shows the size of the heart chambers, how well the heart is pumping, and how well the " "heart valves are working.   Stress test - During a stress test, you might be asked to run or walk on a treadmill while you have an ECG or other heart tests. Physical activity makes the heart pump harder and increases the heart's need for blood. This test helps doctors see if the heart is getting enough blood when it is under stress. If you cannot walk or run, you might instead get a medicine to stress your heart.   Cardiac catheterization (also called \"cardiac cath\") - During this test, the doctor puts a thin tube into a blood vessel in your neck, leg, or arm. Then, they move the tube up to your heart. When the tube is in your heart or blood vessels, they take measurements. The doctor might also put a dye that shows up on an X-ray into the tube. This can show if any arteries in your heart are narrowed or blocked. This part of the test is called \"coronary angiography.\"  What can I do on my own to protect my heart? -- If you do the following things, you will feel better and reduce the chances that you will need to go to the hospital:   Take your medicines, even if you feel well - The medicines your doctor prescribes can help you feel better and live longer. But they will work only if you take them as your doctor tells you to.   Watch for changes in your symptoms, and follow an action plan - An action plan is a list of instructions on what to do if your symptoms change. To use an action plan, you must watch your symptoms closely and weigh yourself every day (see next bullet). If your symptoms get worse or if you gain weight suddenly, you must take action (figure 2 and figure 3). Keep your action plan somewhere handy, such as on your refrigerator, so that you can always check it to see what you should do.   Call your doctor or nurse if you gain weight suddenly - Weigh yourself every morning after you urinate but before you eat breakfast. Wear roughly the same amount of clothing every time. And write down your weight " "every day on a calendar. Call your doctor or nurse if your weight goes up by 2 or more pounds (1 kilogram) in 1 day, or 4 or more pounds (2 kilograms) in 1 week. When you have heart failure, sudden weight gain is a sign that your body could be holding on to too much fluid. You might need a change in your medicines.   Eat less salt - Try not to add salt at the table or when you cook. Also, avoid foods that come in boxes and cans, unless their labels say that they are low in sodium. The best choices for food are fresh or fresh frozen foods, and foods you prepare yourself (table 1). Ask your doctor how much salt you should have. Your doctor might also tell you to limit the amount of fluids you drink.   Lose weight, if you are overweight - If you are overweight, your heart has to work extra hard to keep up with your body's needs.   Stop smoking - Smoking worsens heart failure and increases the chance that you will have a heart attack or die.   Avoid alcohol - If you already have heart failure, alcohol is not safe for your heart or good for your health.   Be active - Ask your doctor what activities are safe for you. Your doctor will let you know if activities such as walking or biking on most days of the week can help reduce your symptoms. But do not exercise if your symptoms are bothering you a lot.   Check with your doctor before taking any new medicines or supplements - Some over-the-counter and prescription medicines, \"natural\" remedies, and supplements are not good for people with heart failure. For example, medicines such as ibuprofen (sample brand names: Advil, Motrin) and naproxen (sample brand name: Aleve) can make heart failure worse.  How is heart failure treated? -- There are many treatments for heart failure, but medicines are a key part of controlling the condition.   Take your medicines every day as directed. They can reduce the chances that you will need to go to the hospital, have a heart attack, or die. " "They can also reduce or get rid of your symptoms. That's why they are so important.   Tell your doctor if you can't afford your medicines. They might have ways to reduce the cost of your medicines.   Tell your doctor if your medicines cause side effects or other problems. They might be able to switch to another medicine or lower your dose so that you do not have that problem.  Other treatments for heart failure include devices to help the heart pump with more force or to beat at the right rhythm, and surgery to improve blood flow to the heart or replace the heart.  All topics are updated as new evidence becomes available and our peer review process is complete.  This topic retrieved from [employer] on: Feb 28, 2024.  Topic 74148 Version 24.0  Release: 32.2.4 - C32.58  © 2024 UpToDate, Inc. and/or its affiliates. All rights reserved.  figure 1: Pitting edema     To check for swelling, a doctor or nurse can press on the skin and then remove their finger. If the indent stays there, the person has swelling. Doctors call this \"pitting edema.\"  Graphic 87831 Version 11.0  figure 2: Heart failure action plan - Page 1     Graphic 82931 Version 4.0  figure 3: Heart failure action plan - Page 2     Graphic 79242 Version 6.0  table 1: Ways to cut down on salt (sodium)  Avoid these foods  Try these foods instead    Cured and smoked foods like le, sausage, smoked fish and meats, hot dogs, ham, lunch meats, corned beef, and pickles Fresh turkey, chicken, and lean beef   Canned fish (tuna, sardines) Unsalted tuna or sardines   Canned meats Fresh unprocessed meats, vegetable protein, and fish  Frozen and canned meats, vegetable protein, and fish that are labeled \"low-sodium\"   Salted pretzels, crackers, potato chips, tortilla chips, and nuts Low-sodium and unsalted versions of these foods   Most cheeses Low-sodium cheeses (check label for actual sodium content)   Sauces (tomato and cream, etc), tomato juices Low-sodium versions of " "these foods, such as low-sodium tomato juice   Processed, instant, and convenience foods like frozen dinners, packaged meals, canned soups, and boxed pasta blends Cook and freeze your own low-sodium meals, soups, and broths    If you do need to use convenience or processed foods, read the labels. Choose items with 140 to 200 mg of sodium per serving.    For an entire convenience meal (frozen dinner), try to find options with less than 500 to 600 mg of sodium.    If you used canned foods, look for those labeled \"sodium-free.\" Or you can rinse the canned food under water to lower the sodium content.   Fast foods and foods prepared at restaurants (unless without cheese, sauces, or added salt) Fresh foods and foods with sauces on the side  Request that food be prepared without cheese or added salt   Graphic 38601 Version 10.0  Consumer Information Use and Disclaimer   Disclaimer: This generalized information is a limited summary of diagnosis, treatment, and/or medication information. It is not meant to be comprehensive and should be used as a tool to help the user understand and/or assess potential diagnostic and treatment options. It does NOT include all information about conditions, treatments, medications, side effects, or risks that may apply to a specific patient. It is not intended to be medical advice or a substitute for the medical advice, diagnosis, or treatment of a health care provider based on the health care provider's examination and assessment of a patient's specific and unique circumstances. Patients must speak with a health care provider for complete information about their health, medical questions, and treatment options, including any risks or benefits regarding use of medications. This information does not endorse any treatments or medications as safe, effective, or approved for treating a specific patient. UpToDate, Inc. and its affiliates disclaim any warranty or liability relating to this " information or the use thereof.The use of this information is governed by the Terms of Use, available at https://www.wolterskluwer.com/en/know/clinical-effectiveness-terms. 2024© UpToDate, Inc. and its affiliates and/or licensors. All rights reserved.  Copyright   © 2024 [employer] and/or its affiliates. All rights reserved.

## 2024-07-29 NOTE — PROGRESS NOTES
Ambulatory Visit  Name: Airam Rainey      : 1965      MRN: [id number]  Encounter Provider: JOSE ROBERTO Grace  Encounter Date: 2024   Encounter department: [facility]    Assessment & Plan   1. HIV disease (HCC)  Assessment & Plan:  Doing well on Biktarvy with an undetectable VL.  Pt reports 100% medication compliance on HAART.  Stressed the importance of 100% medication adherence  Monitor CD4. HIV RNA, CMP, and CBCD for virologic response and drug toxicities  Follow up with Dr. Kaplan or Dr. Cortez   HIV Counseling:    Viral Load: not detected     CD4 Count: 592      Denies side effects.  Stressed the importance of adherence.  Continue follow up in the ID clinic with Dr. Kaplan or Dr. Cortez.    Reviewed the most recent labs, including the CD4 and viral load.  Discussed the risks and benefits of treatment options, instructions for management, importance of treatment adherence, and reduction of risk factors.  Educated on possible medication side effects.    Counseled on routes of HIV transmission, including the risk of  infection.  Emphasized that viral suppression is the best method to prevent HIV transmission.  At this time the pt denies the need for HIV testing of anyone in their life.    Total encounter time was greater than 35 minutes.  Greater than 20 minutes were spent on counseling and patient education.  Pt voices understanding and agreement with treatment plan.      Orders:  -     MENINGOCOCCAL ACYW-135 TT CONJUGATE  2. Need for Menactra vaccination  -     MENINGOCOCCAL ACYW-135 TT CONJUGATE  3. Stage 3a chronic kidney disease (HCC)  Assessment & Plan:  Lab Results   Component Value Date    EGFR 31 2024    EGFR 42 2024    EGFR 36 2024    CREATININE 1.74 (H) 2024    CREATININE 1.36 (H) 2024    CREATININE 1.56 (H) 2024   Above baseline on last lab  (1.35-1.56).  Does follow with Nephrology.  Unclear etiology.  Denies being sick at that time.  Stressed the importance of avoiding all NSAIDS  Orders:  -     Basic metabolic panel; Future  4. Essential hypertension  Assessment & Plan:  BP was low during ER visit 90's.  She usually runs in the 120's.  Unclear if related to viral infection.  Stressed the importance of calling Cardiology for recommendations  5. Fever of unknown origin  -     UA (URINE) with reflex to Scope; Future  -     UA (URINE) with reflex to Scope  6. Chills  -     UA (URINE) with reflex to Scope; Future  -     UA (URINE) with reflex to Scope  7. Serum total bilirubin elevated  -     Bilirubin, direct; Future  8. Tobacco abuse  Assessment & Plan:  Remains in remission.   Continue to stress the importance of remaining smoke free  Nicotine Dependency - Primary    Counseled for greater than 15 minutes on the importance of smoking cessation.  Education was given regarding the cardiovascular effects of how nicotine affects the heart, lungs, kidneys, and peripheral vascular system.  Referred to [facility] for enrollment in smoking cessation program.     9. Ischemic cardiomyopathy  Assessment & Plan:  Feeling good today.  Reports taking Coreg and Oswaldo every day  Continue above meds  Follow up with Cardiology  Monitor weight, given heart failure educational materials to avoid adverse events  10. 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 (HCC)  Assessment & Plan:  On Coumadin for life.   Follow with Cardiology for anticoagulation   11. Mixed hyperlipidemia  Assessment & Plan:  LDL at goal.  Continue with statin      History of Present Illness     Airam Rainey is a 59 y.o. female who presents for 6 month follow up visit and for management of HIV. Airam was recently seen in [facility] ER for chills, dizziness, and HA on 7/26/24.  Covid and Strep A were negative. She then left before any other workup could be done. She received gently IV hydration  "given her cardiac hx.     Airam reports she is feeling better.  All her symptoms have resolved from her ER visit. BP back to normal. She does not exercise.      She does not endorse any fever, chills, nausea, vomiting, cough, SOB, diarrhea, or night sweats.      Review of Systems   Constitutional: Negative.  Negative for activity change, appetite change, fatigue, fever and unexpected weight change.   HENT: Negative.     Eyes: Negative.    Respiratory: Negative.  Negative for cough, chest tightness and shortness of breath.    Cardiovascular: Negative.  Negative for chest pain, palpitations and leg swelling.   Gastrointestinal: Negative.  Negative for abdominal distention, constipation and diarrhea.   Endocrine: Negative.    Genitourinary: Negative.  Negative for difficulty urinating, dysuria and frequency.   Musculoskeletal: Negative.  Negative for arthralgias, joint swelling and myalgias.   Skin: Negative.    Allergic/Immunologic: Negative.    Neurological: Negative.  Negative for dizziness, syncope, light-headedness and headaches.   Hematological: Negative.    Psychiatric/Behavioral: Negative.  Negative for dysphoric mood and sleep disturbance.        Objective     /61 (BP Location: Right arm, Patient Position: Sitting, Cuff Size: Standard)   Pulse 68   Temp 97.7 °F (36.5 °C) (Tympanic)   Resp 17   Ht 5' 2\" (1.575 m)   Wt 77.9 kg (171 lb 12.8 oz)   SpO2 99%   BMI 31.42 kg/m²     Physical Exam  Constitutional:       General: She is not in acute distress.     Appearance: Normal appearance. She is normal weight. She is not ill-appearing.   HENT:      Head: Normocephalic and atraumatic.      Right Ear: External ear normal.      Left Ear: External ear normal.      Nose: Nose normal.      Mouth/Throat:      Mouth: Mucous membranes are moist.      Pharynx: Oropharynx is clear.   Eyes:      Conjunctiva/sclera: Conjunctivae normal.   Cardiovascular:      Rate and Rhythm: Normal rate and regular rhythm.      " Chest Wall: PMI is not displaced.      Pulses: Normal pulses.      Heart sounds: Normal heart sounds. No murmur heard.     No friction rub.   Pulmonary:      Effort: Pulmonary effort is normal. No respiratory distress.      Breath sounds: Normal breath sounds. No wheezing.   Abdominal:      General: Bowel sounds are normal. There is no distension.      Palpations: Abdomen is soft. There is no mass.      Tenderness: There is no abdominal tenderness.   Musculoskeletal:         General: No swelling or tenderness. Normal range of motion.      Cervical back: Normal range of motion and neck supple. No rigidity.      Right lower leg: Edema (trace) present.      Left lower leg: Edema (trace) present.   Skin:     General: Skin is warm and dry.      Capillary Refill: Capillary refill takes less than 2 seconds.   Neurological:      General: No focal deficit present.      Mental Status: She is alert and oriented to person, place, and time. Mental status is at baseline.      Motor: No weakness.      Gait: Gait normal.   Psychiatric:         Mood and Affect: Mood normal.         Behavior: Behavior normal.         Thought Content: Thought content normal.         Judgment: Judgment normal.       Administrative Statements         BMI Counseling: Body mass index is 31.42 kg/m². The BMI is above normal. Nutrition recommendations include reducing portion sizes, decreasing overall calorie intake, 3-5 servings of fruits/vegetables daily, reducing fast food intake, consuming healthier snacks, decreasing soda and/or juice intake, moderation in carbohydrate intake, increasing intake of lean protein, reducing intake of saturated fat and trans fat, and reducing intake of cholesterol. Exercise recommendations include exercising 3-5 times per week and strength training exercises.

## 2024-07-29 NOTE — ASSESSMENT & PLAN NOTE
Doing well on Biktarvy with an undetectable VL.  Pt reports 100% medication compliance on HAART.  Stressed the importance of 100% medication adherence  Monitor CD4. HIV RNA, CMP, and CBCD for virologic response and drug toxicities  Follow up with Dr. Kaplan or Dr. Cortez   HIV Counseling:    Viral Load: not detected     CD4 Count: 592      Denies side effects.  Stressed the importance of adherence.  Continue follow up in the ID clinic with Dr. Kaplan or Dr. Cortez.    Reviewed the most recent labs, including the CD4 and viral load.  Discussed the risks and benefits of treatment options, instructions for management, importance of treatment adherence, and reduction of risk factors.  Educated on possible medication side effects.    Counseled on routes of HIV transmission, including the risk of  infection.  Emphasized that viral suppression is the best method to prevent HIV transmission.  At this time the pt denies the need for HIV testing of anyone in their life.    Total encounter time was greater than 35 minutes.  Greater than 20 minutes were spent on counseling and patient education.  Pt voices understanding and agreement with treatment plan.

## 2024-07-29 NOTE — ASSESSMENT & PLAN NOTE
Remains in remission.   Continue to stress the importance of remaining smoke free  Nicotine Dependency - Primary    Counseled for greater than 15 minutes on the importance of smoking cessation.  Education was given regarding the cardiovascular effects of how nicotine affects the heart, lungs, kidneys, and peripheral vascular system.  Referred to [facility] for enrollment in smoking cessation program.

## 2024-07-29 NOTE — ASSESSMENT & PLAN NOTE
Feeling good today.  Reports taking Coreg and Oswaldo every day  Continue above meds  Follow up with Cardiology  Monitor weight, given heart failure educational materials to avoid adverse events

## 2024-07-29 NOTE — PROGRESS NOTES
MAWD application was completed. Unable to submit application at this time. Ct aware this cm needed another pay stub to submit the application.    [employer] application was completed. Unable to submit application at this time due to needing another pay stub. Ct made aware.     KELLY signed by ct for [facility] Assistance Office and scanned into ct's chart.

## 2024-07-29 NOTE — PROGRESS NOTES
Ct agreeable to completing Epic assessment with this cm. Ct reports complete understanding of HIV disease process, transmission, and medications. Ct reports she is able to meet own basic needs and is able to access community assistance as needed. Ct reports consistent and reliable access to transportation. Ct reports medical care coverage/health insurance. Ct reports needing occasional assistance with follow up and completing forms. Ct reports stable housing. Ct reports not being sexually active in the last 3 months. Ct reports no difficulties with substance use. Ct reports being aware of dental services and needs occasional assistance accessing dental services. Ct reports past mental health concerns/or reports current difficulties or stress; functioning and/or already engaged in services. Ct reports steady source of income. Ct reports no language/cultural barriers.      Acuity scale was completed and scanned into ct's chart.     RW part B form was completed and scanned into ct's chart with supporting documents.

## 2024-07-29 NOTE — ASSESSMENT & PLAN NOTE
Lab Results   Component Value Date    EGFR 31 07/26/2024    EGFR 42 04/22/2024    EGFR 36 04/08/2024    CREATININE 1.74 (H) 07/26/2024    CREATININE 1.36 (H) 04/22/2024    CREATININE 1.56 (H) 04/08/2024   Above baseline on last lab (1.35-1.56).  Does follow with Nephrology.  Unclear etiology.  Denies being sick at that time.  Stressed the importance of avoiding all NSAIDS

## 2024-07-29 NOTE — ASSESSMENT & PLAN NOTE
BP was low during ER visit 90's.  She usually runs in the 120's.  Unclear if related to viral infection.  Stressed the importance of calling Cardiology for recommendations

## 2024-07-30 ENCOUNTER — PATIENT OUTREACH (OUTPATIENT)
Dept: SURGERY | Facility: CLINIC | Age: 59
End: 2024-07-30

## 2024-07-30 NOTE — TELEPHONE ENCOUNTER
07/30/24 2:07 PM    Patient contacted post ED visit, [facility] department spoke with patient/caregiver and outreach was successful.    Thank you.  Kylee Zamora  [facility]

## 2024-07-30 NOTE — PROGRESS NOTES
MAWD application was completed and sent to [facility] with supporting documents. Application was scanned into ct's chart.     KELLY Signed by ct was sent to [facility].

## 2024-07-30 NOTE — PROGRESS NOTES
SPBP application was completed, faxed to [facility], and scanned into ct's chart with fax confirmation sheet.

## 2024-07-30 NOTE — PROGRESS NOTES
This cm contacted [facility] with ct present. Ct has an appointment scheduled with ct on 8/12/2024 at 4 pm. Prosthodontics office let this cm know the comprehensive exam would be $110.00, which was due upon exam completion. Prosthodontics office unable to send this cm a bill for comprehensive exam. Prosthodontics aware this cm would speak to her supervisor in regards to payment. Prosthodontics aware ct previously had screws on her lower gums and had them removed. Prosthodontics aware the [facility] was unable to put the screws back in due to ct having weak bones. Prosthodontics aware [facility] referred ct to see a prosthodontics. Ct is aware of the appointment and aware to arrive early to complete paperwork.     KELLY signed by ct was completed and scanned into ct's chart.     Ct discussion took place with supervisor, Malgorzata in regards to conversation with [facility].

## 2024-07-30 NOTE — TELEPHONE ENCOUNTER
07/30/24 2:06 PM    Patient contacted post ED visit, outreach attempt made but message could not be left. Additional outreach attempt will be made.     Thank you.  Kylee Zamora  [facility]

## 2024-07-31 ENCOUNTER — APPOINTMENT (OUTPATIENT)
Dept: LAB | Facility: HOSPITAL | Age: 59
End: 2024-07-31
Payer: MEDICARE

## 2024-07-31 ENCOUNTER — ANTICOAG VISIT (OUTPATIENT)
Dept: CARDIOLOGY CLINIC | Facility: CLINIC | Age: 59
End: 2024-07-31

## 2024-07-31 DIAGNOSIS — Z79.01 LONG TERM CURRENT USE OF ANTICOAGULANT THERAPY: ICD-10-CM

## 2024-07-31 DIAGNOSIS — N18.31 STAGE 3A CHRONIC KIDNEY DISEASE (HCC): ICD-10-CM

## 2024-07-31 DIAGNOSIS — R17 SERUM TOTAL BILIRUBIN ELEVATED: ICD-10-CM

## 2024-07-31 DIAGNOSIS — D32.0 MENINGIOMA, CEREBRAL (HCC): ICD-10-CM

## 2024-07-31 DIAGNOSIS — I23.6 LV (LEFT VENTRICULAR) MURAL THROMBUS FOLLOWING MI (HCC): ICD-10-CM

## 2024-07-31 DIAGNOSIS — I25.5 ISCHEMIC CARDIOMYOPATHY: Primary | ICD-10-CM

## 2024-07-31 LAB
ANION GAP SERPL CALCULATED.3IONS-SCNC: 8 MMOL/L (ref 4–13)
BILIRUB DIRECT SERPL-MCNC: 0.13 MG/DL (ref 0–0.2)
BUN SERPL-MCNC: 19 MG/DL (ref 5–25)
CALCIUM SERPL-MCNC: 9.4 MG/DL (ref 8.4–10.2)
CHLORIDE SERPL-SCNC: 106 MMOL/L (ref 96–108)
CO2 SERPL-SCNC: 27 MMOL/L (ref 21–32)
CREAT SERPL-MCNC: 1.5 MG/DL (ref 0.6–1.3)
GFR SERPL CREATININE-BSD FRML MDRD: 38 ML/MIN/1.73SQ M
GLUCOSE P FAST SERPL-MCNC: 103 MG/DL (ref 65–99)
INR PPP: 3.17 (ref 0.84–1.19)
POTASSIUM SERPL-SCNC: 4.5 MMOL/L (ref 3.5–5.3)
PROTHROMBIN TIME: 32.8 SECONDS (ref 11.6–14.5)
SODIUM SERPL-SCNC: 141 MMOL/L (ref 135–147)

## 2024-07-31 PROCEDURE — 80048 BASIC METABOLIC PNL TOTAL CA: CPT

## 2024-07-31 PROCEDURE — 36415 COLL VENOUS BLD VENIPUNCTURE: CPT

## 2024-07-31 PROCEDURE — 82248 BILIRUBIN DIRECT: CPT

## 2024-07-31 PROCEDURE — 85610 PROTHROMBIN TIME: CPT

## 2024-07-31 NOTE — PROGRESS NOTES
Left message for patient, advised INR good, continue 7.5 mg Mon Wed Fri, 5 mg all other days, will recheck in 3 weeks 8/21/24  Advised to call with questions or concerns.

## 2024-08-01 ENCOUNTER — PATIENT OUTREACH (OUTPATIENT)
Dept: SURGERY | Facility: CLINIC | Age: 59
End: 2024-08-01

## 2024-08-01 NOTE — PROGRESS NOTES
This cm received a phone call from Socorro from [facility]. Ct's application was pending due to needing income.    Ct's income information was re-faxed to [facility]. Fax confirmation sheet was scanned into ct's chart.

## 2024-08-02 ENCOUNTER — PATIENT OUTREACH (OUTPATIENT)
Dept: SURGERY | Facility: CLINIC | Age: 59
End: 2024-08-02

## 2024-08-02 NOTE — PROGRESS NOTES
This cm reached out to [employer] to follow up on ct's application. Ct's application is still pending due to one of the pay stubs being illegible.     This cm re-submitted ct's last pay stub via e-mail.    Ct was made aware of conversation with [employer].

## 2024-08-02 NOTE — PROGRESS NOTES
Health Sustaining Medication Form was sent to [facility] Office. Form was scanned into ct's chart.

## 2024-08-05 ENCOUNTER — PATIENT OUTREACH (OUTPATIENT)
Dept: SURGERY | Facility: CLINIC | Age: 59
End: 2024-08-05

## 2024-08-05 NOTE — PROGRESS NOTES
This cm contacted [facility] and spoke to Wander. Wander let this cm know they could accept a credit card over the phone. Wander let this cm know they could contact this cm via telephone for payment on the day of ct's appointment.    Ct was made aware she is able to go to her appointment with [facility] on 8/12/2024 at 4 pm.

## 2024-08-06 ENCOUNTER — PATIENT OUTREACH (OUTPATIENT)
Dept: SURGERY | Facility: CLINIC | Age: 59
End: 2024-08-06

## 2024-08-06 DIAGNOSIS — I51.3 MURAL THROMBUS OF LEFT VENTRICLE: ICD-10-CM

## 2024-08-06 NOTE — PROGRESS NOTES
Ct contacted this cm to let her know she received a letter from the Department of [employer] letting this cm know that the income information that was submitted was illegible. Ct aware this cm resubmitted her income information twice and would follow up with [employer] to see if she was approved.    This cm contacted [employer]. Ct was approved thru 09/03/2025. [id number]. Ct was made aware. Ct aware to send this cm a copy of her new card when she receives it.    This cm provided , Kelsy with ct's [employer] information.

## 2024-08-07 ENCOUNTER — TELEPHONE (OUTPATIENT)
Dept: CARDIOLOGY CLINIC | Facility: CLINIC | Age: 59
End: 2024-08-07

## 2024-08-07 RX ORDER — WARFARIN SODIUM 5 MG/1
TABLET ORAL
Qty: 135 TABLET | Refills: 1 | Status: SHIPPED | OUTPATIENT
Start: 2024-08-07

## 2024-08-07 NOTE — TELEPHONE ENCOUNTER
Patient was due for follow up with Dr Dennison in August, can you please call patient to schedule?  Thank you

## 2024-08-13 ENCOUNTER — PATIENT OUTREACH (OUTPATIENT)
Dept: SURGERY | Facility: CLINIC | Age: 59
End: 2024-08-13

## 2024-08-13 NOTE — PROGRESS NOTES
This cm received a message from Wander from [facility].     This cm spoke to Gene from [facility]. Gene let this cm know ct was seen in the office on 8/12/2024 and she was reaching out to this cm for payment. Gene let this cm know ct was charged for evaluation and denture adjustment. Gene let this cm know that per ct the dentures were okay at this time and knows to reach out to them if she has any further issues. Gene let this cm know if ct would not be charged if she needed additional adjustments. This cm provided Gene with credit card information. Gene to send this cm a paid invoice. This cm provided Gene with fax number.    Paid invoice was received and scanned into ct's chart. This cm confirmed with [facility] that this cm received the fax.

## 2024-08-15 ENCOUNTER — PATIENT OUTREACH (OUTPATIENT)
Dept: SURGERY | Facility: CLINIC | Age: 59
End: 2024-08-15

## 2024-08-21 ENCOUNTER — PATIENT OUTREACH (OUTPATIENT)
Dept: SURGERY | Facility: CLINIC | Age: 59
End: 2024-08-21

## 2024-08-23 ENCOUNTER — APPOINTMENT (OUTPATIENT)
Dept: LAB | Facility: HOSPITAL | Age: 59
End: 2024-08-23
Payer: MEDICARE

## 2024-08-23 ENCOUNTER — ANTICOAG VISIT (OUTPATIENT)
Dept: CARDIOLOGY CLINIC | Facility: CLINIC | Age: 59
End: 2024-08-23

## 2024-08-23 DIAGNOSIS — Z79.01 LONG TERM CURRENT USE OF ANTICOAGULANT THERAPY: ICD-10-CM

## 2024-08-23 DIAGNOSIS — I25.5 ISCHEMIC CARDIOMYOPATHY: Primary | ICD-10-CM

## 2024-08-23 DIAGNOSIS — I23.6 LV (LEFT VENTRICULAR) MURAL THROMBUS FOLLOWING MI (HCC): ICD-10-CM

## 2024-08-23 LAB
INR PPP: 2.66 (ref 0.85–1.19)
PROTHROMBIN TIME: 28.2 SECONDS (ref 12.3–15)

## 2024-08-23 PROCEDURE — 36415 COLL VENOUS BLD VENIPUNCTURE: CPT

## 2024-08-23 PROCEDURE — 85610 PROTHROMBIN TIME: CPT

## 2024-08-23 NOTE — PROGRESS NOTES
Spoke with patient, advised INR good, continue 7.5 mg Mon Wed Fri, 5 mg all other days, will recheck in 3 weeks 9/13/24

## 2024-09-03 NOTE — PROGRESS NOTES
Ct agreeable to completing Epic assessment with this cm. Ct reports complete understanding of HIV disease process, transmission, and medications. Ct reports she is able to meet own basic needs and is able to access community assistance as needed. Ct reports consistent and reliable access to transportation. Ct reports medical care coverage/health insurance. Ct reports being self sufficient and can independently follow up on own referrals and access care and services. Ct reports stable housing. Ct reports not being sexually active in the last 3 months. Ct reports no difficulties with substance use. Ct reports having own dental insurance; able to access dental services. Ct let this cm know she has an upcoming appointment on 2/8/2024 with Jason Jin. Ct reports steady source of income. Ct reports no language/cultural barriers.      Acuity scale was completed and scanned into ct's chart.   
---

## 2024-09-12 ENCOUNTER — PATIENT OUTREACH (OUTPATIENT)
Dept: SURGERY | Facility: CLINIC | Age: 59
End: 2024-09-12

## 2024-09-12 NOTE — PROGRESS NOTES
Ct reached out to this cm to let her know she received her new insurance card. Ct was made aware to send this cm a copy of her a card so it could be put in her chart.     Insurance card was received and scanned into ct's chart.     Ct discussion took place with , Kelsy, in regards to ct's insurance.

## 2024-09-12 NOTE — PROGRESS NOTES
Per ct request, this cm gave ct contact number for [facility]. Ct aware there would not be a charge for adjusting her current dentures.

## 2024-09-16 ENCOUNTER — TELEPHONE (OUTPATIENT)
Dept: NEPHROLOGY | Facility: CLINIC | Age: 59
End: 2024-09-16

## 2024-09-17 ENCOUNTER — REMOTE DEVICE CLINIC VISIT (OUTPATIENT)
Dept: CARDIOLOGY CLINIC | Facility: CLINIC | Age: 59
End: 2024-09-17
Payer: MEDICARE

## 2024-09-17 DIAGNOSIS — Z95.810 AICD (AUTOMATIC CARDIOVERTER/DEFIBRILLATOR) PRESENT: Primary | ICD-10-CM

## 2024-09-17 PROCEDURE — 93295 DEV INTERROG REMOTE 1/2/MLT: CPT | Performed by: INTERNAL MEDICINE

## 2024-09-17 PROCEDURE — 93296 REM INTERROG EVL PM/IDS: CPT | Performed by: INTERNAL MEDICINE

## 2024-09-17 NOTE — PROGRESS NOTES
Results for orders placed or performed in visit on 09/17/24   Cardiac EP device report    Narrative    MDT-SINGLE CHAMBER ICD - ACTIVE SYSTEM IS MRI CONDITIONAL  CARELINK TRANSMISSION: BATTERY VOLTAGE ADEQUATE (4.3 YRS). <0.1%. ALL AVAILABLE LEAD PARAMETERS WITHIN NORMAL LIMITS. NO SIGNIFICANT HIGH RATE EPISODES. OPTI-VOL WITHIN NORMAL LIMITS. NORMAL DEVICE FUNCTION. GV

## 2024-09-19 ENCOUNTER — ANTICOAG VISIT (OUTPATIENT)
Dept: CARDIOLOGY CLINIC | Facility: CLINIC | Age: 59
End: 2024-09-19

## 2024-09-19 ENCOUNTER — APPOINTMENT (OUTPATIENT)
Dept: LAB | Facility: HOSPITAL | Age: 59
End: 2024-09-19
Payer: MEDICARE

## 2024-09-19 DIAGNOSIS — Z79.01 LONG TERM CURRENT USE OF ANTICOAGULANT THERAPY: ICD-10-CM

## 2024-09-19 DIAGNOSIS — I25.5 ISCHEMIC CARDIOMYOPATHY: Primary | ICD-10-CM

## 2024-09-19 DIAGNOSIS — Z95.810 AICD (AUTOMATIC CARDIOVERTER/DEFIBRILLATOR) PRESENT: ICD-10-CM

## 2024-09-19 DIAGNOSIS — I51.3 MURAL THROMBUS OF LEFT VENTRICLE: ICD-10-CM

## 2024-09-19 DIAGNOSIS — I25.10 TRIPLE VESSEL CORONARY ARTERY DISEASE: ICD-10-CM

## 2024-09-19 DIAGNOSIS — Z95.810 AICD (AUTOMATIC CARDIOVERTER/DEFIBRILLATOR) PRESENT: Primary | ICD-10-CM

## 2024-09-19 DIAGNOSIS — I23.6 LV (LEFT VENTRICULAR) MURAL THROMBUS FOLLOWING MI (HCC): ICD-10-CM

## 2024-09-19 LAB
INR PPP: 3.37 (ref 0.85–1.19)
PROTHROMBIN TIME: 33.6 SECONDS (ref 12.3–15)

## 2024-09-19 PROCEDURE — 85610 PROTHROMBIN TIME: CPT

## 2024-09-19 PROCEDURE — 36415 COLL VENOUS BLD VENIPUNCTURE: CPT

## 2024-09-19 NOTE — PROGRESS NOTES
PC to patient with INR of 3.37 which is within range.  Advised to continue same dosing of warfarin and recheck in 4 weeks.    7.5 M/W/F and 5 mgs all other days of the week.

## 2024-10-08 ENCOUNTER — TELEPHONE (OUTPATIENT)
Dept: SURGERY | Facility: CLINIC | Age: 59
End: 2024-10-08

## 2024-10-08 DIAGNOSIS — B20 HIV DISEASE (HCC): Primary | ICD-10-CM

## 2024-10-08 DIAGNOSIS — I10 ESSENTIAL HYPERTENSION: ICD-10-CM

## 2024-10-15 ENCOUNTER — APPOINTMENT (OUTPATIENT)
Dept: LAB | Facility: HOSPITAL | Age: 59
End: 2024-10-15
Payer: MEDICARE

## 2024-10-15 ENCOUNTER — ANTICOAG VISIT (OUTPATIENT)
Dept: CARDIOLOGY CLINIC | Facility: CLINIC | Age: 59
End: 2024-10-15

## 2024-10-15 DIAGNOSIS — I23.6 LV (LEFT VENTRICULAR) MURAL THROMBUS FOLLOWING MI (HCC): ICD-10-CM

## 2024-10-15 DIAGNOSIS — Z79.01 LONG TERM CURRENT USE OF ANTICOAGULANT THERAPY: ICD-10-CM

## 2024-10-15 DIAGNOSIS — Z95.810 AICD (AUTOMATIC CARDIOVERTER/DEFIBRILLATOR) PRESENT: ICD-10-CM

## 2024-10-15 DIAGNOSIS — I25.5 ISCHEMIC CARDIOMYOPATHY: Primary | ICD-10-CM

## 2024-10-15 DIAGNOSIS — I25.10 TRIPLE VESSEL CORONARY ARTERY DISEASE: ICD-10-CM

## 2024-10-15 DIAGNOSIS — I51.3 MURAL THROMBUS OF LEFT VENTRICLE: ICD-10-CM

## 2024-10-15 LAB
ALBUMIN SERPL BCG-MCNC: 4.1 G/DL (ref 3.5–5)
ALP SERPL-CCNC: 58 U/L (ref 34–104)
ALT SERPL W P-5'-P-CCNC: 19 U/L (ref 7–52)
ANION GAP SERPL CALCULATED.3IONS-SCNC: 7 MMOL/L (ref 4–13)
AST SERPL W P-5'-P-CCNC: 22 U/L (ref 13–39)
BASOPHILS # BLD AUTO: 0.03 THOUSANDS/ΜL (ref 0–0.1)
BASOPHILS NFR BLD AUTO: 1 % (ref 0–1)
BILIRUB SERPL-MCNC: 0.64 MG/DL (ref 0.2–1)
BUN SERPL-MCNC: 11 MG/DL (ref 5–25)
CALCIUM SERPL-MCNC: 9.5 MG/DL (ref 8.4–10.2)
CHLORIDE SERPL-SCNC: 107 MMOL/L (ref 96–108)
CO2 SERPL-SCNC: 28 MMOL/L (ref 21–32)
CREAT SERPL-MCNC: 1.44 MG/DL (ref 0.6–1.3)
EOSINOPHIL # BLD AUTO: 0.08 THOUSAND/ΜL (ref 0–0.61)
EOSINOPHIL NFR BLD AUTO: 2 % (ref 0–6)
ERYTHROCYTE [DISTWIDTH] IN BLOOD BY AUTOMATED COUNT: 13.7 % (ref 11.6–15.1)
GFR SERPL CREATININE-BSD FRML MDRD: 40 ML/MIN/1.73SQ M
GLUCOSE SERPL-MCNC: 150 MG/DL (ref 65–140)
HCT VFR BLD AUTO: 41.6 % (ref 34.8–46.1)
HGB BLD-MCNC: 14 G/DL (ref 11.5–15.4)
IMM GRANULOCYTES # BLD AUTO: 0.01 THOUSAND/UL (ref 0–0.2)
IMM GRANULOCYTES NFR BLD AUTO: 0 % (ref 0–2)
INR PPP: 3.89 (ref 0.85–1.19)
LYMPHOCYTES # BLD AUTO: 1.67 THOUSANDS/ΜL (ref 0.6–4.47)
LYMPHOCYTES NFR BLD AUTO: 38 % (ref 14–44)
MCH RBC QN AUTO: 30.6 PG (ref 26.8–34.3)
MCHC RBC AUTO-ENTMCNC: 33.7 G/DL (ref 31.4–37.4)
MCV RBC AUTO: 91 FL (ref 82–98)
MONOCYTES # BLD AUTO: 0.26 THOUSAND/ΜL (ref 0.17–1.22)
MONOCYTES NFR BLD AUTO: 6 % (ref 4–12)
NEUTROPHILS # BLD AUTO: 2.3 THOUSANDS/ΜL (ref 1.85–7.62)
NEUTS SEG NFR BLD AUTO: 53 % (ref 43–75)
NRBC BLD AUTO-RTO: 0 /100 WBCS
PLATELET # BLD AUTO: 170 THOUSANDS/UL (ref 149–390)
PMV BLD AUTO: 11.4 FL (ref 8.9–12.7)
POTASSIUM SERPL-SCNC: 4.2 MMOL/L (ref 3.5–5.3)
PROT SERPL-MCNC: 6.6 G/DL (ref 6.4–8.4)
PROTHROMBIN TIME: 37.5 SECONDS (ref 12.3–15)
RBC # BLD AUTO: 4.57 MILLION/UL (ref 3.81–5.12)
SODIUM SERPL-SCNC: 142 MMOL/L (ref 135–147)
WBC # BLD AUTO: 4.35 THOUSAND/UL (ref 4.31–10.16)

## 2024-10-15 PROCEDURE — 80053 COMPREHEN METABOLIC PANEL: CPT

## 2024-10-15 PROCEDURE — 87536 HIV-1 QUANT&REVRSE TRNSCRPJ: CPT

## 2024-10-15 PROCEDURE — 86361 T CELL ABSOLUTE COUNT: CPT

## 2024-10-15 PROCEDURE — 85025 COMPLETE CBC W/AUTO DIFF WBC: CPT

## 2024-10-15 PROCEDURE — 85610 PROTHROMBIN TIME: CPT

## 2024-10-15 NOTE — PROGRESS NOTES
Spoke with patient, advised INR just a little high, no changes. Current dose verified.  Advised to take 5 mg tomorrow instead of 7.5 mg, then go back to 7.5 mg Mon Wed Fri, 5 mg all other days, will recheck in 3 weeks 11/5/24

## 2024-10-16 ENCOUNTER — PATIENT OUTREACH (OUTPATIENT)
Dept: SURGERY | Facility: CLINIC | Age: 59
End: 2024-10-16

## 2024-10-16 LAB
BASOPHILS # BLD AUTO: 0 X10E3/UL (ref 0–0.2)
BASOPHILS NFR BLD AUTO: 1 %
CD3+CD4+ CELLS # BLD: 500 /UL (ref 359–1519)
CD3+CD4+ CELLS NFR BLD: 29.4 % (ref 30.8–58.5)
EOSINOPHIL # BLD AUTO: 0.1 X10E3/UL (ref 0–0.4)
EOSINOPHIL NFR BLD AUTO: 2 %
ERYTHROCYTE [DISTWIDTH] IN BLOOD BY AUTOMATED COUNT: 13.6 % (ref 11.7–15.4)
HCT VFR BLD AUTO: 41.3 % (ref 34–46.6)
HGB BLD-MCNC: 13.7 G/DL (ref 11.1–15.9)
HIV1 RNA # PLAS NAA DL=20: ABNORMAL {COPIES}/ML
IMM GRANULOCYTES # BLD: 0 X10E3/UL (ref 0–0.1)
IMM GRANULOCYTES NFR BLD: 0 %
LYMPHOCYTES # BLD AUTO: 1.7 X10E3/UL (ref 0.7–3.1)
LYMPHOCYTES NFR BLD AUTO: 39 %
MCH RBC QN AUTO: 30.2 PG (ref 26.6–33)
MCHC RBC AUTO-ENTMCNC: 33.2 G/DL (ref 31.5–35.7)
MCV RBC AUTO: 91 FL (ref 79–97)
MONOCYTES # BLD AUTO: 0.2 X10E3/UL (ref 0.1–0.9)
MONOCYTES NFR BLD AUTO: 6 %
NEUTROPHILS # BLD AUTO: 2.4 X10E3/UL (ref 1.4–7)
NEUTROPHILS NFR BLD AUTO: 52 %
PLATELET # BLD AUTO: 175 X10E3/UL (ref 150–450)
RBC # BLD AUTO: 4.53 X10E6/UL (ref 3.77–5.28)
WBC # BLD AUTO: 4.4 X10E3/UL (ref 3.4–10.8)

## 2024-10-16 NOTE — PROGRESS NOTES
Ct discussion took place with supervisor, Malgorzata in regard to ct's medical bill.    [facility] if [facility] approval could be applied to the bill. Cherrie let this cm know the [employer] pankaj could not be applied to medical bill.

## 2024-10-17 ENCOUNTER — PATIENT OUTREACH (OUTPATIENT)
Dept: SURGERY | Facility: CLINIC | Age: 59
End: 2024-10-17

## 2024-10-17 NOTE — PROGRESS NOTES
Referral for services form was completed and sent to supervisor, Malgorzata. Copy of form was scanned into ct's chart.     Ct discussion took place with supervisor, Malgorzata in regards to ct's medical bill.

## 2024-10-18 ENCOUNTER — OFFICE VISIT (OUTPATIENT)
Dept: SURGERY | Facility: CLINIC | Age: 59
End: 2024-10-18
Payer: MEDICARE

## 2024-10-18 VITALS
DIASTOLIC BLOOD PRESSURE: 74 MMHG | TEMPERATURE: 97.1 F | OXYGEN SATURATION: 98 % | BODY MASS INDEX: 31.83 KG/M2 | WEIGHT: 173 LBS | HEART RATE: 67 BPM | RESPIRATION RATE: 17 BRPM | HEIGHT: 62 IN | SYSTOLIC BLOOD PRESSURE: 110 MMHG

## 2024-10-18 DIAGNOSIS — Z21 HIV POSITIVE (HCC): ICD-10-CM

## 2024-10-18 DIAGNOSIS — D32.0 MENINGIOMA, CEREBRAL (HCC): ICD-10-CM

## 2024-10-18 DIAGNOSIS — N18.31 STAGE 3A CHRONIC KIDNEY DISEASE (HCC): ICD-10-CM

## 2024-10-18 DIAGNOSIS — I25.5 ISCHEMIC CARDIOMYOPATHY: ICD-10-CM

## 2024-10-18 DIAGNOSIS — Z23 NEED FOR COVID-19 VACCINE: ICD-10-CM

## 2024-10-18 DIAGNOSIS — B20 HIV DISEASE (HCC): Primary | ICD-10-CM

## 2024-10-18 DIAGNOSIS — Z23 NEED FOR INFLUENZA VACCINATION: ICD-10-CM

## 2024-10-18 PROCEDURE — G0008 ADMIN INFLUENZA VIRUS VAC: HCPCS

## 2024-10-18 PROCEDURE — 99214 OFFICE O/P EST MOD 30 MIN: CPT | Performed by: STUDENT IN AN ORGANIZED HEALTH CARE EDUCATION/TRAINING PROGRAM

## 2024-10-18 PROCEDURE — 90673 RIV3 VACCINE NO PRESERV IM: CPT

## 2024-10-18 RX ORDER — BICTEGRAVIR SODIUM, EMTRICITABINE, AND TENOFOVIR ALAFENAMIDE FUMARATE 50; 200; 25 MG/1; MG/1; MG/1
1 TABLET ORAL
Qty: 90 TABLET | Refills: 1 | Status: SHIPPED | OUTPATIENT
Start: 2024-10-18

## 2024-10-19 NOTE — ASSESSMENT & PLAN NOTE
Lab Results   Component Value Date    EGFR 40 10/15/2024    EGFR 38 07/31/2024    EGFR 31 07/26/2024    CREATININE 1.44 (H) 10/15/2024    CREATININE 1.50 (H) 07/31/2024    CREATININE 1.74 (H) 07/26/2024   Creatinine stable at baseline 1.3-1.5   - -monitor creatinine               -Continue follow-up with nephrology

## 2024-10-19 NOTE — ASSESSMENT & PLAN NOTE
with mural thrombus. She has AICD in place, on warfarin               -Patient follows with cardiology

## 2024-10-19 NOTE — PROGRESS NOTES
Ambulatory Visit  Name: Airam Rainey      : 1965      MRN: [id number]  Encounter Provider: Estrellita Cortez MD  Encounter Date: 10/18/2024   Encounter department: [facility]    Assessment & Plan  HIV disease ([facility])  Doing well on Biktarvy.  Tolerating ART without any side effects.  Viral load is undetectable and CD4 500              -Continue ART regimen of Biktarvy              -Recheck labs in 5 months to assess for virologic control, drug toxicity   -Follow up in 6 months               -Patient was provided medication, adherence and prevention education    Orders:    COVID-19 Pfizer mRNA vaccine 12 yr and older (Comirnaty pre-filled syringe)    influenza vaccine, recombinant, PF, 0.5 mL IM (Flublok)    Need for COVID-19 vaccine  Patient declines  Orders:    COVID-19 Pfizer mRNA vaccine 12 yr and older (Comirnaty pre-filled syringe)    Need for influenza vaccination  Vaccine provided today  Orders:    influenza vaccine, recombinant, PF, 0.5 mL IM (Flublok)    Ischemic cardiomyopathy  with mural thrombus. She has AICD in place, on warfarin               -Patient follows with cardiology     Meningioma, cerebral (HCC)  Continue follow up with neurosurgery       Stage 3a chronic kidney disease (HCC)  Lab Results   Component Value Date    EGFR 40 10/15/2024    EGFR 38 2024    EGFR 31 2024    CREATININE 1.44 (H) 10/15/2024    CREATININE 1.50 (H) 2024    CREATININE 1.74 (H) 2024   Creatinine stable at baseline 1.3-1.5   - -monitor creatinine               -Continue follow-up with nephrology     My recommendations were discussed with the patient in detail who verbalized understanding.  Patient care coordinated with JOSE ROBERTO Rosas.     Subjective:  Routine follow-up for HIV.  Patient claims 100% adherence with Biktarvy. Patient denies any notable side effects.  Overall she is feeling well.  The patient denies any fever chills or sweats, denies any nausea vomiting or diarrhea,  "denies any cough or shortness of breath.      ROS:  A complete review of systems is negative other than that noted above in the subjective    Followup portions patient history reviewed and updated as:  Allergies, current medications, past medical history, past social history, past surgical history, and the problem list    Objective:  Vitals:  Vitals:    10/18/24 0738   BP: 110/74   BP Location: Right arm   Patient Position: Sitting   Cuff Size: Large   Pulse: 67   Resp: 17   Temp: (!) 97.1 °F (36.2 °C)   TempSrc: Tympanic   SpO2: 98%   Weight: 78.5 kg (173 lb)   Height: 5' 2\" (1.575 m)       Physical Exam:   General Appearance:  Alert, interactive, appearing well,  nontoxic, no acute distress.   Neck:   Supple without lymphadenopathy, no thyromegaly or masses   Throat: Oropharynx moist without lesions.    Lungs:   Clear to auscultation bilaterally; no wheezes, rhonchi or rales; respirations unlabored   Heart:  RRR; no murmur, rub or gallop   Abdomen:   Soft, non-tender, non-distended, positive bowel sounds.     Extremities: No clubbing, cyanosis or edema   Skin: No new rashes or lesions. No draining wounds noted.       Labs, Imaging, & Other studies:   All pertinent labs and imaging studies were personally reviewed    Lab Results   Component Value Date    K 4.2 10/15/2024     10/15/2024    CO2 28 10/15/2024    BUN 11 10/15/2024    CREATININE 1.44 (H) 10/15/2024    GLUF 103 (H) 07/31/2024    CALCIUM 9.5 10/15/2024    AST 22 10/15/2024    ALT 19 10/15/2024    ALKPHOS 58 10/15/2024    EGFR 40 10/15/2024     Lab Results   Component Value Date    WBC 4.35 10/15/2024    WBC 4.4 10/15/2024    HGB 14.0 10/15/2024    HGB 13.7 10/15/2024    HCT 41.6 10/15/2024    HCT 41.3 10/15/2024    MCV 91 10/15/2024    MCV 91 10/15/2024     10/15/2024     10/15/2024     Lab Results   Component Value Date    HEPCAB Non-reactive 04/22/2024     Lab Results   Component Value Date    HAV Non-reactive 11/02/2018    HEPCAB " Non-reactive 04/22/2024     Lab Results   Component Value Date    RPR Non-Reactive 07/18/2022     CD4 ABS   Date/Time Value Ref Range Status   10/15/2024 02:18  359 - 1519 /uL Final     HIV-1 RNA by PCR, Qn   Date/Time Value Ref Range Status   04/28/2023 01:57 PM <20 copies/mL Final     Comment:     HIV-1 RNA detected  The reportable range for this assay is 20 to 10,000,000  copies HIV-1 RNA/mL.           Current Outpatient Medications:     Aspirin Low Dose 81 MG EC tablet, TAKE 1 TABLET BY MOUTH DAILY, Disp: 30 tablet, Rfl: 5    atorvastatin (LIPITOR) 80 mg tablet, TAKE 1 TABLET BY MOUTH DAILY, Disp: 30 tablet, Rfl: 5    bictegravir-emtricitab-tenofovir alafenamide (Biktarvy) -25 MG tablet, Take 1 tablet by mouth daily with breakfast, Disp: 90 tablet, Rfl: 1    Calcium Carb-Cholecalciferol (calcium carbonate-vitamin D) 500 mg-5 mcg tablet, TAKE 1 TABLET BY MOUTH DAILY WITH BREAKFAST, Disp: 90 tablet, Rfl: 2    carvedilol (COREG) 6.25 mg tablet, TAKE 1 TABLET BY MOUTH TWICE A DAY WITH MEALS, Disp: 60 tablet, Rfl: 5    cholecalciferol (VITAMIN D3) 1,000 units tablet, Take 2,000 Units by mouth daily, Disp: , Rfl:     nicotine polacrilex (SM Nicotine Polacrilex) 4 MG lozenge, Apply 1 lozenge (4 mg total) to the mouth or throat as needed for smoking cessation, Disp: 243 each, Rfl: 2    sacubitril-valsartan (Entresto) 49-51 MG TABS, TAKE 1 TABLET BY MOUTH TWICE A DAY, Disp: 60 tablet, Rfl: 11    warfarin (COUMADIN) 5 mg tablet, TAKE 1 TO 1 AND 1/2 TABLETS BY MOUTH DAILY AS DIRECTED BY MD, Disp: 135 tablet, Rfl: 1    cyanocobalamin (VITAMIN B-12) 100 MCG tablet, Take 100 mcg by mouth daily (Patient not taking: Reported on 7/29/2024), Disp: , Rfl:

## 2024-10-19 NOTE — ASSESSMENT & PLAN NOTE
Doing well on Biktarvy.  Tolerating ART without any side effects.  Viral load is undetectable and CD4 500              -Continue ART regimen of Biktarvy              -Recheck labs in 5 months to assess for virologic control, drug toxicity   -Follow up in 6 months               -Patient was provided medication, adherence and prevention education    Orders:    COVID-19 Pfizer mRNA vaccine 12 yr and older (Comirnaty pre-filled syringe)    influenza vaccine, recombinant, PF, 0.5 mL IM (Flublok)

## 2024-10-21 ENCOUNTER — PATIENT OUTREACH (OUTPATIENT)
Dept: SURGERY | Facility: CLINIC | Age: 59
End: 2024-10-21

## 2024-10-21 DIAGNOSIS — B20 HIV DISEASE (HCC): Primary | ICD-10-CM

## 2024-10-21 DIAGNOSIS — E78.2 MIXED HYPERLIPIDEMIA: ICD-10-CM

## 2024-10-21 DIAGNOSIS — Z79.899 OTHER LONG TERM (CURRENT) DRUG THERAPY: ICD-10-CM

## 2024-10-21 DIAGNOSIS — I10 ESSENTIAL HYPERTENSION: ICD-10-CM

## 2024-10-21 DIAGNOSIS — Z20.2 CONTACT WITH AND (SUSPECTED) EXPOSURE TO INFECTIONS WITH A PREDOMINANTLY SEXUAL MODE OF TRANSMISSION: ICD-10-CM

## 2024-10-21 DIAGNOSIS — Z11.3 ENCOUNTER FOR SCREENING FOR BACTERIAL SEXUALLY TRANSMITTED DISEASE: ICD-10-CM

## 2024-10-21 DIAGNOSIS — D72.89 OTHER SPECIFIED DISORDERS OF WHITE BLOOD CELLS: ICD-10-CM

## 2024-10-21 DIAGNOSIS — Z13.1 SCREENING FOR DIABETES MELLITUS: ICD-10-CM

## 2024-10-21 DIAGNOSIS — Z11.1 SCREENING-PULMONARY TB: ICD-10-CM

## 2024-10-21 DIAGNOSIS — Z72.89 OTHER PROBLEMS RELATED TO LIFESTYLE: ICD-10-CM

## 2024-10-22 ENCOUNTER — PATIENT OUTREACH (OUTPATIENT)
Dept: SURGERY | Facility: CLINIC | Age: 59
End: 2024-10-22

## 2024-10-22 NOTE — PROGRESS NOTES
This cm reached out to ct to let her know this cm was unable to get a bill she submitted to this cm back in June. Ct was made aware, [facility] was unable to pay for the bill due to being more that 90 days prior to application date. Ct was made aware the pankaj was unable to pay for it. Ct verbalized understanding.

## 2024-10-31 DIAGNOSIS — I25.10 TRIPLE VESSEL CORONARY ARTERY DISEASE: ICD-10-CM

## 2024-10-31 DIAGNOSIS — I25.5 ISCHEMIC CARDIOMYOPATHY: ICD-10-CM

## 2024-10-31 DIAGNOSIS — I25.2 PAST HEART ATTACK: ICD-10-CM

## 2024-10-31 RX ORDER — CARVEDILOL 6.25 MG/1
TABLET ORAL
Qty: 60 TABLET | Refills: 0 | Status: SHIPPED | OUTPATIENT
Start: 2024-10-31

## 2024-10-31 RX ORDER — SACUBITRIL AND VALSARTAN 49; 51 MG/1; MG/1
TABLET, FILM COATED ORAL
Qty: 60 TABLET | Refills: 0 | Status: SHIPPED | OUTPATIENT
Start: 2024-10-31

## 2024-10-31 NOTE — TELEPHONE ENCOUNTER
Patient is scheduled for an appointment. Courtesy refill provided.     
What Type Of Note Output Would You Prefer (Optional)?: Standard Output
How Severe Are Your Spot(S)?: severe
Have Your Spot(S) Been Treated In The Past?: has not been treated
Hpi Title: Evaluation of a Skin Lesion

## 2024-11-08 ENCOUNTER — ANTICOAG VISIT (OUTPATIENT)
Dept: CARDIOLOGY CLINIC | Facility: CLINIC | Age: 59
End: 2024-11-08

## 2024-11-08 ENCOUNTER — APPOINTMENT (OUTPATIENT)
Dept: LAB | Facility: HOSPITAL | Age: 59
End: 2024-11-08
Payer: MEDICARE

## 2024-11-08 DIAGNOSIS — Z79.01 LONG TERM CURRENT USE OF ANTICOAGULANT THERAPY: ICD-10-CM

## 2024-11-08 DIAGNOSIS — I51.3 MURAL THROMBUS OF LEFT VENTRICLE: ICD-10-CM

## 2024-11-08 DIAGNOSIS — I23.6 LV (LEFT VENTRICULAR) MURAL THROMBUS FOLLOWING MI (HCC): ICD-10-CM

## 2024-11-08 DIAGNOSIS — I25.10 TRIPLE VESSEL CORONARY ARTERY DISEASE: ICD-10-CM

## 2024-11-08 DIAGNOSIS — I25.5 ISCHEMIC CARDIOMYOPATHY: Primary | ICD-10-CM

## 2024-11-08 DIAGNOSIS — Z95.810 AICD (AUTOMATIC CARDIOVERTER/DEFIBRILLATOR) PRESENT: ICD-10-CM

## 2024-11-08 LAB
INR PPP: 2.78 (ref 0.85–1.19)
PROTHROMBIN TIME: 29.1 SECONDS (ref 12.3–15)

## 2024-11-08 PROCEDURE — 36415 COLL VENOUS BLD VENIPUNCTURE: CPT

## 2024-11-08 PROCEDURE — 85610 PROTHROMBIN TIME: CPT

## 2024-11-18 ENCOUNTER — PATIENT OUTREACH (OUTPATIENT)
Dept: SURGERY | Facility: CLINIC | Age: 59
End: 2024-11-18

## 2024-11-22 DIAGNOSIS — E78.5 HYPERLIPIDEMIA, UNSPECIFIED HYPERLIPIDEMIA TYPE: ICD-10-CM

## 2024-11-22 DIAGNOSIS — I25.5 ISCHEMIC CARDIOMYOPATHY: ICD-10-CM

## 2024-11-22 DIAGNOSIS — I25.2 PAST HEART ATTACK: ICD-10-CM

## 2024-11-22 DIAGNOSIS — I25.2 OLD MI (MYOCARDIAL INFARCTION): ICD-10-CM

## 2024-11-22 DIAGNOSIS — I25.10 TRIPLE VESSEL CORONARY ARTERY DISEASE: ICD-10-CM

## 2024-11-22 RX ORDER — ASPIRIN 81 MG/1
81 TABLET, COATED ORAL DAILY
Qty: 30 TABLET | Refills: 5 | Status: SHIPPED | OUTPATIENT
Start: 2024-11-22

## 2024-11-22 RX ORDER — SACUBITRIL AND VALSARTAN 49; 51 MG/1; MG/1
1 TABLET, FILM COATED ORAL 2 TIMES DAILY
Qty: 60 TABLET | Refills: 2 | Status: SHIPPED | OUTPATIENT
Start: 2024-11-22

## 2024-11-22 RX ORDER — CARVEDILOL 6.25 MG/1
6.25 TABLET ORAL 2 TIMES DAILY WITH MEALS
Qty: 60 TABLET | Refills: 2 | Status: SHIPPED | OUTPATIENT
Start: 2024-11-22

## 2024-11-22 RX ORDER — ATORVASTATIN CALCIUM 80 MG/1
80 TABLET, FILM COATED ORAL DAILY
Qty: 30 TABLET | Refills: 5 | Status: SHIPPED | OUTPATIENT
Start: 2024-11-22

## 2024-11-22 NOTE — ASSESSMENT & PLAN NOTE
On Coumadin for life.   Follow with Cardiology for anticoagulation    Ortho called asking for pt to be established with new pcp.  Please set pt up with Dr. Fernández to address BP

## 2024-11-24 NOTE — ASSESSMENT & PLAN NOTE
Patient was referred to us from Family Practice. She experienced a myocardial infarction on February 28, 2017 in [place]. She has subsequently moved to [facility]. She had a cardiac catheterization taken at [facility]. Patient's cardiac catheterization CD revealed the left main coronary artery to be nonobstructed. The left anterior descending artery was 100% in its midportion after a large 1st diagonal branch which was nonobstructed. The circumflex artery obtuse marginal was 90%. The right coronary artery in its midportion with 50-60%. The large distal LAD filled from right to left collaterals. [facility] had recommended CABG but the patient had refused at that time. No ventriculogram was performed but an echocardiogram which I performed had demonstrated extensive LV damage. A nuclear stress test demonstrated minimal with any it ischemia. I did not feel that the patient would improve with CABG.      Patient is free of angina pectoris or shortness of breath. She is doing quite well for her degree of LV dysfunction.    Aspirin 81 mg daily

## 2024-11-24 NOTE — ASSESSMENT & PLAN NOTE
05/15/2017 stress test: Severe LV dysfunction, EF 20%. Mild LV enlargement. Anterior apical aneurysm with paradoxical motion. Extensive myocardial infarctions involving the distal anterior apical and inferior apical walls. Negative for Persantine induced ischemia.      12/13/2017 ICD: single-chamber.    03/12/2019 echocardiogram: Severe LV systolic dysfunction, EF 25-27% apex is paradoxical. Grade 1 diastolic dysfunction. Thrombus in the septal apex with smoke     4/12/2023 echocardiogram: LVEF 42% with cavity upper limits of normal.  Anterior walls are hypokinetic.  [place] is hypokinetic to akinetic.  Grade 1 diastolic dysfunction.    ICD in situ    Aspirin 81 mg daily   carvedilol 6.25 mg twice daily  Entresto 49/51 2 times daily

## 2024-11-24 NOTE — ASSESSMENT & PLAN NOTE
7/26/2024 95/65  7/29/2024 110/61  10/18/2024 110/74    Carvedilol 6.25 mg twice daily  Entresto 49/51 twice daily

## 2024-11-24 NOTE — ASSESSMENT & PLAN NOTE
Lab Results   Component Value Date    EGFR 40 10/15/2024    EGFR 38 07/31/2024    EGFR 31 07/26/2024    CREATININE 1.44 (H) 10/15/2024    CREATININE 1.50 (H) 07/31/2024    CREATININE 1.74 (H) 07/26/2024

## 2024-11-26 ENCOUNTER — OFFICE VISIT (OUTPATIENT)
Dept: CARDIOLOGY CLINIC | Facility: CLINIC | Age: 59
End: 2024-11-26
Payer: MEDICARE

## 2024-11-26 VITALS — HEART RATE: 64 BPM | DIASTOLIC BLOOD PRESSURE: 60 MMHG | SYSTOLIC BLOOD PRESSURE: 90 MMHG

## 2024-11-26 DIAGNOSIS — I25.10 TRIPLE VESSEL CORONARY ARTERY DISEASE: Primary | ICD-10-CM

## 2024-11-26 DIAGNOSIS — Z72.0 TOBACCO ABUSE: ICD-10-CM

## 2024-11-26 DIAGNOSIS — I25.5 ISCHEMIC CARDIOMYOPATHY: ICD-10-CM

## 2024-11-26 DIAGNOSIS — N18.32 STAGE 3B CHRONIC KIDNEY DISEASE (HCC): ICD-10-CM

## 2024-11-26 DIAGNOSIS — E78.2 MIXED HYPERLIPIDEMIA: ICD-10-CM

## 2024-11-26 DIAGNOSIS — E78.5 HYPERLIPIDEMIA, UNSPECIFIED HYPERLIPIDEMIA TYPE: ICD-10-CM

## 2024-11-26 DIAGNOSIS — I51.3 MURAL THROMBUS OF LEFT VENTRICLE: ICD-10-CM

## 2024-11-26 DIAGNOSIS — I25.2 PAST HEART ATTACK: ICD-10-CM

## 2024-11-26 DIAGNOSIS — I10 ESSENTIAL HYPERTENSION: ICD-10-CM

## 2024-11-26 DIAGNOSIS — I25.2 OLD MI (MYOCARDIAL INFARCTION): ICD-10-CM

## 2024-11-26 PROCEDURE — 99214 OFFICE O/P EST MOD 30 MIN: CPT | Performed by: INTERNAL MEDICINE

## 2024-11-26 RX ORDER — WARFARIN SODIUM 5 MG/1
TABLET ORAL
Qty: 135 TABLET | Refills: 3 | Status: SHIPPED | OUTPATIENT
Start: 2024-11-26

## 2024-11-26 RX ORDER — SACUBITRIL AND VALSARTAN 49; 51 MG/1; MG/1
1 TABLET, FILM COATED ORAL 2 TIMES DAILY
Qty: 180 TABLET | Refills: 3 | Status: SHIPPED | OUTPATIENT
Start: 2024-11-26

## 2024-11-26 RX ORDER — CARVEDILOL 6.25 MG/1
6.25 TABLET ORAL 2 TIMES DAILY WITH MEALS
Qty: 180 TABLET | Refills: 3 | Status: SHIPPED | OUTPATIENT
Start: 2024-11-26

## 2024-11-26 RX ORDER — ASPIRIN 81 MG/1
81 TABLET ORAL DAILY
Qty: 90 TABLET | Refills: 3 | Status: SHIPPED | OUTPATIENT
Start: 2024-11-26

## 2024-11-26 RX ORDER — ATORVASTATIN CALCIUM 80 MG/1
80 TABLET, FILM COATED ORAL DAILY
Qty: 90 TABLET | Refills: 3 | Status: SHIPPED | OUTPATIENT
Start: 2024-11-26

## 2024-11-26 NOTE — PROGRESS NOTES
[facility]  [address] [place] [address]  Phone#  [phone]   Fax#  [phone]  *-*-*-*-*-*-*-*-*-*-*-*-*-*-*-*-*-*-*-*-*-*-*-*-*-*-*-*-*-*-*-*-*-*-*-*-*-*-*-*-*-*-*-*-*-*-*-*-*-*-*-*-*-*                                   Cardiology Follow Up      ENCOUNTER DATE: 24 4:32 PM  PATIENT NAME: Airam Rainey   : 1965    MRN: [id number]  AGE:59 y.o.      SEX: female  ENCOUNTER PROVIDER:Gibran Dennison MD     PRIMARY CARE PHYSICIAN: JOSE ROBERTO Grace    CARDIOLOGY SPECIALTY COMMENTS  Patient was referred to us from Family Practice. She experienced a myocardial infarction on 2017 in [place]. She has subsequently moved to [facility]. She had a cardiac catheterization taken at [facility]. Patient's cardiac catheterization CD revealed the left main coronary artery to be nonobstructed. The left anterior descending artery was 100% in its midportion after a large 1st diagonal branch which was nonobstructed. The circumflex artery obtuse marginal was 90%. The right coronary artery in its midportion with 50-60%. The large distal LAD filled from right to left collaterals. [facility] had recommended CABG but the patient had refused at that time. No ventriculogram was performed but an echocardiogram which I performed had demonstrated extensive LV damage. A nuclear stress test demonstrated minimal with any it ischemia. I did not feel that the patient would improve with CABG.     Patient is free of angina pectoris or shortness of breath. She is doing quite well for her degree of LV dysfunction.     2017 echocardiogram severe LV dysfunction, EF 30%, mild LV enlargement, apical aneurysm with akinesis to paradoxical motion. Large organized apical thrombus. Grade 1 diastolic dysfunction, normal pulmonary artery pressures     05/15/2017 stress test: Severe LV dysfunction, EF 20%. Mild LV enlargement.  Anterior apical aneurysm with paradoxical motion. Extensive myocardial infarctions involving the distal anterior apical and inferior apical walls. Negative for Persantine induced ischemia.      12/13/2017 ICD: single-chamber.    03/12/2019 echocardiogram: Severe LV systolic dysfunction, EF 25-27% apex is paradoxical. Grade 1 diastolic dysfunction. Thrombus in the septal apex with smoke    4/12/2023 echocardiogram: LVEF 42% with cavity upper limits of normal.  Anterior walls are hypokinetic.  [place] is hypokinetic to akinetic.  Grade 1 diastolic dysfunction.    ACTIVE PROBLEM LIST  Patient Active Problem List   Diagnosis    Ischemic cardiomyopathy    Triple vessel coronary artery disease    HIV disease (HCC)    Essential hypertension    Mixed hyperlipidemia    Impaired left ventricular function    Long term current use of anticoagulant therapy    old extensive anterior, apical, lateral and distal inferior apical MI    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 (HCC)    CKD (chronic kidney disease) stage 3, GFR 30-59 ml/min (formerly [facility])    Implantable cardioverter-defibrillator (ICD) in situ    Mild intermittent asthma without complication    ASCUS with positive high risk HPV cervical    Tobacco abuse    Onychomycosis    Bronchiectasis without complication (HCC)    Aggression    Orbital cellulitis on left    Abnormal CT scan, head    Lesion of left frontal lobe of brain    Seasonal allergies    Mucous retention cyst of maxillary sinus    Meningioma, cerebral (HCC)    Age-related osteoporosis without current pathological fracture    Weakness of extremity    Osteopenia of lumbar spine       ACTIVE DIAGNOSIS AND PLAN    1. Triple vessel coronary artery disease  Assessment & Plan:  Patient was referred to us from Family Practice. She experienced a myocardial infarction on February 28, 2017 in [place]. She has subsequently moved to [facility]. She had a cardiac catheterization taken at [facility]  [facility]. Patient's cardiac catheterization CD revealed the left main coronary artery to be nonobstructed. The left anterior descending artery was 100% in its midportion after a large 1st diagonal branch which was nonobstructed. The circumflex artery obtuse marginal was 90%. The right coronary artery in its midportion with 50-60%. The large distal LAD filled from right to left collaterals. [facility] had recommended CABG but the patient had refused at that time. No ventriculogram was performed but an echocardiogram which I performed had demonstrated extensive LV damage. A nuclear stress test demonstrated minimal with any it ischemia. I did not feel that the patient would improve with CABG.      Patient is free of angina pectoris or shortness of breath. She is doing quite well for her degree of LV dysfunction.    Aspirin 81 mg daily  Orders:  -     carvedilol (COREG) 6.25 mg tablet; Take 1 tablet (6.25 mg total) by mouth 2 (two) times a day with meals  2. Ischemic cardiomyopathy  Assessment & Plan:  05/15/2017 stress test: Severe LV dysfunction, EF 20%. Mild LV enlargement. Anterior apical aneurysm with paradoxical motion. Extensive myocardial infarctions involving the distal anterior apical and inferior apical walls. Negative for Persantine induced ischemia.      12/13/2017 ICD: single-chamber.    03/12/2019 echocardiogram: Severe LV systolic dysfunction, EF 25-27% apex is paradoxical. Grade 1 diastolic dysfunction. Thrombus in the septal apex with smoke     4/12/2023 echocardiogram: LVEF 42% with cavity upper limits of normal.  Anterior walls are hypokinetic.  [place] is hypokinetic to akinetic.  Grade 1 diastolic dysfunction.    ICD in situ    Aspirin 81 mg daily   carvedilol 6.25 mg twice daily  Entresto 49/51 2 times daily  Orders:  -     carvedilol (COREG) 6.25 mg tablet; Take 1 tablet (6.25 mg total) by mouth 2 (two) times a day with meals  -     sacubitril-valsartan (Entresto) 49-51  MG TABS; Take 1 tablet by mouth 2 (two) times a day  3. Mixed hyperlipidemia  Assessment & Plan:  Triglycerides 312, LDL 45  Atorvastatin 80 mg daily    4. Essential hypertension  Assessment & Plan:  7/26/2024 95/65  7/29/2024 110/61  10/18/2024 110/74    Carvedilol 6.25 mg twice daily  Entresto 49/51 twice daily  5. Stage 3b chronic kidney disease (HCC)  Assessment & Plan:  Lab Results   Component Value Date    EGFR 40 10/15/2024    EGFR 38 07/31/2024    EGFR 31 07/26/2024    CREATININE 1.44 (H) 10/15/2024    CREATININE 1.50 (H) 07/31/2024    CREATININE 1.74 (H) 07/26/2024     6. Tobacco abuse  7. Old MI (myocardial infarction)  -     aspirin (Aspirin Low Dose) 81 mg EC tablet; Take 1 tablet (81 mg total) by mouth daily  8. Hyperlipidemia, unspecified hyperlipidemia type  -     atorvastatin (LIPITOR) 80 mg tablet; Take 1 tablet (80 mg total) by mouth daily  9. old extensive anterior, apical, lateral and distal inferior apical MI  -     sacubitril-valsartan (Entresto) 49-51 MG TABS; Take 1 tablet by mouth 2 (two) times a day  10. Mural thrombus of left ventricle  -     warfarin (COUMADIN) 5 mg tablet; TAKE 1 TO 1 AND 1/2 TABLETS BY MOUTH DAILY AS DIRECTED BY MD     INTERVAL HISTORY:        She denies chest discomfort or shortness of breath.  She has no palpitations.  She denies symptoms of dizziness, lightheadedness or near-syncope/syncope.  She denies leg edema.  She denies symptoms of orthopnea or paroxysmal nocturnal dyspnea.    Her blood pressure is only 90 systolic at this time.  Patient denies any dizziness or lightheadedness.  She never has a problem of dizziness or lightheadedness.  Will leave her present medications alone.  However if she does ever get dizziness or lightheadedness, would reduce her Entresto to 24/26 twice a day.    DISCUSSION/PLAN:          Continue present medications as ordered above: Aspirin daily, atorvastatin 80 mg daily, carvedilol 6.25 mg with breakfast and dinner twice a day, and  Entresto 49/51 twice a day.  Return in 6 months    No results found for this visit on 11/26/24.      Lab Studies:    Lab Results   Component Value Date    CHOLESTEROL 163 04/22/2024    CHOLESTEROL 164 11/24/2023    CHOLESTEROL 153 03/31/2023     Lab Results   Component Value Date    TRIG 312 (H) 04/22/2024    TRIG 172 (H) 11/24/2023    TRIG 93 03/31/2023     Lab Results   Component Value Date    HDL 56 04/22/2024    HDL 53 11/24/2023    HDL 61 03/31/2023     Lab Results   Component Value Date    LDLCALC 45 04/22/2024    LDLCALC 77 11/24/2023    LDLCALC 73 03/31/2023       Lab Results   Component Value Date    LDLDIRECT 76.46 02/14/2019         Lab Results   Component Value Date    HGBA1C 5.9 (H) 04/22/2024    HGBA1C 5.7 (H) 11/24/2023    HGBA1C 5.6 01/30/2023      Lab Results   Component Value Date    EGFR 40 10/15/2024    EGFR 38 07/31/2024    EGFR 31 07/26/2024    SODIUM 142 10/15/2024    SODIUM 141 07/31/2024    SODIUM 138 07/26/2024    K 4.2 10/15/2024    K 4.5 07/31/2024    K 4.0 07/26/2024     10/15/2024     07/31/2024     07/26/2024    CO2 28 10/15/2024    CO2 27 07/31/2024    CO2 23 07/26/2024    BUN 11 10/15/2024    BUN 19 07/31/2024    BUN 17 07/26/2024    CREATININE 1.44 (H) 10/15/2024    CREATININE 1.50 (H) 07/31/2024    CREATININE 1.74 (H) 07/26/2024    MG 2.2 04/20/2023    MG 2.1 12/13/2017     Lab Results   Component Value Date    WBC 4.35 10/15/2024    WBC 4.4 10/15/2024    WBC 6.59 07/26/2024    HGB 14.0 10/15/2024    HGB 13.7 10/15/2024    HGB 14.3 07/26/2024    HCT 41.6 10/15/2024    HCT 41.3 10/15/2024    HCT 42.4 07/26/2024    MCV 91 10/15/2024    MCV 91 10/15/2024    MCV 92 07/26/2024    MCH 30.6 10/15/2024    MCH 30.2 10/15/2024    MCH 30.9 07/26/2024    MCHC 33.7 10/15/2024    MCHC 33.2 10/15/2024    MCHC 33.7 07/26/2024     10/15/2024     10/15/2024     07/26/2024      Lab Results   Component Value Date    CALCIUM 9.5 10/15/2024    CALCIUM 9.4  07/31/2024    CALCIUM 9.4 07/26/2024    ALB 4.1 10/15/2024    ALB 4.1 07/26/2024    ALB 4.0 04/22/2024    TP 6.6 10/15/2024    TP 7.0 07/26/2024    TP 7.0 04/22/2024    AST 22 10/15/2024    AST 14 07/26/2024    AST 18 04/22/2024    ALT 19 10/15/2024    ALT 11 07/26/2024    ALT 12 04/22/2024    ALKPHOS 58 10/15/2024    ALKPHOS 65 07/26/2024    ALKPHOS 47 04/22/2024       Lab Results   Component Value Date    NTBNP 952 (H) 06/18/2022    NTBNP 556 (H) 03/14/2020       Current Outpatient Medications:     aspirin (Aspirin Low Dose) 81 mg EC tablet, Take 1 tablet (81 mg total) by mouth daily, Disp: 90 tablet, Rfl: 3    atorvastatin (LIPITOR) 80 mg tablet, Take 1 tablet (80 mg total) by mouth daily, Disp: 90 tablet, Rfl: 3    bictegravir-emtricitab-tenofovir alafenamide (Biktarvy) -25 MG tablet, Take 1 tablet by mouth daily with breakfast, Disp: 90 tablet, Rfl: 1    Calcium Carb-Cholecalciferol (calcium carbonate-vitamin D) 500 mg-5 mcg tablet, TAKE 1 TABLET BY MOUTH DAILY WITH BREAKFAST, Disp: 90 tablet, Rfl: 2    carvedilol (COREG) 6.25 mg tablet, Take 1 tablet (6.25 mg total) by mouth 2 (two) times a day with meals, Disp: 180 tablet, Rfl: 3    cholecalciferol (VITAMIN D3) 1,000 units tablet, Take 2,000 Units by mouth daily, Disp: , Rfl:     nicotine polacrilex (SM Nicotine Polacrilex) 4 MG lozenge, Apply 1 lozenge (4 mg total) to the mouth or throat as needed for smoking cessation, Disp: 243 each, Rfl: 2    sacubitril-valsartan (Entresto) 49-51 MG TABS, Take 1 tablet by mouth 2 (two) times a day, Disp: 180 tablet, Rfl: 3    warfarin (COUMADIN) 5 mg tablet, TAKE 1 TO 1 AND 1/2 TABLETS BY MOUTH DAILY AS DIRECTED BY MD, Disp: 135 tablet, Rfl: 3  Allergies   Allergen Reactions    No Active Allergies     No Known Allergies        Past Medical History:   Diagnosis Date    Asthma     Coronary artery disease     defibrillator    HIV positive (HCC)      Social History     Socioeconomic History    Marital status: Single      Spouse name: Not on file    Number of children: Not on file    Years of education: Not on file    Highest education level: Not on file   Occupational History    Not on file   Tobacco Use    Smoking status: Former     Current packs/day: 0.00     Average packs/day: 1 pack/day for 39.2 years (39.2 ttl pk-yrs)     Types: Cigarettes     Start date: 1984     Quit date: 2023     Years since quittin.6     Passive exposure: Past    Smokeless tobacco: Never   Vaping Use    Vaping status: Never Used   Substance and Sexual Activity    Alcohol use: Yes     Comment: socially    Drug use: No    Sexual activity: Not Currently   Other Topics Concern    Not on file   Social History Narrative    Not on file     Social Drivers of Health     Financial Resource Strain: Low Risk (12/15/2023)    [facility]    Financial Resource Strain     Sometimes people find that their income does not quite cover their living costs.  In the last 12 months, has this happened to you?: No     What is your current work situation? : Unemployed, and not seeking work (ex: student, retired, disabled, unpaid primary care giver)   Food Insecurity: No Food Insecurity (2024)    Nursing - Inadequate Food Risk Classification     Worried About Running Out of Food in the Last Year: Never true     Ran Out of Food in the Last Year: Never true     Ran Out of Food in the Last Year: Not on file   Transportation Needs: Not on file   Physical Activity: Not on file   Stress: Low Risk (12/15/2023)    [facility] from [facility] ([facility]    Stress     Over the last 2 weeks, how often have you been bothered by the following problems: feeling nervous, anxious, on edge?: Not at all     Over the last 2 weeks, how often have you been bothered by the following problems: Not being able to stop or control worrying?: Not at all   Social Connections: Low Risk  (12/15/2023)    Received from [facility] ([facility] ([facility])    Social Connections     How often do you feel isolated from others?: Hardly ever   Intimate Partner Violence: Not on file   Housing Stability: Not on file      Family History   Problem Relation Age of Onset    No Known Problems Mother     Other Father         GI bleed    No Known Problems Sister     No Known Problems Sister     No Known Problems Maternal Grandmother     No Known Problems Maternal Grandfather     No Known Problems Paternal Grandmother     No Known Problems Paternal Grandfather     Suicidality Brother     Drug abuse Brother         overdose    No Known Problems Maternal Aunt     No Known Problems Maternal Aunt     No Known Problems Maternal Aunt     No Known Problems Paternal Aunt      Past Surgical History:   Procedure Laterality Date    ANGIOPLASTY      CARDIAC DEFIBRILLATOR PLACEMENT      CORONARY ANGIOPLASTY      pci placement    TOTAL ABDOMINAL HYSTERECTOMY      US GUIDED INJECTION FOR RESEARCH STUDY  5/7/2018    US GUIDED INJECTION FOR RESEARCH STUDY  1/18/2019    US GUIDED INJECTION FOR RESEARCH STUDY  5/7/2018    US GUIDED INJECTION FOR RESEARCH STUDY  5/14/2018       PREVIOUS WEIGHTS:   Wt Readings from Last 10 Encounters:   10/18/24 78.5 kg (173 lb)   07/29/24 77.9 kg (171 lb 12.8 oz)   07/26/24 76.6 kg (168 lb 14 oz)   07/16/24 77.1 kg (170 lb)   07/02/24 77.2 kg (170 lb 4.8 oz)   04/19/24 77.4 kg (170 lb 9.6 oz)   04/16/24 76.7 kg (169 lb)   04/08/24 77 kg (169 lb 12.1 oz)   03/05/24 75.3 kg (166 lb)   02/19/24 75.3 kg (166 lb)        Review of Systems:  Review of Systems   Respiratory:  Negative for cough, choking, chest tightness, shortness of breath and wheezing.    Cardiovascular:  Negative for chest pain, palpitations and leg swelling.   Musculoskeletal:  Negative for gait problem.   Skin:  Negative for rash.   Neurological:  Negative for dizziness, tremors, syncope, weakness,  light-headedness, numbness and headaches.   Psychiatric/Behavioral:  Negative for agitation and behavioral problems. The patient is not hyperactive.        Physical Exam:  BP 90/60 (BP Location: Left arm, Patient Position: Sitting, Cuff Size: Adult)   Pulse 64     Physical Exam  Constitutional:       General: She is not in acute distress.     Appearance: She is well-developed.   HENT:      Head: Normocephalic and atraumatic.   Neck:      Thyroid: No thyromegaly.      Vascular: No carotid bruit or JVD.      Trachea: No tracheal deviation.   Cardiovascular:      Rate and Rhythm: Normal rate and regular rhythm.      Pulses: Normal pulses.      Heart sounds: Normal heart sounds. No murmur heard.     No friction rub. No gallop.   Pulmonary:      Effort: Pulmonary effort is normal. No respiratory distress.      Breath sounds: Normal breath sounds. No wheezing, rhonchi or rales.   Chest:      Chest wall: No tenderness.   Musculoskeletal:         General: Normal range of motion.      Cervical back: Normal range of motion and neck supple.      Right lower leg: No edema.      Left lower leg: No edema.   Skin:     General: Skin is warm and dry.   Neurological:      General: No focal deficit present.      Mental Status: She is alert and oriented to person, place, and time.      Gait: Gait normal.   Psychiatric:         Mood and Affect: Mood normal.         Behavior: Behavior normal.         Thought Content: Thought content normal.         Judgment: Judgment normal.         -------------------------------------------------------------------------------   CARDIAC EP DEVICE REPORT  Results for orders placed in visit on 09/17/24    Cardiac EP device report    Narrative  MDT-SINGLE CHAMBER ICD - ACTIVE SYSTEM IS MRI CONDITIONAL  CARELINK TRANSMISSION: BATTERY VOLTAGE ADEQUATE (4.3 YRS). <0.1%. ALL AVAILABLE LEAD PARAMETERS WITHIN NORMAL LIMITS. NO SIGNIFICANT HIGH RATE EPISODES. OPTI-VOL WITHIN NORMAL LIMITS. NORMAL DEVICE  "FUNCTION. GV      Results for orders placed in visit on 06/18/24    Cardiac EP device report    Narrative  MDT-SINGLE CHAMBER ICD - ACTIVE SYSTEM IS MRI CONDITIONAL  CARELINK TRANSMISSION: BATTERY VOLTAGE ADEQUATE (3.9 YRS).  <0.1% (VVI 40PPM); ALL AVAILABLE LEAD PARAMETERS WITHIN NORMAL LIMITS. 1 VT-NS EPISODE - 6 BEATS @  BPM. EF 42% (4/12/2023 ECHO); PATIENT TAKING ASA 81, WARFARIN, ENTRESTO, CARVEDILOL. OPTI-VOL WITHIN NORMAL LIMITS. NORMAL DEVICE FUNCTION.  ES      ======================================================  Imaging:   Results Review Statement: No pertinent imaging studies reviewed.    Portions of the record may have been created with voice recognition software. Occasional wrong word or \"sound a like\" substitutions may have occurred due to the inherent limitations of voice recognition software. Read the chart carefully and recognize, using context, where substitutions have occurred.    SIGNATURES:   Gibran Dennison MD   "

## 2024-11-27 DIAGNOSIS — T65.222D TOXIC EFFECT OF TOBACCO CIGARETTE, INTENTIONAL SELF-HARM, SUBSEQUENT ENCOUNTER: ICD-10-CM

## 2024-11-29 ENCOUNTER — APPOINTMENT (OUTPATIENT)
Dept: LAB | Facility: HOSPITAL | Age: 59
End: 2024-11-29
Payer: MEDICARE

## 2024-11-29 ENCOUNTER — HOSPITAL ENCOUNTER (OUTPATIENT)
Dept: MAMMOGRAPHY | Facility: CLINIC | Age: 59
End: 2024-11-29
Payer: MEDICARE

## 2024-11-29 ENCOUNTER — ANTICOAG VISIT (OUTPATIENT)
Dept: CARDIOLOGY CLINIC | Facility: CLINIC | Age: 59
End: 2024-11-29

## 2024-11-29 VITALS — BODY MASS INDEX: 31.83 KG/M2 | HEIGHT: 62 IN | WEIGHT: 173 LBS

## 2024-11-29 DIAGNOSIS — Z79.01 LONG TERM CURRENT USE OF ANTICOAGULANT THERAPY: ICD-10-CM

## 2024-11-29 DIAGNOSIS — I25.5 ISCHEMIC CARDIOMYOPATHY: Primary | ICD-10-CM

## 2024-11-29 DIAGNOSIS — I23.6 LV (LEFT VENTRICULAR) MURAL THROMBUS FOLLOWING MI (HCC): ICD-10-CM

## 2024-11-29 DIAGNOSIS — Z12.31 ENCOUNTER FOR SCREENING MAMMOGRAM FOR BREAST CANCER: ICD-10-CM

## 2024-11-29 DIAGNOSIS — Z95.810 AICD (AUTOMATIC CARDIOVERTER/DEFIBRILLATOR) PRESENT: ICD-10-CM

## 2024-11-29 DIAGNOSIS — I51.3 MURAL THROMBUS OF LEFT VENTRICLE: ICD-10-CM

## 2024-11-29 DIAGNOSIS — I25.10 TRIPLE VESSEL CORONARY ARTERY DISEASE: ICD-10-CM

## 2024-11-29 LAB
INR PPP: 4.8 (ref 0.85–1.19)
PROTHROMBIN TIME: 43.9 SECONDS (ref 12.3–15)

## 2024-11-29 PROCEDURE — 77063 BREAST TOMOSYNTHESIS BI: CPT

## 2024-11-29 PROCEDURE — 77067 SCR MAMMO BI INCL CAD: CPT

## 2024-11-29 PROCEDURE — 36415 COLL VENOUS BLD VENIPUNCTURE: CPT

## 2024-11-29 PROCEDURE — 85610 PROTHROMBIN TIME: CPT

## 2024-11-29 NOTE — PROGRESS NOTES
PC to Airam with high INR of 4.80. She had 4-5 drinks over the holiday. I am having her hold warfarin for 2 days and then go back to regular dosing of 7.5 M/W/F and 5 mgs all other days of the week.  Recheck in one week.

## 2024-12-03 ENCOUNTER — PATIENT OUTREACH (OUTPATIENT)
Dept: SURGERY | Facility: CLINIC | Age: 59
End: 2024-12-03

## 2024-12-03 NOTE — PROGRESS NOTES
This cm reached out to ct to find out if she was coming to the luncheon. Ct let this cm know she was not sure.

## 2024-12-05 ENCOUNTER — ANTICOAG VISIT (OUTPATIENT)
Dept: CARDIOLOGY CLINIC | Facility: CLINIC | Age: 59
End: 2024-12-05

## 2024-12-05 ENCOUNTER — APPOINTMENT (OUTPATIENT)
Dept: LAB | Facility: HOSPITAL | Age: 59
End: 2024-12-05
Payer: MEDICARE

## 2024-12-05 DIAGNOSIS — Z95.810 AICD (AUTOMATIC CARDIOVERTER/DEFIBRILLATOR) PRESENT: ICD-10-CM

## 2024-12-05 DIAGNOSIS — I25.5 ISCHEMIC CARDIOMYOPATHY: Primary | ICD-10-CM

## 2024-12-05 DIAGNOSIS — I51.3 MURAL THROMBUS OF LEFT VENTRICLE: ICD-10-CM

## 2024-12-05 DIAGNOSIS — I23.6 LV (LEFT VENTRICULAR) MURAL THROMBUS FOLLOWING MI (HCC): ICD-10-CM

## 2024-12-05 DIAGNOSIS — M81.0 AGE-RELATED OSTEOPOROSIS WITHOUT CURRENT PATHOLOGICAL FRACTURE: ICD-10-CM

## 2024-12-05 DIAGNOSIS — Z79.01 LONG TERM CURRENT USE OF ANTICOAGULANT THERAPY: ICD-10-CM

## 2024-12-05 DIAGNOSIS — I25.10 TRIPLE VESSEL CORONARY ARTERY DISEASE: ICD-10-CM

## 2024-12-05 LAB
INR PPP: 2.93 (ref 0.85–1.19)
PROTHROMBIN TIME: 30.3 SECONDS (ref 12.3–15)

## 2024-12-05 PROCEDURE — 85610 PROTHROMBIN TIME: CPT

## 2024-12-05 PROCEDURE — 36415 COLL VENOUS BLD VENIPUNCTURE: CPT

## 2024-12-05 NOTE — PROGRESS NOTES
Spoke with patient, advised INR good, continue 7.5 mg Mon Wed Fri, 5 mg all other days, will recheck in 3 weeks 12/26/24

## 2024-12-10 ENCOUNTER — OFFICE VISIT (OUTPATIENT)
Dept: NEPHROLOGY | Facility: CLINIC | Age: 59
End: 2024-12-10
Payer: MEDICARE

## 2024-12-10 VITALS
BODY MASS INDEX: 32.02 KG/M2 | HEIGHT: 62 IN | WEIGHT: 174 LBS | SYSTOLIC BLOOD PRESSURE: 102 MMHG | DIASTOLIC BLOOD PRESSURE: 80 MMHG

## 2024-12-10 DIAGNOSIS — I25.5 ISCHEMIC CARDIOMYOPATHY: ICD-10-CM

## 2024-12-10 DIAGNOSIS — N18.32 STAGE 3B CHRONIC KIDNEY DISEASE (HCC): Primary | ICD-10-CM

## 2024-12-10 PROCEDURE — 99214 OFFICE O/P EST MOD 30 MIN: CPT | Performed by: INTERNAL MEDICINE

## 2024-12-10 NOTE — ASSESSMENT & PLAN NOTE
Chronic kidney disease, stage IIIb, baseline creatinine 1.3-1.7, recent creatinine 1.44, EGFR 48.  Previous microalbumin creatinine ratio normal

## 2024-12-10 NOTE — PROGRESS NOTES
"NEPHROLOGY OUTPATIENT PROGRESS NOTE   Ariam Rainey 59 y.o. female MRN: [id number]  Reason for visit: CKD    Assessment & Plan  Stage 3b chronic kidney disease (HCC)  Chronic kidney disease, stage IIIb, baseline creatinine 1.3-1.7, recent creatinine 1.44, EGFR 48.  Previous microalbumin creatinine ratio normal  Ischemic cardiomyopathy  Previous ejection fraction 42%, volume status currently appears stable.  Entresto as per cardiology.    Summary:  Overall renal function remains quite stable  Volume status acceptable  No changes from nephrology standpoint  Continued surveillance monitoring recommend follow-up in 1 year with repeat labs at that time    SUBJECTIVE / INTERVAL HISTORY:  She has been doing well.  Offers no new complaints.  Denies any dizziness lightheadedness.  Denies any chest pain shortness of breath or significant swelling.  No changes in appetite.  Denies any recent hospitalizations or illnesses.      OBJECTIVE:  /80 (BP Location: Right arm, Patient Position: Sitting, Cuff Size: Large)   Ht 5' 2\" (1.575 m)   Wt 78.9 kg (174 lb)   BMI 31.83 kg/m²   Vitals:    12/10/24 1357   Weight: 78.9 kg (174 lb)       Physical Exam  Constitutional:       Appearance: She is not ill-appearing.   Eyes:      General: No scleral icterus.  Cardiovascular:      Rate and Rhythm: Normal rate and regular rhythm.   Pulmonary:      Effort: Pulmonary effort is normal.      Breath sounds: Normal breath sounds.   Abdominal:      General: There is no distension.      Palpations: Abdomen is soft.      Tenderness: There is no abdominal tenderness.   Musculoskeletal:      Right lower leg: No edema.      Left lower leg: No edema.   Skin:     General: Skin is warm.      Findings: No rash.   Neurological:      Mental Status: She is alert and oriented to person, place, and time.           Medications:    Current Outpatient Medications:     aspirin (Aspirin Low Dose) 81 mg EC tablet, Take 1 tablet (81 mg total) by mouth daily, " Disp: 90 tablet, Rfl: 3    atorvastatin (LIPITOR) 80 mg tablet, Take 1 tablet (80 mg total) by mouth daily, Disp: 90 tablet, Rfl: 3    bictegravir-emtricitab-tenofovir alafenamide (Biktarvy) -25 MG tablet, Take 1 tablet by mouth daily with breakfast, Disp: 90 tablet, Rfl: 1    Calcium Carb-Cholecalciferol (calcium carbonate-vitamin D) 500 mg-5 mcg tablet, Take 1 tablet by mouth daily with breakfast, Disp: 90 tablet, Rfl: 1    carvedilol (COREG) 6.25 mg tablet, Take 1 tablet (6.25 mg total) by mouth 2 (two) times a day with meals, Disp: 180 tablet, Rfl: 3    cholecalciferol (VITAMIN D3) 1,000 units tablet, Take 2,000 Units by mouth daily, Disp: , Rfl:     nicotine polacrilex (SM Nicotine Polacrilex) 4 MG lozenge, Apply 1 lozenge (4 mg total) to the mouth or throat as needed for smoking cessation, Disp: 243 each, Rfl: 2    sacubitril-valsartan (Entresto) 49-51 MG TABS, Take 1 tablet by mouth 2 (two) times a day, Disp: 180 tablet, Rfl: 3    warfarin (COUMADIN) 5 mg tablet, TAKE 1 TO 1 AND 1/2 TABLETS BY MOUTH DAILY AS DIRECTED BY MD, Disp: 135 tablet, Rfl: 3    Laboratory Results:  Results for orders placed or performed in visit on 12/05/24   Protime-INR    Collection Time: 12/05/24  2:27 PM   Result Value Ref Range    Protime 30.3 (H) 12.3 - 15.0 seconds    INR 2.93 (H) 0.85 - 1.19     *Note: Due to a large number of results and/or encounters for the requested time period, some results have not been displayed. A complete set of results can be found in Results Review.

## 2024-12-10 NOTE — ASSESSMENT & PLAN NOTE
Previous ejection fraction 42%, volume status currently appears stable.  Entresto as per cardiology.

## 2024-12-19 ENCOUNTER — PATIENT OUTREACH (OUTPATIENT)
Dept: SURGERY | Facility: CLINIC | Age: 59
End: 2024-12-19

## 2024-12-26 ENCOUNTER — APPOINTMENT (OUTPATIENT)
Dept: LAB | Facility: HOSPITAL | Age: 59
End: 2024-12-26
Payer: MEDICARE

## 2024-12-26 ENCOUNTER — ANTICOAG VISIT (OUTPATIENT)
Dept: CARDIOLOGY CLINIC | Facility: CLINIC | Age: 59
End: 2024-12-26

## 2024-12-26 DIAGNOSIS — Z72.89 OTHER PROBLEMS RELATED TO LIFESTYLE: ICD-10-CM

## 2024-12-26 DIAGNOSIS — I10 ESSENTIAL HYPERTENSION: ICD-10-CM

## 2024-12-26 DIAGNOSIS — Z79.899 OTHER LONG TERM (CURRENT) DRUG THERAPY: ICD-10-CM

## 2024-12-26 DIAGNOSIS — Z11.3 ENCOUNTER FOR SCREENING FOR BACTERIAL SEXUALLY TRANSMITTED DISEASE: ICD-10-CM

## 2024-12-26 DIAGNOSIS — Z20.2 CONTACT WITH AND (SUSPECTED) EXPOSURE TO INFECTIONS WITH A PREDOMINANTLY SEXUAL MODE OF TRANSMISSION: ICD-10-CM

## 2024-12-26 DIAGNOSIS — I25.10 TRIPLE VESSEL CORONARY ARTERY DISEASE: ICD-10-CM

## 2024-12-26 DIAGNOSIS — I51.3 MURAL THROMBUS OF LEFT VENTRICLE: ICD-10-CM

## 2024-12-26 DIAGNOSIS — Z95.810 AICD (AUTOMATIC CARDIOVERTER/DEFIBRILLATOR) PRESENT: ICD-10-CM

## 2024-12-26 DIAGNOSIS — E78.2 MIXED HYPERLIPIDEMIA: ICD-10-CM

## 2024-12-26 DIAGNOSIS — D72.89 OTHER SPECIFIED DISORDERS OF WHITE BLOOD CELLS: ICD-10-CM

## 2024-12-26 DIAGNOSIS — I25.5 ISCHEMIC CARDIOMYOPATHY: Primary | ICD-10-CM

## 2024-12-26 DIAGNOSIS — I25.5 ISCHEMIC CARDIOMYOPATHY: ICD-10-CM

## 2024-12-26 DIAGNOSIS — Z11.1 SCREENING-PULMONARY TB: ICD-10-CM

## 2024-12-26 DIAGNOSIS — Z79.01 LONG TERM CURRENT USE OF ANTICOAGULANT THERAPY: ICD-10-CM

## 2024-12-26 DIAGNOSIS — B20 HIV DISEASE (HCC): ICD-10-CM

## 2024-12-26 DIAGNOSIS — Z13.1 SCREENING FOR DIABETES MELLITUS: ICD-10-CM

## 2024-12-26 DIAGNOSIS — I23.6 LV (LEFT VENTRICULAR) MURAL THROMBUS FOLLOWING MI (HCC): ICD-10-CM

## 2024-12-26 DIAGNOSIS — N18.32 STAGE 3B CHRONIC KIDNEY DISEASE (HCC): ICD-10-CM

## 2024-12-26 LAB
INR PPP: 2.52 (ref 0.85–1.19)
PROTHROMBIN TIME: 27.1 SECONDS (ref 12.3–15)

## 2024-12-26 PROCEDURE — 36415 COLL VENOUS BLD VENIPUNCTURE: CPT

## 2024-12-26 PROCEDURE — 85610 PROTHROMBIN TIME: CPT

## 2024-12-30 ENCOUNTER — PATIENT OUTREACH (OUTPATIENT)
Dept: SURGERY | Facility: CLINIC | Age: 59
End: 2024-12-30

## 2024-12-30 NOTE — PROGRESS NOTES
This cm reached out to ct for her new social security award letter and 4 pay stubs.     RW sliding fee scale application was completed and sent to the [facility] with Cherrie cc'd on the email with requested documents. Application was scanned into ct's chart.

## 2025-01-08 ENCOUNTER — IN-CLINIC DEVICE VISIT (OUTPATIENT)
Dept: CARDIOLOGY CLINIC | Facility: CLINIC | Age: 60
End: 2025-01-08
Payer: MEDICARE

## 2025-01-08 DIAGNOSIS — Z95.810 PRESENCE OF AUTOMATIC CARDIOVERTER/DEFIBRILLATOR (AICD): Primary | ICD-10-CM

## 2025-01-08 PROCEDURE — 93282 PRGRMG EVAL IMPLANTABLE DFB: CPT | Performed by: INTERNAL MEDICINE

## 2025-01-08 NOTE — PROGRESS NOTES
MDT SC ICD - ACTIVE SYSTEM IS MRI CONDITIONAL   DEVICE INTERROGATED IN THE [place] OFFICE. BATTERY VOLTAGE ADEQUATE (3.9 YRS).  <0.1%. ALL LEAD PARAMETERS WITHIN NORMAL LIMITS. 1 NSVT (8 @ 194). PT TAKES CARVEDILOL. EF 42% (ECHO 4/12/23). OPTI-VOL WITHIN NORMAL LIMITS. NO PROGRAMMING CHANGES MADE TO DEVICE PARAMETERS. NORMAL DEVICE FUNCTION. [facility]

## 2025-01-15 ENCOUNTER — RESULTS FOLLOW-UP (OUTPATIENT)
Dept: CARDIOLOGY CLINIC | Facility: CLINIC | Age: 60
End: 2025-01-15

## 2025-01-21 ENCOUNTER — ANTICOAG VISIT (OUTPATIENT)
Dept: CARDIOLOGY CLINIC | Facility: CLINIC | Age: 60
End: 2025-01-21

## 2025-01-21 ENCOUNTER — APPOINTMENT (OUTPATIENT)
Dept: LAB | Facility: HOSPITAL | Age: 60
End: 2025-01-21
Payer: MEDICARE

## 2025-01-21 DIAGNOSIS — I25.10 TRIPLE VESSEL CORONARY ARTERY DISEASE: ICD-10-CM

## 2025-01-21 DIAGNOSIS — Z79.01 LONG TERM CURRENT USE OF ANTICOAGULANT THERAPY: ICD-10-CM

## 2025-01-21 DIAGNOSIS — Z95.810 AICD (AUTOMATIC CARDIOVERTER/DEFIBRILLATOR) PRESENT: ICD-10-CM

## 2025-01-21 DIAGNOSIS — I51.3 MURAL THROMBUS OF LEFT VENTRICLE: ICD-10-CM

## 2025-01-21 DIAGNOSIS — I25.5 ISCHEMIC CARDIOMYOPATHY: Primary | ICD-10-CM

## 2025-01-21 DIAGNOSIS — I23.6 LV (LEFT VENTRICULAR) MURAL THROMBUS FOLLOWING MI (HCC): ICD-10-CM

## 2025-01-21 LAB
INR PPP: 3.04 (ref 0.85–1.19)
PROTHROMBIN TIME: 31.1 SECONDS (ref 12.3–15)

## 2025-01-21 PROCEDURE — 36415 COLL VENOUS BLD VENIPUNCTURE: CPT

## 2025-01-21 PROCEDURE — 85610 PROTHROMBIN TIME: CPT

## 2025-01-21 NOTE — PROGRESS NOTES
Spoke with patient, advised INR good, continue 7.5 mg Mon Wed Fri, 5 mg all other days, requesting recheck in 3 weeks, patient states she will be out of town 2/4 to 2/15, will check before she goes.

## 2025-01-27 ENCOUNTER — PATIENT OUTREACH (OUTPATIENT)
Dept: SURGERY | Facility: CLINIC | Age: 60
End: 2025-01-27

## 2025-01-27 ENCOUNTER — HOSPITAL ENCOUNTER (EMERGENCY)
Facility: HOSPITAL | Age: 60
Discharge: HOME | End: 2025-01-28
Attending: EMERGENCY MEDICINE
Payer: MEDICARE

## 2025-01-27 ENCOUNTER — APPOINTMENT (EMERGENCY)
Dept: CT IMAGING | Facility: HOSPITAL | Age: 60
End: 2025-01-27
Payer: MEDICARE

## 2025-01-27 DIAGNOSIS — K59.00 CONSTIPATION: ICD-10-CM

## 2025-01-27 DIAGNOSIS — K44.9 HIATAL HERNIA: ICD-10-CM

## 2025-01-27 DIAGNOSIS — R09.A2 SENSATION OF FOREIGN BODY IN ESOPHAGUS: Primary | ICD-10-CM

## 2025-01-27 LAB
2HR DELTA HS TROPONIN: 0 NG/L
ALBUMIN SERPL BCG-MCNC: 4 G/DL (ref 3.5–5)
ALP SERPL-CCNC: 55 U/L (ref 34–104)
ALT SERPL W P-5'-P-CCNC: 9 U/L (ref 7–52)
ANION GAP SERPL CALCULATED.3IONS-SCNC: 4 MMOL/L (ref 4–13)
AST SERPL W P-5'-P-CCNC: 15 U/L (ref 13–39)
BASOPHILS # BLD AUTO: 0.02 THOUSANDS/ΜL (ref 0–0.1)
BASOPHILS NFR BLD AUTO: 0 % (ref 0–1)
BILIRUB SERPL-MCNC: 0.9 MG/DL (ref 0.2–1)
BUN SERPL-MCNC: 20 MG/DL (ref 5–25)
CALCIUM SERPL-MCNC: 9.7 MG/DL (ref 8.4–10.2)
CARDIAC TROPONIN I PNL SERPL HS: 5 NG/L (ref ?–50)
CARDIAC TROPONIN I PNL SERPL HS: 5 NG/L (ref ?–50)
CHLORIDE SERPL-SCNC: 109 MMOL/L (ref 96–108)
CO2 SERPL-SCNC: 29 MMOL/L (ref 21–32)
CREAT SERPL-MCNC: 1.5 MG/DL (ref 0.6–1.3)
EOSINOPHIL # BLD AUTO: 0.07 THOUSAND/ΜL (ref 0–0.61)
EOSINOPHIL NFR BLD AUTO: 2 % (ref 0–6)
ERYTHROCYTE [DISTWIDTH] IN BLOOD BY AUTOMATED COUNT: 14.3 % (ref 11.6–15.1)
GFR SERPL CREATININE-BSD FRML MDRD: 38 ML/MIN/1.73SQ M
GLUCOSE SERPL-MCNC: 89 MG/DL (ref 65–140)
HCT VFR BLD AUTO: 38.3 % (ref 34.8–46.1)
HGB BLD-MCNC: 12.7 G/DL (ref 11.5–15.4)
IMM GRANULOCYTES # BLD AUTO: 0.01 THOUSAND/UL (ref 0–0.2)
IMM GRANULOCYTES NFR BLD AUTO: 0 % (ref 0–2)
LIPASE SERPL-CCNC: 56 U/L (ref 11–82)
LYMPHOCYTES # BLD AUTO: 2.02 THOUSANDS/ΜL (ref 0.6–4.47)
LYMPHOCYTES NFR BLD AUTO: 42 % (ref 14–44)
MCH RBC QN AUTO: 30.1 PG (ref 26.8–34.3)
MCHC RBC AUTO-ENTMCNC: 33.2 G/DL (ref 31.4–37.4)
MCV RBC AUTO: 91 FL (ref 82–98)
MONOCYTES # BLD AUTO: 0.34 THOUSAND/ΜL (ref 0.17–1.22)
MONOCYTES NFR BLD AUTO: 7 % (ref 4–12)
NEUTROPHILS # BLD AUTO: 2.33 THOUSANDS/ΜL (ref 1.85–7.62)
NEUTS SEG NFR BLD AUTO: 49 % (ref 43–75)
NRBC BLD AUTO-RTO: 0 /100 WBCS
PLATELET # BLD AUTO: 149 THOUSANDS/UL (ref 149–390)
PMV BLD AUTO: 11 FL (ref 8.9–12.7)
POTASSIUM SERPL-SCNC: 4.2 MMOL/L (ref 3.5–5.3)
PROT SERPL-MCNC: 6.8 G/DL (ref 6.4–8.4)
RBC # BLD AUTO: 4.22 MILLION/UL (ref 3.81–5.12)
SODIUM SERPL-SCNC: 142 MMOL/L (ref 135–147)
WBC # BLD AUTO: 4.79 THOUSAND/UL (ref 4.31–10.16)

## 2025-01-27 PROCEDURE — 36415 COLL VENOUS BLD VENIPUNCTURE: CPT

## 2025-01-27 PROCEDURE — 71260 CT THORAX DX C+: CPT

## 2025-01-27 PROCEDURE — 74177 CT ABD & PELVIS W/CONTRAST: CPT

## 2025-01-27 PROCEDURE — 83690 ASSAY OF LIPASE: CPT

## 2025-01-27 PROCEDURE — 93005 ELECTROCARDIOGRAM TRACING: CPT

## 2025-01-27 PROCEDURE — 85025 COMPLETE CBC W/AUTO DIFF WBC: CPT

## 2025-01-27 PROCEDURE — 84484 ASSAY OF TROPONIN QUANT: CPT

## 2025-01-27 PROCEDURE — 99285 EMERGENCY DEPT VISIT HI MDM: CPT

## 2025-01-27 PROCEDURE — 96374 THER/PROPH/DIAG INJ IV PUSH: CPT

## 2025-01-27 PROCEDURE — 99284 EMERGENCY DEPT VISIT MOD MDM: CPT

## 2025-01-27 PROCEDURE — 80053 COMPREHEN METABOLIC PANEL: CPT

## 2025-01-27 RX ORDER — FAMOTIDINE 10 MG/ML
20 INJECTION, SOLUTION INTRAVENOUS ONCE
Status: COMPLETED | OUTPATIENT
Start: 2025-01-27 | End: 2025-01-27

## 2025-01-27 RX ORDER — MAGNESIUM HYDROXIDE/ALUMINUM HYDROXICE/SIMETHICONE 120; 1200; 1200 MG/30ML; MG/30ML; MG/30ML
30 SUSPENSION ORAL ONCE
Status: COMPLETED | OUTPATIENT
Start: 2025-01-27 | End: 2025-01-27

## 2025-01-27 RX ORDER — LIDOCAINE HYDROCHLORIDE 20 MG/ML
15 SOLUTION OROPHARYNGEAL ONCE
Status: COMPLETED | OUTPATIENT
Start: 2025-01-27 | End: 2025-01-27

## 2025-01-27 RX ADMIN — LIDOCAINE HYDROCHLORIDE 15 ML: 20 SOLUTION ORAL at 20:46

## 2025-01-27 RX ADMIN — IOHEXOL 100 ML: 350 INJECTION, SOLUTION INTRAVENOUS at 22:50

## 2025-01-27 RX ADMIN — FAMOTIDINE 20 MG: 10 INJECTION, SOLUTION INTRAVENOUS at 20:47

## 2025-01-27 RX ADMIN — ALUMINUM HYDROXIDE, MAGNESIUM HYDROXIDE, AND SIMETHICONE 30 ML: 200; 200; 20 SUSPENSION ORAL at 20:47

## 2025-01-28 ENCOUNTER — NURSE TRIAGE (OUTPATIENT)
Age: 60
End: 2025-01-28

## 2025-01-28 VITALS
RESPIRATION RATE: 16 BRPM | HEART RATE: 50 BPM | TEMPERATURE: 97.9 F | DIASTOLIC BLOOD PRESSURE: 74 MMHG | WEIGHT: 175.93 LBS | OXYGEN SATURATION: 97 % | BODY MASS INDEX: 32.18 KG/M2 | SYSTOLIC BLOOD PRESSURE: 142 MMHG

## 2025-01-28 LAB
ATRIAL RATE: 51 BPM
ATRIAL RATE: 54 BPM
P AXIS: 68 DEGREES
P AXIS: 68 DEGREES
PR INTERVAL: 136 MS
PR INTERVAL: 136 MS
QRS AXIS: 117 DEGREES
QRS AXIS: 121 DEGREES
QRSD INTERVAL: 90 MS
QRSD INTERVAL: 96 MS
QT INTERVAL: 466 MS
QT INTERVAL: 498 MS
QTC INTERVAL: 441 MS
QTC INTERVAL: 458 MS
T WAVE AXIS: 84 DEGREES
T WAVE AXIS: 85 DEGREES
VENTRICULAR RATE: 51 BPM
VENTRICULAR RATE: 54 BPM

## 2025-01-28 PROCEDURE — 93010 ELECTROCARDIOGRAM REPORT: CPT | Performed by: INTERNAL MEDICINE

## 2025-01-28 RX ORDER — FAMOTIDINE 20 MG/1
20 TABLET, FILM COATED ORAL DAILY
Qty: 30 TABLET | Refills: 0 | Status: SHIPPED | OUTPATIENT
Start: 2025-01-28

## 2025-01-28 NOTE — DISCHARGE INSTRUCTIONS
Follow-up with PCP.  Follow-up with GI.  Symptomatic care as discussed.  Pepcid as prescribed.  Return to ED if symptoms worsen, fail to improve, or develop as listed in follow-up section.

## 2025-01-28 NOTE — TELEPHONE ENCOUNTER
"Consult 3/5/24 Dr. Castillo (encounter for colorectal screening), CT colonography ordered, completed 3/25/24 negative to repeat in five years. Patient seen in [facility] ED 1/247/25, sensation of foreign body in esophagus. Labs/CT completed. Discharged on Pepcid 20 mg daily.  Referred to GI     Patient continues to have issues with swallowing today.  Patient scheduled for urgent visit Dr. Hamlin [facility] office 1/31/25. She is leaving for [place] 2/4/25. Patient willing to see another provider.    Patient advised to report back to ED if symptoms worsen.    Reason for Disposition   Patient wants to be seen    Answer Assessment - Initial Assessment Questions  1. DESCRIPTION: \"Tell me more about this problem.\" \"Are you  having trouble swallowing liquids, solids, or both?\" \"Any trouble with swallowing saliva (spit)?\"      Swallowing issues  2. SEVERITY: \"How bad is the swallowing difficulty?\"  (Scale 1-10; or mild, moderate, severe)      moderate  3. ONSET: \"When did the swallowing problems begin?\"       Seen in ED yesterday 1/27/25, issue started last Friday  4. CAUSE: \"What do you think is causing the problem?\"  (e.g., dry mouth, food or pill stuck in throat, mouth pain, sore throat, progression of disease process such as dementia or Parkinson's disease).       Unknown, ED notes hiatal hernia may be cause  5. CHRONIC or RECURRENT: \"Is this a new problem for you?\"  If No, ask: \"How long have you had this problem?\" (e.g., days, weeks, months)       new  6. OTHER SYMPTOMS: \"Do you have any other symptoms?\" (e.g., chest pain, difficulty breathing, mouth sores, sore throat, swollen tongue, chest pain)        7. PREGNANCY: \"Is there any chance you are pregnant?\" \"When was your last menstrual period?\"      N/A    Protocols used: Swallowing Difficulty-Adult-[place]    "

## 2025-01-28 NOTE — TELEPHONE ENCOUNTER
----- Message from Lisandra LLANES sent at 1/28/2025 12:14 PM EST -----  Patient was seen in ED for Sensation of foreign body in esophagus  Saw Dr. Castillo 1 time in March for Consult to colon  No Available FU -  Pt going OOT next week would like to get in before  Please return call to pt to discuss/Advise

## 2025-01-28 NOTE — ED PROVIDER NOTES
"Time reflects when diagnosis was documented in both MDM as applicable and the Disposition within this note       Time User Action Codes Description Comment    1/28/2025 12:11 AM Ira Daniela Add [R09.A2] Sensation of foreign body in esophagus     1/28/2025 12:13 AM Ira Daniela Add [K44.9] Hiatal hernia     1/28/2025 12:14 AM Ira Daniela Add [K59.00] Constipation           ED Disposition       ED Disposition   Discharge    Condition   Stable    Date/Time   Tue Jan 28, 2025 12:06 AM    Comment   Airam Rainey discharge to home/self care.                   Assessment & Plan       Medical Decision Making  Patient is a 59-year-old female presenting with foreign body sensation in esophagus starting on Friday.  On physical examination, patient is well-appearing in no acute distress.  Tolerating secretions, normal phonation.  Tenderness palpation of epigastric area, right upper quadrant, and left upper quadrant.  Tenderness to palpation of sternum.    Plan: GI cocktail for symptoms.  CBC, CMP, lipase, troponin.  EKG and CT chest abdomen and pelvis.  CBC, lipase and CMP unremarkable.  0-hour troponin 5, will trend with 2-hour troponin.  EKG without acute ischemic changes.  Repeat EKG without significant changes from previous.  Delta troponin 0, will cancel 4-hour troponin.  CT chest abdomen and pelvis final read: \"1 .  No acute cardiopulmonary process.  2.  Constipation.\" Also revealed \"small hiatal hernia\"  Patient reports improvement of symptoms following medication.  Hiatal hernia likely attributing to symptoms.  GI referral placed.  Instructed patient to follow-up with PCP and GI.  Pepcid sent to pharmacy.    Discussed findings from the visit with the patient.  We had a conversation regarding supportive care and indications for return.  Recommended appropriate follow-up.  Patient and/or family understand and agree with plan.    Portions of the record may have been created with voice recognition software. Occasional " "use of the incorrect word or \"sound a like\" substitutions may have occurred due to the inherent limitations of voice recognition software. Read the chart carefully and recognize, using context, where substitutions have occurred.       Amount and/or Complexity of Data Reviewed  Labs: ordered. Decision-making details documented in ED Course.  Radiology: ordered. Decision-making details documented in ED Course.  ECG/medicine tests:  Decision-making details documented in ED Course.    Risk  OTC drugs.  Prescription drug management.        ED Course as of 01/28/25 0331   Mon Jan 27, 2025 2111 ECG 12 lead  Sinus bradycardia 54 bpm.  RAD.  .  QTc 441.  No ST elevations or depressions as interpreted by me.  Similar compared to previous EKG.   2201 CBC and differential  WNL   2233 hs TnI 0hr: 5  Will trend with 2 hr   2233 LIPASE: 56  WNL   2234 Comprehensive metabolic panel(!)  Similar compared to previous   2323 ECG 12 lead  Repeat EKG without significant changes from previous.   2339 Delta 2hr hsTnI: 0   Tue Jan 28, 2025   0004 CT chest abdomen pelvis w contrast  \"1. No acute cardiopulmonary process.  2. Constipation.\"       Medications   aluminum-magnesium hydroxide-simethicone (MAALOX) oral suspension 30 mL (30 mL Oral Given 1/27/25 2047)   Famotidine (PF) (PEPCID) injection 20 mg (20 mg Intravenous Given 1/27/25 2047)   Lidocaine Viscous HCl (XYLOCAINE) 2 % mucosal solution 15 mL (15 mL Swish & Swallow Given 1/27/25 2046)   iohexol (OMNIPAQUE) 350 MG/ML injection (SINGLE-DOSE) 100 mL (100 mL Intravenous Given 1/27/25 2250)       ED Risk Strat Scores   HEART Risk Score      Flowsheet Row Most Recent Value   Heart Score Risk Calculator    History 0 Filed at: 01/27/2025 2339   ECG 0 Filed at: 01/27/2025 2339   Age 1 Filed at: 01/27/2025 2339   Risk Factors 2 Filed at: 01/27/2025 2339   Troponin 0 Filed at: 01/27/2025 2339   HEART Score 3 Filed at: 01/27/2025 2339          HEART Risk Score      Flowsheet Row Most " Recent Value   Heart Score Risk Calculator    History 0 Filed at: 01/27/2025 2339   ECG 0 Filed at: 01/27/2025 2339   Age 1 Filed at: 01/27/2025 2339   Risk Factors 2 Filed at: 01/27/2025 2339   Troponin 0 Filed at: 01/27/2025 2339   HEART Score 3 Filed at: 01/27/2025 2339                            SBIRT 20yo+      Flowsheet Row Most Recent Value   Initial Alcohol Screen: US AUDIT-C     1. How often do you have a drink containing alcohol? 0 Filed at: 01/27/2025 2049   2. How many drinks containing alcohol do you have on a typical day you are drinking?  0 Filed at: 01/27/2025 2049   3a. Male UNDER 65: How often do you have five or more drinks on one occasion? 0 Filed at: 01/27/2025 2049   3b. FEMALE Any Age, or MALE 65+: How often do you have 4 or more drinks on one occassion? 0 Filed at: 01/27/2025 2049   Audit-C Score 0 Filed at: 01/27/2025 2049   ALICIA: How many times in the past year have you...    Used an illegal drug or used a prescription medication for non-medical reasons? Never Filed at: 01/27/2025 2049                            History of Present Illness       Chief Complaint   Patient presents with    Vomiting     Pt reports she went out to eat on Friday and reports something got stuck in her esophagus and she hasn't been able to eat since. Reports trying to eat a Big Mac today and she said it got stuck again.       Past Medical History:   Diagnosis Date    Asthma     Coronary artery disease     defibrillator    HIV positive (HCC)       Past Surgical History:   Procedure Laterality Date    ANGIOPLASTY      CARDIAC DEFIBRILLATOR PLACEMENT      CORONARY ANGIOPLASTY      pci placement    TOTAL ABDOMINAL HYSTERECTOMY      US GUIDED INJECTION FOR RESEARCH STUDY  5/7/2018    US GUIDED INJECTION FOR RESEARCH STUDY  1/18/2019    US GUIDED INJECTION FOR RESEARCH STUDY  5/7/2018    US GUIDED INJECTION FOR RESEARCH STUDY  5/14/2018      Family History   Problem Relation Age of Onset    No Known Problems Mother      "Other Father         GI bleed    No Known Problems Sister     No Known Problems Sister     No Known Problems Maternal Grandmother     No Known Problems Maternal Grandfather     No Known Problems Paternal Grandmother     No Known Problems Paternal Grandfather     Suicidality Brother     Drug abuse Brother         overdose    No Known Problems Maternal Aunt     No Known Problems Maternal Aunt     No Known Problems Maternal Aunt     No Known Problems Paternal Aunt       Social History     Tobacco Use    Smoking status: Former     Current packs/day: 0.00     Average packs/day: 1 pack/day for 39.2 years (39.2 ttl pk-yrs)     Types: Cigarettes     Start date: 1984     Quit date: 2023     Years since quittin.8     Passive exposure: Past    Smokeless tobacco: Never   Vaping Use    Vaping status: Never Used   Substance Use Topics    Alcohol use: Yes     Comment: socially    Drug use: No      E-Cigarette/Vaping    E-Cigarette Use Never User       E-Cigarette/Vaping Substances    Nicotine No     THC No     CBD No     Flavoring No     Other No     Unknown No       I have reviewed and agree with the history as documented.     Patient is a 59-year-old female presenting with foreign body sensation in esophagus starting on Friday.  States on Friday she was out to eat, was eating steak and felt as though the steak got stuck in her esophagus.  States she was regurgitating water following.  He was able to eat on Saturday and  and keep water down without issue.  States on Saturday and  \"food was able to get through\" without difficulty. Then today patient tried eating a big Mac and had the same foreign body sensation.  States she vomited was unable to keep anything down.  Denies any blood in the vomit or hemoptysis.  Last bowel movement was on Saturday, denies melena or hematochezia.  Reports she is currently having abdominal pain.   Denies chest pain or shortness of breath.  Denies history of abdominal " surgery.  Denies fever, chills, sweats, headaches, vision changes, lightheaded/dizziness, numbness/tingling, sore throat, runny nose, cough, congestion, urinary symptoms, etc.      Vomiting      Review of Systems   Gastrointestinal:  Positive for vomiting.           Objective       ED Triage Vitals   Temperature Pulse Blood Pressure Respirations SpO2 Patient Position - Orthostatic VS   01/27/25 1802 01/27/25 1802 01/27/25 1803 01/27/25 1802 01/27/25 1802 01/27/25 1803   97.9 °F (36.6 °C) 67 112/70 18 99 % Sitting      Temp Source Heart Rate Source BP Location FiO2 (%) Pain Score    01/27/25 1802 01/27/25 1802 01/27/25 1803 -- 01/27/25 2048    Oral Monitor Right arm  5      Vitals      Date and Time Temp Pulse SpO2 Resp BP Pain Score FACES Pain Rating User   01/28/25 0015 -- 50 97 % 16 -- -- -- MM   01/27/25 2315 -- 52 100 % 18 142/74 -- -- DW   01/27/25 2300 -- 56 98 % 19 142/74 -- -- MM   01/27/25 2051 -- 52 100 % 18 116/73 -- -- DW   01/27/25 2048 -- -- -- -- -- 5 -- DW   01/27/25 1803 -- -- -- -- 112/70 -- -- LP   01/27/25 1802 97.9 °F (36.6 °C) 67 99 % 18 -- -- -- LP            Physical Exam  Vitals and nursing note reviewed.   Constitutional:       General: She is not in acute distress.     Appearance: She is well-developed. She is not ill-appearing, toxic-appearing or diaphoretic.   HENT:      Head: Normocephalic and atraumatic.      Right Ear: External ear normal.      Left Ear: External ear normal.      Nose: Nose normal.      Mouth/Throat:      Mouth: Mucous membranes are moist.      Pharynx: No oropharyngeal exudate or posterior oropharyngeal erythema.   Eyes:      General: No scleral icterus.        Right eye: No discharge.         Left eye: No discharge.      Extraocular Movements: Extraocular movements intact.      Conjunctiva/sclera: Conjunctivae normal.      Pupils: Pupils are equal, round, and reactive to light.   Cardiovascular:      Rate and Rhythm: Normal rate and regular rhythm.      Pulses:  Normal pulses.      Heart sounds: Normal heart sounds. No murmur heard.  Pulmonary:      Effort: Pulmonary effort is normal. No respiratory distress.      Breath sounds: Normal breath sounds.   Chest:      Chest wall: Tenderness present.   Abdominal:      Palpations: Abdomen is soft.      Tenderness: There is abdominal tenderness in the right upper quadrant, epigastric area and left upper quadrant. There is no guarding or rebound.   Musculoskeletal:         General: No swelling.      Cervical back: Normal range of motion and neck supple.   Skin:     General: Skin is warm and dry.      Capillary Refill: Capillary refill takes less than 2 seconds.   Neurological:      General: No focal deficit present.      Mental Status: She is alert.   Psychiatric:         Mood and Affect: Mood normal.         Results Reviewed       Procedure Component Value Units Date/Time    HS Troponin I 2hr [[id number]]  (Normal) Collected: 01/27/25 2308    Lab Status: Final result Specimen: Blood from Arm, Left Updated: 01/27/25 2339     hs TnI 2hr 5 ng/L      Delta 2hr hsTnI 0 ng/L     HS Troponin 0hr (reflex protocol) [[id number]]  (Normal) Collected: 01/27/25 2045    Lab Status: Final result Specimen: Blood from Arm, Left Updated: 01/27/25 2218     hs TnI 0hr 5 ng/L     Comprehensive metabolic panel [[id number]]  (Abnormal) Collected: 01/27/25 2045    Lab Status: Final result Specimen: Blood from Arm, Left Updated: 01/27/25 2210     Sodium 142 mmol/L      Potassium 4.2 mmol/L      Chloride 109 mmol/L      CO2 29 mmol/L      ANION GAP 4 mmol/L      BUN 20 mg/dL      Creatinine 1.50 mg/dL      Glucose 89 mg/dL      Calcium 9.7 mg/dL      AST 15 U/L      ALT 9 U/L      Alkaline Phosphatase 55 U/L      Total Protein 6.8 g/dL      Albumin 4.0 g/dL      Total Bilirubin 0.90 mg/dL      eGFR 38 ml/min/1.73sq m     Narrative:      National Kidney Disease Foundation guidelines for Chronic Kidney Disease (CKD):     Stage 1 with normal or high GFR (GFR >  90 mL/min/1.73 square meters)    Stage 2 Mild CKD (GFR = 60-89 mL/min/1.73 square meters)    Stage 3A Moderate CKD (GFR = 45-59 mL/min/1.73 square meters)    Stage 3B Moderate CKD (GFR = 30-44 mL/min/1.73 square meters)    Stage 4 Severe CKD (GFR = 15-29 mL/min/1.73 square meters)    Stage 5 End Stage CKD (GFR <15 mL/min/1.73 square meters)  Note: GFR calculation is accurate only with a steady state creatinine    Lipase [[id number]]  (Normal) Collected: 01/27/25 2045    Lab Status: Final result Specimen: Blood from Arm, Left Updated: 01/27/25 2210     Lipase 56 u/L     CBC and differential [[id number]] Collected: 01/27/25 2045    Lab Status: Final result Specimen: Blood from Arm, Left Updated: 01/27/25 2156     WBC 4.79 Thousand/uL      RBC 4.22 Million/uL      Hemoglobin 12.7 g/dL      Hematocrit 38.3 %      MCV 91 fL      MCH 30.1 pg      MCHC 33.2 g/dL      RDW 14.3 %      MPV 11.0 fL      Platelets 149 Thousands/uL      nRBC 0 /100 WBCs      Segmented % 49 %      Immature Grans % 0 %      Lymphocytes % 42 %      Monocytes % 7 %      Eosinophils Relative 2 %      Basophils Relative 0 %      Absolute Neutrophils 2.33 Thousands/µL      Absolute Immature Grans 0.01 Thousand/uL      Absolute Lymphocytes 2.02 Thousands/µL      Absolute Monocytes 0.34 Thousand/µL      Eosinophils Absolute 0.07 Thousand/µL      Basophils Absolute 0.02 Thousands/µL             CT chest abdomen pelvis w contrast   Final Interpretation by Ariel Galvin MD (01/27 9667)         1. No acute cardiopulmonary process.   2. Constipation.               Workstation performed: [id number]             Procedures    ED Medication and Procedure Management   Prior to Admission Medications   Prescriptions Last Dose Informant Patient Reported? Taking?   Calcium Carb-Cholecalciferol (calcium carbonate-vitamin D) 500 mg-5 mcg tablet   No Yes   Sig: Take 1 tablet by mouth daily with breakfast   aspirin (Aspirin Low Dose) 81 mg EC tablet   No Yes   Sig:  Take 1 tablet (81 mg total) by mouth daily   atorvastatin (LIPITOR) 80 mg tablet   No Yes   Sig: Take 1 tablet (80 mg total) by mouth daily   bictegravir-emtricitab-tenofovir alafenamide (Biktarvy) -25 MG tablet   No Yes   Sig: Take 1 tablet by mouth daily with breakfast   carvedilol (COREG) 6.25 mg tablet   No Yes   Sig: Take 1 tablet (6.25 mg total) by mouth 2 (two) times a day with meals   cholecalciferol (VITAMIN D3) 1,000 units tablet  Self Yes Yes   Sig: Take 2,000 Units by mouth daily   nicotine polacrilex (SM Nicotine Polacrilex) 4 MG lozenge   No Yes   Sig: Apply 1 lozenge (4 mg total) to the mouth or throat as needed for smoking cessation   sacubitril-valsartan (Entresto) 49-51 MG TABS   No Yes   Sig: Take 1 tablet by mouth 2 (two) times a day   warfarin (COUMADIN) 5 mg tablet   No Yes   Sig: TAKE 1 TO 1 AND 1/2 TABLETS BY MOUTH DAILY AS DIRECTED BY MD      Facility-Administered Medications: None     Discharge Medication List as of 1/28/2025 12:15 AM        START taking these medications    Details   famotidine (PEPCID) 20 mg tablet Take 1 tablet (20 mg total) by mouth daily, Starting Tue 1/28/2025, Normal           CONTINUE these medications which have NOT CHANGED    Details   aspirin (Aspirin Low Dose) 81 mg EC tablet Take 1 tablet (81 mg total) by mouth daily, Starting Tue 11/26/2024, Normal      atorvastatin (LIPITOR) 80 mg tablet Take 1 tablet (80 mg total) by mouth daily, Starting Tue 11/26/2024, Normal      bictegravir-emtricitab-tenofovir alafenamide (Biktarvy) -25 MG tablet Take 1 tablet by mouth daily with breakfast, Starting Fri 10/18/2024, Normal      Calcium Carb-Cholecalciferol (calcium carbonate-vitamin D) 500 mg-5 mcg tablet Take 1 tablet by mouth daily with breakfast, Starting Thu 12/5/2024, Normal      carvedilol (COREG) 6.25 mg tablet Take 1 tablet (6.25 mg total) by mouth 2 (two) times a day with meals, Starting Tue 11/26/2024, Normal      cholecalciferol (VITAMIN D3) 1,000  units tablet Take 2,000 Units by mouth daily, Historical Med      nicotine polacrilex (SM Nicotine Polacrilex) 4 MG lozenge Apply 1 lozenge (4 mg total) to the mouth or throat as needed for smoking cessation, Starting Wed 11/27/2024, Normal      sacubitril-valsartan (Entresto) 49-51 MG TABS Take 1 tablet by mouth 2 (two) times a day, Starting Tue 11/26/2024, Normal      warfarin (COUMADIN) 5 mg tablet TAKE 1 TO 1 AND 1/2 TABLETS BY MOUTH DAILY AS DIRECTED BY MD, Normal             ED SEPSIS DOCUMENTATION   Time reflects when diagnosis was documented in both MDM as applicable and the Disposition within this note       Time User Action Codes Description Comment    1/28/2025 12:11 AM Daniela Roth [R09.A2] Sensation of foreign body in esophagus     1/28/2025 12:13 AM Daniela Roth [K44.9] Hiatal hernia     1/28/2025 12:14 AM Daniela Roth [K59.00] Constipation                  Daniela Roth PA-C  01/28/25 033

## 2025-01-30 ENCOUNTER — PATIENT OUTREACH (OUTPATIENT)
Dept: SURGERY | Facility: CLINIC | Age: 60
End: 2025-01-30

## 2025-01-30 NOTE — PROGRESS NOTES
This cm reached out to ct for recent [employer] premium. Ct sent this cm a copy of her December [employer] bill.     Referral for services and attachment C was completed and sent to supervisor, Malgorzata. Documents were scanned into ct's chart.      Referral for services was approved and scanned into ct's chart.      CM completed appropriate paperwork to address [employer] premium. [employer] payment was e-mailed to supervisor, Malgorzata. Requisition check was signed. Bill was scanned into ct's chart.     Supervisor, Malgorzata approved check.

## 2025-01-30 NOTE — PROGRESS NOTES
Ct agreeable to completing Epic assessment with this cm. Ct reports complete understanding of HIV disease process, transmission, and medications. Ct reports she is able to meet own basic needs and is able to access community assistance as needed. Ct reports consistent and reliable access to transportation. Ct reports medical care coverage/health insurance. Ct reports needing occasional assistance with follow up and completing forms. Ct reports stable housing. Ct reports not being sexually active in the last 3 months. Ct reports no difficulties with substance use. Ct reports own dental insurance;able to access dental services. Ct reports no history of mental health. Ct reports steady source of income. Ct reports no language/cultural barriers.      Acuity scale was completed and scanned into ct's chart.

## 2025-01-31 ENCOUNTER — OFFICE VISIT (OUTPATIENT)
Dept: GASTROENTEROLOGY | Facility: CLINIC | Age: 60
End: 2025-01-31
Payer: MEDICARE

## 2025-01-31 ENCOUNTER — TELEPHONE (OUTPATIENT)
Dept: GASTROENTEROLOGY | Facility: CLINIC | Age: 60
End: 2025-01-31

## 2025-01-31 VITALS
SYSTOLIC BLOOD PRESSURE: 128 MMHG | TEMPERATURE: 98.4 F | HEIGHT: 62 IN | WEIGHT: 174.8 LBS | BODY MASS INDEX: 32.17 KG/M2 | DIASTOLIC BLOOD PRESSURE: 74 MMHG

## 2025-01-31 DIAGNOSIS — K59.00 ACUTE CONSTIPATION: ICD-10-CM

## 2025-01-31 DIAGNOSIS — I23.6 LV (LEFT VENTRICULAR) MURAL THROMBUS FOLLOWING MI (HCC): ICD-10-CM

## 2025-01-31 DIAGNOSIS — Z12.11 SCREENING FOR COLON CANCER: ICD-10-CM

## 2025-01-31 DIAGNOSIS — D69.6 THROMBOCYTOPENIA (HCC): ICD-10-CM

## 2025-01-31 DIAGNOSIS — I25.5 ISCHEMIC CARDIOMYOPATHY: ICD-10-CM

## 2025-01-31 DIAGNOSIS — I25.10 TRIPLE VESSEL CORONARY ARTERY DISEASE: ICD-10-CM

## 2025-01-31 DIAGNOSIS — R13.10 DYSPHAGIA, UNSPECIFIED TYPE: Primary | ICD-10-CM

## 2025-01-31 PROCEDURE — G2211 COMPLEX E/M VISIT ADD ON: HCPCS | Performed by: INTERNAL MEDICINE

## 2025-01-31 PROCEDURE — 99214 OFFICE O/P EST MOD 30 MIN: CPT | Performed by: INTERNAL MEDICINE

## 2025-01-31 RX ORDER — BISACODYL 5 MG/1
20 TABLET, DELAYED RELEASE ORAL ONCE
Qty: 4 TABLET | Refills: 0 | Status: SHIPPED | OUTPATIENT
Start: 2025-01-31 | End: 2025-01-31

## 2025-01-31 RX ORDER — POLYETHYLENE GLYCOL 3350 17 G/17G
238 POWDER, FOR SOLUTION ORAL ONCE
Qty: 238 G | Refills: 0 | Status: SHIPPED | OUTPATIENT
Start: 2025-01-31 | End: 2025-01-31

## 2025-01-31 RX ORDER — POLYETHYLENE GLYCOL 3350 17 G/17G
17 POWDER, FOR SOLUTION ORAL DAILY
Qty: 510 G | Refills: 5 | Status: SHIPPED | OUTPATIENT
Start: 2025-01-31 | End: 2025-07-30

## 2025-01-31 RX ORDER — POLYETHYLENE GLYCOL 3350 17 G/17G
17 POWDER, FOR SOLUTION ORAL DAILY
Qty: 510 G | Refills: 5 | Status: SHIPPED | OUTPATIENT
Start: 2025-01-31 | End: 2025-01-31

## 2025-01-31 RX ORDER — SODIUM CHLORIDE, SODIUM LACTATE, POTASSIUM CHLORIDE, CALCIUM CHLORIDE 600; 310; 30; 20 MG/100ML; MG/100ML; MG/100ML; MG/100ML
125 INJECTION, SOLUTION INTRAVENOUS CONTINUOUS
OUTPATIENT
Start: 2025-01-31

## 2025-01-31 NOTE — PATIENT INSTRUCTIONS
Scheduled date of EGD(as of today): 3/3/25  Physician performing EGD: Dr. Malaika Saenz  Location of EGD: [place]  Instructions reviewed with patient by: Sari  Clearances: coumadin-hold requested be cardio

## 2025-01-31 NOTE — PROGRESS NOTES
Name: Airam Rainey      : 1965      MRN: [id number]  Encounter Provider: Lester Hamlin MD  Encounter Date: 2025   Encounter department: [facility] [place]  :  Assessment & Plan  Dysphagia, unspecified type  Patient having new onset symptoms of dysphagia that are disturbing her quality of life.  She would like to get EGD done.  I discussed with her that I reached out to her cardiologist Dr. Dennison who said she is moderate to high risk for EGD but it can be performed if absolutely needed.  Patient would like to get the procedure done.  Patient will need to have procedure done at [facility] as we will have easy access to ICU and cardiac care at those places.  She has significant history of heart disease and currently on Coumadin.  Her cardiologist recommended that we can stop Coumadin 5 to 7 days prior to the procedure.  She is agreeable to stop the Coumadin.  Her cardiologist also recommended that if she does stop Coumadin she should stop it for a period of 3 months and get heart echo and then it will be determined if she needs to restart anticoagulation afterwards.  She is agreeable to get the heart echo.  I will message the cardiologist to arrange outpatient follow-up visit with her after the echo is done.  Regarding colon cancer screening, she is up-to-date.  She got CT colonography in  which was negative.  Repeat in 5 years.  Regarding her constipation it is acute in nature, we will give her bowel cleanout with Dulcolax prep and then discussed with her about continuing MiraLAX daily to prevent constipation.  Patient agreeable.  Ordered the medications.      Orders:    EGD; Future    Acute constipation  As above.  Orders:    bisacodyl (DULCOLAX) 5 mg EC tablet; Take 4 tablets (20 mg total) by mouth once for 1 dose Colonoscopy prep    polyethylene glycol (MIRALAX) 17 g packet; Take 238 g by mouth once for 1 dose For bowel prep. Mix in 64 oz  of gatorade. Drink 32 oz at the rate of 8 oz/1 glass every 15 mins starting at 5 pm on the evening prior to day of procedure. Drink the remaining 32 oz 5 hours prior to procedure time at at the rate of 8 oz/1 glass every 15 mins until finished.    polyethylene glycol (MIRALAX) 17 g packet; Take 17 g by mouth daily    Ischemic cardiomyopathy  As above.       Triple vessel coronary artery disease  As above.       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 (HCC)  As above.       Screening for colon cancer  As above.    Lester Hamlin MD  Gastroenterology  [facility]  Date: January 31, 2025             History of Present Illness   Chest Pain   Pertinent negatives include no abdominal pain, back pain, cough, fever, palpitations, shortness of breath or vomiting.   Pertinent negatives for past medical history include no seizures.     Airam Rainey is a 59 y.o. female with CKD, ischemic cardiomyopathy, ejection fraction 35%, history of MI in the past, LV thrombus, currently on Coumadin, follows with Dr. Dennison with cardiology.    Patient reports that she went to the ER recently with globus sensation, swallowing problem and hard to pass stools.  Labs were done which include complete blood count, lipase, renal function labs, LFTs which are all within normal limits.  Cardiac enzymes were normal.  CT scan was done the results of which were reviewed by me today and showed high stool burden along with hiatal hernia.  INR was found to be 3.0.  Patient was discharged after she got GI cocktail and her symptoms improved.  GI referral was made.    Patient reports that she had steak bites stuck in her chest about 1 week ago which then passed down later.  Since then she has been having symptoms with solids and liquids where bites get stuck in the lower chest and then passed down.  Before this she was asymptomatic.  She has had hard to pass stools for the last 1 to 2 weeks.  She reports she has not had a  "good bowel movement in the last 1 week.  No blood in stools.  She has had weight gain in the last 1 year.    She was previously seen by Dr. Castillo for colon cancer screening.  She was deemed high risk for elective procedure for colon cancer screening and virtual CT colonography was ordered.  CT colonography was done in March 2024 the results of which were reviewed by me today and found to be normal.  Repeat was recommended in 5 years.      Review of Systems   Constitutional:  Negative for chills and fever.   HENT:  Negative for ear pain and sore throat.    Eyes:  Negative for pain and visual disturbance.   Respiratory:  Negative for cough and shortness of breath.    Cardiovascular:  Positive for chest pain. Negative for palpitations.   Gastrointestinal:  Negative for abdominal pain and vomiting.   Genitourinary:  Negative for dysuria and hematuria.   Musculoskeletal:  Negative for arthralgias and back pain.   Skin:  Negative for color change and rash.   Neurological:  Negative for seizures and syncope.   All other systems reviewed and are negative.         Objective   /74   Temp 98.4 °F (36.9 °C)   Ht 5' 2\" (1.575 m)   Wt 79.3 kg (174 lb 12.8 oz)   BMI 31.97 kg/m²      Physical Exam  Vitals and nursing note reviewed.   Constitutional:       General: She is not in acute distress.     Appearance: She is well-developed.   HENT:      Head: Normocephalic and atraumatic.   Eyes:      Conjunctiva/sclera: Conjunctivae normal.   Cardiovascular:      Rate and Rhythm: Normal rate and regular rhythm.      Heart sounds: No murmur heard.  Pulmonary:      Effort: Pulmonary effort is normal. No respiratory distress.      Breath sounds: Normal breath sounds.   Abdominal:      Palpations: Abdomen is soft.      Tenderness: There is no abdominal tenderness.   Musculoskeletal:         General: No swelling.      Cervical back: Neck supple.   Skin:     General: Skin is warm and dry.      Capillary Refill: Capillary refill takes " less than 2 seconds.   Neurological:      Mental Status: She is alert.   Psychiatric:         Mood and Affect: Mood normal.

## 2025-01-31 NOTE — TELEPHONE ENCOUNTER
Our mutual patient is scheduled for procedure: ____EGD___________     On: __3/3/25__________    With: __Dr. Malaika Saenz____________    He/She is taking the following blood thinner:  __Coumadin__________     Can this be stopped __5_ days prior to the procedure?       Physician Approving clearance: ________________________

## 2025-02-03 ENCOUNTER — ANTICOAG VISIT (OUTPATIENT)
Dept: CARDIOLOGY CLINIC | Facility: CLINIC | Age: 60
End: 2025-02-03

## 2025-02-03 ENCOUNTER — APPOINTMENT (OUTPATIENT)
Dept: LAB | Facility: HOSPITAL | Age: 60
End: 2025-02-03
Payer: MEDICARE

## 2025-02-03 DIAGNOSIS — I25.5 ISCHEMIC CARDIOMYOPATHY: Primary | ICD-10-CM

## 2025-02-03 DIAGNOSIS — I25.10 TRIPLE VESSEL CORONARY ARTERY DISEASE: ICD-10-CM

## 2025-02-03 DIAGNOSIS — Z79.01 LONG TERM CURRENT USE OF ANTICOAGULANT THERAPY: ICD-10-CM

## 2025-02-03 DIAGNOSIS — I23.6 LV (LEFT VENTRICULAR) MURAL THROMBUS FOLLOWING MI (HCC): ICD-10-CM

## 2025-02-03 DIAGNOSIS — I51.3 MURAL THROMBUS OF LEFT VENTRICLE: ICD-10-CM

## 2025-02-03 DIAGNOSIS — Z95.810 AICD (AUTOMATIC CARDIOVERTER/DEFIBRILLATOR) PRESENT: ICD-10-CM

## 2025-02-03 LAB
INR PPP: 2.92 (ref 0.85–1.19)
PROTHROMBIN TIME: 30.2 SECONDS (ref 12.3–15)

## 2025-02-03 PROCEDURE — 85610 PROTHROMBIN TIME: CPT

## 2025-02-03 NOTE — PROGRESS NOTES
Spoke with patient, advised INR good, continue 7.5 mg Mon Wed Fri, 5 mg all other days, will recheck in 3 weeks 2/24/25

## 2025-02-17 ENCOUNTER — TELEPHONE (OUTPATIENT)
Dept: CARDIOLOGY CLINIC | Facility: CLINIC | Age: 60
End: 2025-02-17

## 2025-02-17 NOTE — TELEPHONE ENCOUNTER
Spoke with catrachito to change mom and daughter appt to 6/30 from 4/22 due to needing 2 appts together

## 2025-02-19 ENCOUNTER — OFFICE VISIT (OUTPATIENT)
Dept: SURGERY | Facility: CLINIC | Age: 60
End: 2025-02-19
Payer: MEDICARE

## 2025-02-19 ENCOUNTER — APPOINTMENT (OUTPATIENT)
Dept: LAB | Facility: HOSPITAL | Age: 60
End: 2025-02-19
Payer: MEDICARE

## 2025-02-19 ENCOUNTER — DOCUMENTATION (OUTPATIENT)
Dept: SURGERY | Facility: CLINIC | Age: 60
End: 2025-02-19

## 2025-02-19 ENCOUNTER — ANTICOAG VISIT (OUTPATIENT)
Dept: CARDIOLOGY CLINIC | Facility: CLINIC | Age: 60
End: 2025-02-19

## 2025-02-19 VITALS
DIASTOLIC BLOOD PRESSURE: 84 MMHG | BODY MASS INDEX: 32.72 KG/M2 | HEART RATE: 69 BPM | TEMPERATURE: 97.8 F | SYSTOLIC BLOOD PRESSURE: 128 MMHG | RESPIRATION RATE: 16 BRPM | WEIGHT: 177.8 LBS | HEIGHT: 62 IN | OXYGEN SATURATION: 98 %

## 2025-02-19 DIAGNOSIS — B20 HIV DISEASE (HCC): Primary | ICD-10-CM

## 2025-02-19 DIAGNOSIS — J47.9 BRONCHIECTASIS WITHOUT COMPLICATION (HCC): ICD-10-CM

## 2025-02-19 DIAGNOSIS — R73.03 PREDIABETES: ICD-10-CM

## 2025-02-19 DIAGNOSIS — E78.2 MIXED HYPERLIPIDEMIA: ICD-10-CM

## 2025-02-19 DIAGNOSIS — Z12.31 SCREENING MAMMOGRAM FOR BREAST CANCER: ICD-10-CM

## 2025-02-19 DIAGNOSIS — I10 ESSENTIAL HYPERTENSION: ICD-10-CM

## 2025-02-19 DIAGNOSIS — I25.10 TRIPLE VESSEL CORONARY ARTERY DISEASE: ICD-10-CM

## 2025-02-19 DIAGNOSIS — I23.6 LV (LEFT VENTRICULAR) MURAL THROMBUS FOLLOWING MI (HCC): ICD-10-CM

## 2025-02-19 DIAGNOSIS — R91.1 PULMONARY NODULE 1 CM OR GREATER IN DIAMETER: ICD-10-CM

## 2025-02-19 DIAGNOSIS — M81.0 AGE-RELATED OSTEOPOROSIS WITHOUT CURRENT PATHOLOGICAL FRACTURE: ICD-10-CM

## 2025-02-19 DIAGNOSIS — D32.0 MENINGIOMA, CEREBRAL (HCC): ICD-10-CM

## 2025-02-19 DIAGNOSIS — Z95.810 AICD (AUTOMATIC CARDIOVERTER/DEFIBRILLATOR) PRESENT: ICD-10-CM

## 2025-02-19 DIAGNOSIS — R13.10 DYSPHAGIA, UNSPECIFIED TYPE: ICD-10-CM

## 2025-02-19 DIAGNOSIS — Z87.891 PERSONAL HISTORY OF TOBACCO USE, PRESENTING HAZARDS TO HEALTH: ICD-10-CM

## 2025-02-19 DIAGNOSIS — Z12.2 ENCOUNTER FOR SCREENING FOR MALIGNANT NEOPLASM OF LUNG IN FORMER SMOKER WHO QUIT IN PAST 15 YEARS WITH 30 PACK YEAR HISTORY OR GREATER: ICD-10-CM

## 2025-02-19 DIAGNOSIS — Z87.891 ENCOUNTER FOR SCREENING FOR MALIGNANT NEOPLASM OF LUNG IN FORMER SMOKER WHO QUIT IN PAST 15 YEARS WITH 30 PACK YEAR HISTORY OR GREATER: ICD-10-CM

## 2025-02-19 DIAGNOSIS — D22.9 ATYPICAL MOLE: ICD-10-CM

## 2025-02-19 DIAGNOSIS — I25.5 ISCHEMIC CARDIOMYOPATHY: Primary | ICD-10-CM

## 2025-02-19 DIAGNOSIS — Z79.01 LONG TERM CURRENT USE OF ANTICOAGULANT THERAPY: ICD-10-CM

## 2025-02-19 DIAGNOSIS — Z00.00 MEDICARE ANNUAL WELLNESS VISIT, SUBSEQUENT: ICD-10-CM

## 2025-02-19 DIAGNOSIS — I51.3 MURAL THROMBUS OF LEFT VENTRICLE: ICD-10-CM

## 2025-02-19 DIAGNOSIS — D69.6 THROMBOCYTOPENIA (HCC): ICD-10-CM

## 2025-02-19 DIAGNOSIS — N18.32 STAGE 3B CHRONIC KIDNEY DISEASE (HCC): ICD-10-CM

## 2025-02-19 LAB
INR PPP: 3.76 (ref 0.85–1.19)
PROTHROMBIN TIME: 36.6 SECONDS (ref 12.3–15)

## 2025-02-19 PROCEDURE — 85610 PROTHROMBIN TIME: CPT

## 2025-02-19 PROCEDURE — 36415 COLL VENOUS BLD VENIPUNCTURE: CPT

## 2025-02-19 PROCEDURE — G0439 PPPS, SUBSEQ VISIT: HCPCS | Performed by: NURSE PRACTITIONER

## 2025-02-19 PROCEDURE — 99214 OFFICE O/P EST MOD 30 MIN: CPT | Performed by: NURSE PRACTITIONER

## 2025-02-19 RX ORDER — DENOSUMAB 60 MG/ML
60 INJECTION SUBCUTANEOUS ONCE
Qty: 1 ML | Refills: 0 | Status: SHIPPED | OUTPATIENT
Start: 2025-02-19 | End: 2025-02-19

## 2025-02-19 NOTE — PROGRESS NOTES
Data: Pt completed the PHQ-9 screening within PCP's appointment, however, [facility] couldn't discuss the results at the time due to conflicting individual session. Pt scored was (PHQ-9=0) displaying minimal depressive traits without SI or HI. Results will be discussed with pt to address multidimensional stability on a phone f/u, or direct pt contact.

## 2025-02-19 NOTE — ASSESSMENT & PLAN NOTE
Unclear or diagnosis of thrombocytopenia came from since review of CBC differential shows no thrombocytopenia.  Continue to monitor for now  Patient is on warfarin therapy

## 2025-02-19 NOTE — ASSESSMENT & PLAN NOTE
Doing well on Biktarvy with an undetectable VL.  Pt reports 100% medication compliance on HAART.  Stressed the importance of 100% medication adherence  Monitor CD4. HIV RNA, CMP, and CBCD to monitor for any developing virologic response, treatment failure, or drug toxicities  Follow up with Dr. Kaplan or Dr. Cortez   Continue Biktarvy  HIV Counseling:    Viral Load: <20    CD4 Count: 500      Denies side effects.  Stressed the importance of adherence.  Continue follow up in the ID clinic with Dr. Kaplan or Dr. Cortez.    Reviewed the most recent labs, including the CD4 and viral load.  Discussed the risks and benefits of treatment options, instructions for management, importance of treatment adherence, and reduction of risk factors.  Educated on possible medication side effects.    Counseled on routes of HIV transmission, including the risk of  infection.  Emphasized that viral suppression is the best method to prevent HIV transmission.  At this time the pt denies the need for HIV testing of anyone in their life.    Total encounter time was greater than 35 minutes.  Greater than 20 minutes were spent on counseling and patient education.  Pt voices understanding and agreement with treatment plan.

## 2025-02-19 NOTE — ASSESSMENT & PLAN NOTE
Currently not on treatment except for vitamin D C and follow lifestyle recommendations.  Discussed starting her on Prolia 60 mg subcu injections every 6 months for treatment of osteoporosis  She is agreeable    Orders:    denosumab (Prolia) 60 mg/mL; Inject 1 mL (60 mg total) under the skin once for 1 dose

## 2025-02-19 NOTE — ASSESSMENT & PLAN NOTE
BP at goal.  Continue Coreg 6.25 mg twice daily  Discussed the importance of monitoring BP at home and calling for BPs greater than 130/80  Continue follow-up with cardiology

## 2025-02-19 NOTE — ASSESSMENT & PLAN NOTE
Neuro diagnosis was seen in the ED and 1/27/2025 for feeling of foreign body within her throat during swallowing.  Was seen by GI and scheduled for EGD.  Denies any choking, cough, or trouble swallowing, just heart burn.  Pending on results of EGD may need referral to speech  Continue to follow with GI  EGD is scheduled for 3/2/25

## 2025-02-19 NOTE — PROGRESS NOTES
Name: Airam Rainey      : 1965      MRN: [id number]  Encounter Provider: JOSE ROBERTO Grace  Encounter Date: 2025   Encounter department: ASC AT [facility]    Assessment & Plan  HIV disease (HCC)  Doing well on Biktarvy with an undetectable VL.  Pt reports 100% medication compliance on HAART.  Stressed the importance of 100% medication adherence  Monitor CD4. HIV RNA, CMP, and CBCD to monitor for any developing virologic response, treatment failure, or drug toxicities  Follow up with Dr. Kaplan or Dr. Cortez   Continue Biktarvy  HIV Counseling:    Viral Load: <20    CD4 Count: 500      Denies side effects.  Stressed the importance of adherence.  Continue follow up in the ID clinic with Dr. Kaplan or Dr. Cortez.    Reviewed the most recent labs, including the CD4 and viral load.  Discussed the risks and benefits of treatment options, instructions for management, importance of treatment adherence, and reduction of risk factors.  Educated on possible medication side effects.    Counseled on routes of HIV transmission, including the risk of  infection.  Emphasized that viral suppression is the best method to prevent HIV transmission.  At this time the pt denies the need for HIV testing of anyone in their life.    Total encounter time was greater than 35 minutes.  Greater than 20 minutes were spent on counseling and patient education.  Pt voices understanding and agreement with treatment plan.             Essential hypertension  BP at goal.  Continue Coreg 6.25 mg twice daily  Discussed the importance of monitoring BP at home and calling for BPs greater than 130/80  Continue follow-up with cardiology         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 ([facility])  Currently on Coumadin being managed by cardiology.  Reviewed notes from GI who spoke with cardiology and since she needs to stop her Coumadin 5 to 7 days prior to EGD recommendations were to hold for 3 months and have a  repeat echo to see if she needs to continue on warfarin therapy.  Continue to follow with cardiology  Warfarin management per cardiology  She is going to discuss JOSE JUAN with cardiologist          Bronchiectasis without complication (HCC)  Doing well.  No signs of acute exacerbation.  Continue to monitor         Meningioma, cerebral (HCC)  Continue to follow with neurosurgery         Age-related osteoporosis without current pathological fracture  Currently not on treatment except for vitamin D C and follow lifestyle recommendations.  Discussed starting her on Prolia 60 mg subcu injections every 6 months for treatment of osteoporosis  She is agreeable    Orders:    denosumab (Prolia) 60 mg/mL; Inject 1 mL (60 mg total) under the skin once for 1 dose    Stage 3b chronic kidney disease (HCC)  Lab Results   Component Value Date    EGFR 38 01/27/2025    EGFR 40 10/15/2024    EGFR 38 07/31/2024    CREATININE 1.50 (H) 01/27/2025    CREATININE 1.44 (H) 10/15/2024    CREATININE 1.50 (H) 07/31/2024   Renal function remained stable.  Creatinine: 1.44 remains within baseline of 1.35-1.74.  BP appears adequately controlled on Coreg.  Heart failure echo showed LV function 44% and being managed by [facility].  UA: No proteinuria.  Continue to follow with Dr. Vega from nephrology annually  Continue to monitor CMP  Appears euvolemic  Discussed importance of avoiding all NSAID products  Discussed importance of good hydration with water    Orders:    Empagliflozin (Jardiance) 10 MG TABS tablet; Take 1 tablet (10 mg total) by mouth every morning    Dysphagia, unspecified type  Neuro diagnosis was seen in the ED and 1/27/2025 for feeling of foreign body within her throat during swallowing.  Was seen by GI and scheduled for EGD.  Denies any choking, cough, or trouble swallowing, just heart burn.  Pending on results of EGD may need referral to speech  Continue to follow with GI  EGD is scheduled for 3/2/25         Mixed hyperlipidemia  LDL at  goal.  Continue statin  Continue to monitor lipid panel         Thrombocytopenia (HCC)  Unclear or diagnosis of thrombocytopenia came from since review of CBC differential shows no thrombocytopenia.  Continue to monitor for now  Patient is on warfarin therapy         Medicare annual wellness visit, subsequent         Atypical mole  Located on left nostril nasal fold towards entrance of nostril.  Recently noticed.   Orders:    Ambulatory Referral to Dermatology; Future    Prediabetes  A1C 6: 5.9.  Patient has CKD, hypertension, and cardiomyopathy.  Will start Jardiance 10 mg for cardiac renal protection along with management of prediabetes to prevent disease progression  Recheck A1C  Follow with [facility] dietitian  Orders:    Empagliflozin (Jardiance) 10 MG TABS tablet; Take 1 tablet (10 mg total) by mouth every morning    Screening mammogram for breast cancer    Orders:    Mammo screening bilateral w 3d and cad; Future    Personal history of tobacco use, presenting hazards to health    Orders:    CT lung screening program; Future    Encounter for screening for malignant neoplasm of lung in former smoker who quit in past 15 years with 30 pack year history or greater  I discussed with her that she is a candidate for lung cancer CT screening.     The following Shared Decision-Making points were covered:  Benefits of screening were discussed, including the rates of reduction in death from lung cancer and other causes.  Harms of screening were reviewed, including false positive tests, radiation exposure levels, risks of invasive procedures, risks of complications of screening, and risk of overdiagnosis.  I counseled on the importance of adherence to annual lung cancer LDCT screening, impact of co-morbidities, and ability or willingness to undergo diagnosis and treatment.  I counseled on the importance of maintaining abstinence as a former smoker or was counseled on the importance of smoking cessation if a current  smoker    Review of Eligibility Criteria: She meets all of the criteria for Lung Cancer Screening.   She is 59 y.o.   She has 20 pack year tobacco history and is a current smoker or has quit within the past 15 years  She presents no signs or symptoms of lung cancer    After discussion, the patient decided to elect lung cancer screening.    Orders:    CT lung screening program; Future    Pulmonary nodule 1 cm or greater in diameter    Orders:    CT lung screening program; Future       Preventive health issues were discussed with patient, and age appropriate screening tests were ordered as noted in patient's After Visit Summary. Personalized health advice and appropriate referrals for health education or preventive services given if needed, as noted in patient's After Visit Summary.    History of Present Illness     HPI   Airam presents for medicare wellness visit and for management of HIV.  She was seen in ER on 1/25 for foregn body obstruction.   She feels like something is stuck at the lower esophagus after swallowing foods like steak and after eating [employer].  She was able to swallow liquids and rice without difficulty.  Denies any vomiting, nuasea, couhging or choking.  She did experience regurgitation.  Notice new mole on left side of nose.  She returned from visiting family in [place] as well.  She denies any other recent illnesses.    She does not endorse any fever, chills, nausea, vomiting, cough, SOB, diarrhea, or night sweats.        Patient Care Team:  JOSE ROBERTO Grace as PCP - General (Nurse Practitioner)  DO Masoud Douglas DO (Nephrology)    Review of Systems   Constitutional: Negative.    HENT: Negative.     Respiratory: Negative.     Cardiovascular: Negative.    Gastrointestinal: Negative.    Genitourinary: Negative.    Musculoskeletal: Negative.    Skin:         Mole on left side of nose.    Neurological: Negative.    Psychiatric/Behavioral: Negative.       Medical  History Reviewed by provider this encounter:       Annual Wellness Visit Questionnaire   Airam is here for her Subsequent Wellness visit.     Health Risk Assessment:   Patient rates overall health as fair. Patient feels that their physical health rating is same. Patient is very satisfied with their life. Eyesight was rated as same. Hearing was rated as same. Patient feels that their emotional and mental health rating is same. Patients states they are never, rarely angry. Patient states they are never, rarely unusually tired/fatigued. Pain experienced in the last 7 days has been none. Patient states that she has experienced weight loss or gain in last 6 months.     Depression Screening:   PHQ-2 Score: 0      Fall Risk Screening:   In the past year, patient has experienced: no history of falling in past year      Urinary Incontinence Screening:   Patient has not leaked urine accidently in the last six months.     Home Safety:  Patient does not have trouble with stairs inside or outside of their home. Patient has working smoke alarms and has working carbon monoxide detector. Home safety hazards include: none.     Nutrition:   Current diet is Regular.     Medications:   Patient is not currently taking any over-the-counter supplements. Patient is able to manage medications.     Activities of Daily Living (ADLs)/Instrumental Activities of Daily Living (IADLs):   Walk and transfer into and out of bed and chair?: Yes  Dress and groom yourself?: Yes    Bathe or shower yourself?: Yes    Feed yourself? Yes  Do your laundry/housekeeping?: Yes  Manage your money, pay your bills and track your expenses?: Yes  Make your own meals?: Yes    Do your own shopping?: Yes    Previous Hospitalizations:   Any hospitalizations or ED visits within the last 12 months?: No      Advance Care Planning:   Living will: No    Durable POA for healthcare: No    Advanced directive: No      Cognitive Screening:   Provider or family/friend/caregiver  concerned regarding cognition?: No    PREVENTIVE SCREENINGS      Cardiovascular Screening:    General: Screening Not Indicated and History Lipid Disorder    Due for: Lipid Panel      Diabetes Screening:     General: Screening Current      Colorectal Cancer Screening:     General: Screening Current      Breast Cancer Screening:     General: Screening Current      Cervical Cancer Screening:    General: Screening Not Indicated      Osteoporosis Screening:    General: Screening Not Indicated and History Osteoporosis      Abdominal Aortic Aneurysm (AAA) Screening:        General: Screening Not Indicated      Lung Cancer Screening:     General: Screening Current      Hepatitis C Screening:    General: Screening Current    Hep C Screening Accepted: Yes      Screening, Brief Intervention, and Referral to Treatment (SBIRT)     Screening  Typical number of drinks in a day: 0  Typical number of drinks in a week: 0  Interpretation: Low risk drinking behavior.    Brief Intervention  Alcohol & drug use screenings were reviewed. No concerns regarding substance use disorder identified.     Other Counseling Topics:   Car/seat belt/driving safety, skin self-exam, sunscreen and calcium and vitamin D intake and regular weightbearing exercise.     Social Drivers of Health     Financial Resource Strain: Low Risk (12/15/2023)    [facility]    Financial Resource Strain     Sometimes people find that their income does not quite cover their living costs.  In the last 12 months, has this happened to you?: No     What is your current work situation? : Unemployed, and not seeking work (ex: student, retired, disabled, unpaid primary care giver)   Food Insecurity: No Food Insecurity (2/19/2025)    Hunger Vital Sign     Worried About Running Out of Food in the Last Year: Never true     Ran Out of Food in the Last Year: Never true   Transportation Needs: Low Risk (12/15/2023)     "Received from [facility]    Transportation     Has a lack of transportation kept you from medical appointments, meetings, work, or from getting things needed for daily living. Check all that apply. : No   Housing Stability: Unknown (2/19/2025)    Housing Stability Vital Sign     Unable to Pay for Housing in the Last Year: No     Homeless in the Last Year: No   Utilities: Not At Risk (12/15/2023)    Received from [facility] ([facility]    Utilities     In the past 12 months has the electric, gas, oil, or water company threatened to shut off services in your home?: No     No results found.    Objective   /84 (BP Location: Right arm, Patient Position: Sitting)   Pulse 69   Temp 97.8 °F (36.6 °C) (Tympanic)   Resp 16   Ht 5' 2\" (1.575 m)   Wt 80.6 kg (177 lb 12.8 oz)   SpO2 98%   BMI 32.52 kg/m²     Physical Exam  Vitals and nursing note reviewed.   Constitutional:       General: She is not in acute distress.     Appearance: Normal appearance. She is not ill-appearing.   HENT:      Head: Normocephalic.      Right Ear: Tympanic membrane normal.      Left Ear: Tympanic membrane normal.      Nose: Nose normal.      Mouth/Throat:      Mouth: Mucous membranes are moist.      Pharynx: Oropharynx is clear.   Eyes:      Extraocular Movements: Extraocular movements intact.      Pupils: Pupils are equal, round, and reactive to light.   Neck:      Thyroid: No thyroid mass, thyromegaly or thyroid tenderness.      Vascular: No carotid bruit.   Cardiovascular:      Rate and Rhythm: Normal rate and regular rhythm.      Chest Wall: PMI is not displaced.      Pulses: Normal pulses.      Heart sounds: Normal heart sounds.   Pulmonary:      Effort: Pulmonary effort is normal.      Breath sounds: Normal breath sounds.   Abdominal:      General: Bowel sounds are normal.   Musculoskeletal:         General: Normal range of motion.      " Right lower leg: No edema.      Left lower leg: No edema.   Lymphadenopathy:      Cervical: No cervical adenopathy.   Skin:     General: Skin is warm and dry.      Capillary Refill: Capillary refill takes less than 2 seconds.   Neurological:      Mental Status: She is alert and oriented to person, place, and time.   Psychiatric:         Mood and Affect: Mood normal.         Behavior: Behavior normal.         Thought Content: Thought content normal.         Judgment: Judgment normal.

## 2025-02-19 NOTE — PATIENT INSTRUCTIONS
Medicare Preventive Visit Patient Instructions  Thank you for completing your Welcome to Medicare Visit or Medicare Annual Wellness Visit today. Your next wellness visit will be due in one year (2/20/2026).  The screening/preventive services that you may require over the next 5-10 years are detailed below. Some tests may not apply to you based off risk factors and/or age. Screening tests ordered at today's visit but not completed yet may show as past due. Also, please note that scanned in results may not display below.  Preventive Screenings:  Service Recommendations Previous Testing/Comments   Colorectal Cancer Screening  * Colonoscopy    * Fecal Occult Blood Test (FOBT)/Fecal Immunochemical Test (FIT)  * Fecal DNA/Cologuard Test  * Flexible Sigmoidoscopy Age: 45-75 years old   Colonoscopy: every 10 years (may be performed more frequently if at higher risk)  OR  FOBT/FIT: every 1 year  OR  Cologuard: every 3 years  OR  Sigmoidoscopy: every 5 years  Screening may be recommended earlier than age 45 if at higher risk for colorectal cancer. Also, an individualized decision between you and your healthcare provider will decide whether screening between the ages of 76-85 would be appropriate. Colonoscopy: Not on file  FOBT/FIT: Not on file  Cologuard: Not on file  Sigmoidoscopy: Not on file    Screening Current     Breast Cancer Screening Age: 40+ years old  Frequency: every 1-2 years  Not required if history of left and right mastectomy Mammogram: 11/29/2024    Screening Current   Cervical Cancer Screening Between the ages of 21-29, pap smear recommended once every 3 years.   Between the ages of 30-65, can perform pap smear with HPV co-testing every 5 years.   Recommendations may differ for women with a history of total hysterectomy, cervical cancer, or abnormal pap smears in past. Pap Smear: 11/14/2019    Screening Not Indicated   Hepatitis C Screening Once for adults born between 1945 and 1965  More frequently in  patients at high risk for Hepatitis C Hep C Antibody: 04/22/2024    Screening Current   Diabetes Screening 1-2 times per year if you're at risk for diabetes or have pre-diabetes Fasting glucose: 103 mg/dL (7/31/2024)  A1C: 5.9 % (4/22/2024)  Screening Current   Cholesterol Screening Once every 5 years if you don't have a lipid disorder. May order more often based on risk factors. Lipid panel: 04/22/2024    Screening Not Indicated  History Lipid Disorder     Other Preventive Screenings Covered by Medicare:  Abdominal Aortic Aneurysm (AAA) Screening: covered once if your at risk. You're considered to be at risk if you have a family history of AAA.  Lung Cancer Screening: covers low dose CT scan once per year if you meet all of the following conditions: (1) Age 55-77; (2) No signs or symptoms of lung cancer; (3) Current smoker or have quit smoking within the last 15 years; (4) You have a tobacco smoking history of at least 20 pack years (packs per day multiplied by number of years you smoked); (5) You get a written order from a healthcare provider.  Glaucoma Screening: covered annually if you're considered high risk: (1) You have diabetes OR (2) Family history of glaucoma OR (3)  aged 50 and older OR (4)  American aged 65 and older  Osteoporosis Screening: covered every 2 years if you meet one of the following conditions: (1) You're estrogen deficient and at risk for osteoporosis based off medical history and other findings; (2) Have a vertebral abnormality; (3) On glucocorticoid therapy for more than 3 months; (4) Have primary hyperparathyroidism; (5) On osteoporosis medications and need to assess response to drug therapy.   Last bone density test (DXA Scan): 03/01/2023.  HIV Screening: covered annually if you're between the age of 15-65. Also covered annually if you are younger than 15 and older than 65 with risk factors for HIV infection. For pregnant patients, it is covered up to 3 times per  pregnancy.    Immunizations:  Immunization Recommendations   Influenza Vaccine Annual influenza vaccination during flu season is recommended for all persons aged >= 6 months who do not have contraindications   Pneumococcal Vaccine   * Pneumococcal conjugate vaccine = PCV13 (Prevnar 13), PCV15 (Vaxneuvance), PCV20 (Prevnar 20)  * Pneumococcal polysaccharide vaccine = PPSV23 (Pneumovax) Adults 19-65 yo with certain risk factors or if 65+ yo  If never received any pneumonia vaccine: recommend Prevnar 20 (PCV20)  Give PCV20 if previously received 1 dose of PCV13 or PPSV23   Hepatitis B Vaccine 3 dose series if at intermediate or high risk (ex: diabetes, end stage renal disease, liver disease)   Respiratory syncytial virus (RSV) Vaccine - COVERED BY MEDICARE PART D  * RSVPreF3 (Arexvy) CDC recommends that adults 60 years of age and older may receive a single dose of RSV vaccine using shared clinical decision-making (SCDM)   Tetanus (Td) Vaccine - COST NOT COVERED BY MEDICARE PART B Following completion of primary series, a booster dose should be given every 10 years to maintain immunity against tetanus. Td may also be given as tetanus wound prophylaxis.   Tdap Vaccine - COST NOT COVERED BY MEDICARE PART B Recommended at least once for all adults. For pregnant patients, recommended with each pregnancy.   Shingles Vaccine (Shingrix) - COST NOT COVERED BY MEDICARE PART B  2 shot series recommended in those 19 years and older who have or will have weakened immune systems or those 50 years and older     Health Maintenance Due:      Topic Date Due   • Breast Cancer Screening: Mammogram  11/29/2025   • Lung Cancer Screening  01/27/2026   • Colorectal Cancer Screening  03/25/2029   • Hepatitis C Screening  Completed     Immunizations Due:  There are no preventive care reminders to display for this patient.  Advance Directives   What are advance directives?  Advance directives are legal documents that state your wishes and plans  for medical care. These plans are made ahead of time in case you lose your ability to make decisions for yourself. Advance directives can apply to any medical decision, such as the treatments you want, and if you want to donate organs.   What are the types of advance directives?  There are many types of advance directives, and each state has rules about how to use them. You may choose a combination of any of the following:  Living will:  This is a written record of the treatment you want. You can also choose which treatments you do not want, which to limit, and which to stop at a certain time. This includes surgery, medicine, IV fluid, and tube feedings.   Durable power of  for healthcare (DPAHC):  This is a written record that states who you want to make healthcare choices for you when you are unable to make them for yourself. This person, called a proxy, is usually a family member or a friend. You may choose more than 1 proxy.  Do not resuscitate (DNR) order:  A DNR order is used in case your heart stops beating or you stop breathing. It is a request not to have certain forms of treatment, such as CPR. A DNR order may be included in other types of advance directives.  Medical directive:  This covers the care that you want if you are in a coma, near death, or unable to make decisions for yourself. You can list the treatments you want for each condition. Treatment may include pain medicine, surgery, blood transfusions, dialysis, IV or tube feedings, and a ventilator (breathing machine).  Values history:  This document has questions about your views, beliefs, and how you feel and think about life. This information can help others choose the care that you would choose.  Why are advance directives important?  An advance directive helps you control your care. Although spoken wishes may be used, it is better to have your wishes written down. Spoken wishes can be misunderstood, or not followed. Treatments may be  given even if you do not want them. An advance directive may make it easier for your family to make difficult choices about your care.   Weight Management   Why it is important to manage your weight:  Being overweight increases your risk of health conditions such as heart disease, high blood pressure, type 2 diabetes, and certain types of cancer. It can also increase your risk for osteoarthritis, sleep apnea, and other respiratory problems. Aim for a slow, steady weight loss. Even a small amount of weight loss can lower your risk of health problems.  How to lose weight safely:  A safe and healthy way to lose weight is to eat fewer calories and get regular exercise. You can lose up about 1 pound a week by decreasing the number of calories you eat by 500 calories each day.   Healthy meal plan for weight management:  A healthy meal plan includes a variety of foods, contains fewer calories, and helps you stay healthy. A healthy meal plan includes the following:  Eat whole-grain foods more often.  A healthy meal plan should contain fiber. Fiber is the part of grains, fruits, and vegetables that is not broken down by your body. Whole-grain foods are healthy and provide extra fiber in your diet. Some examples of whole-grain foods are whole-wheat breads and pastas, oatmeal, brown rice, and bulgur.  Eat a variety of vegetables every day.  Include dark, leafy greens such as spinach, kale, maciej greens, and mustard greens. Eat yellow and orange vegetables such as carrots, sweet potatoes, and winter squash.   Eat a variety of fruits every day.  Choose fresh or canned fruit (canned in its own juice or light syrup) instead of juice. Fruit juice has very little or no fiber.  Eat low-fat dairy foods.  Drink fat-free (skim) milk or 1% milk. Eat fat-free yogurt and low-fat cottage cheese. Try low-fat cheeses such as mozzarella and other reduced-fat cheeses.  Choose meat and other protein foods that are low in fat.  Choose beans or  other legumes such as split peas or lentils. Choose fish, skinless poultry (chicken or turkey), or lean cuts of red meat (beef or pork). Before you cook meat or poultry, cut off any visible fat.   Use less fat and oil.  Try baking foods instead of frying them. Add less fat, such as margarine, sour cream, regular salad dressing and mayonnaise to foods. Eat fewer high-fat foods. Some examples of high-fat foods include french fries, doughnuts, ice cream, and cakes.  Eat fewer sweets.  Limit foods and drinks that are high in sugar. This includes candy, cookies, regular soda, and sweetened drinks.  Exercise:  Exercise at least 30 minutes per day on most days of the week. Some examples of exercise include walking, biking, dancing, and swimming. You can also fit in more physical activity by taking the stairs instead of the elevator or parking farther away from stores. Ask your healthcare provider about the best exercise plan for you.      © Copyright [employer] 2018 Information is for End User's use only and may not be sold, redistributed or otherwise used for commercial purposes. All illustrations and images included in CareNotes® are the copyrighted property of A.D.A.M., Inc. or AT Internet

## 2025-02-19 NOTE — PROGRESS NOTES
Spoke with patient, advised INR a little high, advised to take 5 mg tonight instead of 7.5 mg, then go back to 7.5 mg Mon Wed Fri, 5 mg all other days.  Patient mentioned that she will be having GI procedure 3/3/25 and was told she should hold her Coumadin for 5-7 days prior (ok per Dr Dennison), patient will start holding 2/24/25. She also mentioned that her PCP told her she needs to hold 3 months after the procedure.  Advised I will need to look into this and will call back tomorrow.     After reviewing chart, I see in GI note from 1/31/25, they spoke with Dr Dennison, and he states if she does stop her Coumadin, she should stop it for 3 months and get repeat Echo to see if she still needs to continue Coumadin.  Will discuss further with Dr Dennison.

## 2025-02-19 NOTE — ASSESSMENT & PLAN NOTE
Currently on Coumadin being managed by cardiology.  Reviewed notes from GI who spoke with cardiology and since she needs to stop her Coumadin 5 to 7 days prior to EGD recommendations were to hold for 3 months and have a repeat echo to see if she needs to continue on warfarin therapy.  Continue to follow with cardiology  Warfarin management per cardiology  She is going to discuss JOSE JUAN with cardiologist

## 2025-02-19 NOTE — ASSESSMENT & PLAN NOTE
Lab Results   Component Value Date    EGFR 38 01/27/2025    EGFR 40 10/15/2024    EGFR 38 07/31/2024    CREATININE 1.50 (H) 01/27/2025    CREATININE 1.44 (H) 10/15/2024    CREATININE 1.50 (H) 07/31/2024   Renal function remained stable.  Creatinine: 1.44 remains within baseline of 1.35-1.74.  BP appears adequately controlled on Coreg.  Heart failure echo showed LV function 44% and being managed by Entresto.  UA: No proteinuria.  Continue to follow with Dr. Vega from nephrology annually  Continue to monitor CMP  Appears euvolemic  Discussed importance of avoiding all NSAID products  Discussed importance of good hydration with water    Orders:    Empagliflozin (Jardiance) 10 MG TABS tablet; Take 1 tablet (10 mg total) by mouth every morning

## 2025-02-20 DIAGNOSIS — T65.222D TOXIC EFFECT OF TOBACCO CIGARETTE, INTENTIONAL SELF-HARM, SUBSEQUENT ENCOUNTER: ICD-10-CM

## 2025-02-20 NOTE — PROGRESS NOTES
2/20/25~discussed with Dr Dennison, he advised that patient does not to restart her Coumadin after her procedure, can just follow up with him in June as scheduled.     Spoke with patient, advised above.

## 2025-02-21 ENCOUNTER — PATIENT OUTREACH (OUTPATIENT)
Dept: SURGERY | Facility: CLINIC | Age: 60
End: 2025-02-21

## 2025-02-21 NOTE — PROGRESS NOTES
[facility] Annual Sexual Health Assessment     Airam was seen by Prevention Nurse in conjunction with her PCP visit.     Patient is an established patient.  CD4 count:   500  Viral load:  <20  ART:   Biktarvy    Explained to patient that we complete a sexual health assessment once yearly. Airam reports she is not sexually active at this time. Is interested in finding a partner but feels like it is hard to find honest people to spend time with. She is adherent with her meds and remains undetectable. Talked about condom use to protect from other STIs should she find herself in a new relationship.

## 2025-02-27 RX ORDER — SODIUM CHLORIDE 9 MG/ML
125 INJECTION, SOLUTION INTRAVENOUS CONTINUOUS
Status: CANCELLED | OUTPATIENT
Start: 2025-02-27

## 2025-03-03 ENCOUNTER — ANESTHESIA (OUTPATIENT)
Dept: GASTROENTEROLOGY | Facility: HOSPITAL | Age: 60
End: 2025-03-03
Payer: MEDICARE

## 2025-03-03 ENCOUNTER — ANESTHESIA EVENT (OUTPATIENT)
Dept: GASTROENTEROLOGY | Facility: HOSPITAL | Age: 60
End: 2025-03-03
Payer: MEDICARE

## 2025-03-03 ENCOUNTER — HOSPITAL ENCOUNTER (OUTPATIENT)
Dept: GASTROENTEROLOGY | Facility: HOSPITAL | Age: 60
Setting detail: OUTPATIENT SURGERY
Discharge: HOME | End: 2025-03-03
Attending: INTERNAL MEDICINE
Payer: MEDICARE

## 2025-03-03 VITALS
RESPIRATION RATE: 18 BRPM | OXYGEN SATURATION: 98 % | HEART RATE: 58 BPM | BODY MASS INDEX: 32.57 KG/M2 | WEIGHT: 177 LBS | TEMPERATURE: 97 F | DIASTOLIC BLOOD PRESSURE: 60 MMHG | HEIGHT: 62 IN | SYSTOLIC BLOOD PRESSURE: 100 MMHG

## 2025-03-03 DIAGNOSIS — R13.10 DYSPHAGIA, UNSPECIFIED TYPE: ICD-10-CM

## 2025-03-03 DIAGNOSIS — R13.19 ESOPHAGEAL DYSPHAGIA: Primary | ICD-10-CM

## 2025-03-03 LAB
INR PPP: 1.05 (ref 0.85–1.19)
PROTHROMBIN TIME: 13.9 SECONDS (ref 12.3–15)

## 2025-03-03 PROCEDURE — 85610 PROTHROMBIN TIME: CPT | Performed by: ANESTHESIOLOGY

## 2025-03-03 PROCEDURE — 88312 SPECIAL STAINS GROUP 1: CPT | Performed by: PATHOLOGY

## 2025-03-03 PROCEDURE — 88305 TISSUE EXAM BY PATHOLOGIST: CPT | Performed by: PATHOLOGY

## 2025-03-03 PROCEDURE — C1726 CATH, BAL DIL, NON-VASCULAR: HCPCS

## 2025-03-03 RX ORDER — PROPOFOL 10 MG/ML
INJECTION, EMULSION INTRAVENOUS AS NEEDED
Status: DISCONTINUED | OUTPATIENT
Start: 2025-03-03 | End: 2025-03-03

## 2025-03-03 RX ORDER — SODIUM CHLORIDE 9 MG/ML
125 INJECTION, SOLUTION INTRAVENOUS CONTINUOUS
Status: DISCONTINUED | OUTPATIENT
Start: 2025-03-03 | End: 2025-03-07 | Stop reason: HOSPADM

## 2025-03-03 RX ORDER — SODIUM CHLORIDE, SODIUM LACTATE, POTASSIUM CHLORIDE, CALCIUM CHLORIDE 600; 310; 30; 20 MG/100ML; MG/100ML; MG/100ML; MG/100ML
INJECTION, SOLUTION INTRAVENOUS CONTINUOUS PRN
Status: DISCONTINUED | OUTPATIENT
Start: 2025-03-03 | End: 2025-03-03

## 2025-03-03 RX ORDER — FENTANYL CITRATE 50 UG/ML
INJECTION, SOLUTION INTRAMUSCULAR; INTRAVENOUS AS NEEDED
Status: DISCONTINUED | OUTPATIENT
Start: 2025-03-03 | End: 2025-03-03

## 2025-03-03 RX ORDER — SODIUM CHLORIDE, SODIUM LACTATE, POTASSIUM CHLORIDE, CALCIUM CHLORIDE 600; 310; 30; 20 MG/100ML; MG/100ML; MG/100ML; MG/100ML
125 INJECTION, SOLUTION INTRAVENOUS CONTINUOUS
Status: DISCONTINUED | OUTPATIENT
Start: 2025-03-03 | End: 2025-03-07 | Stop reason: HOSPADM

## 2025-03-03 RX ORDER — OMEPRAZOLE 40 MG/1
40 CAPSULE, DELAYED RELEASE ORAL DAILY
Qty: 30 CAPSULE | Refills: 5 | Status: SHIPPED | OUTPATIENT
Start: 2025-03-03 | End: 2025-08-30

## 2025-03-03 RX ORDER — METOCLOPRAMIDE HYDROCHLORIDE 5 MG/ML
10 INJECTION INTRAMUSCULAR; INTRAVENOUS ONCE
Status: COMPLETED | OUTPATIENT
Start: 2025-03-03 | End: 2025-03-03

## 2025-03-03 RX ORDER — LIDOCAINE HCL/PF 100 MG/5ML
SYRINGE (ML) INJECTION AS NEEDED
Status: DISCONTINUED | OUTPATIENT
Start: 2025-03-03 | End: 2025-03-03

## 2025-03-03 RX ADMIN — SODIUM CHLORIDE, SODIUM LACTATE, POTASSIUM CHLORIDE, AND CALCIUM CHLORIDE 125 ML/HR: .6; .31; .03; .02 INJECTION, SOLUTION INTRAVENOUS at 09:44

## 2025-03-03 RX ADMIN — PROPOFOL 50 MG: 10 INJECTION, EMULSION INTRAVENOUS at 11:40

## 2025-03-03 RX ADMIN — LIDOCAINE HYDROCHLORIDE 60 MG: 20 INJECTION INTRAVENOUS at 11:35

## 2025-03-03 RX ADMIN — SODIUM CHLORIDE, SODIUM LACTATE, POTASSIUM CHLORIDE, AND CALCIUM CHLORIDE: .6; .31; .03; .02 INJECTION, SOLUTION INTRAVENOUS at 11:42

## 2025-03-03 RX ADMIN — PROPOFOL 100 MG: 10 INJECTION, EMULSION INTRAVENOUS at 11:38

## 2025-03-03 RX ADMIN — SODIUM CHLORIDE, SODIUM LACTATE, POTASSIUM CHLORIDE, AND CALCIUM CHLORIDE: .6; .31; .03; .02 INJECTION, SOLUTION INTRAVENOUS at 10:47

## 2025-03-03 RX ADMIN — FENTANYL CITRATE 25 MCG: 50 INJECTION INTRAMUSCULAR; INTRAVENOUS at 11:36

## 2025-03-03 RX ADMIN — METOCLOPRAMIDE 10 MG: 5 INJECTION, SOLUTION INTRAMUSCULAR; INTRAVENOUS at 10:14

## 2025-03-03 RX ADMIN — PROPOFOL 50 MG: 10 INJECTION, EMULSION INTRAVENOUS at 11:46

## 2025-03-03 RX ADMIN — PROPOFOL 50 MG: 10 INJECTION, EMULSION INTRAVENOUS at 11:42

## 2025-03-03 NOTE — ANESTHESIA POSTPROCEDURE EVALUATION
Post-Op Assessment Note    Last Filed PACU Vitals:  Vitals Value Taken Time   Temp 97 °F (36.1 °C) 03/03/25 1154   Pulse 58 03/03/25 1209   /60 03/03/25 1209   Resp 18 03/03/25 1209   SpO2 98 % 03/03/25 1209       Modified Ramon:     Vitals Value Taken Time   Activity 2 03/03/25 1209   Respiration 2 03/03/25 1209   Circulation 2 03/03/25 1209   Consciousness 2 03/03/25 1209   Oxygen Saturation 2 03/03/25 1209     Modified Ramon Score: 10

## 2025-03-03 NOTE — ANESTHESIA POSTPROCEDURE EVALUATION
Post-Op Assessment Note    CV Status:  Stable  Pain Score: 0    Pain management: adequate       Mental Status:  Alert and awake   Hydration Status:  Euvolemic   PONV Controlled:  Controlled   Airway Patency:  Patent  Two or more mitigation strategies used for obstructive sleep apnea   Post Op Vitals Reviewed: Yes    No anethesia notable event occurred.    Staff: CRNA, Anesthesiologist           Last Filed PACU Vitals:  Vitals Value Taken Time   Temp 97 °F (36.1 °C) 03/03/25 1154   Pulse 53 03/03/25 1154   BP 86/51 03/03/25 1154   Resp 16 03/03/25 1154   SpO2 96 % 03/03/25 1154       Modified Ramon:     Vitals Value Taken Time   Activity 0 03/03/25 1154   Respiration 2 03/03/25 1154   Circulation 2 03/03/25 1154   Consciousness 0 03/03/25 1154   Oxygen Saturation 2 03/03/25 1154     Modified Ramon Score: 6

## 2025-03-03 NOTE — H&P
History and Physical - [facility]  Airam Rainey 60 y.o. female MRN: [id number]                  HPI: Airam Rainey is a 60 y.o. year old female who presents for EGD for dysphagia.      REVIEW OF SYSTEMS: Per the HPI, and otherwise unremarkable.    Historical Information   Past Medical History:   Diagnosis Date    Asthma     Coronary artery disease     defibrillator    HIV positive (HCC)      Past Surgical History:   Procedure Laterality Date    ANGIOPLASTY      CARDIAC DEFIBRILLATOR PLACEMENT      CORONARY ANGIOPLASTY      pci placement    TOTAL ABDOMINAL HYSTERECTOMY      US GUIDED INJECTION FOR RESEARCH STUDY  2018    US GUIDED INJECTION FOR RESEARCH STUDY  2019    US GUIDED INJECTION FOR RESEARCH STUDY  2018    US GUIDED INJECTION FOR RESEARCH STUDY  2018     Social History   Social History     Substance and Sexual Activity   Alcohol Use Yes    Comment: socially     Social History     Substance and Sexual Activity   Drug Use No     Social History     Tobacco Use   Smoking Status Former    Current packs/day: 0.00    Average packs/day: 1 pack/day for 39.2 years (39.2 ttl pk-yrs)    Types: Cigarettes    Start date: 1984    Quit date: 2023    Years since quittin.9    Passive exposure: Past   Smokeless Tobacco Never     Family History   Problem Relation Age of Onset    No Known Problems Mother     Other Father         GI bleed    No Known Problems Sister     No Known Problems Sister     No Known Problems Maternal Grandmother     No Known Problems Maternal Grandfather     No Known Problems Paternal Grandmother     No Known Problems Paternal Grandfather     Suicidality Brother     Drug abuse Brother         overdose    No Known Problems Maternal Aunt     No Known Problems Maternal Aunt     No Known Problems Maternal Aunt     No Known Problems Paternal Aunt        Meds/Allergies       Current Outpatient Medications:     aspirin (Aspirin Low Dose) 81 mg EC tablet     "atorvastatin (LIPITOR) 80 mg tablet    bictegravir-emtricitab-tenofovir alafenamide (Biktarvy) -25 MG tablet    Calcium Carb-Cholecalciferol (calcium carbonate-vitamin D) 500 mg-5 mcg tablet    carvedilol (COREG) 6.25 mg tablet    cholecalciferol (VITAMIN D3) 1,000 units tablet    nicotine polacrilex (COMMIT) 4 MG lozenge    sacubitril-valsartan (Entresto) 49-51 MG TABS    bisacodyl (DULCOLAX) 5 mg EC tablet    denosumab (Prolia) 60 mg/mL    Empagliflozin (Jardiance) 10 MG TABS tablet    famotidine (PEPCID) 20 mg tablet    polyethylene glycol (MIRALAX) 17 g packet    polyethylene glycol (MIRALAX) 17 g packet    warfarin (COUMADIN) 5 mg tablet    Current Facility-Administered Medications:     lactated ringers infusion, 125 mL/hr, Intravenous, Continuous, 125 mL/hr at 03/03/25 0944    sodium chloride 0.9 % infusion, 125 mL/hr, Intravenous, Continuous    Allergies   Allergen Reactions    No Active Allergies     No Known Allergies        Objective     BP 99/64   Pulse 58   Temp 98 °F (36.7 °C) (Temporal)   Resp 18   Ht 5' 2\" (1.575 m)   Wt 80.3 kg (177 lb)   SpO2 100%   BMI 32.37 kg/m²       PHYSICAL EXAM    Gen: NAD  Head: NCAT  CV: RRR  CHEST: Clear  ABD: soft, NT/ND  EXT: no edema      ASSESSMENT/PLAN:  This is a 60 y.o. year old female here for EGD, and she is stable and optimized for her procedure. We discussed potential adverse events related with the procedure. They verbalized their understanding and are in agreement with proceeding with the procedure.       "

## 2025-03-03 NOTE — ANESTHESIA PREPROCEDURE EVALUATION
Procedure:  EGD    Relevant Problems   CARDIO  2023 2D Echo    Interpretation Summary       ·  Left Ventricle: Left ventricular cavity size is at the upper limits of normal.  There is no definite thrombus seen in the apex.  Definity was used but did not fill the apex completely with swirling of contrast. Wall thickness is normal. The left ventricular ejection fraction is 42% by biplane measurement. Systolic function is mild to moderately reduced. The anterior and lateral walls are significantly hypokinetic.  The distal anterior wall, distal lateral wall, distal inferior wall and distal septum extending into the apex are severely hypokinetic to akinetic. Diastolic function is mildly abnormal, consistent with grade I (abnormal) relaxation.  Left atrial filling pressure is normal.        (+) Essential hypertension   (+) Mixed hyperlipidemia   (+) Triple vessel coronary artery disease      GI/HEPATIC   (+) Dysphagia      /RENAL   (+) CKD (chronic kidney disease) stage 3, GFR 30-59 ml/min (HCC)      HEMATOLOGY   (+) Thrombocytopenia (HCC)      NEURO/PSYCH   (+) Weakness of extremity      PULMONARY   (+) Mild intermittent asthma without complication        Physical Exam    Airway    Mallampati score: II  TM Distance: >3 FB  Neck ROM: full     Dental    upper dentures and lower dentures    Cardiovascular  Rhythm: regular, Rate: normal, Cardiovascular exam normal    Pulmonary  Pulmonary exam normal Breath sounds clear to auscultation    Other Findings  post-pubertal.      Anesthesia Plan  ASA Score- 3     Anesthesia Type- general with ASA Monitors.         Additional Monitors:     Airway Plan:            Plan Factors-Exercise tolerance (METS): >4 METS.    Chart reviewed.  Imaging results reviewed. Existing labs reviewed. Patient summary reviewed.    Patient is a current smoker.              Induction- intravenous.    Postoperative Plan-         Informed Consent- Anesthetic plan and risks discussed with patient.        NPO  Status:  Vitals Value Taken Time   Date of last liquid 03/03/25 03/03/25 0930   Time of last liquid 0800 03/03/25 0930   Date of last solid 03/02/25 03/03/25 0930   Time of last solid 2000 03/03/25 0930

## 2025-03-07 PROCEDURE — 88305 TISSUE EXAM BY PATHOLOGIST: CPT | Performed by: PATHOLOGY

## 2025-03-07 PROCEDURE — 88312 SPECIAL STAINS GROUP 1: CPT | Performed by: PATHOLOGY

## 2025-03-12 DIAGNOSIS — M81.0 AGE-RELATED OSTEOPOROSIS WITHOUT CURRENT PATHOLOGICAL FRACTURE: ICD-10-CM

## 2025-03-13 ENCOUNTER — RESULTS FOLLOW-UP (OUTPATIENT)
Dept: GASTROENTEROLOGY | Facility: CLINIC | Age: 60
End: 2025-03-13

## 2025-03-13 RX ORDER — DENOSUMAB 60 MG/ML
INJECTION SUBCUTANEOUS
Qty: 1 ML | Refills: 2 | Status: SHIPPED | OUTPATIENT
Start: 2025-03-13

## 2025-03-19 ENCOUNTER — PATIENT OUTREACH (OUTPATIENT)
Dept: SURGERY | Facility: CLINIC | Age: 60
End: 2025-03-19

## 2025-03-24 ENCOUNTER — PATIENT OUTREACH (OUTPATIENT)
Dept: SURGERY | Facility: CLINIC | Age: 60
End: 2025-03-24

## 2025-03-24 NOTE — PROGRESS NOTES
Ct sent this cm her recent MAWD premium statement.    CM completed appropriate paperwork to address MAWD premium. MAWD payment was e-mailed to supervisor, Malgorzata. Requisition check was signed. Bill was scanned into ct's chart.     Supervisor, Malgorzata approved check.

## 2025-03-25 ENCOUNTER — PATIENT OUTREACH (OUTPATIENT)
Dept: SURGERY | Facility: CLINIC | Age: 60
End: 2025-03-25

## 2025-03-26 ENCOUNTER — PATIENT OUTREACH (OUTPATIENT)
Dept: SURGERY | Facility: CLINIC | Age: 60
End: 2025-03-26

## 2025-04-03 ENCOUNTER — PATIENT OUTREACH (OUTPATIENT)
Dept: SURGERY | Facility: CLINIC | Age: 60
End: 2025-04-03

## 2025-04-03 NOTE — PROGRESS NOTES
This cm contacted the Department of Human services and was on hold for 30 minutes prior to getting connected with a .  let this cm know ct's MAWD was terminated due to not paying for her MAWD.  was made aware a check was sent in the amount of $156.00 to pay past due amount on 3/21/2025.  let this cm know ct's  would be made aware of the check.  let this cm know ct needed to reapply for MAWD.     Ct was made aware of conversation with the [facility]. Ct was made aware to reapply, this cm needed a copy of her award letter, a month's worth of recent income, and March 2025's bank statements.

## 2025-04-10 ENCOUNTER — TELEPHONE (OUTPATIENT)
Dept: SURGERY | Facility: CLINIC | Age: 60
End: 2025-04-10

## 2025-04-10 NOTE — TELEPHONE ENCOUNTER
LVM for patient to call me back. Wanted to give a reminder to get labs done for upcoming ID appointment on 4/18.

## 2025-04-10 NOTE — TELEPHONE ENCOUNTER
Patient returned my call. Spoke with patient and gave a reminded to get labs done for upcoming ID appointment on 4/18.

## 2025-04-15 ENCOUNTER — RESULTS FOLLOW-UP (OUTPATIENT)
Dept: CARDIOLOGY CLINIC | Facility: CLINIC | Age: 60
End: 2025-04-15

## 2025-04-15 ENCOUNTER — REMOTE DEVICE CLINIC VISIT (OUTPATIENT)
Dept: CARDIOLOGY CLINIC | Facility: CLINIC | Age: 60
End: 2025-04-15
Payer: MEDICARE

## 2025-04-15 DIAGNOSIS — Z95.810 IMPLANTABLE CARDIOVERTER-DEFIBRILLATOR (ICD) IN SITU: Primary | ICD-10-CM

## 2025-04-15 PROCEDURE — 93295 DEV INTERROG REMOTE 1/2/MLT: CPT | Performed by: INTERNAL MEDICINE

## 2025-04-15 PROCEDURE — 93296 REM INTERROG EVL PM/IDS: CPT | Performed by: INTERNAL MEDICINE

## 2025-04-15 NOTE — PROGRESS NOTES
MDT-SINGLE CHAMBER ICD - ACTIVE SYSTEM IS MRI CONDITIONAL   CARELINK TRANSMISSION: BATTERY VOLTAGE ADEQUATE (3.6 YR).  <0.1% (VVI 40 PPM). ALL AVAILABLE LEAD PARAMETERS WITHIN NORMAL LIMITS. NO SIGNIFICANT HIGH RATE EPISODES. OPTI-VOL WITHIN NORMAL LIMITS. NORMAL DEVICE FUNCTION.  ES

## 2025-04-16 ENCOUNTER — APPOINTMENT (OUTPATIENT)
Dept: LAB | Facility: HOSPITAL | Age: 60
End: 2025-04-16
Payer: MEDICARE

## 2025-04-16 LAB
ALBUMIN SERPL BCG-MCNC: 4.1 G/DL (ref 3.5–5)
ALP SERPL-CCNC: 75 U/L (ref 34–104)
ALT SERPL W P-5'-P-CCNC: 13 U/L (ref 7–52)
ANION GAP SERPL CALCULATED.3IONS-SCNC: 7 MMOL/L (ref 4–13)
AST SERPL W P-5'-P-CCNC: 14 U/L (ref 13–39)
BACTERIA UR QL AUTO: ABNORMAL /HPF
BASOPHILS # BLD AUTO: 0.03 THOUSANDS/ÂΜL (ref 0–0.1)
BASOPHILS NFR BLD AUTO: 1 % (ref 0–1)
BILIRUB SERPL-MCNC: 0.72 MG/DL (ref 0.2–1)
BILIRUB UR QL STRIP: NEGATIVE
BUN SERPL-MCNC: 14 MG/DL (ref 5–25)
CALCIUM SERPL-MCNC: 9.6 MG/DL (ref 8.4–10.2)
CHLORIDE SERPL-SCNC: 109 MMOL/L (ref 96–108)
CHOLEST SERPL-MCNC: 142 MG/DL (ref ?–200)
CLARITY UR: ABNORMAL
CO2 SERPL-SCNC: 28 MMOL/L (ref 21–32)
COLOR UR: ABNORMAL
CREAT SERPL-MCNC: 1.35 MG/DL (ref 0.6–1.3)
EOSINOPHIL # BLD AUTO: 0.11 THOUSAND/ÂΜL (ref 0–0.61)
EOSINOPHIL NFR BLD AUTO: 2 % (ref 0–6)
ERYTHROCYTE [DISTWIDTH] IN BLOOD BY AUTOMATED COUNT: 13.2 % (ref 11.6–15.1)
EST. AVERAGE GLUCOSE BLD GHB EST-MCNC: 120 MG/DL
GFR SERPL CREATININE-BSD FRML MDRD: 43 ML/MIN/1.73SQ M
GLUCOSE P FAST SERPL-MCNC: 106 MG/DL (ref 65–99)
GLUCOSE UR STRIP-MCNC: NEGATIVE MG/DL
HBA1C MFR BLD: 5.8 %
HCT VFR BLD AUTO: 42.8 % (ref 34.8–46.1)
HDLC SERPL-MCNC: 53 MG/DL
HGB BLD-MCNC: 13.6 G/DL (ref 11.5–15.4)
HGB UR QL STRIP.AUTO: 10
IMM GRANULOCYTES # BLD AUTO: 0.01 THOUSAND/UL (ref 0–0.2)
IMM GRANULOCYTES NFR BLD AUTO: 0 % (ref 0–2)
KETONES UR STRIP-MCNC: NEGATIVE MG/DL
LDLC SERPL CALC-MCNC: 65 MG/DL (ref 0–100)
LEUKOCYTE ESTERASE UR QL STRIP: 100
LYMPHOCYTES # BLD AUTO: 2.25 THOUSANDS/ÂΜL (ref 0.6–4.47)
LYMPHOCYTES NFR BLD AUTO: 47 % (ref 14–44)
MCH RBC QN AUTO: 30.1 PG (ref 26.8–34.3)
MCHC RBC AUTO-ENTMCNC: 31.8 G/DL (ref 31.4–37.4)
MCV RBC AUTO: 95 FL (ref 82–98)
MONOCYTES # BLD AUTO: 0.34 THOUSAND/ÂΜL (ref 0.17–1.22)
MONOCYTES NFR BLD AUTO: 7 % (ref 4–12)
MUCOUS THREADS UR QL AUTO: ABNORMAL
NEUTROPHILS # BLD AUTO: 2.07 THOUSANDS/ÂΜL (ref 1.85–7.62)
NEUTS SEG NFR BLD AUTO: 43 % (ref 43–75)
NITRITE UR QL STRIP: POSITIVE
NON-SQ EPI CELLS URNS QL MICRO: ABNORMAL /HPF
NONHDLC SERPL-MCNC: 89 MG/DL
NRBC BLD AUTO-RTO: 0 /100 WBCS
PH UR STRIP.AUTO: 5 [PH]
PLATELET # BLD AUTO: 202 THOUSANDS/UL (ref 149–390)
PMV BLD AUTO: 10.5 FL (ref 8.9–12.7)
POTASSIUM SERPL-SCNC: 4.9 MMOL/L (ref 3.5–5.3)
PROT SERPL-MCNC: 7 G/DL (ref 6.4–8.4)
PROT UR STRIP-MCNC: ABNORMAL MG/DL
RBC # BLD AUTO: 4.52 MILLION/UL (ref 3.81–5.12)
RBC #/AREA URNS AUTO: ABNORMAL /HPF
SODIUM SERPL-SCNC: 144 MMOL/L (ref 135–147)
SP GR UR STRIP.AUTO: 1.02 (ref 1–1.04)
TRIGL SERPL-MCNC: 120 MG/DL (ref ?–150)
UROBILINOGEN UA: NEGATIVE MG/DL
WBC # BLD AUTO: 4.81 THOUSAND/UL (ref 4.31–10.16)
WBC #/AREA URNS AUTO: ABNORMAL /HPF

## 2025-04-17 DIAGNOSIS — Z21 HIV POSITIVE (HCC): ICD-10-CM

## 2025-04-17 LAB
BASOPHILS # BLD AUTO: 0 X10E3/UL (ref 0–0.2)
BASOPHILS NFR BLD AUTO: 1 %
C TRACH DNA SPEC QL NAA+PROBE: NEGATIVE
CD3+CD4+ CELLS # BLD: 594 /UL (ref 359–1519)
CD3+CD4+ CELLS NFR BLD: 28.3 % (ref 30.8–58.5)
EOSINOPHIL # BLD AUTO: 0.1 X10E3/UL (ref 0–0.4)
EOSINOPHIL NFR BLD AUTO: 2 %
ERYTHROCYTE [DISTWIDTH] IN BLOOD BY AUTOMATED COUNT: 13 % (ref 11.7–15.4)
GAMMA INTERFERON BACKGROUND BLD IA-ACNC: 0.06 IU/ML
HCT VFR BLD AUTO: 36.5 % (ref 34–46.6)
HCV AB SER QL: NORMAL
HGB BLD-MCNC: 12.5 G/DL (ref 11.1–15.9)
IMM GRANULOCYTES # BLD: 0 X10E3/UL (ref 0–0.1)
IMM GRANULOCYTES NFR BLD: 0 %
LYMPHOCYTES # BLD AUTO: 2.1 X10E3/UL (ref 0.7–3.1)
LYMPHOCYTES NFR BLD AUTO: 45 %
M TB IFN-G BLD-IMP: NEGATIVE
M TB IFN-G CD4+ BCKGRND COR BLD-ACNC: -0.01 IU/ML
M TB IFN-G CD4+ BCKGRND COR BLD-ACNC: 0.01 IU/ML
MCH RBC QN AUTO: 31.5 PG (ref 26.6–33)
MCHC RBC AUTO-ENTMCNC: 34.2 G/DL (ref 31.5–35.7)
MCV RBC AUTO: 92 FL (ref 79–97)
MITOGEN IGNF BCKGRD COR BLD-ACNC: 9.94 IU/ML
MONOCYTES # BLD AUTO: 0.3 X10E3/UL (ref 0.1–0.9)
MONOCYTES NFR BLD AUTO: 7 %
N GONORRHOEA DNA SPEC QL NAA+PROBE: NEGATIVE
NEUTROPHILS # BLD AUTO: 2.1 X10E3/UL (ref 1.4–7)
NEUTROPHILS NFR BLD AUTO: 45 %
PLATELET # BLD AUTO: 212 X10E3/UL (ref 150–450)
RBC # BLD AUTO: 3.97 X10E6/UL (ref 3.77–5.28)
TREPONEMA PALLIDUM IGG+IGM AB [PRESENCE] IN SERUM OR PLASMA BY IMMUNOASSAY: NORMAL
WBC # BLD AUTO: 4.6 X10E3/UL (ref 3.4–10.8)

## 2025-04-17 RX ORDER — BICTEGRAVIR SODIUM, EMTRICITABINE, AND TENOFOVIR ALAFENAMIDE FUMARATE 50; 200; 25 MG/1; MG/1; MG/1
1 TABLET ORAL
Qty: 30 TABLET | Refills: 5 | Status: SHIPPED | OUTPATIENT
Start: 2025-04-17

## 2025-04-18 ENCOUNTER — DOCUMENTATION (OUTPATIENT)
Dept: SURGERY | Facility: CLINIC | Age: 60
End: 2025-04-18

## 2025-04-18 ENCOUNTER — PATIENT OUTREACH (OUTPATIENT)
Dept: SURGERY | Facility: CLINIC | Age: 60
End: 2025-04-18

## 2025-04-18 ENCOUNTER — OFFICE VISIT (OUTPATIENT)
Dept: SURGERY | Facility: CLINIC | Age: 60
End: 2025-04-18
Payer: MEDICARE

## 2025-04-18 VITALS
TEMPERATURE: 98.9 F | HEART RATE: 74 BPM | WEIGHT: 174 LBS | RESPIRATION RATE: 17 BRPM | DIASTOLIC BLOOD PRESSURE: 59 MMHG | OXYGEN SATURATION: 98 % | SYSTOLIC BLOOD PRESSURE: 117 MMHG | BODY MASS INDEX: 32.02 KG/M2 | HEIGHT: 62 IN

## 2025-04-18 DIAGNOSIS — N18.32 STAGE 3B CHRONIC KIDNEY DISEASE (HCC): ICD-10-CM

## 2025-04-18 DIAGNOSIS — I25.5 ISCHEMIC CARDIOMYOPATHY: Primary | ICD-10-CM

## 2025-04-18 DIAGNOSIS — R73.03 PREDIABETES: ICD-10-CM

## 2025-04-18 DIAGNOSIS — B20 HIV DISEASE (HCC): ICD-10-CM

## 2025-04-18 DIAGNOSIS — D32.0 MENINGIOMA, CEREBRAL (HCC): ICD-10-CM

## 2025-04-18 LAB — HIV1 RNA # PLAS NAA DL=20: NOT DETECTED {COPIES}/ML

## 2025-04-18 PROCEDURE — G2211 COMPLEX E/M VISIT ADD ON: HCPCS | Performed by: STUDENT IN AN ORGANIZED HEALTH CARE EDUCATION/TRAINING PROGRAM

## 2025-04-18 PROCEDURE — 99214 OFFICE O/P EST MOD 30 MIN: CPT | Performed by: STUDENT IN AN ORGANIZED HEALTH CARE EDUCATION/TRAINING PROGRAM

## 2025-04-18 NOTE — PROGRESS NOTES
Pastoral Care Progress Note    2025  Patient: Airam Rainey : 1965  Encounter Date & Time: 2025   MRN: [id number]        The  met with Ms. Rainey for continued care and support. Ms. Rainey shared she is well. 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 She expressed doing well spiritually and emotionally and did not elaborate on anything particular.     The  checked in with patient. Patient appeared lighthearted and well.  Ms Rainey thanked the  for checking on her and she will reach out if she needs anything.  No further support is needed at this time.

## 2025-04-18 NOTE — PROGRESS NOTES
"Name: Airam Rainey      : 1965      MRN: [id number]  Encounter Provider: Estrellita Cortez MD  Encounter Date: 2025   Encounter department: ASC AT [facility]  :  Assessment & Plan  HIV disease (HCC)  Doing well on Biktarvy.  Tolerating ART without any side effects.  Viral load is undetectable and CD4 594              -Continue ART regimen of Biktarvy              -Recheck labs in 5 months to assess for virologic control, drug toxicity   -Follow up in 6 months               -Patient was provided medication, adherence and prevention education    Ischemic cardiomyopathy  With mural thrombus. She has AICD in place, on warfarin               -Patient follows with cardiology     Meningioma, cerebral (HCC)  Continue follow up with neurosurgery. Last MRI with stable meningioma  Stage 3b chronic kidney disease (HCC)  Lab Results   Component Value Date    EGFR 43 2025    EGFR 38 2025    EGFR 40 10/15/2024    CREATININE 1.35 (H) 2025    CREATININE 1.50 (H) 2025    CREATININE 1.44 (H) 10/15/2024   Creatinine stable at baseline 1.3-1.5. Follows with nephrology outpatient. Continue to monitor  Prediabetes  Hemoglobin A1c 5.8%.  Started on Jardiance by PCP           History of Present Illness   Routine follow-up for HIV.  Patient claims 100% adherence with Biktarvy. Patient denies any notable side effects.  Overall feeling well.  The patient denies any fever chills or sweats, denies any nausea vomiting or diarrhea, denies any cough or shortness of breath, dysuria.    ROS: A complete review of systems are negative other than that noted in the HPI      Objective   Temp 98.9 °F (37.2 °C) (Tympanic)   Resp 17   Ht 5' 2\" (1.575 m)   Wt 78.9 kg (174 lb)   BMI 31.83 kg/m²     General: Alert, interactive, appearing well, nontoxic, no acute distress.  Neck: Supple without lymphadenopathy, no thyromegaly or masses  Throat: Oropharynx moist without lesions.   Lungs: Clear to auscultation " bilaterally; no wheezes, rhonchi or rales; respirations unlabored  Heart: RRR; no murmur, rub or gallop  Abdomen: Soft, non-tender, non-distended, positive bowel sounds.    Extremities: No clubbing, cyanosis or edema  Skin: No new rashes or lesions. No draining wounds noted.    Lab Results: I have personally reviewed pertinent labs.  Lab Results   Component Value Date    K 4.9 04/16/2025     (H) 04/16/2025    CO2 28 04/16/2025    BUN 14 04/16/2025    CREATININE 1.35 (H) 04/16/2025    GLUF 106 (H) 04/16/2025    CALCIUM 9.6 04/16/2025    AST 14 04/16/2025    ALT 13 04/16/2025    ALKPHOS 75 04/16/2025    EGFR 43 04/16/2025     Lab Results   Component Value Date    WBC 4.81 04/16/2025    WBC 4.6 04/16/2025    HGB 13.6 04/16/2025    HGB 12.5 04/16/2025    HCT 42.8 04/16/2025    HCT 36.5 04/16/2025    MCV 95 04/16/2025    MCV 92 04/16/2025     04/16/2025     04/16/2025     Lab Results   Component Value Date    HEPCAB Non-reactive 04/16/2025     Lab Results   Component Value Date    HAV Non-reactive 11/02/2018    HEPCAB Non-reactive 04/16/2025     Lab Results   Component Value Date    RPR Non-Reactive 07/18/2022     CD4 ABS   Date/Time Value Ref Range Status   04/16/2025 12:45  359 - 1519 /uL Final     HIV-1 RNA by PCR, Qn   Date/Time Value Ref Range Status   04/28/2023 01:57 PM <20 copies/mL Final     Comment:     HIV-1 RNA detected  The reportable range for this assay is 20 to 10,000,000  copies HIV-1 RNA/mL.     HIV-1 T[facility]   Date/Time Value Ref Range Status   10/15/2024 02:18 PM Detected <20 cp/mL (A) Not Detected Final

## 2025-04-19 PROBLEM — R73.03 PREDIABETES: Status: ACTIVE | Noted: 2025-04-19

## 2025-04-19 NOTE — ASSESSMENT & PLAN NOTE
Lab Results   Component Value Date    EGFR 43 04/16/2025    EGFR 38 01/27/2025    EGFR 40 10/15/2024    CREATININE 1.35 (H) 04/16/2025    CREATININE 1.50 (H) 01/27/2025    CREATININE 1.44 (H) 10/15/2024   Creatinine stable at baseline 1.3-1.5. Follows with nephrology outpatient. Continue to monitor

## 2025-04-19 NOTE — ASSESSMENT & PLAN NOTE
Doing well on Biktarvy.  Tolerating ART without any side effects.  Viral load is undetectable and CD4 594              -Continue ART regimen of Biktarvy              -Recheck labs in 5 months to assess for virologic control, drug toxicity   -Follow up in 6 months               -Patient was provided medication, adherence and prevention education

## 2025-04-21 ENCOUNTER — PATIENT OUTREACH (OUTPATIENT)
Dept: SURGERY | Facility: CLINIC | Age: 60
End: 2025-04-21

## 2025-04-21 DIAGNOSIS — I10 ESSENTIAL HYPERTENSION: ICD-10-CM

## 2025-04-21 DIAGNOSIS — B20 HUMAN IMMUNODEFICIENCY VIRUS (HIV) DISEASE (HCC): Primary | ICD-10-CM

## 2025-04-21 NOTE — PROGRESS NOTES
This cm reached out to ct requesting that this cm bring in 3 pay stubs and March's bank statements to her appointment for MAWD application.     MAWD application was completed and faxed and emailed to [facility] Assistance Office with supporting documents. Application with fax confirmation was scanned into ct's chart.     ROIs were completed and signed by ct for [facility] Assistance Office. KELLY was faxed and emailed to [facility]. ROIs were scanned into ct's chart with fax confirmation sheet.

## 2025-04-24 ENCOUNTER — PATIENT OUTREACH (OUTPATIENT)
Dept: SURGERY | Facility: CLINIC | Age: 60
End: 2025-04-24

## 2025-04-28 NOTE — QUICK NOTE
Investigation Report    Device investigated:          Make/model: ICD - Medtronic Visia AF MRI™ VR [id number]   Leads:  RVLead - Medtronic [id number] Sprint Quattro Secure S MRI SureScan         Method investigated   imaging report  vendor contacted     [employer]   website used   [employer]  [email or web address]    Time spent investigating in minutes: 15    Investigation findings: conditional    Risk vs Benefit performed.  If so, list physician and outcome: N/A

## 2025-05-05 ENCOUNTER — PATIENT OUTREACH (OUTPATIENT)
Dept: SURGERY | Facility: CLINIC | Age: 60
End: 2025-05-05

## 2025-05-05 NOTE — PROGRESS NOTES
Per [facility], ct's MAWD has been reinstated. Recipient eligibility verification summary was scanned into ct's chart.    Ct was made aware. Ct aware to watch her mail for next MAWD bill. Ct aware if the bill goes unpaid, she would have to re-apply for MAWD. Ct let this cm know she still owes a payment for April. Per ct she has not received that bill yet.

## 2025-05-06 ENCOUNTER — PATIENT OUTREACH (OUTPATIENT)
Dept: SURGERY | Facility: CLINIC | Age: 60
End: 2025-05-06

## 2025-05-08 ENCOUNTER — PATIENT OUTREACH (OUTPATIENT)
Dept: SURGERY | Facility: CLINIC | Age: 60
End: 2025-05-08

## 2025-05-19 DIAGNOSIS — R73.03 PREDIABETES: ICD-10-CM

## 2025-05-19 DIAGNOSIS — N18.32 STAGE 3B CHRONIC KIDNEY DISEASE (HCC): ICD-10-CM

## 2025-05-20 RX ORDER — EMPAGLIFLOZIN 10 MG/1
10 TABLET, FILM COATED ORAL EVERY MORNING
Qty: 90 TABLET | Refills: 1 | Status: SHIPPED | OUTPATIENT
Start: 2025-05-20

## 2025-05-21 DIAGNOSIS — T65.222D TOXIC EFFECT OF TOBACCO CIGARETTE, INTENTIONAL SELF-HARM, SUBSEQUENT ENCOUNTER: ICD-10-CM

## 2025-05-28 ENCOUNTER — HOSPITAL ENCOUNTER (OUTPATIENT)
Dept: RADIOLOGY | Facility: HOSPITAL | Age: 60
Discharge: HOME | End: 2025-05-28

## 2025-05-28 DIAGNOSIS — Z95.0 MRI SAFE CARDIAC PACEMAKER IN SITU: ICD-10-CM

## 2025-06-03 ENCOUNTER — HOSPITAL ENCOUNTER (OUTPATIENT)
Dept: RADIOLOGY | Facility: HOSPITAL | Age: 60
Discharge: HOME | End: 2025-06-03
Payer: MEDICARE

## 2025-06-03 DIAGNOSIS — D32.0 MENINGIOMA, CEREBRAL (HCC): ICD-10-CM

## 2025-06-03 DIAGNOSIS — M81.0 AGE-RELATED OSTEOPOROSIS WITHOUT CURRENT PATHOLOGICAL FRACTURE: ICD-10-CM

## 2025-06-03 PROCEDURE — 76018 MR SAFETY IMPLANT ELEC PREPJ: CPT

## 2025-06-03 PROCEDURE — A9585 GADOBUTROL INJECTION: HCPCS | Performed by: NURSE PRACTITIONER

## 2025-06-03 PROCEDURE — 76014 MR SFTY IMPLT&/FB ASMT STF 1: CPT

## 2025-06-03 PROCEDURE — 70553 MRI BRAIN STEM W/O & W/DYE: CPT

## 2025-06-03 RX ORDER — GADOBUTROL 604.72 MG/ML
7 INJECTION INTRAVENOUS
Status: COMPLETED | OUTPATIENT
Start: 2025-06-03 | End: 2025-06-03

## 2025-06-03 RX ADMIN — GADOBUTROL 7 ML: 604.72 INJECTION INTRAVENOUS at 14:46

## 2025-06-03 NOTE — NURSING NOTE
Device interrogation for MRI.  Normal device function prior to MRI.  Leads and device meet all requirements per policy for MRI.  Device programmed OVO per Cardiology for MRI.  Patient has no complaints.  Vital signs monitored throughout by BEVERLY Sapp RN.  Normal device function post MRI.  Device reprogrammed to prior settings per Cardiology.

## 2025-06-03 NOTE — NURSING NOTE
Medtronic device interrogation for MRI done by [place], BARON clinical specialist. Device set to MRI safe mode--turned OFF.    MRI brain w/without contrast complete. Pt tolerated well.    VSS throughout scan. Pt alert and oriented on room air. No complaints or visible signs of distress.    Device reprogrammed to prior settings per Cardiology by FELIZ Colbert.

## 2025-06-23 ENCOUNTER — PATIENT OUTREACH (OUTPATIENT)
Dept: SURGERY | Facility: CLINIC | Age: 60
End: 2025-06-23

## 2025-06-23 NOTE — PROGRESS NOTES
Survey was generated and sent to ct. Ct aware she would receive a $25 gift card for completing survey.

## 2025-06-28 PROBLEM — Z87.891 FORMER SMOKER: Status: ACTIVE | Noted: 2021-03-02

## 2025-06-28 NOTE — ASSESSMENT & PLAN NOTE
Patient was referred to us from Family Practice. She experienced a myocardial infarction on February 28, 2017 in [place]. She has subsequently moved to [facility]. She had a cardiac catheterization taken at [facility]. Patient's cardiac catheterization CD revealed the left main coronary artery to be nonobstructed. The left anterior descending artery was 100% in its midportion after a large 1st diagonal branch which was nonobstructed. The circumflex artery obtuse marginal was 90%. The right coronary artery in its midportion with 50-60%. The large distal LAD filled from right to left collaterals. [facility] had recommended CABG but the patient had refused at that time. No ventriculogram was performed but an echocardiogram which I performed had demonstrated extensive LV damage. A nuclear stress test demonstrated minimal with any it ischemia. I did not feel that the patient would improve with CABG.      Patient is free of angina pectoris or shortness of breath. She is doing quite well for her degree of LV dysfunction.    Aspirin 81 mg daily  Carvedilol 6.25 mg 2 times a day  Jardiance 10 mg daily  Entresto 49/51 - 2 times a day

## 2025-06-28 NOTE — ASSESSMENT & PLAN NOTE
05/15/2017 stress test: Severe LV dysfunction, EF 20%. Mild LV enlargement. Anterior apical aneurysm with paradoxical motion. Extensive myocardial infarctions involving the distal anterior apical and inferior apical walls. Negative for Persantine induced ischemia.      12/13/2017 ICD: single-chamber.    03/12/2019 echocardiogram: Severe LV systolic dysfunction, EF 25-27% apex is paradoxical. Grade 1 diastolic dysfunction. Thrombus in the septal apex with smoke     4/12/2023 echocardiogram: LVEF 42% with cavity upper limits of normal.  Anterior walls are hypokinetic.  [place] is hypokinetic to akinetic.  Grade 1 diastolic dysfunction.    ICD in situ    Aspirin 81 mg daily   carvedilol 6.25 mg twice daily  Jardiance 10 mg daily  Entresto 49/51 2 times daily

## 2025-06-28 NOTE — ASSESSMENT & PLAN NOTE
Initially patient had a mural thrombus noted on echocardiogram.  Her first echocardiogram from 3/12/2019 after she moved to the [place] area demonstrated thrombus in the apical septum which was akinetic to dyskinetic.  She had evidence of smoke in the apex.  She was placed on Coumadin anticoagulation because of the thrombus.  The thrombus was noted 2 years after her infarction but the smoke was also concerning.  Subsequently the LV thrombus disappeared and previous smoke the apex disappeared, I decided to continue her on Coumadin indefinitely but most recently hide have reconsidered.  She had to discontinue the Coumadin for endoscopy.  Since she discontinued the Coumadin, I thought I would repeat the echocardiogram off Coumadin and see if the smoke or thrombus was coming back.  If it was not coming back, I thought I would leave her off Coumadin since the guidelines do not endorse lifelong Coumadin for LV thrombus.  I will repeat her echocardiogram.

## 2025-06-28 NOTE — ASSESSMENT & PLAN NOTE
04/16/2025 cholesterol 142, triglycerides 120, HDL 53, LDL calculated 65, non-HDL cholesterol 89.    Atorvastatin 80 mg daily

## 2025-06-28 NOTE — ASSESSMENT & PLAN NOTE
7/26/2024 95/65  7/29/2024 110/61  10/18/2024 110/74  2/19/2025 128/84  3/3/2025  100/60  04/18/2025  117/59    Carvedilol 6.25 mg twice daily  Entresto 49/51 twice daily

## 2025-06-28 NOTE — ASSESSMENT & PLAN NOTE
Lab Results   Component Value Date    EGFR 43 04/16/2025    EGFR 38 01/27/2025    EGFR 40 10/15/2024    CREATININE 1.35 (H) 04/16/2025    CREATININE 1.50 (H) 01/27/2025    CREATININE 1.44 (H) 10/15/2024

## 2025-06-30 ENCOUNTER — OFFICE VISIT (OUTPATIENT)
Dept: CARDIOLOGY CLINIC | Facility: CLINIC | Age: 60
End: 2025-06-30
Payer: MEDICARE

## 2025-06-30 VITALS
BODY MASS INDEX: 31.64 KG/M2 | WEIGHT: 173 LBS | DIASTOLIC BLOOD PRESSURE: 60 MMHG | SYSTOLIC BLOOD PRESSURE: 96 MMHG | HEART RATE: 72 BPM

## 2025-06-30 DIAGNOSIS — I25.10 TRIPLE VESSEL CORONARY ARTERY DISEASE: Primary | ICD-10-CM

## 2025-06-30 DIAGNOSIS — Z87.891 FORMER SMOKER: ICD-10-CM

## 2025-06-30 DIAGNOSIS — I25.5 ISCHEMIC CARDIOMYOPATHY: ICD-10-CM

## 2025-06-30 DIAGNOSIS — E78.2 MIXED HYPERLIPIDEMIA: ICD-10-CM

## 2025-06-30 DIAGNOSIS — N18.32 STAGE 3B CHRONIC KIDNEY DISEASE (HCC): ICD-10-CM

## 2025-06-30 DIAGNOSIS — I23.6 LV (LEFT VENTRICULAR) MURAL THROMBUS FOLLOWING MI (HCC): ICD-10-CM

## 2025-06-30 DIAGNOSIS — I10 ESSENTIAL HYPERTENSION: ICD-10-CM

## 2025-06-30 PROCEDURE — 99214 OFFICE O/P EST MOD 30 MIN: CPT | Performed by: INTERNAL MEDICINE

## 2025-06-30 NOTE — PROGRESS NOTES
[facility]  [address] [place] [address]  Phone#  [phone]   Fax#  [phone]  *-*-*-*-*-*-*-*-*-*-*-*-*-*-*-*-*-*-*-*-*-*-*-*-*-*-*-*-*-*-*-*-*-*-*-*-*-*-*-*-*-*-*-*-*-*-*-*-*-*-*-*-*-*                                   Cardiology Follow Up      ENCOUNTER DATE: 25 3:17 PM  PATIENT NAME: Airam Rainey   : 1965    MRN: [id number]  AGE:60 y.o.      SEX: female  ENCOUNTER PROVIDER:Gibran Dennison MD     PRIMARY CARE PHYSICIAN: JOSE ROBERTO Grace    ACTIVE DIAGNOSIS AND PLAN    1. Triple vessel coronary artery disease  Assessment & Plan:  Patient was referred to us from [facility]. She experienced a myocardial infarction on 2017 in [place]. She has subsequently moved to [facility]. She had a cardiac catheterization taken at [facility]. Patient's cardiac catheterization CD revealed the left main coronary artery to be nonobstructed. The left anterior descending artery was 100% in its midportion after a large 1st diagonal branch which was nonobstructed. The circumflex artery obtuse marginal was 90%. The right coronary artery in its midportion with 50-60%. The large distal LAD filled from right to left collaterals. [facility] had recommended CABG but the patient had refused at that time. No ventriculogram was performed but an echocardiogram which I performed had demonstrated extensive LV damage. A nuclear stress test demonstrated minimal with any it ischemia. I did not feel that the patient would improve with CABG.      Patient is free of angina pectoris or shortness of breath. She is doing quite well for her degree of LV dysfunction.    Aspirin 81 mg daily  Carvedilol 6.25 mg 2 times a day  Jardiance 10 mg daily  Entresto 49/51 - 2 times a day  2. Ischemic cardiomyopathy  Assessment & Plan:  05/15/2017 stress test: Severe LV dysfunction, EF 20%. Mild LV enlargement. Anterior apical  aneurysm with paradoxical motion. Extensive myocardial infarctions involving the distal anterior apical and inferior apical walls. Negative for Persantine induced ischemia.      12/13/2017 ICD: single-chamber.    03/12/2019 echocardiogram: Severe LV systolic dysfunction, EF 25-27% apex is paradoxical. Grade 1 diastolic dysfunction. Thrombus in the septal apex with smoke     4/12/2023 echocardiogram: LVEF 42% with cavity upper limits of normal.  Anterior walls are hypokinetic.  [place] is hypokinetic to akinetic.  Grade 1 diastolic dysfunction.    ICD in situ    Aspirin 81 mg daily   carvedilol 6.25 mg twice daily  Jardiance 10 mg daily  Entresto 49/51 2 times daily  Orders:  -     Echo; Future; Expected date: 07/07/2025  3. Mixed hyperlipidemia  Assessment & Plan:  04/16/2025 cholesterol 142, triglycerides 120, HDL 53, LDL calculated 65, non-HDL cholesterol 89.    Atorvastatin 80 mg daily  4. 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 ([facility])  Assessment & Plan:  Initially patient had a mural thrombus noted on echocardiogram.  Her first echocardiogram from 3/12/2019 after she moved to the [place] area demonstrated thrombus in the apical septum which was akinetic to dyskinetic.  She had evidence of smoke in the apex.  She was placed on Coumadin anticoagulation because of the thrombus.  The thrombus was noted 2 years after her infarction but the smoke was also concerning.  Subsequently the LV thrombus disappeared and previous smoke the apex disappeared, I decided to continue her on Coumadin indefinitely but most recently hide have reconsidered.  She had to discontinue the Coumadin for endoscopy.  Since she discontinued the Coumadin, I thought I would repeat the echocardiogram off Coumadin and see if the smoke or thrombus was coming back.  If it was not coming back, I thought I would leave her off Coumadin since the guidelines do not endorse lifelong Coumadin for LV thrombus.  I will repeat her  echocardiogram.  Orders:  -     Echo; Future; Expected date: 07/07/2025  5. Essential hypertension  Assessment & Plan:  7/26/2024 95/65  7/29/2024 110/61  10/18/2024 110/74  2/19/2025 128/84  3/3/2025  100/60  04/18/2025  117/59    Carvedilol 6.25 mg twice daily  Entresto 49/51 twice daily  6. Stage 3b chronic kidney disease (HCC)  Assessment & Plan:  Lab Results   Component Value Date    EGFR 43 04/16/2025    EGFR 38 01/27/2025    EGFR 40 10/15/2024    CREATININE 1.35 (H) 04/16/2025    CREATININE 1.50 (H) 01/27/2025    CREATININE 1.44 (H) 10/15/2024     7. Former smoker     INTERVAL HISTORY:        Patient is asymptomatic.She denies chest discomfort or shortness of breath.  She has no palpitations.  She denies symptoms of dizziness, lightheadedness or near-syncope/syncope.  She denies leg edema.  She denies symptoms of orthopnea or paroxysmal nocturnal dyspnea.    Her blood pressure is on the low side at 96/60 but she has no lightheadedness.  Since she recently stopped her Coumadin for endoscopy, I reevaluated her need for lifelong Coumadin and decided to reecho her after she was off Coumadin for approximately 3 months.  Her last echo which was performed on Coumadin did demonstrate resolution of thrombus.    She appears euvolemic.  She has no signs of edema.  Her serum sodium is 144    DISCUSSION/PLAN:          Stay off Coumadin and obtain repeat echocardiogram  Liberalize her oral fluid and see if renal function improves  Return in 6 months.      CARDIOLOGY HISTORY AND TESTING  Patient was referred to us from Family Practice. She experienced a myocardial infarction on February 28, 2017 in [place]. She has subsequently moved to [facility]. She had a cardiac catheterization taken at [facility]. Patient's cardiac catheterization CD revealed the left main coronary artery to be nonobstructed. The left anterior descending artery was 100% in its midportion after a large 1st diagonal  branch which was nonobstructed. The circumflex artery obtuse marginal was 90%. The right coronary artery in its midportion with 50-60%. The large distal LAD filled from right to left collaterals. [facility] had recommended CABG but the patient had refused at that time. No ventriculogram was performed but an echocardiogram which I performed had demonstrated extensive LV damage. A nuclear stress test demonstrated minimal with any it ischemia. I did not feel that the patient would improve with CABG.     Patient is free of angina pectoris or shortness of breath. She is doing quite well for her degree of LV dysfunction.     04/11/2017 echocardiogram severe LV dysfunction, EF 30%, mild LV enlargement, apical aneurysm with akinesis to paradoxical motion. Large organized apical thrombus. Grade 1 diastolic dysfunction, normal pulmonary artery pressures     05/15/2017 stress test: Severe LV dysfunction, EF 20%. Mild LV enlargement. Anterior apical aneurysm with paradoxical motion. Extensive myocardial infarctions involving the distal anterior apical and inferior apical walls. Negative for Persantine induced ischemia.      12/13/2017 ICD: single-chamber.    03/12/2019 echocardiogram: Severe LV systolic dysfunction, EF 25-27% apex is paradoxical. Grade 1 diastolic dysfunction. Thrombus in the septal apex with smoke    4/12/2023 echocardiogram: LVEF 42% with cavity upper limits of normal.  Anterior walls are hypokinetic.  [place] is hypokinetic to akinetic.  Grade 1 diastolic dysfunction.    ACTIVE PROBLEM LIST  Problem List[1]    TODAY'S EKG  No results found for this visit on 06/30/25.      Lab Studies:    Lab Results   Component Value Date    CHOLESTEROL 142 04/16/2025    CHOLESTEROL 163 04/22/2024    CHOLESTEROL 164 11/24/2023     Lab Results   Component Value Date    TRIG 120 04/16/2025    TRIG 312 (H) 04/22/2024    TRIG 172 (H) 11/24/2023     Lab Results   Component Value Date    HDL 53 04/16/2025     HDL 56 04/22/2024    HDL 53 11/24/2023     Lab Results   Component Value Date    LDLCALC 65 04/16/2025    LDLCALC 45 04/22/2024    LDLCALC 77 11/24/2023     Lab Results   Component Value Date    LDLDIRECT 76.46 02/14/2019         Lab Results   Component Value Date    HGBA1C 5.8 (H) 04/16/2025    HGBA1C 5.9 (H) 04/22/2024    HGBA1C 5.7 (H) 11/24/2023      Lab Results   Component Value Date    EGFR 43 04/16/2025    EGFR 38 01/27/2025    EGFR 40 10/15/2024    SODIUM 144 04/16/2025    SODIUM 142 01/27/2025    SODIUM 142 10/15/2024    K 4.9 04/16/2025    K 4.2 01/27/2025    K 4.2 10/15/2024     (H) 04/16/2025     (H) 01/27/2025     10/15/2024    CO2 28 04/16/2025    CO2 29 01/27/2025    CO2 28 10/15/2024    BUN 14 04/16/2025    BUN 20 01/27/2025    BUN 11 10/15/2024    CREATININE 1.35 (H) 04/16/2025    CREATININE 1.50 (H) 01/27/2025    CREATININE 1.44 (H) 10/15/2024    MG 2.2 04/20/2023    MG 2.1 12/13/2017     Lab Results   Component Value Date    WBC 4.81 04/16/2025    WBC 4.6 04/16/2025    WBC 4.79 01/27/2025    HGB 13.6 04/16/2025    HGB 12.5 04/16/2025    HGB 12.7 01/27/2025    HCT 42.8 04/16/2025    HCT 36.5 04/16/2025    HCT 38.3 01/27/2025    MCV 95 04/16/2025    MCV 92 04/16/2025    MCV 91 01/27/2025    MCH 30.1 04/16/2025    MCH 31.5 04/16/2025    MCH 30.1 01/27/2025    MCHC 31.8 04/16/2025    MCHC 34.2 04/16/2025    MCHC 33.2 01/27/2025     04/16/2025     04/16/2025     01/27/2025      Lab Results   Component Value Date    CALCIUM 9.6 04/16/2025    CALCIUM 9.7 01/27/2025    CALCIUM 9.5 10/15/2024    ALB 4.1 04/16/2025    ALB 4.0 01/27/2025    ALB 4.1 10/15/2024    TP 7.0 04/16/2025    TP 6.8 01/27/2025    TP 6.6 10/15/2024    AST 14 04/16/2025    AST 15 01/27/2025    AST 22 10/15/2024    ALT 13 04/16/2025    ALT 9 01/27/2025    ALT 19 10/15/2024    ALKPHOS 75 04/16/2025    ALKPHOS 55 01/27/2025    ALKPHOS 58 10/15/2024     Lab Results   Component Value Date    NTBNP 951  (H) 2022    NTBNP 556 (H) 2020       Current Medications[2]  Allergies   Allergen Reactions    No Active Allergies     No Known Allergies        Past Medical History[3]  Social History     Socioeconomic History    Marital status: Single     Spouse name: Not on file    Number of children: Not on file    Years of education: Not on file    Highest education level: Not on file   Occupational History    Not on file   Tobacco Use    Smoking status: Former     Current packs/day: 0.00     Average packs/day: 1 pack/day for 39.2 years (39.2 ttl pk-yrs)     Types: Cigarettes     Start date: 1984     Quit date: 2023     Years since quittin.2     Passive exposure: Past    Smokeless tobacco: Never   Vaping Use    Vaping status: Never Used   Substance and Sexual Activity    Alcohol use: Yes     Comment: socially    Drug use: No    Sexual activity: Not Currently   Other Topics Concern    Not on file   Social History Narrative    Not on file     Social Drivers of Health     Financial Resource Strain: Low Risk (12/15/2023)    Received from [facility] ([facility])    Financial Resource Strain     Sometimes people find that their income does not quite cover their living costs.  In the last 12 months, has this happened to you?: No     What is your current work situation? : Unemployed, and not seeking work (ex: student, retired, disabled, unpaid primary care giver)   Food Insecurity: No Food Insecurity (2025)    Nursing - Inadequate Food Risk Classification     Worried About Running Out of Food in the Last Year: Never true     Ran Out of Food in the Last Year: Never true     Ran Out of Food in the Last Year: Not on file   Transportation Needs: Low Risk (12/15/2023)    Received from [facility] ([facility])    Transportation     Has a lack of transportation kept you from medical appointments, meetings, work, or from getting things needed for daily living. Check all that apply. : No   Physical  Activity: Not on file   Stress: Low Risk (12/15/2023)    Received from [facility] ([facility])    Stress     Over the last 2 weeks, how often have you been bothered by the following problems: feeling nervous, anxious, on edge?: Not at all     Over the last 2 weeks, how often have you been bothered by the following problems: Not being able to stop or control worrying?: Not at all   Social Connections: Socially Isolated (2/19/2025)    Social Connection and Isolation Panel     Frequency of Communication with Friends and Family: More than three times a week     Frequency of Social Gatherings with Friends and Family: Twice a week     Attends Gnosticist Services: Never     Active Member of Clubs or Organizations: No     Attends Club or Organization Meetings: Never     Marital Status: Never    Intimate Partner Violence: Not At Risk (2/19/2025)    Humiliation, Afraid, Rape, and Kick questionnaire     Fear of Current or Ex-Partner: No     Emotionally Abused: No     Physically Abused: No     Sexually Abused: No   Housing Stability: Unknown (2/19/2025)    Housing Stability Vital Sign     Unable to Pay for Housing in the Last Year: No     Number of Times Moved in the Last Year: Not on file     Homeless in the Last Year: No      Family History[4]  Past Surgical History[5]    PREVIOUS WEIGHTS:   Wt Readings from Last 10 Encounters:   06/30/25 78.5 kg (173 lb)   04/18/25 78.9 kg (174 lb)   03/03/25 80.3 kg (177 lb)   02/19/25 80.6 kg (177 lb 12.8 oz)   01/31/25 79.3 kg (174 lb 12.8 oz)   01/27/25 79.8 kg (175 lb 14.8 oz)   12/10/24 78.9 kg (174 lb)   11/29/24 78.5 kg (173 lb)   10/18/24 78.5 kg (173 lb)   07/29/24 77.9 kg (171 lb 12.8 oz)        Review of Systems:  Review of Systems   Respiratory:  Negative for cough, choking, chest tightness, shortness of breath and wheezing.    Cardiovascular:  Negative for chest pain, palpitations and leg swelling.   Skin:  Negative for rash.   Neurological:  Negative for  dizziness, tremors, syncope, weakness, light-headedness, numbness and headaches.   Psychiatric/Behavioral:  Negative for agitation and behavioral problems. The patient is not hyperactive.        Physical Exam:  BP 96/60 (BP Location: Left arm, Patient Position: Sitting, Cuff Size: Adult)   Pulse 72   Wt 78.5 kg (173 lb)   BMI 31.64 kg/m²     Physical Exam  Constitutional:       General: She is not in acute distress.     Appearance: She is well-developed.   HENT:      Head: Normocephalic and atraumatic.   Neck:      Thyroid: No thyromegaly.      Vascular: No carotid bruit or JVD.      Trachea: No tracheal deviation.     Cardiovascular:      Rate and Rhythm: Normal rate and regular rhythm.      Pulses: Normal pulses.      Heart sounds: Normal heart sounds. No murmur heard.     No friction rub. No gallop.   Pulmonary:      Effort: Pulmonary effort is normal. No respiratory distress.      Breath sounds: Normal breath sounds. No wheezing, rhonchi or rales.   Chest:      Chest wall: No tenderness.     Musculoskeletal:         General: Normal range of motion.      Cervical back: Normal range of motion and neck supple.      Right lower leg: No edema.      Left lower leg: No edema.     Skin:     General: Skin is warm and dry.     Neurological:      General: No focal deficit present.      Mental Status: She is alert and oriented to person, place, and time.     Psychiatric:         Mood and Affect: Mood normal.         Behavior: Behavior normal.         Thought Content: Thought content normal.         Judgment: Judgment normal.         -------------------------------------------------------------------------------   CARDIAC EP DEVICE REPORT  Results for orders placed in visit on 04/15/25    Cardiac EP device report    Narrative  MDT-SINGLE CHAMBER ICD - ACTIVE SYSTEM IS MRI CONDITIONAL  CARELINK TRANSMISSION: BATTERY VOLTAGE ADEQUATE (3.6 YR).  <0.1% (VVI 40 PPM). ALL AVAILABLE LEAD PARAMETERS WITHIN NORMAL LIMITS. NO  "SIGNIFICANT HIGH RATE EPISODES. OPTI-VOL WITHIN NORMAL LIMITS. NORMAL DEVICE FUNCTION.  ES      Results for orders placed in visit on 01/08/25    Cardiac EP device report    Narrative  MDT-SINGLE CHAMBER ICD - ACTIVE SYSTEM IS MRI CONDITIONAL  DEVICE INTERROGATED IN THE [facility] OFFICE. BATTERY VOLTAGE ADEQUATE (3.9 YRS).  <0.1%. ALL LEAD PARAMETERS WITHIN NORMAL LIMITS. 1 NSVT (8 @ 194). PT TAKES CARVEDILOL. EF 42% (ECHO 4/12/23). OPTI-VOL WITHIN NORMAL LIMITS. NO PROGRAMMING CHANGES MADE TO DEVICE PARAMETERS. NORMAL DEVICE FUNCTION. [facility]/      ======================================================  Imaging:   Results Review Statement: No pertinent imaging studies reviewed.    Portions of the record may have been created with voice recognition software. Occasional wrong word or \"sound a like\" substitutions may have occurred due to the inherent limitations of voice recognition software. Read the chart carefully and recognize, using context, where substitutions have occurred.    SIGNATURES:   Gibran Dennison MD          [1]   Patient Active Problem List  Diagnosis    Ischemic cardiomyopathy    Triple vessel coronary artery disease    HIV disease (HCC)    Essential hypertension    Mixed hyperlipidemia    Impaired left ventricular function    Long term current use of anticoagulant therapy    old extensive anterior, apical, lateral and distal inferior apical MI    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 (HCC)    CKD (chronic kidney disease) stage 3, GFR 30-59 ml/min ([facility])    Implantable cardioverter-defibrillator (ICD) in situ    Mild intermittent asthma without complication    ASCUS with positive high risk HPV cervical    Former smoker    Onychomycosis    Bronchiectasis without complication (HCC)    Aggression    Orbital cellulitis on left    Abnormal CT scan, head    Lesion of left frontal lobe of brain    Seasonal allergies    Mucous retention cyst of maxillary sinus    Meningioma, cerebral (HCC)    " Age-related osteoporosis without current pathological fracture    Weakness of extremity    Osteopenia of lumbar spine    Thrombocytopenia (HCC)    Dysphagia    Prediabetes   [2]   Current Outpatient Medications:     aspirin (Aspirin Low Dose) 81 mg EC tablet, Take 1 tablet (81 mg total) by mouth daily, Disp: 90 tablet, Rfl: 3    atorvastatin (LIPITOR) 80 mg tablet, Take 1 tablet (80 mg total) by mouth daily, Disp: 90 tablet, Rfl: 3    bictegravir-emtricitab-tenofovir alafenamide (Biktarvy) -25 MG tablet, TAKE 1 TABLET BY MOUTH ONCE DAILY WITH BREAKFAST, Disp: 30 tablet, Rfl: 5    Calcium Carb-Cholecalciferol (calcium carbonate-vitamin D) 500 mg-5 mcg tablet, TAKE 1 TABLET BY MOUTH DAILY WITH BREAKFAST, Disp: 90 tablet, Rfl: 1    carvedilol (COREG) 6.25 mg tablet, Take 1 tablet (6.25 mg total) by mouth 2 (two) times a day with meals, Disp: 180 tablet, Rfl: 3    cholecalciferol (VITAMIN D3) 1,000 units tablet, Take 2,000 Units by mouth in the morning., Disp: , Rfl:     nicotine polacrilex (COMMIT) 4 MG lozenge, APPLY 1 LOZENGE (4MG) TO THE MOUTH OR THROAT AS NEEDED FOR SMOKING CESSATION, Disp: 216 lozenge, Rfl: 2    sacubitril-valsartan (Entresto) 49-51 MG TABS, Take 1 tablet by mouth 2 (two) times a day, Disp: 180 tablet, Rfl: 3  [3]   Past Medical History:  Diagnosis Date    Asthma     Coronary artery disease     defibrillator    HIV positive (HCC)    [4]   Family History  Problem Relation Name Age of Onset    No Known Problems Mother      Other Father          GI bleed    No Known Problems Sister      No Known Problems Sister      No Known Problems Maternal Grandmother      No Known Problems Maternal Grandfather      No Known Problems Paternal Grandmother      No Known Problems Paternal Grandfather      Suicidality Brother      Drug abuse Brother          overdose    No Known Problems Maternal Aunt      No Known Problems Maternal Aunt      No Known Problems Maternal Aunt      No Known Problems Paternal Aunt      [5]   Past Surgical History:  Procedure Laterality Date    ANGIOPLASTY      CARDIAC DEFIBRILLATOR PLACEMENT      CORONARY ANGIOPLASTY      pci placement    TOTAL ABDOMINAL HYSTERECTOMY      US GUIDED INJECTION FOR RESEARCH STUDY  5/7/2018    US GUIDED INJECTION FOR RESEARCH STUDY  1/18/2019    US GUIDED INJECTION FOR RESEARCH STUDY  5/7/2018    US GUIDED INJECTION FOR RESEARCH STUDY  5/14/2018

## 2025-07-08 NOTE — ASSESSMENT & PLAN NOTE
1 year follow up for left frontal meningioma.   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   Feeling well.   Exam: Nonfocal    Imaging:   MRI brain w/wo 6/3/25: 1.  Stable 1.5 cm meningioma along the undersurface of the left frontal lobe. Continued clinical and imaging surveillance advised. 2.  Moderate, chronic microangiopathy is slightly progressed from the prior study. 3.  No acute infarction or intracranial hemorrhage    Plan:  Reviewed images with patient.  Stable appearance to meningioma.  Discussed natural history of meningiomas. They are generally asymptomatic, benign and slow growing.  It is not unexpected for a meningioma to grow 1-2 mm/year. Recommend follow up as directed by the NCCN guidelines.   If there is growth or patient were to have symptoms, there are options for management including surgical resection or SRS.   Given stability, recommend follow up in 1 year with MRI brain w/wo to see AP (ICD is MRI conditional)  Review red flag s/s.   Call sooner with any questions or concerns.     Orders:    BUN; Future    Creatinine, serum; Future    MRI brain w wo contrast; Future

## 2025-07-09 ENCOUNTER — OFFICE VISIT (OUTPATIENT)
Dept: NEUROSURGERY | Facility: CLINIC | Age: 60
End: 2025-07-09
Payer: MEDICARE

## 2025-07-09 ENCOUNTER — TELEPHONE (OUTPATIENT)
Dept: NEUROSURGERY | Facility: CLINIC | Age: 60
End: 2025-07-09

## 2025-07-09 VITALS
HEIGHT: 62 IN | RESPIRATION RATE: 18 BRPM | SYSTOLIC BLOOD PRESSURE: 118 MMHG | BODY MASS INDEX: 31.83 KG/M2 | WEIGHT: 173 LBS | TEMPERATURE: 97.1 F | OXYGEN SATURATION: 98 % | DIASTOLIC BLOOD PRESSURE: 80 MMHG | HEART RATE: 68 BPM

## 2025-07-09 DIAGNOSIS — D32.0 MENINGIOMA, CEREBRAL (HCC): Primary | ICD-10-CM

## 2025-07-09 PROCEDURE — 99213 OFFICE O/P EST LOW 20 MIN: CPT | Performed by: PHYSICIAN ASSISTANT

## 2025-07-09 NOTE — TELEPHONE ENCOUNTER
7/9/25 patient placed on wait list with [facility] scheduling for mri brain in 1 year July 2026 due to having a pacemaker appointment with [facility] made on 7/15/26 advised patient to call our office once the mri brain is scheduled if she needs to move her follow up appointment. Also set a task to follow up in 1 year to make sure mri brain is scheduled.

## 2025-07-09 NOTE — PROGRESS NOTES
Name: Airam Rainey      : 1965      MRN: [id number]  Encounter Provider: Yomaira Stephens PA-C  Encounter Date: 2025   Encounter department: [facility] NEUROSURGICAL [facility] [place]  :  Assessment & Plan  Meningioma, cerebral (HCC)  1 year follow up for left frontal meningioma.   Initially noted noted on CT orbits in 23 during workup for orbital cellulitis.   Feeling well.   Exam: Nonfocal    Imaging:   MRI brain w/wo 6/3/25: 1.  Stable 1.5 cm meningioma along the undersurface of the left frontal lobe. Continued clinical and imaging surveillance advised. 2.  Moderate, chronic microangiopathy is slightly progressed from the prior study. 3.  No acute infarction or intracranial hemorrhage    Plan:  Reviewed images with patient.  Stable appearance to meningioma.  Discussed natural history of meningiomas. They are generally asymptomatic, benign and slow growing.  It is not unexpected for a meningioma to grow 1-2 mm/year. Recommend follow up as directed by the NCCN guidelines.   If there is growth or patient were to have symptoms, there are options for management including surgical resection or SRS.   Given stability, recommend follow up in 1 year with MRI brain w/wo to see AP (ICD is MRI conditional)  Review red flag s/s.   Call sooner with any questions or concerns.     Orders:    BUN; Future    Creatinine, serum; Future    MRI brain w wo contrast; Future        History of Present Illness     Airam Rainey is a 60 y.o. female who presents for 1 year follow-up of a meningioma.  This was incidentially noted on CT orbits in 23 during workup for orbital cellulitis.  She is feeling well since last visit, no new complaints. Past medical history of triple-vessel CAD, HIV, CKD stage III, tobacco use disorder, LVH mural thrombosis, AICD/cardiac pacemaker (MRI Conditional).    HPI     Review of Systems   Constitutional: Negative.    HENT:  Negative for tinnitus.    Eyes:  Negative for visual  "disturbance.   Respiratory: Negative.     Cardiovascular: Negative.    Gastrointestinal:  Negative for nausea and vomiting.   Endocrine: Negative.    Genitourinary: Negative.    Musculoskeletal:  Negative for gait problem.   Skin: Negative.    Allergic/Immunologic: Negative.    Neurological:  Negative for dizziness, speech difficulty, weakness, numbness and headaches.   Hematological: Negative.    Psychiatric/Behavioral:  Negative for confusion and sleep disturbance.      I have personally reviewed the MA's review of systems and made changes as necessary.    Past Medical History   Past Medical History[1]  Past Surgical History[2]  Family History[3]  she reports that she quit smoking about 2 years ago. Her smoking use included cigarettes. She started smoking about 41 years ago. She has a 39.2 pack-year smoking history. She has been exposed to tobacco smoke. She has never used smokeless tobacco. She reports current alcohol use. She reports that she does not use drugs.  Current Outpatient Medications   Medication Instructions    aspirin (ASPIRIN LOW DOSE) 81 mg, Oral, Daily    atorvastatin (LIPITOR) 80 mg, Oral, Daily    bictegravir-emtricitab-tenofovir alafenamide (Biktarvy) -25 MG tablet 1 tablet, Oral, Daily with breakfast    Calcium Carb-Cholecalciferol (calcium carbonate-vitamin D) 500 mg-5 mcg tablet 1 tablet, Oral, Daily with breakfast    carvedilol (COREG) 6.25 mg, Oral, 2 times daily with meals    cholecalciferol (VITAMIN D3) 2,000 Units, Daily    nicotine polacrilex (COMMIT) 4 MG lozenge APPLY 1 LOZENGE (4MG) TO THE MOUTH OR THROAT AS NEEDED FOR SMOKING CESSATION    sacubitril-valsartan (Entresto) 49-51 MG TABS 1 tablet, Oral, 2 times daily   Allergies[4]   Objective   /80 (BP Location: Left arm, Patient Position: Sitting, Cuff Size: Standard)   Pulse 68   Temp (!) 97.1 °F (36.2 °C) (Temporal)   Resp 18   Ht 5' 2\" (1.575 m)   Wt 78.5 kg (173 lb)   SpO2 98%   BMI 31.64 kg/m²     Physical " Exam  Vitals reviewed.   Constitutional:       General: She is awake.      Appearance: Normal appearance.   HENT:      Head: Normocephalic and atraumatic.     Eyes:      Extraocular Movements: Extraocular movements intact.      Conjunctiva/sclera: Conjunctivae normal.      Pupils: Pupils are equal, round, and reactive to light.       Cardiovascular:      Rate and Rhythm: Normal rate.   Pulmonary:      Effort: Pulmonary effort is normal.     Skin:     General: Skin is warm and dry.     Neurological:      Mental Status: She is alert.      Deep Tendon Reflexes:      Reflex Scores:       Bicep reflexes are 2+ on the right side and 2+ on the left side.       Brachioradialis reflexes are 2+ on the right side and 2+ on the left side.       Patellar reflexes are 2+ on the right side and 2+ on the left side.    Psychiatric:         Attention and Perception: Attention and perception normal.         Mood and Affect: Mood and affect normal.         Speech: Speech normal.         Behavior: Behavior normal. Behavior is cooperative.         Thought Content: Thought content normal.         Cognition and Memory: Cognition and memory normal.         Judgment: Judgment normal.       Neurological Exam  Mental Status  Awake and alert. Memory is normal. Recent and remote memory are intact. Speech is normal. Language is fluent with no aphasia. Attention and concentration are normal. Fund of knowledge is appropriate for level of education.    Cranial Nerves  CN III, IV, VI: Extraocular movements intact bilaterally. Pupils equal round and reactive to light bilaterally.  CN V:  Right: Facial sensation is normal.  Left: Facial sensation is normal on the left.  CN VII: Full and symmetric facial movement.  CN VIII: Hearing is normal.  CN XI:  Right: Trapezius strength is normal.  Left: Trapezius strength is normal.  CN XII: Tongue midline without atrophy or fasciculations.    Motor  Normal muscle bulk throughout. Normal muscle tone. No pronator  drift.                                             Right                     Left  Hip flexion                              5                          5  Plantarflexion                         5                          5  Dorsiflexion                            5                          5                                             Right                     Left   Biceps                                   5                          5   Triceps                                  5                          5   5/5 bilaterally .    Sensory  Light touch is normal in upper and lower extremities.     Reflexes                                            Right                      Left  Brachioradialis                    2+                         2+  Biceps                                 2+                         2+  Patellar                                2+                         2+    Right pathological reflexes: Tyler's absent.  Left pathological reflexes: Tyler's absent.    Coordination  Right: Finger-to-nose normal.Left: Finger-to-nose normal.    Gait  Casual gait is normal including stance, stride, and arm swing.                     [1]   Past Medical History:  Diagnosis Date    Asthma     Coronary artery disease     defibrillator    HIV positive ([facility])    [2]   Past Surgical History:  Procedure Laterality Date    ANGIOPLASTY      CARDIAC DEFIBRILLATOR PLACEMENT      CORONARY ANGIOPLASTY      pci placement    TOTAL ABDOMINAL HYSTERECTOMY      US GUIDED INJECTION FOR RESEARCH STUDY  5/7/2018    US GUIDED INJECTION FOR RESEARCH STUDY  1/18/2019    US GUIDED INJECTION FOR RESEARCH STUDY  5/7/2018    US GUIDED INJECTION FOR RESEARCH STUDY  5/14/2018   [3]   Family History  Problem Relation Name Age of Onset    No Known Problems Mother      Other Father          GI bleed    No Known Problems Sister      No Known Problems Sister      No Known Problems Maternal Grandmother      No Known Problems Maternal  Grandfather      No Known Problems Paternal Grandmother      No Known Problems Paternal Grandfather      Suicidality Brother      Drug abuse Brother          overdose    No Known Problems Maternal Aunt      No Known Problems Maternal Aunt      No Known Problems Maternal Aunt      No Known Problems Paternal Aunt     [4]   Allergies  Allergen Reactions    No Active Allergies     No Known Allergies

## 2025-07-14 ENCOUNTER — HOSPITAL ENCOUNTER (EMERGENCY)
Facility: HOSPITAL | Age: 60
Discharge: HOME | End: 2025-07-14
Attending: EMERGENCY MEDICINE | Admitting: EMERGENCY MEDICINE
Payer: MEDICARE

## 2025-07-14 VITALS
SYSTOLIC BLOOD PRESSURE: 90 MMHG | OXYGEN SATURATION: 98 % | DIASTOLIC BLOOD PRESSURE: 67 MMHG | RESPIRATION RATE: 20 BRPM | TEMPERATURE: 98.4 F | WEIGHT: 169.97 LBS | HEART RATE: 64 BPM | BODY MASS INDEX: 31.09 KG/M2

## 2025-07-14 DIAGNOSIS — B34.9 VIRAL SYNDROME: Primary | ICD-10-CM

## 2025-07-14 LAB
FLUAV AG UPPER RESP QL IA.RAPID: NEGATIVE
FLUBV AG UPPER RESP QL IA.RAPID: NEGATIVE
SARS-COV+SARS-COV-2 AG RESP QL IA.RAPID: NEGATIVE

## 2025-07-14 PROCEDURE — 87811 SARS-COV-2 COVID19 W/OPTIC: CPT | Performed by: PHYSICIAN ASSISTANT

## 2025-07-14 PROCEDURE — 99282 EMERGENCY DEPT VISIT SF MDM: CPT

## 2025-07-14 PROCEDURE — 99284 EMERGENCY DEPT VISIT MOD MDM: CPT | Performed by: EMERGENCY MEDICINE

## 2025-07-14 PROCEDURE — 87804 INFLUENZA ASSAY W/OPTIC: CPT | Performed by: PHYSICIAN ASSISTANT

## 2025-07-14 RX ORDER — BENZONATATE 100 MG/1
100 CAPSULE ORAL 3 TIMES DAILY PRN
Qty: 21 CAPSULE | Refills: 0 | Status: SHIPPED | OUTPATIENT
Start: 2025-07-14

## 2025-07-14 NOTE — ED PROVIDER NOTES
Time reflects when diagnosis was documented in both MDM as applicable and the Disposition within this note       Time User Action Codes Description Comment    7/14/2025  4:22 PM Bright Kwon Add [B34.9] Viral syndrome           ED Disposition       ED Disposition   Discharge    Condition   Stable    Date/Time   Mon Jul 14, 2025  4:22 PM    Comment   Airam Rainey discharge to home/self care.                   Assessment & Plan       Medical Decision Making  1 day of cough, congestion.  She reportedly had a fever yesterday although none today.  Denies dyspnea, chest pain.  On exam she is well-appearing, nontoxic, no distress.  She is afebrile here.  Appears well-hydrated clinically.  Lungs are clear bilaterally.  Viral etiology fever, COVID and flu testing obtained and pending at time of discharge.  Will discharge with continued supportive care.  Follow-up and strict return to ED indications reviewed.    Amount and/or Complexity of Data Reviewed  Labs: ordered.    Risk  OTC drugs.  Prescription drug management.             Medications - No data to display    ED Risk Strat Scores                    No data recorded        SBIRT 20yo+      Flowsheet Row Most Recent Value   Initial Alcohol Screen: US AUDIT-C     1. How often do you have a drink containing alcohol? 0 Filed at: 07/14/2025 1520   2. How many drinks containing alcohol do you have on a typical day you are drinking?  0 Filed at: 07/14/2025 1520   3a. Male UNDER 65: How often do you have five or more drinks on one occasion? 0 Filed at: 07/14/2025 1520   3b. FEMALE Any Age, or MALE 65+: How often do you have 4 or more drinks on one occassion? 0 Filed at: 07/14/2025 1520   Audit-C Score 0 Filed at: 07/14/2025 1520   ALICIA: How many times in the past year have you...    Used an illegal drug or used a prescription medication for non-medical reasons? Never Filed at: 07/14/2025 1520                            History of Present Illness       Chief Complaint    Patient presents with    Nasal Congestion     Pt reports runny nose, fever,and cough starting yesterday. Denies SOB/CP        Past Medical History[1]   Past Surgical History[2]   Family History[3]   Social History[4]   E-Cigarette/Vaping    E-Cigarette Use Never User       E-Cigarette/Vaping Substances    Nicotine No     THC No     CBD No     Flavoring No     Other No     Unknown No       I have reviewed and agree with the history as documented.     Airam is a 60-year-old female, history of CAD, HIV on Biktarvy and compliant with regimen, CKD, presenting with 1 day of cough, congestion.  She apparently had a fever yesterday to 102 although no fever today.  Cough has been occasionally productive of sputum.  Denies nausea, vomiting, diarrhea.  No shortness of breath noted.  No chest pain or pressure.  No known sick contacts or recent travel.      History provided by:  Patient      Review of Systems   Constitutional:  Positive for fever. Negative for chills.   HENT:  Positive for congestion. Negative for rhinorrhea and sore throat.    Eyes:  Negative for pain and visual disturbance.   Respiratory:  Positive for cough. Negative for shortness of breath and wheezing.    Cardiovascular:  Negative for chest pain and palpitations.   Gastrointestinal:  Negative for abdominal pain, nausea and vomiting.   Genitourinary:  Negative for dysuria, frequency and urgency.   Musculoskeletal:  Negative for back pain, neck pain and neck stiffness.   Skin:  Negative for rash and wound.   Neurological:  Negative for dizziness, weakness, light-headedness and numbness.           Objective       ED Triage Vitals [07/14/25 1518]   Temperature Pulse Blood Pressure Respirations SpO2 Patient Position - Orthostatic VS   98.4 °F (36.9 °C) 64 90/67 20 98 % Sitting      Temp Source Heart Rate Source BP Location FiO2 (%) Pain Score    Oral Monitor Right arm -- --      Vitals      Date and Time Temp Pulse SpO2 Resp BP Pain Score FACES Pain Rating  User   07/14/25 1518 98.4 °F (36.9 °C) 64 98 % 20 90/67 -- -- MLR            Physical Exam  Constitutional:       General: She is not in acute distress.     Appearance: She is well-developed. She is not diaphoretic.   HENT:      Head: Normocephalic and atraumatic.      Right Ear: External ear normal.      Left Ear: External ear normal.     Eyes:      Extraocular Movements:      Right eye: Normal extraocular motion.      Left eye: Normal extraocular motion.      Conjunctiva/sclera: Conjunctivae normal.      Pupils: Pupils are equal, round, and reactive to light.       Cardiovascular:      Rate and Rhythm: Normal rate and regular rhythm.   Pulmonary:      Effort: Pulmonary effort is normal. No accessory muscle usage or respiratory distress.      Breath sounds: No wheezing or rales.   Abdominal:      General: Abdomen is flat. There is no distension.     Musculoskeletal:      Cervical back: Normal range of motion. No rigidity.     Skin:     General: Skin is warm and dry.      Capillary Refill: Capillary refill takes less than 2 seconds.      Findings: No erythema or rash.     Neurological:      Mental Status: She is alert and oriented to person, place, and time.      Motor: No abnormal muscle tone.      Coordination: Coordination normal.     Psychiatric:         Behavior: Behavior normal.         Thought Content: Thought content normal.         Judgment: Judgment normal.         Results Reviewed       Procedure Component Value Units Date/Time    FLU/COVID Rapid Antigen (30 min. TAT) - Preferred screening test in ED [[id number]]  (Normal) Collected: 07/14/25 1621    Lab Status: Final result Specimen: Nares from Nose Updated: 07/14/25 1643     SARS COV Rapid Antigen Negative     Influenza A Rapid Antigen Negative     Influenza B Rapid Antigen Negative    Narrative:      This test has been performed using the Quidel Nazanin 2 FLU+SARS Antigen test under the Emergency Use Authorization (EUA). This test has been validated by  the  and verified by the performing laboratory. The Nazanin uses lateral flow immunofluorescent sandwich assay to detect SARS-COV, Influenza A and Influenza B Antigen.     The Quidel Nazanin 2 SARS Antigen test does not differentiate between SARS-CoV and SARS-CoV-2.     Negative results are presumptive and may be confirmed with a molecular assay, if necessary, for patient management. Negative results do not rule out SARS-CoV-2 or influenza infection and should not be used as the sole basis for treatment or patient management decisions. A negative test result may occur if the level of antigen in a sample is below the limit of detection of this test.     Positive results are indicative of the presence of viral antigens, but do not rule out bacterial infection or co-infection with other viruses.     All test results should be used as an adjunct to clinical observations and other information available to the provider.    FOR PEDIATRIC PATIENTS - copy/paste COVID Guidelines URL to browser: [email or web address]            No orders to display       Procedures    ED Medication and Procedure Management   Prior to Admission Medications   Prescriptions Last Dose Informant Patient Reported? Taking?   Calcium Carb-Cholecalciferol (calcium carbonate-vitamin D) 500 mg-5 mcg tablet   No No   Sig: TAKE 1 TABLET BY MOUTH DAILY WITH BREAKFAST   aspirin (Aspirin Low Dose) 81 mg EC tablet 7/14/2025 Morning Self No Yes   Sig: Take 1 tablet (81 mg total) by mouth daily   atorvastatin (LIPITOR) 80 mg tablet 7/14/2025 Morning Self No Yes   Sig: Take 1 tablet (80 mg total) by mouth daily   bictegravir-emtricitab-tenofovir alafenamide (Biktarvy) -25 MG tablet 7/14/2025 Morning  No Yes   Sig: TAKE 1 TABLET BY MOUTH ONCE DAILY WITH BREAKFAST   carvedilol (COREG) 6.25 mg tablet 7/14/2025 Morning Self No Yes   Sig: Take 1 tablet (6.25 mg total) by mouth 2 (two) times a day with  meals   cholecalciferol (VITAMIN D3) 1,000 units tablet 7/13/2025 Evening Self Yes Yes   Sig: Take 2,000 Units by mouth in the morning.   nicotine polacrilex (COMMIT) 4 MG lozenge 7/14/2025 Morning  No Yes   Sig: APPLY 1 LOZENGE (4MG) TO THE MOUTH OR THROAT AS NEEDED FOR SMOKING CESSATION   sacubitril-valsartan (Entresto) 49-51 MG TABS 7/14/2025 Morning Self No Yes   Sig: Take 1 tablet by mouth 2 (two) times a day      Facility-Administered Medications: None     Discharge Medication List as of 7/14/2025  4:25 PM        START taking these medications    Details   benzonatate (TESSALON PERLES) 100 mg capsule Take 1 capsule (100 mg total) by mouth 3 (three) times a day as needed for cough, Starting Mon 7/14/2025, Normal      dextromethorphan-guaifenesin (MUCINEX DM)  MG per 12 hr tablet Take 1 tablet by mouth every 12 (twelve) hours as needed for cough, Starting Mon 7/14/2025, Normal           CONTINUE these medications which have NOT CHANGED    Details   aspirin (Aspirin Low Dose) 81 mg EC tablet Take 1 tablet (81 mg total) by mouth daily, Starting Tue 11/26/2024, Normal      atorvastatin (LIPITOR) 80 mg tablet Take 1 tablet (80 mg total) by mouth daily, Starting Tue 11/26/2024, Normal      bictegravir-emtricitab-tenofovir alafenamide (Biktarvy) -25 MG tablet TAKE 1 TABLET BY MOUTH ONCE DAILY WITH BREAKFAST, Starting Thu 4/17/2025, Normal      carvedilol (COREG) 6.25 mg tablet Take 1 tablet (6.25 mg total) by mouth 2 (two) times a day with meals, Starting Tue 11/26/2024, Normal      cholecalciferol (VITAMIN D3) 1,000 units tablet Take 2,000 Units by mouth in the morning., Historical Med      nicotine polacrilex (COMMIT) 4 MG lozenge APPLY 1 LOZENGE (4MG) TO THE MOUTH OR THROAT AS NEEDED FOR SMOKING CESSATION, Normal      sacubitril-valsartan (Entresto) 49-51 MG TABS Take 1 tablet by mouth 2 (two) times a day, Starting Tue 11/26/2024, Normal      Calcium Carb-Cholecalciferol (calcium carbonate-vitamin D)  500 mg-5 mcg tablet TAKE 1 TABLET BY MOUTH DAILY WITH BREAKFAST, Starting Tue 6/3/2025, Normal           No discharge procedures on file.  ED SEPSIS DOCUMENTATION   Time reflects when diagnosis was documented in both MDM as applicable and the Disposition within this note       Time User Action Codes Description Comment    2025  4:22 PM Bright Kwon [B34.9] Viral syndrome                    [1]   Past Medical History:  Diagnosis Date    Asthma     Coronary artery disease     defibrillator    HIV positive (HCC)    [2]   Past Surgical History:  Procedure Laterality Date    ANGIOPLASTY      CARDIAC DEFIBRILLATOR PLACEMENT      CORONARY ANGIOPLASTY      pci placement    TOTAL ABDOMINAL HYSTERECTOMY      US GUIDED INJECTION FOR RESEARCH STUDY  2018    US GUIDED INJECTION FOR RESEARCH STUDY  2019    US GUIDED INJECTION FOR RESEARCH STUDY  2018    US GUIDED INJECTION FOR RESEARCH STUDY  2018   [3]   Family History  Problem Relation Name Age of Onset    No Known Problems Mother      Other Father          GI bleed    No Known Problems Sister      No Known Problems Sister      No Known Problems Maternal Grandmother      No Known Problems Maternal Grandfather      No Known Problems Paternal Grandmother      No Known Problems Paternal Grandfather      Suicidality Brother      Drug abuse Brother          overdose    No Known Problems Maternal Aunt      No Known Problems Maternal Aunt      No Known Problems Maternal Aunt      No Known Problems Paternal Aunt     [4]   Social History  Tobacco Use    Smoking status: Former     Current packs/day: 0.00     Average packs/day: 1 pack/day for 39.2 years (39.2 ttl pk-yrs)     Types: Cigarettes     Start date: 1984     Quit date: 2023     Years since quittin.2     Passive exposure: Past    Smokeless tobacco: Never   Vaping Use    Vaping status: Never Used   Substance Use Topics    Alcohol use: Yes     Comment: socially    Drug use: No         Bright Kwon PA-C  07/14/25 2486

## 2025-07-15 ENCOUNTER — VBI (OUTPATIENT)
Dept: SURGERY | Facility: CLINIC | Age: 60
End: 2025-07-15

## 2025-07-15 ENCOUNTER — REMOTE DEVICE CLINIC VISIT (OUTPATIENT)
Dept: CARDIOLOGY CLINIC | Facility: CLINIC | Age: 60
End: 2025-07-15
Payer: MEDICARE

## 2025-07-15 DIAGNOSIS — Z95.810 PRESENCE OF AUTOMATIC CARDIOVERTER/DEFIBRILLATOR (AICD): Primary | ICD-10-CM

## 2025-07-15 PROCEDURE — 93295 DEV INTERROG REMOTE 1/2/MLT: CPT | Performed by: STUDENT IN AN ORGANIZED HEALTH CARE EDUCATION/TRAINING PROGRAM

## 2025-07-15 PROCEDURE — 93296 REM INTERROG EVL PM/IDS: CPT | Performed by: STUDENT IN AN ORGANIZED HEALTH CARE EDUCATION/TRAINING PROGRAM

## 2025-07-16 ENCOUNTER — PATIENT OUTREACH (OUTPATIENT)
Dept: SURGERY | Facility: CLINIC | Age: 60
End: 2025-07-16

## 2025-07-16 NOTE — PROGRESS NOTES
Ct let this cm know she has new insurance. Ct agreeable to sending this cm a copy of her new card. Ct aware if [facility] is sending a premium every month, this bill has to be paid. This cm reminded ct that she can submit her [employer] bill every month to this cm to submit to this cm's supervisor for payment.

## 2025-07-17 ENCOUNTER — PATIENT OUTREACH (OUTPATIENT)
Dept: SURGERY | Facility: CLINIC | Age: 60
End: 2025-07-17

## 2025-07-17 NOTE — PROGRESS NOTES
Ct provided his cm with June [employer] premium.     CM completed appropriate paperwork to address [employer] premium. [employer] payment was e-mailed to supervisor, Malgorzata. Requisition check was signed. Bill was scanned into ct's chart.     Supervisor, Malgorzata approved check.

## 2025-07-18 ENCOUNTER — PATIENT OUTREACH (OUTPATIENT)
Dept: SURGERY | Facility: CLINIC | Age: 60
End: 2025-07-18

## 2025-07-21 ENCOUNTER — PATIENT OUTREACH (OUTPATIENT)
Dept: SURGERY | Facility: CLINIC | Age: 60
End: 2025-07-21

## 2025-07-22 ENCOUNTER — PATIENT OUTREACH (OUTPATIENT)
Dept: SURGERY | Facility: CLINIC | Age: 60
End: 2025-07-22

## 2025-07-23 ENCOUNTER — PATIENT OUTREACH (OUTPATIENT)
Dept: SURGERY | Facility: CLINIC | Age: 60
End: 2025-07-23

## 2025-07-23 NOTE — PROGRESS NOTES
Ct provided his cm with July [employer] premium.      CM completed appropriate paperwork to address [employer] premium. [employer] payment was e-mailed to supervisor, Malgorzata. Requisition check was signed. Bill was scanned into ct's chart.     Supervisor, Malgorzata approved check.

## 2025-07-29 ENCOUNTER — PATIENT OUTREACH (OUTPATIENT)
Dept: SURGERY | Facility: CLINIC | Age: 60
End: 2025-07-29

## 2025-08-13 ENCOUNTER — PATIENT OUTREACH (OUTPATIENT)
Dept: SURGERY | Facility: CLINIC | Age: 60
End: 2025-08-13

## 2025-08-13 ENCOUNTER — HOSPITAL ENCOUNTER (OUTPATIENT)
Dept: RADIOLOGY | Facility: HOSPITAL | Age: 60
Discharge: HOME | End: 2025-08-13
Payer: MEDICARE

## 2025-08-13 ENCOUNTER — OFFICE VISIT (OUTPATIENT)
Dept: SURGERY | Facility: CLINIC | Age: 60
End: 2025-08-13
Payer: MEDICARE

## 2025-08-13 DIAGNOSIS — R06.2 WHEEZING: ICD-10-CM

## 2025-08-13 DIAGNOSIS — R05.1 ACUTE COUGH: ICD-10-CM

## 2025-08-13 DIAGNOSIS — R60.0 BILATERAL LOWER EXTREMITY EDEMA: ICD-10-CM

## 2025-08-13 PROBLEM — D69.6 THROMBOCYTOPENIA (HCC): Status: RESOLVED | Noted: 2025-01-31 | Resolved: 2025-08-13

## 2025-08-13 PROBLEM — I51.89 IMPAIRED LEFT VENTRICULAR FUNCTION: Status: RESOLVED | Noted: 2017-11-07 | Resolved: 2025-08-13

## 2025-08-13 PROBLEM — R80.9 PROTEINURIA: Status: ACTIVE | Noted: 2025-08-13

## 2025-08-13 PROCEDURE — 71046 X-RAY EXAM CHEST 2 VIEWS: CPT

## 2025-08-14 ENCOUNTER — TELEPHONE (OUTPATIENT)
Dept: SURGERY | Facility: CLINIC | Age: 60
End: 2025-08-14

## 2025-08-15 ENCOUNTER — PATIENT OUTREACH (OUTPATIENT)
Dept: SURGERY | Facility: CLINIC | Age: 60
End: 2025-08-15